# Patient Record
Sex: MALE | Race: WHITE | NOT HISPANIC OR LATINO | Employment: OTHER | URBAN - METROPOLITAN AREA
[De-identification: names, ages, dates, MRNs, and addresses within clinical notes are randomized per-mention and may not be internally consistent; named-entity substitution may affect disease eponyms.]

---

## 2017-07-17 ENCOUNTER — APPOINTMENT (OUTPATIENT)
Dept: CT IMAGING | Facility: CLINIC | Age: 82
End: 2017-07-17
Attending: EMERGENCY MEDICINE
Payer: MEDICARE

## 2017-07-17 ENCOUNTER — HOSPITAL ENCOUNTER (EMERGENCY)
Facility: CLINIC | Age: 82
Discharge: HOME | End: 2017-07-17
Attending: EMERGENCY MEDICINE | Admitting: EMERGENCY MEDICINE
Payer: MEDICARE

## 2017-07-17 VITALS
HEIGHT: 66 IN | DIASTOLIC BLOOD PRESSURE: 70 MMHG | BODY MASS INDEX: 22.53 KG/M2 | OXYGEN SATURATION: 100 % | WEIGHT: 140.2 LBS | SYSTOLIC BLOOD PRESSURE: 176 MMHG | RESPIRATION RATE: 16 BRPM | TEMPERATURE: 98.5 F

## 2017-07-17 DIAGNOSIS — S00.03XA SCALP HEMATOMA, INITIAL ENCOUNTER: ICD-10-CM

## 2017-07-17 DIAGNOSIS — S09.90XA CLOSED HEAD INJURY, INITIAL ENCOUNTER: ICD-10-CM
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
## 2017-07-17 PROCEDURE — 99284 EMERGENCY DEPT VISIT MOD MDM: CPT | Mod: 25

## 2017-07-17 PROCEDURE — 70450 CT HEAD/BRAIN W/O DYE: CPT

## 2017-07-17 NOTE — DISCHARGE INSTRUCTIONS
*No sports or activities that put you at risk for a repeat head injury until you have been without symptoms for 1 week.   *Tylenol or motrin as directed as needed for pain.  *Follow-up with your doctor for a recheck in 2-3 days.  *Return if you develop worsening headache, recurrent vomiting, seizures, neurologic changes or become worse in any way.    Discharge Instructions  Head Injury    You have been seen today for a head injury. You were checked for serious problems, like bleeding on the brain, but these problems cannot always be found right away.  Due to this risk, you should not be alone for 24 hours after your injury.  Follow up with your regular physician in 2-3 days. If you are taking a blood thinner, such as aspirin, Pradaxa  (dabigatran), Coumadin  (warfarin), or Plavix  (clopidogrel), you are at especially high risk for immediate or delayed bleeding, and need to re-check with a physician in 24 hours, or sooner if any of the symptoms below happen.     Return to the Emergency Department if:    You are confused, have amnesia, or you are not acting right.    Your headache gets worse or you start to have a really bad headache even with your recommended treatment plan.    You vomit more than once.    You have a convulsion or seizure.    You have trouble walking.    You have weakness or paralysis in an arm or a leg.    You have blood or fluid coming from your ears or nose.    You have new symptoms or anything that worries you.    Sleeping:  It is okay for you to sleep, but someone should wake you up as instructed by your doctor, and someone should check on you at your usual time to wake up.     Activity:    Do not drive for at least 24 hours.    Do not drive if you have dizzy spells or trouble concentrating, or remembering things.    Do not return to any contact sports until cleared by your regular doctor.     Follow-up:  It is very important that you make an appointment with your clinic and go to the  appointment.  If you do not follow-up with your regular doctor, it may result in missing an important development which could result in permanent injury or disability and/or lasting pain.  If there is any problem keeping your appointment, call your doctor or return to the Emergency Department.    MORE INFORMATION:    Concussion:  A concussion is a minor head injury that may cause temporary problems with the way your brain works.  Some symptoms include:  confusion, amnesia, nausea and vomiting, dizziness, fatigue, memory or concentration problems, irritability and sleep problems.    CT Scans: Your evaluation today may have included a CT scan (CAT scan) to look for things like bleeding or a skull fracture (break).  CT scans involve radiation and too many CT scans can cause serious health problems like cancer, especially in children.  Because of this, your doctor may not have ordered a CT scan today if they think you are at low risk for a serious or life threatening problem.    If you were given a prescription for medicine here today, be sure to read all of the information (including the package insert) that comes with your prescription.  This will include important information about the medicine, its side effects, and any warnings that you need to know about.  The pharmacist who fills the prescription can provide more information and answer questions you may have about the medicine.  If you have questions or concerns that the pharmacist cannot address, please call or return to the Emergency Department.     Opioid Medication Information    Pain medications are among the most commonly prescribed medicines, so we are including this information for all our patients. If you did not receive pain medication or get a prescription for pain medicine, you can ignore it.     You may have been given a prescription for an opioid (narcotic) pain medicine and/or have received a pain medicine while here in the Emergency Department.  These medicines can make you drowsy or impaired. You must not drive, operate dangerous equipment, or engage in any other dangerous activities while taking these medications. If you drive while taking these medications, you could be arrested for DUI, or driving under the influence. Do not drink any alcohol while you are taking these medications.     Opioid pain medications can cause addiction. If you have a history of chemical dependency of any type, you are at a higher risk of becoming addicted to pain medications.  Only take these prescribed medications to treat your pain when all other options have been tried. Take it for as short a time and as few doses as possible. Store your pain pills in a secure place, as they are frequently stolen and provide a dangerous opportunity for children or visitors in your house to start abusing these powerful medications. We will not replace any lost or stolen medicine.  As soon as your pain is better, you should flush all your remaining medication.     Many prescription pain medications contain Tylenol  (acetaminophen), including Vicodin , Tylenol #3 , Norco , Lortab , and Percocet .  You should not take any extra pills of Tylenol  if you are using these prescription medications or you can get very sick.  Do not ever take more than 3000 mg of acetaminophen in any 24 hour period.    All opioids tend to cause constipation. Drink plenty of water and eat foods that have a lot of fiber, such as fruits, vegetables, prune juice, apple juice and high fiber cereal.  Take a laxative if you don t move your bowels at least every other day. Miralax , Milk of Magnesia, Colace , or Senna  can be used to keep you regular.      Remember that you can always come back to the Emergency Department if you are not able to see your regular doctor in the amount of time listed above, if you get any new symptoms, or if there is anything that worries you.

## 2017-07-17 NOTE — ED NOTES
Pt did not turn lights on and fell down about 6 steps, landed hitting head on the left temple area, bruising left shoulder.

## 2017-07-17 NOTE — ED PROVIDER NOTES
History     Chief Complaint:  Fall, head injury    HPI   Jean Pierre Roblero is a 83 year old male who presents with complaints of a fall and a left-sided forehead hematoma. She was reaching for the lites at the top of the stairs and accidentally overstepped and tumbled down approximately 6 stairs which were carpeted and then onto a tile floor. He had no loss of consciousness. He did hit the left side of his head and sustained a goose egg there. He has not had any nausea, vomiting, numbness, weakness, or other complaints. He did sustain an abrasion over his right foot on the top, but has been ambulating and denies any pain there at this time. His wife says he's been acting normally. He is not on any blood thinning medications. He was seen by the paramedics at his home, after this occurred, because his wife was concerned when she found him on the floor. They recommended he come to the ED, but he refused transport at that time. He denies any complaints at this time.  He does have a Band-Aid over his nose, but this was from a skin lesion biopsy or excision done earlier in the week, and not from a current injury.    Allergies:    Allergies   Allergen Reactions     Chocolate Anaphylaxis     headache     [place] GI Disturbance     Feathers      Novocaine [Procaine]      Passes out almost immediately     [place] [facility] Disturbance     Most berries        Medications:      cholestyramine (QUESTRAN) 4 G packet   Simethicone (PHAZYME) 180 MG CAPS   pilocarpine (PILOCAR) 0.5 % ophthalmic solution   Multiple Vitamin (MULTI-VITAMIN) per tablet   Ascorbic Acid (VITAMIN C PO)   Cyanocobalamin (VITAMIN B 12 PO)   Cholecalciferol (VITAMIN D3 PO)   FINASTERIDE PO   HYDROcodone-acetaminophen 5-325 MG per tablet   ondansetron (ZOFRAN-ODT) 8 MG disintegrating tablet     Past Medical History:      Past Medical History:   Diagnosis Date     Allergic state      Fainting episodes      Gastroesophageal reflux disease      Headaches      Heartburn   "    Nocturia      Photosensitivity      Swallowing difficulty        Past Surgical History:      Past Surgical History:   Procedure Laterality Date     BACK SURGERY       CHOLECYSTECTOMY       ENDOSCOPIC SINUS SURGERY  8/20/2012    Procedure: ENDOSCOPIC SINUS SURGERY;  ENDOSCOPIC REMOVAL OF LEFT NASAL MASS WITH LANDMARKS (Fusion Tracking [place]), BILATERAL INFERIOR TURBINOPLASTY;  Surgeon: Dima Younger MD;  Location: [facility]     EYE SURGERY      cataracts     HERNIA REPAIR       ORTHOPEDIC SURGERY      ingrown toe nail removal     TURBINOPLASTY  8/20/2012    Procedure: TURBINOPLASTY;;  Surgeon: Dima Younger MD;  Location: [facility]       Family History:    Noncontributory    Social History:  Here today with his wife. Lives at home.  Marital Status:   [2]  Social History   Substance Use Topics     Smoking status: Never Smoker     Smokeless tobacco: Never Used     Alcohol use No        Review of Systems  As noted per HPI.  Remainder of a 10 point review of systems was negative.    Physical Exam   First Vitals:  BP: 176/70  Heart Rate: 78  Temp: 98.5  F (36.9  C)  Resp: 16  Height: 167.6 cm (5' 6\")  Weight: 63.6 kg (140 lb 3.2 oz)  SpO2: 100 %    Physical Exam  General: Well-nourished, no acute distress  Eyes: PERRL, conjunctivae pink no scleral icterus or conjunctival injection  ENT:  Moist mucus membranes, posterior oropharynx clear without erythema or exudates, hearing aids in place  Respiratory:  Lungs clear to auscultation bilaterally, no crackles/rubs/wheezes.  Good air movement.  No seatbelt sign or ecchymoses  CV: Normal rate and rhythm, no murmurs/rubs/gallops  GI:  Abdomen soft and non-distended.  Normoactive BS.  No tenderness, guarding or rebound  Skin: Warm, dry.  No rashes or petechiae.  No lacerations.  Superficial abrasion over right foot.  Musculoskeletal: Hematoma over left forehead.  No peripheral edema or calf tenderness.  No midline tenderness of the cervical/thoracic/lumbar spine.  " No tenderness/crepitus/bony stepoffs over the clavicles, chest wall, pelvis, arms or legs.  Neuro: Alert and oriented to person/place/time  Psychiatric: Normal affect    Emergency Department Course     Imaging:  Radiographic findings were communicated with the patient and family who voiced understanding of the findings.  Head CT w/o contrast   Final Result   IMPRESSION:   1. Mild soft tissue swelling overlying the left frontal bone   anteriorly.    2. Mild diffuse chronic changes of the brain. No acute intracranial   abnormality.          ERLIN ROCHE MD        Emergency Department Course:  After reviewing nursing notes and the patient's past medical history, I examined the patient here in the emergency department. I discussed with him the plan of care including CT workup, and he was in agreement with this plan.     Recheck: After evaluation here in the emergency department, I reassured the patient regarding his symptoms and discussed outpatient management and supportive care at home. I also reviewed with him signs and symptoms to watch and return for. The patient agreed with discharge and will follow up with his primary physician. With reasonable clinical confidence, I believe that the patient is stable to receive further medical care as an outpatient. Patient was given copies of labs and radiographic findings.      Impression & Plan      Medical Decision Making:  Jean Pierre Roblero is a 83 year old male presents with a history and clinical exam consistent with closed head injury.  This patient denies seizure, and has no focal neurological findings.  The patient did not have prolonged LOC, sleepiness, repeated emesis, poor orientation, or significant irritability.  Given his age, however, head CT is indicated. I have discussed the risk/benefit analysis with the patient and family regarding CT imaging.  Fortunately this was negative.  The remainder of his head to toe trauma exam is essentially normal. He is at his  "baseline mental status and well appearing. The patient/family understand that they must return if any \"red flags\" appear/develop in the coming hours/days, as this may represent an indication to perform a CT scan.  I have noted that \"red flags\" include: headaches that get worse, increased drowsiness, strange behavior, repetitive speech, seizures, repeated vomiting, growing confusion, increased irritability, slurred speech, weakness or numbness, and loss of responsiveness.  This information will also be provided in writing at discharge.    Diagnosis:    ICD-10-CM    1. Closed head injury, initial encounter S09.90XA    2. Scalp hematoma, initial encounter S00.03XA    3. Fall, initial encounter W19.XXXA        Disposition:  discharged to home    Discharge Medications:  Discharge Medication List as of 7/17/2017  5:17 AM            Madelin Walter  7/17/2017    EMERGENCY DEPARTMENT       Madelin Walter MD  07/17/17 0640    "

## 2017-07-17 NOTE — ED AVS SNAPSHOT
Emergency Department    [address] [facility] [address]    Phone:  [phone]    Fax:  [phone]                                       Jean Pierre Roblero   MRN: [id number]    Department:   Emergency Department   Date of Visit:  7/17/2017           Patient Information     Date Of Birth          6/21/1934        Your diagnoses for this visit were:     Closed head injury, initial encounter     Scalp hematoma, initial encounter     Fall, initial encounter        You were seen by Rogelio Pitt MD and Madelin Walter MD.      Follow-up Information     Follow up with Kolby Bentley MD. Schedule an appointment as soon as possible for a visit in 3 days.    Specialty:  Internal Medicine    Contact information:    [address] [place] [address]  [phone]          Discharge Instructions       *No sports or activities that put you at risk for a repeat head injury until you have been without symptoms for 1 week.   *Tylenol or motrin as directed as needed for pain.  *Follow-up with your doctor for a recheck in 2-3 days.  *Return if you develop worsening headache, recurrent vomiting, seizures, neurologic changes or become worse in any way.    Discharge Instructions  Head Injury    You have been seen today for a head injury. You were checked for serious problems, like bleeding on the brain, but these problems cannot always be found right away.  Due to this risk, you should not be alone for 24 hours after your injury.  Follow up with your regular physician in 2-3 days. If you are taking a blood thinner, such as aspirin, Pradaxa  (dabigatran), Coumadin  (warfarin), or Plavix  (clopidogrel), you are at especially high risk for immediate or delayed bleeding, and need to re-check with a physician in 24 hours, or sooner if any of the symptoms below happen.     Return to the Emergency Department if:    You are confused, have amnesia, or you are not acting right.    Your  headache gets worse or you start to have a really bad headache even with your recommended treatment plan.    You vomit more than once.    You have a convulsion or seizure.    You have trouble walking.    You have weakness or paralysis in an arm or a leg.    You have blood or fluid coming from your ears or nose.    You have new symptoms or anything that worries you.    Sleeping:  It is okay for you to sleep, but someone should wake you up as instructed by your doctor, and someone should check on you at your usual time to wake up.     Activity:    Do not drive for at least 24 hours.    Do not drive if you have dizzy spells or trouble concentrating, or remembering things.    Do not return to any contact sports until cleared by your regular doctor.     Follow-up:  It is very important that you make an appointment with your clinic and go to the appointment.  If you do not follow-up with your regular doctor, it may result in missing an important development which could result in permanent injury or disability and/or lasting pain.  If there is any problem keeping your appointment, call your doctor or return to the Emergency Department.    MORE INFORMATION:    Concussion:  A concussion is a minor head injury that may cause temporary problems with the way your brain works.  Some symptoms include:  confusion, amnesia, nausea and vomiting, dizziness, fatigue, memory or concentration problems, irritability and sleep problems.    CT Scans: Your evaluation today may have included a CT scan (CAT scan) to look for things like bleeding or a skull fracture (break).  CT scans involve radiation and too many CT scans can cause serious health problems like cancer, especially in children.  Because of this, your doctor may not have ordered a CT scan today if they think you are at low risk for a serious or life threatening problem.    If you were given a prescription for medicine here today, be sure to read all of the information (including  the package insert) that comes with your prescription.  This will include important information about the medicine, its side effects, and any warnings that you need to know about.  The pharmacist who fills the prescription can provide more information and answer questions you may have about the medicine.  If you have questions or concerns that the pharmacist cannot address, please call or return to the Emergency Department.     Opioid Medication Information    Pain medications are among the most commonly prescribed medicines, so we are including this information for all our patients. If you did not receive pain medication or get a prescription for pain medicine, you can ignore it.     You may have been given a prescription for an opioid (narcotic) pain medicine and/or have received a pain medicine while here in the Emergency Department. These medicines can make you drowsy or impaired. You must not drive, operate dangerous equipment, or engage in any other dangerous activities while taking these medications. If you drive while taking these medications, you could be arrested for DUI, or driving under the influence. Do not drink any alcohol while you are taking these medications.     Opioid pain medications can cause addiction. If you have a history of chemical dependency of any type, you are at a higher risk of becoming addicted to pain medications.  Only take these prescribed medications to treat your pain when all other options have been tried. Take it for as short a time and as few doses as possible. Store your pain pills in a secure place, as they are frequently stolen and provide a dangerous opportunity for children or visitors in your house to start abusing these powerful medications. We will not replace any lost or stolen medicine.  As soon as your pain is better, you should flush all your remaining medication.     Many prescription pain medications contain Tylenol  (acetaminophen), including Vicodin , Tylenol  #3 , Norco , Lortab , and Percocet .  You should not take any extra pills of Tylenol  if you are using these prescription medications or you can get very sick.  Do not ever take more than 3000 mg of acetaminophen in any 24 hour period.    All opioids tend to cause constipation. Drink plenty of water and eat foods that have a lot of fiber, such as fruits, vegetables, prune juice, apple juice and high fiber cereal.  Take a laxative if you don t move your bowels at least every other day. Miralax , Milk of Magnesia, Colace , or Senna  can be used to keep you regular.      Remember that you can always come back to the Emergency Department if you are not able to see your regular doctor in the amount of time listed above, if you get any new symptoms, or if there is anything that worries you.            24 Hour Appointment Hotline       To make an appointment at any [facility] call [phone] ([phone]). If you don't have a family doctor or clinic, we will help you find one. [facility] are conveniently located to serve the needs of you and your family.             Review of your medicines      Our records show that you are taking the medicines listed below. If these are incorrect, please call your family doctor or clinic.        Dose / Directions Last dose taken    cholestyramine 4 G Packet   Commonly known as:  QUESTRAN   Dose:  1 packet        Take 1 packet by mouth 2 times daily (with meals).   Refills:  0        FINASTERIDE PO   Dose:  5 mg        Take 5 mg by mouth daily.   Refills:  0        HYDROcodone-acetaminophen 5-325 MG per tablet   Commonly known as:  NORCO   Dose:  1-2 tablet   Quantity:  30 tablet        Take 1-2 tablets by mouth every 4 hours as needed for other (Moderate to Severe Pain).   Refills:  0        Multi-vitamin Tabs tablet   Dose:  1 tablet   Generic drug:  multivitamin, therapeutic with minerals        Take 1 tablet by mouth daily.   Refills:  0        ondansetron 8 MG ODT  tab   Commonly known as:  ZOFRAN-ODT   Dose:  8 mg   Quantity:  10 tablet        Take 1 tablet by mouth every 8 hours as needed for nausea.   Refills:  0        PHAZYME 180 MG Caps   Dose:  180 mg   Generic drug:  Simethicone        Take 180 mg by mouth 3 times daily.   Refills:  0        pilocarpine 0.5 % ophthalmic solution   Commonly known as:  PILOCAR   Dose:  1-2 drop        1-2 drops At Bedtime.   Refills:  0        VITAMIN B 12 PO   Dose:  1000 mg        Take 1,000 mg by mouth daily.   Refills:  0        VITAMIN C PO   Dose:  500 mg        Take 500 mg by mouth daily.   Refills:  0        VITAMIN D3 PO   Dose:  1000 Units        Take 1,000 Units by mouth daily.   Refills:  0                Procedures and tests performed during your visit     Head CT w/o contrast      Orders Needing Specimen Collection     None      Pending Results     No orders found from 7/15/2017 to 7/18/2017.            Pending Culture Results     No orders found from 7/15/2017 to 7/18/2017.            Pending Results Instructions     If you had any lab results that were not finalized at the time of your Discharge, you can call the ED Lab Result RN at [phone]. You will be contacted by this team for any positive Lab results or changes in treatment. The nurses are available 7 days a week from 10A to 6:30P.  You can leave a message 24 hours per day and they will return your call.        Test Results From Your Hospital Stay        7/17/2017  5:03 AM      Narrative     CT HEAD W/O CONTRAST   7/17/2017 4:54 AM     HISTORY: Fall. Head injury.     TECHNIQUE: Volumetric helical acquisition through the head without IV  contrast, displayed as 0.5 cm axial reconstructions. Radiation dose  for this scan was reduced using automated exposure control, adjustment  of the mA and/or kV according to patient size, or iterative  reconstruction technique.    COMPARISON: 7/1/2011.     FINDINGS: Mild soft tissue swelling overlying the left frontal bone.   Mild  generalized cerebral and cerebellar atrophy. Mild low attenuation  in the periventricular white matter consistent with small vessel  ischemic changes of aging. The visualized sinuses and mastoid air  cells are normal. Ventricular size and configuration are within normal  limits, given the degree of generalized atrophy. No evidence for  intracranial hemorrhage or fracture.        Impression     IMPRESSION:  1. Mild soft tissue swelling overlying the left frontal bone  anteriorly.   2. Mild diffuse chronic changes of the brain. No acute intracranial  abnormality.       ERLIN ROCHE MD                Clinical Quality Measure: Blood Pressure Screening     Your blood pressure was checked while you were in the emergency department today. The last reading we obtained was  BP: 176/70 . Please read the guidelines below about what these numbers mean and what you should do about them.  If your systolic blood pressure (the top number) is less than 120 and your diastolic blood pressure (the bottom number) is less than 80, then your blood pressure is normal. There is nothing more that you need to do about it.  If your systolic blood pressure (the top number) is 120-139 or your diastolic blood pressure (the bottom number) is 80-89, your blood pressure may be higher than it should be. You should have your blood pressure rechecked within a year by a primary care provider.  If your systolic blood pressure (the top number) is 140 or greater or your diastolic blood pressure (the bottom number) is 90 or greater, you may have high blood pressure. High blood pressure is treatable, but if left untreated over time it can put you at risk for heart attack, stroke, or kidney failure. You should have your blood pressure rechecked by a primary care provider within the next 4 weeks.  If your provider in the emergency department today gave you specific instructions to follow-up with your doctor or provider even sooner than that, you should  "follow that instruction and not wait for up to 4 weeks for your follow-up visit.        Thank you for choosing [facility]       Thank you for choosing [facility] for your care. Our goal is always to provide you with excellent care. Hearing back from our patients is one way we can continue to improve our services. Please take a few minutes to complete the written survey that you may receive in the mail after you visit with us. Thank you!        [employer] Information     [employer] lets you send messages to your doctor, view your test results, renew your prescriptions, schedule appointments and more. To sign up, go to [email or web address] . Click on \"Log in\" on the left side of the screen, which will take you to the Welcome page. Then click on \"Sign up Now\" on the right side of the page.     You will be asked to enter the access code listed below, as well as some personal information. Please follow the directions to create your username and password.     Your access code is: [email or web address]  Expires: 10/15/2017  5:12 AM     Your access code will  in 90 days. If you need help or a new code, please call your [facility] clinic or [phone].        Care EveryWhere ID     This is your Care EveryWhere ID. This could be used by other organizations to access your [facility] medical records  [id number]        Equal Access to Services     KADI JOYCE : Hadave Jolley, waaxda luqadaha, qaybta kaalmada adenoemi, sarkis vargas . So [facility] [phone].    ATENCIÓN: Si habla español, tiene a aden disposición servicios gratuitos de asistencia lingüística. Llame al [phone].    We comply with applicable federal civil rights laws and [place] laws. We do not discriminate on the basis of race, color, national origin, age, disability sex, sexual orientation or gender identity.            After Visit Summary       This is your record. Keep this with you and show to your community pharmacist(s) " and doctor(s) at your next visit.

## 2017-07-17 NOTE — ED AVS SNAPSHOT
Emergency Department    [address] [place] [address]    Phone:  [phone]    Fax:  [phone]                                       Jean Pierre Roblero   MRN: [id number]    Department:   Emergency Department   Date of Visit:  7/17/2017           After Visit Summary Signature Page     I have received my discharge instructions, and my questions have been answered. I have discussed any challenges I see with this plan with the nurse or doctor.    ..........................................................................................................................................  Patient/Patient Representative Signature      ..........................................................................................................................................  Patient Representative Print Name and Relationship to Patient    ..................................................               ................................................  Date                                            Time    ..........................................................................................................................................  Reviewed by Signature/Title    ...................................................              ..............................................  Date                                                            Time

## 2017-10-31 ENCOUNTER — THERAPY VISIT (OUTPATIENT)
Dept: PHYSICAL THERAPY | Facility: CLINIC | Age: 82
End: 2017-10-31
Payer: MEDICARE

## 2017-10-31 DIAGNOSIS — M54.42 BILATERAL LOW BACK PAIN WITH LEFT-SIDED SCIATICA: Primary | ICD-10-CM

## 2017-10-31 PROCEDURE — G8979 MOBILITY GOAL STATUS: HCPCS | Mod: GP | Performed by: PHYSICAL THERAPIST

## 2017-10-31 PROCEDURE — 97110 THERAPEUTIC EXERCISES: CPT | Mod: GP | Performed by: PHYSICAL THERAPIST

## 2017-10-31 PROCEDURE — G8978 MOBILITY CURRENT STATUS: HCPCS | Mod: GP | Performed by: PHYSICAL THERAPIST

## 2017-10-31 PROCEDURE — 97161 PT EVAL LOW COMPLEX 20 MIN: CPT | Mod: GP | Performed by: PHYSICAL THERAPIST

## 2017-10-31 NOTE — MR AVS SNAPSHOT
"              After Visit Summary   10/31/2017    Jean Pierre Roblero    MRN: [id number]           Patient Information     Date Of Birth          6/21/1934        Visit Information        Provider Department      10/31/2017 1:20 PM Leonila Samuels PT [facility] Physical [facility]        Today's Diagnoses     Bilateral low back pain with left-sided sciatica    -  1       Follow-ups after your visit        Your next 10 appointments already scheduled     Nov 07, 2017  9:30 AM CST   MARTHA Spine with Leonila Samuels PT   [facility]    [address] [id number] [phone]              Who to contact     If you have questions or need follow up information about today's clinic visit or your schedule please contact [facility] THERAPY directly at [phone].  Normal or non-critical lab and imaging results will be communicated to you by [employer], letter or phone within 4 business days after the clinic has received the results. If you do not hear from us within 7 days, please contact the clinic through [employer] or phone. If you have a critical or abnormal lab result, we will notify you by phone as soon as possible.  Submit refill requests through [employer] or call your pharmacy and they will forward the refill request to us. Please allow 3 business days for your refill to be completed.          Additional Information About Your Visit        [employer] Information     [employer] lets you send messages to your doctor, view your test results, renew your prescriptions, schedule appointments and more. To sign up, go to [email or web address] . Click on \"Log in\" on the left side of the screen, which will take you to the Welcome page. Then click on \"Sign up Now\" on the right side of the page.     You will be asked to enter the access code listed below, as well as some " personal information. Please follow the directions to create your username and password.     Your access code is: [email or web address]  Expires: 2018  6:34 AM     Your access code will  in 90 days. If you need help or a new code, please call your [facility] clinic or [phone].        Care EveryWhere ID     This is your Care EveryWhere ID. This could be used by other organizations to access your [facility] medical records  [id number]         Blood Pressure from Last 3 Encounters:   17 176/70   12 160/101    Weight from Last 3 Encounters:   17 63.6 kg (140 lb 3.2 oz)   12 66.1 kg (145 lb 12.8 oz)              We Performed the Following     MARTHA CERT REPORT     MARTHA Inital Eval Report     PT Eval, Low Complexity ([address])     Therapeutic Exercises        Primary Care Provider Office Phone # Fax #    Kolby Zaki Bentley [phone]       [facility] [address] [place] [address]        Equal Access to Services     [facility]: Hadii aad ku hadasho Soomaali, waaxda luqadaha, qaybta kaalmada adenoemi, sarkis vargas . So [facility] [phone].    ATENCIÓN: Si habla español, tiene a aden disposición servicios gratuitos de asistencia lingüística. Jaleel al [phone].    We comply with applicable federal civil rights laws and [place] laws. We do not discriminate on the basis of race, color, national origin, age, disability, sex, sexual orientation, or gender identity.            Thank you!     Thank you for choosing [facility]  for your care. Our goal is always to provide you with excellent care. Hearing back from our patients is one way we can continue to improve our services. Please take a few minutes to complete the written survey that you may receive in the mail after your visit with us. Thank you!             Your Updated Medication List - Protect others around you: Learn how to  safely use, store and throw away your medicines at www.disposemymeds.org.          This list is accurate as of: 10/31/17 11:59 PM.  Always use your most recent med list.                   Brand Name Dispense Instructions for use Diagnosis    cholestyramine 4 G Packet    QUESTRAN     Take 1 packet by mouth 2 times daily (with meals).        FINASTERIDE PO      Take 5 mg by mouth daily.        HYDROcodone-acetaminophen 5-325 MG per tablet    NORCO    30 tablet    Take 1-2 tablets by mouth every 4 hours as needed for other (Moderate to Severe Pain).    Nasal cavity mass       Multi-vitamin Tabs tablet   Generic drug:  multivitamin, therapeutic with minerals      Take 1 tablet by mouth daily.        ondansetron 8 MG ODT tab    ZOFRAN-ODT    10 tablet    Take 1 tablet by mouth every 8 hours as needed for nausea.    Nasal cavity mass       PHAZYME 180 MG Caps   Generic drug:  Simethicone      Take 180 mg by mouth 3 times daily.        pilocarpine 0.5 % ophthalmic solution    PILOCAR     1-2 drops At Bedtime.        VITAMIN B 12 PO      Take 1,000 mg by mouth daily.        VITAMIN C PO      Take 500 mg by mouth daily.        VITAMIN D3 PO      Take 1,000 Units by mouth daily.

## 2017-10-31 NOTE — PROGRESS NOTES
"Subjective:    Patient is a 83 year old male presenting with rehab back hpi.   Jean Pierre Roblero is a 83 year old male with a lumbar condition.  Condition occurred with:  Insidious onset.  Condition occurred: for unknown reasons.  This is a new condition  Pt presents with complaints of numbness of L LE. Pt reported onset of sx's 2 weeks prior without precipitating event. Pt reported concern due to presentation of similar sx's 2 years ago (prior to low back surgery). Pt reported MD office visit; MRI ordered. Return to MD office on 10-30-17 to review MRI results. PT orders generated. Pt reported sx's in L LE are intermittent; generally occurring with sitting on hard/firm chairs and also during walking. Pt reported walking 5 days a week, up to 30 minutes/session. Pt aware of numbness in left lower leg during walking as well as \"fatigue\" of bilateral lower legs while walking.    Patient reports pain:  Lumbar spine right and lumbar spine left.  Radiates to:  Foot left, lower leg left and thigh left.  Quality: numbness, fatigue.    Associated symptoms:  Numbness and tingling. Pain is worse during the day.  Symptoms are exacerbated by sitting and walking and relieved by activity/movement.  Since onset symptoms are gradually improving.  Special tests:  MRI.  Previous treatment includes surgery.  There was moderate improvement following previous treatment.  General health as reported by patient is good.                  Barriers include:  None as reported by the patient.    Red flags:  None as reported by the patient.                        Objective:    Standing Alignment:    Cervical/Thoracic:  Convex thoracic scoliosis R    Lumbar:  Lordosis decr  Pelvic:  Iliac crest high L                         Lumbar/SI Evaluation  ROM:    AROM Lumbar:   Flexion:          WFL's, no provocation of sx's  Ext:                    Significant loss, no provocation of sx's   Side Bend:        Left:  Significant loss, no provocation of sx's    " Right:  WFL's, no provocation of sx's  Rotation:           Left:     Right:   Side Glide:        Left:     Right:                         Lumbar Provocation:      Left negative with:  PROM hip    Right negative with:  PROM hip                                      Hip Evaluation  Hip PROM:    Flexion: Left: WFL   Right: WFL  Extension: Left: 0   Right: 0      Internal Rotation: Left: 20+    Right: 20+  External Rotation: Left: WFL    Right: WFL                               General     ROS    Assessment/Plan:      Patient is a 83 year old male with lumbar complaints.    Patient has the following significant findings with corresponding treatment plan.                Diagnosis 1:  Low back pain with left LE sx's  Pain -  manual therapy, self management, education and home program  Decreased ROM/flexibility - manual therapy and therapeutic exercise  Decreased joint mobility - manual therapy and therapeutic exercise  Decreased function - therapeutic activities    Therapy Evaluation Codes:   1) History comprised of:   Personal factors that impact the plan of care:      Age.    Comorbidity factors that impact the plan of care are:      Numbness/tingling and Osteoarthritis.     Medications impacting care: None.  2) Examination of Body Systems comprised of:   Body structures and functions that impact the plan of care:      Lumbar spine.   Activity limitations that impact the plan of care are:      Walking.  3) Clinical presentation characteristics are:   Stable/Uncomplicated.  4) Decision-Making    Low complexity using standardized patient assessment instrument and/or measureable assessment of functional outcome.  Cumulative Therapy Evaluation is: Low complexity.    Previous and current functional limitations:  (See Goal Flow Sheet for this information)    Short term and Long term goals: (See Goal Flow Sheet for this information)     Communication ability:  Patient appears to be able to clearly communicate and understand  verbal and written communication and follow directions correctly.  Treatment Explanation - The following has been discussed with the patient:   RX ordered/plan of care  Anticipated outcomes  Possible risks and side effects  This patient would benefit from PT intervention to resume normal activities.   Rehab potential is good.    Frequency:  1 X week, once daily  Duration:  for 6 weeks  Discharge Plan:  Achieve all LTG.  Independent in home treatment program.  Reach maximal therapeutic benefit.    Please refer to the daily flowsheet for treatment today, total treatment time and time spent performing 1:1 timed codes.

## 2017-10-31 NOTE — LETTER
"DEPARTMENT OF HEALTH AND HUMAN SERVICES  CENTERS FOR MEDICARE & MEDICAID SERVICES    PLAN/UPDATED PLAN OF PROGRESS FOR OUTPATIENT REHABILITATION    PATIENTS NAME:  Jean Pierre Roblero   : 1934  PR[facility] NUMBER:    [id number]  [id number]  PR[facility]: [facility]  MEDICAL RECORD NUMBER: [id number]   START OF CARE DATE:  SOC Date: 10/31/17   TYPE:  PT  PRIMARY/TREATMENT DIAGNOSIS: (Pertinent Medical Diagnosis)  Bilateral low back pain with left-sided sciatica    VISITS FROM START OF CARE:  Rxs Used: 1     Subjective:  Jean Pierre Roblero is a 83 year old male with a lumbar condition.  Condition occurred with:  Insidious onset.  Condition occurred: for unknown reasons.  This is a new condition  Pt presents with complaints of numbness of L LE. Pt reported onset of sx's 2 weeks prior without precipitating event. Pt reported concern due to presentation of similar sx's 2 years ago (prior to low back surgery). Pt reported MD office visit; MRI ordered. Return to MD office on 10-30-17 to review MRI results. PT orders generated. Pt reported sx's in L LE are intermittent; generally occurring with sitting on hard/firm chairs and also during walking. Pt reported walking 5 days a week, up to 30 minutes/session. Pt aware of numbness in left lower leg during walking as well as \"fatigue\" of bilateral lower legs while walking.  Patient reports pain:  Lumbar spine right and lumbar spine left.  Radiates to:  Foot left, lower leg left and thigh left.  Quality: numbness, fatigue.    Associated symptoms:  Numbness and tingling. Pain is worse during the day.  Symptoms are exacerbated by sitting and walking and relieved by activity/movement.  Since onset symptoms are gradually improving.  Special tests:  MRI.  Previous treatment includes surgery.  There was moderate improvement following previous treatment.  General health as reported by patient is good.  Barriers include:  None as " reported by the patient.  Red flags:  None as reported by the patient.  Pertinent medical history includes:  Osteoarthritis.  Other surgeries include:  Other (Gall bladder, back, hernia).    Current occupation is Retired.  Oswestry Score: 20 %       Objective:  Standing Alignment:    Cervical/Thoracic:  Convex thoracic scoliosis R  Lumbar:  Lordosis decr  Pelvic:  Iliac crest high L  Lumbar/SI Evaluation  ROM:    AROM Lumbar:   Flexion:          WFL's, no provocation of sx's  Ext:                    Significant loss, no provocation of sx's   Side Bend:        Left:  Significant loss, no provocation of sx's    Right:  WFL's, no provocation of sx's  Rotation:           Left:     Right:   Side Glide:        Left:     Right:         Lumbar Provocation:    Left negative with:  PROM hip  Right negative with:  PROM hip  Hip Evaluation  Hip PROM:    Flexion: Left: WFL   Right: WFL  Extension: Left: 0   Right: 0  Internal Rotation: Left: 20+    Right: 20+  External Rotation: Left: WFL    Right: WFL    ROS  Assessment/Plan:    Patient is a 83 year old male with lumbar complaints.    Patient has the following significant findings with corresponding treatment plan.                Diagnosis 1:  Low back pain with left LE sx's  Pain -  manual therapy, self management, education and home program  Decreased ROM/flexibility - manual therapy and therapeutic exercise  Decreased joint mobility - manual therapy and therapeutic exercise  Decreased function - therapeutic activities    Therapy Evaluation Codes:   1) History comprised of:   Personal factors that impact the plan of care:      Age.    Comorbidity factors that impact the plan of care are:      Numbness/tingling and Osteoarthritis.     Medications impacting care: None.  2) Examination of Body Systems comprised of:   Body structures and functions that impact the plan of care:      Lumbar spine.   Activity limitations that impact the plan of care are:      Walking.  3) Clinical  "presentation characteristics are:   Stable/Uncomplicated.  4) Decision-Making    Low complexity using standardized patient assessment instrument and/or   measureable assessment of functional outcome.  Cumulative Therapy Evaluation is: Low complexity.    Previous and current functional limitations:  (See Goal Flow Sheet for this information)    Short term and Long term goals: (See Goal Flow Sheet for this information)     Communication ability:  Patient appears to be able to clearly communicate and understand verbal and written communication and follow directions correctly.  Treatment Explanation - The following has been discussed with the patient:   RX ordered/plan of care  Anticipated outcomes  Possible risks and side effects  This patient would benefit from PT intervention to resume normal activities.   Rehab potential is good.        Frequency:  1 X week, once daily  Duration:  for 6 weeks  Discharge Plan:  Achieve all LTG.  Independent in home treatment program.  Reach maximal therapeutic benefit.    Caregiver Signature/Credentials _____________________________ Date ________       Treating Provider: Leonila Samuels, PT   I have reviewed and certified the need for these services and plan of treatment while under my care.        PHYSICIAN'S SIGNATURE:   ___________________________________  Date___________                                 Osvaldo Vines MD    Certification period:  Beginning of Cert date period: 10/31/17 to  End of Cert period date: 01/03/18     Functional Level Progress Report: Please see attached \"Goal Flow sheet for Functional level.\"    ____X____ Continue Services or       ________ DC Services                Service dates: From  SOC Date: 10/31/17 date to present                         "

## 2017-11-02 PROBLEM — M54.42 BILATERAL LOW BACK PAIN WITH LEFT-SIDED SCIATICA: Status: ACTIVE | Noted: 2017-11-02

## 2017-11-02 NOTE — PROGRESS NOTES
Subjective:    Patient is a 83 year old male presenting with rehab left ankle/foot hpi.                                      Pertinent medical history includes:  Osteoarthritis.    Other surgeries include:  Other (Gall bladder, back, hernia).    Current occupation is Retired.                  Oswestry Score: 20 %                 Objective:    System    Physical Exam    General     ROS    Assessment/Plan:

## 2017-11-07 ENCOUNTER — THERAPY VISIT (OUTPATIENT)
Dept: PHYSICAL THERAPY | Facility: CLINIC | Age: 82
End: 2017-11-07
Payer: MEDICARE

## 2017-11-07 DIAGNOSIS — M54.42 ACUTE BILATERAL LOW BACK PAIN WITH LEFT-SIDED SCIATICA: ICD-10-CM

## 2017-11-07 PROCEDURE — 97112 NEUROMUSCULAR REEDUCATION: CPT | Mod: GP | Performed by: PHYSICAL THERAPIST

## 2017-11-07 PROCEDURE — 97110 THERAPEUTIC EXERCISES: CPT | Mod: GP | Performed by: PHYSICAL THERAPIST

## 2017-11-14 ENCOUNTER — THERAPY VISIT (OUTPATIENT)
Dept: PHYSICAL THERAPY | Facility: CLINIC | Age: 82
End: 2017-11-14
Payer: MEDICARE

## 2017-11-14 DIAGNOSIS — M54.42 ACUTE BILATERAL LOW BACK PAIN WITH LEFT-SIDED SCIATICA: ICD-10-CM

## 2017-11-14 PROCEDURE — G8980 MOBILITY D/C STATUS: HCPCS | Mod: GP | Performed by: PHYSICAL THERAPIST

## 2017-11-14 PROCEDURE — 97110 THERAPEUTIC EXERCISES: CPT | Mod: GP | Performed by: PHYSICAL THERAPIST

## 2017-11-14 PROCEDURE — G8979 MOBILITY GOAL STATUS: HCPCS | Mod: GP | Performed by: PHYSICAL THERAPIST

## 2017-11-14 NOTE — MR AVS SNAPSHOT
"              After Visit Summary   2017    Jean Pierre Roblero    MRN: [id number]           Patient Information     Date Of Birth          1934        Visit Information        Provider Department      2017 9:30 AM Leonila Samuels PT [facility] Physical Therapy        Today's Diagnoses     Acute bilateral low back pain with left-sided sciatica           Follow-ups after your visit        Who to contact     If you have questions or need follow up information about today's clinic visit or your schedule please contact [facility] PHYSICAL [facility] directly at [phone].  Normal or non-critical lab and imaging results will be communicated to you by [employer], letter or phone within 4 business days after the clinic has received the results. If you do not hear from us within 7 days, please contact the clinic through [employer] or phone. If you have a critical or abnormal lab result, we will notify you by phone as soon as possible.  Submit refill requests through [employer] or call your pharmacy and they will forward the refill request to us. Please allow 3 business days for your refill to be completed.          Additional Information About Your Visit        MyChart Information     [employer] lets you send messages to your doctor, view your test results, renew your prescriptions, schedule appointments and more. To sign up, go to [email or web address] . Click on \"Log in\" on the left side of the screen, which will take you to the Welcome page. Then click on \"Sign up Now\" on the right side of the page.     You will be asked to enter the access code listed below, as well as some personal information. Please follow the directions to create your username and password.     Your access code is: [email or web address]  Expires: 2018  5:34 AM     Your access code will  in 90 days. If you need help or a new code, please call your [facility] clinic or " [phone].        Care EveryWhere ID     This is your Care EveryWhere ID. This could be used by other organizations to access your [place] medical records  [id number]         Blood Pressure from Last 3 Encounters:   07/17/17 176/70   08/20/12 160/101    Weight from Last 3 Encounters:   07/17/17 63.6 kg (140 lb 3.2 oz)   08/20/12 66.1 kg (145 lb 12.8 oz)              We Performed the Following     MARTHA Progress Notes Report     Therapeutic Exercises        Primary Care Provider Office Phone # Fax #    Kolby Zaki Bentley [phone]       [facility] [address] [place] [address]        Equal Access to Services     INDIGO [facility] : Hadii aad ku hadasho Soomaali, waaxda luqadaha, qaybta kaalmada adeegyada, waxay nitinin hayfortunaton lei vargas . So [facility] [phone].    ATENCIÓN: Si habla español, tiene a aden disposición servicios gratuitos de asistencia lingüística. [facility] [phone].    We comply with applicable federal civil rights laws and [place] laws. We do not discriminate on the basis of race, color, national origin, age, disability, sex, sexual orientation, or gender identity.            Thank you!     Thank you for choosing [facility] PHYSICAL THERAPY  for your care. Our goal is always to provide you with excellent care. Hearing back from our patients is one way we can continue to improve our services. Please take a few minutes to complete the written survey that you may receive in the mail after your visit with us. Thank you!             Your Updated Medication List - Protect others around you: Learn how to safely use, store and throw away your medicines at www.disposemymeds.org.          This list is accurate as of: 11/14/17 10:52 AM.  Always use your most recent med list.                   Brand Name Dispense Instructions for use Diagnosis    cholestyramine 4 G Packet    QUESTRAN     Take 1 packet by mouth 2 times daily  (with meals).        FINASTERIDE PO      Take 5 mg by mouth daily.        HYDROcodone-acetaminophen 5-325 MG per tablet    NORCO    30 tablet    Take 1-2 tablets by mouth every 4 hours as needed for other (Moderate to Severe Pain).    Nasal cavity mass       Multi-vitamin Tabs tablet   Generic drug:  multivitamin, therapeutic with minerals      Take 1 tablet by mouth daily.        ondansetron 8 MG ODT tab    ZOFRAN-ODT    10 tablet    Take 1 tablet by mouth every 8 hours as needed for nausea.    Nasal cavity mass       PHAZYME 180 MG Caps   Generic drug:  Simethicone      Take 180 mg by mouth 3 times daily.        pilocarpine 0.5 % ophthalmic solution    PILOCAR     1-2 drops At Bedtime.        VITAMIN B 12 PO      Take 1,000 mg by mouth daily.        VITAMIN C PO      Take 500 mg by mouth daily.        VITAMIN D3 PO      Take 1,000 Units by mouth daily.

## 2017-11-14 NOTE — PROGRESS NOTES
Subjective:    HPI  Oswestry Score: 10 %                 Objective:    System    Physical Exam    General     ROS    Assessment/Plan:      DISCHARGE REPORT    Progress reporting period is from 10-31-17 to 11-14-17.       SUBJECTIVE  Subjective changes noted by patient:   Pt reported general absence of of L LE sx's (i.e. numbness). Able to resolve numbness with active movement of the LE.  Walking without restrictions.   Current pain level is  0/10.     Previous pain level was  0/10.  Changes in function:  Yes (See Goal flowsheet attached for changes in current functional level)  Adverse reaction to treatment or activity: None    OBJECTIVE  Objective: ALROM: WFL's; moderate to significant loss of lumbar extension without pain. Negative neuro exam.  ASSESSMENT/PLAN  Updated problem list and treatment plan: Diagnosis 1:  Left lumbar radiculopathy   STG/LTGs have been met or progress has been made towards goals:  Yes (See Goal flow sheet completed today.)  Assessment of Progress: The patient has met all of their long term goals.  Self Management Plans:  Patient has been instructed in a home treatment program.  Patient is independent in a home treatment program.  I have re-evaluated this patient and find that the nature, scope, duration and intensity of the therapy is appropriate for the medical condition of the patient.  Jean Pierre continues to require the following intervention to meet STG and LTG's:  PT intervention is no longer required to meet STG/LTG.    Recommendations:  This patient is ready to be discharged from therapy and continue their home treatment program.    Please refer to the daily flowsheet for treatment today, total treatment time and time spent performing 1:1 timed codes.

## 2017-11-14 NOTE — LETTER
[facility] PHYSICAL THERAPY  [address] [place] [address]  [facility] [address]  [phone]    November 15, 2017    Re: Jean Pierre Roblero   :   1934  MRN:  [id number]   REFERRING PHYSICIAN:   Osvaldo Vines    [facility] PHYSICAL [facility]    Date of Initial Evaluation:  10/31/2017  Visits:  Rxs Used: 3  Reason for Referral:  Acute bilateral low back pain with left-sided sciatica    DISCHARGE REPORT  Progress reporting period is from 10-31-17 to 17.       SUBJECTIVE  Subjective changes noted by patient:   Pt reported general absence of of L LE sx's (i.e. numbness). Able to resolve numbness with active movement of the LE.  Walking without restrictions. Current pain level is  0/10.   Previous pain level was  0/10.  Changes in function:  Yes (See Goal flowsheet attached for changes in current functional level) Adverse reaction to treatment or activity: None    OBJECTIVE  Objective: ALROM: WFL's; moderate to significant loss of lumbar extension without pain. Negative neuro exam.    ASSESSMENT/PLAN  Updated problem list and treatment plan: Diagnosis 1:  Left lumbar radiculopathy   STG/LTGs have been met or progress has been made towards goals:  Yes (See Goal flow sheet completed today.)  Assessment of Progress: The patient has met all of their long term goals.  Self Management Plans:  Patient has been instructed in a home treatment program.  Patient is independent in a home treatment program.  I have re-evaluated this patient and find that the nature, scope, duration and intensity of the therapy is appropriate for the medical condition of the patient.  Jean Pierre continues to require the following intervention to meet STG and LTG's:  PT intervention is no longer required to meet STG/LTG.    Recommendations:  This patient is ready to be discharged from therapy and continue their home treatment program.    Thank you for your  referral.    INQUIRIES  Therapist: Leonila Samuels, PT  [facility]  [address] [place] [id number]  Phone: [phone]  Fax: [phone]

## 2018-07-31 ENCOUNTER — THERAPY VISIT (OUTPATIENT)
Dept: PHYSICAL THERAPY | Facility: CLINIC | Age: 83
End: 2018-07-31
Payer: MEDICARE

## 2018-07-31 DIAGNOSIS — M79.651 BILATERAL THIGH PAIN: Primary | ICD-10-CM

## 2018-07-31 DIAGNOSIS — M79.652 BILATERAL THIGH PAIN: Primary | ICD-10-CM

## 2018-07-31 PROCEDURE — G8981 BODY POS CURRENT STATUS: HCPCS | Mod: GP | Performed by: PHYSICAL THERAPIST

## 2018-07-31 PROCEDURE — 97161 PT EVAL LOW COMPLEX 20 MIN: CPT | Mod: GP | Performed by: PHYSICAL THERAPIST

## 2018-07-31 PROCEDURE — G8982 BODY POS GOAL STATUS: HCPCS | Mod: GP | Performed by: PHYSICAL THERAPIST

## 2018-07-31 PROCEDURE — 97110 THERAPEUTIC EXERCISES: CPT | Mod: GP | Performed by: PHYSICAL THERAPIST

## 2018-07-31 NOTE — LETTER
DEPARTMENT OF HEALTH AND HUMAN SERVICES  CENTERS FOR MEDICARE & MEDICAID SERVICES    PLAN/UPDATED PLAN OF PROGRESS FOR OUTPATIENT REHABILITATION    PATIENTS NAME:  Jean Pierre Roblero   : 1934  PROVIDER NUMBER:    [id number]  HICN:  [id number]  PROVIDER NAME: [place] [facility] PHYSICAL [facility]  MEDICAL RECORD NUMBER: [id number]   START OF CARE DATE:  SOC Date: 18   TYPE:  PT  PRIMARY/TREATMENT DIAGNOSIS: (Pertinent Medical Diagnosis)  Bilateral thigh pain    VISITS FROM START OF CARE:  Rxs Used: 1     [place] [facility] Initial Evaluation  Subjective:  Jean Pierre Roblero is a 84 year old male with a lumbar condition.  Condition occurred with:  Insidious onset.  Condition occurred: for unknown reasons.  This is a new condition  Pt presents with complaints of intermittent bilateral proximal posterior thigh sx's. Pt reported onset of sx's approximately 3 months ago. Pt noted onset of sx's specifically while sitting, primarily after one hour. Pt reported he recently attended a car show where he walked around for about 1.5 hours; pt was unable to walk back to his car. Pt's past medical history significant for lumbar surgery. MD orders for PT generated 18.    Site of Pain: denies low back pain.  Radiates to:  Thigh left and thigh right.  Pain is described as burning and is intermittent    Pain is worse during the day.  Symptoms are exacerbated by sitting and standing   Since onset symptoms are gradually worsening.     General health as reported by patient is good.  Barriers include:  None as reported by the patient.  Red flags:  None as reported by the patient.  Pertinent medical history includes:  Migraines/headaches, numbness/tingling and other (changes in bowel/bladder, severe headaches).    Other surgeries include:  Other (Gall bladder, hernia).  Current medications:  High blood pressure medication.  Current occupation is Retired.  Primary job tasks include:   Prolonged sitting (computer work).  Oswestry Score: 31.11 %            Objective:  Standing Alignment:    Lumbar:  Convex scoliosis R  Pelvic:  PSIS high L and iliac crest high L  Flexibility/Screens:   Lower Extremity:  Decreased left lower extremity flexibility:Hip Flexors and Quadriceps  Decreased right lower extremity flexibility:  Hip Flexors and Quadriceps  Lumbar/SI Evaluation  ROM:    AROM Lumbar:   Flexion:        Fingertips to lower leg; no provocation of sx's  Ext:                    Significant loss in standing; no provocation of sx's   Side Bend:        Left:     Right:   Rotation:           Left:     Right:   Side Glide:        Left:     Right:   Lumbar Myotomes:  Lumbar myotomes: Difficulty with ankle DF bilaterally due to balance issues.  S1 (Toe Raise):  Left: 5    Right: 5  Neural Tension/Mobility:    Left side:SLR  negative.   Right side:   SLR  negative.   Hip Evaluation  Hip PROM:  Hip PROM:  Left Hip:    Normal (with exception of hip extension-significant loss)  Right Hip:  Normal (with exception of hip extension; significant loss)    ROS  Assessment/Plan:    Patient is a 84 year old male with lumbar complaints.    Patient has the following significant findings with corresponding treatment plan.                Diagnosis 1:  Bilateral thigh sx's  Pain -  manual therapy, self management, education and home program  Decreased ROM/flexibility - manual therapy and therapeutic exercise  Decreased joint mobility - manual therapy and therapeutic exercise  Decreased strength - therapeutic exercise and therapeutic activities  Decreased function - therapeutic activities    Therapy Evaluation Codes:   1) History comprised of:   Personal factors that impact the plan of care:      None.    Comorbidity factors that impact the plan of care are:      None.     Medications impacting care: None.  2) Examination of Body Systems comprised of:   Body structures and functions that impact the plan of care:      Lumbar  "spine.   Activity limitations that impact the plan of care are:      Sitting and Walking.  3) Clinical presentation characteristics are:   Stable/Uncomplicated.  4) Decision-Making    Low complexity using standardized patient assessment instrument and/or   measureable assessment of functional outcome.  Cumulative Therapy Evaluation is: Low complexity.    Previous and current functional limitations:  (See Goal Flow Sheet for this information)    Short term and Long term goals: (See Goal Flow Sheet for this information)     Communication ability:  Patient appears to be able to clearly communicate and understand verbal and written communication and follow directions correctly.  Treatment Explanation - The following has been discussed with the patient:   RX ordered/plan of care  Anticipated outcomes  Possible risks and side effects  This patient would benefit from PT intervention to resume normal activities.   Rehab potential is good.    Frequency:  1 X week, once daily  Duration:  for 6 weeks  Discharge Plan:  Achieve all LTG.  Independent in home treatment program.  Reach maximal therapeutic benefit.    Caregiver Signature/Credentials _____________________________ Date ________       Treating Provider: Leonila Samuels, PT  I have reviewed and certified the need for these services and plan of treatment while under my care.        PHYSICIAN'S SIGNATURE:   __________________________________ Date___________                  Kolby Bentley MD    Certification period:  Beginning of Cert date period: 07/31/18 to  End of Cert period date: 10/28/18     Functional Level Progress Report: Please see attached \"Goal Flow sheet for Functional level.\"    ____X____ Continue Services or       ________ DC Services                Service dates: From  SOC Date: 07/31/18 date to present                         "

## 2018-07-31 NOTE — MR AVS SNAPSHOT
"              After Visit Summary   7/31/2018    Jean Pierre Roblero    MRN: [id number]           Patient Information     Date Of Birth          6/21/1934        Visit Information        Provider Department      7/31/2018 2:00 PM Leonila Samuels PT [facility] Physical Therapy        Today's Diagnoses     Bilateral thigh pain    -  1       Follow-ups after your visit        Your next 10 appointments already scheduled     Aug 14, 2018  8:50 AM CDT   MARTHA Extremity with Leonila Samuels PT   [facility]    [address] [id number] [phone]              Who to contact     If you have questions or need follow up information about today's clinic visit or your schedule please contact [facility]ICAL [facility] directly at [phone].  Normal or non-critical lab and imaging results will be communicated to you by MyChart, letter or phone within 4 business days after the clinic has received the results. If you do not hear from us within 7 days, please contact the clinic through [employer] or phone. If you have a critical or abnormal lab result, we will notify you by phone as soon as possible.  Submit refill requests through [employer] or call your pharmacy and they will forward the refill request to us. Please allow 3 business days for your refill to be completed.          Additional Information About Your Visit        MyChart Information     [employer] lets you send messages to your doctor, view your test results, renew your prescriptions, schedule appointments and more. To sign up, go to [email or web address] . Click on \"Log in\" on the left side of the screen, which will take you to the Welcome page. Then click on \"Sign up Now\" on the right side of the page.     You will be asked to enter the access code listed below, as well as some personal information. " Please follow the directions to create your username and password.     Your access code is: [email or web address]  Expires: 10/31/2018  1:20 PM     Your access code will  in 90 days. If you need help or a new code, please call your [facility] clinic or [phone].        Care EveryWhere ID     This is your Care EveryWhere ID. This could be used by other organizations to access your [facility] medical records  [id number]         Blood Pressure from Last 3 Encounters:   17 176/70   12 160/101    Weight from Last 3 Encounters:   17 63.6 kg (140 lb 3.2 oz)   12 66.1 kg (145 lb 12.8 oz)              We Performed the Following     MARTHA CERT REPORT     MARTHA Inital Eval Report     PT Eval, Low Complexity (52062)     Therapeutic Exercises        Primary Care Provider Office Phone # Fax #    Kolby Zaki Bentley [phone]       [facility] [address] [place] [address] DR ZOIE SIMONS [id number]        Equal Access to Services     [facility]: Hadii aad ku hadasho Soomaali, waaxda luqadaha, qaybta kaalmada adeegyada, sarkis vargas . So [facility] [phone].    ATENCIÓN: Si habla español, tiene a aden disposición servicios gratuitos de asistencia lingüística. Llame al [phone].    We comply with applicable federal civil rights laws and [place] laws. We do not discriminate on the basis of race, color, national origin, age, disability, sex, sexual orientation, or gender identity.            Thank you!     Thank you for choosing [facility] PH[facility] THER[facility]  for your care. Our goal is always to provide you with excellent care. Hearing back from our patients is one way we can continue to improve our services. Please take a few minutes to complete the written survey that you may receive in the mail after your visit with us. Thank you!             Your Updated Medication List - Protect others around you: Learn how to safely use, store and  throw away your medicines at www.disposemymeds.org.          This list is accurate as of 7/31/18 11:59 PM.  Always use your most recent med list.                   Brand Name Dispense Instructions for use Diagnosis    cholestyramine 4 g Packet    QUESTRAN     Take 1 packet by mouth 2 times daily (with meals).        FINASTERIDE PO      Take 5 mg by mouth daily.        HYDROcodone-acetaminophen 5-325 MG per tablet    NORCO    30 tablet    Take 1-2 tablets by mouth every 4 hours as needed for other (Moderate to Severe Pain).    Nasal cavity mass       Multi-vitamin Tabs tablet   Generic drug:  multivitamin, therapeutic with minerals      Take 1 tablet by mouth daily.        ondansetron 8 MG ODT tab    ZOFRAN-ODT    10 tablet    Take 1 tablet by mouth every 8 hours as needed for nausea.    Nasal cavity mass       PHAZYME 180 MG Caps   Generic drug:  Simethicone      Take 180 mg by mouth 3 times daily.        pilocarpine 0.5 % ophthalmic solution    PILOCAR     1-2 drops At Bedtime.        VITAMIN B 12 PO      Take 1,000 mg by mouth daily.        VITAMIN C PO      Take 500 mg by mouth daily.        VITAMIN D3 PO      Take 1,000 Units by mouth daily.

## 2018-07-31 NOTE — PROGRESS NOTES
[place] [facility] Initial Evaluation  Subjective:  Patient is a 84 year old male presenting with rehab back hpi.   Jean Pierre Roblero is a 84 year old male with a lumbar condition.  Condition occurred with:  Insidious onset.  Condition occurred: for unknown reasons.  This is a new condition  Pt presents with complaints of intermittent bilateral proximal posterior thigh sx's. Pt reported onset of sx's approximately 3 months ago. Pt noted onset of sx's specifically while sitting, primarily after one hour. Pt reported he recently attended a car show where he walked around for about 1.5 hours; pt was unable to walk back to his car. Pt's past medical history significant for lumbar surgery. MD orders for PT generated 7-30-18.    Site of Pain: denies low back pain.  Radiates to:  Thigh left and thigh right.  Pain is described as burning and is intermittent    Pain is worse during the day.  Symptoms are exacerbated by sitting and standing   Since onset symptoms are gradually worsening.        General health as reported by patient is good.                  Barriers include:  None as reported by the patient.    Red flags:  None as reported by the patient.                        Objective:  Standing Alignment:        Lumbar:  Convex scoliosis R  Pelvic:  PSIS high L and iliac crest high L              Flexibility/Screens:       Lower Extremity:  Decreased left lower extremity flexibility:Hip Flexors and Quadriceps    Decreased right lower extremity flexibility:  Hip Flexors and Quadriceps               Lumbar/SI Evaluation  ROM:    AROM Lumbar:   Flexion:        Fingertips to lower leg; no provocation of sx's  Ext:                    Significant loss in standing; no provocation of sx's   Side Bend:        Left:     Right:   Rotation:           Left:     Right:   Side Glide:        Left:     Right:           Lumbar Myotomes:  Lumbar myotomes: Difficulty with ankle DF bilaterally due to balance issues.          S1 (Toe  Raise):  Left: 5    Right: 5        Neural Tension/Mobility:      Left side:SLR  negative.     Right side:   SLR  negative.                                             Hip Evaluation  Hip PROM:  Hip PROM:  Left Hip:    Normal (with exception of hip extension-significant loss)  Right Hip:  Normal (with exception of hip extension; significant loss)                                           General     ROS    Assessment/Plan:    Patient is a 84 year old male with lumbar complaints.    Patient has the following significant findings with corresponding treatment plan.                Diagnosis 1:  Bilateral thigh sx's  Pain -  manual therapy, self management, education and home program  Decreased ROM/flexibility - manual therapy and therapeutic exercise  Decreased joint mobility - manual therapy and therapeutic exercise  Decreased strength - therapeutic exercise and therapeutic activities  Decreased function - therapeutic activities    Therapy Evaluation Codes:   1) History comprised of:   Personal factors that impact the plan of care:      None.    Comorbidity factors that impact the plan of care are:      None.     Medications impacting care: None.  2) Examination of Body Systems comprised of:   Body structures and functions that impact the plan of care:      Lumbar spine.   Activity limitations that impact the plan of care are:      Sitting and Walking.  3) Clinical presentation characteristics are:   Stable/Uncomplicated.  4) Decision-Making    Low complexity using standardized patient assessment instrument and/or measureable assessment of functional outcome.  Cumulative Therapy Evaluation is: Low complexity.    Previous and current functional limitations:  (See Goal Flow Sheet for this information)    Short term and Long term goals: (See Goal Flow Sheet for this information)     Communication ability:  Patient appears to be able to clearly communicate and understand verbal and written communication and follow  directions correctly.  Treatment Explanation - The following has been discussed with the patient:   RX ordered/plan of care  Anticipated outcomes  Possible risks and side effects  This patient would benefit from PT intervention to resume normal activities.   Rehab potential is good.    Frequency:  1 X week, once daily  Duration:  for 6 weeks  Discharge Plan:  Achieve all LTG.  Independent in home treatment program.  Reach maximal therapeutic benefit.    Please refer to the daily flowsheet for treatment today, total treatment time and time spent performing 1:1 timed codes.

## 2018-07-31 NOTE — LETTER
DEPARTMENT OF HEALTH AND HUMAN SERVICES  CENTERS FOR MEDICARE & MEDICAID SERVICES    PLAN/UPDATED PLAN OF PROGRESS FOR OUTPATIENT REHABILITATION    PATIENTS NAME:  Jean Pierre Roblero   : 1934  PROVIDER NUMBER:    [id number]  HICN:  [id number]  PROVIDER NAME: [place] [facility] PHYSICAL [facility]  MEDICAL RECORD NUMBER: [id number]   START OF CARE DATE:  SOC Date: 18   TYPE:  PT  PRIMARY/TREATMENT DIAGNOSIS: (Pertinent Medical Diagnosis)  Bilateral thigh pain    VISITS FROM START OF CARE:  Rxs Used: 1     [place] [facility] Initial Evaluation  Subjective:  Jean Pierre Roblero is a 84 year old male with a lumbar condition.  Condition occurred with:  Insidious onset.  Condition occurred: for unknown reasons.  This is a new condition  Pt presents with complaints of intermittent bilateral proximal posterior thigh sx's. Pt reported onset of sx's approximately 3 months ago. Pt noted onset of sx's specifically while sitting, primarily after one hour. Pt reported he recently attended a car show where he walked around for about 1.5 hours; pt was unable to walk back to his car. Pt's past medical history significant for lumbar surgery-.  Site of Pain: denies low back pain.  Radiates to:  Thigh left and thigh right.  Pain is described as burning and is intermittent    Pain is worse during the day.  Symptoms are exacerbated by sitting and standing   Since onset symptoms are gradually worsening.  General health as reported by patient is good.  Barriers include:  None as reported by the patient.  Red flags:  None as reported by the patient.  Pertinent medical history includes:  Migraines/headaches, numbness/tingling and other (changes in bowel/bladder, severe headaches).    Other surgeries include:  Other (Gall bladder, hernia).  Current medications:  High blood pressure medication.  Current occupation is Retired.    Primary job tasks include:  Prolonged sitting (computer work).  Oswestry  Score: 31.11 %                 Objective:  Standing Alignment:    Lumbar:  Convex scoliosis R  Pelvic:  PSIS high L and iliac crest high L  Flexibility/Screens:   Lower Extremity:  Decreased left lower extremity flexibility:Hip Flexors and Quadriceps  Decreased right lower extremity flexibility:  Hip Flexors and Quadriceps  Lumbar/SI Evaluation  ROM:    AROM Lumbar:   Flexion:        Fingertips to lower leg; no provocation of sx's  Ext:                    Significant loss in standing; no provocation of sx's   Side Bend:        Left:     Right:   Rotation:           Left:     Right:   Side Glide:        Left:     Right:         Lumbar Myotomes:  Lumbar myotomes: Difficulty with ankle DF bilaterally due to balance issues.  S1 (Toe Raise):  Left: 5    Right: 5  Neural Tension/Mobility:    Left side:SLR  negative.   Right side:   SLR  negative.   Hip Evaluation  Hip PROM:  Hip PROM:  Left Hip:    Normal (with exception of hip extension-significant loss)  Right Hip:  Normal (with exception of hip extension; significant loss)    ROS  Assessment/Plan:    Patient is a 84 year old male with lumbar complaints.    Patient has the following significant findings with corresponding treatment plan.                Diagnosis 1:  Bilateral thigh sx's  Pain -  manual therapy, self management, education and home program  Decreased ROM/flexibility - manual therapy and therapeutic exercise  Decreased joint mobility - manual therapy and therapeutic exercise  Decreased strength - therapeutic exercise and therapeutic activities  Decreased function - therapeutic activities    Therapy Evaluation Codes:   1) History comprised of:   Personal factors that impact the plan of care:      None.    Comorbidity factors that impact the plan of care are:      None.     Medications impacting care: None.  2) Examination of Body Systems comprised of:   Body structures and functions that impact the plan of care:      Lumbar spine.   Activity limitations  "that impact the plan of care are:      Sitting and Walking.  3) Clinical presentation characteristics are:   Stable/Uncomplicated.  4) Decision-Making    Low complexity using standardized patient assessment instrument and/or   measureable assessment of functional outcome.  Cumulative Therapy Evaluation is: Low complexity.    Previous and current functional limitations:  (See Goal Flow Sheet for this information)    Short term and Long term goals: (See Goal Flow Sheet for this information)     Communication ability:  Patient appears to be able to clearly communicate and understand verbal and written communication and follow directions correctly.  Treatment Explanation - The following has been discussed with the patient:   RX ordered/plan of care  Anticipated outcomes  Possible risks and side effects  This patient would benefit from PT intervention to resume normal activities.   Rehab potential is good.    Frequency:  1 X week, once daily  Duration:  for 6 weeks  Discharge Plan:  Achieve all LTG.  Independent in home treatment program.  Reach maximal therapeutic benefit.                             Caregiver Signature/Credentials _____________________________ Date ________       Treating Provider: Leonila Samuels   I have reviewed and certified the need for these services and plan of treatment while under my care.        PHYSICIAN'S SIGNATURE:   ____________________________________ Date___________                   Kolby Bentley MD    Certification period:  Beginning of Cert date period: 07/31/18 to  End of Cert period date: 10/28/18     Functional Level Progress Report: Please see attached \"Goal Flow sheet for Functional level.\"    ____X____ Continue Services or       ________ DC Services                Service dates: From  SOC Date: 07/31/18 date to present                         "

## 2018-08-02 PROBLEM — M79.652 BILATERAL THIGH PAIN: Status: ACTIVE | Noted: 2018-08-02

## 2018-08-02 PROBLEM — M79.651 BILATERAL THIGH PAIN: Status: ACTIVE | Noted: 2018-08-02

## 2018-08-08 NOTE — PROGRESS NOTES
[place] [facility] Initial Evaluation  Subjective:  Patient is a 84 year old male presenting with rehab left ankle/foot hpi.                                      Pertinent medical history includes:  Migraines/headaches, numbness/tingling and other (changes in bowel/bladder, severe headaches).    Other surgeries include:  Other (Gall bladder, hernia).  Current medications:  High blood pressure medication.  Current occupation is Retired.    Primary job tasks include:  Prolonged sitting (computer work).              Oswestry Score: 31.11 %                 Objective:  System    Physical Exam    General     ROS    Assessment/Plan:

## 2018-08-14 ENCOUNTER — THERAPY VISIT (OUTPATIENT)
Dept: PHYSICAL THERAPY | Facility: CLINIC | Age: 83
End: 2018-08-14
Payer: MEDICARE

## 2018-08-14 DIAGNOSIS — M79.651 BILATERAL THIGH PAIN: ICD-10-CM

## 2018-08-14 DIAGNOSIS — M79.652 BILATERAL THIGH PAIN: ICD-10-CM

## 2018-08-14 PROCEDURE — 97110 THERAPEUTIC EXERCISES: CPT | Mod: GP | Performed by: PHYSICAL THERAPIST

## 2018-08-14 PROCEDURE — 97112 NEUROMUSCULAR REEDUCATION: CPT | Mod: GP | Performed by: PHYSICAL THERAPIST

## 2018-08-21 ENCOUNTER — THERAPY VISIT (OUTPATIENT)
Dept: PHYSICAL THERAPY | Facility: CLINIC | Age: 83
End: 2018-08-21
Payer: MEDICARE

## 2018-08-21 DIAGNOSIS — M79.652 BILATERAL THIGH PAIN: ICD-10-CM

## 2018-08-21 DIAGNOSIS — M79.651 BILATERAL THIGH PAIN: ICD-10-CM

## 2018-08-21 PROCEDURE — 97110 THERAPEUTIC EXERCISES: CPT | Mod: GP | Performed by: PHYSICAL THERAPIST

## 2018-08-21 PROCEDURE — 97112 NEUROMUSCULAR REEDUCATION: CPT | Mod: GP | Performed by: PHYSICAL THERAPIST

## 2018-09-04 ENCOUNTER — THERAPY VISIT (OUTPATIENT)
Dept: PHYSICAL THERAPY | Facility: CLINIC | Age: 83
End: 2018-09-04
Payer: MEDICARE

## 2018-09-04 DIAGNOSIS — M79.652 BILATERAL THIGH PAIN: ICD-10-CM

## 2018-09-04 DIAGNOSIS — M79.651 BILATERAL THIGH PAIN: ICD-10-CM

## 2018-09-04 PROCEDURE — 97112 NEUROMUSCULAR REEDUCATION: CPT | Mod: GP | Performed by: PHYSICAL THERAPIST

## 2018-09-04 PROCEDURE — 97110 THERAPEUTIC EXERCISES: CPT | Mod: GP | Performed by: PHYSICAL THERAPIST

## 2018-09-27 PROBLEM — M54.42 BILATERAL LOW BACK PAIN WITH LEFT-SIDED SCIATICA: Status: RESOLVED | Noted: 2017-11-02 | Resolved: 2018-09-27

## 2018-10-22 ENCOUNTER — THERAPY VISIT (OUTPATIENT)
Dept: PHYSICAL THERAPY | Facility: CLINIC | Age: 83
End: 2018-10-22
Payer: MEDICARE

## 2018-10-22 DIAGNOSIS — G44.209 TENSION HEADACHE: Primary | ICD-10-CM

## 2018-10-22 PROCEDURE — 97140 MANUAL THERAPY 1/> REGIONS: CPT | Mod: GP | Performed by: PHYSICAL THERAPIST

## 2018-10-22 PROCEDURE — G8981 BODY POS CURRENT STATUS: HCPCS | Mod: GP | Performed by: PHYSICAL THERAPIST

## 2018-10-22 PROCEDURE — 97161 PT EVAL LOW COMPLEX 20 MIN: CPT | Mod: GP | Performed by: PHYSICAL THERAPIST

## 2018-10-22 PROCEDURE — G8982 BODY POS GOAL STATUS: HCPCS | Mod: GP | Performed by: PHYSICAL THERAPIST

## 2018-10-22 NOTE — MR AVS SNAPSHOT
After Visit Summary   10/22/2018    Jean Pierre Roblero    MRN: [id number]           Patient Information     Date Of Birth          6/21/1934        Visit Information        Provider Department      10/22/2018 9:30 AM Leonila Samuels, DENAE [facility] Physical [facility]        Today's Diagnoses     Tension headache    -  1       Follow-ups after your visit        Your next 10 appointments already scheduled     Oct 25, 2018  8:50 AM CDT   MARTHA Spine with Leonila Samuels PT   [facility] Physical Therapy ([facility]  )    [address]  [facility] [address]   [phone]            Oct 30, 2018 10:10 AM CDT   MARTHA Spine with Leonila Samuels PT   [facility] Physical Therapy ([facility]  )    [address]  [facility] [address]   [phone]            Nov 02, 2018  9:30 AM CDT   MARTHA Spine with Leonila Samuels PT   [facility] ([facility]  )    [address]  [facility] [address]   [phone]              Who to contact     If you have questions or need follow up information about today's clinic visit or your schedule please contact [facility] PHYSICAL THERAPY directly at [phone].  Normal or non-critical lab and imaging results will be communicated to you by MyChart, letter or phone within 4 business days after the clinic has received the results. If you do not hear from us within 7 days, please contact the clinic through MyChart or phone. If you have a critical or abnormal lab result, we will notify you by phone as soon as possible.  Submit refill requests through Lucibel or call your pharmacy and they will forward the refill request to us. Please allow 3 business days for your refill to be completed.          Additional Information About Your Visit         Care EveryWhere ID     This is your Care EveryWhere ID. This could be used by other organizations to access your [place] medical records  [id number]         Blood Pressure from Last 3 Encounters:   07/17/17 176/70   08/20/12 160/101    Weight from Last 3 Encounters:   07/17/17 63.6 kg (140 lb 3.2 oz)   08/20/12 66.1 kg (145 lb 12.8 oz)              We Performed the Following     MARTHA CERT REPORT     MARTHA Inital Eval Report     Manual Ther Tech, 1+Regions, EA 15 min     PT Eval, Low Complexity ([id number])        Primary Care Provider Office Phone # Fax #    Kolby Zaki Bentley [phone]       [facility] [address] [place] [address]        Equal Access to Services     KADI JOYCE : Hadii aad ku hadasho Soomaali, waaxda luqadaha, qaybta kaalmada adeegyada, waxay nitinin hayjunito vargas . So [facility] [phone].    ATENCIÓN: Si habla español, tiene a aden disposición servicios gratuitos de asistencia lingüística. Joshuaame al [phone].    We comply with applicable federal civil rights laws and [place] laws. We do not discriminate on the basis of race, color, national origin, age, disability, sex, sexual orientation, or gender identity.            Thank you!     Thank you for choosing [facility] PHYSICAL THERAPY  for your care. Our goal is always to provide you with excellent care. Hearing back from our patients is one way we can continue to improve our services. Please take a few minutes to complete the written survey that you may receive in the mail after your visit with us. Thank you!             Your Updated Medication List - Protect others around you: Learn how to safely use, store and throw away your medicines at www.disposemymeds.org.          This list is accurate as of 10/22/18  8:03 PM.  Always use your most recent med list.                   Brand Name Dispense Instructions for use Diagnosis    cholestyramine 4 g Packet     QUESTRAN     Take 1 packet by mouth 2 times daily (with meals).        FINASTERIDE PO      Take 5 mg by mouth daily.        HYDROcodone-acetaminophen 5-325 MG per tablet    NORCO    30 tablet    Take 1-2 tablets by mouth every 4 hours as needed for other (Moderate to Severe Pain).    Nasal cavity mass       Multi-vitamin Tabs tablet   Generic drug:  multivitamin, therapeutic with minerals      Take 1 tablet by mouth daily.        ondansetron 8 MG ODT tab    ZOFRAN-ODT    10 tablet    Take 1 tablet by mouth every 8 hours as needed for nausea.    Nasal cavity mass       PHAZYME 180 MG Caps   Generic drug:  Simethicone      Take 180 mg by mouth 3 times daily.        pilocarpine 0.5 % ophthalmic solution    PILOCAR     1-2 drops At Bedtime.        VITAMIN B 12 PO      Take 1,000 mg by mouth daily.        VITAMIN C PO      Take 500 mg by mouth daily.        VITAMIN D3 PO      Take 1,000 Units by mouth daily.

## 2018-10-22 NOTE — PROGRESS NOTES
[place] [facility] Initial Evaluation  Subjective:  Patient is a 84 year old male presenting with rehab cervical spine hpi.   Jean Pierre Roblero is a 84 year old male with a cervical spine condition.  Condition occurred with:  Insidious onset.  Condition occurred: for unknown reasons.  This is a recurrent condition  Pt presents to PT with complaints of HA's. Pt reports history of migraine HA's for which he is currently undergoes Botox injections every 2-3 months.  Approximately 2-3 months ago, pt noted onset of HA's occurring during the night. Pt reported waking usually between 12AM to 3AM due to a HA. Pt reported he will  typically get up and use a cold cloth to the forehead/head; after about one hour pt reports he is able to return to bed. Pt reported HA's typically occur upwards of 3-4 nights/week. Pt reports not waking with a HA in the AM. Pt reported discussing onset of night-time HA's at recent MD visit on 10-11-18. MD ordered PT at that time. Pt denies neck pain at this time; previous PT for left neck related sx's in 2016.    Site of Pain: denies neck pain.  Radiates to:  Face (head).  Pain is described as aching and is intermittent Pain Scale: 0-6/10.  Associated symptoms:  Headache.    and relieved by ice.  Since onset symptoms are unchanged.    Previous treatment includes physical therapy.    General health as reported by patient is good.                  Barriers include:  None as reported by the patient.    Red flags:  None as reported by the patient.                        Objective:  Standing Alignment:    Cervical/Thoracic:  Cervical spine lateral flex L                                    Cervical/Thoracic Evaluation    AROM:  AROM Cervical:    Flexion:            WNL  Extension:       WNL  Rotation:         Left: minimal to moderate loss     Right: minimal to moderate loss  Side Bend:      Left: moderate loss     Right:  Moderate loss      Headaches: cervical and migraine  Cervical Myotomes:   not assessed                  DTR's:  not assessed            Cervical Palpation:  normal (Hypertonicity noted bilaterally of lateral and posterior neck musculature without tenderness)          Spinal Segmental Conclusions:  Bilateral hypomobility of upper/mid cervical spine  Level:  Hypo at C2, C3 and C4                                                General     ROS    Assessment/Plan:    Patient is a 84 year old male with cervical complaints.    Patient has the following significant findings with corresponding treatment plan.                Diagnosis 1:  HA's  Pain -  manual therapy, self management, education and home program  Decreased ROM/flexibility - manual therapy and therapeutic exercise  Decreased joint mobility - manual therapy and therapeutic exercise  Decreased function - therapeutic activities    Therapy Evaluation Codes:   1) History comprised of:   Personal factors that impact the plan of care:      Age and Past/current experiences.    Comorbidity factors that impact the plan of care are:      Osteoarthritis.     Medications impacting care: None.  2) Examination of Body Systems comprised of:   Body structures and functions that impact the plan of care:      Cervical spine.   Activity limitations that impact the plan of care are:      Sleeping.  3) Clinical presentation characteristics are:   Stable/Uncomplicated.  4) Decision-Making    Low complexity using standardized patient assessment instrument and/or measureable assessment of functional outcome.  Cumulative Therapy Evaluation is: Low complexity.    Previous and current functional limitations:  (See Goal Flow Sheet for this information)    Short term and Long term goals: (See Goal Flow Sheet for this information)     Communication ability:  Patient appears to be able to clearly communicate and understand verbal and written communication and follow directions correctly.  Treatment Explanation - The following has been discussed with the patient:    RX ordered/plan of care  Anticipated outcomes  Possible risks and side effects  This patient would benefit from PT intervention to resume normal activities.   Rehab potential is good.    Frequency:  2 X week, once daily  Duration:  for 4 weeks  Discharge Plan:  Independent in home treatment program.  Reach maximal therapeutic benefit.    Please refer to the daily flowsheet for treatment today, total treatment time and time spent performing 1:1 timed codes.

## 2018-10-22 NOTE — LETTER
DEPARTMENT OF HEALTH AND HUMAN SERVICES  CENTERS FOR MEDICARE & MEDICAID SERVICES    PLAN/UPDATED PLAN OF PROGRESS FOR OUTPATIENT REHABILITATION    PATIENTS NAME:  Jean Pierre Roblero   : 1934  PROVIDER NUMBER:    [id number]  HICN:  [id number]   PROVIDER NAME: [place] [facility] PH[facility]ER[facility]  MEDICAL RECORD NUMBER: [id number]   START OF CARE DATE:  SOC Date: 10/22/18   TYPE:  PT  PRIMARY/TREATMENT DIAGNOSIS: (Pertinent Medical Diagnosis)  Tension headache    VISITS FROM START OF CARE:  Rxs Used: 1     [place] [facility] Initial Evaluation  Subjective:  Jean Pierre Roblero is a 84 year old male with a cervical spine condition.  Condition occurred with:  Insidious onset.  Condition occurred: for unknown reasons.  This is a recurrent condition  Pt presents to PT with complaints of HA's. Pt reports history of migraine HA's for which he is currently undergoes Botox injections every 2-3 months.  Approximately 2-3 months ago, pt noted onset of HA's occurring during the night. Pt reported waking usually between 12AM to 3AM due to a HA. Pt reported he will  typically get up and use a cold cloth to the forehead/head; after about one hour pt reports he is able to return to bed. Pt reported HA's typically occur upwards of 3-4 nights/week. Pt reports not waking with a HA in the AM. Pt reported discussing onset of night-time HA's at recent MD visit on 10-11-18. MD ordered PT at that time. Pt denies neck pain at this time; previous PT for left neck related sx's in 2016.  Site of Pain: denies neck pain.  Radiates to:  Face (head).  Pain is described as aching and is intermittent Pain Scale: 0-6/10.  Associated symptoms:  Headache and relieved by ice.  Since onset symptoms are unchanged.  Previous treatment includes physical therapy.  General health as reported by patient is good.  Barriers include:  None as reported by the patient.  Red flags:  None as reported by the  patient.    Objective:  Standing Alignment:    Cervical/Thoracic:  Cervical spine lateral flex L  Cervical/Thoracic Evaluation  AROM:  AROM Cervical:  Flexion:            WNL  Extension:       WNL  Rotation:         Left: minimal to moderate loss     Right: minimal to moderate loss  Side Bend:      Left: moderate loss     Right:  Moderate loss  Headaches: cervical and migraine  Cervical Myotomes:  not assessed  DTR's:  not assessed  Cervical Palpation:  normal (Hypertonicity noted bilaterally of lateral and posterior neck musculature without tenderness)  Spinal Segmental Conclusions:  Bilateral hypomobility of upper/mid cervical spine  Level:  Hypo at C2, C3 and C4  ROS  Assessment/Plan:    Patient is a 84 year old male with cervical complaints.    Patient has the following significant findings with corresponding treatment plan.                Diagnosis 1:  HA's  Pain -  manual therapy, self management, education and home program  Decreased ROM/flexibility - manual therapy and therapeutic exercise  Decreased joint mobility - manual therapy and therapeutic exercise  Decreased function - therapeutic activities    Therapy Evaluation Codes:   1) History comprised of:   Personal factors that impact the plan of care:      Age and Past/current experiences.    Comorbidity factors that impact the plan of care are:      Osteoarthritis.     Medications impacting care: None.  2) Examination of Body Systems comprised of:   Body structures and functions that impact the plan of care:      Cervical spine.   Activity limitations that impact the plan of care are:      Sleeping.  3) Clinical presentation characteristics are:   Stable/Uncomplicated.  4) Decision-Making    Low complexity using standardized patient assessment instrument and/or   measureable assessment of functional outcome.  Cumulative Therapy Evaluation is: Low complexity.    Previous and current functional limitations:  (See Goal Flow Sheet for this information)   "  Short term and Long term goals: (See Goal Flow Sheet for this information)     Communication ability:  Patient appears to be able to clearly communicate and understand verbal and written communication and follow directions correctly.  Treatment Explanation - The following has been discussed with the patient:   RX ordered/plan of care  Anticipated outcomes  Possible risks and side effects  This patient would benefit from PT intervention to resume normal activities.   Rehab potential is good.    Frequency:  2 X week, once daily  Duration:  for 4 weeks  Discharge Plan:  Independent in home treatment program.  Reach maximal therapeutic benefit.                  Caregiver Signature/Credentials _____________________________ Date ________       Treating Provider: Leonila Samuels, PT   I have reviewed and certified the need for these services and plan of treatment while under my care.        PHYSICIAN'S SIGNATURE:   _______________________________  Date___________                       Osvaldo Vines MD    Certification period:  Beginning of Cert date period: 10/22/18 to  End of Cert period date: 12/20/18     Functional Level Progress Report: Please see attached \"Goal Flow sheet for Functional level.\"    ____X____ Continue Services or       ________ DC Services                Service dates: From  SOC Date: 10/22/18 date to present                         "

## 2018-10-25 ENCOUNTER — THERAPY VISIT (OUTPATIENT)
Dept: PHYSICAL THERAPY | Facility: CLINIC | Age: 83
End: 2018-10-25
Payer: MEDICARE

## 2018-10-25 DIAGNOSIS — G44.209 TENSION HEADACHE: ICD-10-CM

## 2018-10-25 PROCEDURE — 97112 NEUROMUSCULAR REEDUCATION: CPT | Mod: GP | Performed by: PHYSICAL THERAPIST

## 2018-10-25 PROCEDURE — 97140 MANUAL THERAPY 1/> REGIONS: CPT | Mod: GP | Performed by: PHYSICAL THERAPIST

## 2018-10-30 ENCOUNTER — THERAPY VISIT (OUTPATIENT)
Dept: PHYSICAL THERAPY | Facility: CLINIC | Age: 83
End: 2018-10-30
Payer: MEDICARE

## 2018-10-30 DIAGNOSIS — G44.209 TENSION HEADACHE: ICD-10-CM

## 2018-10-30 PROCEDURE — 97140 MANUAL THERAPY 1/> REGIONS: CPT | Mod: GP | Performed by: PHYSICAL THERAPIST

## 2018-10-30 PROCEDURE — 97112 NEUROMUSCULAR REEDUCATION: CPT | Mod: GP | Performed by: PHYSICAL THERAPIST

## 2018-11-02 ENCOUNTER — THERAPY VISIT (OUTPATIENT)
Dept: PHYSICAL THERAPY | Facility: CLINIC | Age: 83
End: 2018-11-02
Payer: MEDICARE

## 2018-11-02 DIAGNOSIS — G44.209 TENSION HEADACHE: ICD-10-CM

## 2018-11-02 PROCEDURE — 97140 MANUAL THERAPY 1/> REGIONS: CPT | Mod: GP | Performed by: PHYSICAL THERAPIST

## 2018-11-02 PROCEDURE — 97110 THERAPEUTIC EXERCISES: CPT | Mod: GP | Performed by: PHYSICAL THERAPIST

## 2018-11-06 ENCOUNTER — THERAPY VISIT (OUTPATIENT)
Dept: PHYSICAL THERAPY | Facility: CLINIC | Age: 83
End: 2018-11-06
Payer: MEDICARE

## 2018-11-06 DIAGNOSIS — G44.209 TENSION HEADACHE: ICD-10-CM

## 2018-11-06 PROCEDURE — 97112 NEUROMUSCULAR REEDUCATION: CPT | Mod: GP | Performed by: PHYSICAL THERAPIST

## 2018-11-06 PROCEDURE — 97140 MANUAL THERAPY 1/> REGIONS: CPT | Mod: GP | Performed by: PHYSICAL THERAPIST

## 2018-11-13 ENCOUNTER — THERAPY VISIT (OUTPATIENT)
Dept: PHYSICAL THERAPY | Facility: CLINIC | Age: 83
End: 2018-11-13
Payer: MEDICARE

## 2018-11-13 DIAGNOSIS — G44.209 TENSION HEADACHE: ICD-10-CM

## 2018-11-13 PROCEDURE — 97530 THERAPEUTIC ACTIVITIES: CPT | Mod: GP | Performed by: PHYSICAL THERAPIST

## 2018-11-13 PROCEDURE — 97140 MANUAL THERAPY 1/> REGIONS: CPT | Mod: GP | Performed by: PHYSICAL THERAPIST

## 2018-11-13 NOTE — MR AVS SNAPSHOT
After Visit Summary   11/13/2018    Jean Pierre Roblero    MRN: [id number]           Patient Information     Date Of Birth          6/21/1934        Visit Information        Provider Department      11/13/2018 9:30 AM Leonila Samuels PT [facility] Physical Therapy        Today's Diagnoses     Tension headache           Follow-ups after your visit        Who to contact     If you have questions or need follow up information about today's clinic visit or your schedule please contact [facility] PHYSICAL THERAPY directly at [phone].  Normal or non-critical lab and imaging results will be communicated to you by MyChart, letter or phone within 4 business days after the clinic has received the results. If you do not hear from us within 7 days, please contact the clinic through MyChart or phone. If you have a critical or abnormal lab result, we will notify you by phone as soon as possible.  Submit refill requests through Talenz or call your pharmacy and they will forward the refill request to us. Please allow 3 business days for your refill to be completed.          Additional Information About Your Visit        Care EveryWhere ID     This is your Care EveryWhere ID. This could be used by other organizations to access your [place] medical records  [id number]         Blood Pressure from Last 3 Encounters:   07/17/17 176/70   08/20/12 160/101    Weight from Last 3 Encounters:   07/17/17 63.6 kg (140 lb 3.2 oz)   08/20/12 66.1 kg (145 lb 12.8 oz)              We Performed the Following     MARTHA Progress Notes Report     Manual Ther Tech, 1+Regions, EA 15 min     Therapeutic Activities        Primary Care Provider Office Phone # Fax #    Kolby Bentley [phone]       [facility] [address] [place] [address]        Equal Access to Services     KADI JOYCE : bj Chavez  natanaelfely whitt waxay idiin hayaan adeeg kharash la'aan ah. So [facility] [place] [facility] [phone].    ATENCIÓN: Si maddy prasad, tiene a aden disposición servicios gratuitos de asistencia lingüística. Jaleel al [phone].    We comply with applicable federal civil rights laws and [place] laws. We do not discriminate on the basis of race, color, national origin, age, disability, sex, sexual orientation, or gender identity.            Thank you!     Thank you for choosing [place] [facility] PHYSICAL THERAPY  for your care. Our goal is always to provide you with excellent care. Hearing back from our patients is one way we can continue to improve our services. Please take a few minutes to complete the written survey that you may receive in the mail after your visit with us. Thank you!             Your Updated Medication List - Protect others around you: Learn how to safely use, store and throw away your medicines at www.disposemymeds.org.          This list is accurate as of 11/13/18  1:30 PM.  Always use your most recent med list.                   Brand Name Dispense Instructions for use Diagnosis    cholestyramine 4 g Packet    QUESTRAN     Take 1 packet by mouth 2 times daily (with meals).        FINASTERIDE PO      Take 5 mg by mouth daily.        HYDROcodone-acetaminophen 5-325 MG per tablet    NORCO    30 tablet    Take 1-2 tablets by mouth every 4 hours as needed for other (Moderate to Severe Pain).    Nasal cavity mass       Multi-vitamin Tabs tablet   Generic drug:  multivitamin, therapeutic with minerals      Take 1 tablet by mouth daily.        ondansetron 8 MG ODT tab    ZOFRAN-ODT    10 tablet    Take 1 tablet by mouth every 8 hours as needed for nausea.    Nasal cavity mass       PHAZYME 180 MG Caps   Generic drug:  Simethicone      Take 180 mg by mouth 3 times daily.        pilocarpine 0.5 % ophthalmic solution    PILOCAR     1-2 drops At Bedtime.        VITAMIN B 12 PO       Take 1,000 mg by mouth daily.        VITAMIN C PO      Take 500 mg by mouth daily.        VITAMIN D3 PO      Take 1,000 Units by mouth daily.

## 2018-11-13 NOTE — PROGRESS NOTES
Subjective:  HPI                    Objective:  System    Physical Exam    General     ROS    Assessment/Plan:    PROGRESS  REPORT    Progress reporting period is from 10-22-18 to 11-13-18.       SUBJECTIVE  Subjective changes noted by patient:  Pt had one night without a headache since his last visit and one night with a HA of lesser intensity. Trial of no allergy meds for a week but noticed no change in HA reduction. Experimented with other pillows without improvement;  may buy a thinner pillow. Next botox visit in January (over a month away).   Current pain level is 0-6/10.  Previous pain level was 0-6/10.  Changes in function:  None  Adverse reaction to treatment or activity: None    OBJECTIVE  Objective: PROM: min-mod loss of R sided rotation, min loss on L; no pain with all motions. Mild abnormal density of cervical muscles in posterior neck, R > than L.      ASSESSMENT/PLAN  Updated problem list and treatment plan: Diagnosis 1:  Headaches  Decreased ROM/flexibility - manual therapy and therapeutic exercise  Decreased function - therapeutic activities  STG/LTGs have been met or progress has been made towards goals:  None  Assessment of Progress: The patient's condition is unchanged.  Self Management Plans:  Patient has been instructed in a home treatment program.  I have re-evaluated this patient and find that the nature, scope, duration and intensity of the therapy is appropriate for the medical condition of the patient.  Jean Pierre continues to require the following intervention to meet STG and LTG's:  No further PT planned. Due to lack of improvement, pt was advised to contact 's office.    Recommendations:  No further PT therapy planned at this time.    Please refer to the daily flowsheet for treatment today, total treatment time and time spent performing 1:1 timed codes.

## 2018-11-13 NOTE — LETTER
[facility] [place] PHYSICAL THERAPY  [address] [place] [address]  [facility] [address]  [phone]    2018    Re: Jean Pierre Roblero   :   1934  MRN:  [id number]   REFERRING PHYSICIAN:   Osvaldo Vines    [facility] [place] PHYSICAL THERAPY    Date of Initial Evaluation:  10/22/2018  Visits:  Rxs Used: 6  Reason for Referral:  Tension headache    PROGRESS  REPORT  Progress reporting period is from 10-22-18 to 18.       SUBJECTIVE  Subjective changes noted by patient:  Pt had one night without a headache since his last visit and one night with a HA of lesser intensity. Trial of no allergy meds for a week but noticed no change in HA reduction. Experimented with other pillows without improvement;  may buy a thinner pillow. Next botox visit in January (over a month away).   Current pain level is 0-6/10.  Previous pain level was 0-6/10.  Changes in function:  None  Adverse reaction to treatment or activity: None    OBJECTIVE  Objective: PROM: min-mod loss of R sided rotation, min loss on L; no pain with all motions. Mild abnormal density of cervical muscles in posterior neck, R > than L.      ASSESSMENT/PLAN  Updated problem list and treatment plan: Diagnosis 1:  Headaches  Decreased ROM/flexibility - manual therapy and therapeutic exercise  Decreased function - therapeutic activities  STG/LTGs have been met or progress has been made towards goals:  None  Assessment of Progress: The patient's condition is unchanged.  Self Management Plans:  Patient has been instructed in a home treatment program.  I have re-evaluated this patient and find that the nature, scope, duration and intensity of the therapy is appropriate for the medical condition of the patient.  Jean Pierre continues to require the following intervention to meet STG and LTG's:  No further PT planned. Due to lack of improvement, pt was advised to contact [facility]  office.    Recommendations:  No further PT therapy planned at this time.          Thank you for your referral.    INQUIRIES  Therapist: Leonila Samuels, PT  [facility] [place] [facility]  [address] [place] [id number]  Phone: [phone]  Fax: [phone]

## 2019-02-19 ENCOUNTER — THERAPY VISIT (OUTPATIENT)
Dept: PHYSICAL THERAPY | Facility: CLINIC | Age: 84
End: 2019-02-19
Payer: MEDICARE

## 2019-02-19 DIAGNOSIS — M54.2 NECK PAIN: ICD-10-CM

## 2019-02-19 DIAGNOSIS — R26.9 GAIT DISTURBANCE: ICD-10-CM

## 2019-02-19 PROCEDURE — 97110 THERAPEUTIC EXERCISES: CPT | Mod: GP | Performed by: PHYSICAL THERAPIST

## 2019-02-19 PROCEDURE — 97161 PT EVAL LOW COMPLEX 20 MIN: CPT | Mod: GP | Performed by: PHYSICAL THERAPIST

## 2019-02-19 NOTE — LETTER
DEPARTMENT OF HEALTH AND HUMAN SERVICES  CENTERS FOR MEDICARE & MEDICAID SERVICES    PLAN/UPDATED PLAN OF PROGRESS FOR OUTPATIENT REHABILITATION    PATIENTS NAME:  Jean Pierre Roblero   : 1934  PROVIDER NUMBER:    [id number]  HICN:  [id number]   PROVIDER NAME: [place] [facility] PHYSICAL THERAPY  MEDICAL RECORD NUMBER: [id number]   START OF CARE DATE:  SOC Date: 19   TYPE:  PT  PRIMARY/TREATMENT DIAGNOSIS: (Pertinent Medical Diagnosis)     Gait disturbance  Neck pain    VISITS FROM START OF CARE:   Rxs Used:  1    [place] [facility] [place] Initial Evaluation  General   Condition requiring PT:  Poor balance  Chronicity:  Recurrent  Onset date of current episode/exacerbation comment:  Pt returns to PT with complaints of neck pain (ongoing) and gait disturbances. Pt reported a fall last year in which the EMS was called to his home; pt missed the last step and fell forwards-was unable to get up by himself. Pt has more recently noted his balance to be off when he gets up from bed during the night to use the bathroom or in the AM upon getting up for the day. Pt feels like he lists to the R as he walks. Pt lives in a split level home with 2 sets of stairs. Pt feels he is able to ascend/descend stairs without issue. MD appointment 19; PT orders generated at that time.  Pain location:  Cervical spine (absence of pain in LE's)  Frequency:  Intermittent  Associated symptoms:  Loss of balance  Pain is:  Worse during the day  Symptoms exacerbated by:  Walking  Progression since onset:  Unchanged  Previous treatment:  Physical therapy (neck)  Improvement with previous treatment:  Mild  General health as reported by patient:  Fair  Red flags:  None as reported by the patient  Pertinent medical history includes:  High blood pressure, migraines/headaches and other (changes in bowel/bladder, chest pain, severe headaches).  Medical allergies: yes (Novocain).  Other surgeries include:   Other (Back).  Current medications:  Anti-inflammatory and pain medication.  Current occupation is Retired.    Primary job tasks include:  Lifting (carrying, computer work).    Objective:  Gait:  Pt ambulated to clinic without assistive device. Pt able to ascend/descend stairs with a reciprocal gait; use of railing for descent (for balance), non-use of railing during ascent  Gait Type:  Normal   Assistive Devices:  None    Flexibility/Screens:   Lower Extremity:  Decreased left lower extremity flexibility:Quadriceps and Hamstrings  Decreased right lower extremity flexibility:  Quadriceps and Hamstrings  Hip Evaluation  Hip PROM:  : WFL with exception of hip extension.  Hip Strength:    Flexion:   Left: 5-/5   Pain:  Right: 5-/5   Pain:  Extension:  Left: 4/5  Pain:Right: 4/5    Pain:    Abduction:  Left: 5/5     Pain:Right: 4+/5    Pain:  External Rotation:  Left: 5/5   Pain:  Right: 5/5   Pain:  Knee Flexion:  Left: 5/5   Pain:Right: 5/5   Pain:  Knee Extension:  Left: 5/5   Pain:Right: 5/5    Pain:  Functional Testing:    Proprioception:  Stork balance test:   Left:    <5 seconds  Right:  <5 seconds  % of Uninvolved:     ROS  Assessment/Plan:    Patient is a 84 year old male with cervical and balance complaints.    Patient has the following significant findings with corresponding treatment plan.                Diagnosis 1:  Neck pain  Pain -  self management, education and home program  Decreased ROM/flexibility - therapeutic exercise  Decreased joint mobility - therapeutic exercise  Decreased function - therapeutic activities  Diagnosis 2:  Gait disturbance   Decreased ROM/flexibility - therapeutic exercise  Decreased strength - therapeutic exercise and therapeutic activities  Impaired balance - neuro re-education and therapeutic activities  Impaired gait - gait training  Decreased function - therapeutic activities    Therapy Evaluation Codes:   1) History comprised of:   Personal factors that impact the plan of  "care:      Age and Time since onset of symptoms.    Comorbidity factors that impact the plan of care are:      None.     Medications impacting care: None.  2) Examination of Body Systems comprised of:   Body structures and functions that impact the plan of care:      Cervical spine.   Activity limitations that impact the plan of care are:      Reading/Computer work and Walking.  3) Clinical presentation characteristics are:   Stable/Uncomplicated.  4) Decision-Making    Low complexity using standardized patient assessment instrument and/or   measureable assessment of functional outcome.        Cumulative Therapy Evaluation is: Low complexity.  Previous and current functional limitations:  (See Goal Flow Sheet for this information)    Short term and Long term goals: (See Goal Flow Sheet for this information)     Communication ability:  Patient appears to be able to clearly communicate and understand verbal and written communication and follow directions correctly.  Treatment Explanation - The following has been discussed with the patient:   RX ordered/plan of care  Anticipated outcomes  Possible risks and side effects  This patient would benefit from PT intervention to resume normal activities.   Rehab potential is good.    Frequency:  1 X week, once daily  Duration:  for 6 weeks  Discharge Plan:  Independent in home treatment program.  Reach maximal therapeutic benefit.    Caregiver Signature/Credentials _____________________________ Date ________       Treating Provider: Leonila Samuels, PT   I have reviewed and certified the need for these services and plan of treatment while under my care.        PHYSICIAN'S SIGNATURE:   _____________________________  Date___________                                   Osvaldo Vines MD    Certification period:  Beginning of Cert date period: 02/19/19 to  End of Cert period date: 04/21/19     Functional Level Progress Report: Please see attached \"Goal Flow sheet for Functional " "level.\"    ____X____ Continue Services or       ________ DC Services                Service dates: From  SOC Date: 02/19/19 date to present                         "

## 2019-02-19 NOTE — LETTER
DEPARTMENT OF HEALTH AND HUMAN SERVICES  CENTERS FOR MEDICARE & MEDICAID SERVICES    PLAN/UPDATED PLAN OF PROGRESS FOR OUTPATIENT REHABILITATION    PATIENTS NAME:  Jean Pierre Roblero   : 1934  PROVIDER NUMBER:    [id number]  HICN:  [id number]    PROVIDER NAME: [place] [facility] PHYSICAL THERAPY  MEDICAL RECORD NUMBER: [id number]   START OF CARE DATE:  SOC Date: 19   TYPE:  PT  PRIMARY/TREATMENT DIAGNOSIS: (Pertinent Medical Diagnosis)     Gait disturbance  Neck pain    VISITS FROM START OF CARE:  Rxs Used: 1     [place] [facility] Initial Evaluation  Condition requiring PT:  Poor balance  Chronicity:  Recurrent  Onset date of current episode/exacerbation comment:  Pt returns to PT with complaints of neck pain (ongoing) and gait disturbances. Pt reported a fall last year in which the EMS was called to his home; pt missed the last step and fell forwards-was unable to get up by himself. Pt has more recently noted his balance to be off when he gets up from bed during the night to use the bathroom or in the AM upon getting up for the day. Pt feels like he lists to the R as he walks. Pt lives in a split level home with 2 sets of stairs. Pt feels he is able to ascend/descend stairs without issue  Pain location:  Cervical spine (absence of pain in LE's)  Frequency:  Intermittent  Associated symptoms:  Loss of balance  Pain is:  Worse during the day  Symptoms exacerbated by:  Walking  Progression since onset:  Unchanged  Previous treatment:  Physical therapy (neck)  Improvement with previous treatment:  Mild  General health as reported by patient:  Fair  Red flags:  None as reported by the patient  Pertinent medical history includes:  High blood pressure, migraines/headaches and other (changes in bowel/bladder, chest pain, severe headaches).  Medical allergies: yes (Novocain).  Other surgeries include:  Other (Back).  Current medications:  Anti-inflammatory and pain  medication.  Current occupation is Retired.    Primary job tasks include:  Lifting (carrying, computer work).    Objective:  Gait:  Pt ambulated to clinic without assistive device. Pt able to ascend/descend stairs with a reciprocal gait; use of railing for descent (for balance), non-use of railing during ascent  Gait Type:  Normal   Assistive Devices:  None      Flexibility/Screens:   Lower Extremity:  Decreased left lower extremity flexibility:Quadriceps and Hamstrings  Decreased right lower extremity flexibility:  Quadriceps and Hamstrings  Hip Evaluation  Hip PROM:  : WFL with exception of hip extension.  Hip Strength:    Flexion:   Left: 5-/5   Pain:  Right: 5-/5   Pain:  Extension:  Left: 4/5  Pain:Right: 4/5    Pain:    Abduction:  Left: 5/5     Pain:Right: 4+/5    Pain:  External Rotation:  Left: 5/5   Pain:  Right: 5/5   Pain:  Knee Flexion:  Left: 5/5   Pain:Right: 5/5   Pain:  Knee Extension:  Left: 5/5   Pain:Right: 5/5    Pain:  Functional Testing:    Proprioception:  Stork balance test:   Left:    <5 seconds  Right:  <5 seconds  % of Uninvolved:     ROS  Assessment/Plan:    Patient is a 84 year old male with cervical and balance complaints.    Patient has the following significant findings with corresponding treatment plan.                Diagnosis 1:  Neck pain  Pain -  self management, education and home program  Decreased ROM/flexibility - therapeutic exercise  Decreased joint mobility - therapeutic exercise  Decreased function - therapeutic activities  Diagnosis 2:  Gait disturbance   Decreased ROM/flexibility - therapeutic exercise  Decreased strength - therapeutic exercise and therapeutic activities  Impaired balance - neuro re-education and therapeutic activities  Impaired gait - gait training  Decreased function - therapeutic activities    Therapy Evaluation Codes:   1) History comprised of:   Personal factors that impact the plan of care:      Age and Time since onset of symptoms.  "   Comorbidity factors that impact the plan of care are:      None.     Medications impacting care: None.  2) Examination of Body Systems comprised of:   Body structures and functions that impact the plan of care:      Cervical spine.   Activity limitations that impact the plan of care are:      Reading/Computer work and Walking.  3) Clinical presentation characteristics are:   Stable/Uncomplicated.  4) Decision-Making    Low complexity using standardized patient assessment instrument and/or   measureable assessment of functional outcome.      Cumulative Therapy Evaluation is: Low complexity.  Previous and current functional limitations:  (See Goal Flow Sheet for this information)    Short term and Long term goals: (See Goal Flow Sheet for this information)   Communication ability:  Patient appears to be able to clearly communicate and understand verbal and written communication and follow directions correctly.  Treatment Explanation - The following has been discussed with the patient:   RX ordered/plan of care  Anticipated outcomes  Possible risks and side effects  This patient would benefit from PT intervention to resume normal activities.   Rehab potential is good.    Frequency:  1 X week, once daily  Duration:  for 6 weeks  Discharge Plan:  Independent in home treatment program.  Reach maximal therapeutic benefit.    Caregiver Signature/Credentials _____________________________ Date ________       Treating Provider: Leonila Samuels, PT   I have reviewed and certified the need for these services and plan of treatment while under my care.        PHYSICIAN'S SIGNATURE:   ____________________________  Date___________            Osvaldo Vines MD    Certification period:  Beginning of Cert date period: 02/19/19 to  End of Cert period date: 04/21/19     Functional Level Progress Report: Please see attached \"Goal Flow sheet for Functional level.\"    ____X____ Continue Services or       ________ DC Services            "     Service dates: From  SOC Date: 02/19/19 date to present

## 2019-02-19 NOTE — PROGRESS NOTES
[place] [facility] Initial Evaluation  Subjective:    General   Condition requiring PT:  Poor balance  Chronicity:  Recurrent  Onset date of current episode/exacerbation comment:  Pt returns to PT with complaints of neck pain (ongoing) and gait disturbances. Pt reported a fall last year in which the EMS was called to his home; pt missed the last step and fell forwards-was unable to get up by himself. Pt has more recently noted his balance to be off when he gets up from bed during the night to use the bathroom or in the AM upon getting up for the day. Pt feels like he lists to the R as he walks. Pt lives in a split level home with 2 sets of stairs. Pt feels he is able to ascend/descend stairs without issue. MD appointment 2-11-19; PT orders generated at that time.  Pain location:  Cervical spine (absence of pain in LE's)  Frequency:  Intermittent  Associated symptoms:  Loss of balance  Pain is:  Worse during the day  Symptoms exacerbated by:  Walking  Progression since onset:  Unchanged  Previous treatment:  Physical therapy (neck)  Improvement with previous treatment:  Mild  General health as reported by patient:  Fair  Red flags:  None as reported by the patient                      Objective:    Gait:  Pt ambulated to clinic without assistive device. Pt able to ascend/descend stairs with a reciprocal gait; use of railing for descent (for balance), non-use of railing during ascent  Gait Type:  Normal   Assistive Devices:  None      Flexibility/Screens:       Lower Extremity:  Decreased left lower extremity flexibility:Quadriceps and Hamstrings    Decreased right lower extremity flexibility:  Quadriceps and Hamstrings                                                 Hip Evaluation  Hip PROM:  : WFL with exception of hip extension.                          Hip Strength:    Flexion:   Left: 5-/5   Pain:  Right: 5-/5   Pain:                    Extension:  Left: 4/5  Pain:Right: 4/5    Pain:    Abduction:   Left: 5/5     Pain:Right: 4+/5    Pain:      External Rotation:  Left: 5/5   Pain:  Right: 5/5   Pain:  Knee Flexion:  Left: 5/5   Pain:Right: 5/5   Pain:  Knee Extension:  Left: 5/5   Pain:Right: 5/5    Pain:            Functional Testing:            Proprioception:    Stork balance test:   Left:    <5 seconds  Right:  <5 seconds  % of Uninvolved:                General     ROS    Assessment/Plan:    Patient is a 84 year old male with cervical and balance complaints.    Patient has the following significant findings with corresponding treatment plan.                Diagnosis 1:  Neck pain  Pain -  self management, education and home program  Decreased ROM/flexibility - therapeutic exercise  Decreased joint mobility - therapeutic exercise  Decreased function - therapeutic activities  Diagnosis 2:  Gait disturbance   Decreased ROM/flexibility - therapeutic exercise  Decreased strength - therapeutic exercise and therapeutic activities  Impaired balance - neuro re-education and therapeutic activities  Impaired gait - gait training  Decreased function - therapeutic activities    Therapy Evaluation Codes:   1) History comprised of:   Personal factors that impact the plan of care:      Age and Time since onset of symptoms.    Comorbidity factors that impact the plan of care are:      None.     Medications impacting care: None.  2) Examination of Body Systems comprised of:   Body structures and functions that impact the plan of care:      Cervical spine.   Activity limitations that impact the plan of care are:      Reading/Computer work and Walking.  3) Clinical presentation characteristics are:   Stable/Uncomplicated.  4) Decision-Making    Low complexity using standardized patient assessment instrument and/or measureable assessment of functional outcome.  Cumulative Therapy Evaluation is: Low complexity.    Previous and current functional limitations:  (See Goal Flow Sheet for this information)    Short term and Long  term goals: (See Goal Flow Sheet for this information)     Communication ability:  Patient appears to be able to clearly communicate and understand verbal and written communication and follow directions correctly.  Treatment Explanation - The following has been discussed with the patient:   RX ordered/plan of care  Anticipated outcomes  Possible risks and side effects  This patient would benefit from PT intervention to resume normal activities.   Rehab potential is good.    Frequency:  1 X week, once daily  Duration:  for 6 weeks  Discharge Plan:  Independent in home treatment program.  Reach maximal therapeutic benefit.    Please refer to the daily flowsheet for treatment today, total treatment time and time spent performing 1:1 timed codes.

## 2019-02-21 PROBLEM — M54.2 NECK PAIN: Status: ACTIVE | Noted: 2019-02-21

## 2019-02-21 PROBLEM — R26.9 GAIT DISTURBANCE: Status: ACTIVE | Noted: 2019-02-21

## 2019-02-21 NOTE — PROGRESS NOTES
[place] [facility] Initial Evaluation  Subjective:                                       Pertinent medical history includes:  High blood pressure, migraines/headaches and other (changes in bowel/bladder, chest pain, severe headaches).  Medical allergies: yes (Novocain).  Other surgeries include:  Other (Back).  Current medications:  Anti-inflammatory and pain medication.  Current occupation is Retired.    Primary job tasks include:  Lifting (carrying, computer work).                                Objective:  System    Physical Exam    General     ROS    Assessment/Plan:

## 2019-03-04 ENCOUNTER — THERAPY VISIT (OUTPATIENT)
Dept: PHYSICAL THERAPY | Facility: CLINIC | Age: 84
End: 2019-03-04
Payer: MEDICARE

## 2019-03-04 DIAGNOSIS — M54.2 NECK PAIN: ICD-10-CM

## 2019-03-04 DIAGNOSIS — R26.9 GAIT DISTURBANCE: Primary | ICD-10-CM

## 2019-03-04 PROCEDURE — 97112 NEUROMUSCULAR REEDUCATION: CPT | Mod: GP | Performed by: PHYSICAL THERAPIST

## 2019-03-04 PROCEDURE — 97110 THERAPEUTIC EXERCISES: CPT | Mod: GP | Performed by: PHYSICAL THERAPIST

## 2019-03-12 ENCOUNTER — THERAPY VISIT (OUTPATIENT)
Dept: PHYSICAL THERAPY | Facility: CLINIC | Age: 84
End: 2019-03-12
Payer: MEDICARE

## 2019-03-12 DIAGNOSIS — R26.9 GAIT DISTURBANCE: ICD-10-CM

## 2019-03-12 DIAGNOSIS — M54.2 NECK PAIN: ICD-10-CM

## 2019-03-12 PROCEDURE — 97530 THERAPEUTIC ACTIVITIES: CPT | Mod: GP | Performed by: PHYSICAL THERAPIST

## 2019-03-12 PROCEDURE — 97110 THERAPEUTIC EXERCISES: CPT | Mod: GP | Performed by: PHYSICAL THERAPIST

## 2019-03-18 PROBLEM — G44.209 TENSION HEADACHE: Status: RESOLVED | Noted: 2018-10-22 | Resolved: 2019-03-18

## 2019-06-24 ENCOUNTER — OFFICE VISIT (OUTPATIENT)
Dept: PODIATRY | Facility: CLINIC | Age: 84
End: 2019-06-24
Payer: MEDICARE

## 2019-06-24 VITALS
SYSTOLIC BLOOD PRESSURE: 152 MMHG | DIASTOLIC BLOOD PRESSURE: 82 MMHG | WEIGHT: 146 LBS | HEIGHT: 67 IN | BODY MASS INDEX: 22.91 KG/M2

## 2019-06-24 DIAGNOSIS — L60.0 INGROWN TOENAIL OF LEFT FOOT: ICD-10-CM

## 2019-06-24 DIAGNOSIS — I73.9 PAD (PERIPHERAL ARTERY DISEASE) (H): ICD-10-CM

## 2019-06-24 DIAGNOSIS — R09.89 DECREASED PEDAL PULSES: ICD-10-CM

## 2019-06-24 DIAGNOSIS — I73.9 CLAUDICATION (H): ICD-10-CM

## 2019-06-24 DIAGNOSIS — M79.675 PAIN OF TOE OF LEFT FOOT: Primary | ICD-10-CM

## 2019-06-24 DIAGNOSIS — L60.8 NAIL DEFORMITY: ICD-10-CM

## 2019-06-24 PROCEDURE — 99203 OFFICE O/P NEW LOW 30 MIN: CPT | Performed by: PODIATRIST

## 2019-06-24 ASSESSMENT — MIFFLIN-ST. JEOR: SCORE: 1305.88

## 2019-06-24 NOTE — PROGRESS NOTES
ASSESSMENT/PLAN:    Encounter Diagnoses   Name Primary?     Pain of toe of left foot Yes     Ingrown toenail of left foot      Nail deformity      Decreased pedal pulses      Claudication (H)      PAD (peripheral artery disease) (H)      His description of pain is consistent with an ingrown nail or nail-related soft tissue irritation. There are no clinical signs of bacterial infection.      We discussed partial nail avulsion, but without infection, I prefer to know his arterial status for healing after such a procedure.     FRANCISCO/US ordered    Using a small nail nippers, I was able to trim a large part of the distal media nail plate back at a slant, taking pressure off of the skin. I did not charge for this service today. He was informed that should he want it done again in the future, an out of pocket expense is likely and an ABN waiver for will need to be signed.     Body mass index is 22.87 kg/m .          Eduardo Grant DPM, FACLYNN, MS    [place] [facility] of Podiatry/Foot & Ankle Surgery      ____________________________________________________________________    HPI:         Chief Complaint: ingrown toenal  Onset of problem: months  Pain/ discomfort is described as:  Sore big toe  Ratin/10   Frequency:  intermittent    Previous treatment: warm water soaking; he has packed some cotton under the nail edge.  *  Patient Active Problem List   Diagnosis     Bilateral thigh pain     Gait disturbance     Neck pain   *  *  Past Surgical History:   Procedure Laterality Date     BACK SURGERY       CHOLECYSTECTOMY       ENDOSCOPIC SINUS SURGERY  2012    Procedure: ENDOSCOPIC SINUS SURGERY;  ENDOSCOPIC REMOVAL OF LEFT NASAL MASS WITH LANDMARKS (Fusion Tracking Isabella), BILATERAL INFERIOR TURBINOPLASTY;  Surgeon: Dima Younger MD;  Location: [facility]     EYE SURGERY      cataracts     HERNIA REPAIR       ORTHOPEDIC SURGERY      ingrown toe nail removal     TURBINOPLASTY  2012    Procedure: TURBINOPLASTY;;   Surgeon: Dima Younger MD;  Location: [facility]   *  *  Current Outpatient Medications   Medication Sig Dispense Refill     Ascorbic Acid (VITAMIN C PO) Take 500 mg by mouth daily.         Cholecalciferol (VITAMIN D3 PO) Take 1,000 Units by mouth daily.         cholestyramine (QUESTRAN) 4 G packet Take 1 packet by mouth 2 times daily (with meals).         Cyanocobalamin (VITAMIN B 12 PO) Take 1,000 mg by mouth daily.         FINASTERIDE PO Take 5 mg by mouth daily.         HYDROcodone-acetaminophen 5-325 MG per tablet Take 1-2 tablets by mouth every 4 hours as needed for other (Moderate to Severe Pain). 30 tablet 0     Multiple Vitamin (MULTI-VITAMIN) per tablet Take 1 tablet by mouth daily.         ondansetron (ZOFRAN-ODT) 8 MG disintegrating tablet Take 1 tablet by mouth every 8 hours as needed for nausea. 10 tablet 0     pilocarpine (PILOCAR) 0.5 % ophthalmic solution 1-2 drops At Bedtime.         Simethicone (PHAZYME) 180 MG CAPS Take 180 mg by mouth 3 times daily.           ROS:     A 10-point review of systems was performed and is positive for that noted above in the HPI and as seen below.  All other areas are negative.     Numbness in feet?  no   Calf pain with walking? no  Recent foot/ankle injury? Fractured knee in a fall, 3/18/19  Weight change over past 12 months? no  Self perception as overweight? no  Recent flu-like symptoms? no  Joint pain other than feet ? no    Social History: Employment:  no;  Exercise/Physical activity:  Daily walking, biking;  Tobacco use:  no  Social History     Socioeconomic History     Marital status:      Spouse name: Not on file     Number of children: Not on file     Years of education: Not on file     Highest education level: Not on file   Occupational History     Not on file   Social Needs     Financial resource strain: Not on file     Food insecurity:     Worry: Not on file     Inability: Not on file     Transportation needs:     Medical: Not on file      Non-medical: Not on file   Tobacco Use     Smoking status: Never Smoker     Smokeless tobacco: Never Used   Substance and Sexual Activity     Alcohol use: No     Drug use: No     Sexual activity: Yes     Partners: Female   Lifestyle     Physical activity:     Days per week: Not on file     Minutes per session: Not on file     Stress: Not on file   Relationships     Social connections:     Talks on phone: Not on file     Gets together: Not on file     Attends Yazidi service: Not on file     Active member of club or organization: Not on file     Attends meetings of clubs or organizations: Not on file     Relationship status: Not on file     Intimate partner violence:     Fear of current or ex partner: Not on file     Emotionally abused: Not on file     Physically abused: Not on file     Forced sexual activity: Not on file   Other Topics Concern     Parent/sibling w/ CABG, MI or angioplasty before 65F 55M? Not Asked   Social History Narrative     Not on file       Family history:  No family history on file.    Rheumatoid arthritis:  parent  Foot Problems: no  Diabetes: no      EXAM:    Vitals: There were no vitals taken for this visit.  BMI: There is no height or weight on file to calculate BMI.  Height: Data Unavailable    Constitutional/ general:  Pt is in no apparent distress, appears well-nourished.  Cooperative with history and physical exam.     Vascular:  Pedal pulses are not readily palpable op the left for the DP and PT arteries.     Neuro:  Alert and oriented x 3. Coordinated gait.  Light touch sensation is intact to the L4, L5, S1 distributions. No obvious deficits.  No evidence of neurological-based weakness, spasticity, or contracture in the lower extremities.     Derm: Normal texture and turgor.  No erythema, ecchymosis, or cyanosis.  No open lesions.   The left hallux nail is thicker, elongated, growing slightly laterally.  No pain today, but he specifies the distal aspect, medial skin fold of the  left hallux.  He has packed some cotton under the nail corner.     Musculoskeletal:    Lower extremity muscle strength is normal.  Patient is ambulatory without an assistive device or brace .  No gross deformities.   Eduardo Grant DPM, FACLYNN, MS    [place] Department of Podiatry/Foot & Ankle Surgery

## 2019-06-24 NOTE — PATIENT INSTRUCTIONS
Thank you for choosing [facility]!    DR. JONES'S CLINIC LOCATIONS     MONDAY - [place] WEDNESDAY (AM ONLY) - [place]   [address] [place] [address] [address] Epstein [address]   [phone] / [phone] / [phone]       THURSDAY - [place] SCHEDULE SURGERY: [phone]   [address] APPOINTMENTS: [phone]   [place] [address] BILLING QUESTIONS: [phone] / [phone]       [facility] SCHEDULING  They should be calling you within 24 hours to schedule your scan.  If not, please call the location discussed at your appointment.    1) [facility]:       [phone]      [address] [place] [address]    2) [facility]:      [phone]      [address] [facility] [place] [address]    3) [facility]:       [phone]      [address] [place] [address]    * We will call you with the results. Please allow 24-48 hours for the results to be read and final.                  FYI: BODY WEIGHT AND YOUR FEET  The following information is included in the after visit summary for all patients. Body weight can be a sensitive issue to discuss in clinic, but we think the following information is very important. Although we focus on the feet and ankles, we do support the overall health of our patients.     Many things can cause foot and ankle problems. Foot structure, activity level, foot mechanics and injuries are common causes of pain. One very important issue that often goes unmentioned, is body weight. Extra weight can cause increased stress on muscles, ligaments, bones and tendons. Sometimes just a few extra pounds is all it takes to put one over her/his threshold. Without reducing that stress, it can be difficult to alleviate pain. As Foot & Ankle specialists, our job is addressing the lower extremity problem and possible  causes. Regarding extra body weight, we encourage patients to discuss diet and weight management plans with their primary care doctors. It is this team approach that gives you the best opportunity for pain relief and getting you back on your feet.      [facility] has a Comprehensive Weight Management Program. This program includes counseling, education, non-surgical and surgical approaches to weight loss. If you are interested in learning more either talk to you primary care provider or call [phone].    [facility] has a Comprehensive Weight Management Program. This program includes counseling, education, non-surgical and surgical approaches to weight loss. If you are interested in learning more either talk to you primary care provider or call [phone].    Jean Pierre to follow up with Primary Care provider regarding elevated blood pressure.

## 2019-06-24 NOTE — LETTER
2019         RE: Jean Pierre Roblero  [address]  [facility] [address]        Dear Colleague,    Thank you for referring your patient, Jean Pierre Roblero, to the [facility]. Please see a copy of my visit note below.      ASSESSMENT/PLAN:    Encounter Diagnoses   Name Primary?     Pain of toe of left foot Yes     Ingrown toenail of left foot      Nail deformity      Decreased pedal pulses      Claudication (H)      PAD (peripheral artery disease) (H)      His description of pain is consistent with an ingrown nail or nail-related soft tissue irritation. There are no clinical signs of bacterial infection.      We discussed partial nail avulsion, but without infection, I prefer to know his arterial status for healing after such a procedure.     FRANCISCO/US ordered    Using a small nail nippers, I was able to trim a large part of the distal media nail plate back at a slant, taking pressure off of the skin. I did not charge for this service today. He was informed that should he want it done again in the future, an out of pocket expense is likely and an ABN waiver for will need to be signed.     Body mass index is 22.87 kg/m .          Eduardo Grant DPM, FACFAS, MS    [facility]      ____________________________________________________________________    HPI:         Chief Complaint: ingrown toenal  Onset of problem: months  Pain/ discomfort is described as:  Sore big toe  Ratin/10   Frequency:  intermittent    Previous treatment: warm water soaking; he has packed some cotton under the nail edge.  *  Patient Active Problem List   Diagnosis     Bilateral thigh pain     Gait disturbance     Neck pain   *  *  Past Surgical History:   Procedure Laterality Date     BACK SURGERY       CHOLECYSTECTOMY       ENDOSCOPIC SINUS SURGERY  2012    Procedure: ENDOSCOPIC SINUS SURGERY;  ENDOSCOPIC REMOVAL OF LEFT NASAL MASS WITH LANDMARKS (Fusion Tracking  [place]), BILATERAL INFERIOR TURBINOPLASTY;  Surgeon: Dima Younger MD;  Location: [facility]     EYE SURGERY      cataracts     HERNIA REPAIR       ORTHOPEDIC SURGERY      ingrown toe nail removal     TURBINOPLASTY  8/20/2012    Procedure: TURBINOPLASTY;;  Surgeon: Dima Younger MD;  Location: [facility]   *  *  Current Outpatient Medications   Medication Sig Dispense Refill     Ascorbic Acid (VITAMIN C PO) Take 500 mg by mouth daily.         Cholecalciferol (VITAMIN D3 PO) Take 1,000 Units by mouth daily.         cholestyramine (QUESTRAN) 4 G packet Take 1 packet by mouth 2 times daily (with meals).         Cyanocobalamin (VITAMIN B 12 PO) Take 1,000 mg by mouth daily.         FINASTERIDE PO Take 5 mg by mouth daily.         HYDROcodone-acetaminophen 5-325 MG per tablet Take 1-2 tablets by mouth every 4 hours as needed for other (Moderate to Severe Pain). 30 tablet 0     Multiple Vitamin (MULTI-VITAMIN) per tablet Take 1 tablet by mouth daily.         ondansetron (ZOFRAN-ODT) 8 MG disintegrating tablet Take 1 tablet by mouth every 8 hours as needed for nausea. 10 tablet 0     pilocarpine (PILOCAR) 0.5 % ophthalmic solution 1-2 drops At Bedtime.         Simethicone (PHAZYME) 180 MG CAPS Take 180 mg by mouth 3 times daily.           ROS:     A 10-point review of systems was performed and is positive for that noted above in the HPI and as seen below.  All other areas are negative.     Numbness in feet?  no   Calf pain with walking? no  Recent foot/ankle injury? Fractured knee in a fall, 3/18/19  Weight change over past 12 months? no  Self perception as overweight? no  Recent flu-like symptoms? no  Joint pain other than feet ? no    Social History: Employment:  no;  Exercise/Physical activity:  Daily walking, biking;  Tobacco use:  no  Social History     Socioeconomic History     Marital status:      Spouse name: Not on file     Number of children: Not on file     Years of education: Not on file     Highest  education level: Not on file   Occupational History     Not on file   Social Needs     Financial resource strain: Not on file     Food insecurity:     Worry: Not on file     Inability: Not on file     Transportation needs:     Medical: Not on file     Non-medical: Not on file   Tobacco Use     Smoking status: Never Smoker     Smokeless tobacco: Never Used   Substance and Sexual Activity     Alcohol use: No     Drug use: No     Sexual activity: Yes     Partners: Female   Lifestyle     Physical activity:     Days per week: Not on file     Minutes per session: Not on file     Stress: Not on file   Relationships     Social connections:     Talks on phone: Not on file     Gets together: Not on file     Attends Oriental orthodox service: Not on file     Active member of club or organization: Not on file     Attends meetings of clubs or organizations: Not on file     Relationship status: Not on file     Intimate partner violence:     Fear of current or ex partner: Not on file     Emotionally abused: Not on file     Physically abused: Not on file     Forced sexual activity: Not on file   Other Topics Concern     Parent/sibling w/ CABG, MI or angioplasty before 65F 55M? Not Asked   Social History Narrative     Not on file       Family history:  No family history on file.    Rheumatoid arthritis:  parent  Foot Problems: no  Diabetes: no      EXAM:    Vitals: There were no vitals taken for this visit.  BMI: There is no height or weight on file to calculate BMI.  Height: Data Unavailable    Constitutional/ general:  Pt is in no apparent distress, appears well-nourished.  Cooperative with history and physical exam.     Vascular:  Pedal pulses are not readily palpable op the left for the DP and PT arteries.     Neuro:  Alert and oriented x 3. Coordinated gait.  Light touch sensation is intact to the L4, L5, S1 distributions. No obvious deficits.  No evidence of neurological-based weakness, spasticity, or contracture in the lower  extremities.     Derm: Normal texture and turgor.  No erythema, ecchymosis, or cyanosis.  No open lesions.   The left hallux nail is thicker, elongated, growing slightly laterally.  No pain today, but he specifies the distal aspect, medial skin fold of the left hallux.  He has packed some cotton under the nail corner.     Musculoskeletal:    Lower extremity muscle strength is normal.  Patient is ambulatory without an assistive device or brace .  No gross deformities.   Eduardo Grant DPM, FACFAS, MS    [facility]le [facility]                Again, thank you for allowing me to participate in the care of your patient.        Sincerely,        Eduardo Grant DPM

## 2019-07-08 ENCOUNTER — HOSPITAL ENCOUNTER (OUTPATIENT)
Dept: ULTRASOUND IMAGING | Facility: CLINIC | Age: 84
Discharge: HOME | End: 2019-07-08
Attending: PODIATRIST | Admitting: PODIATRIST
Payer: MEDICARE

## 2019-07-08 DIAGNOSIS — I73.9 CLAUDICATION (H): ICD-10-CM

## 2019-07-08 DIAGNOSIS — R09.89 DECREASED PEDAL PULSES: ICD-10-CM

## 2019-07-08 DIAGNOSIS — I73.9 PAD (PERIPHERAL ARTERY DISEASE) (H): ICD-10-CM

## 2019-07-08 PROCEDURE — 93922 UPR/L XTREMITY ART 2 LEVELS: CPT

## 2019-07-16 PROBLEM — M54.2 NECK PAIN: Status: RESOLVED | Noted: 2019-02-21 | Resolved: 2019-07-16

## 2019-07-16 PROBLEM — R26.9 GAIT DISTURBANCE: Status: RESOLVED | Noted: 2019-02-21 | Resolved: 2019-07-16

## 2019-07-16 NOTE — PROGRESS NOTES
"DISCHARGE REPORT    Progress reporting period is from 2-19-19 to 3-12-19.       SUBJECTIVE  Subjective changes noted by patient:   Pt reported significant improvement in regards to his lower back pain. Found his lumbar roll and started using while sitting in his computer work chair. Notices an overall improvement at the end of the day. Decreased his \"speed\" while walking at the mall. Indicated he works out 3x/week at a local fitness center using upper/lower body machines. Every other week works with a ; spent the last session focusing on balance related ex's. Indicated his grandchildren have remarked on his walking like a \"penguin.\"  Reports balance remains an issue overall.    Current pain level is 0/10  .     Previous pain level was  0/10  .   Changes in function:  Yes (See Goal flowsheet attached for changes in current functional level)  Adverse reaction to treatment or activity: None    OBJECTIVE  Changes noted in objective findings:  Balance issues ongoing; SLS-remains limited to <5 seconds/leg. Able to perform tandem stance: L foot behind-15 seconds, R foot behind-30 seconds.        ASSESSMENT/PLAN  Updated problem list and treatment plan: Diagnosis 1:  Gait disturbance.   STG/LTGs have been met or progress has been made towards goals:  Yes (See Goal flow sheet completed today.)  Assessment of Progress: The patient's condition is improving.  Self Management Plans:  Patient is independent in a home treatment program.  Jean Pierre continues to require the following intervention to meet STG and LTG's:  PT intervention is no longer required to meet STG/LTG.    Recommendations:  This patient is ready to be discharged from therapy and continue their home treatment program.    Please refer to the daily flowsheet for treatment today, total treatment time and time spent performing 1:1 timed codes.        "

## 2019-07-18 ENCOUNTER — TELEPHONE (OUTPATIENT)
Dept: PODIATRY | Facility: CLINIC | Age: 84
End: 2019-07-18

## 2019-07-18 NOTE — TELEPHONE ENCOUNTER
Reason for Call:  Request for results:    Name of test or procedure: ultrasound    Date of test of procedure: 7/8/19    Location of the test or procedure: [facility]    OK to leave the result message on voice mail or with a family member? YES    Phone number Patient can be reached at:  Home number on file [phone] (home)    Additional comments: pt had ultrasound done on 7/8/19 at [facility] and wants to know the results. Please call pt back with results. Thanks.    Call taken on 7/18/2019 at 4:28 PM by SHIRLEY GUPTA

## 2019-07-19 NOTE — TELEPHONE ENCOUNTER
Called and informed pt results were normal. Advised to come back for a 30 minute nail procedure as needed.

## 2019-11-04 ENCOUNTER — OFFICE VISIT (OUTPATIENT)
Dept: PODIATRY | Facility: CLINIC | Age: 84
End: 2019-11-04
Payer: MEDICARE

## 2019-11-04 VITALS
SYSTOLIC BLOOD PRESSURE: 154 MMHG | DIASTOLIC BLOOD PRESSURE: 80 MMHG | WEIGHT: 147 LBS | HEIGHT: 67 IN | BODY MASS INDEX: 23.07 KG/M2

## 2019-11-04 DIAGNOSIS — M79.675 PAIN OF LEFT GREAT TOE: ICD-10-CM

## 2019-11-04 DIAGNOSIS — L60.8 NAIL DEFORMITY: Primary | ICD-10-CM

## 2019-11-04 PROCEDURE — 99213 OFFICE O/P EST LOW 20 MIN: CPT | Performed by: PODIATRIST

## 2019-11-04 ASSESSMENT — MIFFLIN-ST. JEOR: SCORE: 1310.42

## 2019-11-04 NOTE — PATIENT INSTRUCTIONS
Thank you for choosing [facility]!    DR. JONES'S CLINIC LOCATIONS     MONDAY - [place] WEDNESDAY (AM ONLY) - [place]   [address] [place] [address] [address] [place] [address] [place], [place] [id number] [phone] / [phone] / [phone]       THURSDAY - [place] SCHEDULE SURGERY: [phone]   [address] APPOINTMENTS: [phone]   [place] [address] BILLING QUESTIONS: [phone] / [phone]         ROUTINE FOOT CARE    [employer]   $55 nails - $10 calluses  [phone]  (Travels to your home) [employer] - $40  [phone]  [email or web address] for locations or they can come to your home   [employer]  [phone]  [facility] [employer]  [phone]  (Travels to your home)   Mariely Mann, DPM  [address] [place] [address] [phone] Gerard Barrientos, DPM  [address] [place] [address] [phone]   [facility]  [address] [place], [place] [id number] [phone] [facility]   [phone]  [facility]   [facility]  [phone]  [place] & [place] Locations  (does not take BCBS) [facility]   [phone]           FYI: BODY WEIGHT AND YOUR FEET  The following information is included in the after visit summary for all patients. Body weight can be a sensitive issue to discuss in clinic, but we think the following information is very important. Although we focus on the feet and ankles, we do support the overall health of our patients.     Many things can cause foot and ankle problems. Foot structure, activity level, foot mechanics and injuries are common causes of pain. One very important issue that often goes unmentioned, is body weight. Extra weight can cause increased stress on muscles, ligaments, bones and tendons. Sometimes just a few extra pounds is all it takes to put one over her/his threshold.  Without reducing that stress, it can be difficult to alleviate pain. As Foot & Ankle specialists, our job is addressing the lower extremity problem and possible causes. Regarding extra body weight, we encourage patients to discuss diet and weight management plans with their primary care doctors. It is this team approach that gives you the best opportunity for pain relief and getting you back on your feet.      [facility] has a Comprehensive Weight Management Program. This program includes counseling, education, non-surgical and surgical approaches to weight loss. If you are interested in learning more either talk to you primary care provider or call [phone].    Jean Pierre to follow up with Primary Care provider regarding elevated blood pressure.

## 2019-11-04 NOTE — LETTER
11/4/2019         RE: Jean Pierre Roblero  [address]  [facility] [address]        Dear Colleague,    Thank you for referring your patient, Jean Pierre Roblero, to the [facility]. Please see a copy of my visit note below.    ASSESSMENT/PLAN:    Encounter Diagnoses   Name Primary?     Nail deformity Yes     Pain of left great toe      No clinical signs of infection. No toe pain today. Left hallux pain is intermittent.  The FRANCISCO/US studies suggest that there is likely adequate blood flow to his feet for healing, should he need a partial nail avulsion at some point. I asked him if the pain/ discomfort he has is something he can live with. He said it is, rather than a procedure at this time.     I explained that toenail changes are often from injury to a nail unit, rather than from fungus.  Injury might be a one-time event or from repetitive irritation in foot wear and with certain types of activities.  Injured nails are likely more susceptible to fungal infection.  Change seen in multiple nails is more likely from fungus.  Treatment options are limited.     It was explained that many people opt to simply keep the involved nails trimmed and filed. [facility] does not offer this service.  Another option is a trial of a topical and/or oral antifungal.  Many times these are not successful nor provide a cure.  These medications do not correct a nail deformity.      I suggest that he live with the nails and keep them trimmed and filed.  A list of alternatives for nail care was provided.    I asked him to follow up if increasing frequency of pain, redness and swelling.    Body mass index is 23.02 kg/m .      Eduardo Grant DPM, FACFAS, MS    [facility]      ____________________________________________________________________    HPI:       Follow up for left great toe pain  At his last visit on 6/24/19, I trimmed the nail.  We considered it a form  "of ingrown toenail. He has some intermittent pain. No pain today.   He asks about if the right hallux nail has fungus and if there is a treatment option.  Non invasive vascular studies were completed, in case he would need a more involved nail removal procedure.     Patient Active Problem List   Diagnosis     Bilateral thigh pain     Past Surgical History:   Procedure Laterality Date     BACK SURGERY       CHOLECYSTECTOMY       ENDOSCOPIC SINUS SURGERY  8/20/2012    Procedure: ENDOSCOPIC SINUS SURGERY;  ENDOSCOPIC REMOVAL OF LEFT NASAL MASS WITH LANDMARKS (Fusion Tracking [place]), BILATERAL INFERIOR TURBINOPLASTY;  Surgeon: Dima Younger MD;  Location: [facility]     EYE SURGERY      cataracts     HERNIA REPAIR       ORTHOPEDIC SURGERY      ingrown toe nail removal     TURBINOPLASTY  8/20/2012    Procedure: TURBINOPLASTY;;  Surgeon: Dima Younger MD;  Location: [facility]     Current Outpatient Medications   Medication Sig Dispense Refill     Ascorbic Acid (VITAMIN C PO) Take 500 mg by mouth daily.         Cholecalciferol (VITAMIN D3 PO) Take 1,000 Units by mouth daily.         cholestyramine (QUESTRAN) 4 G packet Take 1 packet by mouth 2 times daily (with meals).         Cyanocobalamin (VITAMIN B 12 PO) Take 1,000 mg by mouth daily.         FINASTERIDE PO Take 5 mg by mouth daily.         HYDROcodone-acetaminophen 5-325 MG per tablet Take 1-2 tablets by mouth every 4 hours as needed for other (Moderate to Severe Pain). 30 tablet 0     Multiple Vitamin (MULTI-VITAMIN) per tablet Take 1 tablet by mouth daily.         pilocarpine (PILOCAR) 0.5 % ophthalmic solution 1-2 drops nightly as needed        Simethicone (PHAZYME) 180 MG CAPS Take 180 mg by mouth 3 times daily as needed        ondansetron (ZOFRAN-ODT) 8 MG disintegrating tablet Take 1 tablet by mouth every 8 hours as needed for nausea. (Patient not taking: Reported on 6/24/2019) 10 tablet 0       EXAM:    Vitals: BP (!) 154/80   Ht 1.702 m (5' 7\")   Wt 66.7 " "kg (147 lb)   BMI 23.02 kg/m     BMI: Body mass index is 23.02 kg/m .  Height: 5' 7\"    Constitutional/ general:  Pt is in no apparent distress, appears well-nourished.  Cooperative with history and physical exam.      Vascular:  Pedal pulses are not readily palpable op the left for the DP and PT arteries.      Neuro:  Alert and oriented x 3. Coordinated gait.  Light touch sensation is intact to the L4, L5, S1 distributions. No obvious deficits.  No evidence of neurological-based weakness, spasticity, or contracture in the lower extremities.      Derm: Normal texture and turgor.  No erythema, ecchymosis, or cyanosis.  No open lesions. The left hallux nail is thicker, growing slightly laterally.  No pain today, but he specifies the distal aspect, medial skin fold of the left hallux.   I trimmed this edge at his last visit.     The right hallux nail is also thicker and mildly discolored.  It is elongated.   Other nails appear normal.     Musculoskeletal:    Lower extremity muscle strength is normal.  Patient is ambulatory without an assistive device or brace .  No gross deformities.     PROCEDURE:   FRANCISCO with Doppler Waveforms of the bilateral lower extremities     DATE OF PROCEDURE:   7/8/2019 11:42 AM     CLINICAL HISTORY/INDICATION:  Decreased pedal pulses; Claudication (H); PAD (peripheral artery  disease) (H)     COMPARISON:  None relevant     TECHNIQUE:  Resting ankle branchial indices and waveform analysis of the bilateral  lower extremities     FINDINGS:     Right:  Resting FRANCISCO 1.21 with triphasic waveforms in the femoral, popliteal  and tibial vessels     Left:  Resting FRANCISCO 1.05 triphasic femoral and popliteal waveforms. Dampened  biphasic study wave forms in the tibial vessels.                                                                      Impression:  Normal resting ABIs bilaterally.     FRANCISCO Diagnostic Criteria       >/= 1.3          Non compressible   0.95 - 1.29     Normal   0.90 - 0.94     Mild " PAD   0.50 - 0.89     Moderate PAD   0.20 - 0.49     Severe PAD   < 0.20             Critical PAD     CELIO MACIEL MD        Again, thank you for allowing me to participate in the care of your patient.        Sincerely,        Eduardo Grant DPM

## 2019-11-04 NOTE — PROGRESS NOTES
ASSESSMENT/PLAN:    Encounter Diagnoses   Name Primary?     Nail deformity Yes     Pain of left great toe      No clinical signs of infection. No toe pain today. Left hallux pain is intermittent.  The FRANCISCO/US studies suggest that there is likely adequate blood flow to his feet for healing, should he need a partial nail avulsion at some point. I asked him if the pain/ discomfort he has is something he can live with. He said it is, rather than a procedure at this time.     I explained that toenail changes are often from injury to a nail unit, rather than from fungus.  Injury might be a one-time event or from repetitive irritation in foot wear and with certain types of activities.  Injured nails are likely more susceptible to fungal infection.  Change seen in multiple nails is more likely from fungus.  Treatment options are limited.     It was explained that many people opt to simply keep the involved nails trimmed and filed. [facility] does not offer this service.  Another option is a trial of a topical and/or oral antifungal.  Many times these are not successful nor provide a cure.  These medications do not correct a nail deformity.      I suggest that he live with the nails and keep them trimmed and filed.  A list of alternatives for nail care was provided.    I asked him to follow up if increasing frequency of pain, redness and swelling.    Body mass index is 23.02 kg/m .      Eduardo Grant DPM, FACFAS, MS    [facility]      ____________________________________________________________________    HPI:       Follow up for left great toe pain  At his last visit on 6/24/19, I trimmed the nail.  We considered it a form of ingrown toenail. He has some intermittent pain. No pain today.   He asks about if the right hallux nail has fungus and if there is a treatment option.  Non invasive vascular studies were completed, in case he would need a more involved nail removal  "procedure.     Patient Active Problem List   Diagnosis     Bilateral thigh pain     Past Surgical History:   Procedure Laterality Date     BACK SURGERY       CHOLECYSTECTOMY       ENDOSCOPIC SINUS SURGERY  8/20/2012    Procedure: ENDOSCOPIC SINUS SURGERY;  ENDOSCOPIC REMOVAL OF LEFT NASAL MASS WITH LANDMARKS ([employer] [place]), BILATERAL INFERIOR TURBINOPLASTY;  Surgeon: Dima Younger MD;  Location: [facility]     EYE SURGERY      cataracts     HERNIA REPAIR       ORTHOPEDIC SURGERY      ingrown toe nail removal     TURBINOPLASTY  8/20/2012    Procedure: TURBINOPLASTY;;  Surgeon: Dima Younger MD;  Location: [facility]     Current Outpatient Medications   Medication Sig Dispense Refill     Ascorbic Acid (VITAMIN C PO) Take 500 mg by mouth daily.         Cholecalciferol (VITAMIN D3 PO) Take 1,000 Units by mouth daily.         cholestyramine (QUESTRAN) 4 G packet Take 1 packet by mouth 2 times daily (with meals).         Cyanocobalamin (VITAMIN B 12 PO) Take 1,000 mg by mouth daily.         FINASTERIDE PO Take 5 mg by mouth daily.         HYDROcodone-acetaminophen 5-325 MG per tablet Take 1-2 tablets by mouth every 4 hours as needed for other (Moderate to Severe Pain). 30 tablet 0     Multiple Vitamin (MULTI-VITAMIN) per tablet Take 1 tablet by mouth daily.         pilocarpine (PILOCAR) 0.5 % ophthalmic solution 1-2 drops nightly as needed        Simethicone (PHAZYME) 180 MG CAPS Take 180 mg by mouth 3 times daily as needed        ondansetron (ZOFRAN-ODT) 8 MG disintegrating tablet Take 1 tablet by mouth every 8 hours as needed for nausea. (Patient not taking: Reported on 6/24/2019) 10 tablet 0       EXAM:    Vitals: BP (!) 154/80   Ht 1.702 m (5' 7\")   Wt 66.7 kg (147 lb)   BMI 23.02 kg/m    BMI: Body mass index is 23.02 kg/m .  Height: 5' 7\"    Constitutional/ general:  Pt is in no apparent distress, appears well-nourished.  Cooperative with history and physical exam.      Vascular:  Pedal pulses are not " readily palpable op the left for the DP and PT arteries.      Neuro:  Alert and oriented x 3. Coordinated gait.  Light touch sensation is intact to the L4, L5, S1 distributions. No obvious deficits.  No evidence of neurological-based weakness, spasticity, or contracture in the lower extremities.      Derm: Normal texture and turgor.  No erythema, ecchymosis, or cyanosis.  No open lesions. The left hallux nail is thicker, growing slightly laterally.  No pain today, but he specifies the distal aspect, medial skin fold of the left hallux.  I trimmed this edge at his last visit.     The right hallux nail is also thicker and mildly discolored.  It is elongated.   Other nails appear normal.     Musculoskeletal:    Lower extremity muscle strength is normal.  Patient is ambulatory without an assistive device or brace .  No gross deformities.     PROCEDURE:   FRANCISCO with Doppler Waveforms of the bilateral lower extremities     DATE OF PROCEDURE:   7/8/2019 11:42 AM     CLINICAL HISTORY/INDICATION:  Decreased pedal pulses; Claudication (H); PAD (peripheral artery  disease) (H)     COMPARISON:  None relevant     TECHNIQUE:  Resting ankle branchial indices and waveform analysis of the bilateral  lower extremities     FINDINGS:     Right:  Resting FRANCISCO 1.21 with triphasic waveforms in the femoral, popliteal  and tibial vessels     Left:  Resting FRANCISCO 1.05 triphasic femoral and popliteal waveforms. Dampened  biphasic study wave forms in the tibial vessels.                                                                      Impression:  Normal resting ABIs bilaterally.     FRANCISCO Diagnostic Criteria       >/= 1.3          Non compressible   0.95 - 1.29     Normal   0.90 - 0.94     Mild PAD   0.50 - 0.89     Moderate PAD   0.20 - 0.49     Severe PAD   < 0.20             Critical PAD     CELIO MACIEL MD

## 2020-03-02 ENCOUNTER — APPOINTMENT (OUTPATIENT)
Dept: CT IMAGING | Facility: CLINIC | Age: 85
DRG: 871 | End: 2020-03-02
Attending: EMERGENCY MEDICINE
Payer: MEDICARE

## 2020-03-02 ENCOUNTER — HOSPITAL ENCOUNTER (INPATIENT)
Facility: CLINIC | Age: 85
LOS: 2 days | Discharge: HOME HEALTH | DRG: 871 | End: 2020-03-04
Attending: EMERGENCY MEDICINE | Admitting: INTERNAL MEDICINE
Payer: MEDICARE

## 2020-03-02 DIAGNOSIS — J18.9 COMMUNITY ACQUIRED PNEUMONIA, UNSPECIFIED LATERALITY: ICD-10-CM

## 2020-03-02 LAB
ALBUMIN SERPL-MCNC: 3.8 G/DL (ref 3.4–5)
ALBUMIN UR-MCNC: NEGATIVE MG/DL
ALP SERPL-CCNC: 98 U/L (ref 40–150)
ALT SERPL W P-5'-P-CCNC: 22 U/L (ref 0–70)
ANION GAP SERPL CALCULATED.3IONS-SCNC: 2 MMOL/L (ref 3–14)
APPEARANCE UR: CLEAR
AST SERPL W P-5'-P-CCNC: 22 U/L (ref 0–45)
BASOPHILS # BLD AUTO: 0 10E9/L (ref 0–0.2)
BASOPHILS NFR BLD AUTO: 0 %
BILIRUB SERPL-MCNC: 0.8 MG/DL (ref 0.2–1.3)
BILIRUB UR QL STRIP: NEGATIVE
BUN SERPL-MCNC: 15 MG/DL (ref 7–30)
CALCIUM SERPL-MCNC: 8.5 MG/DL (ref 8.5–10.1)
CHLORIDE SERPL-SCNC: 106 MMOL/L (ref 94–109)
CO2 SERPL-SCNC: 28 MMOL/L (ref 20–32)
COLOR UR AUTO: ABNORMAL
CREAT SERPL-MCNC: 0.92 MG/DL (ref 0.66–1.25)
D DIMER PPP FEU-MCNC: 0.8 UG/ML FEU (ref 0–0.5)
DIFFERENTIAL METHOD BLD: ABNORMAL
EOSINOPHIL # BLD AUTO: 0 10E9/L (ref 0–0.7)
EOSINOPHIL NFR BLD AUTO: 0.2 %
ERYTHROCYTE [DISTWIDTH] IN BLOOD BY AUTOMATED COUNT: 12.8 % (ref 10–15)
FLUAV+FLUBV AG SPEC QL: NEGATIVE
FLUAV+FLUBV AG SPEC QL: NEGATIVE
GFR SERPL CREATININE-BSD FRML MDRD: 76 ML/MIN/{1.73_M2}
GLUCOSE SERPL-MCNC: 128 MG/DL (ref 70–99)
GLUCOSE UR STRIP-MCNC: NEGATIVE MG/DL
HCT VFR BLD AUTO: 44.3 % (ref 40–53)
HGB BLD-MCNC: 14.6 G/DL (ref 13.3–17.7)
HGB UR QL STRIP: NEGATIVE
IMM GRANULOCYTES # BLD: 0 10E9/L (ref 0–0.4)
IMM GRANULOCYTES NFR BLD: 0.2 %
INTERPRETATION ECG - MUSE: NORMAL
KETONES UR STRIP-MCNC: 10 MG/DL
LACTATE BLD-SCNC: 1.2 MMOL/L (ref 0.7–2)
LEUKOCYTE ESTERASE UR QL STRIP: NEGATIVE
LYMPHOCYTES # BLD AUTO: 0.6 10E9/L (ref 0.8–5.3)
LYMPHOCYTES NFR BLD AUTO: 9.5 %
MCH RBC QN AUTO: 32.9 PG (ref 26.5–33)
MCHC RBC AUTO-ENTMCNC: 33 G/DL (ref 31.5–36.5)
MCV RBC AUTO: 100 FL (ref 78–100)
MONOCYTES # BLD AUTO: 0.1 10E9/L (ref 0–1.3)
MONOCYTES NFR BLD AUTO: 2 %
NEUTROPHILS # BLD AUTO: 5.8 10E9/L (ref 1.6–8.3)
NEUTROPHILS NFR BLD AUTO: 88.1 %
NITRATE UR QL: NEGATIVE
NRBC # BLD AUTO: 0 10*3/UL
NRBC BLD AUTO-RTO: 0 /100
PH UR STRIP: 7 PH (ref 5–7)
PLATELET # BLD AUTO: 127 10E9/L (ref 150–450)
POTASSIUM SERPL-SCNC: 4.2 MMOL/L (ref 3.4–5.3)
PROCALCITONIN SERPL-MCNC: <0.05 NG/ML
PROT SERPL-MCNC: 6.8 G/DL (ref 6.8–8.8)
RBC # BLD AUTO: 4.44 10E12/L (ref 4.4–5.9)
RBC #/AREA URNS AUTO: 1 /HPF (ref 0–2)
SODIUM SERPL-SCNC: 137 MMOL/L (ref 133–144)
SOURCE: ABNORMAL
SP GR UR STRIP: 1.01 (ref 1–1.03)
SPECIMEN SOURCE: NORMAL
SQUAMOUS #/AREA URNS AUTO: <1 /HPF (ref 0–1)
TROPONIN I SERPL-MCNC: <0.015 UG/L (ref 0–0.04)
TROPONIN I SERPL-MCNC: <0.015 UG/L (ref 0–0.04)
UROBILINOGEN UR STRIP-MCNC: NORMAL MG/DL (ref 0–2)
WBC # BLD AUTO: 6.5 10E9/L (ref 4–11)
WBC #/AREA URNS AUTO: 0 /HPF (ref 0–5)

## 2020-03-02 PROCEDURE — 12000000 ZZH R&B MED SURG/OB

## 2020-03-02 PROCEDURE — 99223 1ST HOSP IP/OBS HIGH 75: CPT | Mod: AI | Performed by: INTERNAL MEDICINE

## 2020-03-02 PROCEDURE — 84484 ASSAY OF TROPONIN QUANT: CPT | Performed by: EMERGENCY MEDICINE

## 2020-03-02 PROCEDURE — 96375 TX/PRO/DX INJ NEW DRUG ADDON: CPT

## 2020-03-02 PROCEDURE — 93005 ELECTROCARDIOGRAM TRACING: CPT

## 2020-03-02 PROCEDURE — 25800030 ZZH RX IP 258 OP 636: Performed by: EMERGENCY MEDICINE

## 2020-03-02 PROCEDURE — 71275 CT ANGIOGRAPHY CHEST: CPT

## 2020-03-02 PROCEDURE — 99285 EMERGENCY DEPT VISIT HI MDM: CPT | Mod: 25

## 2020-03-02 PROCEDURE — 85379 FIBRIN DEGRADATION QUANT: CPT | Performed by: EMERGENCY MEDICINE

## 2020-03-02 PROCEDURE — 81001 URINALYSIS AUTO W/SCOPE: CPT | Performed by: EMERGENCY MEDICINE

## 2020-03-02 PROCEDURE — 85025 COMPLETE CBC W/AUTO DIFF WBC: CPT | Performed by: EMERGENCY MEDICINE

## 2020-03-02 PROCEDURE — 25800030 ZZH RX IP 258 OP 636: Performed by: INTERNAL MEDICINE

## 2020-03-02 PROCEDURE — 87040 BLOOD CULTURE FOR BACTERIA: CPT | Performed by: EMERGENCY MEDICINE

## 2020-03-02 PROCEDURE — 25000125 ZZHC RX 250: Performed by: EMERGENCY MEDICINE

## 2020-03-02 PROCEDURE — 25000128 H RX IP 250 OP 636: Performed by: EMERGENCY MEDICINE

## 2020-03-02 PROCEDURE — 80053 COMPREHEN METABOLIC PANEL: CPT | Performed by: EMERGENCY MEDICINE

## 2020-03-02 PROCEDURE — 96376 TX/PRO/DX INJ SAME DRUG ADON: CPT

## 2020-03-02 PROCEDURE — 87804 INFLUENZA ASSAY W/OPTIC: CPT | Performed by: EMERGENCY MEDICINE

## 2020-03-02 PROCEDURE — 25000132 ZZH RX MED GY IP 250 OP 250 PS 637: Mod: GY | Performed by: EMERGENCY MEDICINE

## 2020-03-02 PROCEDURE — 70450 CT HEAD/BRAIN W/O DYE: CPT

## 2020-03-02 PROCEDURE — 25000132 ZZH RX MED GY IP 250 OP 250 PS 637: Mod: GY | Performed by: INTERNAL MEDICINE

## 2020-03-02 PROCEDURE — 83605 ASSAY OF LACTIC ACID: CPT | Performed by: EMERGENCY MEDICINE

## 2020-03-02 PROCEDURE — 87086 URINE CULTURE/COLONY COUNT: CPT | Performed by: EMERGENCY MEDICINE

## 2020-03-02 PROCEDURE — 84145 PROCALCITONIN (PCT): CPT | Performed by: EMERGENCY MEDICINE

## 2020-03-02 PROCEDURE — 25000128 H RX IP 250 OP 636: Performed by: INTERNAL MEDICINE

## 2020-03-02 PROCEDURE — 96365 THER/PROPH/DIAG IV INF INIT: CPT | Mod: 59

## 2020-03-02 RX ORDER — AZITHROMYCIN 500 MG/1
500 INJECTION, POWDER, LYOPHILIZED, FOR SOLUTION INTRAVENOUS ONCE
Status: COMPLETED | OUTPATIENT
Start: 2020-03-02 | End: 2020-03-02

## 2020-03-02 RX ORDER — CHOLECALCIFEROL (VITAMIN D3) 125 MCG
3000 CAPSULE ORAL PRN
COMMUNITY
End: 2022-05-26

## 2020-03-02 RX ORDER — ONDANSETRON 2 MG/ML
4 INJECTION INTRAMUSCULAR; INTRAVENOUS EVERY 6 HOURS PRN
Status: DISCONTINUED | OUTPATIENT
Start: 2020-03-02 | End: 2020-03-04 | Stop reason: HOSPADM

## 2020-03-02 RX ORDER — LIDOCAINE 40 MG/G
CREAM TOPICAL
Status: DISCONTINUED | OUTPATIENT
Start: 2020-03-02 | End: 2020-03-04 | Stop reason: HOSPADM

## 2020-03-02 RX ORDER — VIT C/E/ZN/COPPR/LUTEIN/ZEAXAN 60 MG-6 MG
1 CAPSULE ORAL DAILY
COMMUNITY

## 2020-03-02 RX ORDER — ONDANSETRON 2 MG/ML
4 INJECTION INTRAMUSCULAR; INTRAVENOUS ONCE
Status: COMPLETED | OUTPATIENT
Start: 2020-03-02 | End: 2020-03-02

## 2020-03-02 RX ORDER — ACETAMINOPHEN 325 MG/1
650 TABLET ORAL EVERY 4 HOURS PRN
Status: DISCONTINUED | OUTPATIENT
Start: 2020-03-02 | End: 2020-03-04 | Stop reason: HOSPADM

## 2020-03-02 RX ORDER — PROCHLORPERAZINE 25 MG
12.5 SUPPOSITORY, RECTAL RECTAL EVERY 12 HOURS PRN
Status: DISCONTINUED | OUTPATIENT
Start: 2020-03-02 | End: 2020-03-04 | Stop reason: HOSPADM

## 2020-03-02 RX ORDER — AMOXICILLIN 250 MG
2 CAPSULE ORAL 2 TIMES DAILY PRN
Status: DISCONTINUED | OUTPATIENT
Start: 2020-03-02 | End: 2020-03-04 | Stop reason: HOSPADM

## 2020-03-02 RX ORDER — BISACODYL 10 MG
10 SUPPOSITORY, RECTAL RECTAL DAILY PRN
Status: DISCONTINUED | OUTPATIENT
Start: 2020-03-02 | End: 2020-03-04 | Stop reason: HOSPADM

## 2020-03-02 RX ORDER — SODIUM CHLORIDE 9 MG/ML
INJECTION, SOLUTION INTRAVENOUS CONTINUOUS
Status: DISCONTINUED | OUTPATIENT
Start: 2020-03-02 | End: 2020-03-03

## 2020-03-02 RX ORDER — IOPAMIDOL 755 MG/ML
58 INJECTION, SOLUTION INTRAVASCULAR ONCE
Status: COMPLETED | OUTPATIENT
Start: 2020-03-02 | End: 2020-03-02

## 2020-03-02 RX ORDER — CEFTRIAXONE 2 G/1
2 INJECTION, POWDER, FOR SOLUTION INTRAMUSCULAR; INTRAVENOUS EVERY 24 HOURS
Status: DISCONTINUED | OUTPATIENT
Start: 2020-03-03 | End: 2020-03-04 | Stop reason: HOSPADM

## 2020-03-02 RX ORDER — BUTALBITAL, ACETAMINOPHEN AND CAFFEINE 50; 325; 40 MG/1; MG/1; MG/1
1 TABLET ORAL EVERY 4 HOURS PRN
COMMUNITY
End: 2024-02-08

## 2020-03-02 RX ORDER — CEFTRIAXONE 1 G/1
1 INJECTION, POWDER, FOR SOLUTION INTRAMUSCULAR; INTRAVENOUS ONCE
Status: COMPLETED | OUTPATIENT
Start: 2020-03-02 | End: 2020-03-02

## 2020-03-02 RX ORDER — MAGNESIUM AMINO ACID CHELATE 27 MG
250 TABLET ORAL EVERY EVENING
Status: DISCONTINUED | OUTPATIENT
Start: 2020-03-02 | End: 2020-03-04 | Stop reason: HOSPADM

## 2020-03-02 RX ORDER — ACETAMINOPHEN 650 MG/1
650 SUPPOSITORY RECTAL ONCE
Status: COMPLETED | OUTPATIENT
Start: 2020-03-02 | End: 2020-03-02

## 2020-03-02 RX ORDER — POTASSIUM CHLORIDE 7.45 MG/ML
10 INJECTION INTRAVENOUS
Status: DISCONTINUED | OUTPATIENT
Start: 2020-03-02 | End: 2020-03-04 | Stop reason: HOSPADM

## 2020-03-02 RX ORDER — FINASTERIDE 5 MG/1
5 TABLET, FILM COATED ORAL DAILY
COMMUNITY

## 2020-03-02 RX ORDER — NALOXONE HYDROCHLORIDE 0.4 MG/ML
.1-.4 INJECTION, SOLUTION INTRAMUSCULAR; INTRAVENOUS; SUBCUTANEOUS
Status: DISCONTINUED | OUTPATIENT
Start: 2020-03-02 | End: 2020-03-04 | Stop reason: HOSPADM

## 2020-03-02 RX ORDER — ACETAMINOPHEN 650 MG/1
650 SUPPOSITORY RECTAL EVERY 4 HOURS PRN
Status: DISCONTINUED | OUTPATIENT
Start: 2020-03-02 | End: 2020-03-04 | Stop reason: HOSPADM

## 2020-03-02 RX ORDER — ONDANSETRON 4 MG/1
4 TABLET, ORALLY DISINTEGRATING ORAL EVERY 6 HOURS PRN
Status: DISCONTINUED | OUTPATIENT
Start: 2020-03-02 | End: 2020-03-04 | Stop reason: HOSPADM

## 2020-03-02 RX ORDER — FINASTERIDE 5 MG/1
2.5 TABLET, FILM COATED ORAL EVERY EVENING
Status: DISCONTINUED | OUTPATIENT
Start: 2020-03-02 | End: 2020-03-04 | Stop reason: HOSPADM

## 2020-03-02 RX ORDER — PROCHLORPERAZINE MALEATE 5 MG
5 TABLET ORAL EVERY 6 HOURS PRN
Status: DISCONTINUED | OUTPATIENT
Start: 2020-03-02 | End: 2020-03-04 | Stop reason: HOSPADM

## 2020-03-02 RX ORDER — POTASSIUM CL/LIDO/0.9 % NACL 10MEQ/0.1L
10 INTRAVENOUS SOLUTION, PIGGYBACK (ML) INTRAVENOUS
Status: DISCONTINUED | OUTPATIENT
Start: 2020-03-02 | End: 2020-03-04 | Stop reason: HOSPADM

## 2020-03-02 RX ORDER — MULTIVITAMIN WITH IRON
1 TABLET ORAL EVERY EVENING
Status: ON HOLD | COMMUNITY
End: 2022-12-20

## 2020-03-02 RX ORDER — POTASSIUM CHLORIDE 1500 MG/1
20-40 TABLET, EXTENDED RELEASE ORAL
Status: DISCONTINUED | OUTPATIENT
Start: 2020-03-02 | End: 2020-03-04 | Stop reason: HOSPADM

## 2020-03-02 RX ORDER — POTASSIUM CHLORIDE 1.5 G/1.58G
20-40 POWDER, FOR SOLUTION ORAL
Status: DISCONTINUED | OUTPATIENT
Start: 2020-03-02 | End: 2020-03-04 | Stop reason: HOSPADM

## 2020-03-02 RX ORDER — ASCORBIC ACID 500 MG
500 TABLET ORAL DAILY
COMMUNITY

## 2020-03-02 RX ORDER — MEMANTINE HYDROCHLORIDE 10 MG/1
10 TABLET ORAL 2 TIMES DAILY
Status: DISCONTINUED | OUTPATIENT
Start: 2020-03-02 | End: 2020-03-04 | Stop reason: HOSPADM

## 2020-03-02 RX ORDER — CHOLECALCIFEROL (VITAMIN D3) 125 MCG
3000 CAPSULE ORAL 3 TIMES DAILY PRN
Status: DISCONTINUED | OUTPATIENT
Start: 2020-03-02 | End: 2020-03-04 | Stop reason: HOSPADM

## 2020-03-02 RX ORDER — POLYETHYLENE GLYCOL 3350 17 G/17G
17 POWDER, FOR SOLUTION ORAL DAILY PRN
Status: DISCONTINUED | OUTPATIENT
Start: 2020-03-02 | End: 2020-03-04 | Stop reason: HOSPADM

## 2020-03-02 RX ORDER — SIMETHICONE 80 MG
160 TABLET,CHEWABLE ORAL 3 TIMES DAILY PRN
Status: DISCONTINUED | OUTPATIENT
Start: 2020-03-02 | End: 2020-03-04 | Stop reason: HOSPADM

## 2020-03-02 RX ORDER — POTASSIUM CHLORIDE 29.8 MG/ML
20 INJECTION INTRAVENOUS
Status: DISCONTINUED | OUTPATIENT
Start: 2020-03-02 | End: 2020-03-02 | Stop reason: CLARIF

## 2020-03-02 RX ORDER — ZINC SULFATE 50(220)MG
220 CAPSULE ORAL DAILY
Status: ON HOLD | COMMUNITY
End: 2022-12-20

## 2020-03-02 RX ORDER — FAMOTIDINE 40 MG/1
40 TABLET, FILM COATED ORAL AT BEDTIME
Status: ON HOLD | COMMUNITY
End: 2024-02-08

## 2020-03-02 RX ORDER — AMOXICILLIN 250 MG
1 CAPSULE ORAL 2 TIMES DAILY PRN
Status: DISCONTINUED | OUTPATIENT
Start: 2020-03-02 | End: 2020-03-04 | Stop reason: HOSPADM

## 2020-03-02 RX ORDER — FAMOTIDINE 20 MG/1
40 TABLET, FILM COATED ORAL AT BEDTIME
Status: DISCONTINUED | OUTPATIENT
Start: 2020-03-02 | End: 2020-03-04 | Stop reason: HOSPADM

## 2020-03-02 RX ORDER — CHOLECALCIFEROL (VITAMIN D3) 50 MCG
2000 TABLET ORAL DAILY
COMMUNITY

## 2020-03-02 RX ORDER — MEMANTINE HYDROCHLORIDE 10 MG/1
10 TABLET ORAL 2 TIMES DAILY
COMMUNITY

## 2020-03-02 RX ADMIN — ONDANSETRON 4 MG: 2 INJECTION INTRAMUSCULAR; INTRAVENOUS at 06:31

## 2020-03-02 RX ADMIN — ENOXAPARIN SODIUM 40 MG: 40 INJECTION SUBCUTANEOUS at 21:02

## 2020-03-02 RX ADMIN — ACETAMINOPHEN 650 MG: 325 TABLET, FILM COATED ORAL at 21:02

## 2020-03-02 RX ADMIN — MEMANTINE 10 MG: 10 TABLET ORAL at 21:02

## 2020-03-02 RX ADMIN — SODIUM CHLORIDE 1000 ML: 9 INJECTION, SOLUTION INTRAVENOUS at 17:47

## 2020-03-02 RX ADMIN — Medication 250 MG: at 21:02

## 2020-03-02 RX ADMIN — SODIUM CHLORIDE 84 ML: 9 INJECTION, SOLUTION INTRAVENOUS at 08:22

## 2020-03-02 RX ADMIN — FINASTERIDE 2.5 MG: 5 TABLET, FILM COATED ORAL at 21:02

## 2020-03-02 RX ADMIN — SODIUM CHLORIDE 500 ML: 9 INJECTION, SOLUTION INTRAVENOUS at 06:31

## 2020-03-02 RX ADMIN — FAMOTIDINE 40 MG: 20 TABLET, FILM COATED ORAL at 21:07

## 2020-03-02 RX ADMIN — ACETAMINOPHEN 650 MG: 650 SUPPOSITORY RECTAL at 12:02

## 2020-03-02 RX ADMIN — AZITHROMYCIN MONOHYDRATE 500 MG: 500 INJECTION, POWDER, LYOPHILIZED, FOR SOLUTION INTRAVENOUS at 11:16

## 2020-03-02 RX ADMIN — SODIUM CHLORIDE 1000 ML: 9 INJECTION, SOLUTION INTRAVENOUS at 07:16

## 2020-03-02 RX ADMIN — SODIUM CHLORIDE: 9 INJECTION, SOLUTION INTRAVENOUS at 21:08

## 2020-03-02 RX ADMIN — IOPAMIDOL 58 ML: 755 INJECTION, SOLUTION INTRAVENOUS at 08:22

## 2020-03-02 RX ADMIN — CEFTRIAXONE SODIUM 1 G: 1 INJECTION, POWDER, FOR SOLUTION INTRAMUSCULAR; INTRAVENOUS at 10:51

## 2020-03-02 ASSESSMENT — ENCOUNTER SYMPTOMS
LIGHT-HEADEDNESS: 1
SHORTNESS OF BREATH: 0
VOMITING: 1
CONFUSION: 1
DIZZINESS: 0
NAUSEA: 1

## 2020-03-02 ASSESSMENT — ACTIVITIES OF DAILY LIVING (ADL): ADLS_ACUITY_SCORE: 21

## 2020-03-02 NOTE — ED NOTES
Wife of pt came to pod area stating she needed assistance with her . Reoriented to call light. Upon entry to room pt was noted to be wet from urine, half out of bed and confused. ERT and RN attempted to change pt and reorient but pt appeared confused and was having difficulty following directions. Pt was noted to be tremulous and pulse was in the 140s. BP is 203/113. Rechecked and bp is correct.

## 2020-03-02 NOTE — PROGRESS NOTES
Admission    Patient arrives to room 613 via cart from ED  Care plan note:Pt alert to self, [place], calm/coop, T-99.5, other VSS on RA, denies pain, up w/2/GB/walker, NPO w IVF, PT/OT/Speech eval in am.    Inpatient nursing criteria listed below were met:    PCD's Documented: yes  Skin issues/needs documented :na  Isolation education started/completed: yes  Patient allergies verified with patient:yes  Verified completion of [place] Risk Assessment Tool:  yes  Verified completion of Guardianship screening tool: yes  Fall Prevention: Care plan updated, Education given and documented yes  Care Plan initiated: yes  Home medications documented in belongings flowsheet: na  Patient belongings documented in belongings flowsheet: yes  Reminder note (belongings/ medications) placed in discharge instructions:na  Admission profile/ required documentation complete: yes  Bedside Report Letter given and explained to patient yes

## 2020-03-02 NOTE — PROGRESS NOTES
RECEIVING UNIT ED HANDOFF REVIEW    ED Nurse Handoff Report was reviewed by: Yamilex Jackson RN on March 2, 2020 at 4:22 PM

## 2020-03-02 NOTE — PHARMACY-ADMISSION MEDICATION HISTORY
Pharmacy Medication History  Admission medication history interview status for the 3/2/2020  admission is complete. See EPIC admission navigator for prior to admission medications     Medication history sources: Patient, Surescripts and Care Everywhere  Medication history source reliability: Good  Adherence assessment: Good    Medication reconciliation completed by provider prior to medication history? No    Time spent in this activity: 20 minutes      Prior to Admission medications    Medication Sig Last Dose Taking? Auth Provider   butalbital-acetaminophen-caffeine (ESGIC) -40 MG tablet Take 1 tablet by mouth every 4 hours as needed for headaches as needed Yes Unknown, Entered By History   chlorpheniramine-pseudoePHEDrine (PSEUDO-GEST PLUS) 4-60 MG TABS per tablet Take 1 tablet by mouth every evening 3/1/2020 at Unknown time Yes Unknown, Entered By History   cholestyramine (QUESTRAN) 4 G packet Take 1 packet by mouth 2 times daily (with meals).   3/1/2020 at Unknown time Yes Reported, Patient   Diphenhydramine-Pseudoephed (ALLERGY DECONGESTANT PO) Take 1 tablet by mouth every evening Chlorpheniramine  Yes Unknown, Entered By History   famotidine (PEPCID) 40 MG tablet Take 40 mg by mouth At Bedtime 3/1/2020 at Unknown time Yes Unknown, Entered By History   finasteride (PROSCAR) 5 MG tablet Take 2.5 mg by mouth every evening 3/1/2020 at Unknown time Yes Unknown, Entered By History   lactase (LACTAID) 3000 UNIT tablet Take 3,000 Units by mouth as needed (before eating dairy foods) as needed Yes Unknown, Entered By History   magnesium 250 MG tablet Take 1 tablet by mouth every evening 3/1/2020 at Unknown time Yes Unknown, Entered By History   memantine (NAMENDA) 10 MG tablet Take 10 mg by mouth 2 times daily 3/1/2020 at Unknown time Yes Unknown, Entered By History   Multiple Vitamin (MULTI-VITAMIN) per tablet Take 1 tablet by mouth daily.   3/1/2020 at Unknown time Yes Reported, Patient   multivitamin  with  lutein (OCUVITE WITH LTEIN) CAPS per capsule Take 1 capsule by mouth daily 3/1/2020 at Unknown time Yes Unknown, Entered By History   Simethicone (PHAZYME) 180 MG CAPS Take 180 mg by mouth 3 times daily as needed  as needed Yes Reported, Patient   vitamin B-12 (CYANOCOBALAMIN) 1000 MCG tablet Take 1,000 mcg by mouth daily Noon 3/1/2020 at Unknown time Yes Unknown, Entered By History   vitamin C (ASCORBIC ACID) 500 MG tablet Take 500 mg by mouth daily 3/1/2020 at Unknown time Yes Unknown, Entered By History   vitamin D3 (CHOLECALCIFEROL) 2000 units (50 mcg) tablet Take 4,000 Units by mouth daily Noon 3/1/2020 at Unknown time Yes Unknown, Entered By History   zinc sulfate (ZINCATE) 220 (50 Zn) MG capsule Take 220 mg by mouth daily noon 3/1/2020 at Unknown time Yes Unknown, Entered By History

## 2020-03-02 NOTE — H&P
[facility]    History and Physical - Hospitalist Service       Date of Admission:  3/2/2020    Assessment & Plan   Jean Pierre Roblero is a 85 year old male, with a history of recurrent syncope, who presents to the ED for evaluation of syncope with fever observed in the ED of 103.5 and CT scan showing bilateral pneumonia.    Community acquired pneumonia versus influenza with sepsis. CT showed bilateral lower lobe and right middle lobe pneumonia. WBC and procalcitonin were negative.  Febrile to 103.5 and tachycardic in ED. Lactic acid negative.  -continue ceftriaxone and azithromycin  -follow up blood cultures  -PT/OT/SW consults    Acute metabolic encephalopathy with baseline dementia. Baseline mental status is knowing year and what is going on around him. Was not at baseline per family at admission.  -Continue home Namenda  -Continue supportive cares and treat underlying pneumonia and dehydration  -speech consult for diet recommendations    Syncope. Head CT negative.  Had recurrent syncope in the past and is sensitive.  Likely precipitated by pneumonia and dehydration.  -additional 1 liter fluid bolus and then IVF 0.9% NS at 125 ml/h    BPH.   -continue home finasteride    Pulmonary Nodule. CT chest showed incidental findings of 1 cm right upper lobe pulmonary nodule with biopsy or 3 month follow up recommended  -follow up with PCP    Ascending aortic aneurysm. Incidental finding of 4.2 cm ascending aortic aneurysm (increased in size as compared to 7/1/2011 exam where it measured 3.8 cm)   -follow up with PCP    Incidental finding in right internal jugular vein. Radiodensity within the caudal aspect of the right internal jugular vein could represent contrast mixing versus chronic calcified thrombus that could be further evaluated with ultrasound if clinically warranted.  -no symptoms so suspect this is contrast mixing  -will defer to PCP if any additional follow up is needed     Diet: NPO except meds,  consulted speech for diet recommendations  DVT Prophylaxis: Enoxaparin (Lovenox) SQ and Pneumatic Compression Devices  Puckett Catheter: not present  Code Status: Full code, family not available to discuss that would make this decision, will need to discuss later when they are available    Disposition Plan   Expected discharge: 2 - 3 days, recommended to prior living arrangement once antibiotic plan established, mental status at baseline and SIRS/Sepsis treated.  Entered: William Ames MD 03/02/2020, 12:50 PM     The patient's care was discussed with the Bedside Nurse, Patient and Patient's Family.    William Ames MD  [facility]    ______________________________________________________________________    Chief Complaint   Passing out    History is obtained from medical records and family with limited interaction from the patient because of his confusion    History of Present Illness   Jean Pierre Roblero is a 85 year old male, with a history of recurrent syncope, who presents to the ED for evaluation of syncope with fever observed in the ED of 103.5. The spouse reports the patient woke up this morning around 0300 and went to the bathroom. Just after going to the bathroom he was returning to bed when he became lightheaded and she was able to lower him into a chair. 911 was called and, as his vitals were normal and he was feeling better, they left.  However, about two hours later at 0500, the patient began to feel nauseous. He made it to the bathroom where he vomitied but again became lightheaded. The wife called 911 and he was transported to the [facility] ED.  He never hit his head. Patient denies CP, SOB, cough, abd pain, or diarrhea but is not able to communicate reliably currently secondary to confusion. CT chest PE protocol showed bibasilar and right middle lobe pneumonia with incidental findings of 1 cm right upper lobe pulmonary nodule with biopsy or 3 month follow up recommended, 4.2 cm  ascending aortic aneurysm (increased in size as compared to 7/1/2011 exam where it measured 3.8 cm) and radiodensity within the caudal aspect of the right internal jugular vein could represent contrast mixing versus chronic calcified thrombus that could be further evaluated with ultrasound if clinically warranted. CT head was negative.  Only sick contact is his wife that came home from surgery at [facility] with cough and fever that could be pneumonia. Him and his wife reported that he has had multiple syncopal episodes in the past with normal work-ups. Denies nausea now and says feeling better. His WBC is normal and he has been swabbed for influenza which was negative and blood cultures were taken. Procalcitonin, lactic acid and troponin were all negative. UA was negative. EKG showed sinus tachycardia with no signs of ischemia.    Review of Systems    The 10 point Review of Systems is not possible secondary to confusion.    Past Medical History    I have reviewed this patient's medical history and updated it with pertinent information if needed.   Past Medical History:   Diagnosis Date     Allergic state      Fainting episodes     FREQUENT/ EASILY     Gastroesophageal reflux disease      Headaches      Heartburn      Nocturia      Photosensitivity      Swallowing difficulty      Past Surgical History   I have reviewed this patient's surgical history and updated it with pertinent information if needed.  Past Surgical History:   Procedure Laterality Date     BACK SURGERY       CHOLECYSTECTOMY       ENDOSCOPIC SINUS SURGERY  8/20/2012    Procedure: ENDOSCOPIC SINUS SURGERY;  ENDOSCOPIC REMOVAL OF LEFT NASAL MASS WITH LANDMARKS (Fusion Tracking [place]), BILATERAL INFERIOR TURBINOPLASTY;  Surgeon: Dima Younger MD;  Location: [facility]     EYE SURGERY      cataracts     HERNIA REPAIR       ORTHOPEDIC SURGERY      ingrown toe nail removal     TURBINOPLASTY  8/20/2012    Procedure: TURBINOPLASTY;;  Surgeon:  Dima Younger MD;  Location: [facility]       Social History   I have reviewed this patient's social history and updated it with pertinent information if needed.  Social History     Tobacco Use     Smoking status: Never Smoker     Smokeless tobacco: Never Used   Substance Use Topics     Alcohol use: No     Drug use: No       Family History   I have reviewed this patient's family history but patient not able to give history because of confusion.  No family history on file.    Prior to Admission Medications   Prior to Admission Medications   Prescriptions Last Dose Informant Patient Reported? Taking?   Diphenhydramine-Pseudoephed (ALLERGY DECONGESTANT PO)  Spouse/Significant Other Yes Yes   Sig: Take 1 tablet by mouth every evening Chlorpheniramine   Multiple Vitamin (MULTI-VITAMIN) per tablet 3/1/2020 at Unknown time Spouse/Significant Other Yes Yes   Sig: Take 1 tablet by mouth daily.     Simethicone (PHAZYME) 180 MG CAPS as needed Spouse/Significant Other Yes Yes   Sig: Take 180 mg by mouth 3 times daily as needed    butalbital-acetaminophen-caffeine (ESGIC) -40 MG tablet as needed Spouse/Significant Other Yes Yes   Sig: Take 1 tablet by mouth every 4 hours as needed for headaches   chlorpheniramine-pseudoePHEDrine (PSEUDO-GEST PLUS) 4-60 MG TABS per tablet 3/1/2020 at Unknown time Spouse/Significant Other Yes Yes   Sig: Take 1 tablet by mouth every evening   cholestyramine (QUESTRAN) 4 G packet 3/1/2020 at Unknown time Spouse/Significant Other Yes Yes   Sig: Take 1 packet by mouth 2 times daily (with meals).     famotidine (PEPCID) 40 MG tablet 3/1/2020 at Unknown time Spouse/Significant Other Yes Yes   Sig: Take 40 mg by mouth At Bedtime   finasteride (PROSCAR) 5 MG tablet 3/1/2020 at Unknown time Spouse/Significant Other Yes Yes   Sig: Take 2.5 mg by mouth every evening   lactase (LACTAID) 3000 UNIT tablet as needed Spouse/Significant Other Yes Yes   Sig: Take 3,000 Units by mouth as needed (before eating  dairy foods)   magnesium 250 MG tablet 3/1/2020 at Unknown time Spouse/Significant Other Yes Yes   Sig: Take 1 tablet by mouth every evening   memantine (NAMENDA) 10 MG tablet 3/1/2020 at Unknown time Spouse/Significant Other Yes Yes   Sig: Take 10 mg by mouth 2 times daily   multivitamin  with lutein (OCUVITE WITH LTEIN) CAPS per capsule 3/1/2020 at Unknown time Spouse/Significant Other Yes Yes   Sig: Take 1 capsule by mouth daily   vitamin B-12 (CYANOCOBALAMIN) 1000 MCG tablet 3/1/2020 at Unknown time Spouse/Significant Other Yes Yes   Sig: Take 1,000 mcg by mouth daily Noon   vitamin C (ASCORBIC ACID) 500 MG tablet 3/1/2020 at Unknown time Spouse/Significant Other Yes Yes   Sig: Take 500 mg by mouth daily   vitamin D3 (CHOLECALCIFEROL) 2000 units (50 mcg) tablet 3/1/2020 at Unknown time Spouse/Significant Other Yes Yes   Sig: Take 4,000 Units by mouth daily Noon   zinc sulfate (ZINCATE) 220 (50 Zn) MG capsule 3/1/2020 at Unknown time Spouse/Significant Other Yes Yes   Sig: Take 220 mg by mouth daily noon      Facility-Administered Medications: None     Allergies   Allergies   Allergen Reactions     Chocolate Anaphylaxis     headache     [place]gg[place] GI Disturbance     Feathers      Novocaine [Procaine]      Passes out almost immediately     [place] GI Disturbance     Most berries       Physical Exam   Vital Signs: Temp: 103.5  F (39.7  C) Temp src: Rectal BP: 120/73 Pulse: 115 Heart Rate: 114 Resp: 28 SpO2: 92 % O2 Device: None (Room air)    Weight: 138 lbs 0 oz  Constitutional: Awake, confused but cooperative, ill appearing  Eyes: Conjunctiva and pupils examined and normal.  HEENT: Moist mucous membranes, normal dentition.  Respiratory: Crackles bilaterally at bases, no wheezing  Cardiovascular: Regular rate and rhythm, normal S1 and S2, and no murmur noted  GI: Normal bowel sounds, soft, non-distended, non-tender  Lymph/Hematologic: No anterior cervical or supraclavicular adenopathy.  Skin: No rashes, no cyanosis,  no edema.  Musculoskeletal: No joint swelling, erythema or tenderness.  Neurologic: Cranial nerves 2-12 intact, normal strength and sensation.  Psychiatric: Alert, oriented to person only, no obvious anxiety or depression.      Data   Data reviewed today: I reviewed all medications, new labs and imaging results over the last 24 hours. I personally reviewed the EKG tracing showing sinus tachycardia with no ischemic changes.    Recent Labs   Lab 03/02/20  0850 03/02/20  0547   WBC  --  6.5   HGB  --  14.6   MCV  --  100   PLT  --  127*   NA  --  137   POTASSIUM  --  4.2   CHLORIDE  --  106   CO2  --  28   BUN  --  15   CR  --  0.92   ANIONGAP  --  2*   KENYA  --  8.5   GLC  --  128*   ALBUMIN  --  3.8   PROTTOTAL  --  6.8   BILITOTAL  --  0.8   ALKPHOS  --  98   ALT  --  22   AST  --  22   TROPI <0.015 <0.015     Recent Results (from the past 24 hour(s))   CT Chest Pulmonary Embolism w Contrast    Narrative    CT CHEST PULMONARY EMBOLISM WITH CONTRAST  3/2/2020 8:27 AM    HISTORY:  Chest pain, evaluate PE, tachycardia, syncope.    TECHNIQUE: Scans obtained from the apices through the diaphragm with  IV contrast. 58 mL Isovue-370 IV injected. Radiation dose for this  scan was reduced using automated exposure control, adjustment of the  mA and/or kV according to patient size, or iterative reconstruction  technique.    COMPARISON:  None available.    FINDINGS:  Chest/mediastinum: No evidence of pulmonary emboli. No cardiomegaly or  pericardial effusion. Moderate atherosclerotic vascular calcification  of the coronary arteries and thoracic aorta. The ascending aorta is  enlarged measuring 4.2 cm, increased in size as compared to 7/1/2011  exam where it measured 3.8 cm. No significant mediastinal, hilar or  axillary lymphadenopathy. Calcification versus contrast mixing in the  caudal aspect of the right internal jugular vein.    Lungs and pleura: No pleural effusion or pneumothorax. Bibasilar and  right middle lobe patchy  and nodular pulmonary opacities, worrisome  for infection. There is 1 cm right upper lobe solid nodule (series 8  image 148), new as compared to 7/1/2011.    Upper abdomen: Evaluation of the upper abdomen due to lack of coverage  and timing of contrast. Moderate-sized hiatal hernia.    Bones and soft tissue: No suspicious osseous lesion.      Impression    IMPRESSION:   1. No evidence of pulmonary embolism.  2. Bibasilar and right middle lobe predominant patchy and nodular  pulmonary opacities, worrisome for infection.  3. 1 cm right upper lobe solid pulmonary nodule, new as compared to  7/1/2011 exam, indeterminate, as per Fleischner society criteria this  can be further evaluated with PET/CT (probably after symptoms  resolution to increase the specificity of the test), tissue sampling  or 3 month follow-up.  4. 4.2 cm ascending aortic aneurysm, increased in size as compared to  7/1/2011 exam where it measured 3.8 cm.  5. Radiodensity within the caudal aspect of the right internal jugular  vein could represent contrast mixing versus chronic calcified  thrombus, can be further evaluated with ultrasound if clinically  warranted.    SALINAS ORTEGA MD   Head CT w/o contrast    Narrative    CT SCAN OF THE HEAD WITHOUT CONTRAST   3/2/2020 8:27 AM     HISTORY: Fall, syncope.    TECHNIQUE:  Axial images of the head and coronal reformations without  IV contrast material. Radiation dose for this scan was reduced using  automated exposure control, adjustment of the mA and/or kV according  to patient size, or iterative reconstruction technique.    COMPARISON: CT head 7/17/2017.    FINDINGS: There is no evidence of intracranial hemorrhage, mass, acute  infarct or anomaly. Mild-to-moderate global brain parenchymal volume  loss. Moderate scattered patchy and confluent areas of hypoattenuation  in the periventricular and deep cerebral white matter predominantly,  nonspecific, but most likely related to chronic small vessel  ischemic  disease. The ventricles are within normal limits in size and  configuration.    Bilateral lens replacements. Visualized aspects of both orbits  otherwise within normal limits. The visualized paranasal sinuses and  mastoids are clear. The bony calvarium and bones of the skull base  appear intact.       Impression    IMPRESSION:  1. No CT findings of acute intracranial abnormality.  2. Generalized brain parenchymal volume loss and moderate presumed  chronic small vessel ischemic disease.

## 2020-03-02 NOTE — ED PROVIDER NOTES
History   Chief Complaint:  Syncope    HPI   Jean Pierre Roblero is a 85 year old male, with a history of syncope, who presents to the ED for evaluation of syncope. The spouse reports the patient woke up this morning around 0300 and went to the bathroom. Just after going to the bathroom he was returning to bed when he became lightheaded and she was able to lower him into a chair. 911 was called and, as his vitals were normal and he was feeling better, they left.  However, about two hours later at 0500, the patient began to feel nauseous. He made it to the bathroom where he vomited but again became lightheaded. The wife called 911 and he was transported.  He never hit his head.  The pt denies CP, SOB, cough, fever, abd pain, diarrhea.  He is unaware of sick contacts. Him and his wife report that he has had multiple syncope episodes in the past with nl work-ups. Currently, the patient states he feels better and his nausea has resolved.    Allergies:  Novocain  Amoxicillin  Nabumetone  Procaine    Medications:    Finasteride  Ditropan  Proscar  Pepcid  Questran    Past Medical History:    GERD  Migraine  Somatization disorder  Osteopenia  ASHD  Osteoarthritis  Mild dementia  Insomnia    Past Surgical History:    Back surgery  Cholecystectomy  Bilateral cataract removal  Hernia repair  Ingrown to nail removal  Turbinoplasty    Family History:    Dementia  Leukemia    Social History:  Smoking status: Never  Alcohol use: No  Drug use: No  PCP: Kolby Bentley  Presents to the ED with spouse  Marital Status:   [2]     Review of Systems   Respiratory: Negative for shortness of breath.    Cardiovascular: Negative for chest pain.   Gastrointestinal: Positive for nausea and vomiting.   Neurological: Positive for syncope and light-headedness. Negative for dizziness.   Psychiatric/Behavioral: Positive for confusion.   All other systems reviewed and are negative.    Physical Exam     Patient Vitals for the past 24  hrs:   BP Temp Temp src Pulse Heart Rate Resp SpO2 Weight   03/02/20 1017 -- 103.5  F (39.7  C) Rectal -- 128 22 94 % --   03/02/20 0900 (!) 150/88 -- -- 121 121 24 93 % --   03/02/20 0830 (!) 166/101 -- -- 126 126 25 94 % --   03/02/20 0803 (!) 203/116 -- -- 133 135 21 97 % --   03/02/20 0700 -- -- -- -- 112 11 96 % --   03/02/20 0645 -- -- -- -- 107 -- -- --   03/02/20 0630 -- -- -- -- 103 -- -- --   03/02/20 0600 (!) 149/84 -- -- -- -- -- 98 % --   03/02/20 0542 (!) 160/96 98.4  F (36.9  C) Oral 106 -- 16 98 % 62.6 kg (138 lb)     Physical Exam  General/Appearance: appears stated age, well-groomed, appears comfortable  Eyes: EOMI, no scleral injection, no icterus  ENT: MMM  Neck: supple, nl ROM, no stiffness  Cardiovascular: RRR, nl S1S2, no m/r/g, 2+ pulses in all 4 extremities, cap refill <2sec  Respiratory: tachy but regular, good air movement throughout, no wheezes/rhonchi/rales, no increased WOB, no retractions  Back: no lesions  GI: abd soft, non-distended, nttp,  no HSM, no rebound, no guarding, nl BS  MSK: ROBERTS, good tone, no bony abnormality  Skin: warm and well-perfused, no rash, no edema, no ecchymosis, nl turgor  Neuro: GCS 15, alert and oriented, no gross focal neuro deficits  Psych: interacts appropriately  Heme: no petechia, no purpura, no active bleeding    Emergency Department Course   ECG (5:42:01):  Rate 107 bpm. DE interval 166. QRS duration 74. QT/QTc 328/437. P-R-T axes 77 34 61. Sinus tachycardia. Nonspecific ST abnormality. Abnormal ECG. Interpreted at 0610 by Haley Fernandez MD.    Imaging:  Radiology findings were communicated with the patient who voiced understanding of the findings.    CT Chest PE with contrast:  1. No evidence of pulmonary embolism.  2. Bibasilar and right middle lobe predominant patchy and nodular  pulmonary opacities, worrisome for infection.  3. 1 cm right upper lobe solid pulmonary nodule, new as compared to  7/1/2011 exam, indeterminate, as per  Fleischner society criteria this  can be further evaluated with PET/CT (probably after symptoms  resolution to increase the specificity of the test), tissue sampling  or 3 month follow-up.  4. 4.2 cm ascending aortic aneurysm, increased in size as compared to  7/1/2011 exam where it measured 3.8 cm.  5. Radiodensity within the caudal aspect of the right internal jugular  vein could represent contrast mixing versus chronic calcified  thrombus, can be further evaluated with ultrasound if clinically  warranted.    Imaging independently reviewed and agree with radiologist interpretation.    CT Head without contrast:  1. No CT findings of acute intracranial abnormality.  2. Generalized brain parenchymal volume loss and moderate presumed  chronic small vessel ischemic disease.    Imaging independently reviewed and agree with radiologist interpretation.     Laboratory:  Laboratory findings were communicated with the patient who voiced understanding of the findings.    CBC:  (L), o/w WNL (WBC 6.5, HGB 14.6)    CMP: Anion Gap 2 (L),  (H), o/w WNL (Creatinine 0.92)    Troponin (0624): <0.015  Troponin (0933): <0.015    D-dimer: 0.8 (H)    Procalcitonin: <0.05    Lactic Acid (0908): 1.2    UA: Ketone 10, o/w Negative    Influenza A/B Antigen: Negative    Urine Culture Aerobic Bacterial: Pending    Blood Culture x2: Pending    Interventions:  0631: Zofran 4 mg IV  0631: NS 500mL IV Bolus   0716: NS 1L IV Bolus   1051: Rocephin 1 g in 100 IV infusion  1116: Zithromax 500 mg in 250 mL IV infusion    Emergency Department Course:  Past medical records, nursing notes, and vitals reviewed.    0557 I performed an exam of the patient as documented above.     EKG obtained in the ED, see results above.   IV was inserted and blood was drawn for laboratory testing, results above.  The patient was sent for a chest and head CT while in the emergency department, results above.     0715 I rechecked the patient and discussed the  results of his workup thus far.     1015    I spoke to Bj Lay RN for the patient, who informed me that the patient measured a 103.5 fever and had developed shaking chills.     1033    I rechecked the patient. Explained findings to patient.    Findings and plan explained to the Patient who consents to admission. Discussed the patient with Dr. Ames, who will admit the patient to a med/tele bed for further monitoring, evaluation, and treatment.    I personally reviewed the laboratory and imaging results with the Patient and answered all related questions prior to admission.     Impression & Plan   Medical Decision Making:  This patient is an 85-year-old male with history of syncope who presents initially with the same.  There were 2 episodes this morning that sound like syncope versus near syncope.  Both of them were associated with having stood up fairly recently, 1 after which she went to the bathroom and 1 after which he vomited.  There was never chest pain, shortness of breath, palpitations.  He did not hit his head.  That being said when he arrived here he was fairly tachycardic.  Initially I assume this was possibly secondary to dehydration however it had not corrected after 2 L of fluid.  At that point in time I started to consider other etiologies such as PE, ACS, indolent infections.  Delta troponin were negative.  D-dimer was normal for his age however as he remained tachycardic a CT PE was changed.  This suggests infection.  Shortly after this he ultimately spiked a fever.,  Consistent with infection.  Influenza was negative.  We will go ahead and treat him for community-acquired pneumonia.  Lactate is normal, though notably this is after IV fluids.  White count is note to be normal.  He does have sepsis secondary to the tachycardia and fever.  He will come into the hospitalist service under med telemetry.     Diagnosis:    ICD-10-CM    1. Community acquired pneumonia, unspecified laterality  J18.9      Disposition:  Admitted to a med/tele bed.    Scribe Disclosure:  I, Yoan Vazquez, am serving as a scribe at 5:57 AM on 3/2/2020 to document services personally performed by Haley Fernandez MD based on my observations and the provider's statements to me.        Haley Fernandez MD  03/02/20 1136

## 2020-03-02 NOTE — ED NOTES
[facility]  ED Nurse Handoff Report    ED Chief complaint: Loss of Consciousness (brought in by ems - pt woke up nauseous and was walking to bathroom to vomit when he had two syncopal episodes - was lowered to floor by wife. denies falls. fingerstick 128. pt alert, oriented. )      ED Diagnosis:   Final diagnoses:   None       Code Status:     Allergies:   Allergies   Allergen Reactions     Chocolate Anaphylaxis     headache     [place]gg[place] GI Disturbance     Feathers      Novocaine [Procaine]      Passes out almost immediately     [place] GI Disturbance     Most berries       Patient Story: Pt came to ED for evaluation of syncopal episodes x2. Witnessed at home by wife, pt did not hit his head per report.  Focused Assessment:  Per night shift report pt was seen and evaluated by MD. Labs ordered and pt kept comfortable in bed. Upon wife coming out to day RN at 0750 pt was found half out of a soiled bed. Pt was anxious, restless and shivering. Pulse has been in the 120s-140s. RR in the 20s. BP was initially 140s but has since changed and holding steady in the 200s. Pt sent for CT and awaiting results. 18g IV in L AC.     Treatments and/or interventions provided: 2L NS given, CT ordered, labs  Patient's response to treatments and/or interventions: Bp and pulse continued to rise.MD aware. Pt more alert after rectal tylenol.    To be done/followed up on inpatient unit:  follow MD orders    Does this patient have any cognitive concerns?: Forgetful    Activity level - Baseline/Home:  Stand w/ assist  Activity Level - Current:   Stand with assist x2    Patient's Preferred language: English   Needed?: No    Isolation: None  Infection: Not Applicable  Bariatric?: No    Vital Signs:   Vitals:    03/02/20 1400 03/02/20 1428 03/02/20 1430 03/02/20 1530   BP: 112/64  111/64 123/74   Pulse: 98  100 112   Resp: 21  23 20   Temp:  98  F (36.7  C)     TempSrc:  Oral     SpO2: 90%  93% 94%   Weight:            Cardiac Rhythm:     Was the PSS-3 completed:   Yes  What interventions are required if any?               Family Comments: Wife at bedside  OBS brochure/video discussed/provided to patient/family: N/A              Name of person given brochure if not patient: N/A              Relationship to patient: N/A    For the majority of the shift this patient's behavior was Yellow.   Behavioral interventions performed were Redirection, side rails up, fall risk precautions.    ED NURSE PHONE NUMBER: [phone]

## 2020-03-02 NOTE — H&P
[facility]    History and Physical - Hospitalist Service       Date of Admission:  3/2/2020    Assessment & Plan   Jean Pierre Roblero is a 85 year old male, with a history of recurrent syncope, who presents to the ED for evaluation of syncope with fever observed in the ED of 103.5 and CT scan showing bilateral pneumonia.    Community acquired pneumonia versus influenza with sepsis. CT showed bilateral lower lobe and right middle lobe pneumonia. WBC and procalcitonin were negative.  Febrile to 103.5 and tachycardic in ED. Lactic acid negative.  -continue ceftriaxone and azithromycin  -follow up blood cultures  -PT/OT/SW consults    Acute metabolic encephalopathy with baseline dementia. Baseline mental status is knowing year and what is going on around him. Was not at baseline per family at admission.  -Continue home Namenda  -Continue supportive cares and treat underlying pneumonia and dehydration  -speech consult for diet recommendations    Syncope. Head CT negative.  Had recurrent syncope in the past and is sensitive.  Likely precipitated by pneumonia and dehydration.  -additional 1 liter fluid bolus and then IVF 0.9% NS at 125 ml/h    BPH.   -continue home finasteride    Pulmonary Nodule. CT chest showed incidental findings of 1 cm right upper lobe pulmonary nodule with biopsy or 3 month follow up recommended  -follow up with PCP    Ascending aortic aneurysm. Incidental finding of 4.2 cm ascending aortic aneurysm (increased in size as compared to 7/1/2011 exam where it measured 3.8 cm)   -follow up with PCP    Incidental finding in right internal jugular vein. Radiodensity within the caudal aspect of the right internal jugular vein could represent contrast mixing versus chronic calcified thrombus that could be further evaluated with ultrasound if clinically warranted.  -no symptoms so suspect this is contrast mixing  -will defer to PCP if any additional follow up is needed     Diet: NPO except meds,  consulted speech for diet recommendations  DVT Prophylaxis: Enoxaparin (Lovenox) SQ and Pneumatic Compression Devices  Puckett Catheter: not present  Code Status: Full code, family not available to discuss that would make this decision, will need to discuss later when they are available    Disposition Plan   Expected discharge: 2 - 3 days, recommended to prior living arrangement once antibiotic plan established, mental status at baseline and SIRS/Sepsis treated.  Entered: William Ames MD 03/02/2020, 10:57 AM     The patient's care was discussed with the Bedside Nurse, Patient and Patient's Family.    William Ames MD  [facility]    ______________________________________________________________________    Chief Complaint   Passing out    History is obtained from medical records and family with limited interaction from the patient because of his confusion    History of Present Illness   Jean Pierre Roblero is a 85 year old male, with a history of recurrent syncope, who presents to the ED for evaluation of syncope with fever observed in the ED of 103.5. The spouse reports the patient woke up this morning around 0300 and went to the bathroom. Just after going to the bathroom he was returning to bed when he became lightheaded and she was able to lower him into a chair. 911 was called and, as his vitals were normal and he was feeling better, they left.  However, about two hours later at 0500, the patient began to feel nauseous. He made it to the bathroom where he vomitied but again became lightheaded. The wife called 911 and he was transported to the [facility] ED.  He never hit his head. Patient denies CP, SOB, cough, abd pain, or diarrhea but is not able to communicate reliably currently secondary to confusion. CT chest PE protocol showed bibasilar and right middle lobe pneumonia with incidental findings of 1 cm right upper lobe pulmonary nodule with biopsy or 3 month follow up recommended, 4.2 cm  ascending aortic aneurysm (increased in size as compared to 7/1/2011 exam where it measured 3.8 cm) and radiodensity within the caudal aspect of the right internal jugular vein could represent contrast mixing versus chronic calcified thrombus that could be further evaluated with ultrasound if clinically warranted. CT head was negative.  Only sick contact is his wife that came home from surgery at [facility] with cough and fever that could be pneumonia. Him and his wife reported that he has had multiple syncopal episodes in the past with normal work-ups. Denies nausea now and says feeling better. His WBC is normal and he has been swabbed for influenza which was negative and blood cultures were taken. Procalcitonin, lactic acid and troponin were all negative. UA was negative. EKG showed sinus tachycardia with no signs of ischemia.    Review of Systems    The 10 point Review of Systems is not possible secondary to confusion.    Past Medical History    I have reviewed this patient's medical history and updated it with pertinent information if needed.   Past Medical History:   Diagnosis Date     Allergic state      Fainting episodes     FREQUENT/ EASILY     Gastroesophageal reflux disease      Headaches      Heartburn      Nocturia      Photosensitivity      Swallowing difficulty      Past Surgical History   I have reviewed this patient's surgical history and updated it with pertinent information if needed.  Past Surgical History:   Procedure Laterality Date     BACK SURGERY       CHOLECYSTECTOMY       ENDOSCOPIC SINUS SURGERY  8/20/2012    Procedure: ENDOSCOPIC SINUS SURGERY;  ENDOSCOPIC REMOVAL OF LEFT NASAL MASS WITH LANDMARKS ([place]), BILATERAL INFERIOR TURBINOPLASTY;  Surgeon: Dima Younger MD;  Location: [facility]     EYE SURGERY      cataracts     HERNIA REPAIR       ORTHOPEDIC SURGERY      ingrown toe nail removal     TURBINOPLASTY  8/20/2012    Procedure: TURBINOPLASTY;;  Surgeon:  Dima Younger MD;  Location: [facility]       Social History   I have reviewed this patient's social history and updated it with pertinent information if needed.  Social History     Tobacco Use     Smoking status: Never Smoker     Smokeless tobacco: Never Used   Substance Use Topics     Alcohol use: No     Drug use: No       Family History   I have reviewed this patient's family history but patient not able to give history because of confusion.  No family history on file.    Prior to Admission Medications   Prior to Admission Medications   Prescriptions Last Dose Informant Patient Reported? Taking?   Multiple Vitamin (MULTI-VITAMIN) per tablet 3/1/2020 at Unknown time  Yes Yes   Sig: Take 1 tablet by mouth daily.     Simethicone (PHAZYME) 180 MG CAPS as needed  Yes Yes   Sig: Take 180 mg by mouth 3 times daily as needed    butalbital-acetaminophen-caffeine (ESGIC) -40 MG tablet as needed  Yes Yes   Sig: Take 1 tablet by mouth every 4 hours as needed for headaches   cholestyramine (QUESTRAN) 4 G packet 3/1/2020 at Unknown time  Yes Yes   Sig: Take 1 packet by mouth 2 times daily (with meals).     famotidine (PEPCID) 40 MG tablet 3/1/2020 at Unknown time  Yes Yes   Sig: Take 40 mg by mouth At Bedtime   finasteride (PROSCAR) 5 MG tablet 3/1/2020 at Unknown time  Yes Yes   Sig: Take 2.5 mg by mouth every evening   lactase (LACTAID) 3000 UNIT tablet as needed  Yes Yes   Sig: Take 3,000 Units by mouth as needed (before eating dairy foods)   magnesium 250 MG tablet 3/1/2020 at Unknown time  Yes Yes   Sig: Take 1 tablet by mouth every evening   memantine (NAMENDA) 10 MG tablet 3/1/2020 at Unknown time  Yes Yes   Sig: Take 10 mg by mouth 2 times daily   multivitamin  with lutein (OCUVITE WITH LTEIN) CAPS per capsule 3/1/2020 at Unknown time  Yes Yes   Sig: Take 1 capsule by mouth daily   vitamin B-12 (CYANOCOBALAMIN) 1000 MCG tablet 3/1/2020 at Unknown time  Yes Yes   Sig: Take 1,000 mcg by mouth daily Noon   vitamin  C (ASCORBIC ACID) 500 MG tablet 3/1/2020 at Unknown time  Yes Yes   Sig: Take 500 mg by mouth daily   vitamin D3 (CHOLECALCIFEROL) 2000 units (50 mcg) tablet 3/1/2020 at Unknown time  Yes Yes   Sig: Take 4,000 Units by mouth daily Noon   zinc sulfate (ZINCATE) 220 (50 Zn) MG capsule 3/1/2020 at Unknown time  Yes Yes   Sig: Take 220 mg by mouth daily noon      Facility-Administered Medications: None     Allergies   Allergies   Allergen Reactions     Chocolate Anaphylaxis     headache     Eggs GI Disturbance     Feathers      Novocaine [Procaine]      Passes out almost immediately     [place] GI Disturbance     Most berries       Physical Exam   Vital Signs: Temp: 103.5  F (39.7  C) Temp src: Rectal BP: (!) 150/88 Pulse: 121 Heart Rate: 128 Resp: 22 SpO2: 94 % O2 Device: None (Room air)    Weight: 138 lbs 0 oz  Constitutional: Awake, confused but cooperative, ill appearing  Respiratory: Crackles bilaterally at bases, no wheezing  Cardiovascular: Regular rate and rhythm, normal S1 and S2, and no murmur noted  GI: Normal bowel sounds, soft, non-distended, non-tender  Skin/Integumen: No rashes, no cyanosis, no edema  Other:    Data   Data reviewed today: I reviewed all medications, new labs and imaging results over the last 24 hours. I personally reviewed the EKG tracing showing sinus tachycardia with no ischemic changes.    Recent Labs   Lab 03/02/20  0850 03/02/20  0547   WBC  --  6.5   HGB  --  14.6   MCV  --  100   PLT  --  127*   NA  --  137   POTASSIUM  --  4.2   CHLORIDE  --  106   CO2  --  28   BUN  --  15   CR  --  0.92   ANIONGAP  --  2*   KENYA  --  8.5   GLC  --  128*   ALBUMIN  --  3.8   PROTTOTAL  --  6.8   BILITOTAL  --  0.8   ALKPHOS  --  98   ALT  --  22   AST  --  22   TROPI <0.015 <0.015     Recent Results (from the past 24 hour(s))   CT Chest Pulmonary Embolism w Contrast    Narrative    CT CHEST PULMONARY EMBOLISM WITH CONTRAST  3/2/2020 8:27 AM    HISTORY:  Chest pain, evaluate PE, tachycardia,  syncope.    TECHNIQUE: Scans obtained from the apices through the diaphragm with  IV contrast. 58 mL Isovue-370 IV injected. Radiation dose for this  scan was reduced using automated exposure control, adjustment of the  mA and/or kV according to patient size, or iterative reconstruction  technique.    COMPARISON:  None available.    FINDINGS:  Chest/mediastinum: No evidence of pulmonary emboli. No cardiomegaly or  pericardial effusion. Moderate atherosclerotic vascular calcification  of the coronary arteries and thoracic aorta. The ascending aorta is  enlarged measuring 4.2 cm, increased in size as compared to 7/1/2011  exam where it measured 3.8 cm. No significant mediastinal, hilar or  axillary lymphadenopathy. Calcification versus contrast mixing in the  caudal aspect of the right internal jugular vein.    Lungs and pleura: No pleural effusion or pneumothorax. Bibasilar and  right middle lobe patchy and nodular pulmonary opacities, worrisome  for infection. There is 1 cm right upper lobe solid nodule (series 8  image 148), new as compared to 7/1/2011.    Upper abdomen: Evaluation of the upper abdomen due to lack of coverage  and timing of contrast. Moderate-sized hiatal hernia.    Bones and soft tissue: No suspicious osseous lesion.      Impression    IMPRESSION:   1. No evidence of pulmonary embolism.  2. Bibasilar and right middle lobe predominant patchy and nodular  pulmonary opacities, worrisome for infection.  3. 1 cm right upper lobe solid pulmonary nodule, new as compared to  7/1/2011 exam, indeterminate, as per Fleischner society criteria this  can be further evaluated with PET/CT (probably after symptoms  resolution to increase the specificity of the test), tissue sampling  or 3 month follow-up.  4. 4.2 cm ascending aortic aneurysm, increased in size as compared to  7/1/2011 exam where it measured 3.8 cm.  5. Radiodensity within the caudal aspect of the right internal jugular  vein could represent  contrast mixing versus chronic calcified  thrombus, can be further evaluated with ultrasound if clinically  warranted.    SALINAS ORTEGA MD   Head CT w/o contrast    Narrative    CT SCAN OF THE HEAD WITHOUT CONTRAST   3/2/2020 8:27 AM     HISTORY: Fall, syncope.    TECHNIQUE:  Axial images of the head and coronal reformations without  IV contrast material. Radiation dose for this scan was reduced using  automated exposure control, adjustment of the mA and/or kV according  to patient size, or iterative reconstruction technique.    COMPARISON: CT head 7/17/2017.    FINDINGS: There is no evidence of intracranial hemorrhage, mass, acute  infarct or anomaly. Mild-to-moderate global brain parenchymal volume  loss. Moderate scattered patchy and confluent areas of hypoattenuation  in the periventricular and deep cerebral white matter predominantly,  nonspecific, but most likely related to chronic small vessel ischemic  disease. The ventricles are within normal limits in size and  configuration.    Bilateral lens replacements. Visualized aspects of both orbits  otherwise within normal limits. The visualized paranasal sinuses and  mastoids are clear. The bony calvarium and bones of the skull base  appear intact.       Impression    IMPRESSION:  1. No CT findings of acute intracranial abnormality.  2. Generalized brain parenchymal volume loss and moderate presumed  chronic small vessel ischemic disease.

## 2020-03-02 NOTE — ED TRIAGE NOTES
brought in by ems - pt woke up nauseous and was walking to bathroom to vomit when he had two syncopal episodes - was lowered to floor by wife. denies falls. fingerstick 128. pt alert, oriented.

## 2020-03-02 NOTE — ED NOTES
Bed: ED20  Expected date:   Expected time:   Means of arrival:   Comments:  516  85M Syncopal  7809

## 2020-03-02 NOTE — H&P
[facility]    History and Physical - Hospitalist Service       Date of Admission:  3/2/2020    Assessment & Plan   Jean Pierre Roblero is a 85 year old male, with a history of recurrent syncope, who presents to the ED for evaluation of syncope with fever observed in the ED of 103.5 and CT scan showing bilateral pneumonia.    Community acquired pneumonia with sepsis. CT showed bilateral lower lobe and right middle lobe pneumonia. WBC and procalcitonin were negative.  Febrile to 103.5 and tachycardic in ED. Lactic acid negative. Influenza swab was negative.  -continue ceftriaxone and azithromycin  -follow up blood cultures  -PT/OT/SW consults    Acute metabolic encephalopathy with baseline dementia. Worse than baseline per family with baseline knowing year and what is happening and able to communicate.  -continue Namenda  -continue supportive cares  -speech eval for possible dysphagia    Syncope. Head CT negative.  Had recurrent syncope in the past and is sensitive.  Likely precipitated by pneumonia and dehydration.  -additional 1 liter fluid bolus and then IVF 0.9% NS at 125 ml/h  -monitor vitals    BHP  -continue home finasteride    Pulmonary Nodule. CT chest showed incidental findings of 1 cm right upper lobe pulmonary nodule with biopsy or 3 month follow up recommended  -follow up with PCP    Ascending aortic aneurysm. Incidental finding of 4.2 cm ascending aortic aneurysm (increased in size as compared to 7/1/2011 exam where it measured 3.8 cm)   -follow up with PCP    Incidental finding in right internal jugular vein. Radiodensity within the caudal aspect of the right internal jugular vein could represent contrast mixing versus chronic calcified thrombus that could be further evaluated with ultrasound if clinically warranted.  -no symptoms so suspect this is contrast mixing  -will defer to PCP if any additional follow up is needed     Diet: NPO except meds pending speech eval  DVT Prophylaxis:  Enoxaparin (Lovenox) SQ and Pneumatic Compression Devices  Puckett Catheter: not present  Code Status: Full code, family not available    Disposition Plan   Expected discharge: 2 - 3 days, recommended to prior living arrangement once antibiotic plan established, mental status at baseline and SIRS/Sepsis treated.  Entered: William Ames MD 03/02/2020, 10:57 AM     The patient's care was discussed with the Bedside Nurse, Patient and Patient's Family.    William Ames MD  [facility]    ______________________________________________________________________    Chief Complaint   Passing out    History is obtained from medical history and family with limited history from the patient because of confusion.    History of Present Illness   Jean Pierre Roblero is a 85 year old male, with a history of recurrent syncope, who presents to the ED for evaluation of syncope with fever observed in the ED of 103.5. The spouse reports the patient woke up this morning around 0300 and went to the bathroom. Just after going to the bathroom he was returning to bed when he became lightheaded and she was able to lower him into a chair. 911 was called and, as his vitals were normal and he was feeling better, they left.  However, about two hours later at 0500, the patient began to feel nauseous. He made it to the bathroom where he vomitied but again became lightheaded. The wife called 911 and he was transported to the [facility] ED.  He never hit his head. Patient denied CP, SOB, cough, abd pain, or diarrhea. CT chest PE protocol showed bibasilar and right middle lobe pneumonia with incidental findings of 1 cm right upper lobe pulmonary nodule with biopsy or 3 month follow up recommended, 4.2 cm ascending aortic aneurysm (increased in size as compared to 7/1/2011 exam where it measured 3.8 cm) and radiodensity within the caudal aspect of the right internal jugular vein could represent contrast mixing versus chronic calcified  thrombus that could be further evaluated with ultrasound if clinically warranted. CT head was negative.  He is unaware of sick contacts. Him and his wife report that he has had multiple syncopal episodes in the past with normal work-ups. Currently, the patient states he feels better and his nausea has resolved. His WBC is normal and he has been swabbed for influenza which was negative and had blood cultures taken. Procalcitonin, lactic acid and troponin were all negative. UA was negative. EKG shows sinus tachycardia with no signs of ischemia.    Review of Systems    The 10 point Review of Systems is not possible because of confusion.    Past Medical History    I have reviewed this patient's medical history and updated it with pertinent information if needed.   Past Medical History:   Diagnosis Date     Allergic state      Fainting episodes     FREQUENT/ EASILY     Gastroesophageal reflux disease      Headaches      Heartburn      Nocturia      Photosensitivity      Swallowing difficulty      Past Surgical History   I have reviewed this patient's surgical history and updated it with pertinent information if needed.  Past Surgical History:   Procedure Laterality Date     BACK SURGERY       CHOLECYSTECTOMY       ENDOSCOPIC SINUS SURGERY  8/20/2012    Procedure: ENDOSCOPIC SINUS SURGERY;  ENDOSCOPIC REMOVAL OF LEFT NASAL MASS WITH LANDMARKS (Fusion Tracking [place]), BILATERAL INFERIOR TURBINOPLASTY;  Surgeon: Dima Younger MD;  Location: [facility]     EYE SURGERY      cataracts     HERNIA REPAIR       ORTHOPEDIC SURGERY      ingrown toe nail removal     TURBINOPLASTY  8/20/2012    Procedure: TURBINOPLASTY;;  Surgeon: Dima Younger MD;  Location: [facility]       Social History   I have reviewed this patient's social history and updated it with pertinent information if needed.  Social History     Tobacco Use     Smoking status: Never Smoker     Smokeless tobacco: Never Used   Substance Use Topics     Alcohol use: No      Drug use: No       Family History   I have reviewed this patient's family history but patient and family were not able to update at this time.  No family history on file.    Prior to Admission Medications   Prior to Admission Medications   Prescriptions Last Dose Informant Patient Reported? Taking?   Multiple Vitamin (MULTI-VITAMIN) per tablet 3/1/2020 at Unknown time  Yes Yes   Sig: Take 1 tablet by mouth daily.     Simethicone (PHAZYME) 180 MG CAPS as needed  Yes Yes   Sig: Take 180 mg by mouth 3 times daily as needed    butalbital-acetaminophen-caffeine (ESGIC) -40 MG tablet as needed  Yes Yes   Sig: Take 1 tablet by mouth every 4 hours as needed for headaches   cholestyramine (QUESTRAN) 4 G packet 3/1/2020 at Unknown time  Yes Yes   Sig: Take 1 packet by mouth 2 times daily (with meals).     famotidine (PEPCID) 40 MG tablet 3/1/2020 at Unknown time  Yes Yes   Sig: Take 40 mg by mouth At Bedtime   finasteride (PROSCAR) 5 MG tablet 3/1/2020 at Unknown time  Yes Yes   Sig: Take 2.5 mg by mouth every evening   lactase (LACTAID) 3000 UNIT tablet as needed  Yes Yes   Sig: Take 3,000 Units by mouth as needed (before eating dairy foods)   magnesium 250 MG tablet 3/1/2020 at Unknown time  Yes Yes   Sig: Take 1 tablet by mouth every evening   memantine (NAMENDA) 10 MG tablet 3/1/2020 at Unknown time  Yes Yes   Sig: Take 10 mg by mouth 2 times daily   multivitamin  with lutein (OCUVITE WITH LTEIN) CAPS per capsule 3/1/2020 at Unknown time  Yes Yes   Sig: Take 1 capsule by mouth daily   vitamin B-12 (CYANOCOBALAMIN) 1000 MCG tablet 3/1/2020 at Unknown time  Yes Yes   Sig: Take 1,000 mcg by mouth daily Noon   vitamin C (ASCORBIC ACID) 500 MG tablet 3/1/2020 at Unknown time  Yes Yes   Sig: Take 500 mg by mouth daily   vitamin D3 (CHOLECALCIFEROL) 2000 units (50 mcg) tablet 3/1/2020 at Unknown time  Yes Yes   Sig: Take 4,000 Units by mouth daily Noon   zinc sulfate (ZINCATE) 220 (50 Zn) MG capsule 3/1/2020 at Unknown  time  Yes Yes   Sig: Take 220 mg by mouth daily noon      Facility-Administered Medications: None     Allergies   Allergies   Allergen Reactions     Chocolate Anaphylaxis     headache     [place]gg[place] GI Disturbance     Feathers      Novocaine [Procaine]      Passes out almost immediately     [place] GI Disturbance     Most berries       Physical Exam   Vital Signs: Temp: 103.5  F (39.7  C) Temp src: Rectal BP: (!) 150/88 Pulse: 121 Heart Rate: 128 Resp: 22 SpO2: 94 % O2 Device: None (Room air)    Weight: 138 lbs 0 oz  Constitutional: Awake, confused, cooperative, warm and ill appearing  Respiratory: Crackles bilaterally at bases, no wheezing  Cardiovascular: Regular rate and rhythm, normal S1 and S2, and no murmur noted  GI: Normal bowel sounds, soft, non-distended, non-tender  Skin/Integumen: No rashes, no cyanosis, no edema  Other:    Data   Data reviewed today: I reviewed all medications, new labs and imaging results over the last 24 hours. I personally reviewed the EKG tracing showing sinus tachycardia with no ischemic changes.    Recent Labs   Lab 03/02/20  0850 03/02/20  0547   WBC  --  6.5   HGB  --  14.6   MCV  --  100   PLT  --  127*   NA  --  137   POTASSIUM  --  4.2   CHLORIDE  --  106   CO2  --  28   BUN  --  15   CR  --  0.92   ANIONGAP  --  2*   KENYA  --  8.5   GLC  --  128*   ALBUMIN  --  3.8   PROTTOTAL  --  6.8   BILITOTAL  --  0.8   ALKPHOS  --  98   ALT  --  22   AST  --  22   TROPI <0.015 <0.015     Recent Results (from the past 24 hour(s))   CT Chest Pulmonary Embolism w Contrast    Narrative    CT CHEST PULMONARY EMBOLISM WITH CONTRAST  3/2/2020 8:27 AM    HISTORY:  Chest pain, evaluate PE, tachycardia, syncope.    TECHNIQUE: Scans obtained from the apices through the diaphragm with  IV contrast. 58 mL Isovue-370 IV injected. Radiation dose for this  scan was reduced using automated exposure control, adjustment of the  mA and/or kV according to patient size, or iterative  reconstruction  technique.    COMPARISON:  None available.    FINDINGS:  Chest/mediastinum: No evidence of pulmonary emboli. No cardiomegaly or  pericardial effusion. Moderate atherosclerotic vascular calcification  of the coronary arteries and thoracic aorta. The ascending aorta is  enlarged measuring 4.2 cm, increased in size as compared to 7/1/2011  exam where it measured 3.8 cm. No significant mediastinal, hilar or  axillary lymphadenopathy. Calcification versus contrast mixing in the  caudal aspect of the right internal jugular vein.    Lungs and pleura: No pleural effusion or pneumothorax. Bibasilar and  right middle lobe patchy and nodular pulmonary opacities, worrisome  for infection. There is 1 cm right upper lobe solid nodule (series 8  image 148), new as compared to 7/1/2011.    Upper abdomen: Evaluation of the upper abdomen due to lack of coverage  and timing of contrast. Moderate-sized hiatal hernia.    Bones and soft tissue: No suspicious osseous lesion.      Impression    IMPRESSION:   1. No evidence of pulmonary embolism.  2. Bibasilar and right middle lobe predominant patchy and nodular  pulmonary opacities, worrisome for infection.  3. 1 cm right upper lobe solid pulmonary nodule, new as compared to  7/1/2011 exam, indeterminate, as per Fleischner society criteria this  can be further evaluated with PET/CT (probably after symptoms  resolution to increase the specificity of the test), tissue sampling  or 3 month follow-up.  4. 4.2 cm ascending aortic aneurysm, increased in size as compared to  7/1/2011 exam where it measured 3.8 cm.  5. Radiodensity within the caudal aspect of the right internal jugular  vein could represent contrast mixing versus chronic calcified  thrombus, can be further evaluated with ultrasound if clinically  warranted.    SALINAS ORTEGA MD   Head CT w/o contrast    Narrative    CT SCAN OF THE HEAD WITHOUT CONTRAST   3/2/2020 8:27 AM     HISTORY: Fall,  syncope.    TECHNIQUE:  Axial images of the head and coronal reformations without  IV contrast material. Radiation dose for this scan was reduced using  automated exposure control, adjustment of the mA and/or kV according  to patient size, or iterative reconstruction technique.    COMPARISON: CT head 7/17/2017.    FINDINGS: There is no evidence of intracranial hemorrhage, mass, acute  infarct or anomaly. Mild-to-moderate global brain parenchymal volume  loss. Moderate scattered patchy and confluent areas of hypoattenuation  in the periventricular and deep cerebral white matter predominantly,  nonspecific, but most likely related to chronic small vessel ischemic  disease. The ventricles are within normal limits in size and  configuration.    Bilateral lens replacements. Visualized aspects of both orbits  otherwise within normal limits. The visualized paranasal sinuses and  mastoids are clear. The bony calvarium and bones of the skull base  appear intact.       Impression    IMPRESSION:  1. No CT findings of acute intracranial abnormality.  2. Generalized brain parenchymal volume loss and moderate presumed  chronic small vessel ischemic disease.

## 2020-03-03 ENCOUNTER — APPOINTMENT (OUTPATIENT)
Dept: PHYSICAL THERAPY | Facility: CLINIC | Age: 85
DRG: 871 | End: 2020-03-03
Attending: INTERNAL MEDICINE
Payer: MEDICARE

## 2020-03-03 ENCOUNTER — APPOINTMENT (OUTPATIENT)
Dept: SPEECH THERAPY | Facility: CLINIC | Age: 85
DRG: 871 | End: 2020-03-03
Attending: INTERNAL MEDICINE
Payer: MEDICARE

## 2020-03-03 ENCOUNTER — APPOINTMENT (OUTPATIENT)
Dept: OCCUPATIONAL THERAPY | Facility: CLINIC | Age: 85
DRG: 871 | End: 2020-03-03
Attending: INTERNAL MEDICINE
Payer: MEDICARE

## 2020-03-03 LAB
ANION GAP SERPL CALCULATED.3IONS-SCNC: 3 MMOL/L (ref 3–14)
BACTERIA SPEC CULT: NO GROWTH
BUN SERPL-MCNC: 17 MG/DL (ref 7–30)
CALCIUM SERPL-MCNC: 8.1 MG/DL (ref 8.5–10.1)
CHLORIDE SERPL-SCNC: 108 MMOL/L (ref 94–109)
CO2 SERPL-SCNC: 28 MMOL/L (ref 20–32)
CREAT SERPL-MCNC: 0.89 MG/DL (ref 0.66–1.25)
ERYTHROCYTE [DISTWIDTH] IN BLOOD BY AUTOMATED COUNT: 13.4 % (ref 10–15)
GFR SERPL CREATININE-BSD FRML MDRD: 78 ML/MIN/{1.73_M2}
GLUCOSE SERPL-MCNC: 95 MG/DL (ref 70–99)
HCT VFR BLD AUTO: 40 % (ref 40–53)
HGB BLD-MCNC: 13.2 G/DL (ref 13.3–17.7)
Lab: NORMAL
MCH RBC QN AUTO: 33.1 PG (ref 26.5–33)
MCHC RBC AUTO-ENTMCNC: 33 G/DL (ref 31.5–36.5)
MCV RBC AUTO: 100 FL (ref 78–100)
PLATELET # BLD AUTO: 111 10E9/L (ref 150–450)
POTASSIUM SERPL-SCNC: 3.6 MMOL/L (ref 3.4–5.3)
RBC # BLD AUTO: 3.99 10E12/L (ref 4.4–5.9)
SODIUM SERPL-SCNC: 139 MMOL/L (ref 133–144)
SPECIMEN SOURCE: NORMAL
WBC # BLD AUTO: 6.6 10E9/L (ref 4–11)

## 2020-03-03 PROCEDURE — 92610 EVALUATE SWALLOWING FUNCTION: CPT | Mod: GN | Performed by: SPEECH-LANGUAGE PATHOLOGIST

## 2020-03-03 PROCEDURE — 12000000 ZZH R&B MED SURG/OB

## 2020-03-03 PROCEDURE — 25000132 ZZH RX MED GY IP 250 OP 250 PS 637: Mod: GY | Performed by: INTERNAL MEDICINE

## 2020-03-03 PROCEDURE — 99232 SBSQ HOSP IP/OBS MODERATE 35: CPT | Performed by: INTERNAL MEDICINE

## 2020-03-03 PROCEDURE — 97161 PT EVAL LOW COMPLEX 20 MIN: CPT | Mod: GP

## 2020-03-03 PROCEDURE — 97116 GAIT TRAINING THERAPY: CPT | Mod: GP

## 2020-03-03 PROCEDURE — 85027 COMPLETE CBC AUTOMATED: CPT | Performed by: INTERNAL MEDICINE

## 2020-03-03 PROCEDURE — 80048 BASIC METABOLIC PNL TOTAL CA: CPT | Performed by: INTERNAL MEDICINE

## 2020-03-03 PROCEDURE — 36415 COLL VENOUS BLD VENIPUNCTURE: CPT | Performed by: INTERNAL MEDICINE

## 2020-03-03 PROCEDURE — 25800030 ZZH RX IP 258 OP 636: Performed by: INTERNAL MEDICINE

## 2020-03-03 PROCEDURE — 97535 SELF CARE MNGMENT TRAINING: CPT | Mod: GO

## 2020-03-03 PROCEDURE — 97166 OT EVAL MOD COMPLEX 45 MIN: CPT | Mod: GO

## 2020-03-03 PROCEDURE — 25000128 H RX IP 250 OP 636: Performed by: INTERNAL MEDICINE

## 2020-03-03 RX ORDER — CETIRIZINE HYDROCHLORIDE 10 MG/1
10 TABLET ORAL DAILY PRN
Status: DISCONTINUED | OUTPATIENT
Start: 2020-03-03 | End: 2020-03-04 | Stop reason: HOSPADM

## 2020-03-03 RX ORDER — FLUTICASONE PROPIONATE 50 MCG
1 SPRAY, SUSPENSION (ML) NASAL
Status: ON HOLD | COMMUNITY
End: 2024-02-05

## 2020-03-03 RX ORDER — FLUTICASONE PROPIONATE 50 MCG
1 SPRAY, SUSPENSION (ML) NASAL DAILY
Status: DISCONTINUED | OUTPATIENT
Start: 2020-03-03 | End: 2020-03-04 | Stop reason: HOSPADM

## 2020-03-03 RX ORDER — CETIRIZINE HYDROCHLORIDE 10 MG/1
10 TABLET ORAL DAILY PRN
COMMUNITY
End: 2022-05-26

## 2020-03-03 RX ADMIN — CEFTRIAXONE 2 G: 2 INJECTION, POWDER, FOR SOLUTION INTRAMUSCULAR; INTRAVENOUS at 11:03

## 2020-03-03 RX ADMIN — MEMANTINE 10 MG: 10 TABLET ORAL at 20:54

## 2020-03-03 RX ADMIN — ENOXAPARIN SODIUM 40 MG: 40 INJECTION SUBCUTANEOUS at 20:54

## 2020-03-03 RX ADMIN — Medication 250 MG: at 20:54

## 2020-03-03 RX ADMIN — CETIRIZINE HYDROCHLORIDE 10 MG: 10 TABLET, FILM COATED ORAL at 09:00

## 2020-03-03 RX ADMIN — FINASTERIDE 2.5 MG: 5 TABLET, FILM COATED ORAL at 20:54

## 2020-03-03 RX ADMIN — MEMANTINE 10 MG: 10 TABLET ORAL at 09:00

## 2020-03-03 RX ADMIN — AZITHROMYCIN MONOHYDRATE 250 MG: 500 INJECTION, POWDER, LYOPHILIZED, FOR SOLUTION INTRAVENOUS at 11:57

## 2020-03-03 RX ADMIN — SODIUM CHLORIDE: 9 INJECTION, SOLUTION INTRAVENOUS at 07:46

## 2020-03-03 RX ADMIN — FAMOTIDINE 40 MG: 20 TABLET, FILM COATED ORAL at 20:55

## 2020-03-03 ASSESSMENT — ACTIVITIES OF DAILY LIVING (ADL)
ADLS_ACUITY_SCORE: 23
ADLS_ACUITY_SCORE: 21
ADLS_ACUITY_SCORE: 23
ADLS_ACUITY_SCORE: 21
PREVIOUS_RESPONSIBILITIES: MEDICATION MANAGEMENT;DRIVING
ADLS_ACUITY_SCORE: 21
ADLS_ACUITY_SCORE: 21

## 2020-03-03 NOTE — PLAN OF CARE
DATE & TIME: 3/3/2020 AM shift  Cognitive Concerns/ Orientation : A/O x3, disoriented to situation or forgetful. Keeps asking about discharge.   BEHAVIOR & AGGRESSION TOOL COLOR: green, calm/cooperative.   ABNL VS/O2: VSS on RA  MOBILITY: SBA. Ambulated in the [place]bin with PT and nursing.  PAIN MANAGMENT: denies  DIET:Regular diet after Swallow evaluation.   BOWEL/BLADDER: was incontinent yesterday. Pt reports going to the bathroom this morning and urinating, Bladder scanned for 160 ml this afternoon. MD notified.   ABNL LAB/BG: CT chest-baldo infiltrates. D dimer 0.8 on admission. Hg 13.2. Platelets 111  DRAIN/DEVICES: PIV IVF infusing with 0.9 NS at 125 ml/hr.   TELEMETRY RHYTHM: na  SKIN:pale/intact. R LE edema more than L LE edema, per pt that's  his baseline.   TESTS/PROCEDURES: PT/OT/Speech are following.   D/C DAY/GOALS/PLACE: TBD  OTHER IMPORTANT INFO: droplet isolation discontinued, influenza negative. Continues on IV abx. Denies dizziness or lightheadedness.

## 2020-03-03 NOTE — PLAN OF CARE
OT: Eval complete and Tx initiated. Pt admitted after syncopal episode with pneumonia. Prior to admit, pt lives in house with wife and reports I with ADLs, med mgmt, and driving.     Discharge Planner OT   Patient plan for discharge: Home tomorrow    Current status: Pt required min A for LE dressing task seated at EOB. Pt completed functional transfers and retrieval of ADL items with min A.     Barriers to return to prior living situation: Stairs to enter and within home; Fall risk    Recommendations for discharge: Home with A for transportation, home RN for med mgmt, and home OT    Rationale for recommendations: Pt limited by impaired cognition, safety, and balance. OT will continue with daily intervention to ensure safety with ADL/IADLs. Pt's wife supportive and available to provide recommended A. Pt would benefit from home care at discharge.        Entered by: Rohit Carroll 03/03/2020 3:09 PM

## 2020-03-03 NOTE — PLAN OF CARE
DATE & TIME: 3/2/20 winsome  Cognitive Concerns/ Orientation : A/O # 1-2 self/place?  BEHAVIOR & AGGRESSION TOOL COLOR: green, calm/coop  ABNL VS/O2: T-99.8, other VSS on RA  MOBILITY: 1-2/GB/walker to BR, pt assist with repositining  PAIN MANAGMENT: denies  DIET: NPO/ice chips/meds  BOWEL/BLADDER: incontinent of urine, no BM  ABNL LAB/BG: CT chest-baldo infiltrates  DRAIN/DEVICES: PIV IVF infusing  TELEMETRY RHYTHM: na  SKIN:pale/intact  TESTS/PROCEDURES: PT/OT/Speech eval tomorrow  D/C DAY/GOALS/PLACE: TBD  OTHER IMPORTANT INFO: Pt confused, calm/cooperative, LS-diminished, infrquent dry cough, denies pain/syncope, IVF infusing, continues on Abxs, Tylenol given at hs for gen discomfort, therapies will eval pt in am, droplet ISO initiated.

## 2020-03-03 NOTE — PROGRESS NOTES
03/03/20 0815   Quick Adds   Type of Visit Initial PT Evaluation   Living Environment   Lives With spouse   Living Arrangements house   Home Accessibility stairs to enter home;stairs within home   Number of Stairs, Main Entrance 1   Stair Railings, Main Entrance none   Number of Stairs, Within Home, Primary 7   Stair Railings, Within Home, Primary railing on right side (ascending)   Self-Care   Usual Activity Tolerance moderate   Current Activity Tolerance fair   Regular Exercise Yes   Activity/Exercise Type walking;strength training  (owns treadmill and elliptical, goes to the gym 3x/wk)   Equipment Currently Used at Home none   Functional Level Prior   Ambulation 0-->independent   Transferring 0-->independent   Fall history within last six months no   Which of the above functional risks had a recent onset or change? none   General Information   Onset of Illness/Injury or Date of Surgery - Date 03/02/20   Referring Physician William Ames MD   Patient/Family Goals Statement Return home   Pertinent History of Current Problem (include personal factors and/or comorbidities that impact the POC) Pt admitted with syncope, found to have B pneumonia and acute metabolic encephalopathy, now back to baseline mental status. PMH: dementia, recurrent syncope, AAA.   Precautions/Limitations fall precautions   Weight-Bearing Status - LLE full weight-bearing   Weight-Bearing Status - RLE full weight-bearing   Cognitive Status Examination   Orientation orientation to person, place and time   Level of Consciousness alert   Follows Commands and Answers Questions 100% of the time;able to follow single-step instructions   Personal Safety and Judgment intact   Pain Assessment   Patient Currently in Pain No   Posture    Posture Forward head position;Protracted shoulders   Range of Motion (ROM)   ROM Quick Adds No deficits were identified   Strength   Strength Comments B hip flex 4/5, knee ext 5/5, DF 5/5   Bed Mobility   Bed Mobility  "Comments Supine to sit independent   Transfer Skills   Transfer Comments Sit to stand SBA   Gait   Gait Comments Pt ambulated 10 ft with IV pole and CGA, mild dec in balance noted   Balance   Balance Comments Mild dec in balance noted with gait   Sensory Examination   Sensory Perception Comments Denies numbness or tingling   General Therapy Interventions   Planned Therapy Interventions gait training;neuromuscular re-education;strengthening;transfer training   Clinical Impression   Criteria for Skilled Therapeutic Intervention yes, treatment indicated   PT Diagnosis Difficulty ambulating   Influenced by the following impairments Dec strength, balance, activity tolerance   Functional limitations due to impairments Difficulty ambulating   Clinical Presentation Stable/Uncomplicated   Clinical Presentation Rationale medically improved   Clinical Decision Making (Complexity) Low complexity   Therapy Frequency 3x/week   Predicted Duration of Therapy Intervention (days/wks) 1 week   Anticipated Discharge Disposition Home   Risk & Benefits of therapy have been explained Yes   Patient, Family & other staff in agreement with plan of care Yes   [facility] TM \"6 Clicks\"   2016, Trustees of [facility], under license to [employer].  All rights reserved.   6 Clicks Short Forms Basic Mobility Inpatient Short Form   [facility]  \"6 Clicks\" V.2 Basic Mobility Inpatient Short Form   1. Turning from your back to your side while in a flat bed without using bedrails? 4 - None   2. Moving from lying on your back to sitting on the side of a flat bed without using bedrails? 4 - None   3. Moving to and from a bed to a chair (including a wheelchair)? 4 - None   4. Standing up from a chair using your arms (e.g., wheelchair, or bedside chair)? 4 - None   5. To walk in hospital room? 3 - A Little   6. Climbing 3-5 steps with a railing? 3 - A Little   Basic Mobility Raw Score (Score out of 24.Lower scores equate " to lower levels of function) 22   Total Evaluation Time   Total Evaluation Time (Minutes) 15

## 2020-03-03 NOTE — PROGRESS NOTES
03/03/20 1446   Quick Adds   Type of Visit Initial Occupational Therapy Evaluation   Living Environment   Lives With spouse   Living Arrangements house   Home Accessibility stairs to enter home;stairs within home   Number of Stairs, Main Entrance 1   Number of Stairs, Within Home, Primary 7   Transportation Anticipated car, drives self;family or friend will provide  (Pt still drives. Wife can provide for now.)   Self-Care   Usual Activity Tolerance moderate   Current Activity Tolerance fair   Equipment Currently Used at Home grab bar, tub/shower   Functional Level   Ambulation 0-->independent   Transferring 0-->independent   Toileting 0-->independent   Bathing 1-->assistive equipment   Dressing 0-->independent   Eating 0-->independent   Communication 0-->understands/communicates without difficulty   Swallowing 0-->swallows foods/liquids without difficulty   Cognition 1 - attention or memory deficits   Fall history within last six months no   General Information   Onset of Illness/Injury or Date of Surgery - Date 03/02/20   Referring Physician Dr. William Ames   Patient/Family Goals Statement Pt plans to discharge home tomorrow   Additional Occupational Profile Info/Pertinent History of Current Problem Admitted after a syncopal episode and diagnosed with pneumonia   Precautions/Limitations fall precautions   General Observations Pt's wife present for OT eval.    Cognitive Status Examination   Orientation orientation to person, place and time   Level of Consciousness alert;confused   Follows Commands (Cognition) WNL   Memory impaired   Cognitive Comment Per chart, baseline dementia.    Visual Perception   Visual Perception Wears glasses   Pain Assessment   Patient Currently in Pain No   Mobility   Bed Mobility Bed mobility skill: Rolling/Turning;Bed mobility skill: Scooting/Bridging;Bed mobility skill: Sit to supine;Bed mobility skill: Supine to sit;Bed mobility analysis   Bed Mobility Skill: Rolling/Turning    Level of [place] - Bed Mobility Skill Rolling Turning minimum assist (75% patients effort)   Bed Mobility Skill: Scooting/Bridging   Level of [place]: Scooting/Bridging minimum assist (75% patients effort)   Bed Mobility Skill: Sit to Supine   Level of [place]: Sit/Supine minimum assist (75% patients effort)   Bed Mobility Skill: Supine to Sit   Level of [place]: Supine/Sit minimum assist (75% patients effort)   Bed Mobility Analysis   Bed Mobility Limitations cognitive deficits   Impairments Contributing to Impaired Bed Mobility impaired balance   Transfer Skills   Transfer Transfer Safety Analysis Bed/Chair;Transfer Skill: Stand to Sit;Transfer Safety Analysis Sit/Stand   Transfer Skill: Bed to Chair/Chair to Bed   Level of [place]: Bed to Chair minimum assist (75% patients effort)   Transfer Safety Analysis Bed/Chair   Transfer Safety Concerns Noted decreased balance during turns;losing balance backward   Impairments Contributing to Impaired Transfers impaired balance   Transfer Skill: Sit to Stand   Level of [place]: Sit/Stand minimum assist (75% patients effort)   Transfer Safety Analysis Sit/Stand   Transfer Safety Concerns Noted: Sit/Stand decreased balance during turns;losing balance backward   Impaired Transfers: Sit/Stand impaired balance   Toilet Transfer   Toilet Transfer Toilet Transfer Skill;Toilet Transfer Safety Analysis   Transfer Skill: Toilet Transfer   Level of [place]: Toilet minimum assist (75% patients effort)   Assistive Device seat riser   Transfer Safety Analysis Toilet   Transfer Safety Concerns Noted: Toilet losing balance backward;decreased balance during turns   Transfer Safety Analysis Toilet impaired balance   Lower Body Dressing   Level of [place]: Dress Lower Body minimum assist (75% patients effort)   Toileting   Level of [place]: Toilet minimum assist (75% patients effort)   Grooming   Level of [place]: Grooming minimum assist  "(75% patients effort)   Instrumental Activities of Daily Living (IADL)   Previous Responsibilities medication management;driving   Activities of Daily Living Analysis   Impairments Contributing to Impaired Activities of Daily Living balance impaired;cognition impaired   General Therapy Interventions   Planned Therapy Interventions ADL retraining;cognition;transfer training   Clinical Impression   Criteria for Skilled Therapeutic Interventions Met yes, treatment indicated   OT Diagnosis Decreased I and safety with ADLs   Influenced by the following impairments Impaired cognition and safety; Decreased balance and activity tolerance   Assessment of Occupational Performance 3-5 Performance Deficits   Identified Performance Deficits Dressing; Toileting; Bathing; IADLs   Clinical Decision Making (Complexity) Moderate complexity   Therapy Frequency Daily   Predicted Duration of Therapy Intervention (days/wks) 4 days   Anticipated Discharge Disposition Home with Assist;Home with Home Therapy   Risks and Benefits of Treatment have been explained. Yes   Patient, Family & other staff in agreement with plan of care Yes   [facility] TM \"6 Clicks\"   2016, Trustees of [facility], under license to [employer].  All rights reserved.   6 Clicks Short Forms Daily Activity Inpatient Short Form   [facility]  \"6 Clicks\" Daily Activity Inpatient Short Form   1. Putting on and taking off regular lower body clothing? 3 - A Little   2. Bathing (including washing, rinsing, drying)? 3 - A Little   3. Toileting, which includes using toilet, bedpan or urinal? 3 - A Little   4. Putting on and taking off regular upper body clothing? 4 - None   5. Taking care of personal grooming such as brushing teeth? 3 - A Little   6. Eating meals? 4 - None   Daily Activity Raw Score (Score out of 24.Lower scores equate to lower levels of function) 20   Total Evaluation Time   Total Evaluation Time (Minutes) 10     "

## 2020-03-03 NOTE — PROGRESS NOTES
"   03/03/20 1100   General Information   Onset Date 03/02/20   Start of Care Date 03/03/20   Referring Physician William Ames MD   Patient Profile Review/OT: Additional Occupational Profile Info See Profile for full history and prior level of function   Patient/Family Goals Statement to eat something    Swallowing Evaluation Bedside swallow evaluation   Behaviorial Observations WFL (within functional limits)   Mode of current nutrition NPO   Respiratory Status Room air   Comments Per MD note: \"Jean Pierre Roblero is a 85 year old male, with a history of recurrent syncope, who presents to the ED for evaluation of syncope with fever observed in the ED of 103.5. The spouse reports the patient woke up this morning around 0300 and went to the bathroom. Just after going to the bathroom he was returning to bed when he became lightheaded and she was able to lower him into a chair. 911 was called and, as his vitals were normal and he was feeling better, they left.  However, about two hours later at 0500, the patient began to feel nauseous. He made it to the bathroom where he vomitied but again became lightheaded. The wife called 911 and he was transported to the [facility] ED.  He never hit his head. Patient denies CP, SOB, cough, abd pain, or diarrhea but is not able to communicate reliably currently secondary to confusion. CT chest PE protocol showed bibasilar and right middle lobe pneumonia with incidental findings of 1 cm right upper lobe pulmonary nodule with biopsy or 3 month follow up recommended, 4.2 cm ascending aortic aneurysm (increased in size as compared to 7/1/2011 exam where it measured 3.8 cm) and radiodensity within the caudal aspect of the right internal jugular vein could represent contrast mixing versus chronic calcified thrombus that could be further evaluated with ultrasound if clinically warranted. CT head was negative.\"   Clinical Swallow Evaluation   Oral Musculature generally intact  (slight " flat/lower upper lip on left)   Structural Abnormalities none present   Dentition present and adequate  (some missing molars )   Oral Labial Strength and Mobility WFL   Lingual Strength and Mobility WFL   Velar Elevation intact   Buccal Strength and Mobility intact   Laryngeal Function Cough;Throat clear;Swallow;Voicing initiated;Dry swallow palpated   Clinical Swallow Eval: Thin Liquid Texture Trial   Mode of Presentation, Thin Liquids cup;spoon;self-fed;fed by clinician   Volume of Liquid or Food Presented 3 oz    Oral Phase of Swallow WFL   Pharyngeal Phase of Swallow repeated swallows   Diagnostic Statement semi delayed swallow, no s/sx of aspiration   Clinical Swallow Eval: Puree Solid Texture Trial   Mode of Presentation, Puree spoon;self-fed   Volume of Puree Presented whole pudding   Oral Phase, Puree WFL   Pharyngeal Phase, Puree repeated swallows   Diagnostic Statement semi delayed swallow    Clinical Swallow Eval: Solid Food Texture Trial   Mode of Presentation, Solid self-fed   Volume of Solid Food Presented 1 cracker   Oral Phase, Solid WFL   Pharyngeal Phase, Solid repeated swallows   Diagnostic Statement prolonged mastication, semi delayed swallow   Swallow Compensations   Swallow Compensations Reduce amounts;Alternate viscosity of consistencies;Multiple swallow   Esophageal Phase of Swallow   Patient reports or presents with symptoms of esophageal dysphagia Yes   Esophageal comments Hx of reflux and tight UES   Swallow Eval: Clinical Impressions   Skilled Criteria for Therapy Intervention Skilled criteria met.  Treatment indicated.   Functional Assessment Scale (FAS) 5   Dysphagia Outcome Severity Scale (MROA) Level 5 - MORA   Treatment Diagnosis mild oral-pharyngeal dysphagia   Diet texture recommendations Regular diet;Thin liquids   Recommended Feeding/Eating Techniques maintain upright posture during/after eating for 30 mins;alternate between small bites and sips of food/liquid;small sips/bites    Therapy Frequency 2x/3 days   Predicted Duration of Therapy Intervention (days/wks) 1 week   Anticipated Discharge Disposition home   Risks and Benefits of Treatment have been explained. Yes   Patient, family and/or staff in agreement with Plan of Care Yes   Clinical Impression Comments Clinical swallow evaluation completed this AM. Pt presents with mild oral-pharyngeal dysphagia at bedside. Pt/spouse also report occasional choking episodes at home.  Has hx of video swallow studies completed 10/13 and 12/16 which both showed mild deficits. Tight UES with slight reflux per VFSS completed 12/16. ST recommended DD3 with thin liquids which pt currently has regular diet at home. Pt trialed thin liquids by tsp and cup with a semi delayed swallow but no overt s/sx of aspiration. Pudding and solid trials were observed with minimal difficulty and proper mastication. Pt/wife educated on past video swallow studies which they did not recall. Strategies were provided to help minimize swallow difficulties. Pt already following some strategies on his own at home (alternating liquids/solids, avoiding dry/hard foods). Recommend a regular diet with thin liquids with safe swallow strategies: sit upright, double swallow to clear residue, alternate liquids and solids, slow rate, and small bites/sips. Will follow up 1-2x for a meal follow up and to teach strategies further.  If increased choking episodes and respiratory difficulty are observed in the future, a repeat video swallow study and/or GI consult may be indicated for further assessment of swallow function.   Total Evaluation Time   Total Evaluation Time (Minutes) 25

## 2020-03-03 NOTE — PLAN OF CARE
DATE & TIME: 03/02/2020 night    Cognitive Concerns/ Orientation : Alert/oriented x3. Disoriented to situation. Some word finding difficulty   BEHAVIOR & AGGRESSION TOOL COLOR: green  CIWA SCORE: n/a   ABNL VS/O2: VSS with soft BP 95/56, room air  MOBILITY: Assist-1/2, gait belt/walker.   PAIN MANAGMENT: denies this shift  DIET: NPO until SLP can evaluate  BOWEL/BLADDER: Ambulated with 2-assist to bathroom  ABNL LAB/BG:   DRAIN/DEVICES: L PIV running NS at 125 mL/hour  TELEMETRY RHYTHM: n/a  SKIN: intact, pale  TESTS/PROCEDURES: SLP to evaluate swallowing. CT indicates bilateral chest infiltrates  D/C DAY/GOALS/PLACE: pending  OTHER IMPORTANT INFO: Pt requesting allergy medications (Zyrtec and Flonase used at home but does not know dose). Droplet isolation maintained

## 2020-03-03 NOTE — PLAN OF CARE
Discharge Planner SLP   Patient plan for discharge: home   Current status:   Clinical swallow evaluation completed this AM. Pt presents with mild oral-pharyngeal dysphagia at bedside. Pt/spouse also report occasional choking episodes at home.  Has hx of video swallow studies completed 10/13 and 12/16 which both showed mild deficits. Tight UES with slight reflux per VFSS completed 12/16. ST recommended DD3 with thin liquids which pt currently has regular diet at home. Pt trialed thin liquids by tsp and cup with a semi delayed swallow but no overt s/sx of aspiration. Pudding and solid trials were observed with minimal difficulty and proper mastication. Pt/wife educated on past video swallow studies which they did not recall. Strategies were provided to help minimize swallow difficulties. Pt already following some strategies on his own at home (alternating liquids/solids, avoiding dry/hard foods).     Recommend a regular diet with thin liquids with safe swallow strategies: sit upright, double swallow to clear residue, alternate liquids and solids, slow rate, and small bites/sips. Will follow up 1-2x for a meal follow up and to teach strategies further.  If increased choking episodes and respiratory difficulty are observed in the future, a repeat video swallow study and/or GI consult may be indicated for further assessment of swallow function.  Barriers to return to prior living situation: None from ST  Recommendations for discharge: Home with possible OP/HH to follow up on diet recommendations/strategies for ongoing dysphagia difficultly   Rationale for recommendations: Pt likely to meet goals by discharge        Entered by: Charlotte Bernardo 03/03/2020 11:42 AM

## 2020-03-04 ENCOUNTER — APPOINTMENT (OUTPATIENT)
Dept: OCCUPATIONAL THERAPY | Facility: CLINIC | Age: 85
DRG: 871 | End: 2020-03-04
Payer: MEDICARE

## 2020-03-04 VITALS
HEART RATE: 81 BPM | SYSTOLIC BLOOD PRESSURE: 117 MMHG | DIASTOLIC BLOOD PRESSURE: 70 MMHG | TEMPERATURE: 97.6 F | RESPIRATION RATE: 16 BRPM | OXYGEN SATURATION: 97 % | WEIGHT: 142.2 LBS | BODY MASS INDEX: 22.27 KG/M2

## 2020-03-04 PROCEDURE — 25000132 ZZH RX MED GY IP 250 OP 250 PS 637: Mod: GY | Performed by: INTERNAL MEDICINE

## 2020-03-04 PROCEDURE — 97535 SELF CARE MNGMENT TRAINING: CPT | Mod: GO | Performed by: OCCUPATIONAL THERAPY ASSISTANT

## 2020-03-04 PROCEDURE — 99239 HOSP IP/OBS DSCHRG MGMT >30: CPT | Performed by: INTERNAL MEDICINE

## 2020-03-04 RX ORDER — ACETAMINOPHEN 325 MG/1
650 TABLET ORAL EVERY 4 HOURS PRN
Qty: 100 TABLET | Refills: 0 | COMMUNITY
Start: 2020-03-04 | End: 2022-05-26

## 2020-03-04 RX ORDER — AZITHROMYCIN 250 MG/1
250 TABLET, FILM COATED ORAL DAILY
Qty: 3 TABLET | Refills: 0 | Status: SHIPPED | OUTPATIENT
Start: 2020-03-04 | End: 2020-03-07

## 2020-03-04 RX ORDER — CEFUROXIME AXETIL 500 MG/1
500 TABLET ORAL 2 TIMES DAILY
Qty: 14 TABLET | Refills: 0 | Status: SHIPPED | OUTPATIENT
Start: 2020-03-04 | End: 2020-03-11

## 2020-03-04 RX ADMIN — MEMANTINE 10 MG: 10 TABLET ORAL at 09:06

## 2020-03-04 RX ADMIN — CETIRIZINE HYDROCHLORIDE 10 MG: 10 TABLET, FILM COATED ORAL at 09:06

## 2020-03-04 ASSESSMENT — ACTIVITIES OF DAILY LIVING (ADL)
ADLS_ACUITY_SCORE: 23

## 2020-03-04 NOTE — PLAN OF CARE
Discharge Planner OT   Patient plan for discharge: Home   Current status: Pt A&O x4, completed SLUMS, scoring 22/30 which falls in the mild neurocognitive range with deficits in executive functioning and short term memory. Pt completed LE dressing, pt able to doff/don socks with SBA while seated, don pants and underwear with SBA while seated/standing, by bringing legs up over knees, requiring no use of AE. Pt and wife had many questions on home OT, answered all questions I could. Pt completed chair on/off transfer with CGA.   Barriers to return to prior living situation: Stairs to enter and within home; Fall risk  Recommendations for discharge: Home with A for transportation, home RN for med mgmt, and home OT  Rationale for recommendations: Pt limited by impaired cognition, safety, and balance. OT will continue with daily intervention to ensure safety with ADL/IADLs. Pt's wife supportive and available to provide recommended A. Pt would benefit from home care at discharge.        Entered by: Arlen Lugo 03/04/2020 11:34 AM      Pt discharging home with home OT, see discharge summary, GOALS NOT MET

## 2020-03-04 NOTE — PLAN OF CARE
DATE & TIME: 3/3/20, 0040 - 2377   Cognitive Concerns/ Orientation : A&O x3. Disoriented to time   BEHAVIOR & AGGRESSION TOOL COLOR: Green  CIWA SCORE: N/a   ABNL VS/O2: VSS on room air  MOBILITY: SBA, GB and walker  PAIN MANAGMENT: Denied  DIET: Regular  BOWEL/BLADDER: Voids in bathroom with some incontinence at times  ABNL LAB/BG: N/a  DRAIN/DEVICES: PIV SL  TELEMETRY RHYTHM: N/a  SKIN: Pale/intact+1-+2 edema to BLE  TESTS/PROCEDURES: N/a  D/C DAY/GOALS/PLACE: For discharge today

## 2020-03-04 NOTE — DISCHARGE SUMMARY
[facility]  Hospitalist Discharge Summary       Date of Admission:  3/2/2020  Date of Discharge:  3/4/2020  Discharging Provider: William Ames MD      Discharge Diagnoses   Community acquired pneumonia, bilateral with resulting sepsis    Follow-ups Needed After Discharge   Follow-up Appointments     Follow-up and recommended labs and tests       Follow up with primary care provider, Kolby Bentley, within 7   days for hospital follow- up.  No follow up labs or test are needed. Will   need to find up incidental findings of pulmonary nodule, dilated ascending   aorta and a filling defect but suspect these can all just be observed in   setting of dementia but will defer to PCP.           Unresulted Labs Ordered in the Past 30 Days of this Admission     Date and Time Order Name Status Description    3/2/2020 1016 Blood culture Preliminary     3/2/2020 1016 Blood culture Preliminary       These results will be called out if they become positive. Patient discharged on ceftin and azithromycin.    Discharge Disposition   Discharged to home with home care  Condition at discharge: Stable    Hospital Course   Jean Pierre Roblero is a 85 year old male, with a history of recurrent syncope, who presents to the ED for evaluation of syncope with fever observed in the ED of 103.5 and CT scan showing bilateral pneumonia.    Community acquired pneumonia with sepsis. CT showed bilateral lower lobe and right middle lobe pneumonia. WBC and procalcitonin were negative.  Febrile to 103.5 and tachycardic in ED. Lactic acid negative.  -discharge on ceftin and azithromycin  -PT/OT/SW consults recommending home with home PT/OT which has been ordered    Acute metabolic encephalopathy with baseline dementia. Baseline mental status is knowing year and what is going on around him. Was not at baseline per family at admission.  -Continue home Namenda  -Continue supportive cares and treat underlying pneumonia and  dehydration  -speech consult appreciated for diet recommendations to do regular diet and continue to teach safe swallow strategies, home OT to reinforce these strategies    Syncope. Head CT negative.  Had recurrent syncope in the past and is sensitive.  Likely precipitated by pneumonia and dehydration.  -responded to IV fluids and treatment of pnuemonia    BPH.   -continue home finasteride    Pulmonary Nodule. CT chest showed incidental findings of 1 cm right upper lobe pulmonary nodule with biopsy or 3 month follow up recommended  -follow up with PCP to consider imaging again in 3 months or longer with dementia and comorbidities    Ascending aortic aneurysm. Incidental finding of 4.2 cm ascending aortic aneurysm (increased in size as compared to 7/1/2011 exam where it measured 3.8 cm)   -follow up with PCP    Incidental finding in right internal jugular vein. Radiodensity within the caudal aspect of the right internal jugular vein could represent contrast mixing versus chronic calcified thrombus that could be further evaluated with ultrasound if clinically warranted.  -no symptoms so suspect this is contrast mixing  -will defer to PCP if any additional follow up is needed    Consultations This Hospital Stay   SWALLOW EVAL SPEECH PATH AT BEDSIDE IP CONSULT  SOCIAL WORK IP CONSULT  PHYSICAL THERAPY ADULT IP CONSULT  OCCUPATIONAL THERAPY ADULT IP CONSULT    Code Status   Full Code    Time Spent on this Encounter   I, William Ames MD, personally saw the patient today and spent greater than 30 minutes discharging this patient.       William mAes MD  [facility]  ______________________________________________________________________    Physical Exam   Vital Signs: Temp: 97.6  F (36.4  C) Temp src: Oral BP: 117/70 Pulse: 81 Heart Rate: 87 Resp: 16 SpO2: 97 % O2 Device: None (Room air)    Weight: 142 lbs 3.15 oz  Constitutional: Awake, alert, cooperative, no apparent distress  Respiratory:  Crackles right lung base improving, no wheezing  Cardiovascular: Regular rate and rhythm, normal S1 and S2, and no murmur noted  GI: Normal bowel sounds, soft, non-distended, non-tender  Skin/Integumen: No rashes, no cyanosis, 1+ pitting edema in ankles bilaterally improving  Other:         Primary Care Physician   Kolby Bentley    Discharge Orders      Home care nursing referral      Home Care PT Referral for Hospital Discharge      Home Care OT Referral for Hospital Discharge      Activity    Your activity upon discharge: activity as tolerated     MD face to face encounter    Documentation of Face to Face and Certification for Home Health Services    I certify that patient: Jean Pierre Roblero is under my care and that I, or a nurse practitioner or physician's assistant working with me, had a face-to-face encounter that meets the physician face-to-face encounter requirements with this patient on: 3/4/2020.    This encounter with the patient was in whole, or in part, for the following medical condition, which is the primary reason for home health care: community acquired pneumonia, physical deconditioning, dementai.    I certify that, based on my findings, the following services are medically necessary home health services: Nursing, Occupational Therapy and Physical Therapy.    My clinical findings support the need for the above services because: Nurse is needed: To assess compliance after changes in medications or other medical regimen, Occupational Therapy Services are needed to assess and treat cognitive ability and address ADL safety due to impairment in cognition and physical conditioning from dementia and pneumonia and to continue to reinforce safe eating strategies to avoid aspiration. Physical Therapy Services are needed to assess and treat the following functional impairments: physical conditioning from dementia and pneumonia.    Further, I certify that my clinical findings support that this patient is  homebound (i.e. absences from home require considerable and taxing effort and are for medical reasons or [employer] services or infrequently or of short duration when for other reasons) because: Requires assistance of another person or specialized equipment to access medical services because patient: Is prone to wander/get lost without assistance...    Based on the above findings. I certify that this patient is confined to the home and needs intermittent skilled nursing care, physical therapy and/or speech therapy.  The patient is under my care, and I have initiated the establishment of the plan of care.  This patient will be followed by a physician who will periodically review the plan of care.  Physician/Provider to provide follow up care: Kolby Bentley    Attending hospital physician (the Medicare certified [facility] provider): William Ames MD  Physician Signature: See electronic signature associated with these discharge orders.  Date: 3/4/2020     Reason for your hospital stay    You were admitted with pneumonia and are improving. You will be discharged on two antibiotics, azithromycin and ceftin, and should complete the full course prescribed. With your dementia, trouble swallowing and weakness in the setting of this pneumonia therapies are recommending home therapy and RN services which have been ordered. You had some incidental findings on your CT Scan with a lung nodule and dilation of your aorta that will need to be followed by your PCP. It was a pleasure for our Hospitalist group and me personally to care for you at [facility].  We wish you a speedy recovery and good health!     Follow-up and recommended labs and tests     Follow up with primary care provider, Kolby Bentley, within 7 days for hospital follow- up.  No follow up labs or test are needed. Will need to find up incidental findings of pulmonary nodule, dilated ascending aorta and a filling defect but suspect  these can all just be observed in setting of dementia but will defer to PCP.     Full Code     Diet    Follow this diet upon discharge: Regular Diet Adult, recommended safe swallowing strategies with small bites and tucked chin, home OT to assist with continued reminders       Significant Results and Procedures   Most Recent 3 CBC's:  Recent Labs   Lab Test 03/03/20  0851 03/02/20  0547 05/30/13  1024   WBC 6.6 6.5 5.6   HGB 13.2* 14.6 14.8    100 98   * 127* 152     Most Recent 3 BMP's:  Recent Labs   Lab Test 03/03/20  0851 03/02/20  0547 05/30/13  1024    137 139   POTASSIUM 3.6 4.2 4.3   CHLORIDE 108 106 103   CO2 28 28 24   BUN 17 15  --    CR 0.89 0.92 0.82   ANIONGAP 3 2* 11   KENYA 8.1* 8.5  --    GLC 95 128*  --      Most Recent 2 LFT's:  Recent Labs   Lab Test 03/02/20 0547 05/30/13  1024   AST 22 30   ALT 22 29   ALKPHOS 98 86   BILITOTAL 0.8 0.5   ,   Results for orders placed or performed during the hospital encounter of 03/02/20   CT Chest Pulmonary Embolism w Contrast    Narrative    CT CHEST PULMONARY EMBOLISM WITH CONTRAST  3/2/2020 8:27 AM    HISTORY:  Chest pain, evaluate PE, tachycardia, syncope.    TECHNIQUE: Scans obtained from the apices through the diaphragm with  IV contrast. 58 mL Isovue-370 IV injected. Radiation dose for this  scan was reduced using automated exposure control, adjustment of the  mA and/or kV according to patient size, or iterative reconstruction  technique.    COMPARISON:  None available.    FINDINGS:  Chest/mediastinum: No evidence of pulmonary emboli. No cardiomegaly or  pericardial effusion. Moderate atherosclerotic vascular calcification  of the coronary arteries and thoracic aorta. The ascending aorta is  enlarged measuring 4.2 cm, increased in size as compared to 7/1/2011  exam where it measured 3.8 cm. No significant mediastinal, hilar or  axillary lymphadenopathy. Calcification versus contrast mixing in the  caudal aspect of the right internal  jugular vein.    Lungs and pleura: No pleural effusion or pneumothorax. Bibasilar and  right middle lobe patchy and nodular pulmonary opacities, worrisome  for infection. There is 1 cm right upper lobe solid nodule (series 8  image 148), new as compared to 7/1/2011.    Upper abdomen: Evaluation of the upper abdomen due to lack of coverage  and timing of contrast. Moderate-sized hiatal hernia.    Bones and soft tissue: No suspicious osseous lesion.      Impression    IMPRESSION:   1. No evidence of pulmonary embolism.  2. Bibasilar and right middle lobe predominant patchy and nodular  pulmonary opacities, worrisome for infection.  3. 1 cm right upper lobe solid pulmonary nodule, new as compared to  7/1/2011 exam, indeterminate, as per Fleischner society criteria this  can be further evaluated with PET/CT (probably after symptoms  resolution to increase the specificity of the test), tissue sampling  or 3 month follow-up.  4. 4.2 cm ascending aortic aneurysm, increased in size as compared to  7/1/2011 exam where it measured 3.8 cm.  5. Radiodensity within the caudal aspect of the right internal jugular  vein could represent contrast mixing versus chronic calcified  thrombus, can be further evaluated with ultrasound if clinically  warranted.    SALINAS ORTEGA MD   Head CT w/o contrast    Narrative    CT SCAN OF THE HEAD WITHOUT CONTRAST   3/2/2020 8:27 AM     HISTORY: Fall, syncope.    TECHNIQUE:  Axial images of the head and coronal reformations without  IV contrast material. Radiation dose for this scan was reduced using  automated exposure control, adjustment of the mA and/or kV according  to patient size, or iterative reconstruction technique.    COMPARISON: CT head 7/17/2017.    FINDINGS: There is no evidence of intracranial hemorrhage, mass, acute  infarct or anomaly. Mild-to-moderate global brain parenchymal volume  loss. Moderate scattered patchy and confluent areas of hypoattenuation  in the periventricular  and deep cerebral white matter predominantly,  nonspecific, but most likely related to chronic small vessel ischemic  disease. The ventricles are within normal limits in size and  configuration.    Bilateral lens replacements. Visualized aspects of both orbits  otherwise within normal limits. The visualized paranasal sinuses and  mastoids are clear. The bony calvarium and bones of the skull base  appear intact.       Impression    IMPRESSION:  1. No CT findings of acute intracranial abnormality.  2. Generalized brain parenchymal volume loss and moderate presumed  chronic small vessel ischemic disease.    BETTY REDD MD       Discharge Medications   Current Discharge Medication List      START taking these medications    Details   acetaminophen (TYLENOL) 325 MG tablet Take 2 tablets (650 mg) by mouth every 4 hours as needed for mild pain  Qty: 100 tablet, Refills: 0    Associated Diagnoses: Community acquired pneumonia, unspecified laterality      azithromycin (ZITHROMAX) 250 MG tablet Take 1 tablet (250 mg) by mouth daily for 3 days  Qty: 3 tablet, Refills: 0    Associated Diagnoses: Community acquired pneumonia, unspecified laterality      cefuroxime (CEFTIN) 500 MG tablet Take 1 tablet (500 mg) by mouth 2 times daily for 7 days  Qty: 14 tablet, Refills: 0    Associated Diagnoses: Community acquired pneumonia, unspecified laterality         CONTINUE these medications which have NOT CHANGED    Details   butalbital-acetaminophen-caffeine (ESGIC) -40 MG tablet Take 1 tablet by mouth every 4 hours as needed for headaches      cetirizine (ZYRTEC) 10 MG tablet Take 10 mg by mouth daily as needed for allergies      chlorpheniramine-pseudoePHEDrine (PSEUDO-GEST PLUS) 4-60 MG TABS per tablet Take 1 tablet by mouth every evening      cholestyramine (QUESTRAN) 4 G packet Take 1 packet by mouth 2 times daily (with meals).        Diphenhydramine-Pseudoephed (ALLERGY DECONGESTANT PO) Take 1 tablet by mouth every evening  Chlorpheniramine      famotidine (PEPCID) 40 MG tablet Take 40 mg by mouth At Bedtime      finasteride (PROSCAR) 5 MG tablet Take 2.5 mg by mouth every evening      fluticasone (FLONASE) 50 MCG/ACT nasal spray Spray 1 spray into both nostrils daily      lactase (LACTAID) 3000 UNIT tablet Take 3,000 Units by mouth as needed (before eating dairy foods)      magnesium 250 MG tablet Take 1 tablet by mouth every evening      memantine (NAMENDA) 10 MG tablet Take 10 mg by mouth 2 times daily      Multiple Vitamin (MULTI-VITAMIN) per tablet Take 1 tablet by mouth daily.        multivitamin  with lutein (OCUVITE WITH LTEIN) CAPS per capsule Take 1 capsule by mouth daily      Simethicone (PHAZYME) 180 MG CAPS Take 180 mg by mouth 3 times daily as needed       vitamin B-12 (CYANOCOBALAMIN) 1000 MCG tablet Take 1,000 mcg by mouth daily Noon      vitamin C (ASCORBIC ACID) 500 MG tablet Take 500 mg by mouth daily      vitamin D3 (CHOLECALCIFEROL) 2000 units (50 mcg) tablet Take 4,000 Units by mouth daily Noon      zinc sulfate (ZINCATE) 220 (50 Zn) MG capsule Take 220 mg by mouth daily noon           Allergies   Allergies   Allergen Reactions     Chocolate Anaphylaxis     headache     Feathers      Novocaine [Procaine]      Passes out almost immediately     [facility] Disturbance     Most berries

## 2020-03-04 NOTE — DISCHARGE INSTRUCTIONS
A referral has been sent to [facility] for home RN, Occupational and Physical Therapy. Their phone number is [phone].  They will call you to schedule.

## 2020-03-04 NOTE — PLAN OF CARE
Speech Language Therapy Discharge Summary    Reason for therapy discharge:    Discharged to home with home therapy.    Progress towards therapy goal(s). See goals on Care Plan in [facility] electronic health record for goal details.  Goals not met.  Barriers to achieving goals:   discharge from facility.    Therapy recommendation(s):    Continued therapy is recommended.  Rationale/Recommendations:  Continue ST needs for short term dysphagia tx for diet tolerance and swallow strategies.  Recommend a regular diet with thin liquids with safe swallow strategies: sit upright, double swallow to clear residue, alternate liquids and solids, slow rate, and small bites/sips. Will follow up 1-2x for a meal follow up and to teach strategies further.  If increased choking episodes and respiratory difficulty are observed in the future, a repeat video swallow study and/or GI consult may be indicated for further assessment of swallow function.

## 2020-03-04 NOTE — PROGRESS NOTES
[facility]    Medicine Progress Note - Hospitalist Service       Date of Admission:  3/2/2020  Assessment & Plan   Jean Pierre Roblero is a 85 year old male, with a history of recurrent syncope, who presents to the ED for evaluation of syncope with fever observed in the ED of 103.5 and CT scan showing bilateral pneumonia.    Community acquired pneumonia with sepsis. CT showed bilateral lower lobe and right middle lobe pneumonia. WBC and procalcitonin were negative.  Febrile to 103.5 and tachycardic in ED. Lactic acid negative.  -continue ceftriaxone and azithromycin, likely discharge tomorrow on ceftin and azithromycin if remains afebrile  -follow up blood cultures  -PT/OT/SW consults and recommending home with home PT/OT/speech    Acute metabolic encephalopathy with baseline dementia. Baseline mental status is knowing year and what is going on around him. Was not at baseline per family at admission.  -Continue home Namenda  -Continue supportive cares and treat underlying pneumonia and dehydration  -speech consult appreciated for diet recommendations    Syncope. Head CT negative.  Had recurrent syncope in the past and is sensitive.  Likely precipitated by pneumonia and dehydration.  -additional 1 liter fluid bolus and then IVF 0.9% NS at 125 ml/h    BPH.   -continue home finasteride    Pulmonary Nodule. CT chest showed incidental findings of 1 cm right upper lobe pulmonary nodule with biopsy or 3 month follow up recommended  -follow up with PCP    Ascending aortic aneurysm. Incidental finding of 4.2 cm ascending aortic aneurysm (increased in size as compared to 7/1/2011 exam where it measured 3.8 cm)   -follow up with PCP    Incidental finding in right internal jugular vein. Radiodensity within the caudal aspect of the right internal jugular vein could represent contrast mixing versus chronic calcified thrombus that could be further evaluated with ultrasound if clinically warranted.  -no symptoms so  suspect this is contrast mixing  -will defer to PCP if any additional follow up is needed     Diet: Regular Diet Adult    DVT Prophylaxis: Enoxaparin (Lovenox) SQ and Pneumatic Compression Devices  Puckett Catheter: not present  Code Status: Full Code      Disposition Plan   Expected discharge: Tomorrow, recommended to prior living arrangement with home therapies once antibiotic plan established, SIRS/Sepsis treated and afebrile.  Entered: William Ames MD 03/03/2020, 6:38 PM     The patient's care was discussed with the Bedside Nurse and Patient.    William Ames MD  Hospitalist Service  [facility]    ______________________________________________________________________    Interval History   No complaints. No fevers. Much more alert. Denies shortness of breath. Says he has trouble chewing and wants to see dentist. Hoping to go home tomorrow.    Data reviewed today: I reviewed all medications, new labs and imaging results over the last 24 hours. I personally reviewed no images or EKG's today.    Physical Exam   Vital Signs: Temp: 97  F (36.1  C) Temp src: Oral BP: (!) 142/81 Pulse: 88 Heart Rate: 88 Resp: 16 SpO2: 96 % O2 Device: None (Room air)    Weight: 142 lbs 3.15 oz  Constitutional: Awake, alert, cooperative, no apparent distress  Respiratory: Crackles right lung base, no wheezing  Cardiovascular: Regular rate and rhythm, normal S1 and S2, and no murmur noted  GI: Normal bowel sounds, soft, non-distended, non-tender  Skin/Integumen: No rashes, no cyanosis, 1+ pitting edema in ankles bilaterally  Other:    Data   Recent Labs   Lab 03/03/20  0851 03/02/20  0850 03/02/20  0547   WBC 6.6  --  6.5   HGB 13.2*  --  14.6     --  100   *  --  127*     --  137   POTASSIUM 3.6  --  4.2   CHLORIDE 108  --  106   CO2 28  --  28   BUN 17  --  15   CR 0.89  --  0.92   ANIONGAP 3  --  2*   KENYA 8.1*  --  8.5   GLC 95  --  128*   ALBUMIN  --   --  3.8   PROTTOTAL  --   --  6.8    BILITOTAL  --   --  0.8   ALKPHOS  --   --  98   ALT  --   --  22   AST  --   --  22   TROPI  --  <0.015 <0.015     No results found for this or any previous visit (from the past 24 hour(s)).  Medications       azithromycin  250 mg Intravenous Q24H     cefTRIAXone  2 g Intravenous Q24H     enoxaparin ANTICOAGULANT  40 mg Subcutaneous Q24H     famotidine  40 mg Oral At Bedtime     finasteride  2.5 mg Oral QPM     fluticasone  1 spray Both Nostrils Daily     magnesium gluconate  250 mg Oral QPM     memantine  10 mg Oral BID     sodium chloride (PF)  3 mL Intracatheter Q8H

## 2020-03-04 NOTE — CONSULTS
Care Transition Initial Assessment - RN        Met with: Patient and his Spouse Lianet MISHRA   Active Problems:    Community acquired pneumonia       Cognitive Status: awake, alert and oriented.        Contact information and PCP information verified: Yes  Lives With: spouse   Living Arrangements: house     Insurance concerns: No Insurance issues identified  ASSESSMENT  Patient currently receives the following services: NA         Identified issues/concerns regarding health management: Pt is discharging home today.  Went over the Pneumonia Action Plan with pt and his spouse.  The two of them like to keep fit and they do walk at their fitness club frequently.  Home care was ordered.  Offered to go through the Medicare.gov home care ratings, they declined and wanted to use [facility].  Referral was sent.  Follow up appointment was scheduled for pt on 3/10/20.  His chart was corrected with his current PCP, Dr Alaniz at [facility].  No other concerns or issues have been identified in discussion with the two of them.

## 2020-03-04 NOTE — PLAN OF CARE
Discharge    Patient discharged to home via car with his wife  Care plan note    DATE & TIME: 3/4/2020 AM shift  Cognitive Concerns/ Orientation : A/O x3, disoriented to situation or forgetful. Keeps asking about discharge.   BEHAVIOR & AGGRESSION TOOL COLOR: green, calm/cooperative.   ABNL VS/O2: VSS on RA  MOBILITY: SBA. Ambulated in the [place] with nursing.  PAIN MANAGMENT: denies  DIET:Regular diet   BOWEL/BLADDER: voiding per bathroom; reports one BM this morning.   ABNL LAB/BG: n/a  DRAIN/DEVICES: PIV SL  TELEMETRY RHYTHM: na  SKIN:pale/intact. R LE edema more than L LE edema, per pt that's  his baseline.   TESTS/PROCEDURES: PT/OT are following  D/C DAY/GOALS/PLACE: Today home with Home PT/OT/RN and continue PO abx  OTHER IMPORTANT INFO:          Listed belongings gathered and returned to patient. Yes  Care Plan and Patient education resolved: Yes  Prescriptions if needed, hard copies sent with patient  NA  Home and hospital acquired medications returned to patient: NA  Medication Bin checked and emptied on discharge Yes  Follow up appointment made for patient: Yes

## 2020-03-04 NOTE — PLAN OF CARE
"DATE & TIME: 3/3/2020 winsome  Cognitive Concerns/ Orientation : A/O x3, disoriented to situation/forgetful  BEHAVIOR & AGGRESSION TOOL COLOR: green, calm/cooperative.   ABNL VS/O2: VSS on RA, afibrile  MOBILITY: SBA/walker, ambulated in hallway #2 tonight  PAIN MANAGMENT: denies  DIET:Regular diet, good appetite  BOWEL/BLADDER: occ incontinent of urine, sm BM   ABNL LAB/BG: none today  DRAIN/DEVICES: PIV SL  TELEMETRY RHYTHM: na  SKIN:pale/intact, baldo LEs 1-2+ edema   TESTS/PROCEDURES: PT/OT  D/C DAY/GOALS/PLACE: tomorrow  OTHER IMPORTANT INFO: Pt feels \"a lot better\" today, IVF to SL, VSS on RA, afibrile, denies pain/dizziness, LS-diminished, infrequent dry cough, continues on Abxs, voiding w/o difficulties, discharge to home tomorrow.  "

## 2020-03-05 NOTE — PLAN OF CARE
PT:  Physical Therapy Discharge Summary    Reason for therapy discharge:    Discharged to home with home therapy.    Progress towards therapy goal(s). See goals on Care Plan in [facility] electronic health record for goal details.  Goals not met.  Barriers to achieving goals:   discharge from facility.    Therapy recommendation(s):    Continued therapy is recommended.  Rationale/Recommendations:  eval and treat with home PT.

## 2020-03-08 LAB
BACTERIA SPEC CULT: NO GROWTH
BACTERIA SPEC CULT: NO GROWTH
SPECIMEN SOURCE: NORMAL
SPECIMEN SOURCE: NORMAL

## 2021-04-06 ENCOUNTER — HOSPITAL ENCOUNTER (EMERGENCY)
Facility: CLINIC | Age: 86
Discharge: HOME | End: 2021-04-07
Attending: EMERGENCY MEDICINE | Admitting: EMERGENCY MEDICINE
Payer: MEDICARE

## 2021-04-06 ENCOUNTER — APPOINTMENT (OUTPATIENT)
Dept: CT IMAGING | Facility: CLINIC | Age: 86
End: 2021-04-06
Attending: EMERGENCY MEDICINE
Payer: MEDICARE

## 2021-04-06 VITALS
TEMPERATURE: 97.1 F | WEIGHT: 135 LBS | HEART RATE: 106 BPM | SYSTOLIC BLOOD PRESSURE: 167 MMHG | BODY MASS INDEX: 20.46 KG/M2 | RESPIRATION RATE: 18 BRPM | HEIGHT: 68 IN | OXYGEN SATURATION: 97 % | DIASTOLIC BLOOD PRESSURE: 105 MMHG

## 2021-04-06 DIAGNOSIS — S01.81XA FACIAL LACERATION, INITIAL ENCOUNTER: ICD-10-CM

## 2021-04-06 DIAGNOSIS — S01.81XA LACERATION OF FOREHEAD, INITIAL ENCOUNTER: ICD-10-CM

## 2021-04-06 PROCEDURE — 70450 CT HEAD/BRAIN W/O DYE: CPT | Mod: MG

## 2021-04-06 PROCEDURE — 99284 EMERGENCY DEPT VISIT MOD MDM: CPT | Mod: 25

## 2021-04-06 ASSESSMENT — MIFFLIN-ST. JEOR: SCORE: 1258.92

## 2021-04-06 NOTE — ED TRIAGE NOTES
"Patient was moving his garbage can and it \"took off on me.\" Patient fell and hit his face on the curb. NO LOC, denies blood thinners. Lacerations to forehead and nose.   "

## 2021-04-07 NOTE — DISCHARGE INSTRUCTIONS
Please return to the the ED if you notice increasing redness, warmth, swelling, drainage or fevers, this is concerning for infection.  Keep wound clean and dry, wash after the first 24 hours with warm soapy water or after the dressing has dissolved.  After dressing has dissolved, apply bacitracin to wounds.    Discharge Instructions  Laceration (Cut)    You were seen today for a laceration (cut).  Your provider examined your laceration for any problems such a buried foreign body (like glass, a splinter, or gravel), or injury to blood vessels, tendons, and nerves.  Your provider may have also rinsed and/or scrubbed your laceration to help prevent an infection. It may not be possible to find all problems with your laceration on the first visit; occasionally foreign bodies or a tendon injury can go undetected.    Your laceration may have been closed in one of several ways:  No closure: many wounds will heal just fine without closure.  Stitches: regular stitches that require removal.  Staples: skin staples are often used in the scalp/head.  Wound adhesive (glue): skin glue can be used for certain lacerations and doesn t require removal.  Wound strips (aka Butterfly bandages or steri-strips): these are bandages that help to close a wound.  Absorbable stitches:  dissolving  stitches that go away on their own and usually don t require removal.    A small percentage of wounds will develop an infection regardless of how well the wound is cared for. Antibiotics are generally not indicated to prevent an infection so are only given for a small number of high-risk wounds. Some lacerations are too high risk to close, and are left open to heal because closure can increase the likelihood that an infection will develop.    Remember that all lacerations, no matter how expertly repaired, will cause scarring. We consider many factors, techniques, and materials, in our efforts to provide the best possible cosmetic outcome.    Generally,  every Emergency Department visit should have a follow-up clinic visit with either a primary or a specialty clinic/provider. Please follow-up as instructed by your emergency provider today.     Return to the Emergency Department right away if:  You have more redness, swelling, pain, drainage (pus), a bad smell, or red streaking from your laceration as these symptoms could indicate an infection.  You have a fever of 100.4 F or more.  You have bleeding that you cannot stop at home. If your cut starts to bleed, hold pressure on the bleeding area with a clean cloth or put pressure over the bandage.  If the bleeding does not stop after using constant pressure for 30 minutes, you should return to the Emergency Department for further treatment.  An area past the laceration is cool, pale, or blue compared with the other side, or has a slower return of color when squeezed.  Your dressing seems too tight or starts to get uncomfortable or painful. For children, signs of a problem might be irritability or restlessness.  You have loss of normal function or use of an area, such as being unable to straighten or bend a finger normally.  You have a numb area past the laceration.    Return to the Emergency Department or see your regular provider if:  The laceration starts to come open.   You have something coming out of the cut or a feeling that there is something in the laceration.  Your wound will not heal, or keeps breaking open. There can always be glass, wood, dirt or other things in any wound.  They will not always show up, even on x-rays.  If a wound does not heal, this may be why, and it is important to follow-up with your regular provider.    Home Care:  Take your dressing off in 12-24 hours, or as instructed by your provider, to check your laceration. Remove the dressing sooner if it seems too tight or painful, or if it is getting numb, tingly, or pale past the dressing.  Gently wash your laceration 1-2 times daily with  clean water and mild soap. It is okay to shower or run clean water over the laceration, but do not let the laceration soak in water (no swimming).  If your laceration was closed with wound adhesive or strips: pat it dry and leave it open to the air. For all other repairs: after you wash your laceration, or at least 2 times a day, apply antibiotic ointment (such as Neosporin  or Bacitracin ) to the laceration, then cover it with a Band-Aid  or gauze.  Keep the laceration clean. Wear gloves or other protective clothing if you are around dirt.    Follow-up for removal:  If your wound was closed with staples or regular stitches, they need to be removed according to the instructions and timeline specified by your provider today.  If your wound was closed with absorbable ( dissolving ) sutures, they should fall out, dissolve, or not be visible in about one week. If they are still visible, then they should be removed according to the instructions and timeline specified by your provider today.    Scars:  To help minimize scarring:  Wear sunscreen over the healed laceration when out in the sun.  Massage the area regularly once healed.  You may apply Vitamin E to the healed wound.  Wait. Scars improve in appearance over months and years.    If you were given a prescription for medicine here today, be sure to read all of the information (including the package insert) that comes with your prescription.  This will include important information about the medicine, its side effects, and any warnings that you need to know about.  The pharmacist who fills the prescription can provide more information and answer questions you may have about the medicine.  If you have questions or concerns that the pharmacist cannot address, please call or return to the Emergency Department.       Remember that you can always come back to the Emergency Department if you are not able to see your regular provider in the amount of time listed above, if  you get any new symptoms, or if there is anything that worries you.    Discharge Instructions  Head Injury    You have been seen today for a head injury. Your evaluation included a history and physical examination. You may have had a CT (CAT) scan performed, though most head injuries do not require a scan. Based on this evaluation, your provider today does not feel that your head injury is serious.    Generally, every Emergency Department visit should have a follow-up clinic visit with either a primary or a specialty clinic/provider. Please follow-up as instructed by your emergency provider today.  Return to the Emergency Department if:  You are confused or you are not acting right.  Your headache gets worse or you start to have a really bad headache even with your recommended treatment plan.  You vomit (throw up) more than once.  You have a seizure.  You have trouble walking.  You have weakness or paralysis (cannot move) in an arm or a leg.  You have blood or fluid coming from your ears or nose.  You have new symptoms or anything that worries you.    Sleeping:  It is okay for you to sleep, but someone should wake you up if instructed by your provider, and someone should check on you at your usual time to wake up.     Activity:  Do not drive for at least 24 hours.  Do not drive if you have dizzy spells or trouble concentrating, or remembering things.  Do not return to any contact sports until cleared by your regular provider.     MORE INFORMATION:    Concussion:  A concussion is a minor head injury that may cause temporary problems with the way the brain works. Although concussions are important, they are generally not an emergency or a reason that a person needs to be hospitalized. Some concussion symptoms include confusion, amnesia (forgetful), nausea (sick to your stomach) and vomiting (throwing up), dizziness, fatigue, memory or concentration problems, irritability and sleep problems. For most people,  concussions are mild and temporary but some will have more severe and persistent symptoms that require on-going care and treatment.  CT Scans: Your evaluation today may have included a CT scan (CAT scan) to look for things like bleeding or a skull fracture (broken bone).  CT scans involve radiation and too many CT scans can cause serious health problems like cancer, especially in children.  Because of this, your provider may not have ordered a CT scan today if they think you are at low risk for a serious or life threatening problem.    If you were given a prescription for medicine here today, be sure to read all of the information (including the package insert) that comes with your prescription.  This will include important information about the medicine, its side effects, and any warnings that you need to know about.  The pharmacist who fills the prescription can provide more information and answer questions you may have about the medicine.  If you have questions or concerns that the pharmacist cannot address, please call or return to the Emergency Department.     Remember that you can always come back to the Emergency Department if you are not able to see your regular provider in the amount of time listed above, if you get any new symptoms, or if there is anything that worries you.

## 2021-04-07 NOTE — ED PROVIDER NOTES
History     Chief Complaint:    Facial Laceration      HPI   Jean Pierre Roblero is a 86 year old male who presents with fall.  Patient reports that he was wheeling the trashcan down to the curb when he lost his balance and fell into the curb.  He does not recall a loss of consciousness.  He does have ongoing balance issues and had difficulty getting up, he required assistance of the neighbors.  Wife reports that he was somewhat fuzzy for 10 minutes following the fall but now is at baseline.  He has not had any vomiting, nausea, headaches, neck pain or other concerns.  He is not on blood thinners.  He did not have any other injuries from the fall.  He denies any chest pain, shortness of breath, weakness, numbness or tingling.  Tetanus 2017.      Review of Systems   All other systems reviewed and are negative.        Allergies:    Chocolate  Feathers  Novocaine [Procaine]  Strawberry      Medications:      acetaminophen (TYLENOL) 325 MG tablet  butalbital-acetaminophen-caffeine (ESGIC) -40 MG tablet  cetirizine (ZYRTEC) 10 MG tablet  chlorpheniramine-pseudoePHEDrine (PSEUDO-GEST PLUS) 4-60 MG TABS per tablet  cholestyramine (QUESTRAN) 4 G packet  Diphenhydramine-Pseudoephed (ALLERGY DECONGESTANT PO)  famotidine (PEPCID) 40 MG tablet  finasteride (PROSCAR) 5 MG tablet  fluticasone (FLONASE) 50 MCG/ACT nasal spray  lactase (LACTAID) 3000 UNIT tablet  magnesium 250 MG tablet  memantine (NAMENDA) 10 MG tablet  Multiple Vitamin (MULTI-VITAMIN) per tablet  multivitamin  with lutein (OCUVITE WITH LTEIN) CAPS per capsule  Simethicone (PHAZYME) 180 MG CAPS  vitamin B-12 (CYANOCOBALAMIN) 1000 MCG tablet  vitamin C (ASCORBIC ACID) 500 MG tablet  vitamin D3 (CHOLECALCIFEROL) 2000 units (50 mcg) tablet  zinc sulfate (ZINCATE) 220 (50 Zn) MG capsule        Past Medical History:      Past Medical History:   Diagnosis Date     Allergic state      Fainting episodes      Gastroesophageal reflux disease      Headaches      Heartburn  "     Nocturia      Photosensitivity      Swallowing difficulty      Patient Active Problem List    Diagnosis Date Noted     Community acquired pneumonia 03/02/2020     Priority: Medium     Bilateral thigh pain 08/02/2018     Priority: Medium        Past Surgical History:      Past Surgical History:   Procedure Laterality Date     BACK SURGERY       CHOLECYSTECTOMY       ENDOSCOPIC SINUS SURGERY  8/20/2012    Procedure: ENDOSCOPIC SINUS SURGERY;  ENDOSCOPIC REMOVAL OF LEFT NASAL MASS WITH LANDMARKS (Fusion Tracking [place]), BILATERAL INFERIOR TURBINOPLASTY;  Surgeon: Dima Younger MD;  Location: [facility]     EYE SURGERY      cataracts     HERNIA REPAIR       ORTHOPEDIC SURGERY      ingrown toe nail removal     TURBINOPLASTY  8/20/2012    Procedure: TURBINOPLASTY;;  Surgeon: Dima Younger MD;  Location: [facility]       Family History:    No family history on file.    Social History:  Presents with wife    Physical Exam     Patient Vitals for the past 24 hrs:   BP Temp Pulse Resp SpO2 Height Weight   04/06/21 2305 -- -- -- -- 97 % -- --   04/06/21 2300 -- -- -- -- 98 % -- --   04/06/21 2215 -- -- -- -- 97 % -- --   04/06/21 2200 (!) 167/105 -- -- -- 97 % -- --   04/06/21 1857 (!) 214/112 97.1  F (36.2  C) 106 18 97 % 1.715 m (5' 7.5\") 61.2 kg (135 lb)       Physical Exam  General: Resting on the bed.  Head: No obvious trauma to head.  Ears, Nose, Throat:  External ears normal.  Nose with abrasion on the bridge, no septal hematoma, no obvious deformity.  Clear TMs.  Forehead hematoma and abrasion.    Eyes:  Conjunctivae clear.  Pupils are equal, round, and reactive.   Neck: Normal range of motion.  Neck supple. Nontender C-spine to palpation.  CV: Regular rate and rhythm.  No murmurs.      Respiratory: Effort normal and breath sounds normal.  No wheezing or crackles.   Gastrointestinal: Soft.  No distension. There is no tenderness.  There is no rigidity, no rebound and no guarding.   Musculoskeletal: Normal range of " motion.  Non tender extremities to palpations.  Non tender t/l spine.    Neuro: Alert. Moving all extremities appropriately.  Normal speech.  GCS 15.  CN II-XII grossly intact.  Gross muscle strength intact of the proximal and distal bilateral upper and lower extremities.  Sensation intact to light touch in all 4 extremities.    Skin: Skin is warm and dry.  No rash noted.       Emergency Department Course     Imaging:  CT Head w/o Contrast   Final Result   IMPRESSION:   1.  No acute intracranial process.        Emergency Department Course:    Reviewed:    I reviewed nursing notes, vitals and past history    Assessments:   I obtained history and examined the patient as noted above.    I rechecked the patient and explained findings to patient and family member .     Disposition:  The patient was discharged to home.    Impression & Plan      Medical Decision Makin-year-old male presents after a mechanical fall.  Vital signs mildly hypertensive but improved over the course of stay.  Broad differential was pursued including not limited to skull fracture, hematoma, intracranial hemorrhage, subdural, subarachnoid, laceration, etc.  Overall patient's well-appearing nontoxic.  He is alert and oriented.  This appears to be a mechanical fall.  No syncopal symptoms.  Remainder of head to toe exam is otherwise unremarkable.  Able to clinically clear the C-spine.  Appears to be no associated facial fractures.  Abrasion to the nose and to the forehead.  CT fortunately shows no evidence of acute intracranial pathology.  Abrasions were cleaned and dressing was applied.  On the bridge of his nose there was a avulsion, I placed Surgicel and was able to get good hemostasis.  Recommend home cares for this including bacitracin and covering with a dressing as needed.  We discussed possible closure with sutures versus glue but given this is more of avulsion it is more amenable to good wound cares as opposed to suture or glue.   Patient voiced understand this plan.  Patient and wife feel comfortable with plan to discharge home.  Return precautions were provided.  Close follow-up was advised.  Patient was discharged home.      Diagnosis:    ICD-10-CM    1. Facial laceration, initial encounter  S01.81XA    2. Laceration of forehead, initial encounter  S01.81XA        Discharge Medications:  Discharge Medication List as of 4/7/2021 12:03 AM             Marietta Aguilera MD  04/07/21 0551       Marietta Aguilera MD  04/07/21 0707

## 2022-05-26 ENCOUNTER — APPOINTMENT (OUTPATIENT)
Dept: SPEECH THERAPY | Facility: CLINIC | Age: 87
DRG: 069 | End: 2022-05-26
Attending: INTERNAL MEDICINE
Payer: MEDICARE

## 2022-05-26 ENCOUNTER — APPOINTMENT (OUTPATIENT)
Dept: MRI IMAGING | Facility: CLINIC | Age: 87
DRG: 069 | End: 2022-05-26
Payer: MEDICARE

## 2022-05-26 ENCOUNTER — APPOINTMENT (OUTPATIENT)
Dept: CT IMAGING | Facility: CLINIC | Age: 87
DRG: 069 | End: 2022-05-26
Attending: EMERGENCY MEDICINE
Payer: MEDICARE

## 2022-05-26 ENCOUNTER — HOSPITAL ENCOUNTER (INPATIENT)
Facility: CLINIC | Age: 87
LOS: 1 days | Discharge: HOME HEALTH | DRG: 069 | End: 2022-05-27
Attending: EMERGENCY MEDICINE | Admitting: INTERNAL MEDICINE
Payer: MEDICARE

## 2022-05-26 DIAGNOSIS — G45.9 TIA (TRANSIENT ISCHEMIC ATTACK): ICD-10-CM

## 2022-05-26 LAB
ANION GAP SERPL CALCULATED.3IONS-SCNC: 5 MMOL/L (ref 3–14)
APTT PPP: 28 SECONDS (ref 22–38)
ATRIAL RATE - MUSE: 87 BPM
BASOPHILS # BLD AUTO: 0 10E3/UL (ref 0–0.2)
BASOPHILS NFR BLD AUTO: 0 %
BUN SERPL-MCNC: 15 MG/DL (ref 7–30)
CALCIUM SERPL-MCNC: 8.6 MG/DL (ref 8.5–10.1)
CHLORIDE BLD-SCNC: 108 MMOL/L (ref 94–109)
CHOLEST SERPL-MCNC: 128 MG/DL
CO2 SERPL-SCNC: 26 MMOL/L (ref 20–32)
CREAT SERPL-MCNC: 1.05 MG/DL (ref 0.66–1.25)
DIASTOLIC BLOOD PRESSURE - MUSE: NORMAL MMHG
EOSINOPHIL # BLD AUTO: 0.1 10E3/UL (ref 0–0.7)
EOSINOPHIL NFR BLD AUTO: 1 %
ERYTHROCYTE [DISTWIDTH] IN BLOOD BY AUTOMATED COUNT: 12.8 % (ref 10–15)
GFR SERPL CREATININE-BSD FRML MDRD: 69 ML/MIN/1.73M2
GLUCOSE BLD-MCNC: 133 MG/DL (ref 70–99)
GLUCOSE BLDC GLUCOMTR-MCNC: 107 MG/DL (ref 70–99)
HBA1C MFR BLD: 5.4 % (ref 0–5.6)
HCT VFR BLD AUTO: 39.9 % (ref 40–53)
HDLC SERPL-MCNC: 45 MG/DL
HGB BLD-MCNC: 13.2 G/DL (ref 13.3–17.7)
HOLD SPECIMEN: NORMAL
HOLD SPECIMEN: NORMAL
IMM GRANULOCYTES # BLD: 0.1 10E3/UL
IMM GRANULOCYTES NFR BLD: 1 %
INR PPP: 1.05 (ref 0.85–1.15)
INTERPRETATION ECG - MUSE: NORMAL
LDLC SERPL CALC-MCNC: 64 MG/DL
LYMPHOCYTES # BLD AUTO: 3.2 10E3/UL (ref 0.8–5.3)
LYMPHOCYTES NFR BLD AUTO: 41 %
MAGNESIUM SERPL-MCNC: 2.6 MG/DL (ref 1.6–2.3)
MCH RBC QN AUTO: 32.8 PG (ref 26.5–33)
MCHC RBC AUTO-ENTMCNC: 33.1 G/DL (ref 31.5–36.5)
MCV RBC AUTO: 99 FL (ref 78–100)
MONOCYTES # BLD AUTO: 0.5 10E3/UL (ref 0–1.3)
MONOCYTES NFR BLD AUTO: 7 %
NEUTROPHILS # BLD AUTO: 3.9 10E3/UL (ref 1.6–8.3)
NEUTROPHILS NFR BLD AUTO: 50 %
NONHDLC SERPL-MCNC: 83 MG/DL
NRBC # BLD AUTO: 0 10E3/UL
NRBC BLD AUTO-RTO: 0 /100
P AXIS - MUSE: 81 DEGREES
PLATELET # BLD AUTO: 142 10E3/UL (ref 150–450)
POTASSIUM BLD-SCNC: 4.1 MMOL/L (ref 3.4–5.3)
PR INTERVAL - MUSE: 170 MS
QRS DURATION - MUSE: 82 MS
QT - MUSE: 380 MS
QTC - MUSE: 457 MS
R AXIS - MUSE: 51 DEGREES
RBC # BLD AUTO: 4.03 10E6/UL (ref 4.4–5.9)
SARS-COV-2 RNA RESP QL NAA+PROBE: NEGATIVE
SODIUM SERPL-SCNC: 139 MMOL/L (ref 133–144)
SYSTOLIC BLOOD PRESSURE - MUSE: NORMAL MMHG
T AXIS - MUSE: 79 DEGREES
TRIGL SERPL-MCNC: 96 MG/DL
TROPONIN I SERPL HS-MCNC: 12 NG/L
TROPONIN I SERPL HS-MCNC: 12 NG/L
VENTRICULAR RATE- MUSE: 87 BPM
WBC # BLD AUTO: 7.7 10E3/UL (ref 4–11)

## 2022-05-26 PROCEDURE — 120N000001 HC R&B MED SURG/OB

## 2022-05-26 PROCEDURE — G0378 HOSPITAL OBSERVATION PER HR: HCPCS

## 2022-05-26 PROCEDURE — 70496 CT ANGIOGRAPHY HEAD: CPT | Mod: ME

## 2022-05-26 PROCEDURE — 99285 EMERGENCY DEPT VISIT HI MDM: CPT | Mod: 25

## 2022-05-26 PROCEDURE — 85610 PROTHROMBIN TIME: CPT | Performed by: EMERGENCY MEDICINE

## 2022-05-26 PROCEDURE — 250N000013 HC RX MED GY IP 250 OP 250 PS 637: Performed by: INTERNAL MEDICINE

## 2022-05-26 PROCEDURE — 92610 EVALUATE SWALLOWING FUNCTION: CPT | Mod: GN

## 2022-05-26 PROCEDURE — 83735 ASSAY OF MAGNESIUM: CPT | Performed by: INTERNAL MEDICINE

## 2022-05-26 PROCEDURE — U0005 INFEC AGEN DETEC AMPLI PROBE: HCPCS | Performed by: EMERGENCY MEDICINE

## 2022-05-26 PROCEDURE — 82962 GLUCOSE BLOOD TEST: CPT

## 2022-05-26 PROCEDURE — 82310 ASSAY OF CALCIUM: CPT | Performed by: EMERGENCY MEDICINE

## 2022-05-26 PROCEDURE — 80061 LIPID PANEL: CPT | Performed by: INTERNAL MEDICINE

## 2022-05-26 PROCEDURE — 84484 ASSAY OF TROPONIN QUANT: CPT | Performed by: EMERGENCY MEDICINE

## 2022-05-26 PROCEDURE — 250N000011 HC RX IP 250 OP 636: Performed by: EMERGENCY MEDICINE

## 2022-05-26 PROCEDURE — 85730 THROMBOPLASTIN TIME PARTIAL: CPT | Performed by: EMERGENCY MEDICINE

## 2022-05-26 PROCEDURE — 99207 PR CDG-CODE CATEGORY CHANGED: CPT | Performed by: INTERNAL MEDICINE

## 2022-05-26 PROCEDURE — A9585 GADOBUTROL INJECTION: HCPCS | Performed by: EMERGENCY MEDICINE

## 2022-05-26 PROCEDURE — C9803 HOPD COVID-19 SPEC COLLECT: HCPCS

## 2022-05-26 PROCEDURE — 93005 ELECTROCARDIOGRAM TRACING: CPT

## 2022-05-26 PROCEDURE — 250N000009 HC RX 250: Performed by: EMERGENCY MEDICINE

## 2022-05-26 PROCEDURE — 85025 COMPLETE CBC W/AUTO DIFF WBC: CPT | Performed by: EMERGENCY MEDICINE

## 2022-05-26 PROCEDURE — 84484 ASSAY OF TROPONIN QUANT: CPT | Performed by: INTERNAL MEDICINE

## 2022-05-26 PROCEDURE — 36415 COLL VENOUS BLD VENIPUNCTURE: CPT | Performed by: INTERNAL MEDICINE

## 2022-05-26 PROCEDURE — 70553 MRI BRAIN STEM W/O & W/DYE: CPT | Mod: ME

## 2022-05-26 PROCEDURE — 255N000002 HC RX 255 OP 636: Performed by: EMERGENCY MEDICINE

## 2022-05-26 PROCEDURE — 83036 HEMOGLOBIN GLYCOSYLATED A1C: CPT | Performed by: INTERNAL MEDICINE

## 2022-05-26 PROCEDURE — G1004 CDSM NDSC: HCPCS

## 2022-05-26 PROCEDURE — 99220 PR INITIAL OBSERVATION CARE,LEVEL III: CPT | Performed by: INTERNAL MEDICINE

## 2022-05-26 PROCEDURE — 36415 COLL VENOUS BLD VENIPUNCTURE: CPT | Performed by: EMERGENCY MEDICINE

## 2022-05-26 PROCEDURE — 250N000013 HC RX MED GY IP 250 OP 250 PS 637: Performed by: NURSE PRACTITIONER

## 2022-05-26 RX ORDER — ASPIRIN 81 MG/1
81 TABLET ORAL DAILY
Status: DISCONTINUED | OUTPATIENT
Start: 2022-05-27 | End: 2022-05-26

## 2022-05-26 RX ORDER — FLUTICASONE PROPIONATE 50 MCG
1 SPRAY, SUSPENSION (ML) NASAL DAILY PRN
Status: DISCONTINUED | OUTPATIENT
Start: 2022-05-26 | End: 2022-05-27 | Stop reason: HOSPADM

## 2022-05-26 RX ORDER — ONDANSETRON 2 MG/ML
4 INJECTION INTRAMUSCULAR; INTRAVENOUS EVERY 6 HOURS PRN
Status: DISCONTINUED | OUTPATIENT
Start: 2022-05-26 | End: 2022-05-26

## 2022-05-26 RX ORDER — POLYETHYLENE GLYCOL 3350 17 G/17G
17 POWDER, FOR SOLUTION ORAL DAILY PRN
Status: DISCONTINUED | OUTPATIENT
Start: 2022-05-26 | End: 2022-05-27 | Stop reason: HOSPADM

## 2022-05-26 RX ORDER — ASPIRIN 300 MG/1
300 SUPPOSITORY RECTAL DAILY
Status: CANCELLED | OUTPATIENT
Start: 2022-05-26

## 2022-05-26 RX ORDER — IOPAMIDOL 755 MG/ML
75 INJECTION, SOLUTION INTRAVASCULAR ONCE
Status: COMPLETED | OUTPATIENT
Start: 2022-05-26 | End: 2022-05-26

## 2022-05-26 RX ORDER — LIDOCAINE 40 MG/G
CREAM TOPICAL
Status: DISCONTINUED | OUTPATIENT
Start: 2022-05-26 | End: 2022-05-27 | Stop reason: HOSPADM

## 2022-05-26 RX ORDER — FINASTERIDE 5 MG/1
5 TABLET, FILM COATED ORAL DAILY
Status: DISCONTINUED | OUTPATIENT
Start: 2022-05-27 | End: 2022-05-27 | Stop reason: HOSPADM

## 2022-05-26 RX ORDER — ONDANSETRON 2 MG/ML
4 INJECTION INTRAMUSCULAR; INTRAVENOUS EVERY 6 HOURS PRN
Status: DISCONTINUED | OUTPATIENT
Start: 2022-05-26 | End: 2022-05-27 | Stop reason: HOSPADM

## 2022-05-26 RX ORDER — ASPIRIN 81 MG/1
81 TABLET ORAL DAILY
Status: DISCONTINUED | OUTPATIENT
Start: 2022-05-27 | End: 2022-05-27 | Stop reason: HOSPADM

## 2022-05-26 RX ORDER — ATORVASTATIN CALCIUM 40 MG/1
40 TABLET, FILM COATED ORAL EVERY EVENING
Status: DISCONTINUED | OUTPATIENT
Start: 2022-05-26 | End: 2022-05-27

## 2022-05-26 RX ORDER — ACETAMINOPHEN 325 MG/1
650 TABLET ORAL EVERY 4 HOURS PRN
Status: DISCONTINUED | OUTPATIENT
Start: 2022-05-26 | End: 2022-05-27 | Stop reason: HOSPADM

## 2022-05-26 RX ORDER — ONDANSETRON 4 MG/1
4 TABLET, ORALLY DISINTEGRATING ORAL EVERY 6 HOURS PRN
Status: DISCONTINUED | OUTPATIENT
Start: 2022-05-26 | End: 2022-05-27 | Stop reason: HOSPADM

## 2022-05-26 RX ORDER — ASPIRIN 81 MG/1
81 TABLET, CHEWABLE ORAL DAILY
Status: DISCONTINUED | OUTPATIENT
Start: 2022-05-27 | End: 2022-05-27 | Stop reason: HOSPADM

## 2022-05-26 RX ORDER — FAMOTIDINE 20 MG/1
40 TABLET, FILM COATED ORAL DAILY
Status: DISCONTINUED | OUTPATIENT
Start: 2022-05-27 | End: 2022-05-27 | Stop reason: HOSPADM

## 2022-05-26 RX ORDER — GADOBUTROL 604.72 MG/ML
6 INJECTION INTRAVENOUS ONCE
Status: COMPLETED | OUTPATIENT
Start: 2022-05-26 | End: 2022-05-26

## 2022-05-26 RX ORDER — ASPIRIN 81 MG/1
324 TABLET, CHEWABLE ORAL DAILY
Status: CANCELLED | OUTPATIENT
Start: 2022-05-26

## 2022-05-26 RX ORDER — ONDANSETRON 4 MG/1
4 TABLET, ORALLY DISINTEGRATING ORAL EVERY 6 HOURS PRN
Status: DISCONTINUED | OUTPATIENT
Start: 2022-05-26 | End: 2022-05-26

## 2022-05-26 RX ORDER — MEMANTINE HYDROCHLORIDE 10 MG/1
10 TABLET ORAL 2 TIMES DAILY
Status: DISCONTINUED | OUTPATIENT
Start: 2022-05-26 | End: 2022-05-27 | Stop reason: HOSPADM

## 2022-05-26 RX ADMIN — GADOBUTROL 6 ML: 604.72 INJECTION INTRAVENOUS at 10:17

## 2022-05-26 RX ADMIN — MEMANTINE 10 MG: 10 TABLET ORAL at 21:39

## 2022-05-26 RX ADMIN — SODIUM CHLORIDE 90 ML: 900 INJECTION INTRAVENOUS at 06:44

## 2022-05-26 RX ADMIN — IOPAMIDOL 75 ML: 755 INJECTION, SOLUTION INTRAVENOUS at 06:43

## 2022-05-26 RX ADMIN — ATORVASTATIN CALCIUM 40 MG: 40 TABLET, FILM COATED ORAL at 19:50

## 2022-05-26 ASSESSMENT — ENCOUNTER SYMPTOMS
FACIAL ASYMMETRY: 1
CONFUSION: 1
HEADACHES: 1
WEAKNESS: 1

## 2022-05-26 ASSESSMENT — ACTIVITIES OF DAILY LIVING (ADL)
ADLS_ACUITY_SCORE: 35
ADLS_ACUITY_SCORE: 35

## 2022-05-26 NOTE — CONSULTS
"          Stroke Consult Note    Reason for Consult: Stroke Code     Chief Complaint: Altered Mental Status      HPI  Jean Pierre Roblero is a 87 year old male w hx dementia headaches, here w L weakness.    0330 went to bathroom, wife heard a fall, found on knees trying to stand, seemed ok when he got up, LKN 0400 was reading newspaper in chair, fell asleep which is not unusual. 0600 was found in chair unresponsive. Paramedics called. BP 60/40 with pulse 40. Incontinent of urine. Got 0.5ml fluid bolus.     In ED responsive but new L neglect, L facial droop, LLE weakness.     Notably patient had MRI brain yesterday for headaches, we do not have records.  Imaging Findings  CTH no bleed or large infarct    HEAD CTA:   No intracranial arterial large vessel occlusion.  Right precavernous ICA mild stenosis. Right distal carotid siphon moderate stenosis.  Left distal carotid siphon severe stenosis.  Left proximal V4 segment mild stenosis.     NECK CTA:  1.  No significant stenosis, occlusion, dissection    Intravenous Thrombolysis  Not given due to:   - minor/isolated/quickly resolving symptoms    Endovascular Treatment  Not initiated due to absence of proximal vessel occlusion    Impression     86yo man here with L sided weakness and neglect, concern for RMCA infarct.    Differential includes new thrombotic event cardioembolic vs large vessel vs hypoperfusion from hypotension combined with intracranial athero vs a seizure given being found with incontinence and recent headaches (need to ro brain tumor).    Patient exam rapidly improved in ED with significant improvement of L neglects, resolution of LLE drift, patient able to walk, mild L facial droop. Discussed TNK with wife and daughter Fabienne and agreed to hold off given rapid improvement. No target for IR. Needs admission for stabilization and workup.    Recommendations  - Use orderset: \"Ischemic Stroke Routine Admission\" or \"Ischemic " "Stroke No Thrombolytics/No Thrombectomy ICU Admission\"  - Neurochecks and Vital Signs every q4h   - Permissive HTN; goal SBP < 220 mmHg  - Daily aspirin 81 mg for secondary stroke prevention  - Statin: Atorvastatin 40mg qhs  - MRI Brain with and without contrast  - TTE (with Bubble Study if age 60 yrs or less)  - Telemetry, EKG  - Bedside Glucose Monitoring  - A1c, Lipid Panel, Troponin x 3  - PT/OT/SLP  - Stroke Education  - Euthermia, Euglycemia    Patient Follow-up    - final recommendation pending work-up    Thank you for this consult. We will continue to follow.     The Stroke Staff is Dr. Eisenberg.    Gee Ramirez MD  Vascular Neurology Fellow  To page me or covering stroke neurology team member, click here: [facility]   Choose \"On Call\" tab at top, then search dropdown box for \"Neurology Adult\", select location, press Enter, then look for stroke/neuro ICU/telestroke.    ______________________________________________________    Clinically Significant Risk Factors Present on Admission                        Past Medical History   Past Medical History:   Diagnosis Date     Allergic state      Fainting episodes     FREQUENT/ EASILY     Gastroesophageal reflux disease      Headaches      Heartburn      Nocturia      Photosensitivity      Swallowing difficulty      Past Surgical History   Past Surgical History:   Procedure Laterality Date     BACK SURGERY       CHOLECYSTECTOMY       ENDOSCOPIC SINUS SURGERY  8/20/2012    Procedure: ENDOSCOPIC SINUS SURGERY;  ENDOSCOPIC REMOVAL OF LEFT NASAL MASS WITH LANDMARKS (Fusion Tracking Sawyer), BILATERAL INFERIOR TURBINOPLASTY;  Surgeon: Dima Younger MD;  Location: [facility]     EYE SURGERY      cataracts     HERNIA REPAIR       ORTHOPEDIC SURGERY      ingrown toe nail removal     TURBINOPLASTY  8/20/2012    Procedure: TURBINOPLASTY;;  Surgeon: Dima Younger MD;  Location: [facility]     Medications   Home Meds  Prior to Admission medications    Medication Sig Start Date End Date " Taking? Authorizing Provider   acetaminophen (TYLENOL) 325 MG tablet Take 2 tablets (650 mg) by mouth every 4 hours as needed for mild pain 3/4/20   William Ames MD   butalbital-acetaminophen-caffeine (ESGIC) -40 MG tablet Take 1 tablet by mouth every 4 hours as needed for headaches    Unknown, Entered By History   cetirizine (ZYRTEC) 10 MG tablet Take 10 mg by mouth daily as needed for allergies    Reported, Patient   chlorpheniramine-pseudoePHEDrine (PSEUDO-GEST PLUS) 4-60 MG TABS per tablet Take 1 tablet by mouth every evening    Unknown, Entered By History   cholestyramine (QUESTRAN) 4 G packet Take 1 packet by mouth 2 times daily (with meals).      Reported, Patient   Diphenhydramine-Pseudoephed (ALLERGY DECONGESTANT PO) Take 1 tablet by mouth every evening Chlorpheniramine    Unknown, Entered By History   famotidine (PEPCID) 40 MG tablet Take 40 mg by mouth At Bedtime    Unknown, Entered By History   finasteride (PROSCAR) 5 MG tablet Take 2.5 mg by mouth every evening    Unknown, Entered By History   fluticasone (FLONASE) 50 MCG/ACT nasal spray Spray 1 spray into both nostrils daily    Reported, Patient   lactase (LACTAID) 3000 UNIT tablet Take 3,000 Units by mouth as needed (before eating dairy foods)    Unknown, Entered By History   magnesium 250 MG tablet Take 1 tablet by mouth every evening    Unknown, Entered By History   memantine (NAMENDA) 10 MG tablet Take 10 mg by mouth 2 times daily    Unknown, Entered By History   Multiple Vitamin (MULTI-VITAMIN) per tablet Take 1 tablet by mouth daily.      Reported, Patient   multivitamin  with lutein (OCUVITE WITH LTEIN) CAPS per capsule Take 1 capsule by mouth daily    Unknown, Entered By History   Simethicone (PHAZYME) 180 MG CAPS Take 180 mg by mouth 3 times daily as needed     Reported, Patient   vitamin B-12 (CYANOCOBALAMIN) 1000 MCG tablet Take 1,000 mcg by mouth daily Noon    Unknown, Entered By History   vitamin C (ASCORBIC ACID) 500 MG tablet  Take 500 mg by mouth daily    Unknown, Entered By History   vitamin D3 (CHOLECALCIFEROL) 2000 units (50 mcg) tablet Take 4,000 Units by mouth daily Noon    Unknown, Entered By History   zinc sulfate (ZINCATE) 220 (50 Zn) MG capsule Take 220 mg by mouth daily noon    Unknown, Entered By History       Scheduled Meds      Infusion Meds      PRN Meds      Allergies   Allergies   Allergen Reactions     Chocolate Anaphylaxis     headache     Feathers      Novocaine [Procaine]      Passes out almost immediately     [facility] Disturbance     Most berries     Family History   No family history on file.  Social History   Social History     Tobacco Use     Smoking status: Never Smoker     Smokeless tobacco: Never Used   Substance Use Topics     Alcohol use: No     Drug use: No       Review of Systems   Unable due to dementia       PHYSICAL EXAMINATION  Temp:  [98.1  F (36.7  C)] 98.1  F (36.7  C)  Pulse:  [68-75] 75  Resp:  [16] 16  BP: (125-140)/(71-91) 140/91  SpO2:  [96 %] 96 %     Neuro Exam  Mental Status:  alert, oriented x 1, follows commands, mild slurring speech, naming and repetition normal  Cranial Nerves: L field cut, R gaze able to overcome, facial sensation intact and symmetric (tested by nurse), mild L facial droop, hearing not formally tested but intact to conversation  Motor:  LLE drift resolved on follow-up exam, otherwise itnact  Reflexes:  unable to test (telestroke)  Sensory:  light touch sensation intact and symmetric throughout upper and lower extremities (assessed by nurse), no extinction on double simultaneous stimulation (assessed by nurse)  Coordination:  normal finger-to-nose and heel-to-shin bilaterally without dysmetria, rapid alternating movements symmetric  Station/Gait:  shuffling gait, baseline per wife    Dysphagia Screen  Per Nursing    Stroke Scales    NIHSS  1a. Level of Consciousness 0-->Alert, keenly responsive   1b. LOC Questions 1-->Answers one question correctly   1c. LOC Commands  0-->Performs both tasks correctly   2.   Best Gaze 1-->Partial gaze palsy, gaze is abnormal in one or both eyes, but forced deviation or total gaze paresis is not present   3.   Visual 1-->Partial hemianopia   4.   Facial Palsy 1-->Minor paralysis (flattened nasolabial fold, asymmetry on smiling)   5a. Motor Arm, Left 0-->No drift, limb holds 90 (or 45) degrees for full 10 secs   5b. Motor Arm, Right 0-->No drift, limb holds 90 (or 45) degrees for full 10 secs   6a. Motor Leg, Left 1-->Drift, leg falls by the end of the 5-sec period but does not hit bed   6b. Motor Leg, right 0-->No drift, leg holds 30 degree position for full 5 secs   7.   Limb Ataxia 0-->Absent   8.   Sensory 0-->Normal, no sensory loss   9.   Best Language 0-->No aphasia, normal   10. Dysarthria 1-->Mild-to-moderate dysarthria, patient slurs at least some words and, at worst, can be understood with some difficulty   11. Extinction and Inattention  1-->Visual, tactile, auditory, spatial, or personal inattention or extinction to bilateral simultaneous stimulation in one of the sensory modalities   Total 7 (05/26/22 0739)       Imaging  I personally reviewed all imaging; relevant findings per HPI.     Lab Results Data   CBC  Recent Labs   Lab 05/26/22  0640   WBC 7.7   RBC 4.03*   HGB 13.2*   HCT 39.9*   *     Basic Metabolic Panel    Recent Labs   Lab 05/26/22  0640      POTASSIUM 4.1   CHLORIDE 108   CO2 26   BUN 15   CR 1.05   *   KENYA 8.6     Liver Panel  No results for input(s): PROTTOTAL, ALBUMIN, BILITOTAL, ALKPHOS, AST, ALT, BILIDIRECT in the last 168 hours.  INR    Recent Labs   Lab Test 05/26/22  0640   INR 1.05      Lipid Profile  No lab results found.  A1C  No lab results found.  Troponin I    Recent Labs   Lab 05/26/22  0640   TROPONINIS 12          Stroke Code Data Data   Stroke Code Data  (for stroke code with tele)  Stroke code activated 05/26/22   0601   First stroke provider response 05/26/22   0698   Video start  time 05/26/22   0700   Video end time 05/26/22   0735   Last known normal 05/26/22   0400   Time of discovery  (or onset of symptoms)  05/26/22   0600   Head CT read by Stroke Neuro Dr/Provider 05/26/22   0658   Was stroke code de-escalated? Yes 05/26/22 0736           Telestroke Service Details  Type of service telemedicine diagnostic assessment of acute neurological changes   Reason telemedicine is appropriate patient requires assessment with a specialist for diagnosis and treatment of neurological symptoms   Mode of transmission secure interactive audio and video communication per [place]   Originating site (patient location) [facility]    Distant site (provider location) Provider remote site

## 2022-05-26 NOTE — ED NOTES
Tele neuro at this time. Pt. Up to side of bed, walked 10 feet, slow with 2 assist. Pt. Able to ask for urinal. Pt. Looks to left at writer and states his name and denies pain. Pt. Able to lift left leg for 5 seconds. Wife in room.

## 2022-05-26 NOTE — PROGRESS NOTES
"   05/26/22 1312   General Information   Onset of Illness/Injury or Date of Surgery 05/25/22   Referring Physician Zeny Brown MD   Patient/Family Therapy Goal Statement (SLP) go home today if possible   Pertinent History of Current Problem Per H&P, \"Jean Pierre Roblero is a 87 year old male with hx of mild dementia and recurrent syncope who was admitted on 5/26/2022 for possible TIA/stroke...Patient's wife believes he has returned to his baseline mental status. He offers no complaints - he denies focal weakness, numbness/tingling; cp/sob, dizziness/lightheadedness; fevers/chills or recent illnesses; he believes he has been eating and drinking well.\" Per MRI 5/26/22: \"Faint increased diffusion signal within the right frontal white matter without ADC correlate, likely representing recent white matter ischemic change. No other evidence for recent infarction.\"   General Observations Pt awake/alert, eating lunch. Wife present.   Past History of Dysphagia Pt seen for past VFSS in 5/2012 10/2013, and 12/2016 secondary to c/o coughing episodes with PO. Most recent recommendations were for DD3 diet (consistent with soft and bite sized diet -- 6) with thin liquid. Pt seen for clinical swallow evaluation in 3/2020 with recommendation for regular diet with thin liquid and swallow precautions.   Pain Assessment   Patient Currently in Pain No   Type of Evaluation   Type of Evaluation Swallow Evaluation   Oral Motor   Oral Musculature generally intact   Dentition (Oral Motor)   Dentition (Oral Motor) natural dentition;some missing teeth  (partial not present)   Cough/Swallow/Gag Reflex (Oral Motor)   Volitional Throat Clear/Cough (Oral Motor) reduced strength   Vocal Quality/Secretion Management (Oral Motor)   Vocal Quality (Oral Motor) hoarse  (mild)   Secretion Management (Oral Motor) WNL   General Swallowing Observations   Current Diet/Method of Nutritional Intake (General Swallowing Observations, NIS) regular diet;thin " liquids (level 0)   Swallowing Evaluation Clinical swallow evaluation   Clinical Swallow Evaluation   Clinical Swallow Evaluation Textures Trialed thin liquids;pureed;solid foods   Clinical Swallow Eval: Thin Liquid Texture Trial   Mode of Presentation, Thin Liquids cup;self-fed   Oral Phase of Swallow WFL   Pharyngeal Phase of Swallow intact   Clinical Swallow Evaluation: Puree Solid Texture Trial   Mode of Presentation, Puree spoon;self-fed   Oral Phase, Puree WFL   Pharyngeal Phase, Puree intact   Clinical Swallow Evaluation: Solid Food Texture Trial   Mode of Presentation self-fed   Oral Phase WFL   Pharyngeal Phase intact   Swallowing Recommendations   Diet Consistency Recommendations regular diet;thin liquids (level 0)   Supervision Level for Intake distant supervision needed   Mode of Delivery Recommendations bolus size, small;slow rate of intake   Swallowing Maneuver Recommendations alternate food and liquid intake   Recommended Feeding/Eating Techniques (Swallow Eval) maintain upright posture during/after eating for 30 minutes   Medication Administration Recommendations, Swallowing (SLP) whole with liquid or in puree   Instrumental Assessment Recommendations instrumental evaluation not recommended at this time   Clinical Impression   Criteria for Skilled Therapeutic Interventions Met (SLP Eval) Evaluation only   SLP Diagnosis clinically functional swallow   Clinical Impression Comments Pt currently presents with clinically functional swallow. + adequate acceptance/containment. Bolus formation/propulsion and lingual coordination appeared grossly adequate. Noted slightly reduced/prolonged mastication with increased oral transit time and complete clearance. Suspect grossly timely swallow with fair/adequate hyolaryngeal elevation. No cough, throat clear, wet vocal quality, eye watering, or increased WOB.   SLP Discharge Planning   SLP Discharge Recommendation home;home with assist   SLP Rationale for DC Rec Pt  appears to be at baseline re: swallow function; no further services warranted. Pt/wife deny other deficits, may pursue speech-language/cognitive evaluation at next level of care as indicated.   SLP Brief overview of current status  sit upright, double swallow to clear residue, alternate liquids and solids, slow rate, and small bites/sips    Total Evaluation Time   Total Evaluation Time (Minutes) 9

## 2022-05-26 NOTE — ED TRIAGE NOTES
Pt biba from home - wife heard pt fall in bathroom around 0330 - pt then proceeded to walk into his office and sit on chair while wife went back to sleep. Wife found pt slumped over in chair around 0600 unresponsive. Upon ems arrival, pt had pulse in the 40s and hypotensive 60/40 - improved after being laid down and fluid administration. Upon arrival in ed, pt alert, but confused, not following directions. Left sided neglect and left facial droop noted

## 2022-05-26 NOTE — ED NOTES
Bed: ST03  Expected date:   Expected time:   Means of arrival:   Comments:  [id number] 87m unresponsive.

## 2022-05-26 NOTE — ED NOTES
[facility]  ED Nurse Handoff Report    ED Chief complaint: Altered Mental Status      ED Diagnosis:   Final diagnoses:   TIA (transient ischemic attack)       Code Status: Full Code    Allergies:   Allergies   Allergen Reactions     Chocolate Anaphylaxis     headache     Feathers      Novocaine [Procaine]      Passes out almost immediately     [place] [facility] Disturbance     Most berries       Patient Story: Pt. Lives at home with wife, fell in bathroom at 330, got up and walked to office, sat in office chair and wife found pt sitting in chair not responding. EMS brings pt in with left sided deficit and neglect. Pt. Worked up as stroke in ED.  Focused Assessment:  A/Ox4, can make needs known. Pt. Able to walk with slow gait assist of 2. Left sided weakness has improved along with facial droop. Stroke code de escalated. Wife in room.    Treatments and/or interventions provided: IV, labs, CT, MRI pending  Patient's response to treatments and/or interventions: Responded well    To be done/followed up on inpatient unit:  Monitor    Does this patient have any cognitive concerns?: None    Activity level - Baseline/Home:  Cane  Activity Level - Current:   Stand with assist x2    Patient's Preferred language: English   Needed?: No    Isolation: None  Infection: Not Applicable  Patient tested for COVID 19 prior to admission: YES  Bariatric?: No    Vital Signs:   Vitals:    05/26/22 0700 05/26/22 0715 05/26/22 0730 05/26/22 0745   BP: (!) 145/84 (!) 160/97 (!) 143/82 (!) 164/91   Pulse: 85 102 97 99   Resp: 13 10 17 20   Temp:       TempSrc:       SpO2: 96%      Weight:           Cardiac Rhythm:     Was the PSS-3 completed:   Yes  What interventions are required if any?  None             Family Comments: Wife in room  OBS brochure/video discussed/provided to patient/family: N/A              Name of person given brochure if not patient: NA              Relationship to patient: NA    For the majority  of the shift this patient's behavior was Green.   Behavioral interventions performed were None.    ED NURSE PHONE NUMBER: [phone]

## 2022-05-26 NOTE — H&P
[facility]    History and Physical - Hospitalist Service       Date of Admission:  5/26/2022    Assessment & Plan   Jean Pierre Roblero is a 87 year old male with hx of mild dementia and recurrent syncope who was admitted on 5/26/2022 for possible TIA/stroke    Possible TIA/stroke  * Presented to the ED after patient was found unresponsive by his wife. In the field, he was noted to be bradycardic to 40s and hypotensive to 60s systolic; left facial droop and left hemianopsia noted on arrival to the ED. Code Stroke activated in the ED  * Head CT was negative for stroke; CTA head/neck showed right precavernous ICA mild stenosis and right distal carotid siphon moderate stenosis; left distal carotid siphon severe stenosis and left proximal V4 segment mild stenosis  * Symptoms resolved in the ED; TNKase deferred in consultation with Stroke Neuro  - Brain MRI, TTE ordered  - Lipid panel, A1c, troponin ordered  - He has been empirically initiated on ASA 81 mg daily and atorvastatin 40 mg qhs   - PT/OT/SLP   - Stroke Neuro following    GERD  - Resume home famotidine once med rec complete    BPH  - Resume home finasteride once med rec complete    Mild dementia without behavioral disturbance  - Currently oriented x4  - Resume home memantine once med rec complete  - Delirium precautions in place    Diet: Regular diet once passes swallow eval  DVT Prophylaxis: Pneumatic Compression Devices  Puckett Catheter: Not present  Code Status:  FULL CODE       Disposition: Expected discharge once stroke work-up complete and he has been cleared by Stroke Neuro, PT/OT/SLP, possibly tomorrow    Zeny Brown MD  [facility]  Contact information available via [facility] Paging/Directory      ______________________________________________________________________    Chief Complaint   Altered mental status    History is obtained from the patient, his wife, discussion with ED staff, and review of  medical records    History of Present Illness   Jean Pierre Roblero is a 87 year old male with hx of mild dementia and recurrent syncope who presents following an episode of unresponsiveness. The patient seemed to be at his usual state of health until earlier this morning - he went to the bathroom, and fell while trying to back up from the toilet. He denies symptoms preceding his fall and denies hitting his head. His wife found him on the floor in the bathroom, helped him up, and he went to sit down in their home office where he fell asleep in a chair, which is typical. 2 hours later (around 0600), his wife went to check on him, and was unable to rouse him which prompted her to activate EMS    In the field, he was noted to be bradycardic with HR 40s and hypotensive to 60s systolic. En route to the ED, he was given a bolus of IV fluid with improvement in blood pressure and mental status, but on arrival to the ED, he was noted to have a left facial droop and left gaze neglect. Code stroke was activated - head CT was negative for stroke; CTA head/neck showed right precavernous ICA mild stenosis and right distal carotid siphon moderate stenosis; left distal carotid siphon severe stenosis and left proximal V4 segment mild stenosis. While in the ED, his symptoms resolved, and in consultation with Stroke Neuro, TNKase was deferred.    Patient's wife believes he has returned to his baseline mental status. He offers no complaints - he denies focal weakness, numbness/tingling; cp/sob, dizziness/lightheadedness; fevers/chills or recent illnesses; he believes he has been eating and drinking well.    Review of Systems    10 point Review of Systems was reviewed and pertinent positives and negatives are noted in the HPI    Past Medical History    I have reviewed this patient's medical history and updated it with pertinent information if needed.   Past Medical History:   Diagnosis Date     Allergic state      Fainting episodes      FREQUENT/ EASILY     Gastroesophageal reflux disease      Headaches      Heartburn      Nocturia      Photosensitivity      Swallowing difficulty        Past Surgical History   I have reviewed this patient's surgical history and updated it with pertinent information if needed.  Past Surgical History:   Procedure Laterality Date     BACK SURGERY       CHOLECYSTECTOMY       ENDOSCOPIC SINUS SURGERY  8/20/2012    Procedure: ENDOSCOPIC SINUS SURGERY;  ENDOSCOPIC REMOVAL OF LEFT NASAL MASS WITH LANDMARKS (Fusion Tracking [place]), BILATERAL INFERIOR TURBINOPLASTY;  Surgeon: Dima Younger MD;  Location: [facility]     EYE SURGERY      cataracts     HERNIA REPAIR       ORTHOPEDIC SURGERY      ingrown toe nail removal     TURBINOPLASTY  8/20/2012    Procedure: TURBINOPLASTY;;  Surgeon: Dima Younger MD;  Location: [facility]       Social History   He denies history of smoking, alcohol, or illicit drug use. He lives at home with his wife who assists with cares. He ambulates with a cane and walker    Family History    Family history was reviewed and non-contributory    Prior to Admission Medications   Prior to Admission Medications   Prescriptions Last Dose Informant Patient Reported? Taking?   Diphenhydramine-Pseudoephed (ALLERGY DECONGESTANT PO)  Spouse/Significant Other Yes No   Sig: Take 1 tablet by mouth every evening Chlorpheniramine   Multiple Vitamin (MULTI-VITAMIN) per tablet  Spouse/Significant Other Yes No   Sig: Take 1 tablet by mouth daily.     Simethicone (PHAZYME) 180 MG CAPS  Spouse/Significant Other Yes No   Sig: Take 180 mg by mouth 3 times daily as needed    acetaminophen (TYLENOL) 325 MG tablet   No No   Sig: Take 2 tablets (650 mg) by mouth every 4 hours as needed for mild pain   butalbital-acetaminophen-caffeine (ESGIC) -40 MG tablet  Spouse/Significant Other Yes No   Sig: Take 1 tablet by mouth every 4 hours as needed for headaches   cetirizine (ZYRTEC) 10 MG tablet   Yes No   Sig: Take 10 mg by  mouth daily as needed for allergies   chlorpheniramine-pseudoePHEDrine (PSEUDO-GEST PLUS) 4-60 MG TABS per tablet  Spouse/Significant Other Yes No   Sig: Take 1 tablet by mouth every evening   cholestyramine (QUESTRAN) 4 G packet  Spouse/Significant Other Yes No   Sig: Take 1 packet by mouth 2 times daily (with meals).     famotidine (PEPCID) 40 MG tablet  Spouse/Significant Other Yes No   Sig: Take 40 mg by mouth At Bedtime   finasteride (PROSCAR) 5 MG tablet  Spouse/Significant Other Yes No   Sig: Take 2.5 mg by mouth every evening   fluticasone (FLONASE) 50 MCG/ACT nasal spray   Yes No   Sig: Spray 1 spray into both nostrils daily   lactase (LACTAID) 3000 UNIT tablet  Spouse/Significant Other Yes No   Sig: Take 3,000 Units by mouth as needed (before eating dairy foods)   magnesium 250 MG tablet  Spouse/Significant Other Yes No   Sig: Take 1 tablet by mouth every evening   memantine (NAMENDA) 10 MG tablet  Spouse/Significant Other Yes No   Sig: Take 10 mg by mouth 2 times daily   multivitamin  with lutein (OCUVITE WITH LTEIN) CAPS per capsule  Spouse/Significant Other Yes No   Sig: Take 1 capsule by mouth daily   vitamin B-12 (CYANOCOBALAMIN) 1000 MCG tablet  Spouse/Significant Other Yes No   Sig: Take 1,000 mcg by mouth daily Noon   vitamin C (ASCORBIC ACID) 500 MG tablet  Spouse/Significant Other Yes No   Sig: Take 500 mg by mouth daily   vitamin D3 (CHOLECALCIFEROL) 2000 units (50 mcg) tablet  Spouse/Significant Other Yes No   Sig: Take 4,000 Units by mouth daily Noon   zinc sulfate (ZINCATE) 220 (50 Zn) MG capsule  Spouse/Significant Other Yes No   Sig: Take 220 mg by mouth daily noon      Facility-Administered Medications: None     Allergies   Allergies   Allergen Reactions     Chocolate Anaphylaxis     headache     Feathers      Novocaine [Procaine]      Passes out almost immediately     [place] GI Disturbance     Most berries       Physical Exam   Vital Signs: Temp: 98.1  F (36.7  C) Temp src: Temporal  BP: (!) 140/91 Pulse: 75   Resp: 16 SpO2: 96 %      Weight: 143 lbs 8.31 oz    Constitutional: Appears comfortable  HEENT: NC/AT, sclera white, conjunctiva clear, EOMI, MMM  Respiratory: Breathing non-labored. Lungs CTAB - no wheezes/crackles/rhonchi  Cardiovascular: Heart RRR, no m/r/g. 1-2+ BLE edema   GI: +BS. Abd soft/NT  Skin: Warm and dry. No rash.  Musculoskeletal: Normal muscle bulk and tone  Neurologic: Alert and appropriate. Oriented to self, place, year, and situation. ROBERTS. 5/5 strength throughout.  Psychiatric: Calm and cooperative     Data   Data reviewed today: I reviewed all medications, new labs and imaging results over the last 24 hours. I personally reviewed the head CT which was negative for stroke; CTA head/neck showed right precavernous ICA mild stenosis and right distal carotid siphon moderate stenosis; left distal carotid siphon severe stenosis and left proximal V4 segment mild stenosis.    Recent Labs   Lab 05/26/22  0640   WBC 7.7   HGB 13.2*   MCV 99   *   INR 1.05      POTASSIUM 4.1   CHLORIDE 108   CO2 26   BUN 15   CR 1.05   ANIONGAP 5   KENYA 8.6   *         Recent Results (from the past 24 hour(s))   CT Head w/o Contrast    Narrative    EXAM: CT HEAD W/O CONTRAST  LOCATION: [facility]  DATE/TIME: 5/26/2022 6:42 AM    INDICATION: Neuro deficit, acute, stroke suspected, left-sided weakness  COMPARISON: None.  TECHNIQUE: Routine CT Head without IV contrast. Multiplanar reformats. Dose reduction techniques were used.    FINDINGS:  INTRACRANIAL CONTENTS: No intracranial hemorrhage, extraaxial collection, or mass effect.  No CT evidence of acute infarct. Moderate to severe presumed chronic small vessel ischemic changes. Moderate generalized volume loss. No hydrocephalus.     VISUALIZED ORBITS/SINUSES/MASTOIDS: No intraorbital abnormality. No paranasal sinus mucosal disease. No middle ear or mastoid effusion.    BONES/SOFT TISSUES: No acute  abnormality. Vascular calcifications. Air within bilateral cavernous sinus likely echogenic.      Impression    IMPRESSION:  1.  No acute intracranial hemorrhage.    2.  No CT evidence acute infarct. Aspect score 10.    3.  Age related changes described above.      Dr Gretchen Baez  was notified by Dr Edward Andrew at 7:00 AM 05/26/2022.   CTA Head Neck with Contrast    Narrative    EXAM: CTA  HEAD NECK WITH CONTRAST  LOCATION: [facility]  DATE/TIME: 5/26/2022 6:43 AM    INDICATION: Neuro deficit, acute, stroke suspected, left sided weakness  COMPARISON: None.  CONTRAST:  75 ml Isovue 370  TECHNIQUE: Head and neck CT angiogram with IV contrast. Axial helical CT images of the head and neck vessels obtained during the arterial phase of intravenous contrast administration. Axial 2D reconstructed images and multiplanar 3D MIP reconstructed   images of the head and neck vessels were performed by the technologist. Dose reduction techniques were used. All stenosis measurements made according to NASCET criteria unless otherwise specified.    FINDINGS:   HEAD CTA:  Right precavernous ICA mild stenosis. Right distal carotid siphon moderate stenosis.    Left distal carotid siphon severe stenosis.    Left proximal V4 segment mild stenosis.    No additional significant stenosis/occlusion.      No brain aneurysm. No AVM/AVF.    Standard [place] anatomy.     Dominant left and smaller right vertebral artery contribute to a normal basilar artery.     DURAL VENOUS SINUSES: Not well evaluated on a technical basis.      NECK CTA:  RIGHT CAROTID: No significant stenosis/occlusion. No dissection.    LEFT CAROTID: No significant stenosis/occlusion. No dissection.    VERTEBRAL ARTERIES:  No significant stenosis/occlusion. No dissection.      Dominant left and smaller right vertebral arteries.    AORTIC ARCH: Classic aortic arch anatomy with no significant stenosis at the origin of the great  vessels.    ARTERIAL PLAQUE: Calcified and to a lesser extent noncalcified plaque within the aorta, left subclavian artery, right brachiocephalic artery, right subclavian artery, bilateral chronic calcifications/bulbs, bilateral carotid siphons, left V4 segment.    NONVASCULAR STRUCTURES:   Multifocal foci of air within the venous system nonspecific likely iatrogenic. Degenerative changes noted within the spine. Multilevel severe central canal stenosis. Right anterior nasal polyp.          Impression     IMPRESSION:   HEAD CTA:   No intracranial arterial large vessel occlusion.    Right precavernous ICA mild stenosis. Right distal carotid siphon moderate stenosis.    Left distal carotid siphon severe stenosis.    Left proximal V4 segment mild stenosis.    No additional significant stenosis/occlusion.        NECK CTA:  1.  No significant stenosis, occlusion, dissection.      Dr Gretchen Baez  was notified by Dr Edward Andrew at  7:20 AM 05/26/2020.

## 2022-05-26 NOTE — ED NOTES
Pt c/o Rt sided CP when arriving back from MRI. Dr. Baez updated. EKG done. Pain quickly resolved on it own within 5 minutes.

## 2022-05-26 NOTE — PROGRESS NOTES
RECEIVING UNIT ED HANDOFF REVIEW    ED Nurse Handoff Report was reviewed by: Ana Campos RN on May 26, 2022 at 10:36 AM

## 2022-05-26 NOTE — ED PROVIDER NOTES
"  History   Chief Complaint:  Altered Mental Status     The history is provided by the patient, the EMS personnel, medical records and the spouse. The history is limited by the condition of the patient.      Jean Pierre Roblero is a 87 year old male with history of mild dementia, ASHD, and migraine who presents via ambulance with altered mental status. The patient's wife reports that they went to bed at 2130 last night. He got up to use the bathroom at 0330. She heard a crash in the bathroom and when she went to check on him he was on his knees trying to stand back up. She did not see him fall. When she helped him up he seemed ok. He went to his office and read a magazine in his office. This seemed normal to her. He fell asleep in his chair, which is also normal, and she went to bed around 0400. When she got up at 0600, she was not able to rouse him, so she called 911.  When EMS arrived, medics were initially not able to rouse him. His initial blood pressure was 60/40, pulse 40. He had been incontinent of urine. When they laid him flat his blood pressure improved and he roused, but was not able to follow commands. Blood sugar 146 on finger poke. Medics gave 500mL BOLUS en route. He reported to medics that he \"feels crummy.\" On my examination he is oriented to self and he knows his wifes name. He endorses mild headache. Wife denies history of stroke. No blood thinners.   Incidentally the patient apparently had an MRI of his brain yesterday at an outside radiology facility.  The wife cannot give any more information.    Review of Systems   Unable to perform ROS: Mental status change   Neurological: Positive for facial asymmetry, weakness and headaches.   Psychiatric/Behavioral: Positive for confusion.     Allergies:  Chocolate  Feathers  Novocaine [Procaine]  Strawberry    Medications:  Flomax  Namenda  Pepcid  Fioricet  Questran  Proscar    Past Medical History:     Syncope  GERD  Headache  Photosensitivity  Swallowing " difficulty  Mild dementia  Somatization disorder  Presbyesophagus  Osteoarthritis  Anal sphincter incontinence  Diverticulosis of colon  Migraine  Inguinal hernia  Xerostomia  Insomnia  Tortuous colon  Sciatica  Heberden's node  Laly's node  ASHD  Spinal stenosis  Skin cancer    Past Surgical History:    Back surgery  Cholecystectomy  Endoscopic sinus surgery  Cataract extraction  Hernia repair  Ingrown toenail removal  Turbinoplasty  Flexible sigmoidoscopy  Hemilaminotomy lumbar spine     Family History:    Brother: dementia  Father: leukemia  Mother: arthritis, dementia  Sister: back problems, injury  Son: prostate cancer     Social History:  The patient presents to the ED via ambulance.   The patient lives at home with his wife.     Physical Exam     Patient Vitals for the past 24 hrs:   BP Temp Temp src Pulse Resp SpO2 Weight   05/26/22 0745 (!) 164/91 -- -- 99 20 -- --   05/26/22 0730 (!) 143/82 -- -- 97 17 -- --   05/26/22 0715 (!) 160/97 -- -- 102 10 -- --   05/26/22 0700 (!) 145/84 -- -- 85 13 96 % --   05/26/22 0659 -- 98.1  F (36.7  C) Temporal -- -- -- --   05/26/22 0650 (!) 140/91 -- -- -- -- -- --   05/26/22 0645 135/77 -- -- 75 -- -- --   05/26/22 0636 125/71 -- -- 68 16 96 % 65.1 kg (143 lb 8.3 oz)       Physical Exam  Nursing note and vitals reviewed.    Constitutional:  Appears comfortable.    HENT:    Nose normal.  No discharge. No evidence of scalp trauma. Old lesion on right ear.      Oral mucosa is moist.  Eyes:    Conjunctivae are normal without injection.  Pupils are equal.  Cardiovascular:  Normal rate, regular rhythm with normal S1 and S2.      Normal heart sounds and peripheral pulses 2+ and equal.       No murmur or boom.  Pulmonary:  Effort normal and breath sounds clear to auscultation bilaterally.     No respiratory distress.  No stridor.     No wheezes. No rales.     GI:    Soft. No distension and no mass. No tenderness.      No rebound and no guarding. No flank pain.  No  HSM.  Musculoskeletal:  Normal range of motion. No extremity deformity.     No edema and no tenderness.    Neurological:   GCS 15. NIH stroke scale score 5. O X 3.  No sensory deficit. Left-sided neglect.   Finger to nose unable to assess, patient unable to follow command. No hand drift. No leg drift.   Visual fields full. EOMs intact. Right gaze preference. No diplopia. Left facial droop. No slurred speech.   Comprehension and expression of speech is normal. Follows commands, but requires coaching.   Skin:    Skin is warm and dry. No rash noted. No diaphoresis.      No erythema. No pallor.  No lesions.  Psychiatric:   Behavior is normal. Appropriate mood and affect.  Seems a little slow.    Emergency Department Course   ECG  ECG taken at 0658, ECG read at 0700  Normal sinus rhythm. Possible left atrial enlargement. Borderline ECG.    Rate 87 bpm. [place] interval 170 ms. QRS duration 82 ms. QT/QTc 380/457 ms. P-R-T axes 81 51 79.     Imaging:  CTA Head Neck with Contrast   Final Result    IMPRESSION:    HEAD CTA:    No intracranial arterial large vessel occlusion.      Right precavernous ICA mild stenosis. Right distal carotid siphon moderate stenosis.      Left distal carotid siphon severe stenosis.      Left proximal V4 segment mild stenosis.      No additional significant stenosis/occlusion.           NECK CTA:   1.  No significant stenosis, occlusion, dissection.         Dr Gretchen Baez  was notified by Dr Edward Andrew at  7:20 AM 05/26/2020.      CT Head w/o Contrast   Final Result   IMPRESSION:   1.  No acute intracranial hemorrhage.      2.  No CT evidence acute infarct. Aspect score 10.      3.  Age related changes described above.         Dr Gretchen Baez  was notified by Dr Edward Andrew at 7:00 AM 05/26/2022.      MR Brain w/o & w Contrast    (Results Pending)     Report per radiology    Laboratory:  Labs Ordered and Resulted from Time of ED Arrival to Time of ED Departure   BASIC METABOLIC PANEL - Abnormal        Result Value    Sodium 139      Potassium 4.1      Chloride 108      Carbon Dioxide (CO2) 26      Anion Gap 5      Urea Nitrogen 15      Creatinine 1.05      Calcium 8.6      Glucose 133 (*)     GFR Estimate 69     CBC WITH PLATELETS AND DIFFERENTIAL - Abnormal    WBC Count 7.7      RBC Count 4.03 (*)     Hemoglobin 13.2 (*)     Hematocrit 39.9 (*)     MCV 99      MCH 32.8      MCHC 33.1      RDW 12.8      Platelet Count 142 (*)     % Neutrophils 50      % Lymphocytes 41      % Monocytes 7      % Eosinophils 1      % Basophils 0      % Immature Granulocytes 1      NRBCs per 100 WBC 0      Absolute Neutrophils 3.9      Absolute Lymphocytes 3.2      Absolute Monocytes 0.5      Absolute Eosinophils 0.1      Absolute Basophils 0.0      Absolute Immature Granulocytes 0.1      Absolute NRBCs 0.0     INR - Normal    INR 1.05     PARTIAL THROMBOPLASTIN TIME - Normal    aPTT 28     TROPONIN I - Normal    Troponin I High Sensitivity 12     COVID-19 VIRUS (CORONAVIRUS) BY PCR - Normal    SARS CoV2 PCR Negative     GLUCOSE MONITOR NURSING POCT        Emergency Department Course:         Reviewed:  I reviewed nursing notes, vitals, past medical history and Care Everywhere    Assessments:  0632 I obtained history and examined the patient as noted above.   0649 I spoke with the patient's wife while the patient was in CT.   0723 I rechecked the patient and explained findings to patient and wife. Neurology at bedside on TeleHealth.    0742 I rechecked the patient and explained plan for admission.     Consults:  0643 I spoke with Dr. Ramirez, stroke neurology, about the patient's presentation and plan.   0703 I spoke with [facility] about the patient's CT imaging.   0719 I spoke with [facility] again.   0745 I spoke with Dr. Brown, the hospitalist, who accepted the patient.     Disposition:  The patient was admitted to the hospital under the care of Dr. Brown.     Impression & Plan     CMS Diagnoses: The patient  has stroke symptoms:         ED Stroke specific documentation           NIHSS PDF     Patient last known well time: 0400  ED Provider first to bedside at: 0632  CT Results received at: 0703    Thrombolytics:   Not given due to:   - minor/isolated/quickly resolving symptoms      Endovascular Retrieval:  Not initiated due to absence of proximal vessel occlusion    National Institutes of Health Stroke Scale (Baseline)  Time Performed: 0635     Score    Level of consciousness: (0)   Alert, keenly responsive    LOC questions: (0)   Answers both questions correctly    LOC commands: (0)   Performs both tasks correctly    Best gaze: (1)   Partial gaze palsy    Visual: (2)   Complete hemianopia    Facial palsy: (1)   Minor paralysis (flat nasolabial fold, smile asymmetry)    Motor arm (left): (0)   No drift    Motor arm (right): (0)   No drift    Motor leg (left): (0)   No drift    Motor leg (right): (0)   No drift    Limb ataxia: (0)   Absent    Sensory: (0)   Normal- no sensory loss    Best language: (0)   Normal- no aphasia    Dysarthria: (0)   Normal    Extinction and inattention: (1)   Visual, tacile, auditory, spatial, person inattention        Total Score:  5        Stroke Mimics were considered (including migraine headache, seizure disorder, hypoglycemia (or hyperglycemia), head or spinal trauma, CNS infection, Toxin ingestion and shock state (e.g. sepsis) .        National Institutes of Health Stroke Scale  Time Performed: 0730      Score    Level of consciousness: (0)   Alert, keenly responsive    LOC questions: (0)   Answers both questions correctly    LOC commands: (0)   Performs both tasks correctly    Best gaze: (0)   Normal    Visual: (0)   No visual loss    Facial palsy: (0)   Normal symmetrical movements    Motor arm (left): (0)   No drift    Motor arm (right): (0)   No drift    Motor leg (left): (0)   No drift    Motor leg (right): (0)   No drift    Limb ataxia: (0)   Absent    Sensory: (0)   Normal- no  sensory loss    Best language: (0)   Normal- no aphasia    Dysarthria: (0)   Normal    Extinction and inattention: (0)   No abnormality        Total Score:  0            Medical Decision Making:  Patient came in with possible stroke with left facial droop and left hemianopsia.  Last known well was a little over 2 hours prior to presentation so a tier 1 code stroke was called.  He went down to CT and the CT and CTA revealed just some narrowing of his internal carotid arteries but no acute bleed or other abnormality.  Over the next hour his symptoms essentially resolved.  Stroke neurology saw the patient and after discussion with the family and with his significant improvement, they decided against thrombolytics.  The patient remained stable in the ER.  His labs came back showing no acute abnormality other than a mild thrombocytopenia.  This likely was a TIA.  There was no evidence that he had a seizure.  He will be admitted to Dr. Brown on a neuro floor and get an MRI today.  They are looking for the outside MRI report which we do not have available in the chart from yesterday.  He will have frequent neurochecks.  His NIH score went from a 5 to a 0.    Critical Care Time: was 45 minutes for this patient excluding procedures    Diagnosis:    ICD-10-CM    1. TIA (transient ischemic attack)  G45.9        Discharge Medications:  New Prescriptions    No medications on file       Scribe Disclosure:  I, Bridgett Camacho, am serving as a scribe at 6:36 AM on 5/26/2022 to document services personally performed by Gretchen Baez MD based on my observations and the provider's statements to me.      Gretchen Baez MD  05/26/22 8452

## 2022-05-26 NOTE — PROGRESS NOTES
OBS GOALS:   - diagnostic tests and consults completed and resulted. NOT MET. ECHO ordered  -vital signs normal or at patient baseline. MET

## 2022-05-26 NOTE — PHARMACY-ADMISSION MEDICATION HISTORY
Pharmacy Medication History  Admission medication history interview status for the 5/26/2022  admission is complete. See EPIC admission navigator for prior to admission medications     Location of Interview: Patient room  Medication history sources: Patient's family/friend (spouse) and Surescripts    Significant changes made to the medication list:  None    In the past week, patient estimated taking medication this percent of the time: greater than 90%    Additional medication history information:   None    Medication reconciliation completed by provider prior to medication history? No    Time spent in this activity: 15 min    Prior to Admission medications    Medication Sig Last Dose Taking? Auth Provider   butalbital-acetaminophen-caffeine (ESGIC) -40 MG tablet Take 1 tablet by mouth every 4 hours as needed for headaches  at prn Yes Unknown, Entered By History   cholestyramine (QUESTRAN) 4 G packet Take 1 packet by mouth 2 times daily (with meals) 5/25/2022 at Unknown time Yes Reported, Patient   famotidine (PEPCID) 40 MG tablet Take 40 mg by mouth At Bedtime 5/25/2022 at Unknown time Yes Unknown, Entered By History   finasteride (PROSCAR) 5 MG tablet Take 5 mg by mouth daily 5/25/2022 at Unknown time Yes Unknown, Entered By History   fluticasone (FLONASE) 50 MCG/ACT nasal spray Spray 1 spray into both nostrils daily as needed for rhinitis or allergies  at prn Yes Reported, Patient   magnesium 250 MG tablet Take 1 tablet by mouth every evening 5/25/2022 at Unknown time Yes Unknown, Entered By History   memantine (NAMENDA) 10 MG tablet Take 10 mg by mouth 2 times daily 5/25/2022 at Unknown time Yes Unknown, Entered By History   Multiple Vitamin (MULTI-VITAMIN) per tablet Take 1 tablet by mouth daily 5/25/2022 at Unknown time Yes Reported, Patient   multivitamin  with lutein (OCUVITE WITH LTEIN) CAPS per capsule Take 1 capsule by mouth daily 5/25/2022 at Unknown time Yes Unknown, Entered By History   sodium  chloride (OCEAN) 0.65 % nasal spray Spray 1 spray into both nostrils daily as needed for congestion  at prn Yes Unknown, Entered By History   vitamin B-12 (CYANOCOBALAMIN) 1000 MCG tablet Take 1,000 mcg by mouth daily Noon 5/25/2022 at Unknown time Yes Unknown, Entered By History   vitamin C (ASCORBIC ACID) 500 MG tablet Take 500 mg by mouth daily 5/25/2022 at Unknown time Yes Unknown, Entered By History   vitamin D3 (CHOLECALCIFEROL) 2000 units (50 mcg) tablet Take 4,000 Units by mouth daily Noon 5/25/2022 at Unknown time Yes Unknown, Entered By History   zinc sulfate (ZINCATE) 220 (50 Zn) MG capsule Take 220 mg by mouth daily noon 5/25/2022 at Unknown time Yes Unknown, Entered By History       The information provided in this note is only as accurate as the sources available at the time of update(s)

## 2022-05-27 ENCOUNTER — APPOINTMENT (OUTPATIENT)
Dept: CARDIOLOGY | Facility: CLINIC | Age: 87
DRG: 069 | End: 2022-05-27
Attending: INTERNAL MEDICINE
Payer: MEDICARE

## 2022-05-27 ENCOUNTER — APPOINTMENT (OUTPATIENT)
Dept: OCCUPATIONAL THERAPY | Facility: CLINIC | Age: 87
DRG: 069 | End: 2022-05-27
Attending: INTERNAL MEDICINE
Payer: MEDICARE

## 2022-05-27 ENCOUNTER — APPOINTMENT (OUTPATIENT)
Dept: PHYSICAL THERAPY | Facility: CLINIC | Age: 87
DRG: 069 | End: 2022-05-27
Attending: INTERNAL MEDICINE
Payer: MEDICARE

## 2022-05-27 VITALS
RESPIRATION RATE: 16 BRPM | SYSTOLIC BLOOD PRESSURE: 143 MMHG | OXYGEN SATURATION: 99 % | DIASTOLIC BLOOD PRESSURE: 80 MMHG | TEMPERATURE: 97.8 F | HEART RATE: 89 BPM | HEIGHT: 68 IN | BODY MASS INDEX: 21.75 KG/M2 | WEIGHT: 143.52 LBS

## 2022-05-27 LAB
ANION GAP SERPL CALCULATED.3IONS-SCNC: 6 MMOL/L (ref 3–14)
BUN SERPL-MCNC: 14 MG/DL (ref 7–30)
CALCIUM SERPL-MCNC: 8.6 MG/DL (ref 8.5–10.1)
CHLORIDE BLD-SCNC: 105 MMOL/L (ref 94–109)
CO2 SERPL-SCNC: 27 MMOL/L (ref 20–32)
CREAT SERPL-MCNC: 0.83 MG/DL (ref 0.66–1.25)
ERYTHROCYTE [DISTWIDTH] IN BLOOD BY AUTOMATED COUNT: 12.8 % (ref 10–15)
GFR SERPL CREATININE-BSD FRML MDRD: 85 ML/MIN/1.73M2
GLUCOSE BLD-MCNC: 146 MG/DL (ref 70–99)
GLUCOSE BLDC GLUCOMTR-MCNC: 124 MG/DL (ref 70–99)
HCT VFR BLD AUTO: 43.2 % (ref 40–53)
HGB BLD-MCNC: 14.1 G/DL (ref 13.3–17.7)
LVEF ECHO: NORMAL
MAGNESIUM SERPL-MCNC: 2.4 MG/DL (ref 1.6–2.3)
MCH RBC QN AUTO: 33.2 PG (ref 26.5–33)
MCHC RBC AUTO-ENTMCNC: 32.6 G/DL (ref 31.5–36.5)
MCV RBC AUTO: 102 FL (ref 78–100)
PLATELET # BLD AUTO: 143 10E3/UL (ref 150–450)
POTASSIUM BLD-SCNC: 3.7 MMOL/L (ref 3.4–5.3)
RBC # BLD AUTO: 4.25 10E6/UL (ref 4.4–5.9)
SODIUM SERPL-SCNC: 138 MMOL/L (ref 133–144)
WBC # BLD AUTO: 5.3 10E3/UL (ref 4–11)

## 2022-05-27 PROCEDURE — 250N000013 HC RX MED GY IP 250 OP 250 PS 637: Performed by: NURSE PRACTITIONER

## 2022-05-27 PROCEDURE — 250N000013 HC RX MED GY IP 250 OP 250 PS 637: Performed by: INTERNAL MEDICINE

## 2022-05-27 PROCEDURE — G0378 HOSPITAL OBSERVATION PER HR: HCPCS

## 2022-05-27 PROCEDURE — 97165 OT EVAL LOW COMPLEX 30 MIN: CPT | Mod: GO

## 2022-05-27 PROCEDURE — 97116 GAIT TRAINING THERAPY: CPT | Mod: GP | Performed by: PHYSICAL THERAPIST

## 2022-05-27 PROCEDURE — 36415 COLL VENOUS BLD VENIPUNCTURE: CPT | Performed by: INTERNAL MEDICINE

## 2022-05-27 PROCEDURE — 99221 1ST HOSP IP/OBS SF/LOW 40: CPT | Performed by: NURSE PRACTITIONER

## 2022-05-27 PROCEDURE — 97535 SELF CARE MNGMENT TRAINING: CPT | Mod: GO

## 2022-05-27 PROCEDURE — 85027 COMPLETE CBC AUTOMATED: CPT | Performed by: INTERNAL MEDICINE

## 2022-05-27 PROCEDURE — 99217 PR OBSERVATION CARE DISCHARGE: CPT | Performed by: INTERNAL MEDICINE

## 2022-05-27 PROCEDURE — 80048 BASIC METABOLIC PNL TOTAL CA: CPT | Performed by: INTERNAL MEDICINE

## 2022-05-27 PROCEDURE — 82962 GLUCOSE BLOOD TEST: CPT

## 2022-05-27 PROCEDURE — 83735 ASSAY OF MAGNESIUM: CPT | Performed by: INTERNAL MEDICINE

## 2022-05-27 PROCEDURE — 97161 PT EVAL LOW COMPLEX 20 MIN: CPT | Mod: GP | Performed by: PHYSICAL THERAPIST

## 2022-05-27 RX ORDER — CLOPIDOGREL BISULFATE 75 MG/1
300 TABLET ORAL ONCE
Status: DISCONTINUED | OUTPATIENT
Start: 2022-05-27 | End: 2022-05-27 | Stop reason: HOSPADM

## 2022-05-27 RX ORDER — ATORVASTATIN CALCIUM 10 MG/1
10 TABLET, FILM COATED ORAL EVERY EVENING
Qty: 30 TABLET | Refills: 0 | Status: SHIPPED | OUTPATIENT
Start: 2022-05-27

## 2022-05-27 RX ORDER — CLOPIDOGREL BISULFATE 75 MG/1
75 TABLET ORAL DAILY
Qty: 90 TABLET | Refills: 0 | Status: SHIPPED | OUTPATIENT
Start: 2022-05-28 | End: 2022-08-19

## 2022-05-27 RX ORDER — ATORVASTATIN CALCIUM 10 MG/1
10 TABLET, FILM COATED ORAL EVERY EVENING
Status: DISCONTINUED | OUTPATIENT
Start: 2022-05-27 | End: 2022-05-27 | Stop reason: HOSPADM

## 2022-05-27 RX ORDER — CLOPIDOGREL BISULFATE 75 MG/1
75 TABLET ORAL DAILY
Status: DISCONTINUED | OUTPATIENT
Start: 2022-05-28 | End: 2022-05-27 | Stop reason: HOSPADM

## 2022-05-27 RX ORDER — HYDROCHLOROTHIAZIDE 12.5 MG/1
12.5 TABLET ORAL DAILY
Qty: 30 TABLET | Refills: 0 | Status: SHIPPED | OUTPATIENT
Start: 2022-05-27 | End: 2022-05-27

## 2022-05-27 RX ORDER — HYDROCHLOROTHIAZIDE 12.5 MG/1
12.5 TABLET ORAL DAILY
Qty: 30 TABLET | Refills: 0 | Status: CANCELLED | OUTPATIENT
Start: 2022-05-27

## 2022-05-27 RX ADMIN — FINASTERIDE 5 MG: 5 TABLET, FILM COATED ORAL at 08:00

## 2022-05-27 RX ADMIN — FAMOTIDINE 40 MG: 20 TABLET ORAL at 08:00

## 2022-05-27 RX ADMIN — ASPIRIN 81 MG CHEWABLE TABLET 81 MG: 81 TABLET CHEWABLE at 08:00

## 2022-05-27 RX ADMIN — MEMANTINE 10 MG: 10 TABLET ORAL at 08:00

## 2022-05-27 NOTE — PROGRESS NOTES
"   05/27/22 0800   Quick Adds   Type of Visit Initial Occupational Therapy Evaluation   Living Environment   Living Environment Comments pt lives in a house with spouse. walk in shower with bars. stairs. standard height toilet   Self-Care   Usual Activity Tolerance good   Current Activity Tolerance good   Equipment Currently Used at Home walker, rolling  (hurrycane)   Fall history within last six months yes   Number of times patient has fallen within last six months 1   Activity/Exercise/Self-Care Comment pt reports indp with self care and ADL. has been using hurrycane and walker in house and in community   Instrumental Activities of Daily Living (IADL)   IADL Comments pt and wife complete IADL in the home together. wife drives more frequently. manages medication and finances. strong family support that lives in the area for check in   General Information   Onset of Illness/Injury or Date of Surgery 05/26/22   Referring Physician Zeny Brown MD   Patient/Family Therapy Goal Statement (OT) to go home today   Additional Occupational Profile Info/Pertinent History of Current Problem Jean Pierre Roblero is a 87 year old male with hx of mild dementia and recurrent syncope who was admitted on 5/26/2022 for possible TIA/stroke   Existing Precautions/Restrictions fall   General Observations and Info agreeable to OT. wife present in room for session   Cognitive Status Examination   Orientation Status orientation to person, place and time   Affect/Mental Status (Cognitive) WNL   Follows Commands WNL   Cognitive Status Comments good safety awareness and insight into limitations. per chart has baseline \"mild dementia\". pt wife reports he is back to baseline.   Visual Perception   Visual Impairment/Limitations corrective lenses full-time   Impact of Vision Impairment on Function (Vision) pt reports he was getting botox injections. after stpping injections he has developed fluctuating blurry vision. reports some days are " better than others. reports he does not drive when vision is worse   Sensory   Sensory Quick Adds No deficits were identified   Pain Assessment   Patient Currently in Pain No   Integumentary/Edema   Integumentary/Edema no deficits were identifed   Range of Motion Comprehensive   Comment, General Range of Motion WFL for ADL. bilaterally equal   Strength Comprehensive (MMT)   Comment, General Manual Muscle Testing (MMT) Assessment bilaterally equal 4/5   Coordination   Upper Extremity Coordination No deficits were identified   Coordination Comments opposition and finger to nose in tact   Transfers   Transfers sit-stand transfer   Sit-Stand Transfer   Sit-Stand [place] (Transfers) minimum assist (75% patient effort)   Sit/Stand Transfer Comments no device   Activities of Daily Living   BADL Assessment/Intervention lower body dressing;upper body dressing;toileting   Upper Body Dressing Assessment/Training   Comment, (Upper Body Dressing) seated   [place] Level (Upper Body Dressing) supervision   Lower Body Dressing Assessment/Training   Comment, (Lower Body Dressing) seated   [place] Level (Lower Body Dressing) supervision   Clinical Impression   Criteria for Skilled Therapeutic Interventions Met (OT) Yes, treatment indicated   OT Diagnosis decreased function in ADL   OT Problem List-Impairments impacting ADL problems related to;activity tolerance impaired;mobility;vision   Assessment of Occupational Performance 1-3 Performance Deficits   Identified Performance Deficits bathing, dressing, mobility   Planned Therapy Interventions (OT) ADL retraining;IADL retraining;home program guidelines;progressive activity/exercise   Clinical Decision Making Complexity (OT) low complexity   Anticipated Equipment Needs Upon Discharge (OT) shower chair   Risk & Benefits of therapy have been explained evaluation/treatment results reviewed;care plan/treatment goals reviewed;risks/benefits reviewed;current/potential barriers  reviewed;participants voiced agreement with care plan;participants included;patient;spouse/significant other   Clinical Impression Comments decreased function in ADL warrants skilled IP OT tx   OT Discharge Planning   OT Discharge Recommendation (DC Rec) home with assist   OT Rationale for DC Rec pt below baseline at this time with ADL. mild activity intolerance and mobility deficits impacting ADL and IADL safety. anticipate with skilled IP OT tx will be able to discharge to home with supervision from wife for medication mgmt and IADL. rec shower chair   Total Evaluation Time (Minutes)   Total Evaluation Time (Minutes) 10   OT Goals   Therapy Frequency (OT) One time eval and treatment   OT Predicted Duration/Target Date for Goal Attainment 05/27/22   OT Goals OT Goal 1;OT Goal 2;Toilet Transfer/Toileting   OT: Toilet Transfer/Toileting Supervision/stand-by assist;toilet transfer;cleaning and garment management;using adaptive equipment   OT: Goal 1 Pt will verbalize 3 fall reduction strategies for home safety   OT: Goal 2 Pt will verbalize understanding of shower chair rec for home   Psychosocial Support   Trust Relationship/Rapport care explained;choices provided;emotional support provided;empathic listening provided;questions answered;questions encouraged;reassurance provided;thoughts/feelings acknowledged

## 2022-05-27 NOTE — PLAN OF CARE
Physical Therapy Discharge Summary    Reason for therapy discharge:    Discharged to home with home therapy.    Progress towards therapy goal(s). See goals on Care Plan in [facility] electronic health record for goal details.  Goals partially met.  Barriers to achieving goals:   discharge from facility.    Therapy recommendation(s):    Continued therapy is recommended.  Rationale/Recommendations:  Patient would benefit from continued skilled pt to improve balance and increase independence on stairs. .    Goal Outcome Evaluation:

## 2022-05-27 NOTE — PHARMACY-ADMISSION MEDICATION HISTORY
Pharmacy Consult to evaluate for medication related stroke core measures    Jean Pierre Roblero, 87 year old male admitted for possible TIA/stroke on 5/26/2022.    Thrombolytic was not given because of  rapid improvement of symptoms    VTE Prophylaxis SCDs /PCDs placed on 5/26/22, as appropriate prior to end of hospital day 2.    Antithrombotic: aspirin started on 5/27/22, as appropriate by end of hospital day 2. Continue antithrombotic therapy on discharge to meet quality measures, unless contraindicated.    Anticoagulation if history of A-fib/flutter: Patient does not have history of A-fib/flutter - anticoagulation not required for medication related stroke core measures.     LDL Cholesterol Calculated   Date Value Ref Range Status   05/26/2022 64 <=100 mg/dL Final   07/02/2011 67 0 - 129 mg/dL Final     Comment:     LDL Cholesterol is the primary guide to therapy: LDL-cholesterol goal in high   risk patients is <100 mg/dL and in very high risk patients is <70 mg/dL.       Patient currently receiving Lipitor (atorvastatin) continue statin on discharge to meet quality measures, unless contraindicated.    Recommendations: None at this time    Thank you for the consult.    Elyssa Green, PharmD 5/27/2022 11:53 AM

## 2022-05-27 NOTE — DISCHARGE INSTRUCTIONS
Your risk factors for stroke or TIA (transient ischemic attack):    Your Risk Factors Your Results Normal Ranges   High blood pressure BP Readings from Last 1 Encounters:   05/27/22 (!) 167/93    Less than 120/80   Cholesterol              Total Lab Results   Component Value Date    CHOL 128 05/26/2022    CHOL 125 07/02/2011      Less than 150    Triglycerides   Lab Results   Component Value Date    TRIG 96 05/26/2022    TRIG 90 07/02/2011    Less than 150   LDL Lab Results   Component Value Date    LDL 64 05/26/2022    LDL 67 07/02/2011       Less than 70   HDL Lab Results   Component Value Date    HDL 45 05/26/2022    HDL 40 07/02/2011            Greater than 40 (men)  Greater than 50 (women)   Diabetes Recent Labs   Lab 05/27/22  0823   *    Fasting blood glucose    Smoking/tobacco use  Quit smoking and tobacco   Overweight  Lose 1-2 pounds a week   Lack of exercise  30 minutes moderate activity each day   Other risk factors include carotid (neck) artery disease, atrial fibrillation and stress. You may be on new medicine to treat high blood pressure, cholesterol, diabetes or atrial fibrillation.    Understanding Stroke Booklet given to patient. Please refer to booklet for further information.    Stroke warning signs and symptoms - CALL 911 right away for:  - Sudden numbness or weakness in the face, arm or leg (often on one side of the body).  - Sudden confusion or trouble understanding what is going on.  - Sudden blurred or decreased vision in one or both eyes.  - Sudden trouble speaking, loss of balance, dizziness or problems with coordination.  - Sudden, severe headache for no reason.  - Fainting or seizures.  - Symptoms may go away then come back suddenly.              in the next 1-2 week(s) with PCP  - in 6-8 weeks with [facility] or other local neurologist of patient's choice  [facility]

## 2022-05-27 NOTE — DISCHARGE SUMMARY
[facility]  Hospitalist Discharge Summary      Date of Admission:  5/26/2022  Date of Discharge:  5/27/2022  Discharging Provider: Zeny Brown MD    Discharge Diagnoses   1. TIA  2. GERD  3. BPH  4. Mild dementia without behavioral disturbance    Follow-ups Needed After Discharge   Follow-up Appointments     Follow-up and recommended labs and tests       Follow up with primary care provider, Zaki Alaniz, Monday June 6th   1pm for hospital follow-up and blood pressure check.           Discharge Disposition   Discharged to home  Condition at discharge: Good    Hospital Course   Jean Pierre Roblero is a 87 year old male with hx of mild dementia and recurrent syncope who was admitted on 5/26/2022 for suspected TIA     TIA  * Presented to the ED after patient was found unresponsive by his wife. In the field, he was noted to be bradycardic to 40s and hypotensive to 60s systolic; left facial droop and left hemianopsia noted on arrival to the ED. Code Stroke activated in the ED  * Head CT was negative for stroke; CTA head/neck showed right precavernous ICA mild stenosis and right distal carotid siphon moderate stenosis; left distal carotid siphon severe stenosis and left proximal V4 segment mild stenosis  * Symptoms resolved in the ED; TNKase deferred in consultation with Stroke Neuro  * Brain MRI (5/26) showed faint increased diffusion signal in the right frontal matter suggestive of recent ischemia  * TTE (5/27) showed EF 60-65%, grade I diastolic dysfunction, moderate aortic root dilatation, mildly dilated ascending aorta, mild aortic regurgitation  * Additional stroke work-up: A1c 5.4, lipid panel: LDL 64, serial troponin negative  - He will be discharged on DAPT with ASA 81 mg and Plavix for 90 days after which he will decrease to ASA 81 mg daily alone  - He will also be discharged with atorvastatin 10 mg daily  - Home PT ordered at discharge. Home RN was offered and  declined    Elevated blood pressure  - BP 140s-160s since admission, but 109 systolic at the time of discharge  - Defer antihypertensives at this time, and follow up with PCP for BP check    GERD  - Continues on PTA famotidine      BPH  - Continues on PTA finasteride      Mild dementia without behavioral disturbance  - Continues on PTA memantine    Consultations This Hospital Stay   1. NEURO STROKE VIDEO CONNECTION  2. SPEECH LANGUAGE PATH ADULT IP CONSULT  3. PATIENT LEARNING CENTER IP CONSULT  4. PHYSICAL THERAPY ADULT IP CONSULT  5. OCCUPATIONAL THERAPY ADULT IP CONSULT  6. CARE MANAGEMENT / SOCIAL WORK IP CONSULT    Code Status   Full Code    Time Spent on this Encounter   I, Zeny Brown MD, personally saw the patient today and spent 30 minutes discharging this patient.       Zeny Brown MD  [facility] NEUROSCIENCE UNIT  [address] [place] [address]  Phone: [phone]  ______________________________________________________________________    Physical Exam   Vital Signs: Temp: 97.8  F (36.6  C) Temp src: Oral BP: (!) 143/80 Pulse: 89   Resp: 16 SpO2: 99 % O2 Device: None (Room air)    Weight: 143 lbs 8.31 oz    Constitutional: Appears comfortable  HEENT: Sclera white, conjunctiva clear, EOMI, MMM  Respiratory: Breathing non-labored. Lungs CTAB - no wheezes/crackles/rhonchi  Cardiovascular: Heart RRR, no m/r/g. 1+ BLE edema   GI: +BS. Abd soft/NT  Skin: Warm and dry. No rash.  Musculoskeletal: Normal muscle bulk and tone  Neurologic: Alert and appropriate. ROBERTS  Psychiatric: Calm and cooperative    Primary Care Physician   Zaki Alaniz    Discharge Orders      Home Care Referral      Reason for your hospital stay    TIA/stroke. You have been initiated on new medications for stroke prevention     Follow-up and recommended labs and tests     Follow up with primary care provider, Zaki Alaniz, Monday June 6th 1pm for hospital follow-up and blood pressure  check.     Activity    Your activity upon discharge: activity as tolerated     Diet    Follow this diet upon discharge: Regular diet       Significant Results and Procedures   Most Recent 3 CBC's:Recent Labs   Lab Test 05/27/22  0823 05/26/22  0640 03/03/20  0851   WBC 5.3 7.7 6.6   HGB 14.1 13.2* 13.2*   * 99 100   * 142* 111*     Most Recent 3 BMP's:Recent Labs   Lab Test 05/27/22  1222 05/27/22  0823 05/26/22  1247 05/26/22  0640 03/03/20  0851   NA  --  138  --  139 139   POTASSIUM  --  3.7  --  4.1 3.6   CHLORIDE  --  105  --  108 108   CO2  --  27  --  26 28   BUN  --  14  --  15 17   CR  --  0.83  --  1.05 0.89   ANIONGAP  --  6  --  5 3   KENYA  --  8.6  --  8.6 8.1*   * 146* 107* 133* 95   ,   Results for orders placed or performed during the hospital encounter of 05/26/22   CT Head w/o Contrast    Narrative    EXAM: CT HEAD W/O CONTRAST  LOCATION: [facility]  DATE/TIME: 5/26/2022 6:42 AM    INDICATION: Neuro deficit, acute, stroke suspected, left-sided weakness  COMPARISON: None.  TECHNIQUE: Routine CT Head without IV contrast. Multiplanar reformats. Dose reduction techniques were used.    FINDINGS:  INTRACRANIAL CONTENTS: No intracranial hemorrhage, extraaxial collection, or mass effect.  No CT evidence of acute infarct. Moderate to severe presumed chronic small vessel ischemic changes. Moderate generalized volume loss. No hydrocephalus.     VISUALIZED ORBITS/SINUSES/MASTOIDS: No intraorbital abnormality. No paranasal sinus mucosal disease. No middle ear or mastoid effusion.    BONES/SOFT TISSUES: No acute abnormality. Vascular calcifications. Air within bilateral cavernous sinus likely echogenic.      Impression    IMPRESSION:  1.  No acute intracranial hemorrhage.    2.  No CT evidence acute infarct. Aspect score 10.    3.  Age related changes described above.      Dr Gretchen Baez  was notified by Dr Edward Andrew at 7:00 AM 05/26/2022.   CTA Head Neck with  Contrast    Narrative    EXAM: CTA  HEAD NECK WITH CONTRAST  LOCATION: [facility]  DATE/TIME: 5/26/2022 6:43 AM    INDICATION: Neuro deficit, acute, stroke suspected, left sided weakness  COMPARISON: None.  CONTRAST:  75 ml Isovue 370  TECHNIQUE: Head and neck CT angiogram with IV contrast. Axial helical CT images of the head and neck vessels obtained during the arterial phase of intravenous contrast administration. Axial 2D reconstructed images and multiplanar 3D MIP reconstructed   images of the head and neck vessels were performed by the technologist. Dose reduction techniques were used. All stenosis measurements made according to NASCET criteria unless otherwise specified.    FINDINGS:   HEAD CTA:  Right precavernous ICA mild stenosis. Right distal carotid siphon moderate stenosis.    Left distal carotid siphon severe stenosis.    Left proximal V4 segment mild stenosis.    No additional significant stenosis/occlusion.      No brain aneurysm. No AVM/AVF.    Standard [place] of [place] anatomy.     Dominant left and smaller right vertebral artery contribute to a normal basilar artery.     DURAL VENOUS SINUSES: Not well evaluated on a technical basis.      NECK CTA:  RIGHT CAROTID: No significant stenosis/occlusion. No dissection.    LEFT CAROTID: No significant stenosis/occlusion. No dissection.    VERTEBRAL ARTERIES:  No significant stenosis/occlusion. No dissection.      Dominant left and smaller right vertebral arteries.    AORTIC ARCH: Classic aortic arch anatomy with no significant stenosis at the origin of the great vessels.    ARTERIAL PLAQUE: Calcified and to a lesser extent noncalcified plaque within the aorta, left subclavian artery, right brachiocephalic artery, right subclavian artery, bilateral chronic calcifications/bulbs, bilateral carotid siphons, left V4 segment.    NONVASCULAR STRUCTURES:   Multifocal foci of air within the venous system nonspecific likely iatrogenic.  Degenerative changes noted within the spine. Multilevel severe central canal stenosis. Right anterior nasal polyp.          Impression     IMPRESSION:   HEAD CTA:   No intracranial arterial large vessel occlusion.    Right precavernous ICA mild stenosis. Right distal carotid siphon moderate stenosis.    Left distal carotid siphon severe stenosis.    Left proximal V4 segment mild stenosis.    No additional significant stenosis/occlusion.        NECK CTA:  1.  No significant stenosis, occlusion, dissection.      Dr Gretchen Baez  was notified by Dr Edward Andrew at  7:20 AM 05/26/2020.   MR Brain w/o & w Contrast    Narrative    MRI OF THE BRAIN WITHOUT AND WITH CONTRAST  5/26/2022 10:16 AM     HISTORY:  Neuro deficit, acute, stroke suspected.     COMPARISON: Head CT and CTA 5/26/2022.    TECHNIQUE: Axial diffusion-weighted with ADC map, T2-weighted with fat  saturation, T1-weighted and turboFLAIR and coronal T1-weighted images  of the brain were obtained without intravenous contrast.  Following 6  mL Gadavist IV, axial turboFLAIR and coronal T1-weighted images of the  brain were obtained.     FINDINGS:   INTRACRANIAL CONTENTS: Faint increased diffusion signal within the  right frontal centrum semiovale (axial image 31) without ADC  correlate, otherwise no diffusion abnormality. No mass, acute  hemorrhage, or extra-axial fluid collections. Patchy nonspecific T2  hyperintensities throughout the cerebral white matter most consistent  with moderate chronic microvascular ischemic change. Moderate  generalized parenchymal volume loss. No hydrocephalus. Normal position  of the cerebellar tonsils. No pathologic enhancement.    SELLA: No significant abnormality accounting for technique.    OSSEOUS STRUCTURES/SOFT TISSUES: No aggressive osseous lesion  involving the calvarium, skull base, or visualized upper cervical  spine. The major intracranial vascular flow voids are maintained.    ORBITS: No significant abnormality  accounting for technique.    SINUSES/MASTOIDS: No significant paranasal sinus mucosal disease. No  significant middle ear or mastoid effusion.       Impression    IMPRESSION:  1.  Faint increased diffusion signal within the right frontal white  matter without ADC correlate, likely representing recent white matter  ischemic change. No other evidence for recent infarction.  2.  Moderate generalized volume loss and chronic microvascular  ischemic change.    ANDREI BARON MD         SYSTEM ID:  [id number]   Echocardiogram Complete - For age > 60 yrs     Value    LVEF  60-65%    Narrative    [id number]  [id number]  [id number]  [id number]^EVA^[place]     [facility]  Echocardiography Laboratory  [address] [place] [id number]     Name: MITZY DICKERSON  MRN: [id number]  : 1934  Study Date: 2022 11:49 AM  Age: 87 yrs  Gender: Male  Patient Location: [facility]  Reason For Study: Cerebrovascular Incident  Ordering Physician: EVA GUADARRAMA  Referring Physician: Zaki Alaniz,  Performed By: Mariely Pelaez     BSA: 1.8 m2  Height: 67 in  Weight: 143 lb  HR: 87  BP: 154/91 mmHg  ______________________________________________________________________________  Procedure  Complete Portable Echo Adult.  ______________________________________________________________________________  Interpretation Summary     The visual ejection fraction is 60-65%.  Left ventricular systolic function is normal.  Moderate aortic root dilatation.  The ascending aorta is Mildly dilated.  There is mild (1+) aortic regurgitation.  The study was technically difficult.  ______________________________________________________________________________  Left Ventricle  The left ventricle is normal in size. There is normal left ventricular wall  thickness. The visual ejection fraction is 60-65%. Left ventricular systolic  function is normal. Grade I or early diastolic dysfunction. Diastolic Doppler  findings (E/E'  ratio and/or other parameters) suggest left ventricular filling  pressures are indeterminate. Septal motion is consistent with conduction  abnormality.     Right Ventricle  The right ventricle is normal in size and function.     Atria  Normal left atrial size. Right atrial size is normal. Lipomatous hypertrophy  of the interatrial septum is noted. There is no color Doppler evidence of an  atrial shunt.     Mitral Valve  The mitral valve leaflets are moderately thickened. There is mild mitral  annular calcification.     Tricuspid Valve  There is mild (1+) tricuspid regurgitation.     Aortic Valve  The aortic valve is trileaflet. There is mild (1+) aortic regurgitation. No  hemodynamically significant valvular aortic stenosis.     Pulmonic Valve  There is trace pulmonic valvular regurgitation. Normal pulmonic valve  velocity.     Vessels  Moderate aortic root dilatation. The ascending aorta is Mildly dilated. IVC  diameter <2.1 cm collapsing >50% with sniff suggests a normal RA pressure of 3  mmHg.     Pericardium  There is no pericardial effusion.     Rhythm  Sinus rhythm was noted.  ______________________________________________________________________________  MMode/2D Measurements & Calculations  IVSd: 1.0 cm     LVIDd: 4.3 cm  LVIDs: 3.0 cm  LVPWd: 1.1 cm  FS: 29.7 %  LV mass(C)d: 159.1 grams  LV mass(C)dI: 90.7 grams/m2  Ao root diam: 4.8 cm  LA dimension: 1.9 cm  asc Aorta Diam: 4.3 cm  LA/Ao: 0.39  LVOT diam: 2.0 cm  LVOT area: 3.2 cm2  LA Volume (BP): 24.7 ml  LA Volume Index (BP): 14.1 ml/m2  RWT: 0.52     Doppler Measurements & Calculations  MV E max yuri: 50.4 cm/sec  MV A max yuri: 96.0 cm/sec  MV E/A: 0.52  MV max P.3 mmHg  MV mean P.1 mmHg  MV V2 VTI: 18.8 cm  MVA(VTI): 2.4 cm2  MV dec slope: 141.9 cm/sec2  AI P1/2t: 273.2 msec  LV V1 max P.9 mmHg  LV V1 max: 84.8 cm/sec  LV V1 VTI: 13.7 cm  SV(LVOT): 44.6 ml  SI(LVOT): 25.4 ml/m2  PA acc time: 0.10 sec  E/E' av.8  Lateral E/e':  8.3  [facility] E/e': 11.3     ______________________________________________________________________________  Report approved by: Jodie Rush 05/27/2022 01:24 PM               Discharge Medications   Current Discharge Medication List      START taking these medications    Details   aspirin (ASA) 81 MG EC tablet Take 1 tablet (81 mg) by mouth daily  Qty: 30 tablet, Refills: 3    Associated Diagnoses: TIA (transient ischemic attack)      atorvastatin (LIPITOR) 10 MG tablet 1 tablet (10 mg) by Oral or Feeding Tube route every evening  Qty: 30 tablet, Refills: 0    Associated Diagnoses: TIA (transient ischemic attack)      clopidogrel (PLAVIX) 75 MG tablet Take 1 tablet (75 mg) by mouth daily  Qty: 90 tablet, Refills: 0    Associated Diagnoses: TIA (transient ischemic attack)         CONTINUE these medications which have NOT CHANGED    Details   butalbital-acetaminophen-caffeine (ESGIC) -40 MG tablet Take 1 tablet by mouth every 4 hours as needed for headaches      cholestyramine (QUESTRAN) 4 G packet Take 1 packet by mouth 2 times daily (with meals)      famotidine (PEPCID) 40 MG tablet Take 40 mg by mouth At Bedtime      finasteride (PROSCAR) 5 MG tablet Take 5 mg by mouth daily      fluticasone (FLONASE) 50 MCG/ACT nasal spray Spray 1 spray into both nostrils daily as needed for rhinitis or allergies      magnesium 250 MG tablet Take 1 tablet by mouth every evening      memantine (NAMENDA) 10 MG tablet Take 10 mg by mouth 2 times daily      Multiple Vitamin (MULTI-VITAMIN) per tablet Take 1 tablet by mouth daily      multivitamin  with lutein (OCUVITE WITH LTEIN) CAPS per capsule Take 1 capsule by mouth daily      sodium chloride (OCEAN) 0.65 % nasal spray Spray 1 spray into both nostrils daily as needed for congestion      vitamin B-12 (CYANOCOBALAMIN) 1000 MCG tablet Take 1,000 mcg by mouth daily Noon      vitamin C (ASCORBIC ACID) 500 MG tablet Take 500 mg by mouth daily      vitamin D3  (CHOLECALCIFEROL) 2000 units (50 mcg) tablet Take 4,000 Units by mouth daily Noon      zinc sulfate (ZINCATE) 220 (50 Zn) MG capsule Take 220 mg by mouth daily noon           Allergies   Allergies   Allergen Reactions     Chocolate Anaphylaxis     headache     Feathers      Novocaine [Procaine]      Passes out almost immediately     [place] [facility] Disturbance     Most berries

## 2022-05-27 NOTE — CONSULTS
05/27/22 0948 Lorna Nj RN     Stroke Education Note     The following information has been reviewed with the patient and family:     1. Warning signs of stroke     2. Calling 911 if having warning signs of stroke     3. All modifiable risk factors: hypertension, CAD, atrial fib, diabetes, hypercholesterolemia, smoking, substance abuse, diet, physical inactivity, obesity, sleep apnea.     4. Patient's risk factors for stroke which include: physical inactivity,      5. Follow-up plan for after discharge     6. Discharge medications which include: ASA, LIPITOR     In addition, the above information was given to the patient and family in writing as a part of the [facility] Stroke Class Handout.     Learner's response to risk factors / lifestyle modification education: Activation Taking steps      Lorna Nj RN,

## 2022-05-27 NOTE — CONSULTS
[facility]    Stroke Consult Note    Reason for Consult:  stroke    Chief Complaint: Altered Mental Status       HPI  Jean Pierre Roblero is a 87 year old male with past medical history significant for dementia and headaches who was brought to the [facility] ED 5/26 for evaluation after a fall.  On the morning of arrival, he walked to the bathroom around 330 and his wife heard him fall.  He seemed okay when he got up and he returned to his chair and fell asleep.  Around 0600, he was found unresponsive.  Initial BP was 60/40, and he was incontinent of urine.  In the ED he was noted to be responsive but with left-sided neglect, left facial droop, and left lower extremity weakness that improved over the course of his neurologic exam.    Today he reports feeling entirely back to baseline. He reports that there are parts of this event that he does not remember, but this can sometimes be normal as he does have memory problems.    Stroke Evaluation Summarized    MRI/Head CT  MRI brain with faint area of restricted diffusion in the right frontal lobe, likely representing recent white matter ischemic change   Intracranial Vasculature  CTA head without significant intracranial stenosis, left distal carotid siphon severe stenosis   Cervical Vasculature  CTA neck unremarkable     Echocardiogram EF 60-65%, normal LA size, lipomatous hypertrophy of the interatrial septum   EKG/Telemetry  sinus   Other Testing Not Applicable     LDL  5/26/2022: 64 mg/dL   A1C  5/26/2022: 5.4 %   Troponin No lab value available in past 48 hrs       Impression  Acute ischemic stroke of right frontal lobe in the setting of hypotension and right ICA and MCA stenoses.     Recommendations  - Load with Plavix 300 mg today, then DAPT with ASA 81 mg + Plavix 75 mg for 90 days, then ASA 81 mg alone indefinitely   - Lipitor 10 mg daily, goal LDL 40-70. LDL <40 associated with increased risk of ICH  - A1c within goal <7.0  - Goal BP  "<140/90 with tighter control associated with decreased overall CV risk, if tolerated. Avoid hypotension.   - Therapies, as needed  - PLC stroke education  - Consider outpatient EEG given incomplete account of morning of presentation. Suspect this is secondary to hypotension and neuro deficits and less likely seizure.     Patient Follow-up    - in the next 1-2 week(s) with PCP  - in 6-8 weeks with [facility] or other local neurologist of patient's choice    Thank you for this consult. No further stroke evaluation is recommended, so we will sign off. Please contact us with any additional questions.    JAY Barlow, CNP  Neurology  To page me or covering stroke neurology team member, click here: [facility]COM   Choose \"On Call\" tab at top, then search dropdown box for \"Neurology Adult\", select location, press Enter, then look for stroke/neuro ICU/telestroke.    _____________________________________________________    Clinically Significant Risk Factors Present on Admission                     Past Medical History   Past Medical History:   Diagnosis Date     Allergic state      Fainting episodes     FREQUENT/ EASILY     Gastroesophageal reflux disease      Headaches      Heartburn      Nocturia      Photosensitivity      Swallowing difficulty      Past Surgical History   Past Surgical History:   Procedure Laterality Date     BACK SURGERY       CHOLECYSTECTOMY       ENDOSCOPIC SINUS SURGERY  8/20/2012    Procedure: ENDOSCOPIC SINUS SURGERY;  ENDOSCOPIC REMOVAL OF LEFT NASAL MASS WITH LANDMARKS (Fusion Tracking [place]), BILATERAL INFERIOR TURBINOPLASTY;  Surgeon: Dima Younger MD;  Location: [facility]     EYE SURGERY      cataracts     HERNIA REPAIR       ORTHOPEDIC SURGERY      ingrown toe nail removal     TURBINOPLASTY  8/20/2012    Procedure: TURBINOPLASTY;;  Surgeon: Dima Younger MD;  Location: [facility]     Medications   Home Meds  Prior to Admission medications    Medication Sig Start Date End " Date Taking? Authorizing Provider   butalbital-acetaminophen-caffeine (ESGIC) -40 MG tablet Take 1 tablet by mouth every 4 hours as needed for headaches   Yes Unknown, Entered By History   cholestyramine (QUESTRAN) 4 G packet Take 1 packet by mouth 2 times daily (with meals)   Yes Reported, Patient   famotidine (PEPCID) 40 MG tablet Take 40 mg by mouth At Bedtime   Yes Unknown, Entered By History   finasteride (PROSCAR) 5 MG tablet Take 5 mg by mouth daily   Yes Unknown, Entered By History   fluticasone (FLONASE) 50 MCG/ACT nasal spray Spray 1 spray into both nostrils daily as needed for rhinitis or allergies   Yes Reported, Patient   magnesium 250 MG tablet Take 1 tablet by mouth every evening   Yes Unknown, Entered By History   memantine (NAMENDA) 10 MG tablet Take 10 mg by mouth 2 times daily   Yes Unknown, Entered By History   Multiple Vitamin (MULTI-VITAMIN) per tablet Take 1 tablet by mouth daily   Yes Reported, Patient   multivitamin  with lutein (OCUVITE WITH LTEIN) CAPS per capsule Take 1 capsule by mouth daily   Yes Unknown, Entered By History   sodium chloride (OCEAN) 0.65 % nasal spray Spray 1 spray into both nostrils daily as needed for congestion   Yes Unknown, Entered By History   vitamin B-12 (CYANOCOBALAMIN) 1000 MCG tablet Take 1,000 mcg by mouth daily Noon   Yes Unknown, Entered By History   vitamin C (ASCORBIC ACID) 500 MG tablet Take 500 mg by mouth daily   Yes Unknown, Entered By History   vitamin D3 (CHOLECALCIFEROL) 2000 units (50 mcg) tablet Take 4,000 Units by mouth daily Noon   Yes Unknown, Entered By History   zinc sulfate (ZINCATE) 220 (50 Zn) MG capsule Take 220 mg by mouth daily noon   Yes Unknown, Entered By History       Scheduled Meds    aspirin  81 mg Oral Daily    Or     aspirin  81 mg Per Feeding Tube Daily     atorvastatin  10 mg Oral or Feeding Tube QPM     famotidine  40 mg Oral Daily     finasteride  5 mg Oral Daily     memantine  10 mg Oral BID     sodium chloride (PF)   3 mL Intracatheter Q8H       Infusion Meds    - MEDICATION INSTRUCTIONS -       - MEDICATION INSTRUCTIONS -         PRN Meds  acetaminophen, fluticasone, lidocaine 4%, lidocaine (buffered or not buffered), - MEDICATION INSTRUCTIONS -, - MEDICATION INSTRUCTIONS -, melatonin, ondansetron **OR** ondansetron, polyethylene glycol, sodium chloride (PF)    Allergies   Allergies   Allergen Reactions     Chocolate Anaphylaxis     headache     Feathers      Novocaine [Procaine]      Passes out almost immediately     [place] [facility] Disturbance     Most berries     Family History   No family history on file.  Social History   Social History     Tobacco Use     Smoking status: Never Smoker     Smokeless tobacco: Never Used   Substance Use Topics     Alcohol use: No     Drug use: No       Review of Systems   The 10 point Review of Systems is negative other than noted in the HPI or here.        PHYSICAL EXAMINATION   Temp:  [97.4  F (36.3  C)-99.2  F (37.3  C)] 98.3  F (36.8  C)  Pulse:  [] 82  Resp:  [9-24] 18  BP: (130-164)/(72-97) 147/84  SpO2:  [96 %-100 %] 96 %    Neurologic  Mental Status:  alert, oriented x 3, follows commands, speech clear and fluent, naming and repetition normal  Cranial Nerves:  visual fields intact, PERRL, EOMI with normal smooth pursuit, facial sensation intact and symmetric, facial movements symmetric, hearing not formally tested but intact to conversation, no dysarthria, tongue protrusion midline  Motor:  normal muscle tone and bulk, no abnormal movements, able to move all limbs spontaneously, no pronator drift  Reflexes:  deferred   Sensory:  light touch sensation intact and symmetric throughout upper and lower extremities, no extinction on double simultaneous stimulation   Coordination:  FTN without dysmetria  Station/Gait:  deferred    Dysphagia Screen  Per Nursing    Stroke Scales    NIHSS  1a. Level of Consciousness 0-->Alert, keenly responsive   1b. LOC Questions 0-->Answers both  questions correctly   1c. LOC Commands 0-->Performs both tasks correctly   2.   Best Gaze 0-->Normal   3.   Visual 0-->No visual loss   4.   Facial Palsy 0-->Normal symmetrical movements   5a. Motor Arm, Left 0-->No drift, limb holds 90 (or 45) degrees for full 10 secs   5b. Motor Arm, Right 0-->No drift, limb holds 90 (or 45) degrees for full 10 secs   6a. Motor Leg, Left 0-->No drift, leg holds 30 degree position for full 5 secs   6b. Motor Leg, right 0-->No drift, leg holds 30 degree position for full 5 secs   7.   Limb Ataxia 0-->Absent   8.   Sensory 0-->Normal, no sensory loss   9.   Best Language 0-->No aphasia, normal   10. Dysarthria 0-->Normal   11. Extinction and Inattention  0-->No abnormality   Total 0 (05/27/22 1418)       Modified [place] Score (Pre-morbid)  1 - No significant disability.  Able to carry out all usual activities, despite some symptoms.    Imaging  I personally reviewed all imaging; relevant findings per HPI.    Labs Data   CBC  Recent Labs   Lab 05/26/22  0640   WBC 7.7   RBC 4.03*   HGB 13.2*   HCT 39.9*   *     Basic Metabolic Panel   Recent Labs   Lab 05/26/22  1247 05/26/22  0640   NA  --  139   POTASSIUM  --  4.1   CHLORIDE  --  108   CO2  --  26   BUN  --  15   CR  --  1.05   * 133*   KENYA  --  8.6     Liver Panel  No results for input(s): PROTTOTAL, ALBUMIN, BILITOTAL, ALKPHOS, AST, ALT, BILIDIRECT in the last 168 hours.  INR    Recent Labs   Lab Test 05/26/22  0640   INR 1.05      Lipid Profile    Recent Labs   Lab Test 05/26/22  0640   CHOL 128   HDL 45   LDL 64   TRIG 96     A1C    Recent Labs   Lab Test 05/26/22  0640   A1C 5.4     Troponin I    Recent Labs   Lab 05/26/22  1149 05/26/22  0640   TROPONINIS 12 12          Stroke Consult Data Data   This was a non-emergent, non-telestroke consult.  I have personally spent a total of 40 minutes providing care and consulting with this patient's medical providers today, with more than 50% of this time spent in  consultation, coordination of care, and discussion with the patient and/or family regarding diagnostic results, prognosis, symptom management, risks and benefits of management options, and development of plan of care.

## 2022-05-27 NOTE — PROGRESS NOTES
piv removed.  Discharge instructions including f/u care, new meds reviewed with pt and pt spouse.  Teach back used.  Pt to discharge once meds arrived.  BP soft this afternoon.  104/67.  Dr stephens paged re order for new hydrochlorothiazide given previous syncopal episdoe.  Update:  Hydrochlorothiazide discontinued prior to discharge

## 2022-05-27 NOTE — CONSULTS
Care Management Initial Consult    General Information  Assessment completed with: Patient, Spouse or significant other,    Type of CM/SW Visit: Offer D/C Planning    Primary Care Provider verified and updated as needed:     Readmission within the last 30 days:        Reason for Consult: discharge planning  Advance Care Planning:            Communication Assessment  Patient's communication style: spoken language (English or Bilingual)             Cognitive  Cognitive/Neuro/Behavioral: WDL  Level of Consciousness: alert  Arousal Level: opens eyes spontaneously  Orientation: oriented x 4  Mood/Behavior: calm  Best Language: 0 - No aphasia  Speech: clear    Living Environment:   People in home: spouse     Current living Arrangements:        Able to return to prior arrangements: yes       Family/Social Support:  Care provided by: self  Provides care for:    Marital Status:              Description of Support System: Supportive, Involved         Current Resources:   Patient receiving home care services: No     Community Resources: None  Equipment currently used at home: cane, straight, walker, rolling  Supplies currently used at home:      Employment/Financial:  Employment Status: retired        Financial Concerns:             Lifestyle & Psychosocial Needs:  Social Determinants of Health     Tobacco Use: Not on file   Alcohol Use: Not on file   Financial Resource Strain: Not on file   Food Insecurity: Not on file   Transportation Needs: Not on file   Physical Activity: Not on file   Stress: Not on file   Social Connections: Not on file   Intimate Partner Violence: Not on file   Depression: Not on file   Housing Stability: Not on file       Functional Status:  Prior to admission patient needed assistance:              Mental Health Status:          Chemical Dependency Status:                Values/Beliefs:  Spiritual, Cultural Beliefs, Episcopal Practices, Values that affect care:                 Additional  Information:  Met with patient and family to discuss role in discharge planning. Pt lives indep with wife in houes, drives and is indep at baseline. No services in home. Reviewed recommendation for HHPT.  Discussed option of HHC vs OP PT.  Pt feels he will be homebound for awhile and would like HHC due to status of driving to OP PT.  Pt does not feel he needs a Home RN.    Pt/family was provided with the Medicare Compare list for Home Care.  Discussed associated Medicare star ratings to assist with choice for referrals/discharge planning Yes    Education was given to pt/family that star ratings are updated/maintained by Medicare and can be reviewed by visiting www.medicare.gov Yes    Pt/wife would like referral sent to [facility].  Referral given to Salma, Liaison who will see if they can accept.  Will await acceptance prior to adding to AVS.     Pt notes he has Follow-up with Local neurologist Dr. Vines in the next week.  Encouraged to keep that appointment    PCP follow up made and added to AVS. Follow up with primary care provider, Zaki Alaniz, Monday June 6th 1pm for hospital follow-up and blood pressure check.         Terri Mcpherson RN

## 2022-05-27 NOTE — PROGRESS NOTES
Occupational Therapy Discharge Summary    Reason for therapy discharge:    All goals and outcomes met, no further needs identified. rec home with assist. No further OT needs     Progress towards therapy goal(s). See goals on Care Plan in [facility] electronic health record for goal details.  Goals met    Therapy recommendation(s):    rec home with assist. no further OT needs. rec shower chair for home. pt and wife with good understanding of needs for discharge.

## 2022-05-27 NOTE — PROGRESS NOTES
05/27/22 1032   Quick Adds   Type of Visit Initial PT Evaluation   Living Environment   People in Home spouse   Current Living Arrangements house   Home Accessibility stairs to enter home;stairs within home   Number of Stairs, Main Entrance 2   Stair Railings, Main Entrance none   Number of Stairs, Within Home, Primary   (6+6 Rail on one side)   Transportation Anticipated car, drives self  (Rex does most the driving but wife can drive.)   Living Environment Comments Pateint is typically independent with all ADL's and IADL's. Amb with cane inside most of the time but does go without AD at times. Has a 2ww that he uses at night to go to the bathroom and sometimes when he firts gets up in the morning.   Self-Care   Usual Activity Tolerance good   Regular Exercise Yes   Activity/Exercise Type   (Stationary bike.)   Equipment Currently Used at Home cane, straight;walker, rolling   Fall history within last six months yes   Number of times patient has fallen within last six months   (1-2 wasn't sure)   Activity/Exercise/Self-Care Comment Granddaughter does the yard work   General Information   Onset of Illness/Injury or Date of Surgery 05/26/22   Referring Physician Zeny Brown   Patient/Family Therapy Goals Statement (PT) To go home.   Pertinent History of Current Problem (include personal factors and/or comorbidities that impact the POC) Jean Pierre Roblero is a 87 year old male with hx of mild dementia and recurrent syncope who was admitted on 5/26/2022 for possible TIA/stroke   Existing Precautions/Restrictions no known precautions/restrictions   General Observations Patients wife present and very supportive. Pateint slow to answer and very [place]. Hearing aids in.   Cognition   Affect/Mental Status (Cognition) WNL   Orientation Status (Cognition) oriented to;person;place;situation   Follows Commands (Cognition)   (Needs commands repeated frequently.)   Cognitive Status Comments Per chart patient had mild dementia.    Pain Assessment   Patient Currently in Pain No   Posture    Posture Forward head position   Range of Motion (ROM)   Range of Motion ROM is WFL   Strength (Manual Muscle Testing)   Strength Comments Bilateral LE strength grossly 4/5 but patient reports right LE stronger and functionally noted this when doing stairs.   Bed Mobility   Comment, (Bed Mobility) Did not assess, up in chair.   Transfers   Comment, (Transfers) Sit to stand with cues to push from the sitting surface. Guarded with initial standing.   Gait/Stairs (Locomotion)   Comment, (Gait/Stairs) Gait without AD 10 feet in room with no romero LOB but unsteady and very slow. CGA provided.   Balance   Balance Comments Can maintain static standing balance once balance gained. Impaired dynamic balance.   Sensory Examination   Sensory Perception WNL   Clinical Impression   Criteria for Skilled Therapeutic Intervention Yes, treatment indicated   PT Diagnosis (PT) Impaired balance and functional strength especially noted on stairs.   Influenced by the following impairments Decreased balance, Functional strength LE's decreased   Functional limitations due to impairments Needs CGA with cane for amb and min A on stairs when no rail is present.   Clinical Presentation (PT Evaluation Complexity) Stable/Uncomplicated   Clinical Presentation Rationale <3 factors affecting functional mobility   Clinical Decision Making (Complexity) low complexity   Planned Therapy Interventions (PT) balance training;gait training;stair training   Anticipated Equipment Needs at Discharge (PT)   (Has a cane and walker already.)   Risk & Benefits of therapy have been explained evaluation/treatment results reviewed;care plan/treatment goals reviewed;risks/benefits reviewed;current/potential barriers reviewed;participants voiced agreement with care plan;participants included;patient;spouse/significant other   PT Discharge Planning   PT Discharge Recommendation (DC Rec) home with assist;home  with home care physical therapy   PT Rationale for DC Rec Wife observes session and reports he is very close to baseline. Therapist initially provided CGA but progressed to SBA with amb using cane. He has 2 steps at home without a rail and patient currently needs min A when not using a rail. Therapist had wife perform this and she was able to demonstrate this adequately. He would benefit from HHPT to specifically work on independence on 2 steps without a rail or have therapist provide information on getting a rail/some support on these steps. He would also benefit from improving balance with the cane and increasing correct use of the cane. If patient doesn't qualify for HHPT as he does go out of the home for more than MD appts, then recommend OPPT. Discussed with pateint and wife and they are agreeable.   PT Brief overview of current status Up with SBA with his own cane that is in the room.   Total Evaluation Time   Total Evaluation Time (Minutes) 14   Physical Therapy Goals   PT Frequency Daily   PT Predicted Duration/Target Date for Goal Attainment 05/29/22   PT Goals Transfers;Gait;Stairs   PT: Transfers Modified independent;Sit to/from stand;Bed to/from chair;Assistive device   PT: Gait Modified independent;Straight cane;Greater than 200 feet   PT: Stairs 2 stairs;Modified independent

## 2022-06-01 NOTE — UTILIZATION REVIEW
Admission Status; Secondary Review Determination       As part of the [facility] Utilization review plan, a self-audit is done on Medicare inpatient admission with less than 2 midnights stay. The 2014 IPPS Final Rule allows outpatient billing in the event that a hospital determines that an inpatient admission was not medically necessary under utilization review process.          (x) Outpatient status would be Appropriate- Short Stay- Post discharge review.     RATIONALE FOR DETERMINATION   87 year old male with hx of mild dementia and recurrent syncope who was admitted on 5/26/2022 for suspected TIA   * Symptoms resolved in the ED; TNKase deferred in consultation with Stroke Neuro  * Brain MRI (5/26) showed faint increased diffusion signal in the right frontal matter suggestive of recent ischemia  This account and medical record number for a Medicare beneficiary was an inpatient short stay (<2 midnights) and didn't meet medical necessity for inpatient admission. We recommend billing outpatient services based on the severity of illness, intensity of service provided and the inpatient length of stay.  Please contact me within one week of receiving this letter only if you disagree with this determination. If you concur, no further action is needed.          The information on this document is developed by the utilization review team in order for the business office to ensure compliance.  This only denotes the appropriateness of proper admission status and does not reflect the quality of care rendered.         The definitions of Inpatient Status and Observation Status used in making the determination above are those provided in the CMS Coverage Manual, Chapter 1 and Chapter 6, section 70.4.      Sincerely,       CARTER GASPAR MD    System Medical Director  Utilization  Management  [facility].

## 2022-07-08 NOTE — PLAN OF CARE
[facility]      OUTPATIENT SPEECH LANGUAGE PATHOLOGY EVALUATION  PLAN OF TREATMENT FOR OUTPATIENT REHABILITATION  (COMPLETE FOR INITIAL CLAIMS ONLY)  Patient's Last Name, First Name, M.I.  YOB: 1934  Jean Pierre Roblero                        Provider's Name  [facility] Medical Record No.  [id number]                               Onset Date:  05/25/22  Start of Care Date:      Type:     ___PT   ___OT   _X_SLP Medical Diagnosis:            SLP Diagnosis:  clinically functional swallow  Visits from SOC:  1   ________________________________________________________________  Plan of Treatment/Functional Goals    Planned Interventions:                    Goals: See Speech Language Pathology Goals on Care Plan in [facility] electronic health record.    Therapy Frequency: evaluation only  Predicted Duration of Therapy Intervention:    ________________________________________________________________________________    I CERTIFY THE NEED FOR THESE SERVICES FURNISHED UNDER        THIS PLAN OF TREATMENT AND WHILE UNDER MY CARE     (Physician co-signature of this document indicates review and certification of the therapy plan).                       Referring Physician: Zeny Brown MD           Initial Assessment        See Speech Language Pathology documentation in Epic electronic health record, evaluation dated

## 2022-07-08 NOTE — PROGRESS NOTES
[facility]      OUTPATIENT OCCUPATIONAL THERAPY  EVALUATION  PLAN OF TREATMENT FOR OUTPATIENT REHABILITATION  (COMPLETE FOR INITIAL CLAIMS ONLY)  Patient's Last Name, First Name, M.I.  YOB: 1934  Jean Pierre Roblero                          Provider's Name  [facility] Medical Record No.  [id number]                               Onset Date:  05/26/22   Start of Care Date:  07/08/22     Type:     ___PT   _X_OT   ___SLP Medical Diagnosis:  TIA                        OT Diagnosis:  decreased function in ADL   Visits from SOC:  1   _________________________________________________________________________________  Plan of Treatment/Functional Goals    Planned Interventions: ADL retraining, IADL retraining, home program guidelines, progressive activity/exercise   Goals: See Occupational Therapy Goals on Care Plan in [employer] electronic health record.    Therapy Frequency: One time eval and treatment  Predicted Duration of Therapy Intervention: 05/27/22  _________________________________________________________________________________    I CERTIFY THE NEED FOR THESE SERVICES FURNISHED UNDER        THIS PLAN OF TREATMENT AND WHILE UNDER MY CARE     (Physician co-signature of this document indicates review and certification of the therapy plan).              Certification date from: 07/08/22, Certification date to: 07/08/22    Referring Physician: Zeny Brown MD            Initial Assessment        See Occupational Therapy evaluation dated 07/08/22 in Epic electronic health record.

## 2022-07-14 NOTE — PLAN OF CARE
[facility]      OUTPATIENT PHYSICAL THERAPY EVALUATION  PLAN OF TREATMENT FOR OUTPATIENT REHABILITATION  (COMPLETE FOR INITIAL CLAIMS ONLY)  Patient's Last Name, First Name, M.I.  YOB: 1934  Jean Pierre Roblero                        Provider's Name  [facility] Medical Record No.  [id number]                               Onset Date:  05/26/22   Start of Care Date:  05/27/22      Type:     _X_PT   ___OT   ___SLP Medical Diagnosis:  TIA                        PT Diagnosis:  Impaired balance and functional strength especially noted on stairs.   Visits from SOC:  1   _________________________________________________________________________________  Plan of Treatment/Functional Goals    Planned Interventions: balance training, gait training, stair training     Goals: See Physical Therapy Goals on Care Plan in [employer] electronic health record.    Therapy Frequency: Daily  Predicted Duration of Therapy Intervention: 05/29/22  _________________________________________________________________________________    I CERTIFY THE NEED FOR THESE SERVICES FURNISHED UNDER        THIS PLAN OF TREATMENT AND WHILE UNDER MY CARE     (Physician co-signature of this document indicates review and certification of the therapy plan).              Certification date from: 05/27/22, Certification date to: 05/28/22    Referring Physician: Zeny Brown            Initial Assessment        See Physical Therapy evaluation dated 05/27/22 in Epic electronic health record.Goal Outcome Evaluation:

## 2022-08-13 ENCOUNTER — APPOINTMENT (OUTPATIENT)
Dept: CT IMAGING | Facility: CLINIC | Age: 87
DRG: 177 | End: 2022-08-13
Attending: EMERGENCY MEDICINE
Payer: MEDICARE

## 2022-08-13 ENCOUNTER — APPOINTMENT (OUTPATIENT)
Dept: ULTRASOUND IMAGING | Facility: CLINIC | Age: 87
DRG: 177 | End: 2022-08-13
Attending: EMERGENCY MEDICINE
Payer: MEDICARE

## 2022-08-13 ENCOUNTER — HOSPITAL ENCOUNTER (INPATIENT)
Facility: CLINIC | Age: 87
LOS: 6 days | Discharge: HOME | DRG: 177 | End: 2022-08-19
Attending: EMERGENCY MEDICINE | Admitting: INTERNAL MEDICINE
Payer: MEDICARE

## 2022-08-13 ENCOUNTER — HOSPITAL ENCOUNTER (EMERGENCY)
Facility: CLINIC | Age: 87
Discharge: HOME | DRG: 177 | End: 2022-08-13
Attending: EMERGENCY MEDICINE | Admitting: EMERGENCY MEDICINE
Payer: MEDICARE

## 2022-08-13 ENCOUNTER — APPOINTMENT (OUTPATIENT)
Dept: GENERAL RADIOLOGY | Facility: CLINIC | Age: 87
DRG: 177 | End: 2022-08-13
Attending: EMERGENCY MEDICINE
Payer: MEDICARE

## 2022-08-13 VITALS — OXYGEN SATURATION: 98 % | RESPIRATION RATE: 19 BRPM | TEMPERATURE: 99.5 F | HEART RATE: 103 BPM

## 2022-08-13 DIAGNOSIS — U07.1 INFECTION DUE TO 2019 NOVEL CORONAVIRUS: ICD-10-CM

## 2022-08-13 DIAGNOSIS — J18.9 PNEUMONIA DUE TO INFECTIOUS ORGANISM, UNSPECIFIED LATERALITY, UNSPECIFIED PART OF LUNG: ICD-10-CM

## 2022-08-13 DIAGNOSIS — R53.1 WEAKNESS: ICD-10-CM

## 2022-08-13 PROBLEM — N40.0 BPH (BENIGN PROSTATIC HYPERPLASIA): Status: ACTIVE | Noted: 2022-08-13

## 2022-08-13 PROBLEM — E87.1 HYPONATREMIA: Status: ACTIVE | Noted: 2022-08-13

## 2022-08-13 PROBLEM — G93.49 INFECTIOUS ENCEPHALOPATHY: Status: ACTIVE | Noted: 2022-08-13

## 2022-08-13 PROBLEM — E78.2 MIXED HYPERLIPIDEMIA: Status: ACTIVE | Noted: 2022-08-13

## 2022-08-13 PROBLEM — B99.9 INFECTIOUS ENCEPHALOPATHY: Status: ACTIVE | Noted: 2022-08-13

## 2022-08-13 PROBLEM — D69.6 THROMBOCYTOPENIA (H): Status: ACTIVE | Noted: 2022-08-13

## 2022-08-13 LAB
ALBUMIN SERPL-MCNC: 4 G/DL (ref 3.4–5)
ALBUMIN UR-MCNC: NEGATIVE MG/DL
ALBUMIN UR-MCNC: NEGATIVE MG/DL
ALP SERPL-CCNC: 76 U/L (ref 40–150)
ALT SERPL W P-5'-P-CCNC: 22 U/L (ref 0–70)
ANION GAP SERPL CALCULATED.3IONS-SCNC: 3 MMOL/L (ref 3–14)
ANION GAP SERPL CALCULATED.3IONS-SCNC: 6 MMOL/L (ref 3–14)
APPEARANCE UR: CLEAR
APPEARANCE UR: CLEAR
AST SERPL W P-5'-P-CCNC: 21 U/L (ref 0–45)
ATRIAL RATE - MUSE: 109 BPM
BASOPHILS # BLD AUTO: 0 10E3/UL (ref 0–0.2)
BASOPHILS # BLD AUTO: 0 10E3/UL (ref 0–0.2)
BASOPHILS NFR BLD AUTO: 0 %
BASOPHILS NFR BLD AUTO: 0 %
BILIRUB SERPL-MCNC: 0.6 MG/DL (ref 0.2–1.3)
BILIRUB UR QL STRIP: NEGATIVE
BILIRUB UR QL STRIP: NEGATIVE
BUN SERPL-MCNC: 13 MG/DL (ref 7–30)
BUN SERPL-MCNC: 13 MG/DL (ref 7–30)
CALCIUM SERPL-MCNC: 8.5 MG/DL (ref 8.5–10.1)
CALCIUM SERPL-MCNC: 8.8 MG/DL (ref 8.5–10.1)
CHLORIDE BLD-SCNC: 102 MMOL/L (ref 94–109)
CHLORIDE BLD-SCNC: 104 MMOL/L (ref 94–109)
CO2 SERPL-SCNC: 27 MMOL/L (ref 20–32)
CO2 SERPL-SCNC: 28 MMOL/L (ref 20–32)
COLOR UR AUTO: ABNORMAL
COLOR UR AUTO: ABNORMAL
CREAT SERPL-MCNC: 0.87 MG/DL (ref 0.66–1.25)
CREAT SERPL-MCNC: 0.96 MG/DL (ref 0.66–1.25)
CRP SERPL-MCNC: 19.6 MG/L (ref 0–8)
D DIMER PPP FEU-MCNC: 0.69 UG/ML FEU (ref 0–0.5)
DIASTOLIC BLOOD PRESSURE - MUSE: NORMAL MMHG
EOSINOPHIL # BLD AUTO: 0 10E3/UL (ref 0–0.7)
EOSINOPHIL # BLD AUTO: 0 10E3/UL (ref 0–0.7)
EOSINOPHIL NFR BLD AUTO: 0 %
EOSINOPHIL NFR BLD AUTO: 1 %
ERYTHROCYTE [DISTWIDTH] IN BLOOD BY AUTOMATED COUNT: 12.7 % (ref 10–15)
ERYTHROCYTE [DISTWIDTH] IN BLOOD BY AUTOMATED COUNT: 12.8 % (ref 10–15)
GFR SERPL CREATININE-BSD FRML MDRD: 76 ML/MIN/1.73M2
GFR SERPL CREATININE-BSD FRML MDRD: 83 ML/MIN/1.73M2
GLUCOSE BLD-MCNC: 101 MG/DL (ref 70–99)
GLUCOSE BLD-MCNC: 129 MG/DL (ref 70–99)
GLUCOSE UR STRIP-MCNC: NEGATIVE MG/DL
GLUCOSE UR STRIP-MCNC: NEGATIVE MG/DL
HCT VFR BLD AUTO: 42.2 % (ref 40–53)
HCT VFR BLD AUTO: 42.4 % (ref 40–53)
HGB BLD-MCNC: 14 G/DL (ref 13.3–17.7)
HGB BLD-MCNC: 14 G/DL (ref 13.3–17.7)
HGB UR QL STRIP: ABNORMAL
HGB UR QL STRIP: ABNORMAL
HOLD SPECIMEN: NORMAL
IMM GRANULOCYTES # BLD: 0 10E3/UL
IMM GRANULOCYTES # BLD: 0 10E3/UL
IMM GRANULOCYTES NFR BLD: 0 %
IMM GRANULOCYTES NFR BLD: 0 %
INTERPRETATION ECG - MUSE: NORMAL
KETONES UR STRIP-MCNC: 10 MG/DL
KETONES UR STRIP-MCNC: ABNORMAL MG/DL
LACTATE SERPL-SCNC: 1 MMOL/L (ref 0.7–2)
LEUKOCYTE ESTERASE UR QL STRIP: NEGATIVE
LEUKOCYTE ESTERASE UR QL STRIP: NEGATIVE
LYMPHOCYTES # BLD AUTO: 0.5 10E3/UL (ref 0.8–5.3)
LYMPHOCYTES # BLD AUTO: 0.7 10E3/UL (ref 0.8–5.3)
LYMPHOCYTES NFR BLD AUTO: 10 %
LYMPHOCYTES NFR BLD AUTO: 16 %
MCH RBC QN AUTO: 33.1 PG (ref 26.5–33)
MCH RBC QN AUTO: 33.3 PG (ref 26.5–33)
MCHC RBC AUTO-ENTMCNC: 33 G/DL (ref 31.5–36.5)
MCHC RBC AUTO-ENTMCNC: 33.2 G/DL (ref 31.5–36.5)
MCV RBC AUTO: 100 FL (ref 78–100)
MCV RBC AUTO: 100 FL (ref 78–100)
MONOCYTES # BLD AUTO: 0.3 10E3/UL (ref 0–1.3)
MONOCYTES # BLD AUTO: 0.5 10E3/UL (ref 0–1.3)
MONOCYTES NFR BLD AUTO: 10 %
MONOCYTES NFR BLD AUTO: 6 %
NEUTROPHILS # BLD AUTO: 3.4 10E3/UL (ref 1.6–8.3)
NEUTROPHILS # BLD AUTO: 4.1 10E3/UL (ref 1.6–8.3)
NEUTROPHILS NFR BLD AUTO: 74 %
NEUTROPHILS NFR BLD AUTO: 83 %
NITRATE UR QL: NEGATIVE
NITRATE UR QL: NEGATIVE
NRBC # BLD AUTO: 0 10E3/UL
NRBC # BLD AUTO: 0 10E3/UL
NRBC BLD AUTO-RTO: 0 /100
NRBC BLD AUTO-RTO: 0 /100
P AXIS - MUSE: 77 DEGREES
PH UR STRIP: 7 [PH] (ref 5–7)
PH UR STRIP: 7.5 [PH] (ref 5–7)
PLATELET # BLD AUTO: 111 10E3/UL (ref 150–450)
PLATELET # BLD AUTO: 122 10E3/UL (ref 150–450)
POTASSIUM BLD-SCNC: 3.9 MMOL/L (ref 3.4–5.3)
POTASSIUM BLD-SCNC: 4.3 MMOL/L (ref 3.4–5.3)
PR INTERVAL - MUSE: 172 MS
PROCALCITONIN SERPL-MCNC: <0.05 NG/ML
PROT SERPL-MCNC: 6.7 G/DL (ref 6.8–8.8)
QRS DURATION - MUSE: 76 MS
QT - MUSE: 328 MS
QTC - MUSE: 441 MS
R AXIS - MUSE: -12 DEGREES
RBC # BLD AUTO: 4.21 10E6/UL (ref 4.4–5.9)
RBC # BLD AUTO: 4.23 10E6/UL (ref 4.4–5.9)
RBC URINE: 10 /HPF
RBC URINE: 5 /HPF
SARS-COV-2 RNA RESP QL NAA+PROBE: POSITIVE
SODIUM SERPL-SCNC: 132 MMOL/L (ref 133–144)
SODIUM SERPL-SCNC: 138 MMOL/L (ref 133–144)
SP GR UR STRIP: 1.01 (ref 1–1.03)
SP GR UR STRIP: 1.02 (ref 1–1.03)
SYSTOLIC BLOOD PRESSURE - MUSE: NORMAL MMHG
T AXIS - MUSE: 72 DEGREES
UROBILINOGEN UR STRIP-MCNC: NORMAL MG/DL
UROBILINOGEN UR STRIP-MCNC: NORMAL MG/DL
VENTRICULAR RATE- MUSE: 109 BPM
WBC # BLD AUTO: 4.6 10E3/UL (ref 4–11)
WBC # BLD AUTO: 5 10E3/UL (ref 4–11)
WBC URINE: 0 /HPF
WBC URINE: 1 /HPF

## 2022-08-13 PROCEDURE — 81001 URINALYSIS AUTO W/SCOPE: CPT | Performed by: EMERGENCY MEDICINE

## 2022-08-13 PROCEDURE — 99223 1ST HOSP IP/OBS HIGH 75: CPT | Mod: AI | Performed by: INTERNAL MEDICINE

## 2022-08-13 PROCEDURE — 258N000003 HC RX IP 258 OP 636: Performed by: INTERNAL MEDICINE

## 2022-08-13 PROCEDURE — C9803 HOPD COVID-19 SPEC COLLECT: HCPCS

## 2022-08-13 PROCEDURE — 84145 PROCALCITONIN (PCT): CPT | Performed by: INTERNAL MEDICINE

## 2022-08-13 PROCEDURE — 93971 EXTREMITY STUDY: CPT | Mod: RT

## 2022-08-13 PROCEDURE — 80053 COMPREHEN METABOLIC PANEL: CPT | Performed by: EMERGENCY MEDICINE

## 2022-08-13 PROCEDURE — 85379 FIBRIN DEGRADATION QUANT: CPT | Performed by: INTERNAL MEDICINE

## 2022-08-13 PROCEDURE — 250N000013 HC RX MED GY IP 250 OP 250 PS 637: Performed by: INTERNAL MEDICINE

## 2022-08-13 PROCEDURE — 250N000011 HC RX IP 250 OP 636: Performed by: EMERGENCY MEDICINE

## 2022-08-13 PROCEDURE — 36415 COLL VENOUS BLD VENIPUNCTURE: CPT | Performed by: INTERNAL MEDICINE

## 2022-08-13 PROCEDURE — 36415 COLL VENOUS BLD VENIPUNCTURE: CPT | Performed by: EMERGENCY MEDICINE

## 2022-08-13 PROCEDURE — U0005 INFEC AGEN DETEC AMPLI PROBE: HCPCS | Performed by: EMERGENCY MEDICINE

## 2022-08-13 PROCEDURE — XW043E5 INTRODUCTION OF REMDESIVIR ANTI-INFECTIVE INTO CENTRAL VEIN, PERCUTANEOUS APPROACH, NEW TECHNOLOGY GROUP 5: ICD-10-PCS | Performed by: INTERNAL MEDICINE

## 2022-08-13 PROCEDURE — 87086 URINE CULTURE/COLONY COUNT: CPT | Performed by: EMERGENCY MEDICINE

## 2022-08-13 PROCEDURE — 85025 COMPLETE CBC W/AUTO DIFF WBC: CPT | Performed by: EMERGENCY MEDICINE

## 2022-08-13 PROCEDURE — 99285 EMERGENCY DEPT VISIT HI MDM: CPT | Mod: 25

## 2022-08-13 PROCEDURE — 120N000001 HC R&B MED SURG/OB

## 2022-08-13 PROCEDURE — G1010 CDSM STANSON: HCPCS

## 2022-08-13 PROCEDURE — 82803 BLOOD GASES ANY COMBINATION: CPT

## 2022-08-13 PROCEDURE — 83605 ASSAY OF LACTIC ACID: CPT

## 2022-08-13 PROCEDURE — 82310 ASSAY OF CALCIUM: CPT | Performed by: EMERGENCY MEDICINE

## 2022-08-13 PROCEDURE — 96368 THER/DIAG CONCURRENT INF: CPT

## 2022-08-13 PROCEDURE — 83605 ASSAY OF LACTIC ACID: CPT | Performed by: EMERGENCY MEDICINE

## 2022-08-13 PROCEDURE — 250N000011 HC RX IP 250 OP 636: Performed by: INTERNAL MEDICINE

## 2022-08-13 PROCEDURE — 71046 X-RAY EXAM CHEST 2 VIEWS: CPT

## 2022-08-13 PROCEDURE — 86140 C-REACTIVE PROTEIN: CPT | Performed by: INTERNAL MEDICINE

## 2022-08-13 PROCEDURE — 96365 THER/PROPH/DIAG IV INF INIT: CPT

## 2022-08-13 PROCEDURE — 250N000013 HC RX MED GY IP 250 OP 250 PS 637: Performed by: EMERGENCY MEDICINE

## 2022-08-13 PROCEDURE — 96361 HYDRATE IV INFUSION ADD-ON: CPT

## 2022-08-13 PROCEDURE — 258N000003 HC RX IP 258 OP 636: Performed by: EMERGENCY MEDICINE

## 2022-08-13 PROCEDURE — 93005 ELECTROCARDIOGRAM TRACING: CPT

## 2022-08-13 PROCEDURE — 96367 TX/PROPH/DG ADDL SEQ IV INF: CPT

## 2022-08-13 PROCEDURE — 87040 BLOOD CULTURE FOR BACTERIA: CPT | Performed by: EMERGENCY MEDICINE

## 2022-08-13 RX ORDER — ONDANSETRON 4 MG/1
4 TABLET, ORALLY DISINTEGRATING ORAL EVERY 6 HOURS PRN
Status: DISCONTINUED | OUTPATIENT
Start: 2022-08-13 | End: 2022-08-19 | Stop reason: HOSPADM

## 2022-08-13 RX ORDER — ASPIRIN 81 MG/1
81 TABLET ORAL DAILY
Status: DISCONTINUED | OUTPATIENT
Start: 2022-08-13 | End: 2022-08-19 | Stop reason: HOSPADM

## 2022-08-13 RX ORDER — CLOPIDOGREL BISULFATE 75 MG/1
75 TABLET ORAL DAILY
Status: DISCONTINUED | OUTPATIENT
Start: 2022-08-13 | End: 2022-08-19 | Stop reason: HOSPADM

## 2022-08-13 RX ORDER — VIT C/E/ZN/COPPR/LUTEIN/ZEAXAN 60 MG-6 MG
1 CAPSULE ORAL DAILY
Status: DISCONTINUED | OUTPATIENT
Start: 2022-08-13 | End: 2022-08-19 | Stop reason: HOSPADM

## 2022-08-13 RX ORDER — MEMANTINE HYDROCHLORIDE 10 MG/1
10 TABLET ORAL 2 TIMES DAILY
Status: DISCONTINUED | OUTPATIENT
Start: 2022-08-13 | End: 2022-08-19 | Stop reason: HOSPADM

## 2022-08-13 RX ORDER — BISACODYL 5 MG
5 TABLET, DELAYED RELEASE (ENTERIC COATED) ORAL DAILY PRN
Status: DISCONTINUED | OUTPATIENT
Start: 2022-08-13 | End: 2022-08-19 | Stop reason: HOSPADM

## 2022-08-13 RX ORDER — FAMOTIDINE 20 MG/1
20 TABLET, FILM COATED ORAL AT BEDTIME
Status: DISCONTINUED | OUTPATIENT
Start: 2022-08-13 | End: 2022-08-15

## 2022-08-13 RX ORDER — ATORVASTATIN CALCIUM 10 MG/1
10 TABLET, FILM COATED ORAL EVERY EVENING
Status: DISCONTINUED | OUTPATIENT
Start: 2022-08-13 | End: 2022-08-19 | Stop reason: HOSPADM

## 2022-08-13 RX ORDER — ONDANSETRON 2 MG/ML
4 INJECTION INTRAMUSCULAR; INTRAVENOUS EVERY 6 HOURS PRN
Status: DISCONTINUED | OUTPATIENT
Start: 2022-08-13 | End: 2022-08-19 | Stop reason: HOSPADM

## 2022-08-13 RX ORDER — CHOLESTYRAMINE 4 G/9G
1 POWDER, FOR SUSPENSION ORAL 2 TIMES DAILY WITH MEALS
Status: DISCONTINUED | OUTPATIENT
Start: 2022-08-13 | End: 2022-08-19 | Stop reason: HOSPADM

## 2022-08-13 RX ORDER — CEFTRIAXONE 2 G/1
2 INJECTION, POWDER, FOR SOLUTION INTRAMUSCULAR; INTRAVENOUS ONCE
Status: COMPLETED | OUTPATIENT
Start: 2022-08-13 | End: 2022-08-13

## 2022-08-13 RX ORDER — ENOXAPARIN SODIUM 100 MG/ML
40 INJECTION SUBCUTANEOUS EVERY 24 HOURS
Status: DISCONTINUED | OUTPATIENT
Start: 2022-08-13 | End: 2022-08-19 | Stop reason: HOSPADM

## 2022-08-13 RX ORDER — LIDOCAINE 40 MG/G
CREAM TOPICAL
Status: DISCONTINUED | OUTPATIENT
Start: 2022-08-13 | End: 2022-08-19 | Stop reason: HOSPADM

## 2022-08-13 RX ORDER — AZITHROMYCIN 500 MG/1
500 INJECTION, POWDER, LYOPHILIZED, FOR SOLUTION INTRAVENOUS ONCE
Status: COMPLETED | OUTPATIENT
Start: 2022-08-13 | End: 2022-08-13

## 2022-08-13 RX ORDER — FINASTERIDE 5 MG/1
5 TABLET, FILM COATED ORAL DAILY
Status: DISCONTINUED | OUTPATIENT
Start: 2022-08-13 | End: 2022-08-19 | Stop reason: HOSPADM

## 2022-08-13 RX ORDER — BISACODYL 5 MG
10 TABLET, DELAYED RELEASE (ENTERIC COATED) ORAL DAILY PRN
Status: DISCONTINUED | OUTPATIENT
Start: 2022-08-13 | End: 2022-08-19 | Stop reason: HOSPADM

## 2022-08-13 RX ORDER — PIPERACILLIN SODIUM, TAZOBACTAM SODIUM 4; .5 G/20ML; G/20ML
4.5 INJECTION, POWDER, LYOPHILIZED, FOR SOLUTION INTRAVENOUS ONCE
Status: COMPLETED | OUTPATIENT
Start: 2022-08-13 | End: 2022-08-13

## 2022-08-13 RX ORDER — ACETAMINOPHEN 650 MG/1
650 SUPPOSITORY RECTAL ONCE
Status: COMPLETED | OUTPATIENT
Start: 2022-08-13 | End: 2022-08-13

## 2022-08-13 RX ORDER — FLUTICASONE PROPIONATE 50 MCG
1 SPRAY, SUSPENSION (ML) NASAL DAILY PRN
Status: DISCONTINUED | OUTPATIENT
Start: 2022-08-13 | End: 2022-08-19 | Stop reason: HOSPADM

## 2022-08-13 RX ORDER — SODIUM CHLORIDE 9 MG/ML
INJECTION, SOLUTION INTRAVENOUS CONTINUOUS
Status: ACTIVE | OUTPATIENT
Start: 2022-08-13 | End: 2022-08-14

## 2022-08-13 RX ADMIN — AZITHROMYCIN MONOHYDRATE 500 MG: 500 INJECTION, POWDER, LYOPHILIZED, FOR SOLUTION INTRAVENOUS at 17:50

## 2022-08-13 RX ADMIN — PIPERACILLIN AND TAZOBACTAM 4.5 G: 4; .5 INJECTION, POWDER, FOR SOLUTION INTRAVENOUS at 17:30

## 2022-08-13 RX ADMIN — ACETAMINOPHEN 650 MG: 650 SUPPOSITORY RECTAL at 17:06

## 2022-08-13 RX ADMIN — FAMOTIDINE 20 MG: 20 TABLET ORAL at 21:24

## 2022-08-13 RX ADMIN — SODIUM CHLORIDE 50 ML: 9 INJECTION, SOLUTION INTRAVENOUS at 21:23

## 2022-08-13 RX ADMIN — SODIUM CHLORIDE 1000 ML: 9 INJECTION, SOLUTION INTRAVENOUS at 15:04

## 2022-08-13 RX ADMIN — ENOXAPARIN SODIUM 40 MG: 40 INJECTION SUBCUTANEOUS at 19:48

## 2022-08-13 RX ADMIN — SODIUM CHLORIDE 500 ML: 9 INJECTION, SOLUTION INTRAVENOUS at 19:47

## 2022-08-13 RX ADMIN — FINASTERIDE 5 MG: 5 TABLET, FILM COATED ORAL at 19:53

## 2022-08-13 RX ADMIN — REMDESIVIR 200 MG: 100 INJECTION, POWDER, LYOPHILIZED, FOR SOLUTION INTRAVENOUS at 21:21

## 2022-08-13 RX ADMIN — ATORVASTATIN CALCIUM 10 MG: 10 TABLET, FILM COATED ORAL at 19:52

## 2022-08-13 RX ADMIN — Medication 1 CAPSULE: at 19:53

## 2022-08-13 RX ADMIN — CHOLESTYRAMINE 4 G: 4 POWDER, FOR SUSPENSION ORAL at 21:24

## 2022-08-13 RX ADMIN — MEMANTINE 10 MG: 10 TABLET ORAL at 21:24

## 2022-08-13 RX ADMIN — ASPIRIN 81 MG: 81 TABLET, COATED ORAL at 19:53

## 2022-08-13 RX ADMIN — CEFTRIAXONE SODIUM 2 G: 2 INJECTION, POWDER, FOR SOLUTION INTRAMUSCULAR; INTRAVENOUS at 17:10

## 2022-08-13 ASSESSMENT — ACTIVITIES OF DAILY LIVING (ADL)
ADLS_ACUITY_SCORE: 45
WALKING_OR_CLIMBING_STAIRS_DIFFICULTY: YES
ADLS_ACUITY_SCORE: 35
TOILETING_ISSUES: YES
CHANGE_IN_FUNCTIONAL_STATUS_SINCE_ONSET_OF_CURRENT_ILLNESS/INJURY: YES
CONCENTRATING,_REMEMBERING_OR_MAKING_DECISIONS_DIFFICULTY: YES
DRESSING/BATHING: BATHING DIFFICULTY, ASSISTANCE 1 PERSON
DOING_ERRANDS_INDEPENDENTLY_DIFFICULTY: OTHER (SEE COMMENTS)
WEAR_GLASSES_OR_BLIND: YES
EQUIPMENT_CURRENTLY_USED_AT_HOME: CANE, STRAIGHT;WALKER, ROLLING
TOILETING_ASSISTANCE: TOILETING DIFFICULTY, ASSISTANCE 1 PERSON
ADLS_ACUITY_SCORE: 45
ADLS_ACUITY_SCORE: 35
FALL_HISTORY_WITHIN_LAST_SIX_MONTHS: YES
VISION_MANAGEMENT: GLASSES ON
DIFFICULTY_EATING/SWALLOWING: NO
ADLS_ACUITY_SCORE: 35
ADLS_ACUITY_SCORE: 35
DRESSING/BATHING_DIFFICULTY: YES
TRANSFERRING: 1-->ASSISTANCE (EQUIPMENT/PERSON) NEEDED (NOT DEVELOPMENTALLY APPROPRIATE)
ADLS_ACUITY_SCORE: 35
TRANSFERRING: 1-->ASSISTANCE (EQUIPMENT/PERSON) NEEDED
WALKING_OR_CLIMBING_STAIRS: AMBULATION DIFFICULTY, REQUIRES EQUIPMENT

## 2022-08-13 ASSESSMENT — ENCOUNTER SYMPTOMS
WEAKNESS: 1
WEAKNESS: 1
NAUSEA: 0
CONFUSION: 1
COUGH: 1
APPETITE CHANGE: 0
ABDOMINAL PAIN: 0
DIARRHEA: 0
FEVER: 1
NAUSEA: 0

## 2022-08-13 NOTE — ED NOTES
[facility]  ED Nurse Handoff Report    ED Chief complaint: Fever and Generalized Weakness      ED Diagnosis:   Final diagnoses:   Pneumonia due to infectious organism, unspecified laterality, unspecified part of lung   Infection due to 2019 novel coronavirus       Code Status: Full Code    Allergies:   Allergies   Allergen Reactions     Chocolate Anaphylaxis     headache     Feathers      Novocaine [Procaine]      Passes out almost immediately     [place] [facility] Disturbance     Most berries       Patient Story: weakness / cough  Focused Assessment:  88 year old male with history of dementia, TIA who presents with fever, cough and generalized weakness. He was more weak yesterday and was seen in the ED. Patient was discharged early this morning from ED after being evaluated for generalized weakness. History dementia and TIA. Wife reports him to be more confused and generally weak as he is not getting out of his chair and shuffles around when he does. CT blood work and UA were obtained, see results below. He has a new cough and fever since discharge. Per his wife he fell asleep around 11 and was coughing some. When he woke up he had a fever of 102 and seemed weak per family. He ate lunch around 1200 and was coughing some during this. Wife doesn't report any aspiration. He does not have aspiration issues or use thickened food. Family suspects he is dehydrated as he has not urinated since discharge and did not drink much at lunch. Wife states that his right leg swelling has been present for years but seems to be increased. He does typically elevated his feet. Family reports that his dementia is mild at baseline. He is COVID vaccinated.  Pt sleeping and very lethargic during ED stay.    Treatments and/or interventions provided: zythro, rocephin, tylenol  Patient's response to treatments and/or interventions:       To be done/followed up on inpatient unit:        Does this patient have any cognitive  "concerns?: Baseline dementia    Activity level - Baseline/Home:  Stand with Assist and Stand with assist x2  Activity Level - Current:   Total Care    Patient's Preferred language: English   Needed?: No    Isolation: None and COVID r/o and special precautions  Infection: Not Applicable  COVID r/o and special precautions  Patient tested for COVID 19 prior to admission: YES  Bariatric?: No    Vital Signs:   Vitals:    08/13/22 1421   BP: (!) 154/92   Pulse: 102   Resp: 18   Temp: 100.3  F (37.9  C)   TempSrc: Oral   SpO2: 98%   Height: 1.702 m (5' 7\")       Cardiac Rhythm:     Was the PSS-3 completed:   Yes  What interventions are required if any?               Family Comments:     OBS brochure/video discussed/provided to patient/family: N/A              Name of person given brochure if not patient:                 Relationship to patient:       For the majority of the shift this patient's behavior was Green.   Behavioral interventions performed were   .    ED NURSE PHONE NUMBER: [id number]         "

## 2022-08-13 NOTE — PROGRESS NOTES
.RECEIVING UNIT ED HANDOFF REVIEW    ED Nurse Handoff Report was reviewed by: Michael Dejesus RN on August 13, 2022 at 5:34 PM       Can Pt go to US for LE before coming to floor? Order needs to be finalized.     Notes read.   Orders reviewed.   Room 618 getting set up for Special Precautions.    Please message floor when pt is ready for transfer.Thanks

## 2022-08-13 NOTE — ED PROVIDER NOTES
History   Chief Complaint:  Fever and Generalized Weakness       HPI   Jean Pierre Roblero is a 88 year old male with history of dementia, TIA who presents with fever, cough and generalized weakness. His weakness started yesterday and was seen in the ED. Wife reports him to be more confused and generally weak as he is not getting out of his chair and shuffles around when he does. Patient was discharged early this morning from ED after being evaluated for generalized weakness after very thorough work-up was performed including head CT urine and blood work with everything looking fairly normal.  They are discharged around 630 this morning. He has a new cough and fever since discharge. Per his wife he fell asleep around 11 and was coughing some. When he woke up he had a fever of 102 and seemed weak per family. He ate lunch around 1200 and was coughing some during this. Wife doesn't report any aspiration. He does not have aspiration issues or use thickened food. Family suspects he is dehydrated as he has not urinated since discharge and did not drink much at lunch. Wife states that his right leg swelling has been present for years but seems to be increased. He does typically elevated his feet. Family reports that his dementia is mild at baseline. No recent falls. No recent medication changes. Denies chest pain, abdominal pain, nausea or diarrhea. He is COVID vaccinated.     Blood Work Results from 08/13/22:  CBC: WBC 5.0, HGB 14.0, , RBC 4.23, MCH 33.1, Absolute lymphocytes 0.5  CMP: Glucose 101, Protein total 6.7, Creatine 0.87  UA: Ketones 10, Blood 10, RBC 10    CT Results from 08/13/22:  IMPRESSION:  1.  No definite CT evidence for acute intracranial process.  2.  Subtle serpiginous hyperdensity involving the right temporal lobe is likely related to streak artifact rather than hemorrhage.  3.  Brain atrophy and presumed chronic microvascular ischemic changes as above.    Review of Systems   Constitutional:  "Positive for fever.   Respiratory: Positive for cough.    Cardiovascular: Negative for chest pain.   Gastrointestinal: Negative for abdominal pain, diarrhea and nausea.   Neurological: Positive for weakness.   All other systems reviewed and are negative.      Allergies:  Chocolate  Feathers  Novocaine [Procaine]  Strawberry    Medications:  Aspirin 81  Lipitor  Esgic  Questran  Plavix  Pepcid  Proscar  Flonase  Magnesium  Namenda  Cyanocobalamin  Ascorbic acid  Cholecalciferol  Zincate    Past Medical History:     Dementia  Lumbar and sacral osteoarthritis  Somatization disorder  TIA  CAP  Spinal stenosis of lumbar region  Presbyesophagus  ASHD  Skin cancer    Past Surgical History:    Back surgery  Cholecystectomy  Endoscopic sinus surgery  Eye surgery  Hernia repair  Orthopedic surgery  Turbinoplasty    Family History:    Brother - dementia  Father - leukemia  Mother - arthritis, dementia  Son - prostate cancer    Social History:  The patient presents to the ED via EMS. Family at bedside.   PCP: Zaki Alaniz     Physical Exam     Patient Vitals for the past 24 hrs:   BP Temp Temp src Pulse Resp SpO2 Height   08/13/22 1815 -- -- -- -- -- 97 % --   08/13/22 1800 124/71 -- -- 88 -- 97 % --   08/13/22 1745 -- -- -- -- -- 98 % --   08/13/22 1730 131/78 -- -- 88 -- 97 % --   08/13/22 1715 -- -- -- -- -- 96 % --   08/13/22 1700 138/83 -- -- 92 -- 96 % --   08/13/22 1421 (!) 154/92 100.3  F (37.9  C) Oral 102 18 98 % 1.702 m (5' 7\")       Physical Exam    Physical Exam   Constitutional:  Lethargic. Arouses to physical and verbal stimuli.   HENT:   Mouth/Throat:   Oropharynx is clear. Mucus membranes are dry.   Eyes:    Conjunctivae normal and EOM are normal. Pupils are equal, round, and reactive to light.   Neck:    Normal range of motion.   Cardiovascular: Normal rate, regular rhythm and normal heart sounds.  Exam reveals no gallop and no friction rub.  No murmur heard.   Pulmonary/Chest:  Effort normal. Patient " has no wheezes. Patient has no rales. Rhonchorous breath sounds. No respiratory distress.   Abdominal:   Soft. Bowel sounds are normal. Patient exhibits no mass. There is no tenderness. There is no rebound and no guarding.   Musculoskeletal:  Right leg pedal edema chronic.   Neurological:   Lethargic. Generally weak. Not oriented   Skin:   Skin is warm and dry. No rash noted. No erythema.   Psychiatric:   Unable to assess.        Emergency Department Course   ECG  ECG results from 08/13/22   EKG 12-lead, tracing only     Value    Systolic Blood Pressure     Diastolic Blood Pressure     Ventricular Rate 109    Atrial Rate 109    WY Interval 172    QRS Duration 76        QTc 441    P Axis 77    R AXIS -12    T Axis 72    Interpretation ECG      Sinus tachycardia  Biatrial enlargement  Anterior infarct , age undetermined  Abnormal ECG  When compared with ECG of 26-MAY-2022 06:58,  Anterior infarct is now Present  Confirmed by GENERATED REPORT, COMPUTER ([id number]),  Aasen, Bradley ([id number]) on 8/13/2022 2:50:38 AM         Imaging:  Chest XR,  PA & LAT   Final Result   IMPRESSION: Stable cardiac silhouette. Subtle bibasilar opacities, left greater than right could represent atelectasis or pneumonia. No pleural effusion or pneumothorax. No acute bony abnormality. Prior cholecystectomy.      US Lower Extremity Venous Duplex Right    (Results Pending)     Report per radiology    Laboratory:  Labs Ordered and Resulted from Time of ED Arrival to Time of ED Departure   BASIC METABOLIC PANEL - Abnormal       Result Value    Sodium 132 (*)     Potassium 4.3      Chloride 102      Carbon Dioxide (CO2) 27      Anion Gap 3      Urea Nitrogen 13      Creatinine 0.96      Calcium 8.8      Glucose 129 (*)     GFR Estimate 76     CBC WITH PLATELETS AND DIFFERENTIAL - Abnormal    WBC Count 4.6      RBC Count 4.21 (*)     Hemoglobin 14.0      Hematocrit 42.2            MCH 33.3 (*)     MCHC 33.2      RDW 12.8      Platelet  Count 111 (*)     % Neutrophils 74      % Lymphocytes 16      % Monocytes 10      % Eosinophils 0      % Basophils 0      % Immature Granulocytes 0      NRBCs per 100 WBC 0      Absolute Neutrophils 3.4      Absolute Lymphocytes 0.7 (*)     Absolute Monocytes 0.5      Absolute Eosinophils 0.0      Absolute Basophils 0.0      Absolute Immature Granulocytes 0.0      Absolute NRBCs 0.0     COVID-19 VIRUS (CORONAVIRUS) BY PCR - Abnormal    SARS CoV2 PCR Positive (*)    PROCALCITONIN - Normal    Procalcitonin <0.05     LACTIC ACID WHOLE BLOOD - Normal    Lactic Acid 1.0     ROUTINE UA WITH MICROSCOPIC REFLEX TO CULTURE   BLOOD CULTURE   URINE CULTURE   BLOOD CULTURE        Emergency Department Course:       Reviewed:  I reviewed nursing notes, vitals, past medical history and Care Everywhere    Assessments:  1441 I obtained history and examined the patient as noted above.   1608 I rechecked the patient and explained findings.     Consults:  1616 I spoke to Dr. Conroy, hospitalist, who accepts the patient.    Interventions:  Medications   azithromycin (ZITHROMAX) 500 mg vial to attach to  mL bag (500 mg Intravenous Not Given 8/13/22 1822)   0.9% sodium chloride BOLUS (0 mLs Intravenous Stopped 8/13/22 1705)   cefTRIAXone (ROCEPHIN) 2 g vial to attach to  ml bag for ADULTS or NS 50 ml bag for PEDS (0 g Intravenous Stopped 8/13/22 1748)   acetaminophen (TYLENOL) Suppository 650 mg (650 mg Rectal Given 8/13/22 1706)   piperacillin-tazobactam (ZOSYN) 4.5 g vial to attach to  mL bag (0 g Intravenous Stopped 8/13/22 1822)       Disposition:  The patient was admitted to the hospital under the care of Dr. Conroy.     Impression & Plan       Medical Decision Making:  Jean Pierre Mullen is an 88-year-old gentleman presenting to the emergency department with generalized weakness fever and cough.  His work-up last night was fairly unremarkable.  An i-STAT lactic acid was performed when he came back to the main ED and  it was 0.9.  I repeated the blood work which continues to show a normal white blood cell count.  His sodium is 132 his glucose is normal.  A chest x-ray is performed that was suspicious for a left lower lobe pneumonia.  He is also COVID-positive.  He is vaccinated.  He appears to be very encephalopathic from his infection.  He is receiving IV fluids and community-acquired pneumonia treatment.  I will admit him to hospitalist.  He is a candidate for remdesivir.  He is not hypoxic.  He was given rectal Tylenol due to his altered mentation I felt that oral fluids and medication at this time is not appropriate.    Diagnosis:    ICD-10-CM    1. Pneumonia due to infectious organism, unspecified laterality, unspecified part of lung  J18.9    2. Infection due to 2019 novel coronavirus  U07.1          Scribe Disclosure:  I, Yahaira Perla, am serving as a scribe at 2:41 PM on 8/13/2022 to document services personally performed by Parris Sellers MD based on my observations and the provider's statements to me.              Parris Sellers MD  08/13/22 [id number]

## 2022-08-13 NOTE — ED TRIAGE NOTES
pt BIBA from home.    Wife called increased weakness for the past 4 hrs. Woke up to walk to the bathroom but was unable to make it. Baseline able to walk slowly. No recent falls. Hx of TIA in may,. On plavix. No neuro deficits.     Denies any pain.

## 2022-08-13 NOTE — H&P
"    History and Physical - Hospitalist Service       Date of Admission:  8/13/2022    Jean Pierre Roblero is a 88 year old male with multiple medical problems including dementia, history of TIA, hyperlipidemia, BPH who presents the emergency department with weakness.  COVID PCR is positive.    Assessment & Plan    Principal Problem:    Infection due to 2019 novel coronavirus    Admit as inpatient    Special precautions    Treat with remdesivir x3 days in patient at high risk for progression    Routine COVID labs ordered    Begin Lovenox for DVT prophylaxis, following platelet count closely  Active Problems:    Infectious encephalopathy    Patient has a history of dementia, though wife stresses he is only on Namenda \"to prevent progression\" of disease    During first ED visit on 8/13/2022, patient's wife, Lianet, said patient did not remember he needed to remove his dentures, was reluctant to leave his chair, he is now drowsy    Labs are essentially unremarkable with the exception of COVID PCR positive, mild hyponatremia, new from earlier today    Presume encephalopathy is related to infection    Avoid medications which can worsen symptoms, including sedatives, anticholinergics    Follow clinically    Hyponatremia    Sodium was 138 mEq/L earlier today, now 132    Wife says he has not had much to eat or drink today because he is not feeling well, presume he has hypovolemic hyponatremia    Give gentle IV fluids, i.e. 500 cc, then saline lock    Follow sodium    Thrombocytopenia (H)    This is mild, was also noted during hospitalization 5/26/2022    Follow platelet count closely since he will be on Lovenox for DVT Prophylaxis    Mixed hyperlipidemia    Continue statin, Questran    BPH (benign prostatic hyperplasia)    Continue Proscar    Dementia (H)    Continue Namenda  Right lower extremity edema    This is chronic, it was noted in the record of an office visit dated 7/29/2022 and wife " confirms that it is chronic    Begin Lovenox for DVT prophylaxis  History of TIA (transient ischemic attack)    Noted    Patient was admitted to [facility] on 5/26 for TIA.  He was instructed to take baby aspirin and Plavix for 90 days.  Note from patient's office visit with his primary physician on 7/29 indicates he instructed the patient to discontinue Plavix, patient has continued taking it.    Will hold Plavix for now, given mild thrombocytopenia and need for Lovenox    Could request stroke neurology follow-up during his hospital stay to clarify duration of treatment with Plavix, given discrepancy between their instructions and instructions from his primary provider     Diet:   Regular  DVT Prophylaxis: Enoxaparin (Lovenox) SQ  Puckett Catheter: Not present  Central Lines: None  Cardiac Monitoring: None  Code Status:   Full    Clinically Significant Risk Factors Present on Admission         # Hyponatremia: Na = 132 mmol/L (Ref range: 133 - 144 mmol/L) on admission, will monitor as appropriate        # Platelet Defect: home medication list includes an antiplatelet medication           Disposition Plan      Expected Discharge Date: 08/17/2022                The patient's care was discussed with the Patient's Family and ED MD, Dr. Taylor.    Pily Conroy MD  Hospitalist Service  [facility]  Securely message with the Vocera Web Console (learn more here)  Text page via [employer] [place] Paging/Directory         ______________________________________________________________________    Chief Complaint   Weakness.  (Patient is drowsy, unable to provide chief complaint.)    History of Present Illness   Jean Pierre Roblero is a 88 year old male with multiple medical problems including dementia, history of TIA, hyperlipidemia, BPH who presents the emergency department with weakness.  COVID PCR is positive.    Jean Pierre was originally brought to the emergency department by EMS during the early morning hours of 8/13/2022  "with his wife, Lianet, reporting that he was confused, walking slowly, did not acknowledge his need to remove his dentures, had been reluctant to leave his chair.  Work-up included routine blood counts, electrolytes and head CT, all of which were described as \"reassuring,\" showing no acute process.  His symptoms apparently resolved completely, he was able to stand and walk and his wife felt that he was at his baseline.  He was dismissed home.    He woke up, but his weakness had increased to the point where he was unable to walk to the bathroom.  Wife again called EMS.  Their notes indicate patient was talking, had no facial droop, was moving both arms and both legs, he was simply weaker.  He came to the emergency department for evaluation where chest x-ray suggests subtle bibasilar opacities, left greater than right.  Labs show very mild hyponatremia.  COVID PCR is positive.  He is admitted for further evaluation and treatment of COVID infection with superimposed encephalopathy.      Review of Systems    The 10 point Review of Systems is negative other than noted in the HPI or here.     Past Medical History    I have reviewed this patient's medical history and updated it with pertinent information if needed.   Bilateral thigh pain   Community acquired pneumonia   TIA  Mild dementia   OA  Somatization disorder   Spinal stenosis of lumbar region   Presbyesophagus   Hearing loss   Anal sphincter incompetence   Diverticulosis of colon   Localized hyperplasia of prostate   GERD  Migraine   Xerostomia   Inguinal hernia   Tortuous colon   Sciatica   Heberden's node  Laly's node   ASHD  Skin cancer     Past Surgical History   I have reviewed this patient's surgical history and updated it with pertinent information if needed.  Cholecystectomy   Endoscopic sinus surgery   Cataract surgery   Bilateral hernia repair   Ingrown toe nail removal   turbinoplasty   Flexible sigmoidoscopy   Hemilaminotomy lumbar spine, L5-S1  "     Social History   I have reviewed this patient's social history and updated it with pertinent information if needed.  Social History     Tobacco Use     Smoking status: Never Smoker     Smokeless tobacco: Never Used   Substance Use Topics     Alcohol use: No     Drug use: No       Family History     Dementia Brother        Other Father    leukemia   Arthritis Mother        Other Mother    dementia   Other Sister    back problems (Work injury)   Cancer-prostate Son          Family History  Relation Name Status Comments   Brother          Father     (Age 46) leukemia   Mother     (Age 90's)           Prior to Admission Medications   Prior to Admission Medications   Prescriptions Last Dose Informant Patient Reported? Taking?   Multiple Vitamin (MULTI-VITAMIN) per tablet  Spouse/Significant Other Yes No   Sig: Take 1 tablet by mouth daily   aspirin (ASA) 81 MG EC tablet   No No   Sig: Take 1 tablet (81 mg) by mouth daily   atorvastatin (LIPITOR) 10 MG tablet   No No   Si tablet (10 mg) by Oral or Feeding Tube route every evening   butalbital-acetaminophen-caffeine (ESGIC) -40 MG tablet  Spouse/Significant Other Yes No   Sig: Take 1 tablet by mouth every 4 hours as needed for headaches   cholestyramine (QUESTRAN) 4 G packet  Spouse/Significant Other Yes No   Sig: Take 1 packet by mouth 2 times daily (with meals)   clopidogrel (PLAVIX) 75 MG tablet   No No   Sig: Take 1 tablet (75 mg) by mouth daily   famotidine (PEPCID) 40 MG tablet  Spouse/Significant Other Yes No   Sig: Take 40 mg by mouth At Bedtime   finasteride (PROSCAR) 5 MG tablet  Spouse/Significant Other Yes No   Sig: Take 5 mg by mouth daily   fluticasone (FLONASE) 50 MCG/ACT nasal spray   Yes No   Sig: Spray 1 spray into both nostrils daily as needed for rhinitis or allergies   magnesium 250 MG tablet  Spouse/Significant Other Yes No   Sig: Take 1 tablet by mouth every evening   memantine (NAMENDA) 10 MG tablet  Spouse/Significant  Other Yes No   Sig: Take 10 mg by mouth 2 times daily   multivitamin  with lutein (OCUVITE WITH LTEIN) CAPS per capsule  Spouse/Significant Other Yes No   Sig: Take 1 capsule by mouth daily   sodium chloride (OCEAN) 0.65 % nasal spray   Yes No   Sig: Spray 1 spray into both nostrils daily as needed for congestion   vitamin B-12 (CYANOCOBALAMIN) 1000 MCG tablet  Spouse/Significant Other Yes No   Sig: Take 1,000 mcg by mouth daily Noon   vitamin C (ASCORBIC ACID) 500 MG tablet  Spouse/Significant Other Yes No   Sig: Take 500 mg by mouth daily   vitamin D3 (CHOLECALCIFEROL) 2000 units (50 mcg) tablet  Spouse/Significant Other Yes No   Sig: Take 4,000 Units by mouth daily Noon   zinc sulfate (ZINCATE) 220 (50 Zn) MG capsule  Spouse/Significant Other Yes No   Sig: Take 220 mg by mouth daily noon      Facility-Administered Medications: None     Allergies   Allergies   Allergen Reactions     Chocolate Anaphylaxis     headache     Feathers      Novocaine [Procaine]      Passes out almost immediately     [place] GI Disturbance     Most berries       Physical Exam   Vital Signs: Temp: 100.3  F (37.9  C) Temp src: Oral BP: (!) 154/92 Pulse: 102   Resp: 18 SpO2: 98 %      Weight: 0 lbs 0 oz    Constitutional: Dozing, flickered eyelids to loud voice, then back to sleep again.  (Wife says his hearing aids have run out of battery life and he cannot hear examine.)  Eyes: Conjunctiva and pupils examined and normal.  HEENT: Has a partial denture  Respiratory: Clear to auscultation bilaterally, no crackles or wheezing.  Cardiovascular: Tachycardic, regular rate and rhythm  GI: Soft, non-distended, non-tender, normal bowel sounds.  Lymph/Hematologic: No anterior cervical or supraclavicular adenopathy.  Skin: No rash on exposed skin  Musculoskeletal: Right leg has edema from about the mid thigh to the foot.  Wife says this is not new.  Neurologic: moves arms and legs, exam is nonfocal  Psychiatric: Cannot assess mood due to  drowsiness      Data   Data reviewed today: I reviewed all medications, new labs and imaging results over the last 24 hours. I personally reviewed the chest x-ray image(s) showing Stable cardiac silhouette. Subtle bibasilar opacities, left greater than right could represent atelectasis or pneumonia. and the head CT image(s) showing No definite CT evidence for acute intracranial process.  Subtle serpiginous hyperdensity involving the right temporal lobe likely related to streak artifact rather than hemorrhage, brain atrophy and presumed chronic microvascular ischemic changes    Recent Labs   Lab 08/13/22  1425 08/13/22  0148   WBC 4.6 5.0   HGB 14.0 14.0    100   * 122*   * 138   POTASSIUM 4.3 3.9   CHLORIDE 102 104   CO2 27 28   BUN 13 13   CR 0.96 0.87   ANIONGAP 3 6   KENYA 8.8 8.5   * 101*   ALBUMIN  --  4.0   PROTTOTAL  --  6.7*   BILITOTAL  --  0.6   ALKPHOS  --  76   ALT  --  22   AST  --  21     Recent Results (from the past 24 hour(s))   Head CT w/o contrast    Narrative    EXAM: CT HEAD W/O CONTRAST  LOCATION: [facility]  DATE/TIME: 8/13/2022 3:10 AM    INDICATION: Confusion.  COMPARISON: CT head dated 5/26/2022.  TECHNIQUE: Routine CT Head without IV contrast. Multiplanar reformats. Dose reduction techniques were used.    FINDINGS:  INTRACRANIAL CONTENTS: No intracranial hemorrhage, extraaxial collection, or mass effect.  Subtle serpiginous hyperdensity involving the right temporal lobe (image 9, series 2 and image 8, series 2) is likely streak artifact related. No CT evidence of   acute infarct. Moderate to severe presumed chronic small vessel ischemic changes. Mild generalized volume loss. No hydrocephalus.     VISUALIZED ORBITS/SINUSES/MASTOIDS: Prior bilateral cataract surgery. Visualized portions of the orbits are otherwise unremarkable. Mucosal thickening primarily involving the ethmoid air cells.No middle ear or mastoid effusion.    BONES/SOFT  TISSUES: No acute abnormality.      Impression    IMPRESSION:  1.  No definite CT evidence for acute intracranial process.  2.  Subtle serpiginous hyperdensity involving the right temporal lobe is likely related to streak artifact rather than hemorrhage.  3.  Brain atrophy and presumed chronic microvascular ischemic changes as above.   Chest XR,  PA & LAT    Narrative    EXAM: XR CHEST 2 VIEWS  LOCATION: [facility]  DATE/TIME: 8/13/2022 3:14 PM    INDICATION: fever  COMPARISON: Chest CT 6/3/2020      Impression    IMPRESSION: Stable cardiac silhouette. Subtle bibasilar opacities, left greater than right could represent atelectasis or pneumonia. No pleural effusion or pneumothorax. No acute bony abnormality. Prior cholecystectomy.

## 2022-08-13 NOTE — ED PROVIDER NOTES
History   Chief Complaint:  Generalized Weakness    The history is provided by the patient and the spouse.      Jean Pierre Roblero is a 88 year old male with history of TIA and mild dementia who presents with generalized weakness. Jean Pierre presents at baseline but reports that he wasn't feel well earlier tonight. His wife, Lianet, reports confusion, urinary incontinence, weakness, and gait instability tonight. She elaborates that Jean Pierre has been walking slowly today, didn't acknowledge his need to remove his dentures today, and hesitancy to leave his chair; he was still sitting in his chair upon EMS arrival. Denies nausea and changes in appetite. Lianet notes that Jean Pierre typically utilizes a cane but he opted to use his walker today. Lianet reports that his only noticeable symptoms for his TIA three months ago was inability to arouse the patient from sleep. Lianet and Jean Pierre usually go to bed around 2300.     Review of Systems   Constitutional: Negative for appetite change.   Gastrointestinal: Negative for nausea.   Musculoskeletal: Positive for gait problem.   Neurological: Positive for weakness.   Psychiatric/Behavioral: Positive for confusion.   All other systems reviewed and are negative.    Allergies:  Chocolate  Feathers  Novocaine [Procaine]  Strawberry  Berry flavor   Amitriptyline   Nabumetone   Poultry   Theobroma oil   Topiramate   Zonisamide     Medications:  Loratadine   Tamsulosin   Famotidine   Fioricet   Aimovig   Questran   Finasteride   Aspirin  Hydrochlorothiazide   Memantine   Atorvastatin     Past Medical History:     Bilateral thigh pain   Community acquired pneumonia   TIA  Mild dementia   OA  Somatization disorder   Spinal stenosis of lumbar region   Presbyesophagus   Hearing loss   Anal sphincter incompetence   Diverticulosis of colon   Localized hyperplasia of prostate   GERD  Migraine   Xerostomia   Inguinal hernia   Tortuous colon   Sciatica   Heberden's node  Laly's node   ASHD  Skin cancer      Past Surgical History:    Cholecystectomy   Endoscopic sinus surgery   Cataract surgery   Bilateral hernia repair   Ingrown toe nail removal   turbinoplasty   Flexible sigmoidoscopy   Hemilaminotomy lumbar spine, L5-S1      Social History:  The patient presents to the ED with his wife, Lianet   PCP: Zaki Alaniz MD     Physical Exam     Patient Vitals for the past 24 hrs:   Temp Temp src Pulse Resp SpO2   08/13/22 0345 -- -- 106 17 97 %   08/13/22 0200 -- -- 110 17 98 %   08/13/22 0135 99.5  F (37.5  C) Oral -- -- --     Physical Exam  General: Appears well-developed and well-nourished.   Head: No signs of trauma.   CV: Normal rate and regular rhythm.    Resp: Effort normal and breath sounds normal. No respiratory distress.   GI: Soft. There is no tenderness.  No rebound or guarding.  Normal bowel sounds.    MSK: Normal range of motion. no edema. No Calf tenderness.  Neuro: The patient is alert and oriented.  PERRLA, EOMI, strength in upper/lower extremities normal and symmetrical. Sensation normal. Speech normal.  Ambulatory  Skin: Skin is warm and dry. No rash noted.   Psych: normal mood and affect. behavior is normal.       Emergency Department Course   ECG:  ECG taken at 0205, ECG read at 0214  Sinus tachycardia   Biatrial enlargement   Anterior infarct, age undetermined   Abnormal ECG  No change compared to EKG dated 05/26/2022   Rate 109 bpm. MA interval 172 ms. QRS duration 76 ms. QT/QTc 328/441 ms. P-R-T axes 77 -12 72.    Imaging:  Head CT w/o contrast   Final Result   IMPRESSION:   1.  No definite CT evidence for acute intracranial process.   2.  Subtle serpiginous hyperdensity involving the right temporal lobe is likely related to streak artifact rather than hemorrhage.   3.  Brain atrophy and presumed chronic microvascular ischemic changes as above.        Report per radiology    Laboratory:  Labs Ordered and Resulted from Time of ED Arrival to Time of ED Departure   COMPREHENSIVE METABOLIC  PANEL - Abnormal       Result Value    Sodium 138      Potassium 3.9      Chloride 104      Carbon Dioxide (CO2) 28      Anion Gap 6      Urea Nitrogen 13      Creatinine 0.87      Calcium 8.5      Glucose 101 (*)     Alkaline Phosphatase 76      AST 21      ALT 22      Protein Total 6.7 (*)     Albumin 4.0      Bilirubin Total 0.6      GFR Estimate 83     ROUTINE UA WITH MICROSCOPIC REFLEX TO CULTURE - Abnormal    Color Urine Light Yellow      Appearance Urine Clear      Glucose Urine Negative      Bilirubin Urine Negative      Ketones Urine 10  (*)     Specific Gravity Urine 1.016      Blood Urine Small (*)     pH Urine 7.0      Protein Albumin Urine Negative      Urobilinogen Urine Normal      Nitrite Urine Negative      Leukocyte Esterase Urine Negative      RBC Urine 10 (*)     WBC Urine 0     CBC WITH PLATELETS AND DIFFERENTIAL - Abnormal    WBC Count 5.0      RBC Count 4.23 (*)     Hemoglobin 14.0      Hematocrit 42.4            MCH 33.1 (*)     MCHC 33.0      RDW 12.7      Platelet Count 122 (*)     % Neutrophils 83      % Lymphocytes 10      % Monocytes 6      % Eosinophils 1      % Basophils 0      % Immature Granulocytes 0      NRBCs per 100 WBC 0      Absolute Neutrophils 4.1      Absolute Lymphocytes 0.5 (*)     Absolute Monocytes 0.3      Absolute Eosinophils 0.0      Absolute Basophils 0.0      Absolute Immature Granulocytes 0.0      Absolute NRBCs 0.0       Emergency Department Course:       Reviewed:  I reviewed nursing notes, vitals, past medical history and Care Everywhere    Assessments:  0155 I obtained history and examined the patient as noted above.   0447 I rechecked the patient and explained findings. I discussed discharge with the patient and his wife. All questions answered.     Disposition:  The patient was discharged to home.     Impression & Plan     Medical Decision Making:  Jean Pierre Roblero is an 88-year-old gentleman who presents due to weakness.  Apparently he was not getting up  out of his chair or doing some of the typical things he does in the evening, prompting his wife to call EMS.  She reports that he seemed like he was fine while he was sitting in his chair.  On my evaluation the patient was alert and had no complaints.  He was neurologically intact.  Wife does report he had a TIA recently and on chart review of the notes, there are reports neurologic findings at that time.  Blood work and urine were obtained that were reassuring.  I did find an CT of the head which did not show signs of acute process.  Patient was able to stand and ambulate and was acting at his baseline per his wife.  Exact cause of his overall presentation is not clear but given the reassuring work-up I felt is appropriate for discharge home.  I have a low suspicion for TIA as he never did have any neurologic deficits.  Patient and wife are instructed return for any further concerns and to follow-up with the doctor in clinic.    Diagnosis:    ICD-10-CM    1. Weakness  R53.1      Scribe Disclosure:  Isabel GARCIA, am serving as a scribe at 1:55 AM on 8/13/2022 to document services personally performed by Gerard Michelle MD based on my observations and the provider's statements to me.      Gerard Michelle MD  08/14/22 0353

## 2022-08-13 NOTE — ED TRIAGE NOTES
Pt BIBA after getting more and more weak and had fever of 102 at home. Patient was seen overnight for stroke symptoms and sent home. Patient has wet, productive cough. Hx of aspiration pneumonia.     Triage Assessment     Row Name 08/13/22 1422       Respiratory WDL    Respiratory WDL X;cough    Cough Frequency frequent    Cough Type congested       Cardiac WDL    Cardiac WDL WDL       Cognitive/Neuro/Behavioral WDL    Cognitive/Neuro/Behavioral WDL WDL

## 2022-08-14 LAB
ANION GAP SERPL CALCULATED.3IONS-SCNC: 5 MMOL/L (ref 3–14)
BUN SERPL-MCNC: 14 MG/DL (ref 7–30)
CALCIUM SERPL-MCNC: 8.1 MG/DL (ref 8.5–10.1)
CHLORIDE BLD-SCNC: 107 MMOL/L (ref 94–109)
CO2 SERPL-SCNC: 27 MMOL/L (ref 20–32)
CREAT SERPL-MCNC: 0.88 MG/DL (ref 0.66–1.25)
CRP SERPL-MCNC: 37.4 MG/L (ref 0–8)
D DIMER PPP FEU-MCNC: 0.52 UG/ML FEU (ref 0–0.5)
ERYTHROCYTE [DISTWIDTH] IN BLOOD BY AUTOMATED COUNT: 12.9 % (ref 10–15)
GFR SERPL CREATININE-BSD FRML MDRD: 83 ML/MIN/1.73M2
GLUCOSE BLD-MCNC: 90 MG/DL (ref 70–99)
HCT VFR BLD AUTO: 37.2 % (ref 40–53)
HGB BLD-MCNC: 12.3 G/DL (ref 13.3–17.7)
MCH RBC QN AUTO: 33.2 PG (ref 26.5–33)
MCHC RBC AUTO-ENTMCNC: 33.1 G/DL (ref 31.5–36.5)
MCV RBC AUTO: 100 FL (ref 78–100)
PLATELET # BLD AUTO: 101 10E3/UL (ref 150–450)
POTASSIUM BLD-SCNC: 4 MMOL/L (ref 3.4–5.3)
RBC # BLD AUTO: 3.71 10E6/UL (ref 4.4–5.9)
SODIUM SERPL-SCNC: 139 MMOL/L (ref 133–144)
WBC # BLD AUTO: 4.3 10E3/UL (ref 4–11)

## 2022-08-14 PROCEDURE — 250N000011 HC RX IP 250 OP 636: Performed by: INTERNAL MEDICINE

## 2022-08-14 PROCEDURE — 82310 ASSAY OF CALCIUM: CPT | Performed by: INTERNAL MEDICINE

## 2022-08-14 PROCEDURE — 85379 FIBRIN DEGRADATION QUANT: CPT | Performed by: INTERNAL MEDICINE

## 2022-08-14 PROCEDURE — 120N000001 HC R&B MED SURG/OB

## 2022-08-14 PROCEDURE — 36415 COLL VENOUS BLD VENIPUNCTURE: CPT | Performed by: INTERNAL MEDICINE

## 2022-08-14 PROCEDURE — 258N000003 HC RX IP 258 OP 636: Performed by: INTERNAL MEDICINE

## 2022-08-14 PROCEDURE — 85027 COMPLETE CBC AUTOMATED: CPT | Performed by: INTERNAL MEDICINE

## 2022-08-14 PROCEDURE — 250N000013 HC RX MED GY IP 250 OP 250 PS 637: Performed by: INTERNAL MEDICINE

## 2022-08-14 PROCEDURE — 86140 C-REACTIVE PROTEIN: CPT | Performed by: INTERNAL MEDICINE

## 2022-08-14 PROCEDURE — 99233 SBSQ HOSP IP/OBS HIGH 50: CPT | Performed by: INTERNAL MEDICINE

## 2022-08-14 RX ORDER — MORPHINE SULFATE 2 MG/ML
INJECTION, SOLUTION INTRAMUSCULAR; INTRAVENOUS
Status: DISPENSED
Start: 2022-08-14 | End: 2022-08-15

## 2022-08-14 RX ADMIN — ASPIRIN 81 MG: 81 TABLET, COATED ORAL at 09:29

## 2022-08-14 RX ADMIN — REMDESIVIR 100 MG: 100 INJECTION, POWDER, LYOPHILIZED, FOR SOLUTION INTRAVENOUS at 17:04

## 2022-08-14 RX ADMIN — CHOLESTYRAMINE 4 G: 4 POWDER, FOR SUSPENSION ORAL at 17:04

## 2022-08-14 RX ADMIN — MEMANTINE 10 MG: 10 TABLET ORAL at 09:29

## 2022-08-14 RX ADMIN — SODIUM CHLORIDE 50 ML: 9 INJECTION, SOLUTION INTRAVENOUS at 17:05

## 2022-08-14 RX ADMIN — CHOLESTYRAMINE 4 G: 4 POWDER, FOR SUSPENSION ORAL at 09:29

## 2022-08-14 RX ADMIN — FLUTICASONE PROPIONATE 1 SPRAY: 50 SPRAY, METERED NASAL at 23:12

## 2022-08-14 RX ADMIN — FAMOTIDINE 20 MG: 20 TABLET ORAL at 21:20

## 2022-08-14 RX ADMIN — FINASTERIDE 5 MG: 5 TABLET, FILM COATED ORAL at 09:29

## 2022-08-14 RX ADMIN — ENOXAPARIN SODIUM 40 MG: 40 INJECTION SUBCUTANEOUS at 19:06

## 2022-08-14 RX ADMIN — ATORVASTATIN CALCIUM 10 MG: 10 TABLET, FILM COATED ORAL at 19:06

## 2022-08-14 RX ADMIN — MEMANTINE 10 MG: 10 TABLET ORAL at 21:20

## 2022-08-14 RX ADMIN — Medication 1 CAPSULE: at 09:29

## 2022-08-14 ASSESSMENT — ACTIVITIES OF DAILY LIVING (ADL)
ADLS_ACUITY_SCORE: 47
ADLS_ACUITY_SCORE: 51
ADLS_ACUITY_SCORE: 47
ADLS_ACUITY_SCORE: 47
ADLS_ACUITY_SCORE: 45
ADLS_ACUITY_SCORE: 51
ADLS_ACUITY_SCORE: 45

## 2022-08-14 NOTE — PHARMACY-ADMISSION MEDICATION HISTORY
Pharmacy Medication History  Admission medication history interview status for the 8/13/2022  admission is complete. See EPIC admission navigator for prior to admission medications     Location of Interview: Phone  Medication history sources: Patient's family/friend (spouse) and Surescripts    Significant changes made to the medication list:  1. No changes made to PTA list    In the past week, patient estimated taking medication this percent of the time: greater than 90%    Additional medication history information:   1. none    Medication reconciliation completed by provider prior to medication history? No    Time spent in this activity: 10 min    Prior to Admission medications    Medication Sig Last Dose Taking? Auth Provider Long Term End Date   aspirin (ASA) 81 MG EC tablet Take 1 tablet (81 mg) by mouth daily 8/12/2022 at Unknown time Yes Zeny Brown MD     atorvastatin (LIPITOR) 10 MG tablet 1 tablet (10 mg) by Oral or Feeding Tube route every evening 8/12/2022 at Unknown time Yes Zeny Brown MD Yes    butalbital-acetaminophen-caffeine (ESGIC) -40 MG tablet Take 1 tablet by mouth every 4 hours as needed for headaches  Yes Unknown, Entered By History     cholestyramine (QUESTRAN) 4 G packet Take 1 packet by mouth 2 times daily (with meals) 8/12/2022 at Unknown time Yes Reported, Patient     clopidogrel (PLAVIX) 75 MG tablet Take 1 tablet (75 mg) by mouth daily 8/12/2022 at Unknown time Yes Zeny Brown MD Yes    famotidine (PEPCID) 40 MG tablet Take 40 mg by mouth At Bedtime 8/12/2022 at Unknown time Yes Unknown, Entered By History     finasteride (PROSCAR) 5 MG tablet Take 5 mg by mouth daily 8/12/2022 at Unknown time Yes Unknown, Entered By History     fluticasone (FLONASE) 50 MCG/ACT nasal spray Spray 1 spray into both nostrils daily as needed for rhinitis or allergies  Yes Reported, Patient     magnesium 250 MG tablet Take 1 tablet by mouth every evening 8/12/2022 at  Unknown time Yes Unknown, Entered By History     memantine (NAMENDA) 10 MG tablet Take 10 mg by mouth 2 times daily 8/12/2022 at Unknown time Yes Unknown, Entered By History     Multiple Vitamin (MULTI-VITAMIN) per tablet Take 1 tablet by mouth daily 8/12/2022 at Unknown time Yes Reported, Patient     multivitamin  with lutein (OCUVITE WITH LTEIN) CAPS per capsule Take 1 capsule by mouth daily 8/12/2022 at Unknown time Yes Unknown, Entered By History     sodium chloride (OCEAN) 0.65 % nasal spray Spray 1 spray into both nostrils daily as needed for congestion 8/12/2022 at Unknown time Yes Unknown, Entered By History     vitamin B-12 (CYANOCOBALAMIN) 1000 MCG tablet Take 1,000 mcg by mouth daily Noon 8/12/2022 at Unknown time Yes Unknown, Entered By History     vitamin C (ASCORBIC ACID) 500 MG tablet Take 500 mg by mouth daily 8/12/2022 at Unknown time Yes Unknown, Entered By History     vitamin D3 (CHOLECALCIFEROL) 2000 units (50 mcg) tablet Take 4,000 Units by mouth daily Noon 8/12/2022 at Unknown time Yes Unknown, Entered By History     zinc sulfate (ZINCATE) 220 (50 Zn) MG capsule Take 220 mg by mouth daily noon 8/12/2022 at Unknown time Yes Unknown, Entered By History       The information provided in this note is only as accurate as the sources available at the time of update(s)   Tomy OakleyD

## 2022-08-14 NOTE — PLAN OF CARE
DATE & TIME: 08/14/2022 0114-3185  Cognitive Concerns/ Orientation : A&Ox3, somewhat d.o. to time, forgetful, [place], aids bilaterally  BEHAVIOR & AGGRESSION TOOL COLOR: Green, calm/cooperative   CIWA SCORE: NA    ABNL VS/O2: VSS on RA. Frequent congested NPC   MOBILITY: Ax1 w/walker/cane, ambulate in room, T/R q2h  PAIN MANAGMENT: denies   DIET: Regular, needs some assistance ordering food   BOWEL/BLADDER: incontinent b/b, no BM  ABNL LAB/BG: Ca 8.1, CRP 37.4, D-dim 0.52, Covid 19 (+), BC pending.   DRAIN/DEVICES: R PIV x 2 SL  TELEMETRY RHYTHM: NA   SKIN: Pale/bruised   TESTS/PROCEDURES: none  D/C DAY/GOALS/PLACE: 3-4 days pending improvement   OTHER IMPORTANT INFO: LS diminished/clear, no SOB noted, Special precautions, continues on Remdesivir/Lovenox.

## 2022-08-14 NOTE — PLAN OF CARE
Goal Outcome Evaluation:      DATE & TIME: 08/13/2022 6326-6603   Cognitive Concerns/ Orientation : A&Ox4, sleepy, [place], aids bilaterally  BEHAVIOR & AGGRESSION TOOL COLOR: Green, calm/cooperative   CIWA SCORE: NA    ABNL VS/O2: VSS on RA. Infrequent congested NPC   MOBILITY: not OOB this shift, up w/walker/cane at home, turn/repo q2h,   PAIN MANAGMENT: denies   DIET: Regular, needs some assistance ordering food   BOWEL/BLADDER: External catheter in place, no BM  ABNL LAB/BG: Na 132, D-dim 0.69, Covid 19 (+), BC pending.   DRAIN/DEVICES: PIV x 2   TELEMETRY RHYTHM: NA   SKIN: Pale/bruised   TESTS/PROCEDURES: none  D/C DAY/GOALS/PLACE: 3-4 days pending improvement   OTHER IMPORTANT INFO: Pt sleepy/arouses to voice, denies pain, IVF to SL, LS-diminished, no SOB noted, Special precautions, continues on Remdesivir/Lovenox.

## 2022-08-14 NOTE — PLAN OF CARE
Summary: Covid (+), weakness and fever   DATE & TIME: 08/13/2022 2145-7940   Cognitive Concerns/ Orientation : A&Ox4. Tired looking. Awakes to voice. Hearing aids bilaterally. Partial dentures in lower. Wears glasses.   BEHAVIOR & AGGRESSION TOOL COLOR: Green   CIWA SCORE: NA    ABNL VS/O2: VSS, on RA. Occasional congested cough and clear.   MOBILITY: Have not ambulated on floor yet. Assisted with turns. Fair strength with all extremeties. Pt reports using walker and cane in home at baseline.   PAIN MANAGMENT: Some minor back discomfort.   DIET: Regular. Pt likes orange juice with medication. Had some crackers on floor.   BOWEL/BLADDER: External catheter set up for comfort.   ABNL LAB/BG: Na 132, D-dim 0.69, Covid 19 (+), BC pending.   DRAIN/DEVICES: 2 PIV in RFA/hand. NS bolus of 500mL over 5 hours infusing. Remdesivir.   TELEMETRY RHYTHM: NA   SKIN: Some bruising on UE. Pale in color.   TESTS/PROCEDURES: AM Labs, US of LE completed and negative for DVT, CT of head negative in ED   D/C DAY/GOALS/PLACE: 4 days pending improvement   OTHER IMPORTANT INFO: Special precautions initiated on admission. Bed alarm on for safety. Cont POX. Q4 VS. Spouse updated by phone this evening.

## 2022-08-14 NOTE — PROGRESS NOTES
"[facility]    Medicine Progress Note - Hospitalist Service    Date of Admission:  8/13/2022    Assessment & Plan          Jean Pierre Roblero is a 88 year old male with multiple medical problems including dementia, history of TIA, hyperlipidemia, BPH who presents the emergency department with weakness.  COVID PCR is positive.     Principal Problem:    Infection due to 2019 novel coronavirus    Admit as inpatient    Special precautions    Treat with remdesivir x3 days in patient at high risk for progression    Routine COVID labs       Lovenox for DVT prophylaxis, following platelet count closely    Afebrile, normal WBC count and procalcitonin less than 0.05 so discontinued antibiotics.    Active Problems:    Infectious encephalopathy    Patient has a history of dementia, though wife stresses he is only on Namenda \"to prevent progression\" of disease    During first ED visit on 8/13/2022, patient's wife, Lianet, said patient did not remember he needed to remove his dentures, was reluctant to leave his chair, he is now drowsy    Labs are essentially unremarkable with the exception of COVID PCR positive, mild hyponatremia, new from earlier today    Presume encephalopathy is related to infection    Avoid medications which can worsen symptoms, including sedatives, anticholinergics    Follow clinically      Hyponatremia    Sodium was 132 on 8/13: resolved    Wife says he has not had much to eat or drink today because he is not feeling well, presume he has hypovolemic hyponatremia    Follow sodium    Thrombocytopenia (H)    This is mild, was also noted during hospitalization 5/26/2022    Follow platelet count closely since he will be on Lovenox for DVT Prophylaxis    Mixed hyperlipidemia    Continue statin, Questran    BPH (benign prostatic hyperplasia)    Continue Proscar    Dementia (H)    Continue Namenda  Right lower extremity edema    This is chronic, it was noted in the record of an office visit " "dated 7/29/2022 and wife confirms that it is chronic    Begin Lovenox for DVT prophylaxis  History of TIA (transient ischemic attack)    Noted    Patient was admitted to [facility] on 5/26 for TIA.  He was instructed to take baby aspirin and Plavix for 90 days.  Note from patient's office visit with his primary physician on 7/29 indicates he instructed the patient to discontinue Plavix, patient has continued taking it.    Will hold Plavix for now, given mild thrombocytopenia and need for Lovenox    Could request stroke neurology follow-up during his hospital stay to clarify duration of treatment with Plavix, given discrepancy between their instructions and instructions from his primary provider      Diet: Combination Diet Regular Diet Adult    DVT Prophylaxis: Enoxaparin (Lovenox) SQ  Puckett Catheter: Not present  Central Lines: None  Cardiac Monitoring: None  Code Status: Full Code      Disposition Plan      Expected Discharge Date: 08/17/2022                The patient's care was discussed with the Bedside Nurse and Patient.    Ciaran Segal MD, MD  Hospitalist Service  [facility]  Securely message with the Vocera Web Console (learn more here)  Text page via [employer] Paging/Directory     \"This dictation was performed with voice recognition software and may contain errors,  omissions and inadvertent word substitution.\"         Clinically Significant Risk Factors Present on Admission          # Hypocalcemia: Ca = 8.1 mg/dL (Ref range: 8.5 - 10.1 mg/dL) and/or iCa = N/A on admission, will replace as needed       # Platelet Defect: home medication list includes an antiplatelet medication           ______________________________________________________________________    Interval History   Feels much better today compared to yesterday.  Breathing improved  Denies any pain   4 point ROS done and negative unless mentioned     Data reviewed today: I reviewed all medications, new labs and imaging results " "over the last 24 hours. I personally reviewed the chest x-ray image(s) showing as below and the USS venous RLE image(s) showing no DVT.    Physical Exam   /74 (BP Location: Right arm)   Pulse 91   Temp 98.5  F (36.9  C) (Oral)   Resp 18   Ht 1.702 m (5' 7\")   Wt 64.1 kg (141 lb 6.4 oz)   SpO2 97%   BMI 22.15 kg/m    Gen- pleasant lying in bed  HEENT- NAD, EDIS  Neck- supple, no JVD elevation, no thyromegaly  CVS- I+II+ no m/r/g  RS- CTAB, decreased at base   Abdo- soft, no tenderness . No g/r/r   Ext- no edema     Data    BMPRecent Labs   Lab 08/14/22  0729 08/13/22  1425 08/13/22  0148    132* 138   POTASSIUM 4.0 4.3 3.9   CHLORIDE 107 102 104   KENYA 8.1* 8.8 8.5   CO2 27 27 28   BUN 14 13 13   CR 0.88 0.96 0.87   GLC 90 129* 101*     CBC  Recent Labs   Lab 08/14/22  0729 08/13/22  1425 08/13/22  0148   WBC 4.3 4.6 5.0   RBC 3.71* 4.21* 4.23*   HGB 12.3* 14.0 14.0   HCT 37.2* 42.2 42.4    100 100   MCH 33.2* 33.3* 33.1*   MCHC 33.1 33.2 33.0   RDW 12.9 12.8 12.7   * 111* 122*     INRNo lab results found in last 7 days.  LFTs  Recent Labs   Lab 08/13/22  0148   ALKPHOS 76   AST 21   ALT 22   BILITOTAL 0.6   PROTTOTAL 6.7*   ALBUMIN 4.0      PANCNo lab results found in last 7 days.    Recent Results (from the past 24 hour(s))   Chest XR,  PA & LAT    Narrative    EXAM: XR CHEST 2 VIEWS  LOCATION: [facility]  DATE/TIME: 8/13/2022 3:14 PM    INDICATION: fever  COMPARISON: Chest CT 6/3/2020      Impression    IMPRESSION: Stable cardiac silhouette. Subtle bibasilar opacities, left greater than right could represent atelectasis or pneumonia. No pleural effusion or pneumothorax. No acute bony abnormality. Prior cholecystectomy.   US Lower Extremity Venous Duplex Right    Narrative    EXAM: US LOWER EXTREMITY VENOUS DUPLEX RIGHT  LOCATION: [facility]  DATE/TIME: 8/13/2022 6:55 PM    INDICATION: worsening leg edema  COMPARISON: " None.  TECHNIQUE: Venous Duplex ultrasound of the right lower extremity with and without compression, augmentation and duplex. Color flow and spectral Doppler with waveform analysis performed.    FINDINGS: Exam includes the common femoral, femoral, popliteal, and contralateral common femoral veins as well as segmentally visualized deep calf veins and greater saphenous vein.     RIGHT: No deep vein thrombosis. No superficial thrombophlebitis. No popliteal cyst.      Impression    IMPRESSION:  1.  No deep venous thrombosis in the right lower extremity.

## 2022-08-15 ENCOUNTER — APPOINTMENT (OUTPATIENT)
Dept: PHYSICAL THERAPY | Facility: CLINIC | Age: 87
DRG: 177 | End: 2022-08-15
Attending: INTERNAL MEDICINE
Payer: MEDICARE

## 2022-08-15 LAB
ANION GAP SERPL CALCULATED.3IONS-SCNC: 6 MMOL/L (ref 3–14)
BACTERIA UR CULT: NORMAL
BUN SERPL-MCNC: 14 MG/DL (ref 7–30)
CALCIUM SERPL-MCNC: 8.2 MG/DL (ref 8.5–10.1)
CHLORIDE BLD-SCNC: 105 MMOL/L (ref 94–109)
CO2 SERPL-SCNC: 23 MMOL/L (ref 20–32)
CREAT SERPL-MCNC: 0.66 MG/DL (ref 0.66–1.25)
CRP SERPL-MCNC: 47.1 MG/L (ref 0–8)
D DIMER PPP FEU-MCNC: 0.52 UG/ML FEU (ref 0–0.5)
ERYTHROCYTE [DISTWIDTH] IN BLOOD BY AUTOMATED COUNT: 12.8 % (ref 10–15)
GFR SERPL CREATININE-BSD FRML MDRD: 90 ML/MIN/1.73M2
GLUCOSE BLD-MCNC: 94 MG/DL (ref 70–99)
HCT VFR BLD AUTO: 38.7 % (ref 40–53)
HGB BLD-MCNC: 13.1 G/DL (ref 13.3–17.7)
MCH RBC QN AUTO: 33.6 PG (ref 26.5–33)
MCHC RBC AUTO-ENTMCNC: 33.9 G/DL (ref 31.5–36.5)
MCV RBC AUTO: 99 FL (ref 78–100)
PLATELET # BLD AUTO: 107 10E3/UL (ref 150–450)
POTASSIUM BLD-SCNC: 3.6 MMOL/L (ref 3.4–5.3)
RBC # BLD AUTO: 3.9 10E6/UL (ref 4.4–5.9)
SODIUM SERPL-SCNC: 134 MMOL/L (ref 133–144)
WBC # BLD AUTO: 4.6 10E3/UL (ref 4–11)

## 2022-08-15 PROCEDURE — 80048 BASIC METABOLIC PNL TOTAL CA: CPT | Performed by: INTERNAL MEDICINE

## 2022-08-15 PROCEDURE — 86140 C-REACTIVE PROTEIN: CPT | Performed by: INTERNAL MEDICINE

## 2022-08-15 PROCEDURE — 97161 PT EVAL LOW COMPLEX 20 MIN: CPT | Mod: GP

## 2022-08-15 PROCEDURE — 120N000001 HC R&B MED SURG/OB

## 2022-08-15 PROCEDURE — 99233 SBSQ HOSP IP/OBS HIGH 50: CPT | Performed by: INTERNAL MEDICINE

## 2022-08-15 PROCEDURE — 85379 FIBRIN DEGRADATION QUANT: CPT | Performed by: INTERNAL MEDICINE

## 2022-08-15 PROCEDURE — 97530 THERAPEUTIC ACTIVITIES: CPT | Mod: GP

## 2022-08-15 PROCEDURE — 85027 COMPLETE CBC AUTOMATED: CPT | Performed by: INTERNAL MEDICINE

## 2022-08-15 PROCEDURE — 36415 COLL VENOUS BLD VENIPUNCTURE: CPT | Performed by: INTERNAL MEDICINE

## 2022-08-15 PROCEDURE — 250N000013 HC RX MED GY IP 250 OP 250 PS 637: Performed by: INTERNAL MEDICINE

## 2022-08-15 PROCEDURE — 85041 AUTOMATED RBC COUNT: CPT | Performed by: INTERNAL MEDICINE

## 2022-08-15 PROCEDURE — 258N000003 HC RX IP 258 OP 636: Performed by: INTERNAL MEDICINE

## 2022-08-15 PROCEDURE — 97116 GAIT TRAINING THERAPY: CPT | Mod: GP

## 2022-08-15 PROCEDURE — 250N000011 HC RX IP 250 OP 636: Performed by: INTERNAL MEDICINE

## 2022-08-15 RX ORDER — FAMOTIDINE 20 MG/1
40 TABLET, FILM COATED ORAL AT BEDTIME
Status: DISCONTINUED | OUTPATIENT
Start: 2022-08-15 | End: 2022-08-19 | Stop reason: HOSPADM

## 2022-08-15 RX ADMIN — ATORVASTATIN CALCIUM 10 MG: 10 TABLET, FILM COATED ORAL at 21:59

## 2022-08-15 RX ADMIN — SODIUM CHLORIDE 50 ML: 9 INJECTION, SOLUTION INTRAVENOUS at 17:20

## 2022-08-15 RX ADMIN — FAMOTIDINE 40 MG: 20 TABLET ORAL at 21:59

## 2022-08-15 RX ADMIN — REMDESIVIR 100 MG: 100 INJECTION, POWDER, LYOPHILIZED, FOR SOLUTION INTRAVENOUS at 17:12

## 2022-08-15 RX ADMIN — Medication 1 CAPSULE: at 08:38

## 2022-08-15 RX ADMIN — FINASTERIDE 5 MG: 5 TABLET, FILM COATED ORAL at 08:38

## 2022-08-15 RX ADMIN — CHOLESTYRAMINE 4 G: 4 POWDER, FOR SUSPENSION ORAL at 17:09

## 2022-08-15 RX ADMIN — CHOLESTYRAMINE 4 G: 4 POWDER, FOR SUSPENSION ORAL at 08:38

## 2022-08-15 RX ADMIN — ASPIRIN 81 MG: 81 TABLET, COATED ORAL at 08:38

## 2022-08-15 RX ADMIN — MEMANTINE 10 MG: 10 TABLET ORAL at 21:59

## 2022-08-15 RX ADMIN — ENOXAPARIN SODIUM 40 MG: 40 INJECTION SUBCUTANEOUS at 21:59

## 2022-08-15 RX ADMIN — MEMANTINE 10 MG: 10 TABLET ORAL at 08:38

## 2022-08-15 ASSESSMENT — ACTIVITIES OF DAILY LIVING (ADL)
ADLS_ACUITY_SCORE: 45

## 2022-08-15 NOTE — PROGRESS NOTES
"[facility]    Medicine Progress Note - Hospitalist Service    Date of Admission:  8/13/2022    Assessment & Plan          Jean Pierre Roblero is a 88 year old male with multiple medical problems including dementia, history of TIA, hyperlipidemia, BPH who presents the emergency department with weakness.  COVID PCR is positive.     Principal Problem:    Infection due to 2019 novel coronavirus    Admit as inpatient    Special precautions    Treat with remdesivir x3 days in patient at high risk for progression    Routine COVID labs       Lovenox for DVT prophylaxis, following platelet count closely    Afebrile, normal WBC count and procalcitonin less than 0.05 so discontinued antibiotics.    08/15: Clinically improving.  If remains stable may likely be discharged tomorrow depending on  rehab recommendations    Active Problems:    Infectious encephalopathy    Patient has a history of dementia, though wife stresses he is only on Namenda \"to prevent progression\" of disease    During first ED visit on 8/13/2022, patient's wife, Lianet, said patient did not remember he needed to remove his dentures, was reluctant to leave his chair, he is now drowsy    Labs are essentially unremarkable with the exception of COVID PCR positive, mild hyponatremia, new from earlier today    Presume encephalopathy is related to infection    Avoid medications which can worsen symptoms, including sedatives, anticholinergics    Follow clinically      Hyponatremia    Sodium was 132 on 8/13: resolved    Wife says he has not had much to eat or drink today because he is not feeling well, presume he has hypovolemic hyponatremia    Follow sodium    Thrombocytopenia (H)    This is mild, was also noted during hospitalization 5/26/2022    Follow platelet count closely since he will be on Lovenox for DVT Prophylaxis    Mixed hyperlipidemia    Continue statin, Questran    BPH (benign prostatic hyperplasia)    Continue Proscar    Dementia " "(H)    Continue Namenda  Right lower extremity edema    This is chronic, it was noted in the record of an office visit dated 7/29/2022 and wife confirms that it is chronic    Begin Lovenox for DVT prophylaxis  History of TIA (transient ischemic attack)    Noted    Patient was admitted to [facility] on 5/26 for TIA.  He was instructed to take baby aspirin and Plavix for 90 days.  Note from patient's office visit with his primary physician on 7/29 indicates he instructed the patient to discontinue Plavix, patient has continued taking it.    Will hold Plavix for now, given mild thrombocytopenia and need for Lovenox    Could request stroke neurology follow-up during his hospital stay to clarify duration of treatment with Plavix, given discrepancy between their instructions and instructions from his primary provider      Diet: Combination Diet Regular Diet Adult    DVT Prophylaxis: Enoxaparin (Lovenox) SQ  Puckett Catheter: Not present  Central Lines: None  Cardiac Monitoring: None  Code Status: Full Code      Disposition Plan      Expected Discharge Date: 08/24/2022                The patient's care was discussed with the Bedside Nurse and Patient.    Ciaran Segal MD, MD  Hospitalist Service  [facility]  Securely message with the Vocera Web Console (learn more here)  Text page via [employer] Paging/Directory     \"This dictation was performed with voice recognition software and may contain errors,  omissions and inadvertent word substitution.\"         Clinically Significant Risk Factors Present on Admission                      ______________________________________________________________________    Interval History   Feels  better  Breathing improved  Denies any pain   4 point ROS done and negative unless mentioned     Data reviewed today: I reviewed all medications, new labs and imaging results over the last 24 hours. I personally reviewed no images or EKG's today.    Physical Exam   /81 (BP " "Location: Right arm)   Pulse 83   Temp 99  F (37.2  C) (Oral)   Resp 18   Ht 1.702 m (5' 7\")   Wt 63.8 kg (140 lb 10.5 oz)   SpO2 95%   BMI 22.03 kg/m    Gen- pleasant lying in bed  HEENT- NAD, EDIS  Neck- supple, no JVD elevation, no thyromegaly  CVS- I+II+ no m/r/g  RS- CTAB, decreased at base   Abdo- soft, no tenderness . No g/r/r   Ext- no edema     Data    BMP  Recent Labs   Lab 08/15/22  0759 08/14/22  0729 08/13/22  1425 08/13/22  0148    139 132* 138   POTASSIUM 3.6 4.0 4.3 3.9   CHLORIDE 105 107 102 104   KENYA 8.2* 8.1* 8.8 8.5   CO2 23 27 27 28   BUN 14 14 13 13   CR 0.66 0.88 0.96 0.87   GLC 94 90 129* 101*     CBC  Recent Labs   Lab 08/15/22  0759 08/14/22  0729 08/13/22  1425 08/13/22  0148   WBC 4.6 4.3 4.6 5.0   RBC 3.90* 3.71* 4.21* 4.23*   HGB 13.1* 12.3* 14.0 14.0   HCT 38.7* 37.2* 42.2 42.4   MCV 99 100 100 100   MCH 33.6* 33.2* 33.3* 33.1*   MCHC 33.9 33.1 33.2 33.0   RDW 12.8 12.9 12.8 12.7   * 101* 111* 122*     INRNo lab results found in last 7 days.  LFTs  Recent Labs   Lab 08/13/22  0148   ALKPHOS 76   AST 21   ALT 22   BILITOTAL 0.6   PROTTOTAL 6.7*   ALBUMIN 4.0      PANCNo lab results found in last 7 days.    No results found for this or any previous visit (from the past 24 hour(s)).  "

## 2022-08-15 NOTE — PLAN OF CARE
Summary: Covid (+), weakness and fever   DATE & TIME: 08/14/2022 Evenings   Cognitive Concerns/ Orientation : A&Ox3-4. Has some difficulties with information. Calm and cooperative. Hearing aids bilateral. Glasses on.   BEHAVIOR & AGGRESSION TOOL COLOR: Green  CIWA SCORE: NA    ABNL VS/O2: VSS on RA, SaO2>95%. Infrequent congested cough. Some nasal congestion. Nasal spray ordered.   MOBILITY: 1PA, GB, and walker. Assisted pt with positioning.   PAIN MANAGMENT: Denies   DIET: Regular. Assisted with ordering. Pt feeds self with set up. Good appetite and fluid intake. Swallows pills whole 1 or 2 at a time.   BOWEL/BLADDER: External catheter in place this evening. No BM this shift.   ABNL LAB/BG: D-dim 0.52, Covid 19 (+) 8/13, BC pending.   DRAIN/DEVICES: PIV x 2 in RUE SL with daily Remdesivir   TELEMETRY RHYTHM: NA   SKIN: Pale/bruised   TESTS/PROCEDURES: NA   D/C DAY/GOALS/PLACE: 3-4 days pending improvement and completion of IV anti-viral infusions   OTHER IMPORTANT INFO:  Special precautions, continues on Remdesivir/Lovenox. Cont POX. Rounded on freq. Son updated on this shift.

## 2022-08-15 NOTE — PLAN OF CARE
Goal Outcome Evaluation:      Cognitive Concerns/ Orientation : A&O x3, intermittent confusion to situation, calm and cooperative. [place] , baldo hearing aids   BEHAVIOR & AGGRESSION TOOL COLOR: Green  CIWA SCORE: NA    ABNL VS/O2: VSS on RA.   MOBILITY: assist of of 1/GB/walker  PAIN MANAGMENT: Denies   DIET: Regular. Assisted with ordering. Pt feeds self with set up. Good appetite and fluid intake. Swallows pills whole 1 or 2 at a time.   BOWEL/BLADDER: External catheter in place this shift, No BM    ABNL LAB/BG: D-dim 0.52, Covid 19 (+) 8/13, BC pending.   DRAIN/DEVICES: PIV x 2 in RUE SL with daily Remdesivir   TELEMETRY RHYTHM: NA   SKIN: Pale/bruised  TESTS/PROCEDURES: NA   D/C DAY/GOALS/PLACE: 3-4 days pending clinical improvement  OTHER IMPORTANT INFO:  Slept well, denies pain, no BM, LS -diminished, infrequent NPC, Special precautions, continues on Remdesivir/Lovenox.

## 2022-08-15 NOTE — PROGRESS NOTES
08/15/22 1100   Quick Adds   Type of Visit Initial PT Evaluation   Living Environment   People in Home spouse   Current Living Arrangements house   Home Accessibility stairs within home;stairs to enter home   Number of Stairs, Main Entrance 3   Number of Stairs, Within Home, Primary seven   Transportation Anticipated car, drives self;family or friend will provide   Living Environment Comments Lives with spouse who is able to assist with needs, also has 3 sons.   Self-Care   Usual Activity Tolerance good   Current Activity Tolerance good   Regular Exercise Yes   Equipment Currently Used at Home cane, straight;walker, rolling   Fall history within last six months yes   Number of times patient has fallen within last six months 2   Activity/Exercise/Self-Care Comment PLOF IND, walk regularly   General Information   Onset of Illness/Injury or Date of Surgery 08/13/22   Referring Physician Ciaran Segal MD   Patient/Family Therapy Goals Statement (PT) Return to home   Pertinent History of Current Problem (include personal factors and/or comorbidities that impact the POC) 88 year old male with multiple medical problems including dementia, history of TIA, hyperlipidemia, BPH who presents the emergency department with weakness.  COVID PCR is positive.   Cognition   Affect/Mental Status (Cognition) WNL   Orientation Status (Cognition) oriented x 3   Follows Commands (Cognition) WNL   Pain Assessment   Patient Currently in Pain No   Integumentary/Edema   Integumentary/Edema Comments RLE swelling > LLE. LLE redness   Posture    Posture Comments Forward head and shoulders with sitting   Range of Motion (ROM)   ROM Comment BLE ROM WFL, RLE ankle < LLE   Strength (Manual Muscle Testing)   Strength Comments BLE strength against gravity, 3+/5   Bed Mobility   Comment, (Bed Mobility) SBA supine-sit with use of bedrails   Transfers   Comment, (Transfers) SBA sit-stand with FWW   Gait/Stairs (Locomotion)   Distance in Feet  (Required for LE Total Joints) 10, 15, 15   Comment, (Gait/Stairs) CGA gait with FWW   Balance   Balance Comments Stable sitting and static standing balane   Sensory Examination   Sensory Perception Comments No changes from baseline   Clinical Impression   Criteria for Skilled Therapeutic Intervention Yes, treatment indicated   PT Diagnosis (PT) Decreased functional mobility   Influenced by the following impairments Pain, decreased strength and ROM, decreaased activity tolerance   Functional limitations due to impairments Decreased functional mobility   Clinical Presentation (PT Evaluation Complexity) Stable/Uncomplicated   Clinical Presentation Rationale Clinical judgement   Clinical Decision Making (Complexity) low complexity   Planned Therapy Interventions (PT) balance training;bed mobility training;gait training;stair training;strengthening;transfer training;progressive activity/exercise;risk factor education;patient/family education;home program guidelines   Risk & Benefits of therapy have been explained evaluation/treatment results reviewed;risks/benefits reviewed;care plan/treatment goals reviewed;participants voiced agreement with care plan;current/potential barriers reviewed;participants included;patient   Clinical Impression Comments [place], uses hearing aids   PT Discharge Planning   PT Discharge Recommendation (DC Rec) home;home with assist;Transitional Care Facility   PT Rationale for DC Rec Pt below baseline with all functional mobility, previously amb without an AD, currently requires FWW and assist of 1 for safe mobility. Pt has not performed stairs to this date, will require assist of 1 and safe stair navigation in order to safely return home. Anticipate SBA all functional mobility at discharge. If pt does not progress activity tolerance during IP stay, may benefit from short TCU stay to increase strength, balance and safety prior to returning home with spouse.   PT Brief overview of current status SBA  bed mobility, CGA-Naomi transfers and gait with FWW   Total Evaluation Time   Total Evaluation Time (Minutes) 5   Physical Therapy Goals   PT Frequency Daily   PT Predicted Duration/Target Date for Goal Attainment 08/22/22   PT Goals Bed Mobility;Transfers;Gait;Stairs   PT: Bed Mobility Supervision/stand-by assist;Supine to/from sit   PT: Transfers Supervision/stand-by assist;Assistive device;Bed to/from chair;Sit to/from stand   PT: Gait Supervision/stand-by assist;Greater than 200 feet;Standard walker   PT: Stairs 3 stairs;Supervision/stand-by assist

## 2022-08-15 NOTE — PLAN OF CARE
Goal Outcome Evaluation:    Plan of Care Reviewed With: patient, spouse     Summary: Covid (+), weakness and fever   DATE & TIME: 08/15/2022 0431-5496  Cognitive Concerns/ Orientation : A&O x3, intermittent confusion to situation, calm and cooperative. [place] , baldo hearing aids   BEHAVIOR & AGGRESSION TOOL COLOR: Green  CIWA SCORE: NA    ABNL VS/O2: VSS on RA.   MOBILITY: assist of of 1/GB/walker  PAIN MANAGMENT: Denies   DIET: Regular. Assisted with ordering. Pt feeds self with set up. Good appetite and fluid intake. Swallows pills whole 1 or 2 at a time.   BOWEL/BLADDER: External catheter removed this shift, cont BM this shift x1  ABNL LAB/BG: D-dim 0.52, Covid 19 (+) 8/13,   DRAIN/DEVICES: PIV x 2 in RUE SL with daily Remdesivir   TELEMETRY RHYTHM: NA   SKIN: Pale/bruised   TESTS/PROCEDURES: NA   D/C DAY/GOALS/PLACE: 3-4 days pending clinical improvement-most likely 8/24 to home w/ wife if pt cont. To improve, PT suggesting possible TCU if pt cant do stairs safely  OTHER IMPORTANT INFO: denies pain, LS -diminished, infrequent NPC, Special precautions, continues on Remdesivir/Lovenox.

## 2022-08-15 NOTE — PROGRESS NOTES
08/15/22 1100   Quick Adds   Type of Visit Initial PT Evaluation   Living Environment   People in Home spouse   Current Living Arrangements house   Home Accessibility stairs within home;stairs to enter home   Number of Stairs, Main Entrance 3   Number of Stairs, Within Home, Primary seven   Transportation Anticipated car, drives self;family or friend will provide   Living Environment Comments Lives with spouse who is able to assist with needs, also has 3 sons.   Self-Care   Usual Activity Tolerance good   Current Activity Tolerance good   Regular Exercise Yes   Equipment Currently Used at Home cane, straight;walker, rolling   Fall history within last six months yes   Number of times patient has fallen within last six months 2   Activity/Exercise/Self-Care Comment PLOF IND, walk regularly   General Information   Onset of Illness/Injury or Date of Surgery 08/13/22   Referring Physician Ciaran Segal MD   Patient/Family Therapy Goals Statement (PT) Return to home   Pertinent History of Current Problem (include personal factors and/or comorbidities that impact the POC) 88 year old male with multiple medical problems including dementia, history of TIA, hyperlipidemia, BPH who presents the emergency department with weakness.  COVID PCR is positive.   Cognition   Affect/Mental Status (Cognition) WNL   Orientation Status (Cognition) oriented x 3   Follows Commands (Cognition) WNL   Pain Assessment   Patient Currently in Pain No   Integumentary/Edema   Integumentary/Edema Comments RLE swelling > LLE. LLE redness   Posture    Posture Comments Forward head and shoulders with sitting   Range of Motion (ROM)   ROM Comment BLE ROM WFL, RLE ankle < LLE   Strength (Manual Muscle Testing)   Strength Comments BLE strength against gravity, 3+/5   Bed Mobility   Comment, (Bed Mobility) SBA supine-sit with use of bedrails   Transfers   Comment, (Transfers) SBA sit-stand with FWW   Gait/Stairs (Locomotion)   Distance in Feet  (Required for LE Total Joints) 10, 15, 15   Comment, (Gait/Stairs) CGA gait with FWW   Balance   Balance Comments Stable sitting and static standing balane   Sensory Examination   Sensory Perception Comments No changes from baseline   Clinical Impression   Criteria for Skilled Therapeutic Intervention Yes, treatment indicated   PT Diagnosis (PT) Decreased functional mobility   Influenced by the following impairments Pain, decreased strength and ROM, decreaased activity tolerance   Functional limitations due to impairments Decreased functional mobility   Clinical Presentation (PT Evaluation Complexity) Stable/Uncomplicated   Clinical Presentation Rationale Clinical judgement   Clinical Decision Making (Complexity) low complexity   Planned Therapy Interventions (PT) balance training;bed mobility training;gait training;stair training;strengthening;transfer training;progressive activity/exercise;risk factor education;patient/family education;home program guidelines   Risk & Benefits of therapy have been explained evaluation/treatment results reviewed;risks/benefits reviewed;care plan/treatment goals reviewed;participants voiced agreement with care plan;current/potential barriers reviewed;participants included;patient   Clinical Impression Comments [place], uses hearing aids   PT Discharge Planning   PT Discharge Recommendation (DC Rec) home;home with assist;Transitional Care Facility   PT Rationale for DC Rec Pt below baseline with all functional mobility, previously amb without an AD, currently requires FWW and assist of 1 for safe mobility. Pt has not performed stairs to this date, will require assist of 1 and safe stair navigation in order to safely return home. Anticipate SBA all functional mobility at discharge. If pt does not progress activity tolerance during IP stay, may benefit from short TCU stay to increase strength, balance and safety prior to returning home with spouse.   PT Brief overview of current status SBA  bed mobility, CGA-Naomi transfers and gait with FWW   Total Evaluation Time   Total Evaluation Time (Minutes) 5

## 2022-08-16 ENCOUNTER — APPOINTMENT (OUTPATIENT)
Dept: PHYSICAL THERAPY | Facility: CLINIC | Age: 87
DRG: 177 | End: 2022-08-16
Payer: MEDICARE

## 2022-08-16 LAB
ANION GAP SERPL CALCULATED.3IONS-SCNC: 5 MMOL/L (ref 3–14)
BUN SERPL-MCNC: 18 MG/DL (ref 7–30)
CALCIUM SERPL-MCNC: 8.3 MG/DL (ref 8.5–10.1)
CHLORIDE BLD-SCNC: 104 MMOL/L (ref 94–109)
CO2 SERPL-SCNC: 25 MMOL/L (ref 20–32)
CREAT SERPL-MCNC: 0.77 MG/DL (ref 0.66–1.25)
CRP SERPL-MCNC: 38.6 MG/L (ref 0–8)
D DIMER PPP FEU-MCNC: 0.54 UG/ML FEU (ref 0–0.5)
ERYTHROCYTE [DISTWIDTH] IN BLOOD BY AUTOMATED COUNT: 12.7 % (ref 10–15)
GFR SERPL CREATININE-BSD FRML MDRD: 86 ML/MIN/1.73M2
GLUCOSE BLD-MCNC: 128 MG/DL (ref 70–99)
HCT VFR BLD AUTO: 42.9 % (ref 40–53)
HGB BLD-MCNC: 14 G/DL (ref 13.3–17.7)
MCH RBC QN AUTO: 32.7 PG (ref 26.5–33)
MCHC RBC AUTO-ENTMCNC: 32.6 G/DL (ref 31.5–36.5)
MCV RBC AUTO: 100 FL (ref 78–100)
PLATELET # BLD AUTO: 123 10E3/UL (ref 150–450)
POTASSIUM BLD-SCNC: 3.3 MMOL/L (ref 3.4–5.3)
POTASSIUM BLD-SCNC: 4.2 MMOL/L (ref 3.4–5.3)
RBC # BLD AUTO: 4.28 10E6/UL (ref 4.4–5.9)
SODIUM SERPL-SCNC: 134 MMOL/L (ref 133–144)
WBC # BLD AUTO: 4.4 10E3/UL (ref 4–11)

## 2022-08-16 PROCEDURE — 120N000001 HC R&B MED SURG/OB

## 2022-08-16 PROCEDURE — 84132 ASSAY OF SERUM POTASSIUM: CPT | Performed by: INTERNAL MEDICINE

## 2022-08-16 PROCEDURE — 97116 GAIT TRAINING THERAPY: CPT | Mod: GP

## 2022-08-16 PROCEDURE — 250N000013 HC RX MED GY IP 250 OP 250 PS 637: Performed by: INTERNAL MEDICINE

## 2022-08-16 PROCEDURE — 85379 FIBRIN DEGRADATION QUANT: CPT | Performed by: INTERNAL MEDICINE

## 2022-08-16 PROCEDURE — 250N000011 HC RX IP 250 OP 636: Performed by: INTERNAL MEDICINE

## 2022-08-16 PROCEDURE — 86140 C-REACTIVE PROTEIN: CPT | Performed by: INTERNAL MEDICINE

## 2022-08-16 PROCEDURE — 80048 BASIC METABOLIC PNL TOTAL CA: CPT | Performed by: INTERNAL MEDICINE

## 2022-08-16 PROCEDURE — 97112 NEUROMUSCULAR REEDUCATION: CPT | Mod: GP

## 2022-08-16 PROCEDURE — 36415 COLL VENOUS BLD VENIPUNCTURE: CPT | Performed by: INTERNAL MEDICINE

## 2022-08-16 PROCEDURE — 85027 COMPLETE CBC AUTOMATED: CPT | Performed by: INTERNAL MEDICINE

## 2022-08-16 PROCEDURE — 99232 SBSQ HOSP IP/OBS MODERATE 35: CPT | Performed by: INTERNAL MEDICINE

## 2022-08-16 RX ORDER — POTASSIUM CHLORIDE 1.5 G/1.58G
40 POWDER, FOR SOLUTION ORAL ONCE
Status: COMPLETED | OUTPATIENT
Start: 2022-08-16 | End: 2022-08-16

## 2022-08-16 RX ADMIN — Medication 1 CAPSULE: at 08:35

## 2022-08-16 RX ADMIN — MEMANTINE 10 MG: 10 TABLET ORAL at 08:35

## 2022-08-16 RX ADMIN — CHOLESTYRAMINE 4 G: 4 POWDER, FOR SUSPENSION ORAL at 10:31

## 2022-08-16 RX ADMIN — FAMOTIDINE 40 MG: 20 TABLET ORAL at 22:04

## 2022-08-16 RX ADMIN — ASPIRIN 81 MG: 81 TABLET, COATED ORAL at 08:34

## 2022-08-16 RX ADMIN — MEMANTINE 10 MG: 10 TABLET ORAL at 22:04

## 2022-08-16 RX ADMIN — ENOXAPARIN SODIUM 40 MG: 40 INJECTION SUBCUTANEOUS at 19:59

## 2022-08-16 RX ADMIN — ATORVASTATIN CALCIUM 10 MG: 10 TABLET, FILM COATED ORAL at 19:59

## 2022-08-16 RX ADMIN — POTASSIUM CHLORIDE 40 MEQ: 1.5 POWDER, FOR SOLUTION ORAL at 10:31

## 2022-08-16 RX ADMIN — FINASTERIDE 5 MG: 5 TABLET, FILM COATED ORAL at 08:34

## 2022-08-16 ASSESSMENT — ACTIVITIES OF DAILY LIVING (ADL)
ADLS_ACUITY_SCORE: 47
ADLS_ACUITY_SCORE: 45
ADLS_ACUITY_SCORE: 45
ADLS_ACUITY_SCORE: 47
ADLS_ACUITY_SCORE: 45

## 2022-08-16 NOTE — PLAN OF CARE
Goal Outcome Evaluation:    Cognitive Concerns/ Orientation : A&O x4. Intermittent confusion. [place], baldo hearing aids.   BEHAVIOR & AGGRESSION TOOL COLOR: Green  CIWA SCORE: NA    ABNL VS/O2: VSS on RA.   MOBILITY: Ax1 GB/walker, up in chair this morning.  PAIN MANAGMENT: Denies   DIET: Regular. Needs assistance with ordering. Pt feeds self with set up.   BOWEL/BLADDER: Up to bathroom to void  and incontinent at times. No bm this shift.  ABNL LAB/BG: None new  DRAIN/DEVICES: PIV x 2 in RUE SL.  TELEMETRY RHYTHM: NA   SKIN: Pale/bruised   TESTS/PROCEDURES: NA   D/C DAY/GOALS/PLACE:  Possible discharge  home with assist vs TCU per PT.  OTHER IMPORTANT INFO:  Special precautions maintained. LS diminished. Pt has dentures.

## 2022-08-16 NOTE — PLAN OF CARE
Goal Outcome Evaluation:    Plan of Care Reviewed With: patient     Overall Patient Progress: improving         Summary: Covid (+), weakness and fever   DATE & TIME: 08/15/2022 pm shift  Cognitive Concerns/ Orientation : A&O x4. Intermittent confusion. [place] , baldo hearing aids   BEHAVIOR & AGGRESSION TOOL COLOR: Green  CIWA SCORE: NA    ABNL VS/O2: VSS on RA.   MOBILITY: assist of of 1/GB/walker  PAIN MANAGMENT: Denies   DIET: Regular. Assisted with ordering. Pt feeds self with set up. Good appetite and fluid intake. Swallows pills whole 1 or 2 at a time.   BOWEL/BLADDER: incontinent of urine. No bm  ABNL LAB/BG: D-dim 0.52, Covid 19 (+) 8/13, CRP 47.1  DRAIN/DEVICES: PIV x 2 in RUE SL.  TELEMETRY RHYTHM: NA   SKIN: Pale/bruised   TESTS/PROCEDURES: NA   D/C DAY/GOALS/PLACE:  pending clinical improvement to home w/ wife if pt cont. To improve, PT suggesting possible TCU if pt cant do stairs safely  OTHER IMPORTANT INFO:  LS -diminished, infrequent NPC, Special precautions. Last dose of Remdesivir given this shift. On Lovenox.

## 2022-08-16 NOTE — PROGRESS NOTES
"[facility]    Medicine Progress Note - Hospitalist Service    Date of Admission:  8/13/2022    Assessment & Plan          Jean Pierre Roblero is a 88 year old male with multiple medical problems including dementia, history of TIA, hyperlipidemia, BPH who presents the emergency department with weakness.  COVID PCR is positive.     Principal Problem:    Infection due to 2019 novel coronavirus    Admit as inpatient    Special precautions    Treat with remdesivir x3 days in patient at high risk for progression    Routine COVID labs       Lovenox for DVT prophylaxis, following platelet count closely    Afebrile, normal WBC count and procalcitonin less than 0.05 so discontinued antibiotics.  08/16: Clinically improving. A/w TCU     Active Problems:    Infectious encephalopathy    Patient has a history of dementia, though wife stresses he is only on Namenda \"to prevent progression\" of disease    During first ED visit on 8/13/2022, patient's wife, Lianet, said patient did not remember he needed to remove his dentures, was reluctant to leave his chair, he is now drowsy    Labs are essentially unremarkable with the exception of COVID PCR positive, mild hyponatremia, new from earlier today    Presume encephalopathy is related to infection    Avoid medications which can worsen symptoms, including sedatives, anticholinergics    Follow clinically      Hyponatremia    Sodium was 132 on 8/13: resolved    Wife says he has not had much to eat or drink today because he is not feeling well, presume he has hypovolemic hyponatremia    Follow sodium    Hypokalemia: Replace as per protocol      Thrombocytopenia (H)    This is mild, was also noted during hospitalization 5/26/2022    Follow platelet count closely since he will be on Lovenox for DVT Prophylaxis    Mixed hyperlipidemia    Continue statin, Questran    BPH (benign prostatic hyperplasia)    Continue Proscar    Dementia (H)    Continue Namenda  Right lower " "extremity edema    This is chronic, it was noted in the record of an office visit dated 7/29/2022 and wife confirms that it is chronic    Begin Lovenox for DVT prophylaxis  History of TIA (transient ischemic attack)    Noted    Patient was admitted to [facility] on 5/26 for TIA.  He was instructed to take baby aspirin and Plavix for 90 days.  Note from patient's office visit with his primary physician on 7/29 indicates he instructed the patient to discontinue Plavix, patient has continued taking it.    Will hold Plavix for now, given mild thrombocytopenia and need for Lovenox    Could request stroke neurology follow-up during his hospital stay to clarify duration of treatment with Plavix, given discrepancy between their instructions and instructions from his primary provider      Diet: Combination Diet Regular Diet Adult    DVT Prophylaxis: Enoxaparin (Lovenox) SQ  Puckett Catheter: Not present  Central Lines: None  Cardiac Monitoring: None  Code Status: Full Code      Disposition Plan      Expected Discharge Date: 08/24/2022                The patient's care was discussed with the Bedside Nurse and Patient. Called wife and updated 8/16    Ciaran Segal MD, MD  Hospitalist Service  [facility]  Securely message with the Vocera Web Console (learn more here)  Text page via [email or web address] Paging/Directory     \"This dictation was performed with voice recognition software and may contain errors,  omissions and inadvertent word substitution.\"     Clinically Significant Risk Factors Present on Admission                    ______________________________________________________________________    Interval History   Feels  better  Breathing improved  No new complaints.     4 point ROS done and negative unless mentioned     Data reviewed today: I reviewed all medications, new labs and imaging results over the last 24 hours. I personally reviewed no images or EKG's today.    Physical Exam   /76 (BP Location: Right " "arm)   Pulse 67   Temp 97.5  F (36.4  C) (Oral)   Resp 16   Ht 1.702 m (5' 7\")   Wt 63.8 kg (140 lb 10.5 oz)   SpO2 100%   BMI 22.03 kg/m    Gen- pleasant   Neck- supple  CVS- I+II+ no m/r/g  RS- CTAB, decreased at base   Abdo- soft, no tenderness . No g/r/r   Ext- no edema     Data    BMP  Recent Labs   Lab 08/16/22  0822 08/15/22  0759 08/14/22  0729 08/13/22  1425    134 139 132*   POTASSIUM 3.3* 3.6 4.0 4.3   CHLORIDE 104 105 107 102   KENYA 8.3* 8.2* 8.1* 8.8   CO2 25 23 27 27   BUN 18 14 14 13   CR 0.77 0.66 0.88 0.96   * 94 90 129*     CBC  Recent Labs   Lab 08/16/22  0822 08/15/22  0759 08/14/22  0729 08/13/22  1425   WBC 4.4 4.6 4.3 4.6   RBC 4.28* 3.90* 3.71* 4.21*   HGB 14.0 13.1* 12.3* 14.0   HCT 42.9 38.7* 37.2* 42.2    99 100 100   MCH 32.7 33.6* 33.2* 33.3*   MCHC 32.6 33.9 33.1 33.2   RDW 12.7 12.8 12.9 12.8   * 107* 101* 111*     INRNo lab results found in last 7 days.  LFTs  Recent Labs   Lab 08/13/22  0148   ALKPHOS 76   AST 21   ALT 22   BILITOTAL 0.6   PROTTOTAL 6.7*   ALBUMIN 4.0      PANCNo lab results found in last 7 days.    No results found for this or any previous visit (from the past 24 hour(s)).  "

## 2022-08-16 NOTE — PLAN OF CARE
Goal Outcome Evaluation:    Plan of Care Reviewed With: patient     Summary: Covid (+), weakness and fever   DATE & TIME: 08/16/22 5211-1220  Cognitive Concerns/ Orientation : A&O x4. Intermittent confusion. [place], baldo hearing aids.   BEHAVIOR & AGGRESSION TOOL COLOR: Green  CIWA SCORE: NA    ABNL VS/O2: VSS on RA.   MOBILITY: Ax1 GB/walker, up in chair this morning.  PAIN MANAGMENT: Denies   DIET: Regular. Needs assistance with ordering. Pt feeds self with set up.   BOWEL/BLADDER: Up to bathroom to void x1 this shift and incontinent x1. No bm this shift.  ABNL LAB/BG: None new  DRAIN/DEVICES: PIV x 2 in RUE SL.  TELEMETRY RHYTHM: NA   SKIN: Pale/bruised   TESTS/PROCEDURES: NA   D/C DAY/GOALS/PLACE:  Possible discharge today home with assist vs TCU per PT.  OTHER IMPORTANT INFO:  Special precautions maintained. LS diminished.

## 2022-08-16 NOTE — CONSULTS
Care Management Initial Consult    General Information  Assessment completed with: Spouse or significant otherLianet  Type of CM/SW Visit: Initial Assessment    Primary Care Provider verified and updated as needed: Yes   Readmission within the last 30 days: current reason for admission unrelated to previous admission      Reason for Consult: discharge planning  Advance Care Planning: Advance Care Planning Reviewed: no concerns identified        Communication Assessment  Patient's communication style: spoken language (English or Bilingual)    Hearing Difficulty or Deaf: yes   Wear Glasses or Blind: yes    Cognitive  Cognitive/Neuro/Behavioral: WDL  Level of Consciousness: alert  Arousal Level: opens eyes spontaneously  Orientation: oriented x 4  Mood/Behavior: calm, cooperative  Best Language: 0 - No aphasia  Speech: clear    Living Environment:   People in home: spouse  Lianet  Current living Arrangements: house      Able to return to prior arrangements: yes     Family/Social Support:  Care provided by: spouse/significant other, self  Provides care for: no one  Marital Status:   Wife, Children  Lianet       Description of Support System: Involved    Support Assessment: Adequate family and caregiver support, Adequate social supports    Current Resources:   Patient receiving home care services: No     Community Resources: None  Equipment currently used at home: cane, straight, walker, rolling  Supplies currently used at home:      Employment/Financial:  Employment Status: retired      Financial Concerns:       Lifestyle & Psychosocial Needs:  Social Determinants of Health     Tobacco Use: Not on file   Alcohol Use: Not on file   Financial Resource Strain: Not on file   Food Insecurity: Not on file   Transportation Needs: Not on file   Physical Activity: Not on file   Stress: Not on file   Social Connections: Not on file   Intimate Partner Violence: Not on file   Depression: Not on file   Housing Stability: Not  on file     Functional Status:  Prior to admission patient needed assistance:     Mental Health Status:  Chemical Dependency Status:  Values/Beliefs:  Spiritual, Cultural Beliefs, Quaker Practices, Values that affect care:               Additional Information:  Per care management/ social work consult for discharge planning SW spoke with patients spouse via phone. Patient was admitted 8-13-22 for weakness and has an expected discharge date to be determined. Patients spouse stated they live in a house together and he was completely independent (medication management, bathing, driving,etc.) prior to this hospital stay. Lianet now believes she has Covid and is feeling weak herself, she wants to be sure patient is independent before he comes home. SW informed spouse about TCU and how [facility] is the only TCU accepting covid + patients. Spouse is not interested in TCU and plans to pick patient up once he is medically ready.     [place], MSW, LGSW

## 2022-08-17 ENCOUNTER — APPOINTMENT (OUTPATIENT)
Dept: PHYSICAL THERAPY | Facility: CLINIC | Age: 87
DRG: 177 | End: 2022-08-17
Payer: MEDICARE

## 2022-08-17 LAB
ANION GAP SERPL CALCULATED.3IONS-SCNC: 6 MMOL/L (ref 3–14)
BUN SERPL-MCNC: 16 MG/DL (ref 7–30)
CALCIUM SERPL-MCNC: 8.1 MG/DL (ref 8.5–10.1)
CHLORIDE BLD-SCNC: 106 MMOL/L (ref 94–109)
CO2 SERPL-SCNC: 24 MMOL/L (ref 20–32)
CREAT SERPL-MCNC: 0.64 MG/DL (ref 0.66–1.25)
CRP SERPL-MCNC: 23.9 MG/L (ref 0–8)
D DIMER PPP FEU-MCNC: 0.44 UG/ML FEU (ref 0–0.5)
ERYTHROCYTE [DISTWIDTH] IN BLOOD BY AUTOMATED COUNT: 12.4 % (ref 10–15)
GFR SERPL CREATININE-BSD FRML MDRD: >90 ML/MIN/1.73M2
GLUCOSE BLD-MCNC: 150 MG/DL (ref 70–99)
HCT VFR BLD AUTO: 41.9 % (ref 40–53)
HGB BLD-MCNC: 13.8 G/DL (ref 13.3–17.7)
MCH RBC QN AUTO: 32.6 PG (ref 26.5–33)
MCHC RBC AUTO-ENTMCNC: 32.9 G/DL (ref 31.5–36.5)
MCV RBC AUTO: 99 FL (ref 78–100)
PLATELET # BLD AUTO: 109 10E3/UL (ref 150–450)
POTASSIUM BLD-SCNC: 3.6 MMOL/L (ref 3.4–5.3)
RBC # BLD AUTO: 4.23 10E6/UL (ref 4.4–5.9)
SODIUM SERPL-SCNC: 136 MMOL/L (ref 133–144)
WBC # BLD AUTO: 4.2 10E3/UL (ref 4–11)

## 2022-08-17 PROCEDURE — 250N000013 HC RX MED GY IP 250 OP 250 PS 637: Performed by: INTERNAL MEDICINE

## 2022-08-17 PROCEDURE — 97116 GAIT TRAINING THERAPY: CPT | Mod: GP

## 2022-08-17 PROCEDURE — 36415 COLL VENOUS BLD VENIPUNCTURE: CPT | Performed by: INTERNAL MEDICINE

## 2022-08-17 PROCEDURE — 82310 ASSAY OF CALCIUM: CPT | Performed by: INTERNAL MEDICINE

## 2022-08-17 PROCEDURE — 99231 SBSQ HOSP IP/OBS SF/LOW 25: CPT | Performed by: INTERNAL MEDICINE

## 2022-08-17 PROCEDURE — 85379 FIBRIN DEGRADATION QUANT: CPT | Performed by: INTERNAL MEDICINE

## 2022-08-17 PROCEDURE — 85027 COMPLETE CBC AUTOMATED: CPT | Performed by: INTERNAL MEDICINE

## 2022-08-17 PROCEDURE — 120N000001 HC R&B MED SURG/OB

## 2022-08-17 PROCEDURE — 250N000011 HC RX IP 250 OP 636: Performed by: INTERNAL MEDICINE

## 2022-08-17 PROCEDURE — 86140 C-REACTIVE PROTEIN: CPT | Performed by: INTERNAL MEDICINE

## 2022-08-17 RX ADMIN — CHOLESTYRAMINE 4 G: 4 POWDER, FOR SUSPENSION ORAL at 10:35

## 2022-08-17 RX ADMIN — Medication 1 CAPSULE: at 08:14

## 2022-08-17 RX ADMIN — ASPIRIN 81 MG: 81 TABLET, COATED ORAL at 08:14

## 2022-08-17 RX ADMIN — MEMANTINE 10 MG: 10 TABLET ORAL at 20:19

## 2022-08-17 RX ADMIN — ATORVASTATIN CALCIUM 10 MG: 10 TABLET, FILM COATED ORAL at 20:19

## 2022-08-17 RX ADMIN — FAMOTIDINE 40 MG: 20 TABLET ORAL at 22:40

## 2022-08-17 RX ADMIN — FLUTICASONE PROPIONATE 1 SPRAY: 50 SPRAY, METERED NASAL at 08:14

## 2022-08-17 RX ADMIN — MEMANTINE 10 MG: 10 TABLET ORAL at 08:14

## 2022-08-17 RX ADMIN — FINASTERIDE 5 MG: 5 TABLET, FILM COATED ORAL at 08:14

## 2022-08-17 RX ADMIN — CHOLESTYRAMINE 4 G: 4 POWDER, FOR SUSPENSION ORAL at 17:55

## 2022-08-17 RX ADMIN — ENOXAPARIN SODIUM 40 MG: 40 INJECTION SUBCUTANEOUS at 20:19

## 2022-08-17 ASSESSMENT — ACTIVITIES OF DAILY LIVING (ADL)
ADLS_ACUITY_SCORE: 41
ADLS_ACUITY_SCORE: 47
ADLS_ACUITY_SCORE: 47
ADLS_ACUITY_SCORE: 41
ADLS_ACUITY_SCORE: 47
ADLS_ACUITY_SCORE: 48
ADLS_ACUITY_SCORE: 47
ADLS_ACUITY_SCORE: 48
ADLS_ACUITY_SCORE: 41
ADLS_ACUITY_SCORE: 41

## 2022-08-17 NOTE — PLAN OF CARE
Summary: Weakness found to be COVID +.  DATE & TIME: 08/17/22 7-3 pm   Cognitive Concerns/ Orientation : A & O x4. Forgetful. [place], baldo hearing aids in place.   BEHAVIOR & AGGRESSION TOOL COLOR: Green  CIWA SCORE: NA    ABNL VS/O2: VSS on RA.   MOBILITY: A1 GB/walker, up in recliner. Ambulated in room x1 and into BR to urinate x3  PAIN MANAGMENT: Denies   DIET: Regular. Needs assistance with ordering. Pt feeds self with set up.   BOWEL/BLADDER: Incontinent at times of urine. No BM today, one charted yesterday.   ABNL LAB/BG: Cr 0.64, CRP 23.9, and Platelet 109.   DRAIN/DEVICES: PIV x 2 in RUE SL.  TELEMETRY RHYTHM: NA   SKIN: Pale/bruised. Blanchable redness to coccyx, mepilex in place.  TESTS/PROCEDURES: None  D/C DAY/GOALS/PLACE: Unsure of discharge plan have not spoke with MD.   OTHER IMPORTANT INFO:  Special precautions maintained. Stable on RA. LS clear. C/O of feeling congested this am, PRN fluticasone (FLONASE) administered.

## 2022-08-17 NOTE — PROGRESS NOTES
"[facility]    Medicine Progress Note - Hospitalist Service    Date of Admission:  8/13/2022    Assessment & Plan          Jean Pierre Roblero is a 88 year old male with multiple medical problems including dementia, history of TIA, hyperlipidemia, BPH who presents the emergency department with weakness.  COVID PCR is positive.     Principal Problem:    Infection due to 2019 novel coronavirus    Admit as inpatient    Special precautions    Treat with remdesivir x3 days in patient at high risk for progression    Routine COVID labs       Lovenox for DVT prophylaxis, following platelet count closely    Afebrile, normal WBC count and procalcitonin less than 0.05 so discontinued antibiotics.  08/17: Clinically improving. A/w TCU     Active Problems:    Infectious encephalopathy    Patient has a history of dementia, though wife stresses he is only on Namenda \"to prevent progression\" of disease    During first ED visit on 8/13/2022, patient's wife, Lianet, said patient did not remember he needed to remove his dentures, was reluctant to leave his chair, he is now drowsy    Labs are essentially unremarkable with the exception of COVID PCR positive, mild hyponatremia, new from earlier today    Presume encephalopathy is related to infection    Avoid medications which can worsen symptoms, including sedatives, anticholinergics    Follow clinically      Hyponatremia    Sodium was 132 on 8/13: resolved    Wife says he has not had much to eat or drink today because he is not feeling well, presume he has hypovolemic hyponatremia    Follow sodium    Hypokalemia: Replace as per protocol      Thrombocytopenia (H)    This is mild, was also noted during hospitalization 5/26/2022    Follow platelet count closely since he will be on Lovenox for DVT Prophylaxis    Mixed hyperlipidemia    Continue statin, Questran    BPH (benign prostatic hyperplasia)    Continue Proscar    Dementia (H)    Continue Namenda  Right lower " "extremity edema    This is chronic, it was noted in the record of an office visit dated 7/29/2022 and wife confirms that it is chronic    Begin Lovenox for DVT prophylaxis  History of TIA (transient ischemic attack)    Noted    Patient was admitted to [facility] on 5/26 for TIA.  He was instructed to take baby aspirin and Plavix for 90 days.  Note from patient's office visit with his primary physician on 7/29 indicates he instructed the patient to discontinue Plavix, patient has continued taking it.    Will hold Plavix for now, given mild thrombocytopenia and need for Lovenox    Could request stroke neurology follow-up during his hospital stay to clarify duration of treatment with Plavix, given discrepancy between their instructions and instructions from his primary provider      Diet: Combination Diet Regular Diet Adult    DVT Prophylaxis: Enoxaparin (Lovenox) SQ  Puckett Catheter: Not present  Central Lines: None  Cardiac Monitoring: None  Code Status: Full Code      Disposition Plan      Expected Discharge Date: 08/24/2022                The patient's care was discussed with the Bedside Nurse and Patient. Called wife and updated 8/16    Ciaran Segal MD, MD  Hospitalist Service  [facility]  Securely message with the Vocera Web Console (learn more here)  Text page via [employer] Paging/Directory     \"This dictation was performed with voice recognition software and may contain errors,  omissions and inadvertent word substitution.\"     Clinically Significant Risk Factors Present on Admission                    ______________________________________________________________________    Interval History   Feels  better  Breathing improved  No new complaints.     4 point ROS done and negative unless mentioned     Data reviewed today: I reviewed all medications, new labs and imaging results over the last 24 hours. I personally reviewed no images or EKG's today.    Physical Exam   /65 (BP Location: Right " "arm)   Pulse 84   Temp 97.6  F (36.4  C) (Oral)   Resp 18   Ht 1.702 m (5' 7\")   Wt 63.8 kg (140 lb 10.5 oz)   SpO2 98%   BMI 22.03 kg/m    Gen- pleasant   Neck- supple  CVS- I+II+ no m/r/g  RS- CTAB, decreased at base   Abdo- soft, no tenderness . No g/r/r   Ext- no edema     Data    BMP  Recent Labs   Lab 08/17/22  0914 08/16/22  1514 08/16/22  0822 08/15/22  0759 08/14/22  0729     --  134 134 139   POTASSIUM 3.6 4.2 3.3* 3.6 4.0   CHLORIDE 106  --  104 105 107   KENYA 8.1*  --  8.3* 8.2* 8.1*   CO2 24  --  25 23 27   BUN 16  --  18 14 14   CR 0.64*  --  0.77 0.66 0.88   *  --  128* 94 90     CBC  Recent Labs   Lab 08/17/22  0914 08/16/22  0822 08/15/22  0759 08/14/22  0729   WBC 4.2 4.4 4.6 4.3   RBC 4.23* 4.28* 3.90* 3.71*   HGB 13.8 14.0 13.1* 12.3*   HCT 41.9 42.9 38.7* 37.2*   MCV 99 100 99 100   MCH 32.6 32.7 33.6* 33.2*   MCHC 32.9 32.6 33.9 33.1   RDW 12.4 12.7 12.8 12.9   * 123* 107* 101*     INRNo lab results found in last 7 days.  LFTs  Recent Labs   Lab 08/13/22  0148   ALKPHOS 76   AST 21   ALT 22   BILITOTAL 0.6   PROTTOTAL 6.7*   ALBUMIN 4.0      PANCNo lab results found in last 7 days.    No results found for this or any previous visit (from the past 24 hour(s)).  "

## 2022-08-17 NOTE — PLAN OF CARE
Goal Outcome Evaluation:        Cognitive Concerns/ Orientation : A & O x4. Forgetful. [place], baldo hearing aids in place.   BEHAVIOR & AGGRESSION TOOL COLOR: Green  CIWA SCORE: NA    ABNL VS/O2: VSS on RA.   MOBILITY: A1 GB/walker, up in recliner. Ambulated in room x1 and into BR to urinate x1  PAIN MANAGMENT: Denies   DIET: Regular. Needs assistance with ordering. Pt feeds self with set up.   BOWEL/BLADDER: Incontinent at times of urine. No BM today, one charted yesterday.   ABNL LAB/BG: Cr 0.64, CRP 23.9, and Platelet 109.   DRAIN/DEVICES: PIV x 2 in RUE SL.  TELEMETRY RHYTHM: NA   SKIN: Pale/bruised. Blanchable redness to coccyx, mepilex in place.  TESTS/PROCEDURES: None  D/C DAY/GOALS/PLACE: Unsure of discharge plan have not spoke with MD.   OTHER IMPORTANT INFO:  Special precautions maintained. Stable on RA. LS clear.

## 2022-08-17 NOTE — PLAN OF CARE
Goal Outcome Evaluation:                    Summary: Covid (+), weakness and fever   DATE & TIME: 8/16/22-8/17/22 2900-7101  Cognitive Concerns/ Orientation : A&Ox4. Intermittent confusion. [place], baldo hearing aids.   BEHAVIOR & AGGRESSION TOOL COLOR: Green  CIWA SCORE: NA    ABNL VS/O2: VSS on RA.   MOBILITY: Ax1 GB/walker, up in chair this morning.  PAIN MANAGMENT: Denies   DIET: Regular. Needs assistance with ordering. Pt feeds self with set up.   BOWEL/BLADDER: Incontinent at time, external male cath in place. No bm this shift.  ABNL LAB/BG: None  DRAIN/DEVICES: PIV x 2 in RUE SL.  TELEMETRY RHYTHM: NA   SKIN: Pale/bruised. Blanchable redness to coccyx, mepilex in place. Blanchable redness to coccyx, mepilex in place.  TESTS/PROCEDURES: NA   D/C DAY/GOALS/PLACE:  Possible discharge today home with assist vs TCU per PT.  OTHER IMPORTANT INFO:  Special precautions maintained. LS diminished.

## 2022-08-18 ENCOUNTER — APPOINTMENT (OUTPATIENT)
Dept: PHYSICAL THERAPY | Facility: CLINIC | Age: 87
DRG: 177 | End: 2022-08-18
Payer: MEDICARE

## 2022-08-18 LAB
BACTERIA BLD CULT: NO GROWTH
BACTERIA BLD CULT: NO GROWTH
ERYTHROCYTE [DISTWIDTH] IN BLOOD BY AUTOMATED COUNT: 12.4 % (ref 10–15)
HCT VFR BLD AUTO: 42.3 % (ref 40–53)
HGB BLD-MCNC: 14.3 G/DL (ref 13.3–17.7)
MCH RBC QN AUTO: 33.5 PG (ref 26.5–33)
MCHC RBC AUTO-ENTMCNC: 33.8 G/DL (ref 31.5–36.5)
MCV RBC AUTO: 99 FL (ref 78–100)
PLATELET # BLD AUTO: 132 10E3/UL (ref 150–450)
POTASSIUM BLD-SCNC: 3.6 MMOL/L (ref 3.4–5.3)
RBC # BLD AUTO: 4.27 10E6/UL (ref 4.4–5.9)
WBC # BLD AUTO: 4.7 10E3/UL (ref 4–11)

## 2022-08-18 PROCEDURE — 97116 GAIT TRAINING THERAPY: CPT | Mod: GP

## 2022-08-18 PROCEDURE — 250N000013 HC RX MED GY IP 250 OP 250 PS 637: Performed by: INTERNAL MEDICINE

## 2022-08-18 PROCEDURE — 85027 COMPLETE CBC AUTOMATED: CPT | Performed by: INTERNAL MEDICINE

## 2022-08-18 PROCEDURE — 36415 COLL VENOUS BLD VENIPUNCTURE: CPT | Performed by: INTERNAL MEDICINE

## 2022-08-18 PROCEDURE — 99232 SBSQ HOSP IP/OBS MODERATE 35: CPT | Performed by: INTERNAL MEDICINE

## 2022-08-18 PROCEDURE — 250N000011 HC RX IP 250 OP 636: Performed by: INTERNAL MEDICINE

## 2022-08-18 PROCEDURE — 84132 ASSAY OF SERUM POTASSIUM: CPT | Performed by: INTERNAL MEDICINE

## 2022-08-18 PROCEDURE — 97530 THERAPEUTIC ACTIVITIES: CPT | Mod: GP

## 2022-08-18 PROCEDURE — 120N000001 HC R&B MED SURG/OB

## 2022-08-18 RX ADMIN — CHOLESTYRAMINE 4 G: 4 POWDER, FOR SUSPENSION ORAL at 16:11

## 2022-08-18 RX ADMIN — FINASTERIDE 5 MG: 5 TABLET, FILM COATED ORAL at 08:18

## 2022-08-18 RX ADMIN — FAMOTIDINE 40 MG: 20 TABLET ORAL at 21:09

## 2022-08-18 RX ADMIN — ENOXAPARIN SODIUM 40 MG: 40 INJECTION SUBCUTANEOUS at 21:10

## 2022-08-18 RX ADMIN — Medication 1 CAPSULE: at 08:18

## 2022-08-18 RX ADMIN — MEMANTINE 10 MG: 10 TABLET ORAL at 08:18

## 2022-08-18 RX ADMIN — ATORVASTATIN CALCIUM 10 MG: 10 TABLET, FILM COATED ORAL at 21:10

## 2022-08-18 RX ADMIN — CHOLESTYRAMINE 4 G: 4 POWDER, FOR SUSPENSION ORAL at 10:23

## 2022-08-18 RX ADMIN — ASPIRIN 81 MG: 81 TABLET, COATED ORAL at 08:18

## 2022-08-18 RX ADMIN — MEMANTINE 10 MG: 10 TABLET ORAL at 21:10

## 2022-08-18 ASSESSMENT — ACTIVITIES OF DAILY LIVING (ADL)
ADLS_ACUITY_SCORE: 41

## 2022-08-18 NOTE — PROGRESS NOTES
"[facility]    Medicine Progress Note - Hospitalist Service    Date of Admission:  8/13/2022    Assessment & Plan          Jean Pierre Roblero is a 88 year old male with multiple medical problems including dementia, history of TIA, hyperlipidemia, BPH who presents the emergency department with weakness.  COVID PCR is positive.     Principal Problem:    Infection due to 2019 novel coronavirus    Admit as inpatient    Special precautions    Treat with remdesivir x3 days in patient at high risk for progression    Routine COVID labs       Lovenox for DVT prophylaxis, following platelet count closely    Afebrile, normal WBC count and procalcitonin less than 0.05 so discontinued antibiotics.  08/18: Clinically improving. A/w TCU vs home once he does not need assistance (See CC note)    Active Problems:    Infectious encephalopathy    Patient has a history of dementia, though wife stresses he is only on Namenda \"to prevent progression\" of disease    During first ED visit on 8/13/2022, patient's wife, Lianet, said patient did not remember he needed to remove his dentures, was reluctant to leave his chair, he is now drowsy    Labs are essentially unremarkable with the exception of COVID PCR positive, mild hyponatremia, new from earlier today    Presume encephalopathy is related to infection    Avoid medications which can worsen symptoms, including sedatives, anticholinergics    Follow clinically      Hyponatremia    Sodium was 132 on 8/13: resolved    Wife says he has not had much to eat or drink today because he is not feeling well, presume he has hypovolemic hyponatremia    Follow sodium    Hypokalemia: Replace as per protocol      Thrombocytopenia (H)    This is mild, was also noted during hospitalization 5/26/2022    Follow platelet count closely since he will be on Lovenox for DVT Prophylaxis    Mixed hyperlipidemia    Continue statin, Questran    BPH (benign prostatic hyperplasia)    Continue " "Proscar    Dementia (H)    Continue Namenda  Right lower extremity edema    This is chronic, it was noted in the record of an office visit dated 7/29/2022 and wife confirms that it is chronic    Begin Lovenox for DVT prophylaxis  History of TIA (transient ischemic attack)    Noted    Patient was admitted to [facility] on 5/26 for TIA.  He was instructed to take baby aspirin and Plavix for 90 days.  Note from patient's office visit with his primary physician on 7/29 indicates he instructed the patient to discontinue Plavix, patient has continued taking it.    Will hold Plavix for now, given mild thrombocytopenia and need for Lovenox    Could request stroke neurology follow-up during his hospital stay to clarify duration of treatment with Plavix, given discrepancy between their instructions and instructions from his primary provider      Diet: Combination Diet Regular Diet Adult    DVT Prophylaxis: Enoxaparin (Lovenox) SQ  Puckett Catheter: Not present  Central Lines: None  Cardiac Monitoring: None  Code Status: Full Code      Disposition Plan      Expected Discharge Date: 08/24/2022                The patient's care was discussed with the Bedside Nurse and Patient. Called wife and updated 8/16    Ciaran Segal MD, MD  Hospitalist Service  [facility]  Securely message with the Vocera Web Console (learn more here)  Text page via [employer] Paging/Directory     \"This dictation was performed with voice recognition software and may contain errors,  omissions and inadvertent word substitution.\"     Clinically Significant Risk Factors Present on Admission                     ______________________________________________________________________    Interval History   Feels  better  Breathing improved  No new complaints.     4 point ROS done and negative unless mentioned     Data reviewed today: I reviewed all medications, new labs and imaging results over the last 24 hours. I personally reviewed no images or " "EKG's today.    Physical Exam   /72 (BP Location: Right arm)   Pulse 73   Temp 97.6  F (36.4  C) (Oral)   Resp 16   Ht 1.702 m (5' 7\")   Wt 63.8 kg (140 lb 10.5 oz)   SpO2 98%   BMI 22.03 kg/m    Gen- pleasant   Neck- supple  CVS- I+II+ no m/r/g  RS- CTAB, decreased at base   Abdo- soft, no tenderness . No g/r/r   Ext- no edema     Data    BMP  Recent Labs   Lab 08/18/22  0830 08/17/22  0914 08/16/22  1514 08/16/22  0822 08/15/22  0759 08/14/22  0729   NA  --  136  --  134 134 139   POTASSIUM 3.6 3.6 4.2 3.3* 3.6 4.0   CHLORIDE  --  106  --  104 105 107   KENYA  --  8.1*  --  8.3* 8.2* 8.1*   CO2  --  24  --  25 23 27   BUN  --  16  --  18 14 14   CR  --  0.64*  --  0.77 0.66 0.88   GLC  --  150*  --  128* 94 90     CBC  Recent Labs   Lab 08/18/22  0830 08/17/22  0914 08/16/22  0822 08/15/22  0759   WBC 4.7 4.2 4.4 4.6   RBC 4.27* 4.23* 4.28* 3.90*   HGB 14.3 13.8 14.0 13.1*   HCT 42.3 41.9 42.9 38.7*   MCV 99 99 100 99   MCH 33.5* 32.6 32.7 33.6*   MCHC 33.8 32.9 32.6 33.9   RDW 12.4 12.4 12.7 12.8   * 109* 123* 107*     INRNo lab results found in last 7 days.  LFTs  Recent Labs   Lab 08/13/22  0148   ALKPHOS 76   AST 21   ALT 22   BILITOTAL 0.6   PROTTOTAL 6.7*   ALBUMIN 4.0      PANCNo lab results found in last 7 days.    No results found for this or any previous visit (from the past 24 hour(s)).  "

## 2022-08-18 NOTE — PLAN OF CARE
Summary: Weakness found to be COVID +.  DATE & TIME: 08/18/22 7-3 pm  Cognitive Concerns/ Orientation : A & O x4. Forgetful. [employer], baldo hearing aids in place.   BEHAVIOR & AGGRESSION TOOL COLOR: Green  ABNL VS/O2: VSS on RA.   MOBILITY: SBA GB/walker, up in recliner. Ambulated in room x2.  PAIN MANAGMENT: Denies   DIET: Regular.  BOWEL/BLADDER: Incontinent at times of urine. No BM today.   ABNL LAB/BG: Platelet 132  DRAIN/DEVICES: PIV x 2 in RUE SL.  SKIN: Pale/bruised. Blanchable redness to coccyx, mepilex replaced. +1-2 edema to RLE (chronic).   TESTS/PROCEDURES: None  D/C DAY/GOALS/PLACE: Pt needs assistance to go home and now wife has COVID. Unsure of plan.   OTHER IMPORTANT INFO:  Special precautions maintained. Stable. No changes.

## 2022-08-18 NOTE — PLAN OF CARE
Goal Outcome Evaluation:                    Summary: Weakness found to be COVID +.  DATE & TIME: 8/17/22-8/18/22 2731-6277  Cognitive Concerns/ Orientation : A & O x4. Forgetful. [place], baldo hearing aids in place.   BEHAVIOR & AGGRESSION TOOL COLOR: Green  CIWA SCORE: NA    ABNL VS/O2: VSS on RA.   MOBILITY: A1 GB/walker, up in recliner. Ambulated to BR x2.  PAIN MANAGMENT: Denies   DIET: Regular. Needs assistance with ordering. Pt feeds self with set up.   BOWEL/BLADDER: Incontinent at times of urine. External cath in place. No BM today, one charted yesterday.   ABNL LAB/BG: Cr 0.64, CRP 23.9, and Platelet 109.   DRAIN/DEVICES: PIV x 2 in RUE SL.  TELEMETRY RHYTHM: NA   SKIN: Pale/bruised. Blanchable redness to coccyx, mepilex in place.  TESTS/PROCEDURES: None  D/C DAY/GOALS/PLACE: Unsure of discharge plan have not spoke with MD.   OTHER IMPORTANT INFO:  Special precautions maintained. LS clear.

## 2022-08-18 NOTE — PROGRESS NOTES
Care Management Follow Up  Spoke with patients spouse Lianet. Lianet presently is nt feeling well,states she has the same sx like her spouse with fevers and sore throat.  Discussed therapy recommendation to home with assist.  caleb betancur has been independent prior to his illness and did not require any help,she will not be able to assist patient with stairs or mobility as she is not feeling well.  Spouse hopes patient will be independent prior to discharge    Length of Stay (days): 5    Expected Discharge Date: 08/24/2022     Concerns to be Addressed:       Patient plan of care discussed at interdisciplinary rounds: Yes    Anticipated Discharge Disposition: home      Anticipated Discharge Services:    Anticipated Discharge DME:  none    Patient/family educated on Medicare website which has current facility and service quality ratings:    Education Provided on the Discharge Plan:  yes  Patient/Family in Agreement with the Plan: yes    Referrals Placed by CM/SW:Home care services  Private pay costs discussed: transportation costs        Krista Andrew RN CC

## 2022-08-19 ENCOUNTER — APPOINTMENT (OUTPATIENT)
Dept: PHYSICAL THERAPY | Facility: CLINIC | Age: 87
DRG: 177 | End: 2022-08-19
Payer: MEDICARE

## 2022-08-19 VITALS
RESPIRATION RATE: 16 BRPM | HEART RATE: 76 BPM | HEIGHT: 67 IN | WEIGHT: 140.65 LBS | DIASTOLIC BLOOD PRESSURE: 73 MMHG | BODY MASS INDEX: 22.08 KG/M2 | OXYGEN SATURATION: 97 % | TEMPERATURE: 98 F | SYSTOLIC BLOOD PRESSURE: 134 MMHG

## 2022-08-19 LAB
ERYTHROCYTE [DISTWIDTH] IN BLOOD BY AUTOMATED COUNT: 12.5 % (ref 10–15)
HCT VFR BLD AUTO: 42.8 % (ref 40–53)
HGB BLD-MCNC: 13.8 G/DL (ref 13.3–17.7)
MCH RBC QN AUTO: 32.2 PG (ref 26.5–33)
MCHC RBC AUTO-ENTMCNC: 32.2 G/DL (ref 31.5–36.5)
MCV RBC AUTO: 100 FL (ref 78–100)
PLATELET # BLD AUTO: 141 10E3/UL (ref 150–450)
RBC # BLD AUTO: 4.29 10E6/UL (ref 4.4–5.9)
WBC # BLD AUTO: 5 10E3/UL (ref 4–11)

## 2022-08-19 PROCEDURE — 85014 HEMATOCRIT: CPT | Performed by: INTERNAL MEDICINE

## 2022-08-19 PROCEDURE — 250N000013 HC RX MED GY IP 250 OP 250 PS 637: Performed by: INTERNAL MEDICINE

## 2022-08-19 PROCEDURE — 99239 HOSP IP/OBS DSCHRG MGMT >30: CPT | Performed by: INTERNAL MEDICINE

## 2022-08-19 PROCEDURE — 97530 THERAPEUTIC ACTIVITIES: CPT | Mod: GP

## 2022-08-19 PROCEDURE — 97116 GAIT TRAINING THERAPY: CPT | Mod: GP

## 2022-08-19 PROCEDURE — 36415 COLL VENOUS BLD VENIPUNCTURE: CPT | Performed by: INTERNAL MEDICINE

## 2022-08-19 RX ORDER — GUAIFENESIN/DEXTROMETHORPHAN 100-10MG/5
10 SYRUP ORAL EVERY 4 HOURS PRN
Qty: 236 ML | Refills: 1 | Status: SHIPPED | OUTPATIENT
Start: 2022-08-19 | End: 2022-11-06

## 2022-08-19 RX ADMIN — FINASTERIDE 5 MG: 5 TABLET, FILM COATED ORAL at 09:11

## 2022-08-19 RX ADMIN — CHOLESTYRAMINE 4 G: 4 POWDER, FOR SUSPENSION ORAL at 09:11

## 2022-08-19 RX ADMIN — Medication 1 CAPSULE: at 09:11

## 2022-08-19 RX ADMIN — MEMANTINE 10 MG: 10 TABLET ORAL at 09:11

## 2022-08-19 RX ADMIN — ASPIRIN 81 MG: 81 TABLET, COATED ORAL at 09:11

## 2022-08-19 ASSESSMENT — ACTIVITIES OF DAILY LIVING (ADL)
ADLS_ACUITY_SCORE: 47
ADLS_ACUITY_SCORE: 41
ADLS_ACUITY_SCORE: 47
ADLS_ACUITY_SCORE: 47

## 2022-08-19 NOTE — PLAN OF CARE
Goal Outcome Evaluation:    Summary: Weakness found to be COVID +.  DATE & TIME: 08/18/22 2170-0722  Cognitive Concerns/ Orientation : A & O x4. Forgetful. [place],   BEHAVIOR & AGGRESSION TOOL COLOR: Green  ABNL VS/O2: VSS on RA.   MOBILITY: SBA GB/walker, up in recliner. Ambulated in room   PAIN MANAGMENT: Denies   DIET: Regular.  BOWEL/BLADDER: Incontinent at times of urine. No BM this shift  ABNL LAB/BG: Platelet 132  DRAIN/DEVICES: PIV x 2 in RUE SL.  SKIN: Pale/bruised. Blanchable redness to coccyx, mepilex replaced. +1-2 edema to RLE (chronic).   TESTS/PROCEDURES: None  D/C DAY/GOALS/PLACE: Pt needs assistance to go home and now wife has COVID. Unsure of plan.   OTHER IMPORTANT INFO:  Special precautions maintained.

## 2022-08-19 NOTE — DISCHARGE SUMMARY
"[facility]  Hospitalist Discharge Summary      Date of Admission:  8/13/2022  Date of Discharge:  8/19/2022  Discharging Provider: Ciaran Segal MD, MD  Discharge Service: Hospitalist Service    Discharge Diagnoses       Infection due to 2019 novel coronavirus  Possible mild toxic metabolic encephalopathy on admission      Hyponatremia  Hypokalemia       Thrombocytopenia     Mixed hyperlipidemia    BPH (benign prostatic hyperplasia)    Dementia (H)  Right lower extremity edema  History of TIA (transient ischemic attack)    Follow-ups Needed After Discharge   Follow-up Appointments     Follow-up and recommended labs and tests       Follow up with primary care provider, Zaki Alaniz, within 7 days   for hospital follow- up.  The following labs/tests are recommended: CBC-   to evaluate platelet count. B) please review the need for Plavix         {  Unresulted Labs Ordered in the Past 30 Days of this Admission     No orders found from 7/14/2022 to 8/14/2022.        Discharge Disposition   Discharged to home  Condition at discharge: Stable    Hospital Course   Jean Pierre Roblero is a 88 year old male with multiple medical problems including dementia, history of TIA, hyperlipidemia, BPH who presents the emergency department with weakness.  COVID PCR is positive.     Principal Problem:    Infection due to 2019 novel coronavirus  ? Admit as inpatient  ? Special precautions  ? Treated with remdesivir x3 days in patient at high risk for progression  ?  Lovenox for DVT prophylaxis, while inpatient  ? Afebrile, normal WBC count and procalcitonin less than 0.05 so discontinued antibiotics.  He clinically improved with conservative management and was discharged home    Active Problems:    Infectious encephalopathy  ? Patient has a history of dementia, though wife stresses he is only on Namenda \"to prevent progression\" of disease  ? During first ED visit on 8/13/2022, patient's wife, Lianet, said " patient did not remember he needed to remove his dentures, was reluctant to leave his chair, he is now drowsy  ? Labs are essentially unremarkable with the exception of COVID PCR positive, mild hyponatremia, new from earlier today  ? Avoid medications which can worsen symptoms, including sedatives, anticholinergics       Hyponatremia  ? Sodium was 132 on 8/13: resolved  ? Wife says he has not had much to eat or drink today because he is not feeling well, presume he has hypovolemic hyponatremia     Hypokalemia: Replaced as per protocol       Thrombocytopenia (H)  ? This is mild, was also noted during hospitalization 5/26/2022  ? Monitored      Mixed hyperlipidemia  ? Continued statin, Questran    BPH (benign prostatic hyperplasia)  ? Continued Proscar    Dementia (H)  ? Continued Namenda  Right lower extremity edema  ? This is chronic, it was noted in the record of an office visit dated 7/29/2022 and wife confirms that it is chronic    History of TIA (transient ischemic attack)  ? Noted  ? Patient was admitted to [facility] on 5/26 for TIA.  He was instructed to take baby aspirin and Plavix for 90 days.  Note from patient's office visit with his primary physician on 7/29 indicates he instructed the patient to discontinue Plavix, patient has continued taking it.  ? Will hold Plavix for now, given mild thrombocytopenia and need for Lovenox  ? Advised to f/u with primary provider       Consultations This Hospital Stay   PHYSICAL THERAPY ADULT IP CONSULT  SOCIAL WORK IP CONSULT  CARE MANAGEMENT / SOCIAL WORK IP CONSULT    Code Status   Full Code    Time Spent on this Encounter   ICiaran MD, MD, personally saw the patient today and spent greater than 30 minutes discharging this patient.     Ciaran Segal MD, MD  [place] [address] [facility] [address]  [address] [place] [address]  Phone: [phone]  ______________________________________________________________________    Physical Exam    Vital Signs: Temp: 98  F (36.7  C) Temp src: Oral BP: 134/73 Pulse: 76   Resp: 16 SpO2: 97 % O2 Device: None (Room air)    Weight: 140 lbs 10.46 oz  Gen- pleasant   Neck- supple  CVS- I+II+ no m/r/g  RS- CTAB, decreased at base   Abdo- soft, no tenderness . No g/r/r   Ext- no edema     Primary Care Physician   Zaki Alaniz    Discharge Orders      COVID-19 GetWell Loop Referral      Home Care Referral      Reason for your hospital stay    Jean Pierre Roblero is a 88 year old male with multiple medical problems including dementia, history of TIA, hyperlipidemia, BPH who presents the emergency department with weakness.  He was found to have COVID and was managed conservatively     Contact provider    Contact your primary care provider if If increased trouble breathing, new arm/leg swelling, dizziness/passing out, falls, bleeding that doesn't stop, or uncontrolled pain.     Follow-up and recommended labs and tests     Follow up with primary care provider, Zaki Alaniz, within 7 days for hospital follow- up.  The following labs/tests are recommended: CBC- to evaluate platelet count. B) please review the need for Plavix     Discharge - Quarantine/Isolation Instruction    Date of symptom onset:     Date of first positive test:    If you tested positive COVID-19 and show symptoms (fever, cough, body aches or trouble breathing):        Stay home and away from others (self-isolate) until:        At least 10 days have passed since your symptoms started. AND...        You've had no fever-and no medicine that reduces fever for 1 full day (24 hours). AND...         Your other symptoms have resolved (gotten better).  If you tested positive for COVID-19 but don't show symptoms:       Stay home and away from others (self-isolate) until at least 10 days have passed since the date of your first positive COVID-19 test.     Activity    Your activity upon discharge: activity as tolerated     Diet    Follow this diet upon  discharge: Orders Placed This Encounter      Combination Diet Regular Diet Adult       Significant Results and Procedures   Results for orders placed or performed during the hospital encounter of 08/13/22   Chest XR,  PA & LAT    Narrative    EXAM: XR CHEST 2 VIEWS  LOCATION: [facility]  DATE/TIME: 8/13/2022 3:14 PM    INDICATION: fever  COMPARISON: Chest CT 6/3/2020      Impression    IMPRESSION: Stable cardiac silhouette. Subtle bibasilar opacities, left greater than right could represent atelectasis or pneumonia. No pleural effusion or pneumothorax. No acute bony abnormality. Prior cholecystectomy.   US Lower Extremity Venous Duplex Right    Narrative    EXAM: US LOWER EXTREMITY VENOUS DUPLEX RIGHT  LOCATION: [facility]  DATE/TIME: 8/13/2022 6:55 PM    INDICATION: worsening leg edema  COMPARISON: None.  TECHNIQUE: Venous Duplex ultrasound of the right lower extremity with and without compression, augmentation and duplex. Color flow and spectral Doppler with waveform analysis performed.    FINDINGS: Exam includes the common femoral, femoral, popliteal, and contralateral common femoral veins as well as segmentally visualized deep calf veins and greater saphenous vein.     RIGHT: No deep vein thrombosis. No superficial thrombophlebitis. No popliteal cyst.      Impression    IMPRESSION:  1.  No deep venous thrombosis in the right lower extremity.       Discharge Medications   Current Discharge Medication List      CONTINUE these medications which have NOT CHANGED    Details   aspirin (ASA) 81 MG EC tablet Take 1 tablet (81 mg) by mouth daily  Qty: 30 tablet, Refills: 3    Associated Diagnoses: TIA (transient ischemic attack)      atorvastatin (LIPITOR) 10 MG tablet 1 tablet (10 mg) by Oral or Feeding Tube route every evening  Qty: 30 tablet, Refills: 0    Associated Diagnoses: TIA (transient ischemic attack)      butalbital-acetaminophen-caffeine (ESGIC) -40  MG tablet Take 1 tablet by mouth every 4 hours as needed for headaches      cholestyramine (QUESTRAN) 4 G packet Take 1 packet by mouth 2 times daily (with meals)      famotidine (PEPCID) 40 MG tablet Take 40 mg by mouth At Bedtime      finasteride (PROSCAR) 5 MG tablet Take 5 mg by mouth daily      fluticasone (FLONASE) 50 MCG/ACT nasal spray Spray 1 spray into both nostrils daily as needed for rhinitis or allergies      magnesium 250 MG tablet Take 1 tablet by mouth every evening      memantine (NAMENDA) 10 MG tablet Take 10 mg by mouth 2 times daily      Multiple Vitamin (MULTI-VITAMIN) per tablet Take 1 tablet by mouth daily      multivitamin  with lutein (OCUVITE WITH LTEIN) CAPS per capsule Take 1 capsule by mouth daily      sodium chloride (OCEAN) 0.65 % nasal spray Spray 1 spray into both nostrils daily as needed for congestion      vitamin B-12 (CYANOCOBALAMIN) 1000 MCG tablet Take 1,000 mcg by mouth daily Noon      vitamin C (ASCORBIC ACID) 500 MG tablet Take 500 mg by mouth daily      vitamin D3 (CHOLECALCIFEROL) 2000 units (50 mcg) tablet Take 4,000 Units by mouth daily Noon      zinc sulfate (ZINCATE) 220 (50 Zn) MG capsule Take 220 mg by mouth daily noon         STOP taking these medications       clopidogrel (PLAVIX) 75 MG tablet Comments:   Reason for Stopping:             Allergies   Allergies   Allergen Reactions     Chocolate Anaphylaxis     headache     Feathers      Novocaine [Procaine]      Passes out almost immediately     [place] GI Disturbance     Most berries

## 2022-08-19 NOTE — CARE PLAN
Physical Therapy Discharge Summary    Reason for therapy discharge:    Discharged to home.    Progress towards therapy goal(s). See goals on Care Plan in [facility] electronic health record for goal details.  Goals met    Therapy recommendation(s):    No further therapy is recommended.

## 2022-08-19 NOTE — PROGRESS NOTES
Discharge    Patient discharged to home via car with spouse  Care plan note - IV removed, VSS on RA - still COVID positive     Listed belongings gathered and given to patient (including from security/pharmacy). Yes  Care Plan and Patient education resolved: Yes  Prescriptions if needed, hard copies sent with patient  NA  Medication Bin checked and emptied on discharge Yes  SW/care coordinator/charge RN aware of discharge: Yes

## 2022-08-19 NOTE — PLAN OF CARE
Summary: Weakness found to be COVID +.  DATE & TIME: 08/18/22-8/19/22 4530-5183  Cognitive Concerns/ Orientation : A&Ox4. Forgetful. [place]  BEHAVIOR & AGGRESSION TOOL COLOR: Green  ABNL VS/O2: VSS on RA.   MOBILITY: SBA GB/walker, repositioning self in bed.   PAIN MANAGMENT: Denies   DIET: Regular.  BOWEL/BLADDER: Incontinent at times of urine. No BM this shift  ABNL LAB/BG: N/A  DRAIN/DEVICES: PIV x 2 in RUE SL  SKIN: Pale/bruised. Blanchable redness to coccyx, mepilex replaced. +1-2 edema to RLE (chronic).   TESTS/PROCEDURES: None  D/C DAY/GOALS/PLACE: Pts wife now has Covid and is unable to care for pt until feeling better.   OTHER IMPORTANT INFO:  Special precautions maintained.

## 2022-08-22 LAB
HCO3 BLDV-SCNC: 30 MMOL/L (ref 21–28)
LACTATE BLD-SCNC: 1 MMOL/L
PCO2 BLDV: 51 MM HG (ref 40–50)
PH BLDV: 7.38 [PH] (ref 7.32–7.43)
PO2 BLDV: <15 MM HG (ref 25–47)
SAO2 % BLDV: ABNORMAL %

## 2022-11-06 ENCOUNTER — HOSPITAL ENCOUNTER (OUTPATIENT)
Facility: CLINIC | Age: 87
Setting detail: OBSERVATION
Discharge: HOME | End: 2022-11-09
Attending: EMERGENCY MEDICINE | Admitting: STUDENT IN AN ORGANIZED HEALTH CARE EDUCATION/TRAINING PROGRAM
Payer: MEDICARE

## 2022-11-06 ENCOUNTER — APPOINTMENT (OUTPATIENT)
Dept: GENERAL RADIOLOGY | Facility: CLINIC | Age: 87
End: 2022-11-06
Attending: EMERGENCY MEDICINE
Payer: MEDICARE

## 2022-11-06 ENCOUNTER — APPOINTMENT (OUTPATIENT)
Dept: GENERAL RADIOLOGY | Facility: CLINIC | Age: 87
End: 2022-11-06
Attending: STUDENT IN AN ORGANIZED HEALTH CARE EDUCATION/TRAINING PROGRAM
Payer: MEDICARE

## 2022-11-06 DIAGNOSIS — M25.552 HIP PAIN, LEFT: ICD-10-CM

## 2022-11-06 LAB
ALBUMIN SERPL-MCNC: 3.8 G/DL (ref 3.4–5)
ALP SERPL-CCNC: 76 U/L (ref 40–150)
ALT SERPL W P-5'-P-CCNC: 21 U/L (ref 0–70)
ANION GAP SERPL CALCULATED.3IONS-SCNC: 5 MMOL/L (ref 3–14)
AST SERPL W P-5'-P-CCNC: 22 U/L (ref 0–45)
BASOPHILS # BLD AUTO: 0 10E3/UL (ref 0–0.2)
BASOPHILS NFR BLD AUTO: 0 %
BILIRUB SERPL-MCNC: 1 MG/DL (ref 0.2–1.3)
BUN SERPL-MCNC: 12 MG/DL (ref 7–30)
CALCIUM SERPL-MCNC: 8.8 MG/DL (ref 8.5–10.1)
CHLORIDE BLD-SCNC: 107 MMOL/L (ref 94–109)
CO2 SERPL-SCNC: 27 MMOL/L (ref 20–32)
CREAT SERPL-MCNC: 0.83 MG/DL (ref 0.66–1.25)
CRP SERPL-MCNC: <2.9 MG/L (ref 0–8)
EOSINOPHIL # BLD AUTO: 0 10E3/UL (ref 0–0.7)
EOSINOPHIL NFR BLD AUTO: 0 %
ERYTHROCYTE [DISTWIDTH] IN BLOOD BY AUTOMATED COUNT: 12.7 % (ref 10–15)
GFR SERPL CREATININE-BSD FRML MDRD: 84 ML/MIN/1.73M2
GLUCOSE BLD-MCNC: 145 MG/DL (ref 70–99)
HCT VFR BLD AUTO: 42.4 % (ref 40–53)
HGB BLD-MCNC: 14.1 G/DL (ref 13.3–17.7)
IMM GRANULOCYTES # BLD: 0 10E3/UL
IMM GRANULOCYTES NFR BLD: 1 %
LYMPHOCYTES # BLD AUTO: 1 10E3/UL (ref 0.8–5.3)
LYMPHOCYTES NFR BLD AUTO: 16 %
MCH RBC QN AUTO: 33.3 PG (ref 26.5–33)
MCHC RBC AUTO-ENTMCNC: 33.3 G/DL (ref 31.5–36.5)
MCV RBC AUTO: 100 FL (ref 78–100)
MONOCYTES # BLD AUTO: 0.3 10E3/UL (ref 0–1.3)
MONOCYTES NFR BLD AUTO: 4 %
NEUTROPHILS # BLD AUTO: 4.7 10E3/UL (ref 1.6–8.3)
NEUTROPHILS NFR BLD AUTO: 79 %
NRBC # BLD AUTO: 0 10E3/UL
NRBC BLD AUTO-RTO: 0 /100
PLATELET # BLD AUTO: 153 10E3/UL (ref 150–450)
POTASSIUM BLD-SCNC: 3.9 MMOL/L (ref 3.4–5.3)
PROT SERPL-MCNC: 6.9 G/DL (ref 6.8–8.8)
RBC # BLD AUTO: 4.24 10E6/UL (ref 4.4–5.9)
SARS-COV-2 RNA RESP QL NAA+PROBE: NEGATIVE
SODIUM SERPL-SCNC: 139 MMOL/L (ref 133–144)
WBC # BLD AUTO: 6 10E3/UL (ref 4–11)

## 2022-11-06 PROCEDURE — 99285 EMERGENCY DEPT VISIT HI MDM: CPT | Mod: 25

## 2022-11-06 PROCEDURE — 85025 COMPLETE CBC W/AUTO DIFF WBC: CPT | Performed by: EMERGENCY MEDICINE

## 2022-11-06 PROCEDURE — 99219 PR INITIAL OBSERVATION CARE,LEVEL II: CPT | Performed by: STUDENT IN AN ORGANIZED HEALTH CARE EDUCATION/TRAINING PROGRAM

## 2022-11-06 PROCEDURE — 250N000011 HC RX IP 250 OP 636: Performed by: EMERGENCY MEDICINE

## 2022-11-06 PROCEDURE — G0378 HOSPITAL OBSERVATION PER HR: HCPCS

## 2022-11-06 PROCEDURE — U0003 INFECTIOUS AGENT DETECTION BY NUCLEIC ACID (DNA OR RNA); SEVERE ACUTE RESPIRATORY SYNDROME CORONAVIRUS 2 (SARS-COV-2) (CORONAVIRUS DISEASE [COVID-19]), AMPLIFIED PROBE TECHNIQUE, MAKING USE OF HIGH THROUGHPUT TECHNOLOGIES AS DESCRIBED BY CMS-2020-01-R: HCPCS | Performed by: EMERGENCY MEDICINE

## 2022-11-06 PROCEDURE — 72100 X-RAY EXAM L-S SPINE 2/3 VWS: CPT

## 2022-11-06 PROCEDURE — C9803 HOPD COVID-19 SPEC COLLECT: HCPCS

## 2022-11-06 PROCEDURE — 250N000013 HC RX MED GY IP 250 OP 250 PS 637: Performed by: EMERGENCY MEDICINE

## 2022-11-06 PROCEDURE — 73502 X-RAY EXAM HIP UNI 2-3 VIEWS: CPT

## 2022-11-06 PROCEDURE — 86140 C-REACTIVE PROTEIN: CPT | Performed by: EMERGENCY MEDICINE

## 2022-11-06 PROCEDURE — 80053 COMPREHEN METABOLIC PANEL: CPT | Performed by: EMERGENCY MEDICINE

## 2022-11-06 PROCEDURE — 36415 COLL VENOUS BLD VENIPUNCTURE: CPT | Performed by: EMERGENCY MEDICINE

## 2022-11-06 PROCEDURE — 96374 THER/PROPH/DIAG INJ IV PUSH: CPT

## 2022-11-06 PROCEDURE — 250N000013 HC RX MED GY IP 250 OP 250 PS 637: Performed by: STUDENT IN AN ORGANIZED HEALTH CARE EDUCATION/TRAINING PROGRAM

## 2022-11-06 RX ORDER — ONDANSETRON 2 MG/ML
4 INJECTION INTRAMUSCULAR; INTRAVENOUS EVERY 6 HOURS PRN
Status: DISCONTINUED | OUTPATIENT
Start: 2022-11-06 | End: 2022-11-09 | Stop reason: HOSPADM

## 2022-11-06 RX ORDER — OXYCODONE HYDROCHLORIDE 5 MG/1
5 TABLET ORAL EVERY 4 HOURS PRN
Status: DISCONTINUED | OUTPATIENT
Start: 2022-11-06 | End: 2022-11-09 | Stop reason: HOSPADM

## 2022-11-06 RX ORDER — NALOXONE HYDROCHLORIDE 0.4 MG/ML
0.4 INJECTION, SOLUTION INTRAMUSCULAR; INTRAVENOUS; SUBCUTANEOUS
Status: DISCONTINUED | OUTPATIENT
Start: 2022-11-06 | End: 2022-11-09 | Stop reason: HOSPADM

## 2022-11-06 RX ORDER — ACETAMINOPHEN 500 MG
1000 TABLET ORAL EVERY 6 HOURS
Status: DISCONTINUED | OUTPATIENT
Start: 2022-11-06 | End: 2022-11-09 | Stop reason: HOSPADM

## 2022-11-06 RX ORDER — GUAIFENESIN 600 MG/1
600 TABLET, EXTENDED RELEASE ORAL DAILY PRN
Status: ON HOLD | COMMUNITY
End: 2024-02-05

## 2022-11-06 RX ORDER — MEMANTINE HYDROCHLORIDE 10 MG/1
10 TABLET ORAL 2 TIMES DAILY
Status: DISCONTINUED | OUTPATIENT
Start: 2022-11-06 | End: 2022-11-09 | Stop reason: HOSPADM

## 2022-11-06 RX ORDER — KETOROLAC TROMETHAMINE 15 MG/ML
15 INJECTION, SOLUTION INTRAMUSCULAR; INTRAVENOUS ONCE
Status: COMPLETED | OUTPATIENT
Start: 2022-11-06 | End: 2022-11-06

## 2022-11-06 RX ORDER — FINASTERIDE 5 MG/1
5 TABLET, FILM COATED ORAL DAILY
Status: DISCONTINUED | OUTPATIENT
Start: 2022-11-07 | End: 2022-11-09 | Stop reason: HOSPADM

## 2022-11-06 RX ORDER — NALOXONE HYDROCHLORIDE 0.4 MG/ML
0.2 INJECTION, SOLUTION INTRAMUSCULAR; INTRAVENOUS; SUBCUTANEOUS
Status: DISCONTINUED | OUTPATIENT
Start: 2022-11-06 | End: 2022-11-09 | Stop reason: HOSPADM

## 2022-11-06 RX ORDER — ASPIRIN 81 MG/1
81 TABLET ORAL DAILY
Status: DISCONTINUED | OUTPATIENT
Start: 2022-11-07 | End: 2022-11-09 | Stop reason: HOSPADM

## 2022-11-06 RX ORDER — FAMOTIDINE 20 MG/1
40 TABLET, FILM COATED ORAL AT BEDTIME
Status: DISCONTINUED | OUTPATIENT
Start: 2022-11-06 | End: 2022-11-09 | Stop reason: HOSPADM

## 2022-11-06 RX ORDER — CHOLESTYRAMINE 4 G/9G
1 POWDER, FOR SUSPENSION ORAL 2 TIMES DAILY WITH MEALS
Status: DISCONTINUED | OUTPATIENT
Start: 2022-11-06 | End: 2022-11-09 | Stop reason: HOSPADM

## 2022-11-06 RX ORDER — IBUPROFEN 200 MG
400 TABLET ORAL 2 TIMES DAILY PRN
Status: ON HOLD | COMMUNITY
End: 2024-02-05

## 2022-11-06 RX ORDER — ONDANSETRON 4 MG/1
4 TABLET, ORALLY DISINTEGRATING ORAL EVERY 6 HOURS PRN
Status: DISCONTINUED | OUTPATIENT
Start: 2022-11-06 | End: 2022-11-09 | Stop reason: HOSPADM

## 2022-11-06 RX ORDER — TRAMADOL HYDROCHLORIDE 50 MG/1
50 TABLET ORAL ONCE
Status: COMPLETED | OUTPATIENT
Start: 2022-11-06 | End: 2022-11-06

## 2022-11-06 RX ADMIN — ACETAMINOPHEN 1000 MG: 500 TABLET ORAL at 22:37

## 2022-11-06 RX ADMIN — TRAMADOL HYDROCHLORIDE 50 MG: 50 TABLET, COATED ORAL at 17:06

## 2022-11-06 RX ADMIN — KETOROLAC TROMETHAMINE 15 MG: 15 INJECTION, SOLUTION INTRAMUSCULAR; INTRAVENOUS at 15:29

## 2022-11-06 RX ADMIN — MEMANTINE 10 MG: 10 TABLET ORAL at 22:37

## 2022-11-06 RX ADMIN — FAMOTIDINE 40 MG: 20 TABLET ORAL at 22:37

## 2022-11-06 ASSESSMENT — ENCOUNTER SYMPTOMS
WEAKNESS: 0
COLOR CHANGE: 0
FEVER: 0
NUMBNESS: 0

## 2022-11-06 ASSESSMENT — ACTIVITIES OF DAILY LIVING (ADL)
ADLS_ACUITY_SCORE: 35
ADLS_ACUITY_SCORE: 35
ADLS_ACUITY_SCORE: 46
ADLS_ACUITY_SCORE: 35
ADLS_ACUITY_SCORE: 35

## 2022-11-06 NOTE — ED PROVIDER NOTES
History     Chief Complaint:  Leg Pain       HPI   Jean Pierre Roblero is a 88 year old male who presents with left hip and thigh pain for the last 3-4 days, got worse today and was having a hard time standing so he called EMS and brought him in.  No recent falls, no fevers or chills.  He has not had a rash or redness in his joints.  Most of the pain is in his hip now, it started down above his knee in his thigh area and has radiated up to his hip now.  No numbness or tingling in his leg.  He did not take anything for this.  No other associated signs or symptoms.    ROS:  Review of Systems   Constitutional: Negative for fever.   Musculoskeletal:        Left thigh and hip pain   Skin: Negative for color change and rash.   Neurological: Negative for weakness and numbness.   All other systems reviewed and are negative.        Allergies:  Chocolate  Feathers  Novocaine [Procaine]  Strawberry     Medications:    aspirin (ASA) 81 MG EC tablet  atorvastatin (LIPITOR) 10 MG tablet  butalbital-acetaminophen-caffeine (ESGIC) -40 MG tablet  cholestyramine (QUESTRAN) 4 G packet  famotidine (PEPCID) 40 MG tablet  finasteride (PROSCAR) 5 MG tablet  fluticasone (FLONASE) 50 MCG/ACT nasal spray  guaiFENesin-dextromethorphan (ROBITUSSIN DM) 100-10 MG/5ML syrup  magnesium 250 MG tablet  memantine (NAMENDA) 10 MG tablet  Multiple Vitamin (MULTI-VITAMIN) per tablet  multivitamin  with lutein (OCUVITE WITH LTEIN) CAPS per capsule  sodium chloride (OCEAN) 0.65 % nasal spray  vitamin B-12 (CYANOCOBALAMIN) 1000 MCG tablet  vitamin C (ASCORBIC ACID) 500 MG tablet  vitamin D3 (CHOLECALCIFEROL) 2000 units (50 mcg) tablet  zinc sulfate (ZINCATE) 220 (50 Zn) MG capsule        Past Medical History:    Past Medical History:   Diagnosis Date     Allergic state      Fainting episodes      Gastroesophageal reflux disease      Headaches      Heartburn      Nocturia      Photosensitivity      Swallowing difficulty        Past Surgical History:   "  Past Surgical History:   Procedure Laterality Date     BACK SURGERY       CHOLECYSTECTOMY       ENDOSCOPIC SINUS SURGERY  8/20/2012    Procedure: ENDOSCOPIC SINUS SURGERY;  ENDOSCOPIC REMOVAL OF LEFT NASAL MASS WITH LANDMARKS (Fusion Tracking [place]), BILATERAL INFERIOR TURBINOPLASTY;  Surgeon: Dima Younger MD;  Location: [facility]     EYE SURGERY      cataracts     HERNIA REPAIR       ORTHOPEDIC SURGERY      ingrown toe nail removal     TURBINOPLASTY  8/20/2012    Procedure: TURBINOPLASTY;;  Surgeon: Dima Younger MD;  Location: [facility]        Family History:    family history is not on file.    Social History:   reports that he has never smoked. He has never used smokeless tobacco. He reports that he does not drink alcohol and does not use drugs.  PCP: Zaki Alaniz     Physical Exam     Patient Vitals for the past 24 hrs:   BP Temp Temp src Pulse Resp SpO2 Height Weight   11/06/22 1711 -- -- -- -- -- 100 % -- --   11/06/22 1701 (!) 166/101 -- -- -- -- -- -- --   11/06/22 1410 (!) 153/90 -- -- 95 -- 98 % -- --   11/06/22 1318 (!) 171/97 -- -- -- -- -- -- --   11/06/22 1314 (!) 171/100 98.1  F (36.7  C) Oral 104 16 100 % 1.715 m (5' 7.5\") 64 kg (141 lb)        Physical Exam  Nursing note and vitals reviewed.    Constitutional:  Appears comfortable.    Cardiovascular:  Normal rate, regular rhythm.     DP pulse 2+.  Cap refill less than 2 seconds.  Musculoskeletal:  Range of motion full of the left knee and hip with some pain in the hip. No extremity deformity.     There is tenderness palpable over the lateral hip and deep in the buttock on the left.  There is no redness to the knee or hip area, no swelling of the knee and no pain in the knee with palpation or with movement.  He does have trace ankle edema bilaterally.  Legs are equal length bilaterally.  Neurological:   Alert and oriented. Exhibits good muscle tone.      Sensation in the foot is normal..   Skin:    Skin is warm and dry. No " bruising.     No erythema. No pallor.    Psychiatric:   Behavior is normal. Appropriate mood and affect.     Judgment and thought content normal.       Emergency Department Course   ECG:  ECG results from 08/13/22   EKG 12-lead, tracing only     Value    Systolic Blood Pressure     Diastolic Blood Pressure     Ventricular Rate 109    Atrial Rate 109    KS Interval 172    QRS Duration 76        QTc 441    P Axis 77    R AXIS -12    T Axis 72    Interpretation ECG      Sinus tachycardia  Biatrial enlargement  Anterior infarct , age undetermined  Abnormal ECG  When compared with ECG of 26-MAY-2022 06:58,  Anterior infarct is now Present  Confirmed by GENERATED REPORT, COMPUTER ([id number]),  Aasen, Bradley ([id number]) on 8/13/2022 2:50:38 AM         Imaging:  XR Pelvis w Hip Left 1 View   Final Result   IMPRESSION: Left hip and pelvis negative for fracture. Normal hip joint alignment. The pelvic ring appears intact. Generalized demineralization; no focal bone lesion identified. Mild degenerative arthritic changes in both hips, with partial joint space    narrowing and osteophytes. Moderate degenerative changes in the bilateral SI joints. Advanced degenerative disc and facet changes in the lower lumbar spine. Heavy iliofemoral atherosclerotic calcification. Mesh tacks in the pelvis, presumably related to    prior hernia repair.         Report per radiology    Laboratory:  Labs Ordered and Resulted from Time of ED Arrival to Time of ED Departure   COMPREHENSIVE METABOLIC PANEL - Abnormal       Result Value    Sodium 139      Potassium 3.9      Chloride 107      Carbon Dioxide (CO2) 27      Anion Gap 5      Urea Nitrogen 12      Creatinine 0.83      Calcium 8.8      Glucose 145 (*)     Alkaline Phosphatase 76      AST 22      ALT 21      Protein Total 6.9      Albumin 3.8      Bilirubin Total 1.0      GFR Estimate 84     CBC WITH PLATELETS AND DIFFERENTIAL - Abnormal    WBC Count 6.0      RBC Count 4.24 (*)      Hemoglobin 14.1      Hematocrit 42.4            MCH 33.3 (*)     MCHC 33.3      RDW 12.7      Platelet Count 153      % Neutrophils 79      % Lymphocytes 16      % Monocytes 4      % Eosinophils 0      % Basophils 0      % Immature Granulocytes 1      NRBCs per 100 WBC 0      Absolute Neutrophils 4.7      Absolute Lymphocytes 1.0      Absolute Monocytes 0.3      Absolute Eosinophils 0.0      Absolute Basophils 0.0      Absolute Immature Granulocytes 0.0      Absolute NRBCs 0.0     CRP INFLAMMATION - Normal    CRP Inflammation <2.9     COVID-19 VIRUS (CORONAVIRUS) BY PCR          Emergency Department Course:      Reviewed:  I reviewed nursing notes, vitals and past medical history    Assessments:  1320 I obtained history and examined the patient as noted above.     Consults:   1810    Dr Shaffer    Interventions:  Medications   sodium chloride (PF) 0.9% PF flush 3 mL (has no administration in time range)   ketorolac (TORADOL) injection 15 mg (15 mg Intravenous Given 11/6/22 1529)   traMADol (ULTRAM) tablet 50 mg (50 mg Oral Given 11/6/22 1706)        Disposition:  The patient was admitted to the hospital under the care of Dr. Shaffer.     Impression & Plan      Medical Decision Making:  Patient comes in with worsening left hip pain that has had for a few days.  He did not fall.  No fevers or chills.  On exam, I can move his hip and pretty good range of motion although he has some pain.  But he is more tender laterally and in the deep gluteal area.  He does not have radiculopathy or signs of a neurologic deficit.  There is no redness, his white count and CRP are normal.  X-ray reveals arthritis but otherwise unremarkable.  There is calcification in his vessels.  Initially I tried a dose of Toradol IV and he still could not stand up and bear weight on it.  He was then given 1 tramadol tablet and again unable to bear weight.  At this point he needs to come in because he cannot get around at home with his  elderly wife and needs orthopedic consultation.  They may decide to get an MRI or CT of his hip.  His lab work otherwise was unremarkable I will admit him to the hospitalist with Ortho consultation in the morning.          Diagnosis:    ICD-10-CM    1. Hip pain, left  M25.552            Discharge Medications:  New Prescriptions    No medications on file        11/6/2022   Gretchen Baez MD Powell, Tracy Alan, MD  11/06/22 1813

## 2022-11-06 NOTE — ED TRIAGE NOTES
Pt brought in by EMS from home. Pt was unable to stand or get out of bed with left hip and knee pain.      Triage Assessment     Row Name 11/06/22 1315       Triage Assessment (Adult)    Airway WDL WDL       Respiratory WDL    Respiratory WDL WDL       Skin Circulation/Temperature WDL    Skin Circulation/Temperature WDL WDL       Cardiac WDL    Cardiac WDL WDL       Peripheral/Neurovascular WDL    Peripheral Neurovascular WDL WDL       Cognitive/Neuro/Behavioral WDL    Cognitive/Neuro/Behavioral WDL WDL

## 2022-11-07 ENCOUNTER — APPOINTMENT (OUTPATIENT)
Dept: PHYSICAL THERAPY | Facility: CLINIC | Age: 87
End: 2022-11-07
Attending: STUDENT IN AN ORGANIZED HEALTH CARE EDUCATION/TRAINING PROGRAM
Payer: MEDICARE

## 2022-11-07 PROCEDURE — 97530 THERAPEUTIC ACTIVITIES: CPT | Mod: GP | Performed by: PHYSICAL THERAPIST

## 2022-11-07 PROCEDURE — 250N000013 HC RX MED GY IP 250 OP 250 PS 637: Performed by: STUDENT IN AN ORGANIZED HEALTH CARE EDUCATION/TRAINING PROGRAM

## 2022-11-07 PROCEDURE — G0378 HOSPITAL OBSERVATION PER HR: HCPCS

## 2022-11-07 PROCEDURE — 99225 PR SUBSEQUENT OBSERVATION CARE,LEVEL II: CPT | Performed by: HOSPITALIST

## 2022-11-07 PROCEDURE — 250N000012 HC RX MED GY IP 250 OP 636 PS 637: Performed by: PHYSICIAN ASSISTANT

## 2022-11-07 PROCEDURE — 97162 PT EVAL MOD COMPLEX 30 MIN: CPT | Mod: GP | Performed by: PHYSICAL THERAPIST

## 2022-11-07 PROCEDURE — 99207 PR APP CREDIT; MD BILLING SHARED VISIT: CPT | Mod: FS | Performed by: PHYSICIAN ASSISTANT

## 2022-11-07 RX ORDER — METHYLPREDNISOLONE 4 MG/1
4 TABLET ORAL ONCE
Status: COMPLETED | OUTPATIENT
Start: 2022-11-07 | End: 2022-11-07

## 2022-11-07 RX ORDER — METHYLPREDNISOLONE 4 MG/1
4 TABLET ORAL
Status: DISCONTINUED | OUTPATIENT
Start: 2022-11-08 | End: 2022-11-09 | Stop reason: HOSPADM

## 2022-11-07 RX ORDER — METHYLPREDNISOLONE 4 MG/1
8 TABLET ORAL ONCE
Status: COMPLETED | OUTPATIENT
Start: 2022-11-07 | End: 2022-11-07

## 2022-11-07 RX ORDER — METHYLPREDNISOLONE 4 MG/1
8 TABLET ORAL AT BEDTIME
Status: COMPLETED | OUTPATIENT
Start: 2022-11-07 | End: 2022-11-08

## 2022-11-07 RX ORDER — METHYLPREDNISOLONE 4 MG/1
4 TABLET ORAL AT BEDTIME
Status: DISCONTINUED | OUTPATIENT
Start: 2022-11-09 | End: 2022-11-09 | Stop reason: HOSPADM

## 2022-11-07 RX ADMIN — METHYLPREDNISOLONE 4 MG: 4 TABLET ORAL at 21:18

## 2022-11-07 RX ADMIN — OXYCODONE HYDROCHLORIDE 5 MG: 5 TABLET ORAL at 08:35

## 2022-11-07 RX ADMIN — CHOLESTYRAMINE 4 G: 4 POWDER, FOR SUSPENSION ORAL at 18:33

## 2022-11-07 RX ADMIN — ACETAMINOPHEN 1000 MG: 500 TABLET ORAL at 14:41

## 2022-11-07 RX ADMIN — MEMANTINE 10 MG: 10 TABLET ORAL at 21:18

## 2022-11-07 RX ADMIN — ACETAMINOPHEN 1000 MG: 500 TABLET ORAL at 03:48

## 2022-11-07 RX ADMIN — METHYLPREDNISOLONE 8 MG: 4 TABLET ORAL at 23:41

## 2022-11-07 RX ADMIN — CHOLESTYRAMINE 4 G: 4 POWDER, FOR SUSPENSION ORAL at 08:15

## 2022-11-07 RX ADMIN — ASPIRIN 81 MG: 81 TABLET, COATED ORAL at 08:14

## 2022-11-07 RX ADMIN — FINASTERIDE 5 MG: 5 TABLET, FILM COATED ORAL at 08:15

## 2022-11-07 RX ADMIN — FAMOTIDINE 40 MG: 20 TABLET ORAL at 21:19

## 2022-11-07 RX ADMIN — METHYLPREDNISOLONE 8 MG: 4 TABLET ORAL at 15:04

## 2022-11-07 RX ADMIN — OXYCODONE HYDROCHLORIDE 5 MG: 5 TABLET ORAL at 21:31

## 2022-11-07 RX ADMIN — MEMANTINE 10 MG: 10 TABLET ORAL at 08:15

## 2022-11-07 RX ADMIN — METHYLPREDNISOLONE 4 MG: 4 TABLET ORAL at 18:33

## 2022-11-07 RX ADMIN — ACETAMINOPHEN 1000 MG: 500 TABLET ORAL at 08:14

## 2022-11-07 ASSESSMENT — ACTIVITIES OF DAILY LIVING (ADL)
ADLS_ACUITY_SCORE: 46
ADLS_ACUITY_SCORE: 40
ADLS_ACUITY_SCORE: 46
ADLS_ACUITY_SCORE: 46
ADLS_ACUITY_SCORE: 40
ADLS_ACUITY_SCORE: 46
ADLS_ACUITY_SCORE: 40
ADLS_ACUITY_SCORE: 42
ADLS_ACUITY_SCORE: 40

## 2022-11-07 NOTE — H&P
[facility]    History and Physical - Hospitalist Service       Date of Admission:  11/6/2022    Assessment & Plan      Jean Pierre Roblero is a 88 year old male admitted on 11/6/202 for hip pain.    L hip pain  OA   DDD  Patient developed L thigh pain a few weeks ago that would shoot up into his hip. He began taking advil which did help relieve the pain however progressive worsening to presentation today. He cannot bear weight on his L leg without significant pain in his hip. He does not have pain at rest or supine. No numbness or tingling. Strength normal. No leg swelling. No signs of infection or inflammation with normal CRP and WBC. XR imaging with mild OA, degenerative changes to his SI joints and lumbar spine.    - observation admission  - will obtain lumbar imaging to further evaluate his lumbar spine and DDD, pain may be referred  - PRN oxycodone and scheduled tylenol for pain control. Avoid NSAIDS as patient reports taking consistently for several weeks  - ortho consult for management assistance  - PT and OT consults    Elevated BP  BP 170s/100s in the ED. Patient denies hx of elevated BP and both he and his wife surprised to hear the results. Tell me his BP is normally in the 120s  - treat pain and avoid NSAIDs  - monitor for now, slowly trended down to 150s on my assesment    Dementia  - restart PTA namenda    BPH  -restart finasteride    Hold other vitamins and nonessential medications due to obs status    Covid negative         Diet:  regular  DVT Prophylaxis: Pneumatic Compression Devices  Puckett Catheter: Not present  Central Lines: None  Cardiac Monitoring: None  Code Status:   FULL    Clinically Significant Risk Factors Present on Admission                    # Dementia: noted on problem list           Disposition Plan      Expected Discharge Date: 11/07/2022                  Shante Shaffer DO  Hospitalist Service  [facility]  Securely message with the  [employer] Console (learn more here)  Text page via [facility] Paging/Directory         ______________________________________________________________________    Chief Complaint   Hip pain    History is obtained from the patient and family    History of Present Illness   Jean Pierre Roblero is a 88 year old male admitted on 11/6/202 for hip pain.    Patient developed L thigh pain a few weeks ago that would shoot up into his hip. He began taking advil which did help relieve the pain however progressive worsening to presentation today. He cannot bear weight on his L leg without significant pain in his hip. He does not have pain at rest or supine. No numbness or tingling. Strength normal. No leg swelling. No signs of infection or inflammation with normal CRP and WBC. Patient denies     Patient comfortable in exam room. He denies pain at the moment. He denies recent illness or fever. No leg swelling. No trauma. No falls. No GI complaints. No dysuria. NO rashes No headaches    Review of Systems    The 10 point Review of Systems is negative other than noted in the HPI or here.     Past Medical History    I have reviewed this patient's medical history and updated it with pertinent information if needed.   Past Medical History:   Diagnosis Date     Allergic state      Fainting episodes     FREQUENT/ EASILY     Gastroesophageal reflux disease      Headaches      Heartburn      Nocturia      Photosensitivity      Swallowing difficulty        Past Surgical History   I have reviewed this patient's surgical history and updated it with pertinent information if needed.  Past Surgical History:   Procedure Laterality Date     BACK SURGERY       CHOLECYSTECTOMY       ENDOSCOPIC SINUS SURGERY  8/20/2012    Procedure: ENDOSCOPIC SINUS SURGERY;  ENDOSCOPIC REMOVAL OF LEFT NASAL MASS WITH LANDMARKS (Fusion Tracking [place]), BILATERAL INFERIOR TURBINOPLASTY;  Surgeon: Dima Younger MD;  Location: [facility]     EYE SURGERY      cataracts     HERNIA  REPAIR       ORTHOPEDIC SURGERY      ingrown toe nail removal     TURBINOPLASTY  2012    Procedure: TURBINOPLASTY;;  Surgeon: Dima Younger MD;  Location: [facility]       Social History   I have reviewed this patient's social history and updated it with pertinent information if needed.  Social History     Tobacco Use     Smoking status: Never     Smokeless tobacco: Never   Substance Use Topics     Alcohol use: No     Drug use: No       Family History     Non contributory    Prior to Admission Medications   Prior to Admission Medications   Prescriptions Last Dose Informant Patient Reported? Taking?   Multiple Vitamin (MULTI-VITAMIN) per tablet 2022 Spouse/Significant Other Yes Yes   Sig: Take 1 tablet by mouth daily   aspirin (ASA) 81 MG EC tablet 2022 Spouse/Significant Other No Yes   Sig: Take 1 tablet (81 mg) by mouth daily   atorvastatin (LIPITOR) 10 MG tablet 2022 at pm Spouse/Significant Other No Yes   Si tablet (10 mg) by Oral or Feeding Tube route every evening   butalbital-acetaminophen-caffeine (ESGIC) -40 MG tablet  Spouse/Significant Other Yes Yes   Sig: Take 1 tablet by mouth every 4 hours as needed for headaches   cholestyramine (QUESTRAN) 4 G packet 2022 Spouse/Significant Other Yes Yes   Sig: Take 1 packet by mouth 2 times daily (with meals)   famotidine (PEPCID) 40 MG tablet 2022 Spouse/Significant Other Yes Yes   Sig: Take 40 mg by mouth At Bedtime   finasteride (PROSCAR) 5 MG tablet 2022 Spouse/Significant Other Yes Yes   Sig: Take 5 mg by mouth daily   fluticasone (FLONASE) 50 MCG/ACT nasal spray  Spouse/Significant Other Yes No   Sig: Spray 1 spray into both nostrils every 48 hours as needed for rhinitis or allergies   magnesium 250 MG tablet 2022 at pm Spouse/Significant Other Yes Yes   Sig: Take 1 tablet by mouth every evening   memantine (NAMENDA) 10 MG tablet 2022 at am Spouse/Significant Other Yes Yes   Sig: Take 10 mg by mouth 2 times  daily   multivitamin  with lutein (OCUVITE WITH LTEIN) CAPS per capsule  Spouse/Significant Other Yes Yes   Sig: Take 1 capsule by mouth daily   sodium chloride (OCEAN) 0.65 % nasal spray  Spouse/Significant Other Yes Yes   Sig: Spray 1 spray into both nostrils every 48 hours as needed for congestion   vitamin B-12 (CYANOCOBALAMIN) 1000 MCG tablet 11/5/2022 Spouse/Significant Other Yes Yes   Sig: Take 1,000 mcg by mouth daily Noon   vitamin C (ASCORBIC ACID) 500 MG tablet 11/5/2022 Spouse/Significant Other Yes Yes   Sig: Take 500 mg by mouth daily   vitamin D3 (CHOLECALCIFEROL) 2000 units (50 mcg) tablet 11/5/2022 Spouse/Significant Other Yes Yes   Sig: Take 2,000 Units by mouth daily Noon   zinc sulfate (ZINCATE) 220 (50 Zn) MG capsule 11/5/2022 Spouse/Significant Other Yes Yes   Sig: Take 220 mg by mouth daily noon      Facility-Administered Medications: None     Allergies   Allergies   Allergen Reactions     Chocolate Anaphylaxis     headache     Feathers      Novocaine [Procaine]      Passes out almost immediately     [place] GI Disturbance     Most berries       Physical Exam   Vital Signs: Temp: 98.1  F (36.7  C) Temp src: Oral BP: (!) 166/101 Pulse: 95   Resp: 16 SpO2: 100 %      Weight: 141 lbs 0 oz    Constitutional: Awake, alert, cooperative, no apparent distress.  Eyes: Conjunctiva and pupils examined and normal.  HEENT: Moist mucous membranes, normal dentition.  Respiratory: Clear to auscultation bilaterally, no crackles or wheezing.  Cardiovascular: Regular rate and rhythm, normal S1 and S2, and no murmur noted.  GI: Soft, non-distended, non-tender, normal bowel sounds.  Lymph/Hematologic: No anterior cervical or supraclavicular adenopathy.  Skin: No rashes, no cyanosis, no edema.  Musculoskeletal no pain on active ROM of L hip, straight leg negative for pain, OA arthritic changes to hands noted  Neurologic: Cranial nerves 2-12 intact, normal strength and sensation.  Psychiatric: Alert, oriented to  person, place and time, no obvious anxiety or depression.    Data   Data reviewed today: I reviewed all medications, new labs and imaging results over the last 24 hours. I personally reviewed XR of the hips without obvious abnormality.    Recent Labs   Lab 11/06/22  1324   WBC 6.0   HGB 14.1            POTASSIUM 3.9   CHLORIDE 107   CO2 27   BUN 12   CR 0.83   ANIONGAP 5   KENYA 8.8   *   ALBUMIN 3.8   PROTTOTAL 6.9   BILITOTAL 1.0   ALKPHOS 76   ALT 21   AST 22     Most Recent 3 CBC's:Recent Labs   Lab Test 11/06/22  1324 08/19/22  1033 08/18/22  0830   WBC 6.0 5.0 4.7   HGB 14.1 13.8 14.3    100 99    141* 132*     Most Recent 3 BMP's:Recent Labs   Lab Test 11/06/22  1324 08/18/22  0830 08/17/22  0914 08/16/22  1514 08/16/22  0822     --  136  --  134   POTASSIUM 3.9 3.6 3.6   < > 3.3*   CHLORIDE 107  --  106  --  104   CO2 27  --  24  --  25   BUN 12  --  16  --  18   CR 0.83  --  0.64*  --  0.77   ANIONGAP 5  --  6  --  5   KENYA 8.8  --  8.1*  --  8.3*   *  --  150*  --  128*    < > = values in this interval not displayed.     Most Recent 2 LFT's:Recent Labs   Lab Test 11/06/22  1324 08/13/22  0148   AST 22 21   ALT 21 22   ALKPHOS 76 76   BILITOTAL 1.0 0.6

## 2022-11-07 NOTE — PROGRESS NOTES
Orientation/Cognitive: Occasionally d/o to time.  Observation Goals (Met/ Not Met): Not met  Mobility Level/Assist Equipment: Not OOB this shift.   Fall Risk (Y/N): Y  Behavior Concerns: None  Pain Management: Tylenol w/ relief.   Tele/VS/O2: Elevated BP, other VSS on RA.  ABNL Lab/BG: WNL  Diet: Re  Bowel/Bladder: Dribbling at times.  Skin Concerns: Scattered bruises.   Drains/Devices: PIV SL  Tests/Procedures for next shift: None  Anticipated DC date & active delays: TBD.  Patient Stated Goal for Today: Rest

## 2022-11-07 NOTE — PLAN OF CARE
Goal Outcome Evaluation:  -diagnostic tests and consults completed and resulted No  -vital signs normal or at patient baseline  Yes  -safe disposition plan has been identified  No

## 2022-11-07 NOTE — PLAN OF CARE
Goal Outcome Evaluation:  -diagnostic tests and consults completed and resulted Yes  -vital signs normal or at patient baseline  Yes  -safe disposition plan has been identified  NO  Orientation/Cognitive: A&OX3-4, forgetful  Observation Goals (Met/ Not Met): Not met  Mobility Level/Assist Equipment: AX1 with GB and walker  Fall Risk (Y/N): Yes  Behavior Concerns: None, pleasant  Pain Management: Pain in the L hip managed with scheduled tylenol, PRN Oxy and now started on medrol dose pack per Ortho  Tele/VS/O2: VSS on RA  ABNL Lab/BG: None ordered  Diet: Regular diet  Bowel/Bladder: Continent  Skin Concerns: scattered bruising  Drains/Devices: PIV Sled  Tests/Procedures for next shift: none  Anticipated DC date & active delays: possibly tomorrow, PT recommending home with Assist Vs TCU  Patient Stated Goal for Today: Pain management and walk.

## 2022-11-07 NOTE — PROGRESS NOTES
Observation goals  PRIOR TO DISCHARGE        Comments:   -diagnostic tests and consults completed and resulted: not met      -vital signs normal or at patient baseline: met      -safe disposition plan has been identified: not met      Nurse to notify provider when observation goals have been met and patient is ready for discharge.

## 2022-11-07 NOTE — PROGRESS NOTES
Orientation/Cognitive: Disoriented to time   Observation Goals (Met/ Not Met): Not met   Mobility Level/Assist Equipment: Not OOB, refuses to get OOB due to hip pain    Fall Risk (Y/N): Y  Behavior Concerns: Green   Pain Management:  Scheduled Tylenol w/ relief.   Tele/VS/O2: VSS on RA.  ABNL Lab/BG: Abnormal XR lumber spine   Diet: Regular   Bowel/Bladder: Continent, using bedside urinal   Skin Concerns: Scattered bruises.   Drains/Devices: PIV SL  Tests/Procedures for next shift: None  Anticipated DC date & active delays: TBD.  Patient Stated Goal for Today: Rest    Observation goals  PRIOR TO DISCHARGE        Comments:   -diagnostic tests and consults completed and resulted: not met       -vital signs normal or at patient baseline: met       -safe disposition plan has been identified: not met       Nurse to notify provider when observation goals have been met and patient is ready for discharge.

## 2022-11-07 NOTE — PROGRESS NOTES
[facility]    Medicine Progress Note - Hospitalist Service    Date of Admission:  11/6/2022    Assessment & Plan   Jean Pierre Roblero is a 88 year old male admitted on 11/6/202 for hip pain.     Left low back degenerative changes with left leg radiculopathy  OA   DDD  Patient developed L thigh pain a few weeks ago that would shoot up into his hip. He began taking advil which did help relieve the pain however progressive worsening to presentation today. He cannot bear weight on his L leg without significant pain in his hip. He does not have pain at rest or supine. No numbness or tingling. Strength normal. No leg swelling. No signs of infection or inflammation with normal CRP and WBC. XR imaging with mild OA, degenerative changes to his SI joints and lumbar spine.  --observation admission  --X-ray imaging left hip shows mild OA, degenerative changes and scoliosis to his SI joints and lumbar spine.  - PRN oxycodone and scheduled tylenol for pain control. Avoid NSAIDS as patient reports taking consistently for several weeks  --Ortho consulted, input appreciated.  --Patient's pain is improving today, able to ambulate with PT in the room with use of a walker.  He reports that his pain is improving, as he is now able to bear weight when he was not able to before.   --Will trial Medrol Dosepak  --PT consulted and currently recommending home with 24/7 assist with OP PT versus TCU.    Elevated BP  BP 170s/100s in the ED. Patient denies hx of elevated BP and both he and his wife were surprised to hear the results.  Patient reports his BP is normally in the 120s  --treat pain and avoid NSAIDs  --monitor for now, BP has slowly trended down, ranging 108/62 - 150/93 so far today.  Ng     Dementia  --Continue PTA namenda     BPH  --Continue finasteride     Hold other vitamins and nonessential medications due to obs status     Covid negative       Diet: Regular Diet Adult    DVT Prophylaxis: Pneumatic  Compression Devices  Puckett Catheter: Not present  Central Lines: None  Cardiac Monitoring: None  Code Status: Full Code      Disposition Plan      Expected Discharge Date: 11/07/2022                The patient's care was discussed with the patient, bedside RN, and /CC.    Patient was discussed with Dr. Anderson of the hospitalist service.  He is in agreement with my assessment plan of care.    JENIFFER Cain  Hospitalist Service  [facility]  Securely message with the Vocera Web Console (learn more here)  Text page via Concert Window Paging/Directory         Clinically Significant Risk Factors Present on Admission                    # Dementia: noted on problem list           ______________________________________________________________________    Interval History   Patient ambulating from the bathroom to the bed on my arrival.  Using a walker, assist of 1.  He reports that his pain is improving, as he is now able to bear weight when he was not able to before.  He still has pain in the left leg, but states that it is lower down now to the knee.  Denies any focal weakness.  No chest pain, shortness of breath, fever, abdominal pain or nausea.  We will trial Medrol Dosepak and continue PT today, monitor for improvement.    Data reviewed today: I reviewed all medications, new labs and imaging results over the last 24 hours.     Physical Exam   Vital Signs: Temp: 97.6  F (36.4  C) Temp src: Axillary BP: (!) 150/93 Pulse: 95   Resp: 18 SpO2: 98 % O2 Device: None (Room air)    Weight: 138 lbs 0 oz     Constitutional: Alert, oriented to person, place, situation.  Cooperative, lying in bed in NAD.  Respiratory:  Lungs CTAB.  No crackles, wheezes, or rhonchi, no labored breathing.  Cardiovascular:  Heart RRR, no MRG, no edema.  GI:  Abdomen soft, NT/ND and with normoactive BS  Skin/Integumen:  Warm, dry, non-diaphoretic.  MSK: CMS x4 intact.      Data   Recent Labs   Lab 11/06/22  1324    WBC 6.0   HGB 14.1            POTASSIUM 3.9   CHLORIDE 107   CO2 27   BUN 12   CR 0.83   ANIONGAP 5   KENYA 8.8   *   ALBUMIN 3.8   PROTTOTAL 6.9   BILITOTAL 1.0   ALKPHOS 76   ALT 21   AST 22     Recent Results (from the past 24 hour(s))   XR Lumbar Spine 2/3 Views    Narrative    EXAM: XR LUMBAR SPINE 2/3 VIEWS  LOCATION: [facility]  DATE/TIME: 11/6/2022 9:36 PM    INDICATION: Low back pain  COMPARISON: None.  TECHNIQUE: CR Lumbar Spine.      Impression    IMPRESSION: 5 lumbar type vertebral bodies. Normal vertebral body heights. Straightened lordosis. 25 degree levoscoliosis apex L4-L5. Severe L2-L3 through L5-S1 interbody degeneration. Advanced L2-L3 through L5-S1 facet arthropathy. Mild degenerative   change of the sacroiliac joints.     Medications       acetaminophen  1,000 mg Oral Q6H     aspirin  81 mg Oral Daily     cholestyramine  1 packet Oral BID w/meals     famotidine  40 mg Oral At Bedtime     finasteride  5 mg Oral Daily     memantine  10 mg Oral BID     methylPREDNISolone  4 mg Oral Once    And     methylPREDNISolone  4 mg Oral Once    And     methylPREDNISolone  8 mg Oral At Bedtime    And     [START ON 11/8/2022] methylPREDNISolone  4 mg Oral QAM AC    And     [START ON 11/8/2022] methylPREDNISolone  4 mg Oral Daily with lunch    And     [START ON 11/8/2022] methylPREDNISolone  4 mg Oral Daily with supper    And     [START ON 11/9/2022] methylPREDNISolone  4 mg Oral At Bedtime

## 2022-11-07 NOTE — PHARMACY-ADMISSION MEDICATION HISTORY
Pharmacy Medication History  Admission medication history interview status for the 11/6/2022  admission is complete. See EPIC admission navigator for prior to admission medications     Location of Interview: Patient room  Medication history sources: Patient/Lianet patient's spouse    Significant changes made to the medication list:  Removed Robitussin.    Added mucinex, cordelia-synephrine and advil.    In the past week, patient estimated taking medication this percent of the time: 50-90% due to other    Additional medication history information:   none    Medication reconciliation completed by provider prior to medication history? No    Time spent in this activity: 25 minutes    Prior to Admission medications    Medication Sig Last Dose Taking? Auth Provider Long Term End Date   aspirin (ASA) 81 MG EC tablet Take 1 tablet (81 mg) by mouth daily 11/5/2022 Yes Zeny Brown MD     atorvastatin (LIPITOR) 10 MG tablet 1 tablet (10 mg) by Oral or Feeding Tube route every evening 11/5/2022 at pm Yes Zeny Brown MD Yes    butalbital-acetaminophen-caffeine (ESGIC) -40 MG tablet Take 1 tablet by mouth every 4 hours as needed for headaches  Yes Unknown, Entered By History     cholestyramine (QUESTRAN) 4 G packet Take 1 packet by mouth 2 times daily (with meals) 11/5/2022 Yes Reported, Patient     famotidine (PEPCID) 40 MG tablet Take 40 mg by mouth At Bedtime 11/5/2022 Yes Unknown, Entered By History     finasteride (PROSCAR) 5 MG tablet Take 5 mg by mouth daily 11/5/2022 Yes Unknown, Entered By History     guaiFENesin (MUCINEX) 600 MG 12 hr tablet Take 600 mg by mouth daily  Yes Unknown, Entered By History     ibuprofen (ADVIL/MOTRIN) 200 MG tablet Take 400 mg by mouth 2 times daily as needed for pain  Yes Unknown, Entered By History     magnesium 250 MG tablet Take 1 tablet by mouth every evening 11/5/2022 at pm Yes Unknown, Entered By History     memantine (NAMENDA) 10 MG tablet Take 10 mg by mouth 2  times daily 11/6/2022 at am Yes Unknown, Entered By History     Multiple Vitamin (MULTI-VITAMIN) per tablet Take 1 tablet by mouth daily 11/6/2022 Yes Reported, Patient     multivitamin  with lutein (OCUVITE WITH LTEIN) CAPS per capsule Take 1 capsule by mouth daily  Yes Unknown, Entered By History     Phenylephrine HCl (ROX-SYNEPHRINE NA) Spray 1 spray into both nostrils daily as needed  Yes Unknown, Entered By History     sodium chloride (OCEAN) 0.65 % nasal spray Spray 1 spray into both nostrils every 48 hours as needed for congestion  Yes Unknown, Entered By History     vitamin B-12 (CYANOCOBALAMIN) 1000 MCG tablet Take 1,000 mcg by mouth daily Noon 11/5/2022 Yes Unknown, Entered By History     vitamin C (ASCORBIC ACID) 500 MG tablet Take 500 mg by mouth daily 11/5/2022 Yes Unknown, Entered By History     vitamin D3 (CHOLECALCIFEROL) 2000 units (50 mcg) tablet Take 2,000 Units by mouth daily Noon 11/5/2022 Yes Unknown, Entered By History     zinc sulfate (ZINCATE) 220 (50 Zn) MG capsule Take 220 mg by mouth daily noon 11/5/2022 Yes Unknown, Entered By History     fluticasone (FLONASE) 50 MCG/ACT nasal spray Spray 1 spray into both nostrils every 48 hours as needed for rhinitis or allergies   Reported, Patient     clopidogrel (PLAVIX) 75 MG tablet Take 1 tablet (75 mg) by mouth daily   Zeny Brown MD Yes 8/19/22   hydrochlorothiazide (HYDRODIURIL) 12.5 MG tablet Take 1 tablet (12.5 mg) by mouth daily   Zeny Brown MD Yes 5/27/22       The information provided in this note is only as accurate as the sources available at the time of update(s)

## 2022-11-07 NOTE — PROGRESS NOTES
11/07/22 1100   Appointment Info   Signing Clinician's Name / Credentials (PT) Tricia Licha, PT, DPT       Present no   Language English   Living Environment   People in Home spouse   Current Living Arrangements house   Home Accessibility stairs to enter home;stairs within home   Number of Stairs, Main Entrance 1   Stair Railings, Main Entrance none   Number of Stairs, Within Home, Primary seven   Stair Railings, Within Home, Primary railing on right side (ascending)   Living Environment Comments Pt is poor historian, slow processing, and [place], with incr time and wife assisting, reporting they live in house with 1 large step to enter for which pt uses a cane, and has 7 stairs split level up/down with railing on R to accend to bedroom. Pt uses 2WW inside home, and cane w/stairs.   Self-Care   Usual Activity Tolerance moderate   Current Activity Tolerance fair   Regular Exercise No   Equipment Currently Used at Home cane, straight;walker, rolling   Fall history within last six months yes   Number of times patient has fallen within last six months 2  (One fall bacwards while trying to ascend stairs, landed on buttocks, >3mo ago. Unable to recall details of other fall.)   Activity/Exercise/Self-Care Comment Pt reports he does minimal exercising at home, at most 5-10min daily.   General Information   Onset of Illness/Injury or Date of Surgery 11/06/22   Referring Physician Shante Shaffer, DO   Pertinent History of Current Problem (include personal factors and/or comorbidities that impact the POC) Per chart, pt is 87 yo admitted for observation w/ L hip pain. Patient developed L thigh pain a few weeks ago that would shoot up into his hip. He began taking advil which did help relieve the pain however progressive worsening to presentation today. He cannot bear weight on his L leg without significant pain in his hip. He does not have pain at rest or supine. No numbness or tingling. Strength  normal. No leg swelling. No signs of infection or inflammation with normal CRP and WBC. XR imaging with mild OA, degenerative changes to his SI joints and lumbar spine.   Weight-Bearing Status - LLE weight-bearing as tolerated   Cognition   Cognitive Status Comments Per chart pt has mild dementia.  Pt is slow to answer and [place].   Pain Assessment   Patient Currently in Pain Yes, see Vital Sign flowsheet  (3-6/10 with activity)   Integumentary/Edema   Integumentary/Edema no deficits were identifed   Posture    Posture Kyphosis;Scoliosis   Range of Motion (ROM)   ROM Comment B LE WFL; mildly limited TKE B knees.   Strength (Manual Muscle Testing)   Strength (Manual Muscle Testing) Able to perform L SLR;Able to perform R SLR;Deficits observed during functional mobility   Strength Comments Grossly 4/5 B LE, grossly deconditioned   Bed Mobility   Bed Mobility supine-sit   Comment, (Bed Mobility) Naomi due to pain   Transfers   Transfers sit-stand transfer   Sit-Stand Transfer   Assistive Device (Sit-Stand Transfers) walker, front-wheeled   Comment, (Sit-Stand Transfer) CG-Naomi   Gait/Stairs (Locomotion)   Comment, (Gait/Stairs) Amb 40' x 3 with 2WW and CGA   Balance   Balance Comments Impaired static and dynamic balance   Sensory Examination   Sensory Perception Comments L thigh parasthesias, pt is poor historian, difficult to formally assess   Coordination   Coordination no deficits were identified   Muscle Tone   Muscle Tone Comments Gross stiffness   Clinical Impression   Criteria for Skilled Therapeutic Intervention Yes, treatment indicated   PT Diagnosis (PT) Impaired indep w/fn mob   Influenced by the following impairments Pain, strength, balance, sensation, cognition, cardiovasc endurance   Functional limitations due to impairments Bed mob, transfers, amb, stairs   Clinical Presentation (PT Evaluation Complexity) Evolving/Changing   Clinical Presentation Rationale Multiple affecting comorbidities, assessment incl  strength, balance, fn mob.   Clinical Decision Making (Complexity) moderate complexity   Planned Therapy Interventions (PT) balance training;bed mobility training;gait training;stair training;strengthening;transfer training;progressive activity/exercise;risk factor education;patient/family education;home program guidelines;neuromuscular re-education   Risk & Benefits of therapy have been explained evaluation/treatment results reviewed;risks/benefits reviewed;care plan/treatment goals reviewed;participants voiced agreement with care plan;current/potential barriers reviewed;participants included;patient   PT Total Evaluation Time   PT Eval, Moderate Complexity Minutes (97857) 10   Physical Therapy Goals   PT Frequency Daily   PT Predicted Duration/Target Date for Goal Attainment 11/11/22   PT Goals Bed Mobility;Transfers;Gait;Stairs   PT: Bed Mobility Supervision/stand-by assist   PT: Transfers Supervision/stand-by assist   PT: Gait Supervision/stand-by assist   PT: Stairs Supervision/stand-by assist;2 stairs;7 stairs   Interventions   Interventions Quick Adds Therapeutic Activity   Therapeutic Activity   Therapeutic Activities: dynamic activities to improve functional performance Minutes (31126) 40   Symptoms Noted During/After Treatment Fatigue;Increased pain   Treatment Detail/Skilled Intervention Session focused heavily on promoting healing and OOB mobility. Skilled instruction on sup>sit using log rolling technique with UE support. 3/10 pain L thigh in supine, 10/10 wity rolling to R side and coming up to sitting. Pt able to push off using shoulders, Naomi for coming to EOB. No dizziness reported.  Sit<>stand multiple times over course of session, patient pulling up on walker, cued for hand placement. Education with posture and body mechanics for standing and stand pivot transfer to chair. Amb 50' x 3 with 2WW. Sequencing for safety, upper extremity posture and use of UEs. Climbed/desc 3 steps w/cane and 1  "railing, CG-Naomi. No knee buckling, but activity effortful, and pt at increased falls risk.  With amb, pt reports worse pain with trunk flexion, some improvement with upright posture. Patient followed cues well, good recall. Encouraged to ambulate outside of therapy, use of call light. Chair alarm on, call light in reach. RN updated. Incr time spent in shared decision making, educ about falls risk, impairments; Family leaning towards discharge back to home \"in 1-2 days\".   PT Discharge Planning   PT Plan Ambulation, stairs w cane and / no rail (1) and w/rail (7),  caregiver educ on providing CG-Naomi   PT Discharge Recommendation (DC Rec) home with assist;Transitional Care Facility   PT Rationale for DC Rec Pt presents below his mod indep baseline w/2WW, exhibits acute on chronic L LE pain/numbness. Hx of 2 falls in last 6mo. Has B LE weakness, balance deficits, high pain. Of note, per chart has mild dementia as well. Recommend d/c home with Ax1 24/7 with all fn mobility and OPPT. If this level of assistance is not available, TCU recommended to reduce falls risk and risk of re-hospitalization.   PT Brief overview of current status bed mob Naomi with log rolling; transfers CGA w/2WW; amb Naomi w/2WW ~40-50' at a time; stairs CG-Naomi   Total Session Time   Timed Code Treatment Minutes 40   Total Session Time (sum of timed and untimed services) 50     [facility]  OUTPATIENT PHYSICAL THERAPY EVALUATION  PLAN OF TREATMENT FOR OUTPATIENT REHABILITATION  (COMPLETE FOR INITIAL CLAIMS ONLY)  Patient's Last Name, First Name, M.I.  YOB: 1934  Jean Pierre Roblero                        Provider's Name  [facility] Medical Record No.  [id number]                             Onset Date:  11/06/22   Start of Care Date:   11/7/2022   Type:     _X_PT   ___OT   ___SLP Medical Diagnosis:     Thigh pain            PT Diagnosis:  Impaired indep w/fn mob Visits from " SOC:  1     See note for plan of treatment, functional goals and certification details    I CERTIFY THE NEED FOR THESE SERVICES FURNISHED UNDER        THIS PLAN OF TREATMENT AND WHILE UNDER MY CARE     (Physician co-signature of this document indicates review and certification of the therapy plan).

## 2022-11-07 NOTE — ED NOTES
[facility]  ED Nurse Handoff Report    ED Chief complaint: Leg Pain      ED Diagnosis:   Final diagnoses:   Hip pain, left       Code Status: Full Code    Allergies:   Allergies   Allergen Reactions    Chocolate Anaphylaxis     headache    Feathers     Novocaine [Procaine]      Passes out almost immediately    [facility] GI Disturbance     Most berries       Patient Story: Pt was unable to get  out of bed this morning at home due to extreme left hip and knee pain.  Focused Assessment:  A/Ox4, can make needs known. Sats 98% on RA. HR 80. Denies chest pain or dyspnea. Pt. Has left hip and knee pain that has gotten worse, today unable to get out of bed and care for self. Lives at home with wife.    Treatments and/or interventions provided: Iv, labs, meds,xray  Patient's response to treatments and/or interventions: Well    To be done/followed up on inpatient unit:  Monitor.    Does this patient have any cognitive concerns?:  NO    Activity level - Baseline/Home:  Independent  Activity Level - Current:   Total Care    Patient's Preferred language: English   Needed?: No    Isolation: None  Infection: Not Applicable  Patient tested for COVID 19 prior to admission: YES  Bariatric?: No    Vital Signs:   Vitals:    11/06/22 1318 11/06/22 1410 11/06/22 1701 11/06/22 1711   BP: (!) 171/97 (!) 153/90 (!) 166/101    Pulse:  95     Resp:       Temp:       TempSrc:       SpO2:  98%  100%   Weight:       Height:           Cardiac Rhythm:     Was the PSS-3 completed:   Yes  What interventions are required if any?               Family Comments: Wife in room  OBS brochure/video discussed/provided to patient/family: Yes              Name of person given brochure if not patient: Wife              Relationship to patient: Wife    For the majority of the shift this patient's behavior was Green.   Behavioral interventions performed were None.    ED NURSE PHONE NUMBER: RN [phone]

## 2022-11-07 NOTE — CONSULTS
"[facility]    Orthopedic Consultation    Jean Pierre Roblero MRN# [id number]   Age: 88 year old YOB: 1934     Date of Admission: 11/6/2022    Reason for consult: Left hip pain        Requesting provider: Shante Shaffer       Level of consult: Consult, follow and place orders           Assessment and Plan:   Assessment:   Left low back degenerative changes with left leg radiculopathy       Plan:   Patient's pain is improving today.  Able to ambulate with PT in the room with use of a walker.   His radiating pain is likely evolving from his lumbar spine.   Recommend medrol dose oh if medically able, outpatient PT and follow-up with spine provider in clinic.            Chief Complaint:   Left leg/thigh pain          History of Present Illness:   This patient is a 88 year old male who presents with the following condition requiring a hospital admission:  Patient developed left thigh pain a few weeks ago that would \"shoot up into his hip\". He began taking advil which did help relieve the pain however progressive worsening to presentation yesterday. He was unable to bear weight on his left leg without significant pain in his hip/thigh region.   This morning, he was able to ambulate with PT and a walker and reports pain had improved.     Denies numbness/tingling in left leg.  No signs of infection or inflammation with normal CRP and WBC. X-ray imaging left hip shows mild OA, degenerative changes and scoliosis to his SI joints and lumbar spine.  He notes he had lumbar surgery 5+ years ago by Dr Pichardo.              Past Medical History:     Past Medical History:   Diagnosis Date     Allergic state      Fainting episodes     FREQUENT/ EASILY     Gastroesophageal reflux disease      Headaches      Heartburn      Nocturia      Photosensitivity      Swallowing difficulty              Past Surgical History:     Past Surgical History:   Procedure Laterality Date     BACK SURGERY       " CHOLECYSTECTOMY       ENDOSCOPIC SINUS SURGERY  8/20/2012    Procedure: ENDOSCOPIC SINUS SURGERY;  ENDOSCOPIC REMOVAL OF LEFT NASAL MASS WITH LANDMARKS (Fusion Tracking Taylor), BILATERAL INFERIOR TURBINOPLASTY;  Surgeon: Dima Younger MD;  Location: [facility]     EYE SURGERY      cataracts     HERNIA REPAIR       ORTHOPEDIC SURGERY      ingrown toe nail removal     TURBINOPLASTY  8/20/2012    Procedure: TURBINOPLASTY;;  Surgeon: Dima Younger MD;  Location: [facility]             Social History:     Social History     Tobacco Use     Smoking status: Never     Smokeless tobacco: Never   Substance Use Topics     Alcohol use: No             Family History:   No family history on file.          Immunizations:     VACCINE/DOSE   Diptheria   DPT   DTAP   HBIG   Hepatitis A   Hepatitis B   HIB   Influenza   Measles   Meningococcal   MMR   Mumps   Pneumococcal   Polio   Rubella   Small Pox   TDAP   Varicella   Zoster             Allergies:     Allergies   Allergen Reactions     Chocolate Anaphylaxis     headache     Feathers      Novocaine [Procaine]      Passes out almost immediately     [place] [facility] Disturbance     Most berries             Medications:     Current Facility-Administered Medications   Medication     acetaminophen (TYLENOL) tablet 1,000 mg     aspirin EC tablet 81 mg     cholestyramine (QUESTRAN) Packet 4 g     famotidine (PEPCID) tablet 40 mg     finasteride (PROSCAR) tablet 5 mg     melatonin tablet 1 mg     memantine (NAMENDA) tablet 10 mg     naloxone (NARCAN) injection 0.2 mg    Or     naloxone (NARCAN) injection 0.4 mg    Or     naloxone (NARCAN) injection 0.2 mg    Or     naloxone (NARCAN) injection 0.4 mg     ondansetron (ZOFRAN ODT) ODT tab 4 mg    Or     ondansetron (ZOFRAN) injection 4 mg     oxyCODONE (ROXICODONE) tablet 5 mg     sodium chloride (PF) 0.9% PF flush 3 mL             Review of Systems:   ROS:  10 point ROS neg other than the symptoms noted above in the HPI.            Physical  "Exam:   All vitals have been reviewed  Patient Vitals for the past 24 hrs:   BP Temp Temp src Pulse Resp SpO2 Height Weight   11/07/22 1159 117/71 98.1  F (36.7  C) Oral 52 18 96 % -- --   11/07/22 0741 (!) 150/93 97.6  F (36.4  C) Axillary 95 18 98 % -- --   11/07/22 0350 136/78 97.7  F (36.5  C) Oral 67 18 98 % -- --   11/07/22 0001 131/76 98.1  F (36.7  C) Oral 82 16 98 % -- --   11/06/22 2320 -- -- -- -- 16 98 % -- --   11/06/22 2237 -- -- -- -- 16 98 % -- --   11/06/22 2053 (!) 164/96 -- -- -- -- -- -- --   11/06/22 2050 (!) 159/102 97.5  F (36.4  C) Oral 86 16 100 % 1.702 m (5' 7\") 62.6 kg (138 lb)   11/06/22 1728 -- -- -- -- -- 99 % -- --   11/06/22 1711 -- -- -- -- -- 100 % -- --   11/06/22 1701 (!) 166/101 -- -- -- -- -- -- --   11/06/22 1410 (!) 153/90 -- -- 95 -- 98 % -- --   11/06/22 1318 (!) 171/97 -- -- -- -- -- -- --   11/06/22 1314 (!) 171/100 98.1  F (36.7  C) Oral 104 16 100 % 1.715 m (5' 7.5\") 64 kg (141 lb)       Intake/Output Summary (Last 24 hours) at 11/7/2022 1306  Last data filed at 11/7/2022 0800  Gross per 24 hour   Intake 720 ml   Output 800 ml   Net -80 ml         Physical Exam   Temp: 98.1  F (36.7  C) Temp src: Oral BP: 117/71 Pulse: 52   Resp: 18 SpO2: 96 % O2 Device: None (Room air)    Vital Signs with Ranges  Temp:  [97.5  F (36.4  C)-98.1  F (36.7  C)] 98.1  F (36.7  C)  Pulse:  [] 52  Resp:  [16-18] 18  BP: (117-171)/() 117/71  SpO2:  [96 %-100 %] 96 %  138 lbs 0 oz    Constitutional: Pleasant, alert, appropriate, following commands.  HEENT: Head atraumatic normocephalic. Pupils equal round and reactive to light.  Respiratory: Unlabored breathing no audible wheeze  Cardiovascular: Regular rate and rhythm per pulses  GI: Abdomen non-distended.  Lymph/Hematologic: No lymphadenopathy in areas examined  Genitourinary:  No alonzo  Skin: No rashes, no cyanosis, no edema.  Musculoskeletal: On exam of the left LE: no swelling, ecchymosis or erythema.  Able to ambulate with a " walker, reports pain in his left thigh and left medial low leg.  Denies numbness to light touch along left vs right LEs.  Denies pain with passive bilateral hip rotation.  Equal strength with dorsi and plantar flexion and great toe extension.  Pulses intact and equal.  SLR increases left leg pain.    Neurologic: normal without focal findings, mental status, speech normal, alert and oriented x iii  Neuropsychiatric: stable            Data:   All laboratory data reviewed  Results for orders placed or performed during the hospital encounter of 11/06/22   XR Pelvis w Hip Left 1 View     Status: None    Narrative    EXAM: XR PELVIS AND HIP LEFT 1 VIEW  LOCATION: [facility]  DATE/TIME: 11/6/2022 2:26 PM    INDICATION: Left hip pain.  COMPARISON: None.      Impression    IMPRESSION: Left hip and pelvis negative for fracture. Normal hip joint alignment. The pelvic ring appears intact. Generalized demineralization; no focal bone lesion identified. Mild degenerative arthritic changes in both hips, with partial joint space   narrowing and osteophytes. Moderate degenerative changes in the bilateral SI joints. Advanced degenerative disc and facet changes in the lower lumbar spine. Heavy iliofemoral atherosclerotic calcification. Mesh tacks in the pelvis, presumably related to   prior hernia repair.   XR Lumbar Spine 2/3 Views     Status: None    Narrative    EXAM: XR LUMBAR SPINE 2/3 VIEWS  LOCATION: [facility]  DATE/TIME: 11/6/2022 9:36 PM    INDICATION: Low back pain  COMPARISON: None.  TECHNIQUE: CR Lumbar Spine.      Impression    IMPRESSION: 5 lumbar type vertebral bodies. Normal vertebral body heights. Straightened lordosis. 25 degree levoscoliosis apex L4-L5. Severe L2-L3 through L5-S1 interbody degeneration. Advanced L2-L3 through L5-S1 facet arthropathy. Mild degenerative   change of the sacroiliac joints.   Comprehensive metabolic panel     Status: Abnormal   Result  Value Ref Range    Sodium 139 133 - 144 mmol/L    Potassium 3.9 3.4 - 5.3 mmol/L    Chloride 107 94 - 109 mmol/L    Carbon Dioxide (CO2) 27 20 - 32 mmol/L    Anion Gap 5 3 - 14 mmol/L    Urea Nitrogen 12 7 - 30 mg/dL    Creatinine 0.83 0.66 - 1.25 mg/dL    Calcium 8.8 8.5 - 10.1 mg/dL    Glucose 145 (H) 70 - 99 mg/dL    Alkaline Phosphatase 76 40 - 150 U/L    AST 22 0 - 45 U/L    ALT 21 0 - 70 U/L    Protein Total 6.9 6.8 - 8.8 g/dL    Albumin 3.8 3.4 - 5.0 g/dL    Bilirubin Total 1.0 0.2 - 1.3 mg/dL    GFR Estimate 84 >60 mL/min/1.73m2   CBC with platelets and differential     Status: Abnormal   Result Value Ref Range    WBC Count 6.0 4.0 - 11.0 10e3/uL    RBC Count 4.24 (L) 4.40 - 5.90 10e6/uL    Hemoglobin 14.1 13.3 - 17.7 g/dL    Hematocrit 42.4 40.0 - 53.0 %     78 - 100 fL    MCH 33.3 (H) 26.5 - 33.0 pg    MCHC 33.3 31.5 - 36.5 g/dL    RDW 12.7 10.0 - 15.0 %    Platelet Count 153 150 - 450 10e3/uL    % Neutrophils 79 %    % Lymphocytes 16 %    % Monocytes 4 %    % Eosinophils 0 %    % Basophils 0 %    % Immature Granulocytes 1 %    NRBCs per 100 WBC 0 <1 /100    Absolute Neutrophils 4.7 1.6 - 8.3 10e3/uL    Absolute Lymphocytes 1.0 0.8 - 5.3 10e3/uL    Absolute Monocytes 0.3 0.0 - 1.3 10e3/uL    Absolute Eosinophils 0.0 0.0 - 0.7 10e3/uL    Absolute Basophils 0.0 0.0 - 0.2 10e3/uL    Absolute Immature Granulocytes 0.0 <=0.4 10e3/uL    Absolute NRBCs 0.0 10e3/uL   CRP inflammation     Status: Normal   Result Value Ref Range    CRP Inflammation <2.9 0.0 - 8.0 mg/L   Asymptomatic COVID-19 Virus (Coronavirus) by PCR Nasopharyngeal     Status: Normal    Specimen: Nasopharyngeal; Swab   Result Value Ref Range    SARS CoV2 PCR Negative Negative    Narrative    Testing was performed using the Xpert Xpress SARS-CoV-2 Assay on the   Cepheid Gene-Xpert Instrument Systems. Additional information about   this Emergency Use Authorization (EUA) assay can be found via the Lab   Guide. This test should be ordered for  the detection of SARS-CoV-2 in   individuals who meet SARS-CoV-2 clinical and/or epidemiological   criteria. Test performance is unknown in asymptomatic patients. This   test is for in vitro diagnostic use under the FDA EUA for   laboratories certified under CLIA to perform high complexity testing.   This test has not been FDA cleared or approved. A negative result   does not rule out the presence of PCR inhibitors in the specimen or   target RNA in concentration below the limit of detection for the   assay. The possibility of a false negative should be considered if   the patient's recent exposure or clinical presentation suggests   COVID-19. This test was validated by the [facility]. This laboratory is certified under the Clinical Laboratory Improvement Amendments of 1988 (CLIA-88) as qualified to perform high complexity laboratory testing.     CBC with platelets + differential     Status: Abnormal    Narrative    The following orders were created for panel order CBC with platelets + differential.  Procedure                               Abnormality         Status                     ---------                               -----------         ------                     CBC with platelets and d...[[id number]]  Abnormal            Final result                 Please view results for these tests on the individual orders.          Attestation:  I have reviewed today's vital signs, notes, medications, labs and imaging with Dr. Boss.  Amount of time performed on this consult: 45 minutes.    Maria A Cummings PA-C

## 2022-11-07 NOTE — PROGRESS NOTES
RECEIVING UNIT ED HANDOFF REVIEW    ED Nurse Handoff Report was reviewed by: Tito Rasmussen RN on November 6, 2022 at 8:37 PM

## 2022-11-08 ENCOUNTER — APPOINTMENT (OUTPATIENT)
Dept: OCCUPATIONAL THERAPY | Facility: CLINIC | Age: 87
End: 2022-11-08
Attending: STUDENT IN AN ORGANIZED HEALTH CARE EDUCATION/TRAINING PROGRAM
Payer: MEDICARE

## 2022-11-08 ENCOUNTER — APPOINTMENT (OUTPATIENT)
Dept: PHYSICAL THERAPY | Facility: CLINIC | Age: 87
End: 2022-11-08
Payer: MEDICARE

## 2022-11-08 LAB
ANION GAP SERPL CALCULATED.3IONS-SCNC: 8 MMOL/L (ref 3–14)
BUN SERPL-MCNC: 17 MG/DL (ref 7–30)
CALCIUM SERPL-MCNC: 9.3 MG/DL (ref 8.5–10.1)
CHLORIDE BLD-SCNC: 106 MMOL/L (ref 94–109)
CO2 SERPL-SCNC: 26 MMOL/L (ref 20–32)
CREAT SERPL-MCNC: 0.92 MG/DL (ref 0.66–1.25)
ERYTHROCYTE [DISTWIDTH] IN BLOOD BY AUTOMATED COUNT: 12.7 % (ref 10–15)
GFR SERPL CREATININE-BSD FRML MDRD: 80 ML/MIN/1.73M2
GLUCOSE BLD-MCNC: 111 MG/DL (ref 70–99)
HCT VFR BLD AUTO: 41.2 % (ref 40–53)
HGB BLD-MCNC: 13.6 G/DL (ref 13.3–17.7)
MCH RBC QN AUTO: 32.8 PG (ref 26.5–33)
MCHC RBC AUTO-ENTMCNC: 33 G/DL (ref 31.5–36.5)
MCV RBC AUTO: 99 FL (ref 78–100)
PLATELET # BLD AUTO: 145 10E3/UL (ref 150–450)
POTASSIUM BLD-SCNC: 4.1 MMOL/L (ref 3.4–5.3)
RBC # BLD AUTO: 4.15 10E6/UL (ref 4.4–5.9)
SODIUM SERPL-SCNC: 140 MMOL/L (ref 133–144)
WBC # BLD AUTO: 7.6 10E3/UL (ref 4–11)

## 2022-11-08 PROCEDURE — 99224 PR SUBSEQUENT OBSERVATION CARE,LEVEL I: CPT | Performed by: HOSPITALIST

## 2022-11-08 PROCEDURE — 97165 OT EVAL LOW COMPLEX 30 MIN: CPT | Mod: GO | Performed by: OCCUPATIONAL THERAPIST

## 2022-11-08 PROCEDURE — 80048 BASIC METABOLIC PNL TOTAL CA: CPT | Performed by: PHYSICIAN ASSISTANT

## 2022-11-08 PROCEDURE — 250N000013 HC RX MED GY IP 250 OP 250 PS 637: Performed by: STUDENT IN AN ORGANIZED HEALTH CARE EDUCATION/TRAINING PROGRAM

## 2022-11-08 PROCEDURE — 36415 COLL VENOUS BLD VENIPUNCTURE: CPT | Performed by: PHYSICIAN ASSISTANT

## 2022-11-08 PROCEDURE — 99207 PR APP CREDIT; MD BILLING SHARED VISIT: CPT | Mod: FS | Performed by: PHYSICIAN ASSISTANT

## 2022-11-08 PROCEDURE — 97530 THERAPEUTIC ACTIVITIES: CPT | Mod: GP | Performed by: PHYSICAL THERAPIST

## 2022-11-08 PROCEDURE — 85014 HEMATOCRIT: CPT | Performed by: PHYSICIAN ASSISTANT

## 2022-11-08 PROCEDURE — G0378 HOSPITAL OBSERVATION PER HR: HCPCS

## 2022-11-08 PROCEDURE — 250N000012 HC RX MED GY IP 250 OP 636 PS 637: Performed by: PHYSICIAN ASSISTANT

## 2022-11-08 PROCEDURE — 97535 SELF CARE MNGMENT TRAINING: CPT | Mod: GO | Performed by: OCCUPATIONAL THERAPIST

## 2022-11-08 PROCEDURE — 97116 GAIT TRAINING THERAPY: CPT | Mod: GP | Performed by: PHYSICAL THERAPIST

## 2022-11-08 RX ADMIN — FINASTERIDE 5 MG: 5 TABLET, FILM COATED ORAL at 08:00

## 2022-11-08 RX ADMIN — MEMANTINE 10 MG: 10 TABLET ORAL at 08:00

## 2022-11-08 RX ADMIN — ACETAMINOPHEN 1000 MG: 500 TABLET ORAL at 03:02

## 2022-11-08 RX ADMIN — FAMOTIDINE 40 MG: 20 TABLET ORAL at 21:06

## 2022-11-08 RX ADMIN — MEMANTINE 10 MG: 10 TABLET ORAL at 21:05

## 2022-11-08 RX ADMIN — METHYLPREDNISOLONE 4 MG: 4 TABLET ORAL at 11:54

## 2022-11-08 RX ADMIN — ACETAMINOPHEN 1000 MG: 500 TABLET ORAL at 14:12

## 2022-11-08 RX ADMIN — ASPIRIN 81 MG: 81 TABLET, COATED ORAL at 07:59

## 2022-11-08 RX ADMIN — METHYLPREDNISOLONE 4 MG: 4 TABLET ORAL at 06:40

## 2022-11-08 RX ADMIN — CHOLESTYRAMINE 4 G: 4 POWDER, FOR SUSPENSION ORAL at 08:01

## 2022-11-08 RX ADMIN — CHOLESTYRAMINE 4 G: 4 POWDER, FOR SUSPENSION ORAL at 17:00

## 2022-11-08 RX ADMIN — OXYCODONE HYDROCHLORIDE 5 MG: 5 TABLET ORAL at 03:02

## 2022-11-08 RX ADMIN — ACETAMINOPHEN 1000 MG: 500 TABLET ORAL at 08:00

## 2022-11-08 RX ADMIN — METHYLPREDNISOLONE 4 MG: 4 TABLET ORAL at 17:00

## 2022-11-08 RX ADMIN — ACETAMINOPHEN 1000 MG: 500 TABLET ORAL at 21:05

## 2022-11-08 RX ADMIN — OXYCODONE HYDROCHLORIDE 5 MG: 5 TABLET ORAL at 07:59

## 2022-11-08 RX ADMIN — METHYLPREDNISOLONE 8 MG: 4 TABLET ORAL at 21:06

## 2022-11-08 ASSESSMENT — ACTIVITIES OF DAILY LIVING (ADL)
ADLS_ACUITY_SCORE: 40
ADLS_ACUITY_SCORE: 38
ADLS_ACUITY_SCORE: 40
ADLS_ACUITY_SCORE: 38
ADLS_ACUITY_SCORE: 40
DEPENDENT_IADLS:: CLEANING;COOKING;LAUNDRY;SHOPPING;MEAL PREPARATION;MEDICATION MANAGEMENT;MONEY MANAGEMENT;TRANSPORTATION
ADLS_ACUITY_SCORE: 40
ADLS_ACUITY_SCORE: 40
ADLS_ACUITY_SCORE: 41
ADLS_ACUITY_SCORE: 41
PREVIOUS_RESPONSIBILITIES: DRIVING;MEDICATION MANAGEMENT;FINANCES
ADLS_ACUITY_SCORE: 40

## 2022-11-08 NOTE — PLAN OF CARE
Goal Outcome Evaluation:         Orientation/Cognitive: WDL except occasionally disoriented to time; does well with reminders  Observation Goals (Met/ Not Met): Not met  Mobility Level/Assist Equipment: Assist of 1 with walker/gait belt  Fall Risk (Y/N): Yes  Behavior Concerns: None  Pain Management: Left knee and left hip pain controlled with scheduled tylenol; prn oxycodone x 1; and warm packs knee; pt also on a medrol pack taper.  Tele/VS/O2: AVSS; RA; no Tele  ABNL Lab/BG: None  Diet: Regular  Bowel/Bladder: Voiding in good amounts via urinal; no BM overnight  Skin Concerns: None  Drains/Devices: None  Tests/Procedures for next shift: PT/OT consulted  Anticipated DC date & active delays: 1-2 days  Patient Stated Goal for Today: Pain control

## 2022-11-08 NOTE — CONSULTS
Care Management Initial Consult    General Information  Assessment completed with: Spouse or significant other, Patient, [place] Lianet  Type of CM/SW Visit: Initial Assessment    Primary Care Provider verified and updated as needed: Yes   Readmission within the last 30 days: current reason for admission unrelated to previous admission      Reason for Consult: discharge planning  Advance Care Planning: Advance Care Planning Reviewed: no concerns identified          Communication Assessment  Patient's communication style: spoken language (English or Bilingual)    Hearing Difficulty or Deaf: yes        Cognitive  Cognitive/Neuro/Behavioral: WDL  Level of Consciousness: alert  Arousal Level: opens eyes spontaneously  Orientation: disoriented to, time (ok with reminder)  Mood/Behavior: calm, cooperative  Best Language: 0 - No aphasia  Speech: clear, spontaneous    Living Environment:   People in home: spouse     Current living Arrangements: house      Able to return to prior arrangements: yes       Family/Social Support:  Care provided by: spouse/significant other  Provides care for: no one, unable/limited ability to care for self  Marital Status:              Description of Support System:           Current Resources:   Patient receiving home care services: No (has had [facility] in the past)     Community Resources: None  Equipment currently used at home: grab bar, tub/shower, walker, rolling  Supplies currently used at home:      Employment/Financial:  Employment Status: retired        Financial Concerns: No concerns identified           Lifestyle & Psychosocial Needs:  Social Determinants of Health     Tobacco Use: Not on file   Alcohol Use: Not on file   Financial Resource Strain: Not on file   Food Insecurity: Not on file   Transportation Needs: Not on file   Physical Activity: Not on file   Stress: Not on file   Social Connections: Not on file   Intimate Partner Violence: Not on file   Depression: Not on file  "  Housing Stability: Not on file       Functional Status:  Prior to admission patient needed assistance:   Dependent ADLs:: Ambulation-walker  Dependent IADLs:: Cleaning, Cooking, Laundry, Shopping, Meal Preparation, Medication Management, Money Management, Transportation       Mental Health Status:  Mental Health Status: No Current Concerns       Chemical Dependency Status:                Values/Beliefs:  Spiritual, Cultural Beliefs, Jainism Practices, Values that affect care: no               Additional Information:  Met with patient and spouse x 2. Spouse, Lianet asks,\"Is this going to be paid for?\" Writer contacted [facility] to watch for patient as possible home care discharge. Patient has been accepted.  Writer informed patient and spouse. Bedside RN told writer that spouse doesn't know if patient should go to TCU vs. HOme. Writer went into room and asked patient and spouse. Patient shook his head \"No.\" to TCU.    Rosa Brown RN        "

## 2022-11-08 NOTE — PROGRESS NOTES
Orthopedic Surgery  Jean Pierre Roblero  11/08/2022     Admit Date:  11/6/2022  Left thigh/leg pain     Alert and oriented.   Patient had increased pain this morning but is improved this afternoon  Ambulating comfortably with the walker    Temp:  [97.4  F (36.3  C)-98.3  F (36.8  C)] 97.6  F (36.4  C)  Pulse:  [65-91] 91  Resp:  [16-20] 16  BP: (108-141)/(62-82) 114/71  SpO2:  [96 %-98 %] 96 %    Denies pain with hip rotation  Ambulating with a walker with therapy.  Left lower extremity is NVI.  Sensation intact bilateral lower extremities  Patient able to resist dorsi and plantar flexion bilaterally  +Dp pulse    Labs:  Recent Labs   Lab Test 11/08/22  0701 11/06/22  1324 08/19/22  1033   WBC 7.6 6.0 5.0   HGB 13.6 14.1 13.8   * 153 141*     Recent Labs   Lab Test 05/26/22  0640   INR 1.05     Recent Labs   Lab Test 11/06/22  1324 08/17/22  0914 08/16/22  0822   CRP <2.9 23.9* 38.6*     1. Plan:  Patient's pain is gradually improving.    His radiating pain is likely evolving from his lumbar spine.   Continue medrol dose oh.    Probable discharge tomorrow to either home or TCU pending pain and PT progression      Maria A Cummings PA-C

## 2022-11-08 NOTE — UTILIZATION REVIEW
" Admission Status; Secondary Review Determination     Admission Date: 11/6/2022  1:08 PM       Under the authority of the Utilization Management Committee, the utilization review process indicated a secondary review on the above patient.  The review outcome is based on review of the medical records, discussions with staff, and applying clinical experience noted on the date of the review.          (x) Observation Status Appropriate - This patient does not meet hospital inpatient criteria and is placed in observation status. If this patient's primary payer is Medicare and was admitted as an inpatient, Condition Code 44 should be used and patient status changed to \"observation\".       RATIONALE FOR DETERMINATION      Brief clinical presentation, information copied from the chart, abbreviated and edited for relevant content:     Likely could have been discharged today, but family requested more observation.    Jean Pierre Roblero is a 88 year old male admitted on 11/6/202 for L thigh pain a few weeks ago that would shoot up into his hip. He began taking advil which did help relieve the pain however progressive worsening to presentation. He could bear weight on his L leg without significant pain in his hip. He does not have pain at rest or supine. normal CRP and WBC. XR imaging with mild OA, degenerative changes to his SI joints and lumbar spine. Received pain control, ortho consult. Improved and  able to ambulate with PT in the room with use of a walker.  He is now able to bear weight when he was not able to before. On Medrol Dosepak, started 11/7. PT consulted and currently recommending home with 24/7 assist with OP PT versus TCU.  Patient was planned for discharge 11/8, however his spouse who will be helping at home him was uneasy about him coming home today, stating that he seems weaker and would like a another day in the hospital to ensure his safety.  Given his advanced age and frailty, this is reasonable.        The " severity of illness, intensity of cares provided, risk for adverse outcome, and expected LOS make the care appropriate for observation.       The information on this document is developed by the utilization review team in order for the business office to ensure compliance.  This only denotes the appropriateness of proper admission status and does not reflect the quality of care rendered.         The definitions of Inpatient Status and Observation Status used in making the determination above are those provided in the CMS Coverage Manual, Chapter 1 and Chapter 6, section 70.4.      Sincerely,     Evelin Valladares MD   Utilization Review/ Case Management  [facility].

## 2022-11-08 NOTE — PROGRESS NOTES
11/08/22 1021   Appointment Info   Signing Clinician's Name / Credentials (OT) Toni Rivera OTR/L   Living Environment   People in Home spouse   Current Living Arrangements house   Home Accessibility stairs to enter home;stairs within home   Number of Stairs, Main Entrance 1   Stair Railings, Main Entrance none   Number of Stairs, Within Home, Primary seven;six  (Six steps to uppoer level; 7 steps to lower level.)   Stair Railings, Within Home, Primary railing on right side (ascending)   Transportation Anticipated car, drives self;family or friend will provide   Living Environment Comments Kitchen on main level; Bedroom amd bathroom on uppoer level. Walk in shower. 1 large step to enter for which pt uses a cane, and has 7 stairs split level up/down with railing on R to accend to bedroom. Pt uses 2WW inside home, and cane w/stairs.   Self-Care   Usual Activity Tolerance moderate   Current Activity Tolerance fair   Regular Exercise No   Equipment Currently Used at Home grab bar, tub/shower;shower chair;cane, straight;walker, rolling   Fall history within last six months yes   Number of times patient has fallen within last six months 2  (One fall bacwards while trying to ascend stairs, landed on buttocks, >3mo ago. Per spouse pt. had sandals on which fell off.)   Activity/Exercise/Self-Care Comment Pt was IND with dressing, bathing, and toileting at baseline.   Instrumental Activities of Daily Living (IADL)   Previous Responsibilities driving;medication management;finances   IADL Comments Spouse can complete majority of IADL. Pt. completed home finance.   General Information   Onset of Illness/Injury or Date of Surgery 11/06/22   Referring Physician Shante Shaffer, DO   Patient/Family Therapy Goal Statement (OT) return home   Additional Occupational Profile Info/Pertinent History of Current Problem Per chart, pt is 87 yo admitted for observation w/ L hip pain. Patient developed L thigh pain a few weeks ago that  would shoot up into his hip. He began taking advil which did help relieve the pain however progressive worsening to presentation today. He cannot bear weight on his L leg without significant pain in his hip. He does not have pain at rest or supine. No numbness or tingling. Strength normal. No leg swelling. No signs of infection or inflammation with normal CRP and WBC. XR imaging with mild OA, degenerative changes to his SI joints and lumbar spine.   Existing Precautions/Restrictions fall   Cognitive Status Examination   Orientation Status orientation to person, place and time   Visual Perception   Impact of Vision Impairment on Function (Vision) WFL   Pain Assessment   Patient Currently in Pain Yes, see Vital Sign flowsheet   Range of Motion Comprehensive   Comment, General Range of Motion B UE WFL   Strength Comprehensive (MMT)   Comment, General Manual Muscle Testing (MMT) Assessment B UE WFL   Transfers   Transfers sit-stand transfer;toilet transfer   Sit-Stand Transfer   Sit-Stand [place] (Transfers) minimum assist (75% patient effort);nonverbal cues (demo/gesture);verbal cues   Toilet Transfer   Type (Toilet Transfer) sit-stand;stand-sit   [place] Level (Toilet Transfer) nonverbal cues (demo/gesture);verbal cues;moderate assist (50% patient effort)   Assistive Device (Toilet Transfer) commode chair   Activities of Daily Living   BADL Assessment/Intervention lower body dressing   Lower Body Dressing Assessment/Training   Position (Lower Body Dressing) unsupported sitting   Comment, (Lower Body Dressing) LOB with ROM /reaching to LE   [place] Level (Lower Body Dressing) moderate assist (50% patient effort)   Clinical Impression   Criteria for Skilled Therapeutic Interventions Met (OT) Yes, treatment indicated   OT Diagnosis Weakness, decreaed ROM and safety impairment for ADL mobility   OT Problem List-Impairments impacting ADL problems related to;activity tolerance  impaired;balance;cognition;flexibility;mobility;strength;pain   Assessment of Occupational Performance 3-5 Performance Deficits   Identified Performance Deficits Dressing, toileting, bathing IADL   Planned Therapy Interventions (OT) ADL retraining;IADL retraining;strengthening;home program guidelines;risk factor education;progressive activity/exercise   Clinical Decision Making Complexity (OT) low complexity   Anticipated Equipment Needs Upon Discharge (OT) shower chair;commode chair;walker, rolling   Risk & Benefits of therapy have been explained evaluation/treatment results reviewed;care plan/treatment goals reviewed;risks/benefits reviewed;current/potential barriers reviewed;participants voiced agreement with care plan;participants included;patient;spouse/significant other   OT Total Evaluation Time   OT Eval, Low Complexity Minutes (72039) 10   Therapy Certification   Start of Care Date 11/08/22   Certification date from 11/08/22   Certification date to 11/14/22   Medical Diagnosis L Hip Pain   OT Goals   Therapy Frequency (OT) 5 times/wk   OT Predicted Duration/Target Date for Goal Attainment 11/14/22   OT Goals Lower Body Dressing;Transfers;Toilet Transfer/Toileting;Cognition;Upper Body Dressing;Hygiene/Grooming   OT: Hygiene/Grooming modified independent;within precautions;while standing   OT: Upper Body Dressing Modified independent;including set-up/clothing retrieval;within precautions   OT: Lower Body Dressing Modified independent;including set-up/clothing retrieval;within precautions   OT: Transfer within precautions;Supervision/stand-by assist  (Shower transfer to shower bench)   OT: Toilet Transfer/Toileting Modified independent;toilet transfer;cleaning and garment management;within precautions   OT: Cognitive Patient/caregiver will verbalize understanding of cognitive assessment results/recommendations as needed for safe discharge planning   Interventions   Interventions Quick Adds Therapeutic  Activity;Therapeutic Procedures/Exercise   Self-Care/Home Management   Self-Care/Home Mgmt/ADL, Compensatory, Meal Prep Minutes (17762) 25   Symptoms Noted During/After Treatment (Meal Preparation/Planning Training) fatigue;increased pain   Treatment Detail/Skilled Intervention Eval completed and spouse present. Pt. educated in figure four leg posture with forward weight shift to doff/don socks from EOB sit. Pt initally with posterior lean and LOB sitting falling backwards. After education pt completed LE dress with SBA. Set up at sink for standing G/H. VCs and demonstration required for safe sit<>stand from EOB. VCs for safe use of WW at sink. PT tending to try to pull to stand using WW and hold onto WW for stand >sit. Pt. tolerated 7 minutes standing at sink with correct Ww placement after VCs. Sup A to complete tooth brush and hair comb. Pt unable to attempt stand>sit to standard toilet due to pain and decreased mobilty. OT educated pt/spouse on benefit off over tlilet commode. Pt with Over toilet commode in room and demonstrated safe Stand<>sit with MiN A. Pt/spouse given written resource hand out with recommendations for elongated bath bench and over tilet commode for home. Pt. retunred to b/ds chair/ Alarm enaged.Pt left with spouse in room.   OT Discharge Planning   OT Plan SLUMS; Toilet transfer. Standing g/h at sink, TB dressing including set up   OT Discharge Recommendation (DC Rec) home with assist;home with home care occupational therapy;Transitional Care Facility   OT Rationale for DC Rec Pt. is below baseline level of function with recent L LE pain. Pt. lives with spouse and requires to ascend 6 steps to get to bedroom/bathroom level. Pt. demonstrates limited safety awareness with use of WW. He has shower bench yet would benefit from extended shower bench and over toilet commode upon return home. Spouse would need to provide supervision assist for toilet mobility and physical assist for shower  mobility. Would recommend  OT follow up. Alternative would be TCU stay to work on functional mobility for AD/IADL.   OT Brief overview of current status MIN A with MIN A safety cues for WW use. PT required over toilet commode for safe stand<>sit.   Total Session Time   Timed Code Treatment Minutes 25   Total Session Time (sum of timed and untimed services) 35

## 2022-11-08 NOTE — PROGRESS NOTES
[facility]      OUTPATIENT OCCUPATIONAL THERAPY  EVALUATION  PLAN OF TREATMENT FOR OUTPATIENT REHABILITATION  (COMPLETE FOR INITIAL CLAIMS ONLY)  Patient's Last Name, First Name, M.I.  YOB: 1934  Jean Pierre Roblero                          Provider's Name  [facility] Medical Record No.  [id number]                               Onset Date:  11/06/22   Start of Care Date:  11/08/22     Type:     ___PT   _X_OT   ___SLP Medical Diagnosis:  L Hip Pain                        OT Diagnosis:  Weakness, decreaed ROM and safety impairment for ADL mobility   Visits from SOC:  1   _________________________________________________________________________________  Plan of Treatment/Functional Goals    Planned Interventions: ADL retraining, IADL retraining, strengthening, home program guidelines, risk factor education, progressive activity/exercise   Goals: See Occupational Therapy Goals on Care Plan in [employer] electronic health record.    Therapy Frequency:    Predicted Duration of Therapy Intervention:    _________________________________________________________________________________    I CERTIFY THE NEED FOR THESE SERVICES FURNISHED UNDER        THIS PLAN OF TREATMENT AND WHILE UNDER MY CARE     (Physician co-signature of this document indicates review and certification of the therapy plan).              Certification date from: 11/08/22, Certification date to: 11/14/22    Referring Physician: Shante Shaffer,             Initial Assessment        See Occupational Therapy evaluation dated 11/08/22 in [employer] electronic health record.

## 2022-11-08 NOTE — PROGRESS NOTES
diagnostic tests and consults completed and resulted No  -vital signs normal or at patient baseline Yes  -safe disposition plan has been identified No TCU Vs home with assist

## 2022-11-08 NOTE — PROGRESS NOTES
[facility]    Medicine Progress Note - Hospitalist Service    Date of Admission:  11/6/2022    Assessment & Plan   Jean Pierre Roblero is a 88 year old male admitted on 11/6/202 for hip pain.     Left low back degenerative changes with left leg radiculopathy  OA   DDD  Patient developed L thigh pain a few weeks ago that would shoot up into his hip. He began taking advil which did help relieve the pain however progressive worsening to presentation today. He cannot bear weight on his L leg without significant pain in his hip. He does not have pain at rest or supine. No numbness or tingling. Strength normal. No leg swelling. No signs of infection or inflammation with normal CRP and WBC. XR imaging with mild OA, degenerative changes to his SI joints and lumbar spine.  --observation admission  --X-ray imaging left hip shows mild OA, degenerative changes and scoliosis to his SI joints and lumbar spine.  - PRN oxycodone and scheduled tylenol for pain control. Avoid NSAIDS as patient reports taking consistently for several weeks  --Ortho consulted, input appreciated.  --Patient's pain has been gradually improving, able to ambulate with PT in the room with use of a walker.  He is now able to bear weight when he was not able to before.   --Will trial Medrol Dosepak, started 11/7  --PT consulted and currently recommending home with 24/7 assist with OP PT versus TCU.  Patient was planned for discharge 11/8, however his spouse who will be helping at home him was uneasy about him coming home today, stating that he seems weaker and would like a another day in the hospital to ensure his safety.  Given his advanced age and frailty, this is reasonable.  We will reassess on 11/9 and determine disposition plan.  -- Discussed plan with orthopedic surgery JAYLA 11/8    Elevated BP  BP 170s/100s in the ED. Patient denies hx of elevated BP and both he and his wife were surprised to hear the results.  Patient reports his  BP is normally in the 120s  --treat pain and avoid NSAIDs  --monitor for now, BP has slowly trended down, ranging 134/76--141/82 so far today.   --Currently no indication to start scheduled antihypertensive medication     Dementia  --Continue PTA namenda     BPH  --Continue finasteride     Hold other vitamins and nonessential medications due to obs status     Covid negative       Diet: Regular Diet Adult    DVT Prophylaxis: Pneumatic Compression Devices  Puckett Catheter: Not present  Central Lines: None  Cardiac Monitoring: None  Code Status: Full Code      Disposition Plan     PT consulted and currently recommending home with 24/7 assist with OP PT versus TCU.  Patient was planned for discharge 11/8, however his spouse who will be helping at home him was uneasy about him coming home today, stating that he seems weaker and would like a another day in the hospital to ensure his safety.  Given his advanced age and frailty, this is reasonable.  We will reassess on 11/9 and determine disposition plan.    The patient's care was discussed with the patient, bedside RN, and /CC.    Patient was discussed with Dr. Anderson of the hospitalist service.  He is in agreement with my assessment plan of care.    JENIFFER Cain  Hospitalist Service  [facility]  Securely message with the [employer] Web Console (learn more here)  Text page via [employer] Paging/Directory         Clinically Significant Risk Factors Present on Admission                    # Dementia: noted on problem list           ______________________________________________________________________    Interval History   Patient resting in bed reading the newspaper on my arrival.  Spouse present at bedside.  He reports that his pain is gradually improving, as he is now able to bear weight when he was not able to before.  He still has pain in the left leg, but states that it is lower down now to the knee.  Requesting to get up and  ambulate.  No fever, chills, CP, SOB, abdominal pain or nausea.  Tolerated Medrol Dosepak.  Progressing with PT.  Wife plans to take him home tomorrow, uneasy about him discharging today as she states he seems weaker than yesterday.    Data reviewed today: I reviewed all medications, new labs and imaging results over the last 24 hours.     Physical Exam   Vital Signs: Temp: 97.5  F (36.4  C) Temp src: Oral BP: (!) 141/82 Pulse: 80   Resp: 16 SpO2: 98 % O2 Device: None (Room air)    Weight: 138 lbs 0 oz     Constitutional: Alert, oriented to person, place, situation.  Cooperative, lying in bed in NAD.   Respiratory:  Lungs CTAB.  No crackles, or wheezes.  No labored breathing.  Cardiovascular:  Heart RRR, no murmur, no edema.  Skin/Integumen:  Dry, non-diaphoretic.  No rashes on exposed skin  MSK: CMS x4 grossly intact.  Limbs atraumatic.  Psych: Normal mood and affect.      Data   Recent Labs   Lab 11/08/22  0701 11/06/22  1324   WBC 7.6 6.0   HGB 13.6 14.1   MCV 99 100   * 153   NA  --  139   POTASSIUM  --  3.9   CHLORIDE  --  107   CO2  --  27   BUN  --  12   CR  --  0.83   ANIONGAP  --  5   KENYA  --  8.8   GLC  --  145*   ALBUMIN  --  3.8   PROTTOTAL  --  6.9   BILITOTAL  --  1.0   ALKPHOS  --  76   ALT  --  21   AST  --  22     No results found for this or any previous visit (from the past 24 hour(s)).  Medications       acetaminophen  1,000 mg Oral Q6H     aspirin  81 mg Oral Daily     cholestyramine  1 packet Oral BID w/meals     famotidine  40 mg Oral At Bedtime     finasteride  5 mg Oral Daily     memantine  10 mg Oral BID     methylPREDNISolone  8 mg Oral At Bedtime    And     methylPREDNISolone  4 mg Oral QAM AC    And     methylPREDNISolone  4 mg Oral Daily with lunch    And     methylPREDNISolone  4 mg Oral Daily with supper    And     [START ON 11/9/2022] methylPREDNISolone  4 mg Oral At Bedtime

## 2022-11-08 NOTE — PROGRESS NOTES
Observation goals  PRIOR TO DISCHARGE        Comments: -diagnostic tests and consults completed and resulted - not met  -vital signs normal or at patient baseline - met  -safe disposition plan has been identified - met, Home care PT  Nurse to notify provider when observation goals have been met and patient is ready for discharge.

## 2022-11-08 NOTE — PLAN OF CARE
Goal OGoal Outcome Evaluation:  -diagnostic tests and consults completed and resulted Yes  -vital signs normal or at patient baseline  Yes  -safe disposition plan has been identified Home with home PT  Orientation/Cognitive: A&OX3-4, forgetful  Observation Goals (Met/ Not Met): Not met  Mobility Level/Assist Equipment: AX1 with GB and walker  Fall Risk (Y/N): Yes  Behavior Concerns: None, pleasant  Pain Management: Pain in the L hip managed with scheduled tylenol, PRN Oxy, continues on medrol dose pack  Tele/VS/O2: VSS on RA  ABNL Lab/BG: None   Diet: Regular diet  Bowel/Bladder: Continent  Skin Concerns: scattered bruising  Drains/Devices: PIV Sled  Tests/Procedures for next shift: none  Anticipated DC date & active delays: tomorrow to home with home PT pending pain and PT progression per ortho  Patient Stated Goal for Today: Pain management and walk.         utcome Evaluation:

## 2022-11-08 NOTE — PROGRESS NOTES
Observation goals  PRIOR TO DISCHARGE        Comments: -diagnostic tests and consults completed and resulted - not met  -vital signs normal or at patient baseline - met   -safe disposition plan has been identified - not met  Nurse to notify provider when observation goals have been met and patient is ready for discharge.

## 2022-11-08 NOTE — PROGRESS NOTES
Observation goals  PRIOR TO DISCHARGE        Comments: -diagnostic tests and consults completed and resulted - not met  -vital signs normal or at patient baseline - met   -safe disposition plan has been identified - not met  Nurse to notify provider when observation goals have been met and patient is ready for discharge.         Orientation/Cognitive: A&OX3-4, forgetful  Observation Goals (Met/ Not Met): Not met  Mobility Level/Assist Equipment: AX1 with GB and walker. Ambulated in the hallway.  Fall Risk (Y/N): Yes  Behavior Concerns: None  Pain Management: L hip managed with scheduled tylenol, medrol, heatpack & prn oxy.  Tele/VS/O2: VSS on RA  ABNL Lab/BG: None ordered  Diet: Regular diet  Bowel/Bladder: Continent. Upto bathroom. BM this shift.  Skin Concerns: scattered bruising  Drains/Devices: PIV SL.  Tests/Procedures for next shift: none  Anticipated DC date & active delays: possibly tomorrow, PT recommending home with Assist Vs TCU  Patient Stated Goal for Today: Pain management and walk.

## 2022-11-09 VITALS
HEIGHT: 67 IN | TEMPERATURE: 97.8 F | BODY MASS INDEX: 21.66 KG/M2 | OXYGEN SATURATION: 96 % | DIASTOLIC BLOOD PRESSURE: 88 MMHG | HEART RATE: 67 BPM | RESPIRATION RATE: 16 BRPM | SYSTOLIC BLOOD PRESSURE: 156 MMHG | WEIGHT: 138 LBS

## 2022-11-09 PROCEDURE — 99217 PR OBSERVATION CARE DISCHARGE: CPT | Performed by: HOSPITALIST

## 2022-11-09 PROCEDURE — G0378 HOSPITAL OBSERVATION PER HR: HCPCS

## 2022-11-09 PROCEDURE — 250N000012 HC RX MED GY IP 250 OP 636 PS 637: Performed by: PHYSICIAN ASSISTANT

## 2022-11-09 PROCEDURE — 250N000013 HC RX MED GY IP 250 OP 250 PS 637: Performed by: STUDENT IN AN ORGANIZED HEALTH CARE EDUCATION/TRAINING PROGRAM

## 2022-11-09 RX ORDER — METHYLPREDNISOLONE 4 MG/1
4 TABLET ORAL
Qty: 1 TABLET | Refills: 0 | Status: SHIPPED | OUTPATIENT
Start: 2022-11-09 | End: 2022-12-18

## 2022-11-09 RX ORDER — METHYLPREDNISOLONE 4 MG/1
4 TABLET ORAL
Qty: 3 TABLET | Refills: 0 | Status: SHIPPED | OUTPATIENT
Start: 2022-11-10 | End: 2022-12-18

## 2022-11-09 RX ORDER — METHYLPREDNISOLONE 4 MG/1
4 TABLET ORAL AT BEDTIME
Qty: 3 TABLET | Refills: 0 | Status: SHIPPED | OUTPATIENT
Start: 2022-11-09 | End: 2022-12-18

## 2022-11-09 RX ORDER — ACETAMINOPHEN 500 MG
500 TABLET ORAL EVERY 6 HOURS PRN
Qty: 30 TABLET | Refills: 0 | Status: ON HOLD | OUTPATIENT
Start: 2022-11-09 | End: 2022-12-20

## 2022-11-09 RX ORDER — METHYLPREDNISOLONE 4 MG/1
4 TABLET ORAL
Qty: 2 TABLET | Refills: 0 | Status: SHIPPED | OUTPATIENT
Start: 2022-11-09 | End: 2022-12-18

## 2022-11-09 RX ORDER — OXYCODONE HYDROCHLORIDE 5 MG/1
5 TABLET ORAL EVERY 6 HOURS PRN
Qty: 16 TABLET | Refills: 0 | Status: ON HOLD | OUTPATIENT
Start: 2022-11-09 | End: 2022-12-20

## 2022-11-09 RX ADMIN — ACETAMINOPHEN 1000 MG: 500 TABLET ORAL at 09:06

## 2022-11-09 RX ADMIN — ACETAMINOPHEN 1000 MG: 500 TABLET ORAL at 02:34

## 2022-11-09 RX ADMIN — ASPIRIN 81 MG: 81 TABLET, COATED ORAL at 09:06

## 2022-11-09 RX ADMIN — METHYLPREDNISOLONE 4 MG: 4 TABLET ORAL at 06:43

## 2022-11-09 RX ADMIN — CHOLESTYRAMINE 4 G: 4 POWDER, FOR SUSPENSION ORAL at 09:06

## 2022-11-09 RX ADMIN — FINASTERIDE 5 MG: 5 TABLET, FILM COATED ORAL at 09:06

## 2022-11-09 RX ADMIN — MEMANTINE 10 MG: 10 TABLET ORAL at 09:06

## 2022-11-09 ASSESSMENT — ACTIVITIES OF DAILY LIVING (ADL)
ADLS_ACUITY_SCORE: 38

## 2022-11-09 NOTE — PROGRESS NOTES
Observation goals  PRIOR TO DISCHARGE       Comments: -diagnostic tests and consults completed and resulted - not met  -vital signs normal or at patient baseline - met  -safe disposition plan has been identified - met, Home care, PT  Nurse to notify provider when observation goals have been met and patient is ready for discharge

## 2022-11-09 NOTE — PROGRESS NOTES
Observation goals  PRIOR TO DISCHARGE        Comments: -diagnostic tests and consults completed and resulted - not met  -vital signs normal or at patient baseline - met  -safe disposition plan has been identified - met, Home care PT  Nurse to notify provider when observation goals have been met and patient is ready for discharge.     Orientation/Cognitive: A&OX3-4, forgetful.  Observation Goals (Met/ Not Met): Not met  Mobility Level/Assist Equipment: AX1 with GB and walker.  Fall Risk (Y/N): Yes  Behavior Concerns: None  Pain Management: L hip managed with scheduled tylenol,  heatpack & prn oxy.  Tele/VS/O2: VSS on RA  ABNL Lab/BG: None ordered  Diet: Regular diet  Bowel/Bladder: Continent. Upto bathroom.   Skin Concerns: scattered bruising  Drains/Devices: PIV SL.  Tests/Procedures for next shift: Ortho following.  Anticipated DC date & active delays: tomorrow 11/9 home care PT.  Patient Stated Goal for Today: Pain management and walk.

## 2022-11-09 NOTE — PLAN OF CARE
Goal Outcome Evaluation:      Plan of Care Reviewed With: patient  Orientation/Cognitive: AOx4, forgetful  Observation Goals (Met/ Not Met): Met  Mobility Level/Assist Equipment: A1/W/GB  Fall Risk (Y/N): Y  Behavior Concerns: None  Pain Management: L hip pain controlled with scheduled tylenol and heat packs  Tele/VS/O2: VSS on RA, except mild HTN  ABNL Lab/BG: Platelets 145  Diet: Regular  Bowel/Bladder: Voiding using BR  Skin Concerns: None  Drains/Devices: PIV out  Tests/Procedures for next shift: PT/OT consulted  Anticipated DC date & active delays: Today to home with homecare PT  Patient Stated Goal for Today: Pain control

## 2022-11-09 NOTE — PROGRESS NOTES
Observation goals  PRIOR TO DISCHARGE       Comments: -diagnostic tests and consults completed and resulted - met  -vital signs normal or at patient baseline - met  -safe disposition plan has been identified - met, Home care, PT  Nurse to notify provider when observation goals have been met and patient is ready for discharge

## 2022-11-09 NOTE — DISCHARGE SUMMARY
Physician Discharge Summary     Patient ID:  Jean Pierre Roblero  [id number]  88 year old  6/21/1934    Admit date: 11/6/2022    Discharge date and time: 11/9/2022 12:33 PM     Admitting Physician: Shante Shaffer DO     Discharge Physician: Evin lazaro     Admission Diagnoses: Hip pain, left [M25.552]    Discharge Diagnoses: lft hip pain    Admission Condition: fair    Discharged Condition: fair    Indication for Admission: pain management and further work up    Hospital Course:     This is a 88-year-old male with medical history which includes BPH, dementia and has history of elevated blood pressure in the past and is currently not on any medications presented to the hospital on 11/6/2022 for pain over the left hip and he mentioned that his pain does radiates and goes up and down the left thigh and also below the knee and denied any local trauma.  He denied any recent fever or chills.    On presentation to the hospital he had no signs or symptoms of infection and his CRP and WBC count were normal and  he underwent imaging including x-ray of the left hip and x-ray of the lumbar spine which showed mild osteoarthritis, degenerative changes to his SI joint and lumbar spine.  Patient was seen by orthopedics and physical therapy and Occupational Therapy was consulted.    He was also started on as needed Tylenol and oxycodone and patient was started on Medrol Dosepak on 11/7 and he was observed in the hospital.  On day of discharge patient pain is well controlled and he is walking and patient and wife were in agreement with going home.  Home care orders were placed and he will be seen by nurse and physical therapy.  Patient will need to follow as outpatient with primary care physician and orthopedics.  His questions were answered on day of discharge.He will complete the medrol dose pack and continue with tylenol as needed and oxycodone as needed for pain control     Consults: orthopedic surgery    Significant Diagnostic  Studies: labs: cbc and basic and radiology: x ray hip and lumbar spine    Treatments: analgesia: acetaminophen and oxycodone and steroids: medrol dose pack started     Discharge Exam:    General: Patient appears comfortable and in no acute distress.  HEENT: Head is atraumatic, normocephalic.    Neck: Neck is supple and No Lymphadenopathy   Respiratory: Lungs are clear to auscultation bilaterally with no wheeze or crackles   Cardiovascular: Regular rate , S1 and S2 normal with no murmer or rubs or gallops  Abdomen:   soft , non tender , non distended   Skin: No skin rashes   Neurologic:  No facial droop  Musculoskeletal: Normal Range of motion over upper and lower extremities bilaterally   Psychiatric: cooperative               Disposition: home    Patient Instructions:   Discharge Medication List as of 11/9/2022 10:00 AM      START taking these medications    Details   acetaminophen (TYLENOL) 500 MG tablet Take 1 tablet (500 mg) by mouth every 6 hours as needed for mild pain, Disp-30 tablet, R-0, E-Prescribe      !! methylPREDNISolone (MEDROL) 4 MG tablet Take 1 tablet (4 mg) by mouth every morning (before breakfast), Disp-3 tablet, R-0, E-Prescribe      !! methylPREDNISolone (MEDROL) 4 MG tablet Take 1 tablet (4 mg) by mouth daily (with lunch), Disp-2 tablet, R-0, E-Prescribe      !! methylPREDNISolone (MEDROL) 4 MG tablet Take 1 tablet (4 mg) by mouth daily (with dinner), Disp-1 tablet, R-0, E-Prescribe      !! methylPREDNISolone (MEDROL) 4 MG tablet Take 1 tablet (4 mg) by mouth At Bedtime, Disp-3 tablet, R-0, E-Prescribe      oxyCODONE (ROXICODONE) 5 MG tablet Take 1 tablet (5 mg) by mouth every 6 hours as needed for moderate to severe pain, Disp-16 tablet, R-0, Local Print       !! - Potential duplicate medications found. Please discuss with provider.      CONTINUE these medications which have NOT CHANGED    Details   aspirin (ASA) 81 MG EC tablet Take 1 tablet (81 mg) by mouth daily, Disp-30 tablet, R-3,  E-Prescribe      atorvastatin (LIPITOR) 10 MG tablet 1 tablet (10 mg) by Oral or Feeding Tube route every evening, Disp-30 tablet, R-0, E-Prescribe      butalbital-acetaminophen-caffeine (ESGIC) -40 MG tablet Take 1 tablet by mouth every 4 hours as needed for headaches, Historical      cholestyramine (QUESTRAN) 4 G packet Take 1 packet by mouth 2 times daily (with meals), Historical      famotidine (PEPCID) 40 MG tablet Take 40 mg by mouth At Bedtime, Historical      finasteride (PROSCAR) 5 MG tablet Take 5 mg by mouth daily, Historical      guaiFENesin (MUCINEX) 600 MG 12 hr tablet Take 600 mg by mouth daily, Historical      ibuprofen (ADVIL/MOTRIN) 200 MG tablet Take 400 mg by mouth 2 times daily as needed for pain, Historical      magnesium 250 MG tablet Take 1 tablet by mouth every evening, Historical      memantine (NAMENDA) 10 MG tablet Take 10 mg by mouth 2 times daily, Historical      Multiple Vitamin (MULTI-VITAMIN) per tablet Take 1 tablet by mouth daily, Historical      multivitamin  with lutein (OCUVITE WITH LTEIN) CAPS per capsule Take 1 capsule by mouth daily, Historical      Phenylephrine HCl (ROX-SYNEPHRINE NA) Spray 1 spray into both nostrils daily as needed, Historical      sodium chloride (OCEAN) 0.65 % nasal spray Spray 1 spray into both nostrils every 48 hours as needed for congestion, Historical      vitamin B-12 (CYANOCOBALAMIN) 1000 MCG tablet Take 1,000 mcg by mouth daily Noon, Historical      vitamin C (ASCORBIC ACID) 500 MG tablet Take 500 mg by mouth daily, Historical      vitamin D3 (CHOLECALCIFEROL) 2000 units (50 mcg) tablet Take 2,000 Units by mouth daily Noon, Historical      zinc sulfate (ZINCATE) 220 (50 Zn) MG capsule Take 220 mg by mouth daily noon, Historical      fluticasone (FLONASE) 50 MCG/ACT nasal spray Spray 1 spray into both nostrils every 48 hours as needed for rhinitis or allergies, Historical           Activity: activity as tolerated  Diet: regular diet  Wound  Care: none needed    Follow-up with pcp in one week and ortho spine in 1-2 weeks       Time spent on day of discharge is more than 30 minutes     Signed:  Evin Anderson MD  11/9/2022  1:11 PM

## 2022-11-09 NOTE — PLAN OF CARE
Physical Therapy Discharge Summary    Reason for therapy discharge:    Discharged to home with home therapy.    Progress towards therapy goal(s). See goals on Care Plan in [facility] electronic health record for goal details.  Goals not met.  Barriers to achieving goals:   discharge from facility.    Therapy recommendation(s):    Continued therapy is recommended.  Rationale/Recommendations:  In home setting to increase strength, balance, and functional mobility. Wife will need to provide SBA to CGA for mobility on level with WW and on steps with SEC.

## 2022-11-09 NOTE — PLAN OF CARE
Goal Outcome Evaluation:      11-9-22, [id number]  Orientation/Cognitive: A/O x3 not to time; does well with reminders  Observation Goals (Met/ Not Met): Not met  Mobility Level/Assist Equipment: Assist of 1 with walker/gait belt  Fall Risk (Y/N): Yes  Behavior Concerns: None  Pain Management: Left knee and left hip pain controlled with scheduled tylenol  Tele/VS/O2: VSS on RA ex hypertension; no Tele  ABNL Lab/BG: platelets 145  Diet: Regular  Bowel/Bladder: Voiding in BR; no BM overnight  Skin Concerns: None  Drains/Devices: :L PIV SL  Tests/Procedures for next shift: PT/OT consulted  Anticipated DC date & active delays: discharge with homecare and PT  Patient Stated Goal for Today: Pain control   Other: Denies n/v and SOB. CMS intact.

## 2022-11-09 NOTE — PLAN OF CARE
Occupational Therapy Discharge Summary    Reason for therapy discharge:    Discharged to Home with Home PT    Progress towards therapy goal(s). See goals on Care Plan in [facility] electronic health record for goal details.  Goals partially met.  Barriers to achieving goals:   discharge from facility.    Therapy recommendation(s):    Spouse would need to provide supervision assist for toilet mobility and physical assist for shower mobility. Would recommend  OT follow up. Alternative would be TCU stay to work on functional mobility for AD/IADL. Pt has shower bench yet would benefit from extended shower bench and over toilet commode upon return home.

## 2022-11-11 ENCOUNTER — PATIENT OUTREACH (OUTPATIENT)
Dept: CARE COORDINATION | Facility: CLINIC | Age: 87
End: 2022-11-11

## 2022-11-11 NOTE — PROGRESS NOTES
Clinic Care Coordination Contact  [facility]/Voicemail       Clinical Data: Care Coordinator Outreach  Outreach attempted x 2.  Unable to leave a message on patient's voicemail with call back information and requested return call.  Plan:  Care Coordinator will do no further outreaches at this time.    Bonnie COLEMAN Community Health Worker  Clinic Care Coordination  [facility]  Phone: [phone]

## 2022-12-18 ENCOUNTER — HOSPITAL ENCOUNTER (INPATIENT)
Facility: CLINIC | Age: 87
LOS: 3 days | Discharge: HOME HEALTH | DRG: 552 | End: 2022-12-21
Attending: EMERGENCY MEDICINE | Admitting: HOSPITALIST
Payer: MEDICARE

## 2022-12-18 DIAGNOSIS — M54.50 ACUTE LEFT-SIDED LOW BACK PAIN WITHOUT SCIATICA: ICD-10-CM

## 2022-12-18 LAB
ALBUMIN UR-MCNC: 20 MG/DL
ANION GAP SERPL CALCULATED.3IONS-SCNC: 6 MMOL/L (ref 3–14)
APPEARANCE UR: CLEAR
BASOPHILS # BLD AUTO: 0 10E3/UL (ref 0–0.2)
BASOPHILS NFR BLD AUTO: 0 %
BILIRUB UR QL STRIP: NEGATIVE
BUN SERPL-MCNC: 12 MG/DL (ref 7–30)
CALCIUM SERPL-MCNC: 9.4 MG/DL (ref 8.5–10.1)
CHLORIDE BLD-SCNC: 101 MMOL/L (ref 94–109)
CO2 SERPL-SCNC: 29 MMOL/L (ref 20–32)
COLOR UR AUTO: ABNORMAL
CREAT SERPL-MCNC: 0.75 MG/DL (ref 0.66–1.25)
EOSINOPHIL # BLD AUTO: 0 10E3/UL (ref 0–0.7)
EOSINOPHIL NFR BLD AUTO: 0 %
ERYTHROCYTE [DISTWIDTH] IN BLOOD BY AUTOMATED COUNT: 12.3 % (ref 10–15)
GFR SERPL CREATININE-BSD FRML MDRD: 87 ML/MIN/1.73M2
GLUCOSE BLD-MCNC: 137 MG/DL (ref 70–99)
GLUCOSE UR STRIP-MCNC: NEGATIVE MG/DL
HCT VFR BLD AUTO: 45.3 % (ref 40–53)
HGB BLD-MCNC: 14.9 G/DL (ref 13.3–17.7)
HGB UR QL STRIP: ABNORMAL
IMM GRANULOCYTES # BLD: 0 10E3/UL
IMM GRANULOCYTES NFR BLD: 1 %
KETONES UR STRIP-MCNC: 10 MG/DL
LEUKOCYTE ESTERASE UR QL STRIP: NEGATIVE
LYMPHOCYTES # BLD AUTO: 0.8 10E3/UL (ref 0.8–5.3)
LYMPHOCYTES NFR BLD AUTO: 12 %
MCH RBC QN AUTO: 32.7 PG (ref 26.5–33)
MCHC RBC AUTO-ENTMCNC: 32.9 G/DL (ref 31.5–36.5)
MCV RBC AUTO: 100 FL (ref 78–100)
MONOCYTES # BLD AUTO: 0.3 10E3/UL (ref 0–1.3)
MONOCYTES NFR BLD AUTO: 5 %
MUCOUS THREADS #/AREA URNS LPF: PRESENT /LPF
NEUTROPHILS # BLD AUTO: 5.5 10E3/UL (ref 1.6–8.3)
NEUTROPHILS NFR BLD AUTO: 82 %
NITRATE UR QL: NEGATIVE
NRBC # BLD AUTO: 0 10E3/UL
NRBC BLD AUTO-RTO: 0 /100
PH UR STRIP: 7 [PH] (ref 5–7)
PLATELET # BLD AUTO: 141 10E3/UL (ref 150–450)
POTASSIUM BLD-SCNC: 5.1 MMOL/L (ref 3.4–5.3)
RBC # BLD AUTO: 4.55 10E6/UL (ref 4.4–5.9)
RBC URINE: 29 /HPF
SODIUM SERPL-SCNC: 136 MMOL/L (ref 133–144)
SP GR UR STRIP: 1.01 (ref 1–1.03)
SQUAMOUS EPITHELIAL: <1 /HPF
UROBILINOGEN UR STRIP-MCNC: NORMAL MG/DL
WBC # BLD AUTO: 6.7 10E3/UL (ref 4–11)
WBC URINE: 2 /HPF

## 2022-12-18 PROCEDURE — 99223 1ST HOSP IP/OBS HIGH 75: CPT | Mod: AI | Performed by: HOSPITALIST

## 2022-12-18 PROCEDURE — 250N000013 HC RX MED GY IP 250 OP 250 PS 637: Performed by: HOSPITALIST

## 2022-12-18 PROCEDURE — 36415 COLL VENOUS BLD VENIPUNCTURE: CPT | Performed by: EMERGENCY MEDICINE

## 2022-12-18 PROCEDURE — 250N000012 HC RX MED GY IP 250 OP 636 PS 637: Performed by: EMERGENCY MEDICINE

## 2022-12-18 PROCEDURE — 82310 ASSAY OF CALCIUM: CPT | Performed by: EMERGENCY MEDICINE

## 2022-12-18 PROCEDURE — 99285 EMERGENCY DEPT VISIT HI MDM: CPT | Mod: 25

## 2022-12-18 PROCEDURE — 85025 COMPLETE CBC W/AUTO DIFF WBC: CPT | Performed by: EMERGENCY MEDICINE

## 2022-12-18 PROCEDURE — 81001 URINALYSIS AUTO W/SCOPE: CPT | Performed by: EMERGENCY MEDICINE

## 2022-12-18 PROCEDURE — 120N000001 HC R&B MED SURG/OB

## 2022-12-18 RX ORDER — MEMANTINE HYDROCHLORIDE 10 MG/1
10 TABLET ORAL 2 TIMES DAILY
Status: DISCONTINUED | OUTPATIENT
Start: 2022-12-18 | End: 2022-12-21 | Stop reason: HOSPADM

## 2022-12-18 RX ORDER — ACETAMINOPHEN 325 MG/1
650 TABLET ORAL EVERY 6 HOURS PRN
Status: DISCONTINUED | OUTPATIENT
Start: 2022-12-18 | End: 2022-12-19

## 2022-12-18 RX ORDER — LIDOCAINE 40 MG/G
CREAM TOPICAL
Status: DISCONTINUED | OUTPATIENT
Start: 2022-12-18 | End: 2022-12-21 | Stop reason: HOSPADM

## 2022-12-18 RX ORDER — BISACODYL 10 MG
10 SUPPOSITORY, RECTAL RECTAL DAILY PRN
Status: DISCONTINUED | OUTPATIENT
Start: 2022-12-18 | End: 2022-12-21 | Stop reason: HOSPADM

## 2022-12-18 RX ORDER — CHOLESTYRAMINE 4 G/9G
1 POWDER, FOR SUSPENSION ORAL 2 TIMES DAILY WITH MEALS
Status: DISCONTINUED | OUTPATIENT
Start: 2022-12-18 | End: 2022-12-21 | Stop reason: HOSPADM

## 2022-12-18 RX ORDER — GUAIFENESIN 600 MG/1
600 TABLET, EXTENDED RELEASE ORAL DAILY PRN
Status: DISCONTINUED | OUTPATIENT
Start: 2022-12-18 | End: 2022-12-21 | Stop reason: HOSPADM

## 2022-12-18 RX ORDER — ACETAMINOPHEN 650 MG/1
650 SUPPOSITORY RECTAL EVERY 6 HOURS PRN
Status: DISCONTINUED | OUTPATIENT
Start: 2022-12-18 | End: 2022-12-19

## 2022-12-18 RX ORDER — NALOXONE HYDROCHLORIDE 0.4 MG/ML
0.2 INJECTION, SOLUTION INTRAMUSCULAR; INTRAVENOUS; SUBCUTANEOUS
Status: DISCONTINUED | OUTPATIENT
Start: 2022-12-18 | End: 2022-12-21 | Stop reason: HOSPADM

## 2022-12-18 RX ORDER — NALOXONE HYDROCHLORIDE 0.4 MG/ML
0.4 INJECTION, SOLUTION INTRAMUSCULAR; INTRAVENOUS; SUBCUTANEOUS
Status: DISCONTINUED | OUTPATIENT
Start: 2022-12-18 | End: 2022-12-21 | Stop reason: HOSPADM

## 2022-12-18 RX ORDER — AMOXICILLIN 250 MG
2 CAPSULE ORAL 2 TIMES DAILY PRN
Status: DISCONTINUED | OUTPATIENT
Start: 2022-12-18 | End: 2022-12-21 | Stop reason: HOSPADM

## 2022-12-18 RX ORDER — PREDNISONE 20 MG/1
40 TABLET ORAL DAILY
Status: DISCONTINUED | OUTPATIENT
Start: 2022-12-19 | End: 2022-12-19

## 2022-12-18 RX ORDER — FAMOTIDINE 20 MG/1
40 TABLET, FILM COATED ORAL AT BEDTIME
Status: DISCONTINUED | OUTPATIENT
Start: 2022-12-18 | End: 2022-12-21 | Stop reason: HOSPADM

## 2022-12-18 RX ORDER — IBUPROFEN 400 MG/1
400 TABLET, FILM COATED ORAL 2 TIMES DAILY PRN
Status: DISCONTINUED | OUTPATIENT
Start: 2022-12-18 | End: 2022-12-19

## 2022-12-18 RX ORDER — FLUTICASONE PROPIONATE 50 MCG
1 SPRAY, SUSPENSION (ML) NASAL
Status: DISCONTINUED | OUTPATIENT
Start: 2022-12-18 | End: 2022-12-21 | Stop reason: HOSPADM

## 2022-12-18 RX ORDER — PREDNISONE 20 MG/1
40 TABLET ORAL ONCE
Status: COMPLETED | OUTPATIENT
Start: 2022-12-18 | End: 2022-12-18

## 2022-12-18 RX ORDER — FINASTERIDE 5 MG/1
5 TABLET, FILM COATED ORAL DAILY
Status: DISCONTINUED | OUTPATIENT
Start: 2022-12-18 | End: 2022-12-21 | Stop reason: HOSPADM

## 2022-12-18 RX ORDER — ONDANSETRON 4 MG/1
4 TABLET, ORALLY DISINTEGRATING ORAL EVERY 6 HOURS PRN
Status: DISCONTINUED | OUTPATIENT
Start: 2022-12-18 | End: 2022-12-21 | Stop reason: HOSPADM

## 2022-12-18 RX ORDER — HYDROMORPHONE HCL IN WATER/PF 6 MG/30 ML
0.2 PATIENT CONTROLLED ANALGESIA SYRINGE INTRAVENOUS
Status: DISCONTINUED | OUTPATIENT
Start: 2022-12-18 | End: 2022-12-21 | Stop reason: HOSPADM

## 2022-12-18 RX ORDER — ASPIRIN 81 MG/1
81 TABLET ORAL DAILY
Status: DISCONTINUED | OUTPATIENT
Start: 2022-12-18 | End: 2022-12-21 | Stop reason: HOSPADM

## 2022-12-18 RX ORDER — ATORVASTATIN CALCIUM 10 MG/1
10 TABLET, FILM COATED ORAL EVERY EVENING
Status: DISCONTINUED | OUTPATIENT
Start: 2022-12-18 | End: 2022-12-21 | Stop reason: HOSPADM

## 2022-12-18 RX ORDER — ONDANSETRON 2 MG/ML
4 INJECTION INTRAMUSCULAR; INTRAVENOUS EVERY 6 HOURS PRN
Status: DISCONTINUED | OUTPATIENT
Start: 2022-12-18 | End: 2022-12-21 | Stop reason: HOSPADM

## 2022-12-18 RX ORDER — POLYETHYLENE GLYCOL 3350 17 G/17G
17 POWDER, FOR SOLUTION ORAL DAILY PRN
Status: DISCONTINUED | OUTPATIENT
Start: 2022-12-18 | End: 2022-12-21 | Stop reason: HOSPADM

## 2022-12-18 RX ORDER — AMOXICILLIN 250 MG
1 CAPSULE ORAL 2 TIMES DAILY PRN
Status: DISCONTINUED | OUTPATIENT
Start: 2022-12-18 | End: 2022-12-21 | Stop reason: HOSPADM

## 2022-12-18 RX ADMIN — MEMANTINE 10 MG: 10 TABLET ORAL at 21:16

## 2022-12-18 RX ADMIN — PREDNISONE 40 MG: 20 TABLET ORAL at 08:56

## 2022-12-18 RX ADMIN — ASPIRIN 81 MG: 81 TABLET, COATED ORAL at 12:48

## 2022-12-18 RX ADMIN — IBUPROFEN 400 MG: 400 TABLET, FILM COATED ORAL at 17:20

## 2022-12-18 RX ADMIN — ATORVASTATIN CALCIUM 10 MG: 10 TABLET, FILM COATED ORAL at 21:16

## 2022-12-18 RX ADMIN — FINASTERIDE 5 MG: 5 TABLET, FILM COATED ORAL at 12:48

## 2022-12-18 RX ADMIN — CHOLESTYRAMINE 4 G: 4 POWDER, FOR SUSPENSION ORAL at 18:17

## 2022-12-18 RX ADMIN — ACETAMINOPHEN 650 MG: 325 TABLET, FILM COATED ORAL at 21:16

## 2022-12-18 RX ADMIN — MEMANTINE 10 MG: 10 TABLET ORAL at 13:57

## 2022-12-18 RX ADMIN — FAMOTIDINE 40 MG: 20 TABLET ORAL at 21:16

## 2022-12-18 RX ADMIN — ACETAMINOPHEN 650 MG: 325 TABLET, FILM COATED ORAL at 14:55

## 2022-12-18 RX ADMIN — CHOLESTYRAMINE 4 G: 4 POWDER, FOR SUSPENSION ORAL at 13:56

## 2022-12-18 ASSESSMENT — ACTIVITIES OF DAILY LIVING (ADL)
ADLS_ACUITY_SCORE: 44
ADLS_ACUITY_SCORE: 40
ADLS_ACUITY_SCORE: 39
ADLS_ACUITY_SCORE: 35
ADLS_ACUITY_SCORE: 39
ADLS_ACUITY_SCORE: 35

## 2022-12-18 ASSESSMENT — ENCOUNTER SYMPTOMS
BACK PAIN: 1
MYALGIAS: 1
SORE THROAT: 0
SHORTNESS OF BREATH: 0
APPETITE CHANGE: 0
COUGH: 0
FEVER: 0

## 2022-12-18 NOTE — PLAN OF CARE
Goal Outcome Evaluation:    Patient arrived from ED at 1220, A&Ox4, forgetful at times, baseline dementia. , Did score pain on scale of 5/10 PRN Tylenol given. Denied SBO. Not OOB. PIV SL. [place], Bilateral hearing aids in place. Redness noted in the R buttock,  mepilex in place. On regular diet. Voiding via urinal at bedside, can be incontinent of urine at time.

## 2022-12-18 NOTE — ED PROVIDER NOTES
History   Chief Complaint:  Back Pain       The history is limited by the condition of the patient (dementia).      Jean Pierre Roblero is a 88 year old male with history of VPH, dementia, and high blood pressure who presents with constant lower left back that radiates down his leg. He was admitted to the hospital from 11/6-11/11 for left hip, thigh, and lower back pain. His x ray showed degenerative changes to his lumbar spine. He was consulted to orthopedic PT and he has been going. He mentions the pain has been getting better after PT. He was also sent home with Roxicodone and Tylenol which he has been taking daily. He took those meds this morning. This morning he was not able to bend his knees and get out of bed due to pain. He uses a walker. He denies fever, sore throat, cough, chest pain, shortness of breath, or appetite changes. He mentions that he fell a month ago but no trauma recently.  Of note EMS administered a pain medication prior to arrival report was not given as to what this was.    MR Spine Lumbar WO (11/29/22)  1. Multilevel lumbar spondylosis. Grade 1 degenerative anterolisthesis L3-4, L4-5 and S1. Degenerative dextroscoliotic curve.   2. At L2-3, at least moderate central spinal canal stenosis with impingement of traversing left L3 and exiting left L2 nerve roots.   3. At L3-4, mature right hemilaminotomy. Persistent right greater than left neural foraminal stenosis with impingement of the exiting right L3 and probable impingement of traversing right L4 nerve roots.   4. At L4-5, a mature right hemilaminotomy. Right foraminal narrowing with right L4 nerve impingement.   5. At L5-S1, bilateral foraminal stenosis (left greater than right) with bilateral L5 nerve impingement.     Reading per radiology.    Review of Systems   Constitutional: Negative for appetite change and fever.   HENT: Negative for sore throat.    Respiratory: Negative for cough and shortness of breath.    Cardiovascular: Negative  "for chest pain.   Musculoskeletal: Positive for back pain and myalgias.   All other systems reviewed and are negative.        Allergies:  Chocolate  Feathers  Novocaine [Procaine]  Strawberry    Medications:  ASA 81  Lipitor  Esgic  Pepcid  Flonase  Mucinex  Medrol  Namenda  Roxicodone  [place]    Past Medical History:     Pneumonia  TIA  Infectious encephalopathy  Hyponatremia  Thrombocytopenia  Hyperlipidemia  BPH  Dementia  VPH  Hypertension     Past Surgical History:    Back surgery  Cholecystectomy  Endoscopic sinus surgery  Eye surgery  Hernia repair  Orthopedic surgery  Turbinoplasty    Social History:  The patient presents to the ED alone. His wife presented later.   PCP: Zaki Alaniz     Physical Exam     Patient Vitals for the past 24 hrs:   BP Temp Temp src Pulse Resp SpO2 Height Weight   12/18/22 0830 (!) 142/83 -- -- 80 14 99 % -- --   12/18/22 0800 (!) 157/91 -- -- 83 -- 96 % -- --   12/18/22 0747 -- -- -- 83 -- -- 1.702 m (5' 7\") 64.9 kg (143 lb)   12/18/22 0745 (!) 182/101 98.2  F (36.8  C) Temporal -- 16 98 % -- --       Physical Exam    Physical Exam   Constitutional:  Patient is oriented to person, place, and time.  Frail and elderly.  Mild distress secondary to lower back pain Appears sleepy.  HENT:   Mouth/Throat:   Oropharynx is clear and tacky.   Eyes:    Conjunctivae normal and EOM are normal. Pupils are equal, round, and reactive to light.   Neck:    Normal range of motion.   Cardiovascular: Normal rate, regular rhythm and normal heart sounds.  Exam reveals no gallop and no friction rub.  No murmur heard.  Pulmonary/Chest:  Effort normal and breath sounds normal. Patient has no wheezes. Patient has no rales.   Abdominal:   Soft. Bowel sounds are normal. Patient exhibits no mass. There is no tenderness. There is no rebound and no guarding.   Musculoskeletal:  Patient is unable to move about in bed without significant left low back pain.  Patient exhibits no edema.   Neurological: "   Patient is somnolent and oriented to person, place, and time. Patient is generally weak. No cranial nerve deficit or sensory deficit. GCS 14.  Patient is able to lift his left leg off the bed approximately 1 inch.  He has a negative straight leg raise on both sides.  He can dorsi and plantarflex weakly on both sides.  He states he has normal sensation to soft touch on both sides.  When trying to sit up he complains of left low back pain.  Skin:   Skin is warm and dry. No rash noted. No erythema.   Psychiatric:   Normal mood.    Emergency Department Course     Laboratory:  Labs Ordered and Resulted from Time of ED Arrival to Time of ED Departure   BASIC METABOLIC PANEL - Abnormal       Result Value    Sodium 136      Potassium 5.1      Chloride 101      Carbon Dioxide (CO2) 29      Anion Gap 6      Urea Nitrogen 12      Creatinine 0.75      Calcium 9.4      Glucose 137 (*)     GFR Estimate 87     CBC WITH PLATELETS AND DIFFERENTIAL - Abnormal    WBC Count 6.7      RBC Count 4.55      Hemoglobin 14.9      Hematocrit 45.3            MCH 32.7      MCHC 32.9      RDW 12.3      Platelet Count 141 (*)     % Neutrophils 82      % Lymphocytes 12      % Monocytes 5      % Eosinophils 0      % Basophils 0      % Immature Granulocytes 1      NRBCs per 100 WBC 0      Absolute Neutrophils 5.5      Absolute Lymphocytes 0.8      Absolute Monocytes 0.3      Absolute Eosinophils 0.0      Absolute Basophils 0.0      Absolute Immature Granulocytes 0.0      Absolute NRBCs 0.0     ROUTINE UA WITH MICROSCOPIC REFLEX TO CULTURE      Emergency Department Course:     Reviewed:  I reviewed nursing notes, vitals, past medical history and Care Everywhere    Assessments:  0750 I obtained history and examined the patient as noted above.     1869 I rechecked the patient and explained findings. I spoke with the patient's wife.     Consults:  3492 I spoke with Dr. Buck, hospitalist, who agreed to admit the  patient.    Interventions:  0856 Deltasone 2 tabs PO    Disposition:  The patient was admitted to the hospital under the care of Dr. Buck.     Impression & Plan     Medical Decision Making:  Jean Pierre Roblero is an 88-year-old male presenting to the emergency department with acute on chronic left low back pain.  His wife arrived and stated he is really not had any injury or illness since he was last discharged from the hospital.  He did get an MRI of his back on 1129 and was supposed to follow-up with [facility] orthopedic spine this Tuesday for the results and discussion about interventions.  I was able to bowl to review the recorded results as noted above.  There is multiple level disease with spinal stenosis and foraminal stenosis with nerve root impingement.  This is likely the cause of his pain.  It appears to be bilateral at some levels.  At this time he is too painful to get up out of bed.  I am giving him steroids to help with the pain.  I will admit him to hospital and he will have to see the spine specialist in the hospital.  There is no evidence on his exam of focal sensory deficit, cauda equina, abscess, discitis, he is not anticoagulated.  No recent trauma.  Admit to hospitalist for further management and evaluation      Diagnosis:    ICD-10-CM    1. Acute left-sided low back pain without sciatica  M54.50           Scribe Disclosure:  RADHA, Harlan Marquez, am serving as a scribe at 7:41 AM on 12/18/2022 to document services personally performed by Parris Sellers MD based on my observations and the provider's statements to me.          Parris Sellers MD  12/18/22 1896

## 2022-12-18 NOTE — PHARMACY-ADMISSION MEDICATION HISTORY
Pharmacy Medication History  Admission medication history interview status for the 12/18/2022  admission is complete. See EPIC admission navigator for prior to admission medications     Location of Interview: Patient room  Medication history sources: Patient, Patient's family/friend, and Surescripts    Significant changes made to the medication list:  1. Removed Medrol DosPak  2. Changed Mucinex to as needed    In the past week, patient estimated taking medication this percent of the time: 50-90% due to illness    Additional medication history information:   1. Finasteride last filled April x90 days. Family reports still taking.     Medication reconciliation completed by provider prior to medication history? No    Time spent in this activity: 10 min    Prior to Admission medications    Medication Sig Last Dose Taking? Auth Provider Long Term End Date   acetaminophen (TYLENOL) 500 MG tablet Take 1 tablet (500 mg) by mouth every 6 hours as needed for mild pain  Yes Evin Anderson MD     aspirin (ASA) 81 MG EC tablet Take 1 tablet (81 mg) by mouth daily 12/17/2022 Yes Zeny Brown MD     atorvastatin (LIPITOR) 10 MG tablet 1 tablet (10 mg) by Oral or Feeding Tube route every evening 12/17/2022 Yes Zeny Brown MD Yes    butalbital-acetaminophen-caffeine (ESGIC) -40 MG tablet Take 1 tablet by mouth every 4 hours as needed for headaches  Yes Unknown, Entered By History     cholestyramine (QUESTRAN) 4 G packet Take 1 packet by mouth 2 times daily (with meals) 12/17/2022 Yes Reported, Patient     famotidine (PEPCID) 40 MG tablet Take 40 mg by mouth At Bedtime 12/17/2022 Yes Unknown, Entered By History     finasteride (PROSCAR) 5 MG tablet Take 5 mg by mouth daily 12/17/2022 Yes Unknown, Entered By History     fluticasone (FLONASE) 50 MCG/ACT nasal spray Spray 1 spray into both nostrils every 48 hours as needed for rhinitis or allergies  Yes Reported, Patient     guaiFENesin (MUCINEX) 600 MG 12 hr  tablet Take 600 mg by mouth daily as needed  Yes Unknown, Entered By History     ibuprofen (ADVIL/MOTRIN) 200 MG tablet Take 400 mg by mouth 2 times daily as needed for pain  Yes Unknown, Entered By History     magnesium 250 MG tablet Take 1 tablet by mouth every evening 12/17/2022 Yes Unknown, Entered By History     memantine (NAMENDA) 10 MG tablet Take 10 mg by mouth 2 times daily 12/17/2022 Yes Unknown, Entered By History     Multiple Vitamin (MULTI-VITAMIN) per tablet Take 1 tablet by mouth daily 12/17/2022 Yes Reported, Patient     multivitamin  with lutein (OCUVITE WITH LTEIN) CAPS per capsule Take 1 capsule by mouth daily 12/17/2022 Yes Unknown, Entered By History     oxyCODONE (ROXICODONE) 5 MG tablet Take 1 tablet (5 mg) by mouth every 6 hours as needed for moderate to severe pain  Yes Evin Anderson MD     Phenylephrine HCl (ROX-SYNEPHRINE NA) Spray 1 spray into both nostrils daily as needed  Yes Unknown, Entered By History     sodium chloride (OCEAN) 0.65 % nasal spray Spray 1 spray into both nostrils every 48 hours as needed for congestion  Yes Unknown, Entered By History     vitamin B-12 (CYANOCOBALAMIN) 1000 MCG tablet Take 1,000 mcg by mouth daily Noon 12/17/2022 Yes Unknown, Entered By History     vitamin C (ASCORBIC ACID) 500 MG tablet Take 500 mg by mouth daily 12/17/2022 Yes Unknown, Entered By History     vitamin D3 (CHOLECALCIFEROL) 2000 units (50 mcg) tablet Take 2,000 Units by mouth daily Noon 12/17/2022 Yes Unknown, Entered By History     zinc sulfate (ZINCATE) 220 (50 Zn) MG capsule Take 220 mg by mouth daily noon 12/17/2022 Yes Unknown, Entered By History       The information provided in this note is only as accurate as the sources available at the time of update(s)   Tomy COVARRUBIAS PharmD

## 2022-12-18 NOTE — ED TRIAGE NOTES
Presents with left lower back pain that is worsening. Unable to get out of bed today due to pain. Paramedics administered Fentanyl 25mcg.

## 2022-12-18 NOTE — ED NOTES
"[facility]  ED Nurse Handoff Report    ED Chief complaint: Back Pain      ED Diagnosis:   Final diagnoses:   Acute left-sided low back pain without sciatica       Code Status: hospitalist to address      Allergies:   Allergies   Allergen Reactions     Chocolate Anaphylaxis     headache     Feathers      Novocaine [Procaine]      Passes out almost immediately     Abie GI Disturbance     Most berries       Patient Story: Presents with left lower back pain that is worsening. Unable to get out of bed today due to pain. Paramedics administered Fentanyl 25mcg.      Focused Assessment:  A&Ox4. Skin intact. VS stable. Lungs clear. Heart RRR. Arrived in saturated depends.   Left lumbar paraspinal tenderness to palpation.     Treatments and/or interventions provided: labs, prednisone PO please see MAR  Patient's response to treatments and/or interventions: stable    To be done/followed up on inpatient unit:  admission orders, Urine     Does this patient have any cognitive concerns?: Baseline dementia    Activity level - Baseline/Home:  Walker  Activity Level - Current:   bedrest    Patient's Preferred language: English   Needed?: No    Isolation: None  Infection: Not Applicable  Patient tested for COVID 19 prior to admission: NO  Bariatric?: No    Vital Signs:   Vitals:    12/18/22 0745 12/18/22 0747 12/18/22 0800 12/18/22 0830   BP: (!) 182/101  (!) 157/91 (!) 142/83   Pulse:  83 83 80   Resp: 16   14   Temp: 98.2  F (36.8  C)      TempSrc: Temporal      SpO2: 98%  96% 99%   Weight:  64.9 kg (143 lb)     Height:  1.702 m (5' 7\")       Labs Ordered and Resulted from Time of ED Arrival to Time of ED Departure   BASIC METABOLIC PANEL - Abnormal       Result Value    Sodium 136      Potassium 5.1      Chloride 101      Carbon Dioxide (CO2) 29      Anion Gap 6      Urea Nitrogen 12      Creatinine 0.75      Calcium 9.4      Glucose 137 (*)     GFR Estimate 87     CBC WITH PLATELETS AND " DIFFERENTIAL - Abnormal    WBC Count 6.7      RBC Count 4.55      Hemoglobin 14.9      Hematocrit 45.3            MCH 32.7      MCHC 32.9      RDW 12.3      Platelet Count 141 (*)     % Neutrophils 82      % Lymphocytes 12      % Monocytes 5      % Eosinophils 0      % Basophils 0      % Immature Granulocytes 1      NRBCs per 100 WBC 0      Absolute Neutrophils 5.5      Absolute Lymphocytes 0.8      Absolute Monocytes 0.3      Absolute Eosinophils 0.0      Absolute Basophils 0.0      Absolute Immature Granulocytes 0.0      Absolute NRBCs 0.0     ROUTINE UA WITH MICROSCOPIC REFLEX TO CULTURE       Cardiac Rhythm:     Was the PSS-3 completed:   Yes  What interventions are required if any?               Family Comments: wife at bedside.   OBS brochure/video discussed/provided to patient/family: N/A              Name of person given brochure if not patient:               Relationship to patient: =    For the majority of the shift this patient's behavior was Green.   Behavioral interventions performed were none.    ED NURSE PHONE NUMBER: *[phone]

## 2022-12-18 NOTE — H&P
[facility]    History and Physical  Hospitalist       Date of Admission:  12/18/2022  Date of Service (when I saw the patient): 12/18/22    Assessment & Plan   Jean Pierre Roblero is a 88 year old male who presents with back pain.  Admitted for further evaluation and treatment.    Acute on chronic back pain, weakness, deconditioning, low back degenerative changes, history of left leg radiculopathy, osteoarthritis, degenerative disc disease: Patient with MRI of the lumbar spine completed on November 29, 2022 with results available in care everywhere showing multilevel lumbar spondylosis, see report for further details, but does have L2-L3 moderate central spinal canal stenosis with impingement of traversing left L3 and exiting L left L2 nerve roots, see report for further details.  Patient recently discharged on November 9, 2022 with admission for left hip pain, started on Tylenol and oxycodone and steroids, outpatient follow-up with orthopedics, see discharge note for further details.  Patient endorses back pain.  Denies headache, ear pain, eye pain, sore throat, cough, chest pain, abdominal pain, nausea, vomiting, dysuria, blood in the stool or urine, rash, fever.  Sodium is 136 on admission with a creatinine of 0.75.  Patient presenting to the emergency department with left lower back pain with radiation down the leg, previous admission for similar issues, seeing orthopedics, also seeing physical therapy, improved somewhat with narcotics and Tylenol, however, continues to have issues getting out of bed, uses a walker.  Endorses back pain and myalgias.  Plan:  -Orthospine consult.  -Pain control.  -Supportive management.  -Therapy consults.  -Prednisone 40mg daily.     Thrombocytopenia: Patient with a platelet count of 141 on admission.  -Monitor.    History of BPH: Patient previously on finasteride.  -Continue prior to admission finasteride when verified by pharmacy.    Dementia: Patient with  increased risk for delirium.  -Delirium precautions.    Cardiac, hypertension, hyperlipidemia: Patient previously on antihypertensives in the past.  -Await pharmacy medication reconciliation.    DVT Prophylaxis: Pneumatic Compression Devices  Code Status: Full Code    Disposition: Inpatient but could potentially transition to observation status depending on utilization management review.    Clinically Significant Risk Factors Present on Admission                    # Dementia: noted on problem list              Dr. Conor Buck DO, DEA, A  [facility]  Pager [phone]    Conor Buck DO  [facility]  Securely message with the Vocera Web Console (learn more here)  Text page via [facility] Paging/Directory      Primary Care Physician   Zaki Alaniz    Chief Complaint   Back pain    History is obtained from the patient and medical records.     History of Present Illness   Jean Pierre Roblero is a 88 year old male who presents with back pain.  Admitted for further evaluation and treatment. Patient with MRI of the lumbar spine completed on November 29, 2022 with results available in care everywhere showing multilevel lumbar spondylosis, see report for further details, but does have L2-L3 moderate central spinal canal stenosis with impingement of traversing left L3 and exiting L left L2 nerve roots, see report for further details.  Patient recently discharged on November 9, 2022 with admission for left hip pain, started on Tylenol and oxycodone and steroids, outpatient follow-up with orthopedics, see discharge note for further details.  Patient endorses back pain.  Denies headache, ear pain, eye pain, sore throat, cough, chest pain, abdominal pain, nausea, vomiting, dysuria, blood in the stool or urine, rash, fever.  Sodium is 136 on admission with a creatinine of 0.75.  Patient presenting to the emergency department with left lower back pain with radiation down  the leg, previous admission for similar issues, seeing orthopedics, also seeing physical therapy, improved somewhat with narcotics and Tylenol, however, continues to have issues getting out of bed, uses a walker.  Endorses back pain and myalgias.    Past Medical History    I have reviewed this patient's medical history and updated it with pertinent information if needed.   Past Medical History:   Diagnosis Date     Allergic state      Fainting episodes     FREQUENT/ EASILY     Gastroesophageal reflux disease      Headaches      Heartburn      Nocturia      Photosensitivity      Swallowing difficulty        Past Surgical History   I have reviewed this patient's surgical history and updated it with pertinent information if needed.  Past Surgical History:   Procedure Laterality Date     BACK SURGERY       CHOLECYSTECTOMY       ENDOSCOPIC SINUS SURGERY  8/20/2012    Procedure: ENDOSCOPIC SINUS SURGERY;  ENDOSCOPIC REMOVAL OF LEFT NASAL MASS WITH LANDMARKS (Fusion Tracking [place]), BILATERAL INFERIOR TURBINOPLASTY;  Surgeon: Dima Younger MD;  Location: [facility]     EYE SURGERY      cataracts     HERNIA REPAIR       ORTHOPEDIC SURGERY      ingrown toe nail removal     TURBINOPLASTY  8/20/2012    Procedure: TURBINOPLASTY;;  Surgeon: Dima Younger MD;  Location: [facility]       Prior to Admission Medications   Prior to Admission Medications   Prescriptions Last Dose Informant Patient Reported? Taking?   Multiple Vitamin (MULTI-VITAMIN) per tablet  Spouse/Significant Other Yes No   Sig: Take 1 tablet by mouth daily   Phenylephrine HCl (ROX-SYNEPHRINE NA)   Yes No   Sig: Spray 1 spray into both nostrils daily as needed   acetaminophen (TYLENOL) 500 MG tablet   No No   Sig: Take 1 tablet (500 mg) by mouth every 6 hours as needed for mild pain   aspirin (ASA) 81 MG EC tablet  Spouse/Significant Other No No   Sig: Take 1 tablet (81 mg) by mouth daily   atorvastatin (LIPITOR) 10 MG tablet  Spouse/Significant Other No No    Si tablet (10 mg) by Oral or Feeding Tube route every evening   butalbital-acetaminophen-caffeine (ESGIC) -40 MG tablet  Spouse/Significant Other Yes No   Sig: Take 1 tablet by mouth every 4 hours as needed for headaches   cholestyramine (QUESTRAN) 4 G packet  Spouse/Significant Other Yes No   Sig: Take 1 packet by mouth 2 times daily (with meals)   famotidine (PEPCID) 40 MG tablet  Spouse/Significant Other Yes No   Sig: Take 40 mg by mouth At Bedtime   finasteride (PROSCAR) 5 MG tablet  Spouse/Significant Other Yes No   Sig: Take 5 mg by mouth daily   fluticasone (FLONASE) 50 MCG/ACT nasal spray  Spouse/Significant Other Yes No   Sig: Spray 1 spray into both nostrils every 48 hours as needed for rhinitis or allergies   guaiFENesin (MUCINEX) 600 MG 12 hr tablet   Yes No   Sig: Take 600 mg by mouth daily   ibuprofen (ADVIL/MOTRIN) 200 MG tablet   Yes No   Sig: Take 400 mg by mouth 2 times daily as needed for pain   magnesium 250 MG tablet  Spouse/Significant Other Yes No   Sig: Take 1 tablet by mouth every evening   memantine (NAMENDA) 10 MG tablet  Spouse/Significant Other Yes No   Sig: Take 10 mg by mouth 2 times daily   methylPREDNISolone (MEDROL) 4 MG tablet   No No   Sig: Take 1 tablet (4 mg) by mouth every morning (before breakfast)   methylPREDNISolone (MEDROL) 4 MG tablet   No No   Sig: Take 1 tablet (4 mg) by mouth daily (with lunch)   methylPREDNISolone (MEDROL) 4 MG tablet   No No   Sig: Take 1 tablet (4 mg) by mouth daily (with dinner)   methylPREDNISolone (MEDROL) 4 MG tablet   No No   Sig: Take 1 tablet (4 mg) by mouth At Bedtime   multivitamin  with lutein (OCUVITE WITH LTEIN) CAPS per capsule  Spouse/Significant Other Yes No   Sig: Take 1 capsule by mouth daily   oxyCODONE (ROXICODONE) 5 MG tablet   No No   Sig: Take 1 tablet (5 mg) by mouth every 6 hours as needed for moderate to severe pain   sodium chloride (OCEAN) 0.65 % nasal spray  Spouse/Significant Other Yes No   Sig: Spray 1 spray  into both nostrils every 48 hours as needed for congestion   vitamin B-12 (CYANOCOBALAMIN) 1000 MCG tablet  Spouse/Significant Other Yes No   Sig: Take 1,000 mcg by mouth daily Noon   vitamin C (ASCORBIC ACID) 500 MG tablet  Spouse/Significant Other Yes No   Sig: Take 500 mg by mouth daily   vitamin D3 (CHOLECALCIFEROL) 2000 units (50 mcg) tablet  Spouse/Significant Other Yes No   Sig: Take 2,000 Units by mouth daily Noon   zinc sulfate (ZINCATE) 220 (50 Zn) MG capsule  Spouse/Significant Other Yes No   Sig: Take 220 mg by mouth daily noon      Facility-Administered Medications: None     Allergies   Allergies   Allergen Reactions     Chocolate Anaphylaxis     headache     Feathers      Novocaine [Procaine]      Passes out almost immediately     [place] GI Disturbance     Most berries       Social History   I have reviewed this patient's social history and updated it with pertinent information if needed. Jean Pierre Roblero  reports that he has never smoked. He has never used smokeless tobacco. He reports that he does not drink alcohol and does not use drugs.    Family History   I have reviewed this patient's family history and updated it with pertinent information if needed.       Review of Systems   The 10 point Review of Systems is negative other than noted in the HPI or here.     Physical Exam   Temp: 98.2  F (36.8  C) Temp src: Temporal BP: (!) 182/101 Pulse: 83   Resp: 16 SpO2: 98 % O2 Device: None (Room air)    Vital Signs with Ranges  Temp:  [98.2  F (36.8  C)] 98.2  F (36.8  C)  Pulse:  [83] 83  Resp:  [16] 16  BP: (182)/(101) 182/101  SpO2:  [98 %] 98 %  143 lbs 0 oz    GENERAL: Alert. NAD. Conversational.   HEENT: Normocephalic. Nares normal.   LUNGS: Clear to auscultation. No dyspnea at rest.   HEART: Regular rate. Extremities perfused.   ABDOMEN: Soft, nontender, and nondistended. Positive bowel sounds.   EXTREMITIES: No LE edema noted.  Low back pain to the left with radiation.  NEUROLOGIC: Moves  extremities x4, increased pain to LLE with movement, distal cms in tact.     Data   Data reviewed today:  I personally reviewed both laboratory and imaging data.   Recent Labs   Lab 12/18/22  0800   WBC 6.7   HGB 14.9      *      POTASSIUM 5.1   CHLORIDE 101   CO2 29   BUN 12   CR 0.75   ANIONGAP 6   KENYA 9.4   *       No results found for this or any previous visit (from the past 24 hour(s)).

## 2022-12-18 NOTE — TREATMENT PLAN
ORTHOPEDICS  Treatment Plan    87 yo M with acute on chronic back pain.  H/o decompression with Dr Pichardo in 2005.  XR 11/2022 without fracture.  MRI Lumbar Spine 11/29/2022 at [facility] most notable for multilevel degenerative scoliosis, convex right, with grade 1 anterolisthesis at L3-S1, a prior right L3-5 hemilaminectomy, L2-3 moderate-to-severe central stenosis with lateral recess narrowing catching traversing L3 nerve root, and right-sided L3-4 dis protrusion catching exiting right L3 and traversing right L4 nerve roots.    Patient reportedly has an appointment with [facility] this week prompting consult.  Chief complaint was acute on chronic pain.  By review, no symptomatology to raise concerns for myelopathy, central cord impingement, cauda equina syndrome, and/or acute motor or neuro deficits.      [facility] available tomorrow.      In meantime, recommend mobilizing with PT/OT - minimize bending, twisting, and lifting.  Pain control.  Ice/heat.  Manage spasms if present.  In light of multilevel degenerative scoli, will also place order for Orthotics to see for custom TLSO.    [facility]  Ortho Trauma  [facility]

## 2022-12-18 NOTE — ED NOTES
RECEIVING UNIT ED HANDOFF REVIEW    ED Nurse Handoff Report was reviewed by: Alysa Josue RN on December 18, 2022 at 10:29 AM

## 2022-12-18 NOTE — CONSULTS
[facility]    Orthopedics Consultation    Date of Admission:  12/18/2022    Assessment & Plan     Jean Pierre Roblero is a 87 yo M with acute on chronic back pain.  H/o decompression with Dr Pichardo in 2005.  XR 11/2022 without fracture.  MRI Lumbar Spine 11/29/2022 at [facility] most notable for multilevel degenerative scoliosis, convex right, with grade 1 anterolisthesis at L3-S1, a prior right L3-5 hemilaminectomy, L2-3 moderate-to-severe central stenosis with lateral recess narrowing catching traversing L3 nerve root, and right-sided L3-4 dis protrusion catching exiting right L3 and traversing right L4 nerve roots.     On exam, patient is noted to be neuro intact.  No neurodeficits.  He has ongoing left lower extremity radicular pain in a L3, L4 distribution.    Recommend continued symptom treatment including physical therapy  Avoiding bending, twisting, lifting and modifying activities as needed  Pain control  TLSO ordered orthotics  Follow-up with spine surgeon.    Principal Problem:    Acute left-sided low back pain without sciatica    ASHLEY RAMOS MD     Code Status    Full Code    Reason for Consult   Reason for consult: Back pain    Primary Care Physician   Zaki Alaniz    Chief Complaint   Back pain with radicular symptoms    History is obtained from the patient    History of Present Illness   Jean Pierre Roblero is a 88 year old male who presents with chronic back pain with left lower extremity radicular symptoms.  He has a history of a prior decompression in 2005.  A recent MRI was obtained in November through [facility].  He was scheduled to follow-up with [facility] and has spine appointment this upcoming Tuesday.  His current symptoms are worsened prompting him to seek medical attention.  He denies any numbness or paresthesias.  No weakness.  He locates pain on his left leg from his anterior thigh to his anterior knee.  He reports some pain travels past his knee towards his ankle.  He  has intermittent right lower extremity symptoms, but the pain is significantly less.  He has trouble ambulating secondary to the pain.    Past Medical History   I have reviewed this patient's medical history and updated it with pertinent information if needed.   Past Medical History:   Diagnosis Date     Allergic state      Fainting episodes     FREQUENT/ EASILY     Gastroesophageal reflux disease      Headaches      Heartburn      Nocturia      Photosensitivity      Swallowing difficulty        Past Surgical History   I have reviewed this patient's surgical history and updated it with pertinent information if needed.  Past Surgical History:   Procedure Laterality Date     BACK SURGERY       CHOLECYSTECTOMY       ENDOSCOPIC SINUS SURGERY  2012    Procedure: ENDOSCOPIC SINUS SURGERY;  ENDOSCOPIC REMOVAL OF LEFT NASAL MASS WITH LANDMARKS (Fusion Tracking [place]), BILATERAL INFERIOR TURBINOPLASTY;  Surgeon: Dima Younger MD;  Location: [facility]     EYE SURGERY      cataracts     HERNIA REPAIR       ORTHOPEDIC SURGERY      ingrown toe nail removal     TURBINOPLASTY  2012    Procedure: TURBINOPLASTY;;  Surgeon: Dima Younger MD;  Location: [facility]       Prior to Admission Medications   Prior to Admission Medications   Prescriptions Last Dose Informant Patient Reported? Taking?   Multiple Vitamin (MULTI-VITAMIN) per tablet 2022 Spouse/Significant Other Yes Yes   Sig: Take 1 tablet by mouth daily   Phenylephrine HCl (ROX-SYNEPHRINE NA)  Spouse/Significant Other Yes Yes   Sig: Spray 1 spray into both nostrils daily as needed   acetaminophen (TYLENOL) 500 MG tablet  Spouse/Significant Other No Yes   Sig: Take 1 tablet (500 mg) by mouth every 6 hours as needed for mild pain   aspirin (ASA) 81 MG EC tablet 2022 Spouse/Significant Other No Yes   Sig: Take 1 tablet (81 mg) by mouth daily   atorvastatin (LIPITOR) 10 MG tablet 2022 Spouse/Significant Other No Yes   Si tablet (10 mg) by Oral or  Feeding Tube route every evening   butalbital-acetaminophen-caffeine (ESGIC) -40 MG tablet  Spouse/Significant Other Yes Yes   Sig: Take 1 tablet by mouth every 4 hours as needed for headaches   cholestyramine (QUESTRAN) 4 G packet 12/17/2022 Spouse/Significant Other Yes Yes   Sig: Take 1 packet by mouth 2 times daily (with meals)   famotidine (PEPCID) 40 MG tablet 12/17/2022 Spouse/Significant Other Yes Yes   Sig: Take 40 mg by mouth At Bedtime   finasteride (PROSCAR) 5 MG tablet 12/17/2022 Spouse/Significant Other Yes Yes   Sig: Take 5 mg by mouth daily   fluticasone (FLONASE) 50 MCG/ACT nasal spray  Spouse/Significant Other Yes Yes   Sig: Spray 1 spray into both nostrils every 48 hours as needed for rhinitis or allergies   guaiFENesin (MUCINEX) 600 MG 12 hr tablet  Spouse/Significant Other Yes Yes   Sig: Take 600 mg by mouth daily as needed   ibuprofen (ADVIL/MOTRIN) 200 MG tablet  Spouse/Significant Other Yes Yes   Sig: Take 400 mg by mouth 2 times daily as needed for pain   magnesium 250 MG tablet 12/17/2022 Spouse/Significant Other Yes Yes   Sig: Take 1 tablet by mouth every evening   memantine (NAMENDA) 10 MG tablet 12/17/2022 Spouse/Significant Other Yes Yes   Sig: Take 10 mg by mouth 2 times daily   multivitamin  with lutein (OCUVITE WITH LTEIN) CAPS per capsule 12/17/2022 Spouse/Significant Other Yes Yes   Sig: Take 1 capsule by mouth daily   oxyCODONE (ROXICODONE) 5 MG tablet  Spouse/Significant Other No Yes   Sig: Take 1 tablet (5 mg) by mouth every 6 hours as needed for moderate to severe pain   sodium chloride (OCEAN) 0.65 % nasal spray  Spouse/Significant Other Yes Yes   Sig: Spray 1 spray into both nostrils every 48 hours as needed for congestion   vitamin B-12 (CYANOCOBALAMIN) 1000 MCG tablet 12/17/2022 Spouse/Significant Other Yes Yes   Sig: Take 1,000 mcg by mouth daily Noon   vitamin C (ASCORBIC ACID) 500 MG tablet 12/17/2022 Spouse/Significant Other Yes Yes   Sig: Take 500 mg by mouth  daily   vitamin D3 (CHOLECALCIFEROL) 2000 units (50 mcg) tablet 12/17/2022 Spouse/Significant Other Yes Yes   Sig: Take 2,000 Units by mouth daily Noon   zinc sulfate (ZINCATE) 220 (50 Zn) MG capsule 12/17/2022 Spouse/Significant Other Yes Yes   Sig: Take 220 mg by mouth daily noon      Facility-Administered Medications: None     Allergies   Allergies   Allergen Reactions     Chocolate Anaphylaxis     headache     Feathers      Novocaine [Procaine]      Passes out almost immediately     [place] GI Disturbance     Most berries       Social History   I have reviewed this patient's social history and updated it with pertinent information if needed. Jean Pierre Roblero  reports that he has never smoked. He has never used smokeless tobacco. He reports that he does not drink alcohol and does not use drugs.    Family History   I have reviewed this patient's family history and updated it with pertinent information if needed.   No family history on file.    Review of Systems   The 10 point Review of Systems is negative other than noted in the HPI or here.     Physical Exam   Temp: 98.1  F (36.7  C) Temp src: Oral BP: (!) 145/89 Pulse: 93   Resp: 16 SpO2: 97 % O2 Device: None (Room air)    Vital Signs with Ranges  Temp:  [97.9  F (36.6  C)-98.2  F (36.8  C)] 98.1  F (36.7  C)  Pulse:  [80-93] 93  Resp:  [14-17] 16  BP: (130-182)/() 145/89  SpO2:  [96 %-99 %] 97 %  143 lbs 0 oz    Constitutional: awake, alert, cooperative, no apparent distress, and appears stated age  Eyes: extra-ocular muscles intact, no scleral icterus  ENT: normocepalic, atraumatic without obvious abnormality  Hematologic / Lymphatic: No lymphedema   Respiratory: no increased work of breathing, symmetric chest wall expansion      Neuro exam:  5 out of 5 strength with great toe flexion and extension, dorsi flexion and plantar flexion, knee flexion and extension, hip flexion and extension, and hip abduction and adduction  L2-S1 sensation is intact some  baseline numbness.  Good capillary refill distally  no palpation over the spine      Data   Most Recent 3 CBC's:Recent Labs   Lab Test 12/18/22  0800 11/08/22  0701 11/06/22  1324   WBC 6.7 7.6 6.0   HGB 14.9 13.6 14.1    99 100   * 145* 153     Most Recent 3 BMP's:Recent Labs   Lab Test 12/18/22  0800 11/08/22  0701 11/06/22  1324    140 139   POTASSIUM 5.1 4.1 3.9   CHLORIDE 101 106 107   CO2 29 26 27   BUN 12 17 12   CR 0.75 0.92 0.83   ANIONGAP 6 8 5   KENYA 9.4 9.3 8.8   * 111* 145*     Most Recent 3 INR's:Recent Labs   Lab Test 05/26/22  0640   INR 1.05

## 2022-12-18 NOTE — ED NOTES
Bed: ED26  Expected date:   Expected time:   Means of arrival:   Comments:  512  88 M back pain/chronic  Here now

## 2022-12-19 ENCOUNTER — APPOINTMENT (OUTPATIENT)
Dept: OCCUPATIONAL THERAPY | Facility: CLINIC | Age: 87
DRG: 552 | End: 2022-12-19
Attending: HOSPITALIST
Payer: MEDICARE

## 2022-12-19 LAB
ANION GAP SERPL CALCULATED.3IONS-SCNC: 7 MMOL/L (ref 3–14)
BUN SERPL-MCNC: 17 MG/DL (ref 7–30)
CALCIUM SERPL-MCNC: 9 MG/DL (ref 8.5–10.1)
CHLORIDE BLD-SCNC: 102 MMOL/L (ref 94–109)
CO2 SERPL-SCNC: 29 MMOL/L (ref 20–32)
CREAT SERPL-MCNC: 0.84 MG/DL (ref 0.66–1.25)
ERYTHROCYTE [DISTWIDTH] IN BLOOD BY AUTOMATED COUNT: 12.4 % (ref 10–15)
GFR SERPL CREATININE-BSD FRML MDRD: 84 ML/MIN/1.73M2
GLUCOSE BLD-MCNC: 100 MG/DL (ref 70–99)
HCT VFR BLD AUTO: 41.6 % (ref 40–53)
HGB BLD-MCNC: 14.3 G/DL (ref 13.3–17.7)
MAGNESIUM SERPL-MCNC: 2.4 MG/DL (ref 1.6–2.3)
MCH RBC QN AUTO: 33.3 PG (ref 26.5–33)
MCHC RBC AUTO-ENTMCNC: 34.4 G/DL (ref 31.5–36.5)
MCV RBC AUTO: 97 FL (ref 78–100)
PLATELET # BLD AUTO: 150 10E3/UL (ref 150–450)
POTASSIUM BLD-SCNC: 3.5 MMOL/L (ref 3.4–5.3)
POTASSIUM BLD-SCNC: 3.5 MMOL/L (ref 3.4–5.3)
RBC # BLD AUTO: 4.3 10E6/UL (ref 4.4–5.9)
SODIUM SERPL-SCNC: 138 MMOL/L (ref 133–144)
WBC # BLD AUTO: 8.4 10E3/UL (ref 4–11)

## 2022-12-19 PROCEDURE — 85027 COMPLETE CBC AUTOMATED: CPT | Performed by: HOSPITALIST

## 2022-12-19 PROCEDURE — G0378 HOSPITAL OBSERVATION PER HR: HCPCS

## 2022-12-19 PROCEDURE — 97165 OT EVAL LOW COMPLEX 30 MIN: CPT | Mod: GO | Performed by: OCCUPATIONAL THERAPIST

## 2022-12-19 PROCEDURE — 97535 SELF CARE MNGMENT TRAINING: CPT | Mod: GO | Performed by: OCCUPATIONAL THERAPIST

## 2022-12-19 PROCEDURE — 250N000013 HC RX MED GY IP 250 OP 250 PS 637: Performed by: HOSPITALIST

## 2022-12-19 PROCEDURE — 99231 SBSQ HOSP IP/OBS SF/LOW 25: CPT | Performed by: HOSPITALIST

## 2022-12-19 PROCEDURE — 250N000012 HC RX MED GY IP 250 OP 636 PS 637: Performed by: HOSPITALIST

## 2022-12-19 PROCEDURE — 36415 COLL VENOUS BLD VENIPUNCTURE: CPT | Performed by: HOSPITALIST

## 2022-12-19 PROCEDURE — 80048 BASIC METABOLIC PNL TOTAL CA: CPT | Performed by: HOSPITALIST

## 2022-12-19 PROCEDURE — 250N000013 HC RX MED GY IP 250 OP 250 PS 637: Performed by: PHYSICIAN ASSISTANT

## 2022-12-19 PROCEDURE — 83735 ASSAY OF MAGNESIUM: CPT | Performed by: HOSPITALIST

## 2022-12-19 PROCEDURE — 84132 ASSAY OF SERUM POTASSIUM: CPT | Performed by: HOSPITALIST

## 2022-12-19 PROCEDURE — 120N000001 HC R&B MED SURG/OB

## 2022-12-19 RX ORDER — ACETAMINOPHEN 325 MG/1
650 TABLET ORAL EVERY 6 HOURS
Status: DISCONTINUED | OUTPATIENT
Start: 2022-12-19 | End: 2022-12-21 | Stop reason: HOSPADM

## 2022-12-19 RX ORDER — IBUPROFEN 200 MG
200 TABLET ORAL EVERY 6 HOURS PRN
Status: DISCONTINUED | OUTPATIENT
Start: 2022-12-19 | End: 2022-12-21 | Stop reason: HOSPADM

## 2022-12-19 RX ORDER — IBUPROFEN 400 MG/1
400 TABLET, FILM COATED ORAL 2 TIMES DAILY
Status: DISCONTINUED | OUTPATIENT
Start: 2022-12-19 | End: 2022-12-21 | Stop reason: HOSPADM

## 2022-12-19 RX ORDER — ACETAMINOPHEN 650 MG/1
650 SUPPOSITORY RECTAL EVERY 6 HOURS
Status: DISCONTINUED | OUTPATIENT
Start: 2022-12-19 | End: 2022-12-21 | Stop reason: HOSPADM

## 2022-12-19 RX ADMIN — PREDNISONE 40 MG: 20 TABLET ORAL at 08:36

## 2022-12-19 RX ADMIN — ATORVASTATIN CALCIUM 10 MG: 10 TABLET, FILM COATED ORAL at 20:34

## 2022-12-19 RX ADMIN — IBUPROFEN 400 MG: 400 TABLET, FILM COATED ORAL at 03:38

## 2022-12-19 RX ADMIN — FAMOTIDINE 40 MG: 20 TABLET ORAL at 22:28

## 2022-12-19 RX ADMIN — ACETAMINOPHEN 650 MG: 325 TABLET, FILM COATED ORAL at 08:36

## 2022-12-19 RX ADMIN — ASPIRIN 81 MG: 81 TABLET, COATED ORAL at 08:36

## 2022-12-19 RX ADMIN — CHOLESTYRAMINE 4 G: 4 POWDER, FOR SUSPENSION ORAL at 08:36

## 2022-12-19 RX ADMIN — MEMANTINE 10 MG: 10 TABLET ORAL at 08:36

## 2022-12-19 RX ADMIN — ACETAMINOPHEN 650 MG: 325 TABLET, FILM COATED ORAL at 02:31

## 2022-12-19 RX ADMIN — ACETAMINOPHEN 650 MG: 325 TABLET, FILM COATED ORAL at 15:04

## 2022-12-19 RX ADMIN — CHOLESTYRAMINE 4 G: 4 POWDER, FOR SUSPENSION ORAL at 17:11

## 2022-12-19 RX ADMIN — FINASTERIDE 5 MG: 5 TABLET, FILM COATED ORAL at 08:36

## 2022-12-19 RX ADMIN — IBUPROFEN 400 MG: 400 TABLET, FILM COATED ORAL at 17:11

## 2022-12-19 RX ADMIN — OXYCODONE HYDROCHLORIDE 2.5 MG: 5 TABLET ORAL at 10:40

## 2022-12-19 RX ADMIN — ACETAMINOPHEN 650 MG: 325 TABLET, FILM COATED ORAL at 20:34

## 2022-12-19 RX ADMIN — MEMANTINE 10 MG: 10 TABLET ORAL at 20:34

## 2022-12-19 ASSESSMENT — ACTIVITIES OF DAILY LIVING (ADL)
ADLS_ACUITY_SCORE: 47
ADLS_ACUITY_SCORE: 46
ADLS_ACUITY_SCORE: 46
ADLS_ACUITY_SCORE: 49
ADLS_ACUITY_SCORE: 46
ADLS_ACUITY_SCORE: 46
ADLS_ACUITY_SCORE: 49
ADLS_ACUITY_SCORE: 46
ADLS_ACUITY_SCORE: 49
ADLS_ACUITY_SCORE: 46

## 2022-12-19 NOTE — PROGRESS NOTES
"Orthopedic Surgery  Jean Pierre Roblero  12/19/2022     Admit Date:  12/18/2022    Acute on chronic low back pain    History is somewhat limited due to patient being hard of hearing despite hearing aids being in place. The patient is resting comfortably in bed. Notes that his low back pain is the \"best it's been in awhile.\" He does report having burning over the anterior aspect of his left thigh. He states that he has occasionally experienced these symptoms in the past. Sometimes the discomfort and burning begins at the knee and travels proximally, and sometimes begins in the thigh and ends at the knee. The patient reports that he was told he had left knee osteoarthritis in the past. Patient notes sporadic tingling in both feet, which is chronic. Denies any changes with bowel or bladder habits. Denies loss of sensation in the genital region and inner thighs. The patient notes increased symptoms with ambulation. A TLSO brace was ordered yesterday. He has yet to be measured for this/receive a brace. He is on PRN Tylenol and started prednisone 40 mg QD yesterday. Tolerating oral intake. No chest pain, shortness of breath, nausea/vomiting, or abdominal pain. No acute events overnight.    Of note, patient is s/p spine decompression with Dr. Pichardo ([facility]) performed in 2005. The patient was scheduled to see Dr. Daniel Bentley on 12/20/22 at [facility] but this was cancelled due to his hospitalization.    Temp:  [97.6  F (36.4  C)-98.1  F (36.7  C)] 97.6  F (36.4  C)  Pulse:  [] 100  Resp:  [16] 16  BP: (127-180)/() 164/99  SpO2:  [96 %-98 %] 98 %    Patient hard of hearing.  Alert and oriented to self.   Skin intact in examined areas.  Left lower extremity is noticeably smaller circumferentially throughout compared to the contralateral leg. Patient states this is chronic.   Nontender over the lumbar spine or paraspinal musculature.   Nontender over the bilateral hips, thighs, and knee " joints.  Bilateral calves are soft, nontender.  Trace pitting edema at bilateral medial ankles.  Bilateral lower extremities are NVI.  5/5 great toe and ankle dorsiflexion, bilaterally.  5-/5 left hip flexion secondary to pain per patient, otherwise 5/5 bilateral hip extension and right hip flexion.  Negative straight leg raise.  Negative bilateral hip log roll.   Sensation intact to light touch.  DP pulse palpable, bilaterally.    Labs:  Recent Labs   Lab Test 12/19/22  0818 12/18/22  0800 11/08/22  0701   WBC 8.4 6.7 7.6   HGB 14.3 14.9 13.6    141* 145*     Recent Labs   Lab Test 05/26/22  0640   INR 1.05     Recent Labs   Lab Test 11/06/22  1324 08/17/22  0914 08/16/22  0822   CRP <2.9 23.9* 38.6*     Imaging:    Lumbar spine x-rays dated 11/6/22 were reviewed.  IMPRESSION:    5 lumbar type vertebral bodies. Normal vertebral body heights. Straightened  lordosis. 25 degree levoscoliosis apex L4-L5. Severe L2-L3 through L5-S1  interbody degeneration. Advanced L2-L3 through L5-S1 facet arthropathy. Mild  degenerative change of the sacroiliac joints.    Lumbar spine MRI report dated 11/29/22 was reviewed.  IMPRESSION:  1. Multilevel lumbar spondylosis. Grade 1 degenerative anterolisthesis L3-4, L4-5 and S1. Degenerative dextroscoliotic curve.   2. At L2-3, at least moderate central spinal canal stenosis with impingement of traversing left L3 and exiting left L2 nerve roots.   3. At L3-4, mature right hemilaminotomy. Persistent right greater than left neural foraminal stenosis with impingement of the exiting right L3 and probable impingement of traversing right L4 nerve roots.   4. At L4-5, a mature right hemilaminotomy. Right foraminal narrowing with right L4 nerve impingement.   5. At L5-S1, bilateral foraminal stenosis (left greater than right) with bilateral L5 nerve impingement.    A/P    1. Acute on chronic low back pain with intermittent left lower extremity paresthesias in the L3-4 distribution   No  motor deficits on exam today aside from trace weakness with left hip flexion which the patient attributes to pain. Distally, he has 5/5 strength throughout the bilateral lower extremities. No saddle anesthesia or changes in bowel/bladder function.   Discussed patient with on-call spine surgeon with [facility], Dr. Shrestha. He will review the patient's chart, but based off of current findings recommends scheduled Tylenol, NSAIDs, and outpatient follow up within the next 1-2 weeks with himself or Dr. Pichardo to discuss further options possibly to include an ESTEBAN. Patient voiced understanding of this.   Tylenol switched to scheduled. Patient is on prednisone 40 mg QD which was started while inpatient. Will defer dosing and tapering to hospital team, but would recommend transition to scheduled ibuprofen (800 mg TID with meals if tolerated) or Naproxen (440 mg BID with meals) as patient has normal creatinine and eGFR when able and no opioids at discharge if possible.   Mobilize with PT/OT.    WBAT with assistive device and nursing assistance.      Avoid repetitive twisting, bending, and lifting. Otherwise may complete activities as tolerated.   TLSO previously ordered. Awaiting fitting.   Follow-up in 1-2 weeks with Dr. Hermes Shrestha ([facility] - Care Coordinator Phone: [phone]) or Dr. Pichardo ([facility]).     Roselia Lakhani PA-C  [facility]

## 2022-12-19 NOTE — PLAN OF CARE
Goal Outcome Evaluation:      Plan of Care Reviewed With: patient    Summary: Acute on chronic back pain, weakness, deconditioning, low back degenerative changes   DATE & TIME: 12/18/2022, 9936-9397     Cognitive Concerns/ Orientation : A & O x 4, calm and cooperative, forgetful    BEHAVIOR & AGGRESSION TOOL COLOR: Green   CIWA SCORE: NA   ABNL VS/O2: VSS on RA ex HTN (140s systolic), asymptomatic   MOBILITY: remained in bed today, turn and repo q2hr, up with a walker. PT/OT consulted  PAIN MANAGMENT: reporting left lower back pain 6/10. Managed well with PRN tylenol, ibuprofen   DIET: regular, tolerating   BOWEL/BLADDER: BS active x 4, incontinent of B&B   ABNL LAB/BG: platelets 141   DRAIN/DEVICES: PIV/SL   TELEMETRY RHYTHM: NA   SKIN: pale, scattered bruising, intact, L. Buttock skin tear- mepilex in place.-> WOC consult may be needed?  TESTS/PROCEDURES: NA  D/C DAY/GOALS/PLACE: pending clinical improvement  OTHER IMPORTANT INFO: started on prednisone. Per MD note minimize bending, twisting, and lifting. Orthospine and orthotics consulted.

## 2022-12-19 NOTE — PROGRESS NOTES
RN called orthosis clinic inquiring about back brace. Was told that pt would be later consulted for brace this afternoon.

## 2022-12-19 NOTE — PROGRESS NOTES
12/19/22 1108   Appointment Info   Signing Clinician's Name / Credentials (OT) Gerard Hugo EdD, OTR/L   Rehab Comments (OT) Initial evaluation   Quick Adds   Quick Adds Certification   Living Environment   People in Home spouse   Current Living Arrangements house  (3 level split - living room, kitchen on entry level, go up to bedroom and bath, down to family room and bath)   Home Accessibility stairs within home   Number of Stairs, Within Home, Primary seven   Stair Railings, Within Home, Primary railing on right side (ascending)   Transportation Anticipated car, drives self;family or friend will provide   Living Environment Comments pt uses all levels, has an exercise bike in lower level.  Has not gone downstairs as much lately.   Self-Care   Usual Activity Tolerance moderate   Current Activity Tolerance fair   Regular Exercise Yes   Activity/Exercise Type biking;walking  (using stationary bike)   Exercise Amount/Frequency 3-5 times/wk;10 mins   Equipment Currently Used at Home grab bar, toilet;grab bar, tub/shower;raised toilet seat;shower chair;walker, rolling;cane, straight   Fall history within last six months yes   Number of times patient has fallen within last six months 2   Activity/Exercise/Self-Care Comment pt dresses and bathes self at baseline.  Uses cane inside and walker when he goes out.   General Information   Onset of Illness/Injury or Date of Surgery 12/18/22   Referring Physician Wai Buck   Patient/Family Therapy Goal Statement (OT) Pt hopes to return to regular activities.  Pain mostly in left leg from hip on down   Additional Occupational Profile Info/Pertinent History of Current Problem Pt is an 88 year old male admitted with ongoing, progressive back pain, weakness, deconditioning.  Past testing has shown multilevel degenerative scoliosis anterolisthesis at L3-S1, prior Right L3-5 hemilaminextomy, L2-3 moderate to severe central stenosis with lateral recess narrowing catching  traverse L3 nerve root, right sided L3-4 disprotrusion catching right Le traversing and right L4 nerve root.   Performance Patterns (Routines, Roles, Habits) pt has been more sedentary lately with ongoing pain.  Has been doing basic ADLS at home, spouse does most if not all housework.  Pt has hired someone for outside work   Existing Precautions/Restrictions fall;brace worn when out of bed  (brace has been ordered, waiting for the orthotist appointment)   General Observations and Info pt is [place], has bilateral hearing aides.  Still had some difficulty hearing despite hearing aides   Cognitive Status Examination   Orientation Status orientation to person, place and time   Affect/Mental Status (Cognitive) WFL  (spouse answering for pt at times)   Visual Perception   Visual Impairment/Limitations corrective lenses full-time   Pain Assessment   Patient Currently in Pain Yes, see Vital Sign flowsheet  (pt rating left sided pain 7/8 of 10 with movement, 2/3 of 10 when being still)   Range of Motion Comprehensive   General Range of Motion no range of motion deficits identified   Comment, General Range of Motion B UEs   Strength Comprehensive (MMT)   General Manual Muscle Testing (MMT) Assessment upper extremity strength deficits identified   Comment, General Manual Muscle Testing (MMT) Assessment general weakness, hard to do a full test secondary to pt's pain   Coordination   Upper Extremity Coordination No deficits were identified   Bed Mobility   Bed Mobility scooting/bridging;supine-sit;sit-supine   Scooting/Bridging [place] (Bed Mobility) verbal cues;maximum assist (25% patient effort)   Supine-Sit [place] (Bed Mobility) verbal cues;moderate assist (50% patient effort)   Sit-Supine [place] (Bed Mobility) verbal cues;moderate assist (50% patient effort)   Assistive Device (Bed Mobility) bed rails   Comment (Bed Mobility) pt trying to use logrolling technique, only partially successful   Clinical Impression    Criteria for Skilled Therapeutic Interventions Met (OT) Yes, treatment indicated   OT Diagnosis decreased independence and endurance with ADLS   OT Problem List-Impairments impacting ADL problems related to;activity tolerance impaired;range of motion (ROM);pain;strength;post-surgical precautions   Assessment of Occupational Performance 3-5 Performance Deficits   Identified Performance Deficits decreased independence in dressing, bathing, functional mobility, exercise routine   Planned Therapy Interventions (OT) ADL retraining;strengthening;home program guidelines;progressive activity/exercise   Clinical Decision Making Complexity (OT) low complexity   Anticipated Equipment Needs Upon Discharge (OT) dressing equipment   Risk & Benefits of therapy have been explained evaluation/treatment results reviewed;care plan/treatment goals reviewed;patient;spouse/significant other   OT Total Evaluation Time   OT Eval, Low Complexity Minutes (03873) 15   Therapy Certification   Medical Diagnosis acute on chronic back pain   Start of Care Date 12/19/22   Certification date from 12/19/22   Certification date to 12/23/22   OT Goals   Therapy Frequency (OT) Daily   OT Predicted Duration/Target Date for Goal Attainment 12/23/22   OT Goals Upper Body Dressing;Lower Body Dressing;Hygiene/Grooming;Toilet Transfer/Toileting;OT Goal 1   OT: Hygiene/Grooming modified independent;using adaptive equipment;within precautions;from wheelchair   OT: Upper Body Dressing Modified independent;including orthotic;using adaptive equipment;within precautions;including set-up/clothing retrieval   OT: Lower Body Dressing Modified independent;including orthotic;including set-up/clothing retrieval;within precautions;using adaptive equipment   OT: Toilet Transfer/Toileting Modified independent;toilet transfer;cleaning and garment management;using adaptive equipment;within precautions   OT: Goal 1 Pt will demonstrate proper logrolling technique for  managing bed mobility with SBA   OT Discharge Planning   OT Plan OT: Check on brace status, will need cane or walker for mobility.  LE dressing, toilet transfers, grooming at sink with AE as needed.  Practice logrolling technique for bed mobility.   OT Discharge Recommendation (DC Rec) home with assist   OT Rationale for DC Rec PT has been independent at baseline for basic ADLS and climbing some stairs.  Pt has three levels with 7 stairs each level, reports not doing as much on lower level lately secondary to pain.  Has supportive spouse and some AE at home already.  Anticipate pt will be able to move better once brace is placed, will need skilled OT here to make adjustments to complete basic ADLS while using brace.  Anticipate he will be able to return home with help of sposue and AE.  If pain becomes too much to manage stairs, dressing, and or bathroom transfers pt may need to think about a TCU stay.   OT Brief overview of current status Pt is [place], does not always hear everything with hearing aides in.  SBA to sit EOB using logroll technique, tolerance for sitting limited secondary to pain.  Will need to see how brace helps mobility to provide any further DC recommendations.   Total Session Time   Total Session Time (sum of timed and untimed services) 15      [facility]  OUTPATIENT OCCUPATIONAL THERAPY  EVALUATION  PLAN OF TREATMENT FOR OUTPATIENT REHABILITATION  (COMPLETE FOR INITIAL CLAIMS ONLY)  Patient's Last Name, First Name, M.I.  YOB: 1934  Jean Pierre Roblero                          Provider's Name  [facility] Medical Record No.  [id number]                             Onset Date:  12/18/22   Start of Care Date:  12/19/22   Type:     ___PT   _X_OT   ___SLP Medical Diagnosis:  acute on chronic back pain                    OT Diagnosis:  decreased independence and endurance with ADLS Visits from SOC:  1     See note for plan of  treatment, functional goals and certification details    I CERTIFY THE NEED FOR THESE SERVICES FURNISHED UNDER        THIS PLAN OF TREATMENT AND WHILE UNDER MY CARE     (Physician co-signature of this document indicates review and certification of the therapy plan).

## 2022-12-19 NOTE — PLAN OF CARE
Goal Outcome Evaluation:      Plan of Care Reviewed With: patient     Summary: Acute on chronic back pain, weakness, deconditioning, low back degenerative changes   DATE & TIME: 12/19/22 6544-7833  Cognitive Concerns/ Orientation : A & O x 4, calm and cooperative, forgetful    BEHAVIOR & AGGRESSION TOOL COLOR: Green   CIWA SCORE: NA   ABNL VS/O2: VSS on RA   MOBILITY: Not OOB this shift, ambulates with a walker at baseline; turn and repo for comfort.  PT/OT consulted  PAIN MANAGMENT: PRN Tylenol given x1, PRN Ibuprofen given x1 and ice packs for L lower back pain.   DIET: regular  BOWEL/BLADDER: BS active x 4, continent   ABNL LAB/BG: None new  DRAIN/DEVICES: PIV/SL   TELEMETRY RHYTHM: NA   SKIN: pale, scattered bruising, intact, L. Buttock skin tear- mepilex in place.    TESTS/PROCEDURES: NA  D/C DAY/GOALS/PLACE: pending clinical improvement  OTHER IMPORTANT INFO: started on prednisone. Per MD note minimize bending, twisting, and lifting. Orthospine and orthotics consulted.

## 2022-12-19 NOTE — UTILIZATION REVIEW
"Admission Status; Secondary Review Determination         Under the authority of the Utilization Management Committee, the utilization review process indicated a secondary review on the above patient.  The review outcome is based on review of the medical records, discussions with staff, and applying clinical experience noted on the date of the review.          (x) Observation Status Appropriate - This patient does not meet hospital inpatient criteria and is placed in observation status. If this patient's primary payer is Medicare and was admitted as an inpatient, Condition Code 44 should be used and patient status changed to \"observation\".     RATIONALE FOR DETERMINATION     Jean Pierre Roblero is an 88 year old male with history of GERD and difficulty swallowing.  He also had spine decompression surgery in 2005.  He presented to the emergency department on 12/18/2022 with back pain.    He had MRI of the lumbar spine completed on November 29, 2022 showing multilevel lumbar spondylosis and L2-L3 moderate central spinal canal stenosis with impingement of traversing left L3 and exiting L left L2 nerve roots.  Emergency department evaluation showed elevated blood pressure but otherwise unremarkable vital signs.  Laboratory evaluation was unremarkable except for some hematuria on urine analysis.  He was admitted to the hospital with back pain.  He was seen by orthopedic surgery and TLSO brace has been recommended.  No surgical intervention is planned.  Fitting is pending.  Pain has been managed with oral pain medication.  Observation care is appropriate for ongoing conservative management of back pain.      The severity of illness, intensity of service provided, expected LOS and risk for adverse outcome make the care appropriate for further observation; however, doesn't meet criteria for hospital inpatient admission. Dr Conor Ma was notified of this determination by [employer] page.    This document was " produced using voice recognition software.      The information on this document is developed by the utilization review team in order for the business office to ensure compliance.  This only denotes the appropriateness of proper admission status and does not reflect the quality of care rendered.         The definitions of Inpatient Status and Observation Status used in making the determination above are those provided in the CMS Coverage Manual, Chapter 1 and Chapter 6, section 70.4.      Sincerely,    Bernabe Berg MD    Utilization Review  Physician Advisor  [facility].

## 2022-12-19 NOTE — PROGRESS NOTES
[facility]  Hospitalist Progress Note  [facility]        Date of Service (when I saw the patient): 12/19/2022        Interval History:      Patient awaiting ortho consult, discussed plan of care, verbalized understanding.          Assessment and Plan:        Jean Pierre Roblero is a 88 year old male who presents with back pain.  Admitted for further evaluation and treatment.     Acute on chronic back pain, weakness, deconditioning, low back degenerative changes, history of left leg radiculopathy, osteoarthritis, degenerative disc disease: Patient with MRI of the lumbar spine completed on November 29, 2022 with results available in care everywhere showing multilevel lumbar spondylosis, see report for further details, but does have L2-L3 moderate central spinal canal stenosis with impingement of traversing left L3 and exiting L left L2 nerve roots, see report for further details.  Patient recently discharged on November 9, 2022 with admission for left hip pain, started on Tylenol and oxycodone and steroids, outpatient follow-up with orthopedics, see discharge note for further details.  Patient endorses back pain.  Denies headache, ear pain, eye pain, sore throat, cough, chest pain, abdominal pain, nausea, vomiting, dysuria, blood in the stool or urine, rash, fever.  Sodium is 136 on admission with a creatinine of 0.75.  Patient presenting to the emergency department with left lower back pain with radiation down the leg, previous admission for similar issues, seeing orthopedics, also seeing physical therapy, improved somewhat with narcotics and Tylenol, however, continues to have issues getting out of bed, uses a walker.  Plan:  -Orthospine consult.  -Pain control.  -Supportive management.  -Therapy consults.  -Ibuprofen.      Thrombocytopenia: Patient with a platelet count of 141 on admission.  -Monitor.     History of BPH: Patient previously on finasteride.  -Continue prior to  "admission finasteride      Dementia: Patient with increased risk for delirium.  -Delirium precautions.  -Memantine.      Cardiac, hypertension, hyperlipidemia: Patient previously on antihypertensives in the past.  -ASA, statin.     DVT Prophylaxis: SCD's.      Disposition: Pending orthopedics recommendations.     Puckett Catheter: Not present      Diet: Orders Placed This Encounter      Regular Diet Adult      Code: Full Code    Length of Stay:  LOS: 1 day /LOS: 1    Dr. Conor Buck DO, DEA, MHA  [facility] Hospitalist  Pager [phone]    DO TOR [facility]  Securely message with the Vocera Web Console (learn more here)  Text page via [employer] Paging/Directory      Clinically Significant Risk Factors                                                   Physical Exam:           Blood pressure (!) 164/99, pulse 100, temperature 97.6  F (36.4  C), temperature source Oral, resp. rate 16, height 1.702 m (5' 7\"), weight 64.9 kg (143 lb), SpO2 98 %.    Vital Sign Ranges  Temperature Temp  Av.9  F (36.6  C)  Min: 97.6  F (36.4  C)  Max: 98.1  F (36.7  C)   Blood pressure Systolic (24hrs), Av , Min:127 , Max:180        Diastolic (24hrs), Av, Min:73, Max:110      Pulse Pulse  Av.6  Min: 82  Max: 100   Respirations Resp  Av.1  Min: 16  Max: 17   Pulse oximetry SpO2  Av.4 %  Min: 96 %  Max: 98 %     Vital Signs with Ranges  Temp:  [97.6  F (36.4  C)-98.1  F (36.7  C)] 97.6  F (36.4  C)  Pulse:  [] 100  Resp:  [16-17] 16  BP: (127-180)/() 164/99  SpO2:  [96 %-98 %] 98 %  Temp:  [97.6  F (36.4  C)-98.1  F (36.7  C)] 97.6  F (36.4  C)  Pulse:  [] 100  Resp:  [16-17] 16  BP: (127-180)/() 164/99  SpO2:  [96 %-98 %] 98 %    I/O Last 3 Shifts:   I/O last 3 completed shifts:  In: 440 [P.O.:440]  Out: 400 [Urine:400]    I/O past 24 hours:     Intake/Output Summary (Last 24 hours) at 2022 0852  Last data filed at 2022 " 0700  Gross per 24 hour   Intake 440 ml   Output 525 ml   Net -85 ml       Oxygen  Temp: 97.6  F (36.4  C) Temp src: Oral BP: (!) 164/99 Pulse: 100   Resp: 16 SpO2: 98 % O2 Device: None (Room air)    Vitals:    12/18/22 0747   Weight: 64.9 kg (143 lb)       Lines  Peripheral IV 12/18/22 Anterior;Left Upper forearm (Active)   Site Assessment WDL 12/19/22 0012   Line Status Saline locked 12/19/22 0012   Phlebitis Scale 0-->no symptoms 12/19/22 0012   Infiltration Scale 0 12/19/22 0012   Infiltration Site Treatment Method  None 12/18/22 1540   Number of days: 1     GENERAL: Alert. NAD. Conversational. Pleasant.   HEENT: Normocephalic. Nares normal.   LUNGS: Clear to auscultation. No dyspnea at rest.   HEART: Regular rate. Extremities perfused.   ABDOMEN: Soft, nontender, and nondistended. Positive bowel sounds.   EXTREMITIES: No LE edema noted.  Low back pain to the left with radiation.  NEUROLOGIC: Moves extremities x4, increased pain to LLE with movement, distal cms in tact.          Prior to Admission Medications:        Medications Prior to Admission   Medication Sig Dispense Refill Last Dose     acetaminophen (TYLENOL) 500 MG tablet Take 1 tablet (500 mg) by mouth every 6 hours as needed for mild pain 30 tablet 0      aspirin (ASA) 81 MG EC tablet Take 1 tablet (81 mg) by mouth daily 30 tablet 3 12/17/2022     atorvastatin (LIPITOR) 10 MG tablet 1 tablet (10 mg) by Oral or Feeding Tube route every evening 30 tablet 0 12/17/2022     butalbital-acetaminophen-caffeine (ESGIC) -40 MG tablet Take 1 tablet by mouth every 4 hours as needed for headaches        cholestyramine (QUESTRAN) 4 G packet Take 1 packet by mouth 2 times daily (with meals)   12/17/2022     famotidine (PEPCID) 40 MG tablet Take 40 mg by mouth At Bedtime   12/17/2022     finasteride (PROSCAR) 5 MG tablet Take 5 mg by mouth daily   12/17/2022     fluticasone (FLONASE) 50 MCG/ACT nasal spray Spray 1 spray into both nostrils every 48 hours as  needed for rhinitis or allergies        guaiFENesin (MUCINEX) 600 MG 12 hr tablet Take 600 mg by mouth daily as needed        ibuprofen (ADVIL/MOTRIN) 200 MG tablet Take 400 mg by mouth 2 times daily as needed for pain        magnesium 250 MG tablet Take 1 tablet by mouth every evening   12/17/2022     memantine (NAMENDA) 10 MG tablet Take 10 mg by mouth 2 times daily   12/17/2022     Multiple Vitamin (MULTI-VITAMIN) per tablet Take 1 tablet by mouth daily   12/17/2022     multivitamin  with lutein (OCUVITE WITH LTEIN) CAPS per capsule Take 1 capsule by mouth daily   12/17/2022     oxyCODONE (ROXICODONE) 5 MG tablet Take 1 tablet (5 mg) by mouth every 6 hours as needed for moderate to severe pain 16 tablet 0      Phenylephrine HCl (ROX-SYNEPHRINE NA) Spray 1 spray into both nostrils daily as needed        sodium chloride (OCEAN) 0.65 % nasal spray Spray 1 spray into both nostrils every 48 hours as needed for congestion        vitamin B-12 (CYANOCOBALAMIN) 1000 MCG tablet Take 1,000 mcg by mouth daily Noon   12/17/2022     vitamin C (ASCORBIC ACID) 500 MG tablet Take 500 mg by mouth daily   12/17/2022     vitamin D3 (CHOLECALCIFEROL) 2000 units (50 mcg) tablet Take 2,000 Units by mouth daily Noon   12/17/2022     zinc sulfate (ZINCATE) 220 (50 Zn) MG capsule Take 220 mg by mouth daily noon   12/17/2022            Medications:        Current Facility-Administered Medications   Medication Last Rate     Current Facility-Administered Medications   Medication Dose Route Frequency     acetaminophen  650 mg Oral Q6H    Or     acetaminophen  650 mg Rectal Q6H     aspirin  81 mg Oral Daily     atorvastatin  10 mg Oral or Feeding Tube QPM     cholestyramine  1 packet Oral BID w/meals     famotidine  40 mg Oral At Bedtime     finasteride  5 mg Oral Daily     memantine  10 mg Oral BID     predniSONE  40 mg Oral Daily     sodium chloride (PF)  3 mL Intracatheter Q8H     Current Facility-Administered Medications   Medication Dose  Route Frequency     bisacodyl  10 mg Rectal Daily PRN     fluticasone  1 spray Both Nostrils Q48H PRN     guaiFENesin  600 mg Oral Daily PRN     HYDROmorphone  0.2 mg Intravenous Q2H PRN     ibuprofen  400 mg Oral BID PRN     lidocaine 4%   Topical Q1H PRN     lidocaine (buffered or not buffered)  0.1-1 mL Other Q1H PRN     melatonin  1 mg Oral At Bedtime PRN     naloxone  0.2 mg Intravenous Q2 Min PRN    Or     naloxone  0.4 mg Intravenous Q2 Min PRN    Or     naloxone  0.2 mg Intramuscular Q2 Min PRN    Or     naloxone  0.4 mg Intramuscular Q2 Min PRN     ondansetron  4 mg Oral Q6H PRN    Or     ondansetron  4 mg Intravenous Q6H PRN     oxyCODONE IR  2.5 mg Oral Q4H PRN     polyethylene glycol  17 g Oral Daily PRN     senna-docusate  1 tablet Oral BID PRN    Or     senna-docusate  2 tablet Oral BID PRN     sodium chloride  1 spray Both Nostrils Q48H PRN     sodium chloride (PF)  3 mL Intracatheter q1 min prn     ___________________________________________________________________________  Medications       acetaminophen  650 mg Oral Q6H    Or     acetaminophen  650 mg Rectal Q6H     aspirin  81 mg Oral Daily     atorvastatin  10 mg Oral or Feeding Tube QPM     cholestyramine  1 packet Oral BID w/meals     famotidine  40 mg Oral At Bedtime     finasteride  5 mg Oral Daily     memantine  10 mg Oral BID     predniSONE  40 mg Oral Daily     sodium chloride (PF)  3 mL Intracatheter Q8H          Data:      Lab data reviewed.     Data   Recent Labs   Lab 12/19/22  0818 12/18/22  0800   WBC 8.4 6.7   HGB 14.3 14.9   MCV 97 100    141*    136   POTASSIUM 3.5  3.5 5.1   CHLORIDE 102 101   CO2 29 29   BUN 17 12   CR 0.84 0.75   ANIONGAP 7 6   KENYA 9.0 9.4   * 137*           Imaging:      Imaging data reviewed.     No results found for this or any previous visit (from the past 24 hour(s)).    Dr. Conor Buck DO, DEA, MHA  [facility]  Pager [phone]

## 2022-12-20 ENCOUNTER — APPOINTMENT (OUTPATIENT)
Dept: GENERAL RADIOLOGY | Facility: CLINIC | Age: 87
DRG: 552 | End: 2022-12-20
Attending: ORTHOPAEDIC SURGERY
Payer: MEDICARE

## 2022-12-20 LAB
ANION GAP SERPL CALCULATED.3IONS-SCNC: 8 MMOL/L (ref 3–14)
BUN SERPL-MCNC: 26 MG/DL (ref 7–30)
CALCIUM SERPL-MCNC: 8.8 MG/DL (ref 8.5–10.1)
CHLORIDE BLD-SCNC: 104 MMOL/L (ref 94–109)
CO2 SERPL-SCNC: 27 MMOL/L (ref 20–32)
CREAT SERPL-MCNC: 0.82 MG/DL (ref 0.66–1.25)
ERYTHROCYTE [DISTWIDTH] IN BLOOD BY AUTOMATED COUNT: 12.5 % (ref 10–15)
GFR SERPL CREATININE-BSD FRML MDRD: 84 ML/MIN/1.73M2
GLUCOSE BLD-MCNC: 100 MG/DL (ref 70–99)
HCT VFR BLD AUTO: 42.4 % (ref 40–53)
HGB BLD-MCNC: 14.3 G/DL (ref 13.3–17.7)
MAGNESIUM SERPL-MCNC: 2.5 MG/DL (ref 1.6–2.3)
MCH RBC QN AUTO: 33.5 PG (ref 26.5–33)
MCHC RBC AUTO-ENTMCNC: 33.7 G/DL (ref 31.5–36.5)
MCV RBC AUTO: 99 FL (ref 78–100)
PLATELET # BLD AUTO: 162 10E3/UL (ref 150–450)
POTASSIUM BLD-SCNC: 3.4 MMOL/L (ref 3.4–5.3)
POTASSIUM BLD-SCNC: 4.1 MMOL/L (ref 3.4–5.3)
RBC # BLD AUTO: 4.27 10E6/UL (ref 4.4–5.9)
SODIUM SERPL-SCNC: 139 MMOL/L (ref 133–144)
WBC # BLD AUTO: 9.1 10E3/UL (ref 4–11)

## 2022-12-20 PROCEDURE — 250N000009 HC RX 250: Performed by: HOSPITALIST

## 2022-12-20 PROCEDURE — 99231 SBSQ HOSP IP/OBS SF/LOW 25: CPT | Performed by: HOSPITALIST

## 2022-12-20 PROCEDURE — 250N000011 HC RX IP 250 OP 636: Performed by: HOSPITALIST

## 2022-12-20 PROCEDURE — 62323 NJX INTERLAMINAR LMBR/SAC: CPT

## 2022-12-20 PROCEDURE — 84132 ASSAY OF SERUM POTASSIUM: CPT | Performed by: HOSPITALIST

## 2022-12-20 PROCEDURE — 250N000013 HC RX MED GY IP 250 OP 250 PS 637: Performed by: HOSPITALIST

## 2022-12-20 PROCEDURE — 83735 ASSAY OF MAGNESIUM: CPT | Performed by: HOSPITALIST

## 2022-12-20 PROCEDURE — 85027 COMPLETE CBC AUTOMATED: CPT | Performed by: HOSPITALIST

## 2022-12-20 PROCEDURE — 80048 BASIC METABOLIC PNL TOTAL CA: CPT | Performed by: HOSPITALIST

## 2022-12-20 PROCEDURE — 250N000013 HC RX MED GY IP 250 OP 250 PS 637: Performed by: PHYSICIAN ASSISTANT

## 2022-12-20 PROCEDURE — 255N000002 HC RX 255 OP 636: Performed by: HOSPITALIST

## 2022-12-20 PROCEDURE — 3E0R33Z INTRODUCTION OF ANTI-INFLAMMATORY INTO SPINAL CANAL, PERCUTANEOUS APPROACH: ICD-10-PCS | Performed by: PHYSICIAN ASSISTANT

## 2022-12-20 PROCEDURE — 36415 COLL VENOUS BLD VENIPUNCTURE: CPT | Performed by: HOSPITALIST

## 2022-12-20 PROCEDURE — 120N000001 HC R&B MED SURG/OB

## 2022-12-20 RX ORDER — OXYCODONE HYDROCHLORIDE 5 MG/1
2.5 TABLET ORAL EVERY 6 HOURS PRN
Qty: 15 TABLET | Refills: 0 | Status: ON HOLD | OUTPATIENT
Start: 2022-12-20 | End: 2024-02-05

## 2022-12-20 RX ORDER — POTASSIUM CHLORIDE 1.5 G/1.58G
40 POWDER, FOR SOLUTION ORAL ONCE
Status: COMPLETED | OUTPATIENT
Start: 2022-12-20 | End: 2022-12-20

## 2022-12-20 RX ORDER — IOPAMIDOL 408 MG/ML
10 INJECTION, SOLUTION INTRATHECAL ONCE
Status: COMPLETED | OUTPATIENT
Start: 2022-12-20 | End: 2022-12-20

## 2022-12-20 RX ORDER — DEXAMETHASONE SODIUM PHOSPHATE 10 MG/ML
20 INJECTION, SOLUTION INTRAMUSCULAR; INTRAVENOUS ONCE
Status: COMPLETED | OUTPATIENT
Start: 2022-12-20 | End: 2022-12-20

## 2022-12-20 RX ADMIN — POTASSIUM CHLORIDE 40 MEQ: 1.5 POWDER, FOR SOLUTION ORAL at 10:49

## 2022-12-20 RX ADMIN — MEMANTINE 10 MG: 10 TABLET ORAL at 08:15

## 2022-12-20 RX ADMIN — ASPIRIN 81 MG: 81 TABLET, COATED ORAL at 08:15

## 2022-12-20 RX ADMIN — FAMOTIDINE 40 MG: 20 TABLET ORAL at 21:00

## 2022-12-20 RX ADMIN — ACETAMINOPHEN 650 MG: 325 TABLET, FILM COATED ORAL at 10:50

## 2022-12-20 RX ADMIN — MEMANTINE 10 MG: 10 TABLET ORAL at 20:57

## 2022-12-20 RX ADMIN — CHOLESTYRAMINE 4 G: 4 POWDER, FOR SUSPENSION ORAL at 08:16

## 2022-12-20 RX ADMIN — LIDOCAINE HYDROCHLORIDE 2 ML: 10 INJECTION, SOLUTION EPIDURAL; INFILTRATION; INTRACAUDAL; PERINEURAL at 15:49

## 2022-12-20 RX ADMIN — LIDOCAINE HYDROCHLORIDE 3 ML: 10 INJECTION, SOLUTION EPIDURAL; INFILTRATION; INTRACAUDAL; PERINEURAL at 15:56

## 2022-12-20 RX ADMIN — IOPAMIDOL 1 ML: 408 INJECTION, SOLUTION INTRATHECAL at 15:53

## 2022-12-20 RX ADMIN — FINASTERIDE 5 MG: 5 TABLET, FILM COATED ORAL at 08:14

## 2022-12-20 RX ADMIN — CHOLESTYRAMINE 4 G: 4 POWDER, FOR SUSPENSION ORAL at 17:59

## 2022-12-20 RX ADMIN — ACETAMINOPHEN 650 MG: 325 TABLET, FILM COATED ORAL at 16:34

## 2022-12-20 RX ADMIN — ACETAMINOPHEN 650 MG: 325 TABLET, FILM COATED ORAL at 23:23

## 2022-12-20 RX ADMIN — IBUPROFEN 400 MG: 400 TABLET, FILM COATED ORAL at 08:15

## 2022-12-20 RX ADMIN — DEXAMETHASONE SODIUM PHOSPHATE 20 MG: 10 INJECTION, SOLUTION INTRAMUSCULAR; INTRAVENOUS at 15:55

## 2022-12-20 RX ADMIN — ATORVASTATIN CALCIUM 10 MG: 10 TABLET, FILM COATED ORAL at 20:57

## 2022-12-20 RX ADMIN — IBUPROFEN 400 MG: 400 TABLET, FILM COATED ORAL at 17:59

## 2022-12-20 ASSESSMENT — ACTIVITIES OF DAILY LIVING (ADL)
ADLS_ACUITY_SCORE: 52
CHANGE_IN_FUNCTIONAL_STATUS_SINCE_ONSET_OF_CURRENT_ILLNESS/INJURY: YES
TOILETING: 1-->ASSISTANCE (EQUIPMENT/PERSON) NEEDED
DRESSING/BATHING: BATHING DIFFICULTY, REQUIRES EQUIPMENT
ADLS_ACUITY_SCORE: 58
VISION_MANAGEMENT: GLASSES
WEAR_GLASSES_OR_BLIND: YES
DRESS: 1-->ASSISTANCE (EQUIPMENT/PERSON) NEEDED
FALL_HISTORY_WITHIN_LAST_SIX_MONTHS: YES
TOILETING: 1-->ASSISTANCE (EQUIPMENT/PERSON) NEEDED (NOT DEVELOPMENTALLY APPROPRIATE)
ADLS_ACUITY_SCORE: 52
TRANSFERRING: 1-->ASSISTANCE (EQUIPMENT/PERSON) NEEDED (NOT DEVELOPMENTALLY APPROPRIATE)
ADLS_ACUITY_SCORE: 58
ADLS_ACUITY_SCORE: 52
ADLS_ACUITY_SCORE: 52
DIFFICULTY_EATING/SWALLOWING: NO
ADLS_ACUITY_SCORE: 58
ADLS_ACUITY_SCORE: 52
WALKING_OR_CLIMBING_STAIRS: AMBULATION DIFFICULTY, REQUIRES EQUIPMENT
DOING_ERRANDS_INDEPENDENTLY_DIFFICULTY: OTHER (SEE COMMENTS)
DEPENDENT_IADLS:: CLEANING;COOKING;LAUNDRY;SHOPPING;MEAL PREPARATION;MEDICATION MANAGEMENT;MONEY MANAGEMENT;TRANSPORTATION
DIFFICULTY_COMMUNICATING: NO
ADLS_ACUITY_SCORE: 59
CONCENTRATING,_REMEMBERING_OR_MAKING_DECISIONS_DIFFICULTY: YES
DRESSING/BATHING_DIFFICULTY: YES
TOILETING_ISSUES: YES
DRESS: 1-->ASSISTANCE (EQUIPMENT/PERSON) NEEDED (NOT DEVELOPMENTALLY APPROPRIATE)
ADLS_ACUITY_SCORE: 52
WALKING_OR_CLIMBING_STAIRS_DIFFICULTY: YES
ADLS_ACUITY_SCORE: 58
ADLS_ACUITY_SCORE: 52
BATHING: 1-->ASSISTANCE NEEDED
TRANSFERRING: 1-->ASSISTANCE (EQUIPMENT/PERSON) NEEDED
NUMBER_OF_TIMES_PATIENT_HAS_FALLEN_WITHIN_LAST_SIX_MONTHS: 2

## 2022-12-20 NOTE — PROGRESS NOTES
RADIOLOGY PROCEDURE NOTE  Patient name: Jean Pierre Roblero  MRN: [id number]  : 1934    Pre-procedure diagnosis: Back and left leg pain  Post-procedure diagnosis: Same    Procedure Date/Time: 2022  3:59 PM  Procedure: Left L2-L3 TFESI  Estimated blood loss: None  Specimen(s) collected with description: none  The patient tolerated the procedure well with no immediate complications.  Significant findings:none    See imaging dictation for procedural details.    Provider name: Fady Hart PA-C  Assistant(s):None

## 2022-12-20 NOTE — PROGRESS NOTES
[facility]  Hospitalist Progress Note  [facility]        Date of Service (when I saw the patient): 12/20/2022        Interval History:      Patient states he is doing ok, denies questions.          Assessment and Plan:        Jean Pierre Roblero is a 88 year old male who presents with back pain.  Admitted for further evaluation and treatment.     Acute on chronic back pain, weakness, deconditioning, low back degenerative changes, history of left leg radiculopathy, osteoarthritis, degenerative disc disease: Patient with MRI of the lumbar spine completed on November 29, 2022 with results available in care everywhere showing multilevel lumbar spondylosis, see report for further details, but does have L2-L3 moderate central spinal canal stenosis with impingement of traversing left L3 and exiting L left L2 nerve roots, see report for further details.  Patient recently discharged on November 9, 2022 with admission for left hip pain, started on Tylenol and oxycodone and steroids, outpatient follow-up with orthopedics, see discharge note for further details.  Patient endorses back pain.  Denies headache, ear pain, eye pain, sore throat, cough, chest pain, abdominal pain, nausea, vomiting, dysuria, blood in the stool or urine, rash, fever.  Sodium is 136 on admission with a creatinine of 0.75.  Patient presenting to the emergency department with left lower back pain with radiation down the leg, previous admission for similar issues, seeing orthopedics, also seeing physical therapy, improved somewhat with narcotics and Tylenol, however, continues to have issues getting out of bed, uses a walker.  Plan:  -Orthospine consulted.  -Pain control.  -Supportive management.  -Therapy consults.  -Ibuprofen.   -Possible epidural.      Thrombocytopenia: Patient with a platelet count of 141 on admission.  -Monitor.     History of BPH: Patient previously on finasteride.  -Continue prior to admission  "finasteride      Dementia: Patient with increased risk for delirium.  -Delirium precautions.  -Memantine.      Cardiac, hypertension, hyperlipidemia: Patient previously on antihypertensives in the past.  -ASA, statin.      DVT Prophylaxis: SCD's.      Disposition: Today or tomorrow, possible epidural on .     Puckett Catheter: Not present      Diet: Orders Placed This Encounter      Regular Diet Adult      Code: Full Code    Length of Stay:  LOS: 1 day /LOS: 1    Dr. Conor Buck DO, DEA, MHA  M [facility] Hospitalist  Pager [phone]    DO [facility]  Securely message with the Voc[employer] Web Console (learn more here)  Text page via [employer] Paging/Directory      Clinically Significant Risk Factors                        # Severe Obesity: Estimated body mass index is 40.12 kg/m  as calculated from the following:    Height as of this encounter: 1.702 m (5' 7\").    Weight as of this encounter: 116.2 kg (256 lb 2.8 oz)., PRESENT ON ADMISSION                            Physical Exam:           Blood pressure (!) 156/95, pulse 85, temperature 97.6  F (36.4  C), temperature source Oral, resp. rate 18, height 1.702 m (5' 7\"), weight 116.2 kg (256 lb 2.8 oz), SpO2 98 %.    Vital Sign Ranges  Temperature Temp  Av.8  F (36.6  C)  Min: 97.6  F (36.4  C)  Max: 97.9  F (36.6  C)   Blood pressure Systolic (24hrs), Av , Min:127 , Max:164        Diastolic (24hrs), Av, Min:76, Max:99      Pulse Pulse  Av.7  Min: 80  Max: 95   Respirations Resp  Av.7  Min: 16  Max: 18   Pulse oximetry SpO2  Av %  Min: 94 %  Max: 98 %     Vital Signs with Ranges  Temp:  [97.6  F (36.4  C)-97.9  F (36.6  C)] 97.6  F (36.4  C)  Pulse:  [80-95] 85  Resp:  [16-18] 18  BP: (127-164)/(76-99) 156/95  SpO2:  [94 %-98 %] 98 %  Temp:  [97.6  F (36.4  C)-97.9  F (36.6  C)] 97.6  F (36.4  C)  Pulse:  [80-95] 85  Resp:  [16-18] 18  BP: (127-164)/(76-99) 156/95  SpO2:  [94 %-98 " %] 98 %    I/O Last 3 Shifts:   I/O last 3 completed shifts:  In: 640 [P.O.:640]  Out: 125 [Urine:125]    I/O past 24 hours:     Intake/Output Summary (Last 24 hours) at 12/20/2022 0820  Last data filed at 12/20/2022 0543  Gross per 24 hour   Intake 640 ml   Output --   Net 640 ml       Oxygen  Temp: 97.6  F (36.4  C) Temp src: Oral BP: (!) 156/95 Pulse: 85   Resp: 18 SpO2: 98 % O2 Device: None (Room air)    Vitals:    12/18/22 0747 12/20/22 0645   Weight: 64.9 kg (143 lb) 116.2 kg (256 lb 2.8 oz)       Lines  Peripheral IV 12/18/22 Anterior;Left Upper forearm (Active)   Site Assessment WDL 12/19/22 0900   Line Status Saline locked 12/19/22 0900   Phlebitis Scale 0-->no symptoms 12/19/22 0900   Infiltration Scale 0 12/19/22 0900   Infiltration Site Treatment Method  None 12/19/22 0900   Number of days: 2         GENERAL: Alert. NAD. Conversational. Pleasant.   HEENT: Normocephalic. Nares normal.   LUNGS: Clear to auscultation. No dyspnea at rest.   HEART: Regular rate. Extremities perfused.   ABDOMEN: Soft, nontender, and nondistended. Positive bowel sounds.   EXTREMITIES: No LE edema noted.  Low back pain to the left with radiation.  NEUROLOGIC: Moves extremities x4, increased pain to LLE with movement, distal cms in tact.               Prior to Admission Medications:        Medications Prior to Admission   Medication Sig Dispense Refill Last Dose     acetaminophen (TYLENOL) 500 MG tablet Take 1 tablet (500 mg) by mouth every 6 hours as needed for mild pain 30 tablet 0      aspirin (ASA) 81 MG EC tablet Take 1 tablet (81 mg) by mouth daily 30 tablet 3 12/17/2022     atorvastatin (LIPITOR) 10 MG tablet 1 tablet (10 mg) by Oral or Feeding Tube route every evening 30 tablet 0 12/17/2022     butalbital-acetaminophen-caffeine (ESGIC) -40 MG tablet Take 1 tablet by mouth every 4 hours as needed for headaches        cholestyramine (QUESTRAN) 4 G packet Take 1 packet by mouth 2 times daily (with meals)   12/17/2022      famotidine (PEPCID) 40 MG tablet Take 40 mg by mouth At Bedtime   12/17/2022     finasteride (PROSCAR) 5 MG tablet Take 5 mg by mouth daily   12/17/2022     fluticasone (FLONASE) 50 MCG/ACT nasal spray Spray 1 spray into both nostrils every 48 hours as needed for rhinitis or allergies        guaiFENesin (MUCINEX) 600 MG 12 hr tablet Take 600 mg by mouth daily as needed        ibuprofen (ADVIL/MOTRIN) 200 MG tablet Take 400 mg by mouth 2 times daily as needed for pain        magnesium 250 MG tablet Take 1 tablet by mouth every evening   12/17/2022     memantine (NAMENDA) 10 MG tablet Take 10 mg by mouth 2 times daily   12/17/2022     Multiple Vitamin (MULTI-VITAMIN) per tablet Take 1 tablet by mouth daily   12/17/2022     multivitamin  with lutein (OCUVITE WITH LTEIN) CAPS per capsule Take 1 capsule by mouth daily   12/17/2022     oxyCODONE (ROXICODONE) 5 MG tablet Take 1 tablet (5 mg) by mouth every 6 hours as needed for moderate to severe pain 16 tablet 0      Phenylephrine HCl (ROX-SYNEPHRINE NA) Spray 1 spray into both nostrils daily as needed        sodium chloride (OCEAN) 0.65 % nasal spray Spray 1 spray into both nostrils every 48 hours as needed for congestion        vitamin B-12 (CYANOCOBALAMIN) 1000 MCG tablet Take 1,000 mcg by mouth daily Noon   12/17/2022     vitamin C (ASCORBIC ACID) 500 MG tablet Take 500 mg by mouth daily   12/17/2022     vitamin D3 (CHOLECALCIFEROL) 2000 units (50 mcg) tablet Take 2,000 Units by mouth daily Noon   12/17/2022     zinc sulfate (ZINCATE) 220 (50 Zn) MG capsule Take 220 mg by mouth daily noon   12/17/2022            Medications:        Current Facility-Administered Medications   Medication Last Rate     Current Facility-Administered Medications   Medication Dose Route Frequency     acetaminophen  650 mg Oral Q6H    Or     acetaminophen  650 mg Rectal Q6H     aspirin  81 mg Oral Daily     atorvastatin  10 mg Oral or Feeding Tube QPM     cholestyramine  1 packet Oral  BID w/meals     famotidine  40 mg Oral At Bedtime     finasteride  5 mg Oral Daily     ibuprofen  400 mg Oral BID     memantine  10 mg Oral BID     sodium chloride (PF)  3 mL Intracatheter Q8H     Current Facility-Administered Medications   Medication Dose Route Frequency     bisacodyl  10 mg Rectal Daily PRN     fluticasone  1 spray Both Nostrils Q48H PRN     guaiFENesin  600 mg Oral Daily PRN     HYDROmorphone  0.2 mg Intravenous Q2H PRN     ibuprofen  200 mg Oral Q6H PRN     lidocaine 4%   Topical Q1H PRN     lidocaine (buffered or not buffered)  0.1-1 mL Other Q1H PRN     melatonin  1 mg Oral At Bedtime PRN     naloxone  0.2 mg Intravenous Q2 Min PRN    Or     naloxone  0.4 mg Intravenous Q2 Min PRN    Or     naloxone  0.2 mg Intramuscular Q2 Min PRN    Or     naloxone  0.4 mg Intramuscular Q2 Min PRN     ondansetron  4 mg Oral Q6H PRN    Or     ondansetron  4 mg Intravenous Q6H PRN     oxyCODONE IR  2.5 mg Oral Q4H PRN     polyethylene glycol  17 g Oral Daily PRN     senna-docusate  1 tablet Oral BID PRN    Or     senna-docusate  2 tablet Oral BID PRN     sodium chloride  1 spray Both Nostrils Q48H PRN     sodium chloride (PF)  3 mL Intracatheter q1 min prn     ___________________________________________________________________________  Medications       acetaminophen  650 mg Oral Q6H    Or     acetaminophen  650 mg Rectal Q6H     aspirin  81 mg Oral Daily     atorvastatin  10 mg Oral or Feeding Tube QPM     cholestyramine  1 packet Oral BID w/meals     famotidine  40 mg Oral At Bedtime     finasteride  5 mg Oral Daily     ibuprofen  400 mg Oral BID     memantine  10 mg Oral BID     sodium chloride (PF)  3 mL Intracatheter Q8H          Data:      Lab data reviewed.     Data   Recent Labs   Lab 12/19/22  0818 12/18/22  0800   WBC 8.4 6.7   HGB 14.3 14.9   MCV 97 100    141*    136   POTASSIUM 3.5  3.5 5.1   CHLORIDE 102 101   CO2 29 29   BUN 17 12   CR 0.84 0.75   ANIONGAP 7 6   KENYA 9.0 9.4   GLC  100* 137*           Imaging:      Imaging data reviewed.     No results found for this or any previous visit (from the past 24 hour(s)).    Dr. Conor Buck DO, DEA, A  [facility] Hospitalist  Pager [phone]

## 2022-12-20 NOTE — PROGRESS NOTES
Orthopaedic Spine Consult - Dictated   No advanced imaging available. Was done at an [facility] facility recently, radiologist interpretation reviewed.   Impression:  89 yo man with degenerative scoliosis, spondylolisthesis and multileve foraminal stenosis.  Symptoms are consistent with left L3 probably from the central stenosis at L2-3 as the majority of the foraminal stenosis is on the right. .     Recommendations:  Have outside imaging imported into the [employer] system so actual films can be reviewed.   Patient may benefit from a transforaminal epidural steroid injection on the right L2-3. Will schedule with radiology.  This could be done as an outpatient if patient is comfortable enough to be discharged.

## 2022-12-20 NOTE — H&P
Admitted: 12/18/2022    I was asked to evaluate Mr. Roblero for his low back and left leg pain by Dr. Lakhani.      CHIEF COMPLAINT:  Low back and left leg pain.    HISTORY OF PRESENT ILLNESS:  Mr. Roblero is an 88-year-old man who has a long history of low back problems.  He is status post lumbar decompression surgery with Dr. Bashir in the distant past.  He is mildly confused, but does answer questions and states that he had good result following that surgery, until approximately a year ago when he developed recurrent symptoms of left low back and left leg pain.  His symptoms have been progressive and worse over the last several weeks.  He was admitted to the hospital 2 days ago for pain control.  While in-house, he has been treated with oral steroids, ibuprofen, Tylenol.  He feels that the pain is better, but it is still difficult for him to ambulate.  His symptoms are relieved lying down and sitting in a chair and worse when he is standing and walking.    PAST MEDICAL HISTORY:  Significant for admission to [facility] with same complaints in November.  He has a history of thrombocytopenia, benign prostatic hypertrophy, dementia, hypertension, hyperlipidemia.    SOCIAL HISTORY:  He lives with his wife in their home.  He has been using a walker for a couple of years.      PHYSICAL EXAMINATION:      VITALS: temperature of 97.6, pulse of 85, blood pressure 136/95, respirations 18, oxygen saturation 98% on room air.    GENERAL:  Mr. Roblero appeared to be resting comfortably in bed.  He answered questions appropriately and was cooperative with the physical examination.      SKIN: He had a well-healed surgical scar in the midline, approximately the L4 to L5 level.     MUSCULOSKELETAL:  Examination of his back showed no abnormal skin markings or hairy patches.   He was tender with palpation over the lumbar paraspinal muscles, left greater than right.  There was no deformity.  He had a full range of motion about his  hips and knees without pain.  There was no muscular atrophy.    NEUROLOGIC EXAMINATION:  He was mildly confused. He had no facial asymmetry. Sensation: intact to light touch.  Motor: grade 5/5 throughout the lower extremities. Tension signs were negative.    IMAGING STUDIES:  AP and lateral of the lumbar spine were reviewed from 11/06/2022.  They showed marked osteoporosis with a degenerative scoliosis, convexity to the left from L3 to the sacrum, and to the right more proximally.  The lateral view showed a degenerative spondylolisthesis of L3 on L4, L4 on L5 and L5 on S1, again with marked osteopenia.    Report of an outside MRI scan showed moderate to severe central stenosis at the L2-3 level with evidence of old decompression at the L4-5 level foraminal stenosis at multiple levels which was predominantly on the right side.    LABORATORY DATA:  His CBC from today showed WBC of 9.1, hemoglobin 14.3.  No differential was obtained.    IMPRESSION:  Jean Pierre Dickerson is an 88-year-old man with the following diagnoses:  1.  Degenerative scoliosis.  2.  Degenerative spondylolisthesis.  3.  Marked osteoporosis.  4.  Multilevel foraminal stenosis, which was prominently on the right, moderate to severe central stenosis at the L2 to L3 level.    IMPRESSION:  I suspect his symptoms of back pain are related to his degenerative scoliosis and spondylolisthesis, and the leg symptoms are related to the central stenosis at the L2 to L3 level.  He may benefit from a transforaminal epidural steroid injection on the left at L2 to L3 level, and I ordered this with radiology.  He can follow up in my clinic after discharge if he has become comfortable enough with oral pain medication during this hospitalization, this injection could be done as an outpatient.  Please reconsult me during this admission if further orthopedic spine care is needed.    Hermes Shrestha MD        D: 12/20/2022   T: 12/20/2022   MT: RAIZA    Name:     JEAN PIERRE DICKERSON  IMAN BURGOSN:      [id number]        Account:     [id number]   :      1934           Admitted:    2022       Document: [id number]

## 2022-12-20 NOTE — PROVIDER NOTIFICATION
MD Notification    Notified Person: MD    Notified Person Name: Dr. Clayton Shrestha    Notification Date/Time: 12/20/22, 11:52    Notification Interaction: Paged clinic    Purpose of Notification: Pt in 66-17, pt would like epidural during this hospital stay. Wife is asking for more information about procedure when you or a PA have time, thanks    Orders Received:    Comments:

## 2022-12-20 NOTE — UTILIZATION REVIEW
"Admission Status; Secondary Review Determination     Under the authority of the Utilization Management Commitee, the utilization review process indicated a secondary review on the above patient. The review outcome is based on review of the medical records, discussions with staff, and applying clinical experience noted on the date of the review.     (x) Inpatient Status Appropriate - This patient's medical care is consistent with medical management for inpatient care and reasonable inpatient medical practice.     RATIONALE FOR DETERMINATION: Per admit H&P:      \"88 year old male who presents with back pain.....Patient with MRI of the lumbar spine completed on November 29, 2022 with results available in care everywhere showing multilevel lumbar spondylosis, see report for further details, but does have L2-L3 moderate central spinal canal stenosis with impingement of traversing left L3 and exiting L left L2 nerve roots\"    VS notable for mildly elevated blood pressure    Labs unremarkable    No imaging performed    Orthopedic surgery evaluated the patient.  They recommended conservative management with TLSO fitting yesterday.  Today orthopedics is recommending epidural steroid injection given ongoing pain.  This has been ordered.    Pain is being managed with oral oxycodone prn    It is anticipated at least one more night will be required in the hospital for management of his symptoms, including ESTEBAN being ordered given persistent pain.  Inpatient status appears appropriate.  Dr. Buck notified of this recommendation via [employer] paging system today    At the time of admission with the information available to the attending physician more than 2 nights Hospital complex care was anticipated, based on patient risk of adverse outcome if treated as outpatient and complex care required. Inpatient admission is appropriate based on the Medicare guidelines.    The information on this document is developed by the utilization " review team in order for the business office to ensure compliance. This only denotes the appropriateness of proper admission status and does not reflect the quality of care rendered.   The definitions of Inpatient Status and Observation Status used in making the determination above are those provided in the CMS Coverage Manual, Chapter 1 and Chapter 6, section 70.4.     Sincerely,     Esau Clayton MD  Utilization Review   Physician Advisor   [facility]

## 2022-12-20 NOTE — PROGRESS NOTES
Summary: Acute on chronic back pain, weakness, deconditioning, low back degenerative changes   DATE & TIME: 12/19/22-12/20/22 3300-1947  Cognitive Concerns/ Orientation : A & O x 3, disoriented to place. calm and cooperative, forgetful    BEHAVIOR & AGGRESSION TOOL COLOR: Green   CIWA SCORE: NA   ABNL VS/O2: VSS on RA  MOBILITY: Ax1 gb + walker; no bending/twisting.   PAIN MANAGMENT: Scheduled Tylenol given  DIET: regular  BOWEL/BLADDER: Incontinent of urine this shift, had 1 soft  BM  ABNL LAB/BG: None  DRAIN/DEVICES: PIV/SL   TELEMETRY RHYTHM: NA   SKIN: pale, scattered bruising, L. Buttock skin tear- mepilex in place.    TESTS/PROCEDURES: NA  D/C DAY/GOALS/PLACE: pending clinical improvement  OTHER IMPORTANT INFO: Per Orthopedics note -- minimize bending, twisting, and lifting. PT/OT following

## 2022-12-20 NOTE — CONSULTS
"Care Management Initial Consult    General Information  Assessment completed with: Patient, Spouse or significant other,    Type of CM/SW Visit: Initial Assessment    Primary Care Provider verified and updated as needed: Yes   Readmission within the last 30 days:        Reason for Consult: discharge planning, emotional/coping/adjustment concerns, financial concerns  Advance Care Planning: Advance Care Planning Reviewed: no concerns identified        Communication Assessment  Patient's communication style: spoken language (English or Bilingual)    Hearing Difficulty or Deaf: yes      Cognitive  Cognitive/Neuro/Behavioral: .WDL except, orientation  Level of Consciousness: alert  Arousal Level: opens eyes spontaneously  Orientation: oriented x 4, other (see comments) (confused at times)  Mood/Behavior: calm, cooperative  Best Language: 0 - No aphasia  Speech: clear, slow    Living Environment:   People in home: spouse     Current living Arrangements: house      Able to return to prior arrangements: yes     Family/Social Support:  Care provided by: homecare agency, spouse/significant other  Provides care for: no one  Marital Status:   Wife, Children  Lianet       Description of Support System: Supportive, Involved    Support Assessment: Caregiver experiencing high stress, Caregiver difficulty providing physical care/safety  Lianet (spouse) verbalizes many concerns about \"the plan\" for discharge. She expressed frustration that the ortho MD was not available to meet with her while she was here. Writer attempted to reassure her that the doctor can call her to review the plan, or she can be on speaker phone while MD is meeting with Rex. Writer also reassured her that therapies are working with Rex, and that nursing staff are getting him up in his room for meals and toileting. Orthotics should be delivering the back brace today, at which time PT can evaluate him. Reviewed follow up plan at discharge (PCP, ortho, C). " Provided Senior [employer] line brochure.    Current Resources:   Patient receiving home care services: Yes  Skilled Home Care Services: Phys[facility] Therapy  Emailed with BERENICE Clement liaison with [facility] about Rex. He is open to them for PT. She is following for discharge orders. Writer discussed adding OT and HHA at discharge.    Community Resources: None  Equipment currently used at home: grab bar, toilet, grab bar, tub/shower, raised toilet seat, shower chair, walker, rolling, cane, straight  Supplies currently used at home: Incontinence Supplies, Hearing Aid Batteries    Employment/Financial:  Employment Status: retired        Financial Concerns: other (see comments) (Spouse expresses concerns about insurance coverage)   Referral to Financial Worker: No  Writer encouraged Lianet to reach out to Medicare (via phone # on MOON) about specifics of their insurance coverage. Offered to provide additional numbers but she declined. Rex and Lianet have Medicare and BCBS supplement.       Lifestyle & Psychosocial Needs:  Social Determinants of Health     Tobacco Use: Not on file   Alcohol Use: Not on file   Financial Resource Strain: Not on file   Food Insecurity: Not on file   Transportation Needs: Not on file   Physical Activity: Not on file   Stress: Not on file   Social Connections: Not on file   Intimate Partner Violence: Not on file   Depression: Not on file   Housing Stability: Not on file       Functional Status:  Prior to admission patient needed assistance:   Dependent ADLs:: Ambulation-walker  Dependent IADLs:: Cleaning, Cooking, Laundry, Shopping, Meal Preparation, Medication Management, Money Management, Transportation     Mental Health Status:  Mental Health Status: No Current Concerns       Chemical Dependency Status:  Chemical Dependency Status: No Current Concerns           Values/Beliefs:  Spiritual, Cultural Beliefs, Nondenominational Practices, Values that affect care: no               Additional Information:  Rex was  admitted to [facility] for acute on chronic back pain limiting his mobility at home to the degree that EMS brought him in when his son couldn't assist him out of bed. CM is consulted for discharge planning. Writer met with Rex and Lianet (spouse) at bedside. Rex is A&Ox3 at time of assessment. Writer introduced self and role. Rex reports that he is feeling better today. Writer explained that home care with [facility] will be resumed at discharge as OT is recommending home with assist. Reassured Lianet that discharge plans are fluid and CC will be following to ensure safety and to coordinate discharge needs. Writer followed up with bedside RNs about spouse's concerns.   PLAN: Rex will discharge to home with home care through [facility]. Will need new orders for OT/HHA at discharge. Awaiting PT and ortho recommendations, as well as delivery of TLSO brace.     Brittani Melton, RN, CWCN  Inpatient Care Coordinator

## 2022-12-20 NOTE — PLAN OF CARE
Goal Outcome Evaluation:         Summary: Acute on chronic back pain, weakness, deconditioning, low back degenerative changes   DATE & TIME: 12/19/22 8604-1054  Cognitive Concerns/ Orientation : A & O x 4, calm and cooperative, forgetful    BEHAVIOR & AGGRESSION TOOL COLOR: Green   CIWA SCORE: NA   ABNL VS/O2: 146/84 at 1141; otherwise VSS on RA  MOBILITY: Ax1 gb + walker; turn and repo for comfort  PAIN MANAGMENT: 2.5 mg PRN oxy given; scheduled ibuprofen 2x daily. Ice packs for L lower back pain.   DIET: regular  BOWEL/BLADDER: BS normoactive, continent   ABNL LAB/BG: None  DRAIN/DEVICES: PIV/SL   TELEMETRY RHYTHM: NA   SKIN: pale, scattered bruising, intact, L. Buttock skin tear- mepilex in place.    TESTS/PROCEDURES: NA  D/C DAY/GOALS/PLACE: pending clinical improvement  OTHER IMPORTANT INFO: Per MD note -- minimize bending, twisting, and lifting. Surgical Spine and Orthotics consulted. Pt does not have brace yet.

## 2022-12-20 NOTE — PROGRESS NOTES
[facility]  Patient is currently open to home care services with [facility]. The patient is currently receiving PT services.  and home health team have been notified of patient admission. [facility] liaison will continue to follow patient during stay. If appropriate provide orders to resume home care at time of discharge.

## 2022-12-21 ENCOUNTER — APPOINTMENT (OUTPATIENT)
Dept: PHYSICAL THERAPY | Facility: CLINIC | Age: 87
DRG: 552 | End: 2022-12-21
Attending: HOSPITALIST
Payer: MEDICARE

## 2022-12-21 VITALS
SYSTOLIC BLOOD PRESSURE: 165 MMHG | BODY MASS INDEX: 21.59 KG/M2 | DIASTOLIC BLOOD PRESSURE: 92 MMHG | OXYGEN SATURATION: 98 % | HEART RATE: 97 BPM | RESPIRATION RATE: 18 BRPM | TEMPERATURE: 97.4 F | HEIGHT: 67 IN | WEIGHT: 137.57 LBS

## 2022-12-21 LAB
ANION GAP SERPL CALCULATED.3IONS-SCNC: 9 MMOL/L (ref 3–14)
BUN SERPL-MCNC: 31 MG/DL (ref 7–30)
CALCIUM SERPL-MCNC: 8.5 MG/DL (ref 8.5–10.1)
CHLORIDE BLD-SCNC: 106 MMOL/L (ref 94–109)
CO2 SERPL-SCNC: 22 MMOL/L (ref 20–32)
CREAT SERPL-MCNC: 0.76 MG/DL (ref 0.66–1.25)
ERYTHROCYTE [DISTWIDTH] IN BLOOD BY AUTOMATED COUNT: 12.4 % (ref 10–15)
GFR SERPL CREATININE-BSD FRML MDRD: 86 ML/MIN/1.73M2
GLUCOSE BLD-MCNC: 127 MG/DL (ref 70–99)
HCT VFR BLD AUTO: 40.6 % (ref 40–53)
HGB BLD-MCNC: 13.6 G/DL (ref 13.3–17.7)
MAGNESIUM SERPL-MCNC: 2.2 MG/DL (ref 1.6–2.3)
MCH RBC QN AUTO: 33.2 PG (ref 26.5–33)
MCHC RBC AUTO-ENTMCNC: 33.5 G/DL (ref 31.5–36.5)
MCV RBC AUTO: 99 FL (ref 78–100)
PLATELET # BLD AUTO: 159 10E3/UL (ref 150–450)
POTASSIUM BLD-SCNC: 4 MMOL/L (ref 3.4–5.3)
POTASSIUM BLD-SCNC: 4 MMOL/L (ref 3.4–5.3)
RBC # BLD AUTO: 4.1 10E6/UL (ref 4.4–5.9)
SODIUM SERPL-SCNC: 137 MMOL/L (ref 133–144)
WBC # BLD AUTO: 8.5 10E3/UL (ref 4–11)

## 2022-12-21 PROCEDURE — 97162 PT EVAL MOD COMPLEX 30 MIN: CPT | Mod: GP

## 2022-12-21 PROCEDURE — 250N000013 HC RX MED GY IP 250 OP 250 PS 637: Performed by: HOSPITALIST

## 2022-12-21 PROCEDURE — 84132 ASSAY OF SERUM POTASSIUM: CPT | Performed by: HOSPITALIST

## 2022-12-21 PROCEDURE — 97116 GAIT TRAINING THERAPY: CPT | Mod: GP

## 2022-12-21 PROCEDURE — 83735 ASSAY OF MAGNESIUM: CPT | Performed by: HOSPITALIST

## 2022-12-21 PROCEDURE — 85027 COMPLETE CBC AUTOMATED: CPT | Performed by: HOSPITALIST

## 2022-12-21 PROCEDURE — 99239 HOSP IP/OBS DSCHRG MGMT >30: CPT | Performed by: HOSPITALIST

## 2022-12-21 PROCEDURE — 36415 COLL VENOUS BLD VENIPUNCTURE: CPT | Performed by: HOSPITALIST

## 2022-12-21 PROCEDURE — 97530 THERAPEUTIC ACTIVITIES: CPT | Mod: GP

## 2022-12-21 PROCEDURE — 250N000013 HC RX MED GY IP 250 OP 250 PS 637: Performed by: PHYSICIAN ASSISTANT

## 2022-12-21 RX ADMIN — ACETAMINOPHEN 650 MG: 325 TABLET, FILM COATED ORAL at 05:16

## 2022-12-21 RX ADMIN — ASPIRIN 81 MG: 81 TABLET, COATED ORAL at 08:03

## 2022-12-21 RX ADMIN — IBUPROFEN 400 MG: 400 TABLET, FILM COATED ORAL at 08:03

## 2022-12-21 RX ADMIN — CHOLESTYRAMINE 4 G: 4 POWDER, FOR SUSPENSION ORAL at 08:03

## 2022-12-21 RX ADMIN — MEMANTINE 10 MG: 10 TABLET ORAL at 08:03

## 2022-12-21 RX ADMIN — ACETAMINOPHEN 650 MG: 325 TABLET, FILM COATED ORAL at 11:21

## 2022-12-21 RX ADMIN — FINASTERIDE 5 MG: 5 TABLET, FILM COATED ORAL at 08:03

## 2022-12-21 ASSESSMENT — ACTIVITIES OF DAILY LIVING (ADL)
ADLS_ACUITY_SCORE: 59

## 2022-12-21 NOTE — PLAN OF CARE
Goal Outcome Evaluation:         Summary: Acute on chronic back pain, weakness, deconditioning, low back degenerative changes   DATE & TIME: 12/20/22 6818-3773  Cognitive Concerns/ Orientation : A & O x 3-4, disoriented to place at times. Calm and cooperative, forgetful at times   BEHAVIOR & AGGRESSION TOOL COLOR: Green   CIWA SCORE: NA   ABNL VS/O2: VSS on RA  MOBILITY: Ax1 gb + walker; no bending/twisting.   PAIN MANAGMENT: Scheduled Tylenol and ibuprofen given  DIET: regular  BOWEL/BLADDER: Incontinent of urine this shift x1, BM x1; urinal at bedside  ABNL LAB/BG: None  DRAIN/DEVICES: PIV/SL   TELEMETRY RHYTHM: NA   SKIN: pale, scattered bruising, L. Buttock skin tear - mepilex in place    TESTS/PROCEDURES: Lumbar epidural performed this afternoon - dressing CDI  D/C DAY/GOALS/PLACE: pending clinical improvement  OTHER IMPORTANT INFO: Per Orthopedics note -- minimize bending, twisting, and lifting. PT/OT following

## 2022-12-21 NOTE — PROGRESS NOTES
Care Management Discharge Note    Discharge Date: 12/21/2022       Discharge Disposition: Home, Home Care    Discharge Services: None    Discharge DME: None    Discharge Transportation: family or friend will provide    Private pay costs discussed: Not applicable    PAS Confirmation Code:    Patient/family educated on Medicare website which has current facility and service quality ratings:      Education Provided on the Discharge Plan:    Persons Notified of Discharge Plans: charge RN, hospitalist, bedside RN  Patient/Family in Agreement with the Plan: yes    Handoff Referral Completed: No    Additional Information:  Rex discharged home with his spouse. HHC through [facility] will be resumed.     Brittani Melton RN, CWCN  Inpatient Care Coordinator

## 2022-12-21 NOTE — PROGRESS NOTES
I called patient's wife, Lianet to discuss his condition and explain his condition and the epidural injection I recommended. She had all of her questions answered.

## 2022-12-21 NOTE — PLAN OF CARE
Goal Outcome Evaluation:       Summary: Acute on chronic back pain, weakness, deconditioning, low back degenerative changes   DATE & TIME: 12/20/22-12/21/22 2742-0548  Cognitive Concerns/ Orientation : A & O x 3, disoriented to place. Calm and cooperative, forgetful at times . [place]-uses Bilateral hearing aids  BEHAVIOR & AGGRESSION TOOL COLOR: Green   CIWA SCORE: NA   ABNL VS/O2: VSS on RA  MOBILITY: Ax1 gb + walker; no bending/twisting.   PAIN MANAGMENT: Scheduled Tylenol given, Ice pack applied. Patient reported pain came back after lumbar epidural injection.  DIET: regular  BOWEL/BLADDER:Incontinent this shift. Uses the urinal at bedside also. No BM this shift  ABNL LAB/BG: BUN 31  DRAIN/DEVICES: PIV/SL   TELEMETRY RHYTHM: NA   SKIN: pale, scattered bruising, L. Buttock skin tear - mepilex in place    TESTS/PROCEDURES: Lumbar epidural performed 12/21 - dressing CDI  D/C DAY/GOALS/PLACE: pending clinical improvement of symptoms  OTHER IMPORTANT INFO: Per Orthopedics note -- minimize bending, twisting, and lifting. PT/OT following

## 2022-12-21 NOTE — DISCHARGE SUMMARY
Physician Discharge Summary        Primary Care Physician:  Zaki Alaniz Admission Date: 12/18/2022   Discharge Provider: Conor Buck DO Discharge Date: 12/21/2022   Disposition: home Code Status: Full Code   Activity: as directed by therapy Diet: Orders Placed This Encounter      Regular Diet Adult      Diet        Condition at Discharge: stable.       Discharge Diagnoses/Hospital Summary:      Jean Pierre Roblero is a 88 year old male who presents with back pain.  Admitted for further evaluation and treatment.     Acute on chronic back pain, weakness, deconditioning, low back degenerative changes, history of left leg radiculopathy, osteoarthritis, degenerative disc disease: Patient with MRI of the lumbar spine completed on November 29, 2022 with results available in care everywhere showing multilevel lumbar spondylosis, see report for further details, but does have L2-L3 moderate central spinal canal stenosis with impingement of traversing left L3 and exiting L left L2 nerve roots, see report for further details.  Patient recently discharged on November 9, 2022 with admission for left hip pain, started on Tylenol and oxycodone and steroids, outpatient follow-up with orthopedics, see discharge note for further details.  Patient endorses back pain.  Denies headache, ear pain, eye pain, sore throat, cough, chest pain, abdominal pain, nausea, vomiting, dysuria, blood in the stool or urine, rash, fever.  Sodium is 136 on admission with a creatinine of 0.75.  Patient presenting to the emergency department with left lower back pain with radiation down the leg, previous admission for similar issues, seeing orthopedics, also seeing physical therapy, improved somewhat with narcotics and Tylenol, however, continues to have issues getting out of bed, uses a walker.  Plan:  -Orthospine consulted.   -Close outpatient follow up.   -Therapy ordered for home.  -Completed on December 20 an epidural, see report for  procedure note for further details.      Thrombocytopenia: Patient with a platelet count of 141 on admission.  -Monitor. Close outpatient follow up.      History of BPH: Patient previously on finasteride.  -Continue prior to admission finasteride      Dementia: Patient with increased risk for delirium.  -Memantine.      Cardiac, hypertension, hyperlipidemia: Patient previously on antihypertensives in the past.  -ASA, statin.     Discharge Medications:      Review of your medicines      CONTINUE these medicines which may have CHANGED, or have new prescriptions. If we are uncertain of the size of tablets/capsules you have at home, strength may be listed as something that might have changed.      Dose / Directions   oxyCODONE 5 MG tablet  Commonly known as: ROXICODONE  This may have changed:     how much to take    reasons to take this      Dose: 2.5 mg  Take 0.5 tablets (2.5 mg) by mouth every 6 hours as needed for breakthrough pain  Quantity: 15 tablet  Refills: 0        CONTINUE these medicines which have NOT CHANGED      Dose / Directions   aspirin 81 MG EC tablet  Commonly known as: ASA  Used for: TIA (transient ischemic attack)      Dose: 81 mg  Take 1 tablet (81 mg) by mouth daily  Quantity: 30 tablet  Refills: 3     atorvastatin 10 MG tablet  Commonly known as: LIPITOR  Used for: TIA (transient ischemic attack)      Dose: 10 mg  1 tablet (10 mg) by Oral or Feeding Tube route every evening  Quantity: 30 tablet  Refills: 0     butalbital-acetaminophen-caffeine -40 MG tablet  Commonly known as: ESGIC      Dose: 1 tablet  Take 1 tablet by mouth every 4 hours as needed for headaches  Refills: 0     cholestyramine 4 g packet  Commonly known as: QUESTRAN      Dose: 1 packet  Take 1 packet by mouth 2 times daily (with meals)  Refills: 0     famotidine 40 MG tablet  Commonly known as: PEPCID      Dose: 40 mg  Take 40 mg by mouth At Bedtime  Refills: 0     finasteride 5 MG tablet  Commonly known as: PROSCAR       Dose: 5 mg  Take 5 mg by mouth daily  Refills: 0     fluticasone 50 MCG/ACT nasal spray  Commonly known as: FLONASE      Dose: 1 spray  Spray 1 spray into both nostrils every 48 hours as needed for rhinitis or allergies  Refills: 0     guaiFENesin 600 MG 12 hr tablet  Commonly known as: MUCINEX      Dose: 600 mg  Take 600 mg by mouth daily as needed  Refills: 0     ibuprofen 200 MG tablet  Commonly known as: ADVIL/MOTRIN      Dose: 400 mg  Take 400 mg by mouth 2 times daily as needed for pain  Refills: 0     memantine 10 MG tablet  Commonly known as: NAMENDA      Dose: 10 mg  Take 10 mg by mouth 2 times daily  Refills: 0     multivitamin  with lutein Caps per capsule      Dose: 1 capsule  Take 1 capsule by mouth daily  Refills: 0     sodium chloride 0.65 % nasal spray  Commonly known as: OCEAN      Dose: 1 spray  Spray 1 spray into both nostrils every 48 hours as needed for congestion  Refills: 0     vitamin B-12 1000 MCG tablet  Commonly known as: CYANOCOBALAMIN      Dose: 1,000 mcg  Take 1,000 mcg by mouth daily Noon  Refills: 0     vitamin C 500 MG tablet  Commonly known as: ASCORBIC ACID      Dose: 500 mg  Take 500 mg by mouth daily  Refills: 0     vitamin D3 50 mcg (2000 units) tablet  Commonly known as: CHOLECALCIFEROL      Dose: 2,000 Units  Take 2,000 Units by mouth daily Noon  Refills: 0        STOP taking    acetaminophen 500 MG tablet  Commonly known as: TYLENOL        magnesium 250 MG tablet        Multi-vitamin tablet  Generic drug: multivitamin w/minerals        ROX-SYNEPHRINE NA        zinc sulfate 220 (50 Zn) MG capsule  Commonly known as: ZINCATE              Where to get your medicines      Some of these will need a paper prescription and others can be bought over the counter. Ask your nurse if you have questions.    Bring a paper prescription for each of these medications    oxyCODONE 5 MG tablet               Discharge Instructions:  Follow up appointment with Primary Care Physician: Katty  Zaki SADLER  Follow up appointment with Specialist: Ortho spine.        Vital Signs in last 24 hours:    Temp:  [97.4  F (36.3  C)-97.9  F (36.6  C)] 97.4  F (36.3  C)  Pulse:  [82-97] 97  Resp:  [16-18] 18  BP: (121-192)/() 165/92  SpO2:  [96 %-98 %] 98 %    GENERAL: Alert. NAD. Conversational. Pleasant.  Conversational.  HEENT: Normocephalic. Nares normal.   LUNGS: Clear to auscultation. No dyspnea at rest.   HEART: Regular rate. Extremities perfused.   ABDOMEN: Soft, nontender, and nondistended. Positive bowel sounds.   EXTREMITIES: No LE edema noted.  Low back pain to the left with radiation.  NEUROLOGIC: Moves extremities x4, increased pain to LLE with movement, distal cms in tact.     Total Time on discharge process is: 33 minutes    Dr. Conor Buck DO, MBA, A  Internal Medicine Hospitalist  12/21/2022

## 2022-12-21 NOTE — PLAN OF CARE
Occupational Therapy Discharge Summary    Reason for therapy discharge:    Discharged to home.    Progress towards therapy goal(s). See goals on Care Plan in [facility] electronic health record for goal details.  Goals partially met.  Barriers to achieving goals:   discharge from facility.    Therapy recommendation(s):    Has supportive spouse and some AE at home already.  Anticipate pt will be able to move better once brace is placed, will need skilled OT here to make adjustments to complete basic ADLS while using brace.  Anticipate he will be able to return home with help of sposue and AE.  If pain becomes too much to manage stairs, dressing, and or bathroom transfers pt may need to think about a TCU stay.    **Of note, writing therapist did not evaluate nor treat this patient - See OT flowsheet for details regarding previous therapist recommendation.

## 2022-12-21 NOTE — PROGRESS NOTES
Summary: Acute on chronic back pain, weakness, deconditioning, low back degenerative changes   DATE & TIME: 12/20/22-12/21/22 2078-4565  Cognitive Concerns/ Orientation : A & O x 3, disoriented to place. Calm and cooperative, forgetful at times . [place]-uses Bilateral hearing aids  BEHAVIOR & AGGRESSION TOOL COLOR: Green   CIWA SCORE: NA   ABNL VS/O2: VSS on RA  MOBILITY: Ax1 gb + walker; no bending/twisting.   PAIN MANAGMENT: Scheduled Tylenol given, Ice pack applied. Patient reported pain came back after lumbar epidural injection.   DIET: regular  BOWEL/BLADDER: Uses the urinal at bedside. No BM this shift  ABNL LAB/BG: Magnesium 2.5  DRAIN/DEVICES: PIV/SL   TELEMETRY RHYTHM: NA   SKIN: pale, scattered bruising, L. Buttock skin tear - mepilex in place    TESTS/PROCEDURES: Lumbar epidural performed 12/21 - dressing CDI  D/C DAY/GOALS/PLACE: pending clinical improvement of symptoms  OTHER IMPORTANT INFO: Per Orthopedics note -- minimize bending, twisting, and lifting. PT/OT following

## 2022-12-21 NOTE — PROGRESS NOTES
"   12/21/22 1140   Appointment Info   Signing Clinician's Name / Credentials (PT) Jacky Mclain DPT       Present no   Living Environment   People in Home spouse   Current Living Arrangements house   Home Accessibility stairs within home   Number of Stairs, Within Home, Primary seven   Stair Railings, Within Home, Primary railing on right side (ascending)   Transportation Anticipated family or friend will provide   Living Environment Comments Pt lives in split level home with 2 ALEXEY and 7 stairs up to upstairs or downstairs. Spouse drives.   Self-Care   Usual Activity Tolerance moderate   Current Activity Tolerance fair   Regular Exercise No   Equipment Currently Used at Home grab bar, toilet;grab bar, tub/shower;raised toilet seat;shower chair;walker, rolling;cane, straight   Fall history within last six months yes   Number of times patient has fallen within last six months 1   Activity/Exercise/Self-Care Comment Pt uses FWW at baseline for mobility. Pt claiming that he is IND with ADLs, but needing assistance at times with toiletting. Pt has FWW, cane, shower chair, raises toilet seat, and grab bars throughout his bathroom.   General Information   Onset of Illness/Injury or Date of Surgery 12/18/22   Referring Physician Conor Buck, DO   Patient/Family Therapy Goals Statement (PT) return home   Pertinent History of Current Problem (include personal factors and/or comorbidities that impact the POC) Per chart review, \"Mr. Roblero is an 88-year-old man who has a long history of low back problems.  He is status post lumbar decompression surgery with Dr. Bashir in the distant past.  He is mildly confused, but does answer questions and states that he had good result following that surgery, until approximately a year ago when he developed recurrent symptoms of left low back and left leg pain.  His symptoms have been progressive and worse over the last several weeks.  He was admitted to the " "hospital 2 days ago for pain control.  While in-house, he has been treated with oral steroids, ibuprofen, Tylenol.  He feels that the pain is better, but it is still difficult for him to ambulate.  His symptoms are relieved lying down and sitting in a chair and worse when he is standing and walking.\"   Existing Precautions/Restrictions spinal   General Observations Per ortho note, minimize bending, lifting, and twisting through lumbar spine.   Cognition   Affect/Mental Status (Cognition) WFL   Orientation Status (Cognition) oriented x 3   Cognitive Status Comments pt is [place] with use of bilateral hearing aids. Able to answer most questions appropriately throughout, but mildly confused at times.   Integumentary/Edema   Integumentary/Edema no deficits were identifed   Posture    Posture Forward head position;Protracted shoulders   Range of Motion (ROM)   ROM Comment LE AROM mildly limited secondary to weakness   Strength (Manual Muscle Testing)   Strength Comments pt testing grossly 4/5 in bilateral LEs in hip flexion, knee extension, and ankle DF. no focal deficits identified   Bed Mobility   Comment, (Bed Mobility) not assessed during evaluation   Transfers   Comment, (Transfers) SBA sit>stand w/ FWW and toilet transfer   Gait/Stairs (Locomotion)   Distance in Feet 10' eval   Distance in Feet (Gait) 200'   Comment, (Gait/Stairs) Pt ambulated 10' w/ FWW and SBA for evaluation. Demonstrating slow, but steady gait speed. Decreased bilateral step length throughout. Forward stooped posture   Balance   Balance Comments dynamic balance impaired secondary to needing FWW for ambulation   Sensory Examination   Sensory Perception Comments light touch sensation intact throughout bilateral lower extremities   Clinical Impression   Criteria for Skilled Therapeutic Intervention Yes, treatment indicated   PT Diagnosis (PT) impaired functional mobility   Influenced by the following impairments impaired activity tolerance, functional " LE strength deficit, impaired dynamic balance   Functional limitations due to impairments limited independence with functional mobility   Clinical Presentation (PT Evaluation Complexity) Evolving/Changing   Clinical Presentation Rationale clinical judgement   Clinical Decision Making (Complexity) moderate complexity   Planned Therapy Interventions (PT) balance training;bed mobility training;gait training;patient/family education;stair training;strengthening;transfer training;progressive activity/exercise;home program guidelines   Anticipated Equipment Needs at Discharge (PT) walker, rolling   Risk & Benefits of therapy have been explained evaluation/treatment results reviewed;care plan/treatment goals reviewed;risks/benefits reviewed;current/potential barriers reviewed;participants voiced agreement with care plan;participants included;patient   PT Total Evaluation Time   PT Eval, Moderate Complexity Minutes (13523) 8   Physical Therapy Goals   PT Frequency Daily   PT Predicted Duration/Target Date for Goal Attainment 12/28/22   PT Goals Bed Mobility;Transfers;Gait;Stairs   PT: Bed Mobility Supervision/stand-by assist;Supine to/from sit   PT: Transfers Supervision/stand-by assist;Sit to/from stand;Bed to/from chair;Assistive device;Completed;Goal Met   PT: Gait Supervision/stand-by assist;Rolling walker;Greater than 200 feet   PT: Stairs Supervision/stand-by assist;7 stairs;Rail on right;Completed;Goal Met   Interventions   Interventions Quick Adds Gait Training;Therapeutic Activity   Therapeutic Activity   Therapeutic Activities: dynamic activities to improve functional performance Minutes (22425) 15   Symptoms Noted During/After Treatment None   Treatment Detail/Skilled Intervention Greeted pt upon arrival to room. Pt agreeable to working with PT. PT evaluation completed and role of IP PT explained to pt. Pt sitting up in recliner upon entering room. Requesting to use restroom at beginning of session. Sit>stand w/  FWW and CGA. Cueing for safe and efficient sit<>stand procedure including scooting to the front edge of the chair, to lean forward with nose over toes, and hand and foot placement. Pt ambulated short distance to restroom w/ use of FWW and SBA. Toilet transfer w/ FWW and SBA. Pt able to perform some jennifer cares while standing, but ultimately requiring assistance. Able to stand at sink and wash hands with SBA and FWW. Following ambulation, pt stand>sit to recliner w/ SBA and FWW. Cueing to feel the chair with both legs and reach back to find the arm rest prior to sitting. At end of session, pt left sitting up in recliner with all needs met, call light within reach, and chair alarm on. RN and care coordinator updated.   Gait Training   Gait Training Minutes (09437) 12   Symptoms Noted During/After Treatment (Gait Training) fatigue   Treatment Detail/Skilled Intervention Pt ambulated 100' w/ FWW and SBA. Demonstrating slow, but steady gait speed throughout. Noting pt demonstrating forward head  posture with head and eyes facing downward and stooped forward posture. Cueing to stand tall with head and eyes up. Improved upright posture following cueing. Pt ascended and descended 10 stairs with handrail support, hand hold support, and CGA. Cueing for use of step-to pattern while going up and going down. Pt demonstrating good stability with step-to pattern and hand hold assist.   PT Discharge Planning   PT Plan PT: bed mobility, log roll technique, increase ambulation distance, review stairs   PT Discharge Recommendation (DC Rec) home with assist;home with home care physical therapy   PT Rationale for DC Rec Pt is near or slightly below baseline mobility levels at this time. At baseline, pt uses a FWW for mobility and has intermittent assistance with ADLs from spouse. Demonstrating SBA with FWW for most mobility including transfers and ambulation. Lives with spouse who can provide 24/7 assist with functional mobility as  needed. Pt was able to navigate 10 stairs with hand hold assist. Also recommend HHPT to continue improving safety and independence functional mobility and to decrease back pain.   PT Brief overview of current status SBA transfers and ambulation w/ FWW, CGA with handrail and hand hold assist for stair navigation   Total Session Time   Timed Code Treatment Minutes 27   Total Session Time (sum of timed and untimed services) 35

## 2022-12-21 NOTE — PROGRESS NOTES
[facility]  Hospitalist Progress Note  [facility]        Date of Service (when I saw the patient): 12/21/2022        Interval History:      Patient debating whether or not he feels comfortable going home today, extensive discussions with care management, social work, nursing staff, family members including patient's son and patient.         Assessment and Plan:        Jean Pierre Roblero is a 88 year old male who presents with back pain.  Admitted for further evaluation and treatment.     Acute on chronic back pain, weakness, deconditioning, low back degenerative changes, history of left leg radiculopathy, osteoarthritis, degenerative disc disease: Patient with MRI of the lumbar spine completed on November 29, 2022 with results available in care everywhere showing multilevel lumbar spondylosis, see report for further details, but does have L2-L3 moderate central spinal canal stenosis with impingement of traversing left L3 and exiting L left L2 nerve roots, see report for further details.  Patient recently discharged on November 9, 2022 with admission for left hip pain, started on Tylenol and oxycodone and steroids, outpatient follow-up with orthopedics, see discharge note for further details.  Patient endorses back pain.  Denies headache, ear pain, eye pain, sore throat, cough, chest pain, abdominal pain, nausea, vomiting, dysuria, blood in the stool or urine, rash, fever.  Sodium is 136 on admission with a creatinine of 0.75.  Patient presenting to the emergency department with left lower back pain with radiation down the leg, previous admission for similar issues, seeing orthopedics, also seeing physical therapy, improved somewhat with narcotics and Tylenol, however, continues to have issues getting out of bed, uses a walker.  Plan:  -Orthospine consulted.  -Pain control.  -Supportive management.  -Therapy consults.  -Ibuprofen.   -Completed on December 20 an epidural, see  "report for procedure note for further details.      Thrombocytopenia: Patient with a platelet count of 141 on admission.  -Monitor.     History of BPH: Patient previously on finasteride.  -Continue prior to admission finasteride      Dementia: Patient with increased risk for delirium.  -Delirium precautions.  -Memantine.      Cardiac, hypertension, hyperlipidemia: Patient previously on antihypertensives in the past.  -ASA, statin.      DVT Prophylaxis: SCD's.      Disposition: Today or tomorrow, epidural on  completed with some versus minimal benefit per patient report.     Puckett Catheter: Not present      Diet: Orders Placed This Encounter      Regular Diet Adult      Code: Full Code    Length of Stay:  LOS: 3 days /LOS: 3    Dr. Conor Buck DO, DEA, MHA  [facility] Hospitalist  Pager [phone]    Conor Buck DO  [facility]  Securely message with the Vocera Web Console (learn more here)  Text page via [employer] Paging/Directory      Clinically Significant Risk Factors                                                   Physical Exam:           Blood pressure (!) 165/92, pulse 97, temperature 97.4  F (36.3  C), temperature source Oral, resp. rate 18, height 1.702 m (5' 7\"), weight 62.4 kg (137 lb 9.1 oz), SpO2 98 %.    Vital Sign Ranges  Temperature Temp  Av.7  F (36.5  C)  Min: 97.4  F (36.3  C)  Max: 97.9  F (36.6  C)   Blood pressure Systolic (24hrs), Av , Min:121 , Max:192        Diastolic (24hrs), Av, Min:76, Max:121      Pulse Pulse  Av.3  Min: 82  Max: 97   Respirations Resp  Av  Min: 16  Max: 18   Pulse oximetry SpO2  Av.5 %  Min: 96 %  Max: 98 %     Vital Signs with Ranges  Temp:  [97.4  F (36.3  C)-97.9  F (36.6  C)] 97.4  F (36.3  C)  Pulse:  [82-97] 97  Resp:  [16-18] 18  BP: (121-192)/() 165/92  SpO2:  [96 %-98 %] 98 %  Temp:  [97.4  F (36.3  C)-97.9  F (36.6  C)] 97.4  F (36.3  C)  Pulse:  [82-97] 97  Resp:  [16-18] " 18  BP: (121-192)/() 165/92  SpO2:  [96 %-98 %] 98 %    I/O Last 3 Shifts:   I/O last 3 completed shifts:  In: 480 [P.O.:480]  Out: 200 [Urine:200]    I/O past 24 hours:     Intake/Output Summary (Last 24 hours) at 12/21/2022 0824  Last data filed at 12/21/2022 0531  Gross per 24 hour   Intake 480 ml   Output 200 ml   Net 280 ml       Oxygen  Temp: 97.4  F (36.3  C) Temp src: Oral BP: (!) 165/92 Pulse: 97   Resp: 18 SpO2: 98 % O2 Device: None (Room air)    Vitals:    12/18/22 0747 12/20/22 0645 12/21/22 0610   Weight: 64.9 kg (143 lb) 116.2 kg (256 lb 2.8 oz) 62.4 kg (137 lb 9.1 oz)       Lines  Peripheral IV 12/18/22 Anterior;Left Upper forearm (Active)   Site Assessment WDL 12/19/22 0900   Line Status Saline locked 12/19/22 0900   Phlebitis Scale 0-->no symptoms 12/19/22 0900   Infiltration Scale 0 12/19/22 0900   Infiltration Site Treatment Method  None 12/19/22 0900   Number of days: 3     GENERAL: Alert. NAD. Conversational. Pleasant.  Conversational.  HEENT: Normocephalic. Nares normal.   LUNGS: Clear to auscultation. No dyspnea at rest.   HEART: Regular rate. Extremities perfused.   ABDOMEN: Soft, nontender, and nondistended. Positive bowel sounds.   EXTREMITIES: No LE edema noted.  Low back pain to the left with radiation.  NEUROLOGIC: Moves extremities x4, increased pain to LLE with movement, distal cms in tact.           Prior to Admission Medications:        Medications Prior to Admission   Medication Sig Dispense Refill Last Dose     aspirin (ASA) 81 MG EC tablet Take 1 tablet (81 mg) by mouth daily 30 tablet 3 12/17/2022     atorvastatin (LIPITOR) 10 MG tablet 1 tablet (10 mg) by Oral or Feeding Tube route every evening 30 tablet 0 12/17/2022     butalbital-acetaminophen-caffeine (ESGIC) -40 MG tablet Take 1 tablet by mouth every 4 hours as needed for headaches        cholestyramine (QUESTRAN) 4 G packet Take 1 packet by mouth 2 times daily (with meals)   12/17/2022     famotidine (PEPCID)  40 MG tablet Take 40 mg by mouth At Bedtime   12/17/2022     finasteride (PROSCAR) 5 MG tablet Take 5 mg by mouth daily   12/17/2022     fluticasone (FLONASE) 50 MCG/ACT nasal spray Spray 1 spray into both nostrils every 48 hours as needed for rhinitis or allergies        guaiFENesin (MUCINEX) 600 MG 12 hr tablet Take 600 mg by mouth daily as needed        ibuprofen (ADVIL/MOTRIN) 200 MG tablet Take 400 mg by mouth 2 times daily as needed for pain        memantine (NAMENDA) 10 MG tablet Take 10 mg by mouth 2 times daily   12/17/2022     multivitamin  with lutein (OCUVITE WITH LTEIN) CAPS per capsule Take 1 capsule by mouth daily   12/17/2022     sodium chloride (OCEAN) 0.65 % nasal spray Spray 1 spray into both nostrils every 48 hours as needed for congestion        vitamin B-12 (CYANOCOBALAMIN) 1000 MCG tablet Take 1,000 mcg by mouth daily Noon   12/17/2022     vitamin C (ASCORBIC ACID) 500 MG tablet Take 500 mg by mouth daily   12/17/2022     vitamin D3 (CHOLECALCIFEROL) 2000 units (50 mcg) tablet Take 2,000 Units by mouth daily Noon   12/17/2022     [DISCONTINUED] acetaminophen (TYLENOL) 500 MG tablet Take 1 tablet (500 mg) by mouth every 6 hours as needed for mild pain 30 tablet 0      [DISCONTINUED] magnesium 250 MG tablet Take 1 tablet by mouth every evening   12/17/2022     [DISCONTINUED] Multiple Vitamin (MULTI-VITAMIN) per tablet Take 1 tablet by mouth daily   12/17/2022     [DISCONTINUED] oxyCODONE (ROXICODONE) 5 MG tablet Take 1 tablet (5 mg) by mouth every 6 hours as needed for moderate to severe pain 16 tablet 0      [DISCONTINUED] Phenylephrine HCl (ROX-SYNEPHRINE NA) Spray 1 spray into both nostrils daily as needed        [DISCONTINUED] zinc sulfate (ZINCATE) 220 (50 Zn) MG capsule Take 220 mg by mouth daily noon   12/17/2022            Medications:        Current Facility-Administered Medications   Medication Last Rate     Current Facility-Administered Medications   Medication Dose Route Frequency      acetaminophen  650 mg Oral Q6H    Or     acetaminophen  650 mg Rectal Q6H     aspirin  81 mg Oral Daily     atorvastatin  10 mg Oral or Feeding Tube QPM     cholestyramine  1 packet Oral BID w/meals     famotidine  40 mg Oral At Bedtime     finasteride  5 mg Oral Daily     ibuprofen  400 mg Oral BID     memantine  10 mg Oral BID     sodium chloride (PF)  3 mL Intracatheter Q8H     Current Facility-Administered Medications   Medication Dose Route Frequency     bisacodyl  10 mg Rectal Daily PRN     fluticasone  1 spray Both Nostrils Q48H PRN     guaiFENesin  600 mg Oral Daily PRN     HYDROmorphone  0.2 mg Intravenous Q2H PRN     ibuprofen  200 mg Oral Q6H PRN     lidocaine 4%   Topical Q1H PRN     lidocaine (buffered or not buffered)  0.1-1 mL Other Q1H PRN     melatonin  1 mg Oral At Bedtime PRN     naloxone  0.2 mg Intravenous Q2 Min PRN    Or     naloxone  0.4 mg Intravenous Q2 Min PRN    Or     naloxone  0.2 mg Intramuscular Q2 Min PRN    Or     naloxone  0.4 mg Intramuscular Q2 Min PRN     ondansetron  4 mg Oral Q6H PRN    Or     ondansetron  4 mg Intravenous Q6H PRN     oxyCODONE IR  2.5 mg Oral Q4H PRN     polyethylene glycol  17 g Oral Daily PRN     senna-docusate  1 tablet Oral BID PRN    Or     senna-docusate  2 tablet Oral BID PRN     sodium chloride  1 spray Both Nostrils Q48H PRN     sodium chloride (PF)  3 mL Intracatheter q1 min prn     ___________________________________________________________________________  Medications       acetaminophen  650 mg Oral Q6H    Or     acetaminophen  650 mg Rectal Q6H     aspirin  81 mg Oral Daily     atorvastatin  10 mg Oral or Feeding Tube QPM     cholestyramine  1 packet Oral BID w/meals     famotidine  40 mg Oral At Bedtime     finasteride  5 mg Oral Daily     ibuprofen  400 mg Oral BID     memantine  10 mg Oral BID     sodium chloride (PF)  3 mL Intracatheter Q8H          Data:      Lab data reviewed.     Data   Recent Labs   Lab 12/21/22  0612 12/20/22 2056  12/20/22  0927 12/19/22  0818   WBC 8.5  --  9.1 8.4   HGB 13.6  --  14.3 14.3   MCV 99  --  99 97     --  162 150     --  139 138   POTASSIUM 4.0  4.0 4.1 3.4 3.5  3.5   CHLORIDE 106  --  104 102   CO2 22  --  27 29   BUN 31*  --  26 17   CR 0.76  --  0.82 0.84   ANIONGAP 9  --  8 7   KENYA 8.5  --  8.8 9.0   *  --  100* 100*           Imaging:      Imaging data reviewed.     No results found for this or any previous visit (from the past 24 hour(s)).    Dr. Conor Buck DO, DEA, A  [facility]  Pager [phone]

## 2022-12-21 NOTE — PLAN OF CARE
Physical Therapy Discharge Summary    Reason for therapy discharge:    Discharged to home with home therapy.    Progress towards therapy goal(s). See goals on Care Plan in [facility] electronic health record for goal details.  Goals partially met.  Barriers to achieving goals:   discharge from facility.    Therapy recommendation(s):    Continued therapy is recommended.  Rationale/Recommendations:  Patient is near or slightly below baseline mobility levels at this time. At baseline, pt uses a FWW for mobility and has intermittent assistance with ADLs from spouse. Demonstrating SBA with FWW for most mobility including transfers and ambulation. Lives with spouse who can provide 24/7 assist with functional mobility as needed. Pt was able to navigate 10 stairs with hand hold assist. Also recommend HHPT to continue improving safety and independence functional mobility and to decrease back pain.

## 2022-12-21 NOTE — DISCHARGE SUMMARY
Pt discharged home with wife. Iv discontinued. AVS printed and pt educated. Prescription given and all questions answered. All belongings discharged with pt.

## 2022-12-22 ENCOUNTER — PATIENT OUTREACH (OUTPATIENT)
Dept: CARE COORDINATION | Facility: CLINIC | Age: 87
End: 2022-12-22

## 2022-12-22 NOTE — PROGRESS NOTES
Clinic Care Coordination Contact  [facility]: Post-Discharge Note  SITUATION                                                      Admission:    Admission Date: 12/18/22   Reason for Admission: Back pain  Discharge:   Discharge Date: 12/21/22  Discharge Diagnosis: Back pain    BACKGROUND                                                      Per hospital discharge summary and inpatient provider notes:    Mr. Roblero is an 88-year-old man who has a long history of low back problems.  He is status post lumbar decompression surgery with Dr. Bashir in the distant past.  He is mildly confused, but does answer questions and states that he had good result following that surgery, until approximately a year ago when he developed recurrent symptoms of left low back and left leg pain.  His symptoms have been progressive and worse over the last several weeks.  He was admitted to the hospital 2 days ago for pain control.  While in-house, he has been treated with oral steroids, ibuprofen, Tylenol.  He feels that the pain is better, but it is still difficult for him to ambulate.  His symptoms are relieved lying down and sitting in a chair and worse when he is standing and walking.    ASSESSMENT           Discharge Assessment  How are you doing now that you are home?: I am doing okay.  How are your symptoms? (Red Flag symptoms escalate to triage hotline per guidelines): Improved  Do you feel your condition is stable enough to be safe at home until your provider visit?: Yes  Does the patient have their discharge instructions? : Yes  Does the patient have questions regarding their discharge instructions? : No  Were you started on any new medications or were there changes to any of your previous medications? : Yes  Does the patient have all of their medications?: Yes  Do you have questions regarding any of your medications? : No  Do you have all of your needed medical supplies or equipment (DME)?  (i.e. oxygen tank, CPAP, cane,  etc.): Yes  Discharge follow-up appointment scheduled within 14 calendar days? : Yes  Discharge Follow Up Appointment Date: 12/30/22  Discharge Follow Up Appointment Scheduled with?: Primary Care Provider                  PLAN                                                      Outpatient Plan:Please call to schedule an outpatient appointment with Dr. Hermes Shrestha at [facility] within 1-2 weeks  to discuss further options for management of acute on chronic low back pain. His care coordinator's, Tahira, phone  number is [phone]. You may also see your previous surgeon, Dr. Pichardo, who works at [facility].     No future appointments.      For any urgent concerns, please contact our 24 hour nurse triage line: [phone] ([phone])         MIGUEL Hernandez  [phone]  Connected Care Resource [facility]

## 2023-01-28 ENCOUNTER — APPOINTMENT (OUTPATIENT)
Dept: ULTRASOUND IMAGING | Facility: CLINIC | Age: 88
End: 2023-01-28
Attending: EMERGENCY MEDICINE
Payer: MEDICARE

## 2023-01-28 ENCOUNTER — HOSPITAL ENCOUNTER (EMERGENCY)
Facility: CLINIC | Age: 88
Discharge: HOME | End: 2023-01-28
Attending: EMERGENCY MEDICINE | Admitting: EMERGENCY MEDICINE
Payer: MEDICARE

## 2023-01-28 VITALS
HEART RATE: 98 BPM | DIASTOLIC BLOOD PRESSURE: 100 MMHG | BODY MASS INDEX: 22.44 KG/M2 | OXYGEN SATURATION: 100 % | TEMPERATURE: 97.8 F | RESPIRATION RATE: 16 BRPM | SYSTOLIC BLOOD PRESSURE: 161 MMHG | HEIGHT: 67 IN | WEIGHT: 143 LBS

## 2023-01-28 DIAGNOSIS — M79.89 LEG SWELLING: ICD-10-CM

## 2023-01-28 LAB
ALBUMIN SERPL BCG-MCNC: 4.4 G/DL (ref 3.5–5.2)
ALP SERPL-CCNC: 69 U/L (ref 40–129)
ALT SERPL W P-5'-P-CCNC: 13 U/L (ref 10–50)
ANION GAP SERPL CALCULATED.3IONS-SCNC: 12 MMOL/L (ref 7–15)
AST SERPL W P-5'-P-CCNC: 31 U/L (ref 10–50)
BASOPHILS # BLD AUTO: 0 10E3/UL (ref 0–0.2)
BASOPHILS NFR BLD AUTO: 0 %
BILIRUB SERPL-MCNC: 0.4 MG/DL
BUN SERPL-MCNC: 11.6 MG/DL (ref 8–23)
CALCIUM SERPL-MCNC: 9.7 MG/DL (ref 8.8–10.2)
CHLORIDE SERPL-SCNC: 104 MMOL/L (ref 98–107)
CREAT SERPL-MCNC: 0.84 MG/DL (ref 0.67–1.17)
DEPRECATED HCO3 PLAS-SCNC: 24 MMOL/L (ref 22–29)
EOSINOPHIL # BLD AUTO: 0 10E3/UL (ref 0–0.7)
EOSINOPHIL NFR BLD AUTO: 1 %
ERYTHROCYTE [DISTWIDTH] IN BLOOD BY AUTOMATED COUNT: 13.2 % (ref 10–15)
GFR SERPL CREATININE-BSD FRML MDRD: 84 ML/MIN/1.73M2
GLUCOSE SERPL-MCNC: 96 MG/DL (ref 70–99)
HCT VFR BLD AUTO: 44.2 % (ref 40–53)
HGB BLD-MCNC: 14.2 G/DL (ref 13.3–17.7)
IMM GRANULOCYTES # BLD: 0 10E3/UL
IMM GRANULOCYTES NFR BLD: 1 %
LYMPHOCYTES # BLD AUTO: 1 10E3/UL (ref 0.8–5.3)
LYMPHOCYTES NFR BLD AUTO: 16 %
MCH RBC QN AUTO: 33.4 PG (ref 26.5–33)
MCHC RBC AUTO-ENTMCNC: 32.1 G/DL (ref 31.5–36.5)
MCV RBC AUTO: 104 FL (ref 78–100)
MONOCYTES # BLD AUTO: 0.4 10E3/UL (ref 0–1.3)
MONOCYTES NFR BLD AUTO: 6 %
NEUTROPHILS # BLD AUTO: 4.7 10E3/UL (ref 1.6–8.3)
NEUTROPHILS NFR BLD AUTO: 76 %
NRBC # BLD AUTO: 0 10E3/UL
NRBC BLD AUTO-RTO: 0 /100
PLATELET # BLD AUTO: 146 10E3/UL (ref 150–450)
POTASSIUM SERPL-SCNC: 4.2 MMOL/L (ref 3.4–5.3)
PROT SERPL-MCNC: 7 G/DL (ref 6.4–8.3)
RBC # BLD AUTO: 4.25 10E6/UL (ref 4.4–5.9)
SODIUM SERPL-SCNC: 140 MMOL/L (ref 136–145)
WBC # BLD AUTO: 6.1 10E3/UL (ref 4–11)

## 2023-01-28 PROCEDURE — 93971 EXTREMITY STUDY: CPT | Mod: LT

## 2023-01-28 PROCEDURE — 99284 EMERGENCY DEPT VISIT MOD MDM: CPT | Mod: 25

## 2023-01-28 PROCEDURE — 85025 COMPLETE CBC W/AUTO DIFF WBC: CPT | Performed by: EMERGENCY MEDICINE

## 2023-01-28 PROCEDURE — 80053 COMPREHEN METABOLIC PANEL: CPT | Performed by: EMERGENCY MEDICINE

## 2023-01-28 PROCEDURE — 36415 COLL VENOUS BLD VENIPUNCTURE: CPT | Performed by: EMERGENCY MEDICINE

## 2023-01-28 RX ORDER — FUROSEMIDE 20 MG
20 TABLET ORAL DAILY
Qty: 7 TABLET | Refills: 0 | Status: ON HOLD | OUTPATIENT
Start: 2023-01-28 | End: 2024-02-05

## 2023-01-28 ASSESSMENT — ACTIVITIES OF DAILY LIVING (ADL): ADLS_ACUITY_SCORE: 35

## 2023-01-28 ASSESSMENT — ENCOUNTER SYMPTOMS: MYALGIAS: 1

## 2023-01-28 NOTE — DISCHARGE INSTRUCTIONS
Start wearing stockings on both legs to the level of the knees.  We will start you on Lasix 20 mg in the morning for the next week.  Follow-up with your primary care doctor to reassess the leg swelling so that we can reassess whether this medication needs to be continued or not.

## 2023-01-28 NOTE — ED TRIAGE NOTES
LLE swelling x2 days.  No reddness.  Warm to touch.      Triage Assessment     Row Name 01/28/23 0952       Triage Assessment (Adult)    Airway WDL WDL       Respiratory WDL    Respiratory WDL WDL       Skin Circulation/Temperature WDL    Skin Circulation/Temperature WDL WDL       Cardiac WDL    Cardiac WDL WDL       Peripheral/Neurovascular WDL    Peripheral Neurovascular WDL WDL       Cognitive/Neuro/Behavioral WDL    Cognitive/Neuro/Behavioral WDL WDL

## 2023-01-28 NOTE — ED PROVIDER NOTES
History     Chief Complaint:  Leg Swelling       The history is provided by the patient.      Jean Pierre Roblero is a 88 year old male who presents with leg swelling. The patient reports that 2-3 days ago, he developed swelling to this left lower extremity. The patients wife states that the patient always has swelling in his right leg and wears a compression stocking for it. Patient has no history of blood clots. He endorses pain to his left front thigh, radiating into his left hip and left knee.  He has some mild chronic back discomfort as well.  The patient is concerned that he might have a mild pinched nerve.  He denies fever, chills, and rash.    Independent Historian: Patient, in addition the wife provided some history.    Review of External Notes:     ROS:  Review of Systems   Cardiovascular: Positive for leg swelling (left).   Musculoskeletal: Positive for myalgias (left thigh/knee).   The patient's wife notes that he has chronic swelling in the right lower extremity and uses a compressive stocking but he generally does not have significant swelling involving the left shin.      Allergies:  Chocolate  Feathers  Novocaine   Warren     Medications:    Lipitor  Esgic  Questran  Pepcid  Proscar  Namenda  Oxycodone  Cyanocobalamin    Past Medical History:    GERD  Nocturia  Dementia  Lumbar and sacral osteoarthritis  Somatization disorder  Osteoarthritis hands  Spinal stenosis lumbar region  Presbyesophagus  Hearing loss  Anal sphincter incompetence  Somatization disorder    Past Surgical History:    Back surgery  Cholecystectomy  Endoscopic sinus surgery  Eye surgery  Hernia repair  Orthopedic surgery  Turbinoplasty    Family History:    Brother: Dementia, leukemia  Mother: Arthritis  Son: Prostate cancer    Social History:  Patient accompanied by wife.  PCP: Zaki Alaniz     Physical Exam     Patient Vitals for the past 24 hrs:   BP Temp Temp src Pulse Resp SpO2 Height Weight   01/28/23 1310 (!) 161/100  "-- -- 98 16 100 % -- --   01/28/23 0945 (!) 185/99 97.8  F (36.6  C) Temporal 98 16 98 % 1.702 m (5' 7\") 64.9 kg (143 lb)        Physical Exam  General: Resting comfortably on the gurney  Head:  The scalp, face, and head appear normal  Eyes:  The pupils are equal, round, and reactive to light    There is no nystagmus    Extraocular muscles are intact    Conjunctivae and sclerae are normal  ENT:    The nose is normal    Pinnae are normal    The oropharynx is normal    Uvula is in the midline  Neck:  Normal range of motion    There is no rigidity noted    There is no midline cervical spine pain/tenderness    Trachea is in the midline    No mass is detected  CV:  Regular rate and underlying rhythm     Normal S1/S2, no S3/S4    No pathological murmur detected  Resp:  Lungs are clear    There is no tachypnea    Non-labored    No rales    No wheezing   GI:  Abdomen is soft, there is no rigidity    No distension    No tympani    No rebound tenderness     Non-surgical without peritoneal features  MS:  The patient has subjective discomfort in the anterior left thigh and roughly an L3 or L4 dermatome.  This does not extend beyond the level of the knee.  There is no obvious motor weakness.  Reflexes are normal.  There is 2+ edema involving the shins of both legs and ankles and feet, this appears quite symmetric.  He has a stocking on the right leg no stocking on the left leg.  This represents pitting edema which is fairly symmetric but reportedly new to the left leg.  There is no swelling involving the thighs.  There is no groin swelling.    Skin:  No rash or acute skin lesions noted there is no evidence of cellulitis involving either leg.  The skin is slightly shiny involving both legs with more of a subacute to chronic appearance of the probable swelling.  Neuro: Speech is normal and fluent  Psych:  Awake. Alert.      Normal affect.  Appropriate interactions.  Lymph: No anterior cervical lymphadenopathy noted no inguinal " lymphadenopathy      Emergency Department Course     Imaging:  US Lower Extremity Venous Duplex Left   Final Result   IMPRESSION:   1.  No deep venous thrombosis in the left lower extremity. However, there is limited visualization of the left peroneal veins.               Report per radiology    Laboratory:  Labs Ordered and Resulted from Time of ED Arrival to Time of ED Departure   CBC WITH PLATELETS AND DIFFERENTIAL - Abnormal       Result Value    WBC Count 6.1      RBC Count 4.25 (*)     Hemoglobin 14.2      Hematocrit 44.2       (*)     MCH 33.4 (*)     MCHC 32.1      RDW 13.2      Platelet Count 146 (*)     % Neutrophils 76      % Lymphocytes 16      % Monocytes 6      % Eosinophils 1      % Basophils 0      % Immature Granulocytes 1      NRBCs per 100 WBC 0      Absolute Neutrophils 4.7      Absolute Lymphocytes 1.0      Absolute Monocytes 0.4      Absolute Eosinophils 0.0      Absolute Basophils 0.0      Absolute Immature Granulocytes 0.0      Absolute NRBCs 0.0     COMPREHENSIVE METABOLIC PANEL - Normal    Sodium 140      Potassium 4.2      Chloride 104      Carbon Dioxide (CO2) 24      Anion Gap 12      Urea Nitrogen 11.6      Creatinine 0.84      Calcium 9.7      Glucose 96      Alkaline Phosphatase 69      AST 31      ALT 13      Protein Total 7.0      Albumin 4.4      Bilirubin Total 0.4      GFR Estimate 84          Emergency Department Course & Assessments:       Consultations/Discussion of Management or Tests:  All test were reviewed with the patient and his wife.         Assessments:  1115 I spoke with the patient and assessed symptoms.  1341 I rechecked patient and explained findings.    Disposition:  The patient was discharged to home.     Impression & Plan      Medical Decision Making:  This patient presents to the emergency department with reported new onset left lower extremity swelling to the level of the knee.  He has chronic right lower extremity swelling.  The patient underwent  "duplex venous ultrasonography of the left leg to exclude DVT and nothing is seen that is acute.  He underwent blood work testing to look for significant hyponatremia, acute renal insufficiency, hypoproteinemia or hypoalbuminemia none of which are obvious or present.  There is no evidence of infection or cellulitis in the leg.  There is no evidence of shingles in the lower extremity.  The patient has been complaining of some chronic back pain with some referred pain into the anterior aspect of the left thigh and roughly an L3/L4 dermatome.  This could represent an \"herniated disc or osteoarthritis involving the lumbar spine.  This complaint was not specifically evaluated in great detail.  The main purpose of the visit was to attempt to ascertain why there is swelling now present in the left leg.  There is a broad differential diagnosis to lymphedema and lower extremity swelling.  At this juncture it does appear somewhat symmetric.  Congestive heart failure would be in the differential diagnosis.  Increased portal pressure is also a potential etiology.  There is no acute life-threatening etiology noted.  The patient will be started on low-dose Lasix for the next week to see if this will be helpful.  He should start wearing compressive stockings involving the left lower extremity as well.  Follow-up with primary care in 1 to 2 weeks for reassessment of the leg swelling.      Diagnosis:    ICD-10-CM    1. Leg swelling  M79.89            Discharge Medications:  New Prescriptions    FUROSEMIDE (LASIX) 20 MG TABLET    Take 1 tablet (20 mg) by mouth daily for 7 days          Eduardo Duff MD  1/28/2023   Eduardo Duff MD Rock, Michael P, MD  01/28/23 1417    "

## 2023-01-30 NOTE — PLAN OF CARE
PT:  Discharge Planner PT   Patient plan for discharge: Return home  Current status: Currently pt requires SBA for transfers, CGA progressing to SBA for gait of 300 ft without AD with mild dec in balance but no overt LOB noted. O2 sats 94% pre and 91% post on RA with mild SOB noted. Pt was independent with bed mobility and used the bathroom independently with no balance issues noted in a small space. Pt demonstrates dec balance, strength, activity tolerance and difficulty ambulating and would benefit from skilled PT services in order to improve this.  Barriers to return to prior living situation: None  Recommendations for discharge: Return home with home PT  Rationale for recommendations: Pt would benefit from continued PT while admitted and at discharge to improve balance, mobility to increase independence, reduce falls risk.       Entered by: Myrna Curtis 03/03/2020 3:47 PM        30-Jan-2023

## 2023-02-09 ENCOUNTER — TRANSFERRED RECORDS (OUTPATIENT)
Dept: HEALTH INFORMATION MANAGEMENT | Facility: CLINIC | Age: 88
End: 2023-02-09

## 2023-05-04 ENCOUNTER — HOSPITAL ENCOUNTER (EMERGENCY)
Facility: CLINIC | Age: 88
Discharge: HOME | End: 2023-05-05
Attending: EMERGENCY MEDICINE | Admitting: EMERGENCY MEDICINE
Payer: MEDICARE

## 2023-05-04 DIAGNOSIS — I82.411 ACUTE DEEP VEIN THROMBOSIS (DVT) OF FEMORAL VEIN OF RIGHT LOWER EXTREMITY (H): ICD-10-CM

## 2023-05-04 DIAGNOSIS — J06.9 UPPER RESPIRATORY TRACT INFECTION, UNSPECIFIED TYPE: ICD-10-CM

## 2023-05-04 DIAGNOSIS — R50.9 FEVER, UNSPECIFIED FEVER CAUSE: ICD-10-CM

## 2023-05-04 LAB
HCO3 BLDV-SCNC: 27 MMOL/L (ref 21–28)
LACTATE BLD-SCNC: 0.6 MMOL/L
PCO2 BLDV: 41 MM HG (ref 40–50)
PH BLDV: 7.43 [PH] (ref 7.32–7.43)
PO2 BLDV: 40 MM HG (ref 25–47)
SAO2 % BLDV: 77 % (ref 94–100)

## 2023-05-04 PROCEDURE — 84145 PROCALCITONIN (PCT): CPT | Performed by: EMERGENCY MEDICINE

## 2023-05-04 PROCEDURE — 87637 SARSCOV2&INF A&B&RSV AMP PRB: CPT | Performed by: EMERGENCY MEDICINE

## 2023-05-04 PROCEDURE — 83735 ASSAY OF MAGNESIUM: CPT | Performed by: EMERGENCY MEDICINE

## 2023-05-04 PROCEDURE — 36415 COLL VENOUS BLD VENIPUNCTURE: CPT | Performed by: EMERGENCY MEDICINE

## 2023-05-04 PROCEDURE — 96361 HYDRATE IV INFUSION ADD-ON: CPT

## 2023-05-04 PROCEDURE — 80053 COMPREHEN METABOLIC PANEL: CPT | Performed by: EMERGENCY MEDICINE

## 2023-05-04 PROCEDURE — 93005 ELECTROCARDIOGRAM TRACING: CPT

## 2023-05-04 PROCEDURE — 87040 BLOOD CULTURE FOR BACTERIA: CPT | Performed by: EMERGENCY MEDICINE

## 2023-05-04 PROCEDURE — C9803 HOPD COVID-19 SPEC COLLECT: HCPCS

## 2023-05-04 PROCEDURE — 96360 HYDRATION IV INFUSION INIT: CPT

## 2023-05-04 PROCEDURE — 99285 EMERGENCY DEPT VISIT HI MDM: CPT | Mod: 25,CS

## 2023-05-04 PROCEDURE — 85025 COMPLETE CBC W/AUTO DIFF WBC: CPT | Performed by: EMERGENCY MEDICINE

## 2023-05-04 PROCEDURE — 83605 ASSAY OF LACTIC ACID: CPT

## 2023-05-04 RX ORDER — SODIUM CHLORIDE 9 MG/ML
INJECTION, SOLUTION INTRAVENOUS CONTINUOUS
Status: DISCONTINUED | OUTPATIENT
Start: 2023-05-05 | End: 2023-05-05 | Stop reason: HOSPADM

## 2023-05-04 RX ORDER — ACETAMINOPHEN 500 MG
1000 TABLET ORAL ONCE
Status: COMPLETED | OUTPATIENT
Start: 2023-05-04 | End: 2023-05-05

## 2023-05-04 ASSESSMENT — ENCOUNTER SYMPTOMS
SPEECH DIFFICULTY: 1
MYALGIAS: 0
DIARRHEA: 0
COUGH: 1
FACIAL ASYMMETRY: 1
WEAKNESS: 1
VOMITING: 0

## 2023-05-05 ENCOUNTER — APPOINTMENT (OUTPATIENT)
Dept: ULTRASOUND IMAGING | Facility: CLINIC | Age: 88
End: 2023-05-05
Attending: EMERGENCY MEDICINE
Payer: MEDICARE

## 2023-05-05 ENCOUNTER — APPOINTMENT (OUTPATIENT)
Dept: CT IMAGING | Facility: CLINIC | Age: 88
End: 2023-05-05
Attending: EMERGENCY MEDICINE
Payer: MEDICARE

## 2023-05-05 ENCOUNTER — APPOINTMENT (OUTPATIENT)
Dept: GENERAL RADIOLOGY | Facility: CLINIC | Age: 88
End: 2023-05-05
Attending: EMERGENCY MEDICINE
Payer: MEDICARE

## 2023-05-05 VITALS
HEART RATE: 86 BPM | DIASTOLIC BLOOD PRESSURE: 88 MMHG | TEMPERATURE: 99.3 F | OXYGEN SATURATION: 97 % | SYSTOLIC BLOOD PRESSURE: 154 MMHG | RESPIRATION RATE: 15 BRPM

## 2023-05-05 LAB
ALBUMIN SERPL BCG-MCNC: 4.1 G/DL (ref 3.5–5.2)
ALBUMIN UR-MCNC: NEGATIVE MG/DL
ALP SERPL-CCNC: 71 U/L (ref 40–129)
ALT SERPL W P-5'-P-CCNC: 13 U/L (ref 10–50)
ANION GAP SERPL CALCULATED.3IONS-SCNC: 11 MMOL/L (ref 7–15)
APPEARANCE UR: CLEAR
AST SERPL W P-5'-P-CCNC: 24 U/L (ref 10–50)
ATRIAL RATE - MUSE: 117 BPM
BASOPHILS # BLD AUTO: 0 10E3/UL (ref 0–0.2)
BASOPHILS NFR BLD AUTO: 0 %
BILIRUB SERPL-MCNC: 0.3 MG/DL
BILIRUB UR QL STRIP: NEGATIVE
BUN SERPL-MCNC: 13.5 MG/DL (ref 8–23)
CALCIUM SERPL-MCNC: 9.1 MG/DL (ref 8.8–10.2)
CHLORIDE SERPL-SCNC: 99 MMOL/L (ref 98–107)
COLOR UR AUTO: ABNORMAL
CREAT SERPL-MCNC: 0.95 MG/DL (ref 0.67–1.17)
DEPRECATED HCO3 PLAS-SCNC: 26 MMOL/L (ref 22–29)
DIASTOLIC BLOOD PRESSURE - MUSE: NORMAL MMHG
EOSINOPHIL # BLD AUTO: 0 10E3/UL (ref 0–0.7)
EOSINOPHIL NFR BLD AUTO: 0 %
ERYTHROCYTE [DISTWIDTH] IN BLOOD BY AUTOMATED COUNT: 12.1 % (ref 10–15)
FLUAV RNA SPEC QL NAA+PROBE: NEGATIVE
FLUBV RNA RESP QL NAA+PROBE: NEGATIVE
GFR SERPL CREATININE-BSD FRML MDRD: 77 ML/MIN/1.73M2
GLUCOSE SERPL-MCNC: 105 MG/DL (ref 70–99)
GLUCOSE UR STRIP-MCNC: NEGATIVE MG/DL
HCT VFR BLD AUTO: 39.6 % (ref 40–53)
HGB BLD-MCNC: 13.1 G/DL (ref 13.3–17.7)
HGB UR QL STRIP: ABNORMAL
HOLD SPECIMEN: NORMAL
IMM GRANULOCYTES # BLD: 0 10E3/UL
IMM GRANULOCYTES NFR BLD: 1 %
INTERPRETATION ECG - MUSE: NORMAL
KETONES UR STRIP-MCNC: NEGATIVE MG/DL
LEUKOCYTE ESTERASE UR QL STRIP: NEGATIVE
LYMPHOCYTES # BLD AUTO: 0.9 10E3/UL (ref 0.8–5.3)
LYMPHOCYTES NFR BLD AUTO: 16 %
MAGNESIUM SERPL-MCNC: 2.1 MG/DL (ref 1.7–2.3)
MCH RBC QN AUTO: 33.7 PG (ref 26.5–33)
MCHC RBC AUTO-ENTMCNC: 33.1 G/DL (ref 31.5–36.5)
MCV RBC AUTO: 102 FL (ref 78–100)
MONOCYTES # BLD AUTO: 0.4 10E3/UL (ref 0–1.3)
MONOCYTES NFR BLD AUTO: 7 %
NEUTROPHILS # BLD AUTO: 4.3 10E3/UL (ref 1.6–8.3)
NEUTROPHILS NFR BLD AUTO: 76 %
NITRATE UR QL: NEGATIVE
NRBC # BLD AUTO: 0 10E3/UL
NRBC BLD AUTO-RTO: 0 /100
P AXIS - MUSE: 75 DEGREES
PH UR STRIP: 7.5 [PH] (ref 5–7)
PLATELET # BLD AUTO: 137 10E3/UL (ref 150–450)
POTASSIUM SERPL-SCNC: 3.9 MMOL/L (ref 3.4–5.3)
PR INTERVAL - MUSE: 160 MS
PROCALCITONIN SERPL IA-MCNC: 0.04 NG/ML
PROT SERPL-MCNC: 6.7 G/DL (ref 6.4–8.3)
QRS DURATION - MUSE: 84 MS
QT - MUSE: 320 MS
QTC - MUSE: 446 MS
R AXIS - MUSE: -53 DEGREES
RBC # BLD AUTO: 3.89 10E6/UL (ref 4.4–5.9)
RBC URINE: 6 /HPF
RSV RNA SPEC NAA+PROBE: NEGATIVE
SARS-COV-2 RNA RESP QL NAA+PROBE: NEGATIVE
SODIUM SERPL-SCNC: 136 MMOL/L (ref 136–145)
SP GR UR STRIP: 1.01 (ref 1–1.03)
SYSTOLIC BLOOD PRESSURE - MUSE: NORMAL MMHG
T AXIS - MUSE: 58 DEGREES
TRANSITIONAL EPI: <1 /HPF
UROBILINOGEN UR STRIP-MCNC: NORMAL MG/DL
VENTRICULAR RATE- MUSE: 117 BPM
WBC # BLD AUTO: 5.6 10E3/UL (ref 4–11)
WBC URINE: <1 /HPF

## 2023-05-05 PROCEDURE — 250N000013 HC RX MED GY IP 250 OP 250 PS 637: Performed by: EMERGENCY MEDICINE

## 2023-05-05 PROCEDURE — 71046 X-RAY EXAM CHEST 2 VIEWS: CPT

## 2023-05-05 PROCEDURE — 93970 EXTREMITY STUDY: CPT

## 2023-05-05 PROCEDURE — 70450 CT HEAD/BRAIN W/O DYE: CPT | Mod: MG

## 2023-05-05 PROCEDURE — 258N000003 HC RX IP 258 OP 636: Performed by: EMERGENCY MEDICINE

## 2023-05-05 PROCEDURE — 87086 URINE CULTURE/COLONY COUNT: CPT | Performed by: EMERGENCY MEDICINE

## 2023-05-05 PROCEDURE — 81001 URINALYSIS AUTO W/SCOPE: CPT | Performed by: EMERGENCY MEDICINE

## 2023-05-05 RX ORDER — CEPHALEXIN 500 MG/1
500 CAPSULE ORAL ONCE
Status: COMPLETED | OUTPATIENT
Start: 2023-05-05 | End: 2023-05-05

## 2023-05-05 RX ORDER — CEPHALEXIN 500 MG/1
500 CAPSULE ORAL 4 TIMES DAILY
Qty: 28 CAPSULE | Refills: 0 | Status: SHIPPED | OUTPATIENT
Start: 2023-05-05 | End: 2023-05-12

## 2023-05-05 RX ADMIN — CEPHALEXIN 500 MG: 500 CAPSULE ORAL at 05:07

## 2023-05-05 RX ADMIN — SODIUM CHLORIDE 1000 ML: 9 INJECTION, SOLUTION INTRAVENOUS at 00:36

## 2023-05-05 RX ADMIN — SODIUM CHLORIDE: 9 INJECTION, SOLUTION INTRAVENOUS at 01:42

## 2023-05-05 RX ADMIN — ACETAMINOPHEN 1000 MG: 500 TABLET ORAL at 00:21

## 2023-05-05 RX ADMIN — RIVAROXABAN 15 MG: 15 TABLET, FILM COATED ORAL at 05:07

## 2023-05-05 ASSESSMENT — ACTIVITIES OF DAILY LIVING (ADL)
ADLS_ACUITY_SCORE: 41

## 2023-05-05 ASSESSMENT — ENCOUNTER SYMPTOMS: RHINORRHEA: 1

## 2023-05-05 NOTE — DISCHARGE INSTRUCTIONS
Continue the blood thinner for the blood clot in your leg  Take the antibiotics for possible skin infection  It is possible that you have a virus causing the runny nose, slight cough but no signs of pneumonia on the xray  Call your doctor and get seen as soon as you can. You will need more than one month of the blood thinning medication  Tylenol for pain or fever  Stop aspirin until you talk to your doctor about this and no ibuprofen  Watch for shortness of breath, chest pain, confusion, unsteady gait

## 2023-05-05 NOTE — ED PROVIDER NOTES
History     Chief Complaint:  Altered Mental Status       HPI   Jean Pierre Roblero is a 88 year old male with a history of TIA who presents with altered mental status. Per EMS, Jean Pierre's wife states that her 's last known well time was at 1800. Following this, Rex's family notes that Rex started to have some difficulty walking and possible right sided weakness. En route, EMS also note that Rex has some possible left-sided facial droop and some nonsensical speech. Additionally, EMS report that Rex had a blood sugar of 129 and improved during the drive here. During evaluation, Rex was able to deny that he was in any pain.     When speaking with Rex's wife, she notes that Rex has been experiencing a slight cough, rhinorrhea, and congestion recently, but denies that he's had any diarrhea or emesis. She also notes that the weakness she saw was more generalized and not necessarily unilateral. She also states that his smile looks normal.     Independent Historian:   EMS and wife supplement as above      ROS:  Review of Systems   Unable to perform ROS: Mental status change   HENT: Positive for congestion and rhinorrhea.    Respiratory: Positive for cough.    Gastrointestinal: Negative for diarrhea and vomiting.   Musculoskeletal: Positive for gait problem. Negative for myalgias.   Neurological: Positive for facial asymmetry (right), speech difficulty and weakness (right).       Allergies:  Procaine  Amoxicillin  Amitriptyline  Lamotrigine   Nabumetone   Oxycodone   Propranolol   Topiramate   Verpamil  Zonisamide     Medications:    Celecoxib   Memantine   Bumetanide   Hydrochlorothiazide   Aspirin 81 mg  Atorvastatin   Cholecalciferol   Cholestyramine   Cyanocobalamin   Finasteride   Furosemide     Past Medical History:    Thrombocytopenia  Dementia  Osteoarthritis   Spinal stenosis  Presbyesophagus   Somatization disorder   Diverticulosis   BPH   GERD  Inguinal hernia  Sensorineural hearing loss   Insomnia  Tortuous  colon   Sciatica  Heberden's node  Laly's node   Osteopenia   ASHD  TIA  Migraine     Past Surgical History:    Cholecystectomy  Bilateral hernia repair  Flexible sigmoidoscopy  Hemilaminotomy, L5, S1  Endoscopic sinus surgery  Cataract extraction  Ingrown toe nail removal  Turbinoplasty      Family History:    Dementia - brother, mother  Leukemia - father  Arthritis - mother  Prostate cancer - son    Social History:  Patient arrived via EMS.  Patient is accompanied in the ED.  Patient ambulates with a walker at baseline.   PCP: Zaki Alaniz     Physical Exam     Patient Vitals for the past 24 hrs:   BP Temp Temp src Pulse Resp SpO2   05/05/23 0230 (!) 151/108 -- -- 98 15 96 %   05/05/23 0200 (!) 142/89 -- -- 112 15 97 %   05/05/23 0130 (!) 145/85 -- -- 100 16 95 %   05/05/23 0100 (!) 150/93 99.3  F (37.4  C) Oral 108 10 95 %   05/05/23 0000 (!) 155/93 -- -- 117 (!) 8 96 %   05/04/23 2337 (!) 172/101 -- -- 120 27 95 %   05/04/23 2336 (!) 172/101 (!) 102.1  F (38.9  C) Oral 118 20 --        Physical Exam  General: Sitting up in bed  Eyes:  The pupils are equal and round    Conjunctivae and sclerae are normal  ENT:    Wearing a mask. rhinorrhea  Neck:  Normal range of motion  CV:  Regular rate, regular rhythm     Skin warm and well perfused   Resp:  Non labored breathing on room air    No tachypnea    No cough heard, lungs clear bilaterally  GI:  Abdomen is soft, there is no rigidity    No distension    No rebound tenderness     No abdominal tenderness  MS:  Bilateral LE edema R>L  Skin:  Mild erythema as well as chronic appearing skin discoloration on left anterior shin  Neuro:   Awake, alert. Has some confusion     Speech is normal and fluent.    Face is symmetric.     No slurred speech    SILT on bilateral UE/LE    Moves all extremities equally  Psych: Normal affect.  Appropriate interactions.    Emergency Department Course   ECG  ECG taken at 2332, ECG read at 2339  Sinus tachycardia  Possible left  atrial enlargement  Left axis deviation  Abnormal ECG   No significant difference as compared to prior, dated 8/13/22.  Rate 117 bpm. MO interval 160 ms. QRS duration 84 ms. QT/QTc 320/446 ms. P-R-T axes 75 -53 58.     Imaging:  US Lower Extremity Venous Duplex Bilateral   Final Result   IMPRESSION:   1.  Nonocclusive thrombus in the right deep femoral vein compatible with DVT and new since 1/28/2023.      2.  No evidence for DVT elsewhere in either lower extremity.      Findings called to Dr. Rivera 5/5/2023 3:28 AM CDT.      Head CT w/o contrast   Final Result   IMPRESSION:   1.  No CT evidence for acute intracranial process.   2.  Brain atrophy and presumed chronic microvascular ischemic changes as above.      XR Chest 2 Views   Final Result   IMPRESSION: Negative chest.         Report per radiology    Laboratory:  Labs Ordered and Resulted from Time of ED Arrival to Time of ED Departure   COMPREHENSIVE METABOLIC PANEL - Abnormal       Result Value    Sodium 136      Potassium 3.9      Chloride 99      Carbon Dioxide (CO2) 26      Anion Gap 11      Urea Nitrogen 13.5      Creatinine 0.95      Calcium 9.1      Glucose 105 (*)     Alkaline Phosphatase 71      AST 24      ALT 13      Protein Total 6.7      Albumin 4.1      Bilirubin Total 0.3      GFR Estimate 77     ROUTINE UA WITH MICROSCOPIC - Abnormal    Color Urine Straw      Appearance Urine Clear      Glucose Urine Negative      Bilirubin Urine Negative      Ketones Urine Negative      Specific Gravity Urine 1.010      Blood Urine Trace (*)     pH Urine 7.5 (*)     Protein Albumin Urine Negative      Urobilinogen Urine Normal      Nitrite Urine Negative      Leukocyte Esterase Urine Negative      RBC Urine 6 (*)     WBC Urine <1      Transitional Epithelials Urine <1     CBC WITH PLATELETS AND DIFFERENTIAL - Abnormal    WBC Count 5.6      RBC Count 3.89 (*)     Hemoglobin 13.1 (*)     Hematocrit 39.6 (*)      (*)     MCH 33.7 (*)     MCHC 33.1      RDW  12.1      Platelet Count 137 (*)     % Neutrophils 76      % Lymphocytes 16      % Monocytes 7      % Eosinophils 0      % Basophils 0      % Immature Granulocytes 1      NRBCs per 100 WBC 0      Absolute Neutrophils 4.3      Absolute Lymphocytes 0.9      Absolute Monocytes 0.4      Absolute Eosinophils 0.0      Absolute Basophils 0.0      Absolute Immature Granulocytes 0.0      Absolute NRBCs 0.0     ISTAT GASES LACTATE VENOUS POCT - Abnormal    Lactic Acid POCT 0.6      Bicarbonate Venous POCT 27      O2 Sat, Venous POCT 77 (*)     pCO2V Venous POCT 41      pH Venous POCT 7.43      pO2 Venous POCT 40     PROCALCITONIN - Normal    Procalcitonin 0.04     INFLUENZA A/B, RSV, & SARS-COV2 PCR - Normal    Influenza A PCR Negative      Influenza B PCR Negative      RSV PCR Negative      SARS CoV2 PCR Negative     MAGNESIUM - Normal    Magnesium 2.1     URINE CULTURE   BLOOD CULTURE   BLOOD CULTURE        Procedures   none    Emergency Department Course & Assessments:       Interventions:  Medications   sodium chloride (PF) 0.9% PF flush 3 mL (has no administration in time range)   sodium chloride (PF) 0.9% PF flush 3 mL (3 mLs Intracatheter $Given 5/5/23 0021)   0.9% sodium chloride BOLUS (0 mLs Intravenous Stopped 5/5/23 0142)     Followed by   sodium chloride 0.9% infusion ( Intravenous $New Bag 5/5/23 0142)   acetaminophen (TYLENOL) tablet 1,000 mg (1,000 mg Oral $Given 5/5/23 0021)        Assessments:  2328 I obtained history and examined the patient as noted above.   0207 I rechecked and updated the patient. We discussed admission and he had his wife agreed to the plan of care.   0225 I rechecked the patient as his wife expressed concerns for leg swelling.   0358 I rechecked and updated the patient and his wife.   0441 I rechecked the patient. The patient and his wife expressed wishes to be discharged home instead of being admitted, and I agreed that the patient was safe to do this.     Independent Interpretation  (X-rays, CTs, rhythm strip):  I independently reviewed chest xray - no pneumonia seen    Consultations/Discussion of Management or Tests:  None       Social Determinants of Health affecting care:   None    Disposition:  The patient was discharged to home.     Impression & Plan    CMS Diagnoses: None      Medical Decision Making:  Jean Pierre Roblero is a 88-year-old male who presented to the emergency department with altered mental status.  He seems to have slight confusion on arrival to the emergency department but a nonfocal exam.  He is febrile on arrival to the emergency department and suspect that he has encephalopathy from the fever causing his symptoms and presentation.  Doubt stroke or TIA.  CT head with no acute pathology.  Work-up for fever was obtained.  No clear evidence of source of fever based on his blood work, chest x-ray and urine analysis.  He has no abdominal tenderness on exam to suggest intra-abdominal process.  On reevaluation he is back to normal.  This was after his fever had resolved.  He was able to ambulate here in the emergency department well and felt back to normal.  They do note some chronic lower extremity edema so ultrasounds were obtained.  This shows a DVT in the right lower extremity.  Unclear how long he has had this DVT because he has had lower extremity edema for months.  There is some chronic skin changes in the left lower extremity as well as some slight erythema.  It is possible that this could be early cellulitis causing the fever though he also just developed rhinorrhea, slight cough and congestion this evening and I suspect that the fever may actually be due to viral illness.  He has no shortness of breath or chest pain to suggest PE.  He would like to go home.  He does not want to stay in the hospital.  Wife is in agreement with this plan.  Blood cultures are pending.  I discussed that if he develops shortness of breath, chest pain, persistent fever, abdominal pain, confusion  or unsteady gait, he should return to the emergency department.  I discussed anticoagulation with them and risks of bleeding. will start on anticoagulation and recommend follow-up with primary care provider as soon as possible.  Given the slight erythema on the left lower extremity, will start on Keflex for possible cellulitis.  Again suspicion is that he may actually have a viral illness causing rhinorrhea, congestion and slight cough.    Diagnosis:    ICD-10-CM    1. Acute deep vein thrombosis (DVT) of femoral vein of right lower extremity (H)  I82.411       2. Fever, unspecified fever cause  R50.9       3. Upper respiratory tract infection, unspecified type  J06.9            Discharge Medications:  Discharge Medication List as of 5/5/2023  5:10 AM      START taking these medications    Details   cephALEXin (KEFLEX) 500 MG capsule Take 1 capsule (500 mg) by mouth 4 times daily for 7 days, Disp-28 capsule, R-0, E-Prescribe      Rivaroxaban ANTICOAGULANT 15 & 20 MG TBPK Starter Therapy Pack Take 15 mg by mouth 2 times daily (with meals) for 21 days, THEN 20 mg daily with food for 9 days., Disp-51 each, R-0, E-Prescribe             Scribe Disclosure:  I, Chloé Chapin, am serving as a scribe at 11:39 PM on 5/4/2023 to document services personally performed by Kizzy Rivera MD based on my observations and the provider's statements to me.     5/4/2023   Kizzy Rivera MD Goertz, Maria Kristine, MD  05/05/23 1211

## 2023-05-05 NOTE — ED NOTES
"[facility]  ED Nurse Handoff Report    ED Chief complaint: Altered Mental Status      ED Diagnosis:   Final diagnoses:   None       Code Status: Full Code    Allergies:   Allergies   Allergen Reactions     Chocolate Anaphylaxis     headache     Feathers      Novocaine [Procaine]      Passes out almost immediately     Strawberry Extract GI Disturbance     Most berries       Patient Story:   Jean Pierre Roblero is a 88 year old male with a history of TIA who presents with altered mental status. Per EMS, Jean Pierre's wife states that her 's last known well time was at 1800. Following this, Rex's family notes that Rex started to have some difficulty walking and right sided weakness. En route, EMS also note that Rex has some possible left-sided facial droop and some nonsensical speech. Additionally, EMS report that Rex had a blood sugar of 129 and improved during the drive here. During evaluation, Rex was able to deny that he was in any pain.  Pt has generalized weakness and has wife has noticed that pt has more difficulty with walking, and RLE is more edematous compared to LLE and LLE looks more \"pinker\".    Focused Assessment:    A+Ox3 (Disoriented to situation)  SBP's 140-150's  >90%RA  Febrile at 102.1 F  Generalized weakness  BLE edema R>L  Skin on BLE is taught; with redness on LLE    Treatments and/or interventions provided:   1000 Mg Tylenol PO  1 L NS Bolus    Patient's response to treatments and/or interventions:   Improved body temp.    To be done/followed up on inpatient unit:    Continue with plan of care    Does this patient have any cognitive concerns?: Baseline dementia    Activity level - Baseline/Home:  Independent and Walker  Activity Level - Current:   Unknown    Patient's Preferred language: English   Needed?: No    Isolation: None  Infection: Not Applicable  Patient tested for COVID 19 prior to admission: NO  Bariatric?: No    Vital Signs:   Vitals:    05/05/23 0100 " 05/05/23 0130 05/05/23 0200 05/05/23 0230   BP: (!) 150/93 (!) 145/85 (!) 142/89 (!) 151/108   Pulse: 108 100 112 98   Resp: 10 16 15 15   Temp: 99.3  F (37.4  C)      TempSrc: Oral      SpO2: 95% 95% 97% 96%       Cardiac Rhythm:Cardiac Rhythm: (S) Sinus tachycardia    Was the PSS-3 completed:   Yes  What interventions are required if any?               Family Comments: Wife at bedside in ED.  OBS brochure/video discussed/provided to patient/family: Yes    For the majority of the shift this patient's behavior was Green.   Behavioral interventions performed were None.    ED NURSE PHONE NUMBER: *[phone]

## 2023-05-05 NOTE — ED TRIAGE NOTES
Coming from home where he lives with his wife, last known well 1800. Warm to touch. Questionable left facial droop. Blood sugar 129.

## 2023-05-06 NOTE — RESULT ENCOUNTER NOTE
[facility] Emergency Dept discharge antibiotic (if prescribed): Cephalexin (Keflex) 500 mg capsule, 1 capsule (500 mg) by mouth 4 times daily for 7 days.   Date of Rx (if applicable):  5/5/23  No changes in treatment per [facility] ED Lab Result Urine culture protocol.

## 2023-05-07 LAB — BACTERIA UR CULT: NO GROWTH

## 2023-05-07 NOTE — RESULT ENCOUNTER NOTE
"Final urine culture report shows \"NO GROWTH\" and is NEGATIVE.  [facility] Emergency Dept discharge antibiotic: Cephalexin (Keflex) 500 mg capsule, 1 capsule  Recommendations in treatment per [facility] ED Lab result Urine culture protocol.  "

## 2023-05-10 LAB
BACTERIA BLD CULT: NO GROWTH
BACTERIA BLD CULT: NO GROWTH

## 2023-05-27 ENCOUNTER — APPOINTMENT (OUTPATIENT)
Dept: GENERAL RADIOLOGY | Facility: CLINIC | Age: 88
End: 2023-05-27
Attending: EMERGENCY MEDICINE
Payer: MEDICARE

## 2023-05-27 ENCOUNTER — HOSPITAL ENCOUNTER (EMERGENCY)
Facility: CLINIC | Age: 88
Discharge: HOME | End: 2023-05-27
Attending: EMERGENCY MEDICINE | Admitting: EMERGENCY MEDICINE
Payer: MEDICARE

## 2023-05-27 ENCOUNTER — APPOINTMENT (OUTPATIENT)
Dept: CT IMAGING | Facility: CLINIC | Age: 88
End: 2023-05-27
Attending: EMERGENCY MEDICINE
Payer: MEDICARE

## 2023-05-27 VITALS
OXYGEN SATURATION: 95 % | TEMPERATURE: 98.6 F | RESPIRATION RATE: 18 BRPM | SYSTOLIC BLOOD PRESSURE: 157 MMHG | DIASTOLIC BLOOD PRESSURE: 95 MMHG | HEART RATE: 96 BPM

## 2023-05-27 DIAGNOSIS — S00.81XA ABRASION OF FACE, INITIAL ENCOUNTER: ICD-10-CM

## 2023-05-27 DIAGNOSIS — S51.019A SKIN TEAR OF ELBOW WITHOUT COMPLICATION, INITIAL ENCOUNTER: ICD-10-CM

## 2023-05-27 DIAGNOSIS — Z23 NEED FOR TDAP VACCINATION: ICD-10-CM

## 2023-05-27 LAB
HOLD SPECIMEN: NORMAL

## 2023-05-27 PROCEDURE — 90471 IMMUNIZATION ADMIN: CPT | Performed by: EMERGENCY MEDICINE

## 2023-05-27 PROCEDURE — 99285 EMERGENCY DEPT VISIT HI MDM: CPT | Mod: 25

## 2023-05-27 PROCEDURE — 250N000013 HC RX MED GY IP 250 OP 250 PS 637: Performed by: EMERGENCY MEDICINE

## 2023-05-27 PROCEDURE — 70450 CT HEAD/BRAIN W/O DYE: CPT | Mod: MA

## 2023-05-27 PROCEDURE — 12004 RPR S/N/AX/GEN/TRK7.6-12.5CM: CPT

## 2023-05-27 PROCEDURE — 250N000011 HC RX IP 250 OP 636: Performed by: EMERGENCY MEDICINE

## 2023-05-27 PROCEDURE — 90715 TDAP VACCINE 7 YRS/> IM: CPT | Performed by: EMERGENCY MEDICINE

## 2023-05-27 PROCEDURE — 73080 X-RAY EXAM OF ELBOW: CPT | Mod: RT

## 2023-05-27 RX ORDER — LORATADINE 10 MG/1
10 TABLET ORAL ONCE
Status: COMPLETED | OUTPATIENT
Start: 2023-05-27 | End: 2023-05-27

## 2023-05-27 RX ADMIN — LORATADINE 10 MG: 10 TABLET ORAL at 20:45

## 2023-05-27 RX ADMIN — CLOSTRIDIUM TETANI TOXOID ANTIGEN (FORMALDEHYDE INACTIVATED), CORYNEBACTERIUM DIPHTHERIAE TOXOID ANTIGEN (FORMALDEHYDE INACTIVATED), BORDETELLA PERTUSSIS TOXOID ANTIGEN (GLUTARALDEHYDE INACTIVATED), BORDETELLA PERTUSSIS FILAMENTOUS HEMAGGLUTININ ANTIGEN (FORMALDEHYDE INACTIVATED), BORDETELLA PERTUSSIS PERTACTIN ANTIGEN, AND BORDETELLA PERTUSSIS FIMBRIAE 2/3 ANTIGEN 0.5 ML: 5; 2; 2.5; 5; 3; 5 INJECTION, SUSPENSION INTRAMUSCULAR at 20:41

## 2023-05-27 ASSESSMENT — ACTIVITIES OF DAILY LIVING (ADL): ADLS_ACUITY_SCORE: 35

## 2023-05-27 NOTE — ED NOTES
Bed: ED22  Expected date: 5/27/23  Expected time: 5:18 PM  Means of arrival: Ambulance  Comments:  541 88m fall head, thinners ETA [id number]

## 2023-05-27 NOTE — ED TRIAGE NOTES
Patient arrives via EMS after reported loss of balance and fall on his driveway today. Fell on R elbow, R face. C/o pain in elbow most. Currently on thinners, hx of CVA. A/O x4.     Triage Assessment     Row Name 05/27/23 6834       Triage Assessment (Adult)    Airway WDL WDL       Respiratory WDL    Respiratory WDL WDL       Skin Circulation/Temperature WDL    Skin Circulation/Temperature WDL WDL       Cardiac WDL    Cardiac WDL WDL       Peripheral/Neurovascular WDL    Peripheral Neurovascular WDL WDL       Cognitive/Neuro/Behavioral WDL    Cognitive/Neuro/Behavioral WDL WDL

## 2023-05-27 NOTE — ED PROVIDER NOTES
History     Chief Complaint:  Fall       HPI   Jean Pierre Roblero is an 88 year old male brought in by ambulance after he fell in his driveway.  He indicates that he lost his balance and fell onto his right elbow.  He did also hit his forehead, however he does not have a headache or any neck pain.  He also denies any loss of consciousness.  He is left-hand dominant.  He denies any numbness, tingling, or weakness to his right hand.  His main complaint is the injury to his right elbow, which is mainly a skin tear.  He was recently diagnosed with a DVT and started on Xarelto.  He denies any nausea, vomiting, back pain, chest pain, abdominal pain, or shortness of breath.  He is unsure of his last tetanus shot.  Additionally, he and his wife agree that it is not unusual for him to lose his balance like this and they are not concerned about that.      Independent Historian:   Spouse/Partner - They report The additional history of his losing balance.    Review of External Notes:   Clinic note from 5/15/2023.      Medications:    aspirin (ASA) 81 MG EC tablet  atorvastatin (LIPITOR) 10 MG tablet  butalbital-acetaminophen-caffeine (ESGIC) -40 MG tablet  cholestyramine (QUESTRAN) 4 G packet  famotidine (PEPCID) 40 MG tablet  finasteride (PROSCAR) 5 MG tablet  fluticasone (FLONASE) 50 MCG/ACT nasal spray  furosemide (LASIX) 20 MG tablet  guaiFENesin (MUCINEX) 600 MG 12 hr tablet  ibuprofen (ADVIL/MOTRIN) 200 MG tablet  memantine (NAMENDA) 10 MG tablet  multivitamin  with lutein (OCUVITE WITH LTEIN) CAPS per capsule  oxyCODONE (ROXICODONE) 5 MG tablet  Rivaroxaban ANTICOAGULANT 15 & 20 MG TBPK Starter Therapy Pack  sodium chloride (OCEAN) 0.65 % nasal spray  vitamin B-12 (CYANOCOBALAMIN) 1000 MCG tablet  vitamin C (ASCORBIC ACID) 500 MG tablet  vitamin D3 (CHOLECALCIFEROL) 2000 units (50 mcg) tablet        Past Medical History:    Past Medical History:   Diagnosis Date     Allergic state      Fainting episodes       Gastroesophageal reflux disease      Headaches      Heartburn      Nocturia      Photosensitivity      Swallowing difficulty        Past Surgical History:    Past Surgical History:   Procedure Laterality Date     BACK SURGERY       CHOLECYSTECTOMY       ENDOSCOPIC SINUS SURGERY  8/20/2012    Procedure: ENDOSCOPIC SINUS SURGERY;  ENDOSCOPIC REMOVAL OF LEFT NASAL MASS WITH LANDMARKS (Fusion Tracking [place]), BILATERAL INFERIOR TURBINOPLASTY;  Surgeon: Dima Younger MD;  Location: [facility]     EYE SURGERY      cataracts     HERNIA REPAIR       ORTHOPEDIC SURGERY      ingrown toe nail removal     TURBINOPLASTY  8/20/2012    Procedure: TURBINOPLASTY;;  Surgeon: Dima Younger MD;  Location: [facility]        Physical Exam     Patient Vitals for the past 24 hrs:   BP Temp Temp src Pulse Resp   05/27/23 1742 (!) 157/95 98.6  F (37  C) Oral 96 18        Physical Exam  Nursing note and vitals reviewed.  Constitutional:  Oriented to person, place, and time. Cooperative.  C-collar in place.  HENT:   Nose:    Nose normal.   Mouth/Throat:   Mucous membranes are normal.   Eyes:    Conjunctivae normal and EOM are normal.      Pupils are equal, round, and reactive to light.   Neck:    Trachea normal.   Cardiovascular:  Normal rate, regular rhythm, normal heart sounds and normal pulses. No murmur heard.  Pulmonary/Chest:  Effort normal and breath sounds normal.   Abdominal:   Soft. Normal appearance and bowel sounds are normal.      There is no tenderness.      There is no rebound and no CVA tenderness.   Musculoskeletal:  Large approximately 8 cm skin tear to the right elbow, which is slightly tender to palpation.  Full range of motion of the right elbow.  Extremities otherwise atraumatic x 4.  No cervical spine tenderness to palpation.  Lymphadenopathy:  No cervical adenopathy.   Neurological:   Alert and oriented to person, place, and time. Normal strength.      No cranial nerve deficit or sensory deficit. GCS eye subscore is  4. GCS verbal subscore is 5. GCS motor subscore is 6.   Skin:    Small curvilinear abrasion just above the right eyebrow.  Large approximately 8 cm skin tear to the right elbow.   Psychiatric:   Normal mood and affect.      Emergency Department Course   Imaging:  CT Head w/o Contrast   Final Result   IMPRESSION:   1.  No acute traumatic intracranial abnormality.            XR Elbow Right G/E 3 Views   Final Result   IMPRESSION: Soft tissue fullness posterior to the proximal ulna. No fracture, effusion or calcified intra-articular body. Arterial calcifications.         Report per radiology    Laboratory:  Labs Ordered and Resulted from Time of ED Arrival to Time of ED Departure - No data to display     Procedures     Laceration Repair      Procedure: Skin tear repair    Indication: Skin tear    Consent: Verbal    Location: Right forearm    Length: 8 cm    Preparation: Irrigation with Sterile Saline.    Anesthesia/Sedation: None      Treatment/Exploration: Wound explored, no foreign bodies found     Closure: The wound was closed with Tissue Adhesive.    Patient Status: The patient tolerated the procedure well: Yes. There were no complications.    Emergency Department Course & Assessments:             Interventions:  Medications   Tdap (tetanus-diphtheria-acell pertussis) (ADACEL) injection 0.5 mL (0.5 mLs Intramuscular $Given 5/27/23 2041)   loratadine (CLARITIN) tablet 10 mg (10 mg Oral $Given 5/27/23 2045)          Independent Interpretation (X-rays, CTs, rhythm strip):  I reviewed the patient's right elbow x-rays and agree with the reading of no fracture.    Consultations/Discussion of Management or Tests:  None        Social Determinants of Health affecting care:   None    Disposition:  The patient was discharged to home.     Impression & Plan    Medical Decision Making:  This is an 88-year-old male who came in for further evaluation of a right elbow injury after falling.  He also did hit his head, and he is  anticoagulated.  Therefore I felt it was reasonable and appropriate to obtain a CT scan of his head to rule out an intracranial hemorrhage.  His head CT is negative.  I also obtained x-rays of the right elbow to rule out any fractures, and those x-rays also are negative.  He was provided an update of his tetanus and pertussis status.  We subsequently cleaned the skin tear to his right elbow, which I then repaired using skin adhesive.  He had a dressing applied as well.  I recommended close outpatient follow-up to ensure that this heals properly and does not become infected.  He knows to return here with any concerns or worsening symptoms as well.    Diagnosis:    ICD-10-CM    1. Skin tear of right elbow without complication, initial encounter  S51.019A       2. Abrasion of face, initial encounter  S00.81XA       3. Need for Tdap vaccination  Z23            Discharge Medications:  New Prescriptions    No medications on file            5/27/2023   Shawn Andrews MD Lashkowitz, Seth H, MD  05/28/23 0053

## 2023-06-04 ENCOUNTER — APPOINTMENT (OUTPATIENT)
Dept: ULTRASOUND IMAGING | Facility: CLINIC | Age: 88
End: 2023-06-04
Attending: EMERGENCY MEDICINE
Payer: MEDICARE

## 2023-06-04 ENCOUNTER — HOSPITAL ENCOUNTER (EMERGENCY)
Facility: CLINIC | Age: 88
Discharge: HOME | End: 2023-06-04
Attending: EMERGENCY MEDICINE | Admitting: EMERGENCY MEDICINE
Payer: MEDICARE

## 2023-06-04 VITALS
RESPIRATION RATE: 18 BRPM | BODY MASS INDEX: 21.98 KG/M2 | WEIGHT: 145 LBS | OXYGEN SATURATION: 98 % | TEMPERATURE: 98.8 F | DIASTOLIC BLOOD PRESSURE: 94 MMHG | SYSTOLIC BLOOD PRESSURE: 164 MMHG | HEIGHT: 68 IN | HEART RATE: 99 BPM

## 2023-06-04 DIAGNOSIS — L03.113 CELLULITIS OF RIGHT ARM: ICD-10-CM

## 2023-06-04 DIAGNOSIS — M79.89 SWELLING OF RIGHT UPPER EXTREMITY: ICD-10-CM

## 2023-06-04 DIAGNOSIS — S51.011D SKIN TEAR OF RIGHT ELBOW WITHOUT COMPLICATION, SUBSEQUENT ENCOUNTER: ICD-10-CM

## 2023-06-04 PROCEDURE — 99284 EMERGENCY DEPT VISIT MOD MDM: CPT | Mod: 25

## 2023-06-04 PROCEDURE — 93971 EXTREMITY STUDY: CPT | Mod: RT

## 2023-06-04 RX ORDER — CEPHALEXIN 500 MG/1
500 CAPSULE ORAL 4 TIMES DAILY
Qty: 28 CAPSULE | Refills: 0 | Status: SHIPPED | OUTPATIENT
Start: 2023-06-04 | End: 2023-06-11

## 2023-06-04 ASSESSMENT — ACTIVITIES OF DAILY LIVING (ADL): ADLS_ACUITY_SCORE: 35

## 2023-06-04 NOTE — ED TRIAGE NOTES
Pt had skin tear repair to right elbow last week. The area is scabbing over but continues to be swollen and bruised in forearm and hand. CMS intact. Pt takes xarelto.      Triage Assessment     Row Name 06/04/23 8095       Triage Assessment (Adult)    Airway WDL WDL       Respiratory WDL    Respiratory WDL WDL       Skin Circulation/Temperature WDL    Skin Circulation/Temperature WDL X       Cardiac WDL    Cardiac WDL WDL       Peripheral/Neurovascular WDL    Peripheral Neurovascular WDL WDL       Cognitive/Neuro/Behavioral WDL    Cognitive/Neuro/Behavioral WDL WDL

## 2023-06-05 NOTE — ED PROVIDER NOTES
History     Chief Complaint:  Wound Check       HPI   Jean Pierre Roblero is a 88 year old male anticoagulated on Xarelto who presents for evaluation of a right arm wound.  The patient was evaluated in the emergency department approximately 8 days ago for a fall in which she sustained a skin tear of the right elbow.  This was repaired with Dermabond and the patient was advised to follow-up with his primary care provider as soon as he was able to for reevaluation.  The patient states that the wound appeared to be healing well until 2 days ago when he developed increased pain, swelling, and redness that eventually extended into his right hand and fingers.  He has not noticed any redness, pain, or swelling extending into his upper arm.  His most significant pain is located over his elbow, however, he is still able to flex and extend at the elbow without significant difficulty.  He does not have any other concerns today and denies any recent fever, malaise, or nausea.    Independent Historian:   The patient and his wife provide history.    Review of External Notes:   5/27/2023: ED note reviewed as noted in the HPI.    Medications:    cephALEXin (KEFLEX) 500 MG capsule  aspirin (ASA) 81 MG EC tablet  atorvastatin (LIPITOR) 10 MG tablet  butalbital-acetaminophen-caffeine (ESGIC) -40 MG tablet  cholestyramine (QUESTRAN) 4 G packet  famotidine (PEPCID) 40 MG tablet  finasteride (PROSCAR) 5 MG tablet  fluticasone (FLONASE) 50 MCG/ACT nasal spray  furosemide (LASIX) 20 MG tablet  guaiFENesin (MUCINEX) 600 MG 12 hr tablet  ibuprofen (ADVIL/MOTRIN) 200 MG tablet  memantine (NAMENDA) 10 MG tablet  multivitamin  with lutein (OCUVITE WITH LTEIN) CAPS per capsule  oxyCODONE (ROXICODONE) 5 MG tablet  Rivaroxaban ANTICOAGULANT 15 & 20 MG TBPK Starter Therapy Pack  sodium chloride (OCEAN) 0.65 % nasal spray  vitamin B-12 (CYANOCOBALAMIN) 1000 MCG tablet  vitamin C (ASCORBIC ACID) 500 MG tablet  vitamin D3 (CHOLECALCIFEROL) 2000  "units (50 mcg) tablet        Past Medical History:    Past Medical History:   Diagnosis Date     Allergic state      Fainting episodes      Gastroesophageal reflux disease      Headaches      Heartburn      Nocturia      Photosensitivity      Swallowing difficulty        Past Surgical History:    Past Surgical History:   Procedure Laterality Date     BACK SURGERY       CHOLECYSTECTOMY       ENDOSCOPIC SINUS SURGERY  8/20/2012    Procedure: ENDOSCOPIC SINUS SURGERY;  ENDOSCOPIC REMOVAL OF LEFT NASAL MASS WITH LANDMARKS (Fusion Tracking [place]), BILATERAL INFERIOR TURBINOPLASTY;  Surgeon: Dima Younger MD;  Location: [facility]     EYE SURGERY      cataracts     HERNIA REPAIR       ORTHOPEDIC SURGERY      ingrown toe nail removal     TURBINOPLASTY  8/20/2012    Procedure: TURBINOPLASTY;;  Surgeon: Dima Younger MD;  Location: [facility]        Physical Exam     Patient Vitals for the past 24 hrs:   BP Temp Temp src Pulse Resp SpO2 Height Weight   06/04/23 1759 (!) 164/94 98.8  F (37.1  C) Temporal 99 18 98 % 1.715 m (5' 7.5\") 65.8 kg (145 lb)        Physical Exam  General: Alert, appears well-developed and well-nourished. Cooperative.     In mild distress  HEENT:  Head:  Atraumatic  Ears:  External ears are normal  Eyes:   Conjunctivae normal and EOM are normal. No scleral icterus.  Neck:   Normal range of motion. Neck supple.  CV:  Radial pulses are 2+ and symmetric.  Resp:  Non-labored, no retractions or accessory muscle use  MS:  There is approximately an 8 cm healing skin tear to the right elbow, no active drainage.  There is erythema and edema extending from the wound down to the patient's hand.  There is a 1 cm sanguinous nodule just distal to the wound.  There is also an area of induration with tenderness to palpation just inferior to the wound.  Patient is able to completely flex elbow, near full and extension limited secondary to pain.  5/5 strength of elbow flexion/extension.  Upper arm compartments are " soft and nontender.  Able to fully supinate and pronate.  Normal  strength.  Distal CMS intact.    Normal strength in all 4 extremities.   Skin:  Warm and dry.    Neuro: Alert. Normal strength.  Sensation intact in all 4 extremities. GCS: 15  Psych:  Normal mood and affect.    Emergency Department Course   Imaging:  US Upper Extremity Venous Duplex Right   Final Result   IMPRESSION:   1.  No deep venous thrombosis in the right upper extremity.   2.  Small forearm subcutaneous complex fluid collection. Lack of hyperemia favors hematoma/seroma over abscess.         Report per radiology    Procedures   None    Emergency Department Course & Assessments:    Assessments:  1958: Initial evaluation assessment.  2201: Patient reassessed, in agreement with plan for discharge home and outpatient antibiotics with close PCP follow-up.    Independent Interpretation (X-rays, CTs, rhythm strip):  None    Consultations/Discussion of Management or Tests:  None        Social Determinants of Health affecting care:   None    Disposition:  The patient was discharged to home.     Impression & Plan    CMS Diagnoses: None    Medical Decision Making:  Jean Pierre Roblero is an 88-year-old male anticoagulated on Xarelto who presents for evaluation of a right arm wound.  On exam, the patient has a healing skin tear with rubor and edema extending from the wound through his forearm wrist and hand.  He has a superficial hematoma and an area of induration near the wound as well.  Due to concern for possible DVT or vascular occlusion, ultrasound was obtained to evaluate the right upper extremity which showed a subcutaneous complex fluid collection and lack of hyperemia lowering the likelihood of abscess.  This did not show any evidence of DVT.  Signs and symptoms are most likely due to anticoagulation status and traumatic right arm injury.  However, it is difficult to tell if there is underlying cellulitis and therefore we elected to treat the patient  prophylactically with oral antibiotics.  A prescription for Keflex was sent to the patient's pharmacy and he should start this as soon as he is able to.  He was also advised to follow-up with his primary care provider within the next week for reevaluation.  He should return to the emergency department if he develops any new or worsening symptoms in the interim.  The patient is understanding of this plan and all questions were answered.    Diagnosis:    ICD-10-CM    1. Skin tear of right elbow without complication, subsequent encounter  S51.011D       2. Swelling of right upper extremity  M79.89       3. Cellulitis of right arm  L03.113            Discharge Medications:  Discharge Medication List as of 6/4/2023 10:10 PM      START taking these medications    Details   cephALEXin (KEFLEX) 500 MG capsule Take 1 capsule (500 mg) by mouth 4 times daily for 7 days, Disp-28 capsule, R-0, E-Prescribe            Lisa Leary PA-C  6/4/2023

## 2023-06-05 NOTE — ED PROVIDER NOTES
ED ATTENDING PHYSICIAN NOTE:   I evaluated this patient in conjunction with Lisa Leary PA-C  I have participated in the care of the patient and personally performed key elements of the history, exam, and medical decision making.      HPI:   Jean Pierre Roblero is a 88 year old male anticoagulated on Xarelto who presents for evaluation of a right arm wound.  The patient was evaluated in the emergency department approximately 8 days ago for a fall in which she sustained a skin tear of the right elbow.  This was repaired with Dermabond and the patient was advised to follow-up with his primary care provider as soon as he is able to for reevaluation.  Patient states that the wound appeared to be healing well until 2 days ago when he developed increased pain, swelling, and redness that eventually extended into his right hand and fingers.  He has not noticed any redness pain or swelling extending into his upper arm.  His most significant pain is located over his elbow, however, he is still able to flex and extend at the elbow.  He does not have any other concerns today and denies any recent fever, malaise, or nausea.    Independent Historian:   None - Patient Only    Review of External Notes: Reviewed previous ED visit note.      EXAM:   General: Well-nourished, no acute distress  Eyes: PERRL, conjunctivae pink no scleral icterus or conjunctival injection  ENT:  Moist mucus membranes  Respiratory:  No respiratory distress  CV: Normal rate.  Right hand and arm with normal radial pulse, distal capillary refill and temperature.  Skin: Warm, dry.  Right elbow with laceration that appears to be healing.  Dependent edema over the forearm, wrist and hand.  Ecchymoses extending from the wound in a circumferential fashion down to the hand.  No significant warmth.  No drainage.  Musculoskeletal: Right forearm and hand edema is noted.  Able to range at the elbow without significant difficulty.  Forearm compartments are  soft.  Neuro: Alert and oriented to person/place/time.  Normal distal sensation to light touch and normal distal hand function on the right.  Psychiatric: Normal affect    Independent Interpretation (X-rays, CTs, rhythm strip):  None    Consultations/Discussion of Management or Tests:  None     Social Determinants of Health affecting care:   None     MEDICAL DECISION MAKING/ASSESSMENT AND PLAN:   Jean Pierre Roblero is a 88 year old male with swelling of his forearm distal to somewhat complicated skin tear.  No signs of compartment syndrome.  Does not seem like cellulitis but this is a high risk wound and so we will go ahead and treat him for possible developing cellulitis.  There was an area of swelling which is likely hematoma but given the edema and the other physical exam findings, I did obtain an ultrasound which showed no evidence of a blood clot and did not show a vascular pseudoaneurysm or other concerning finding.  I suspect this is dependent edema from a large hematoma tracking down the tissue planes.  We will treat with antibiotics as noted for possibility of developing infection.  At this time, the patient is safe for discharge home to follow-up as an outpatient with his doctor.     DIAGNOSIS:     ICD-10-CM    1. Skin tear of right elbow without complication, subsequent encounter  S51.011D       2. Swelling of right upper extremity  M79.89       3. Cellulitis of right arm  L03.113                DISPOSITION:   Home    [facility] EMERGENCY DEPT     Madelin Walter MD  06/05/23 0159

## 2024-01-22 ENCOUNTER — HOSPITAL ENCOUNTER (EMERGENCY)
Facility: CLINIC | Age: 89
Discharge: HOME | End: 2024-01-22
Attending: EMERGENCY MEDICINE | Admitting: EMERGENCY MEDICINE
Payer: MEDICARE

## 2024-01-22 ENCOUNTER — APPOINTMENT (OUTPATIENT)
Dept: CT IMAGING | Facility: CLINIC | Age: 89
End: 2024-01-22
Attending: EMERGENCY MEDICINE
Payer: MEDICARE

## 2024-01-22 VITALS
TEMPERATURE: 97.8 F | SYSTOLIC BLOOD PRESSURE: 167 MMHG | HEIGHT: 67 IN | BODY MASS INDEX: 23.54 KG/M2 | HEART RATE: 100 BPM | RESPIRATION RATE: 18 BRPM | WEIGHT: 150 LBS | OXYGEN SATURATION: 98 % | DIASTOLIC BLOOD PRESSURE: 108 MMHG
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
## 2024-01-22 DIAGNOSIS — S00.81XA ABRASION OF FOREHEAD, INITIAL ENCOUNTER: ICD-10-CM

## 2024-01-22 LAB
ANION GAP SERPL CALCULATED.3IONS-SCNC: 9 MMOL/L (ref 7–15)
ATRIAL RATE - MUSE: 80 BPM
BASOPHILS # BLD AUTO: 0 10E3/UL (ref 0–0.2)
BASOPHILS NFR BLD AUTO: 0 %
BUN SERPL-MCNC: 16.8 MG/DL (ref 8–23)
CALCIUM SERPL-MCNC: 9.3 MG/DL (ref 8.8–10.2)
CHLORIDE SERPL-SCNC: 107 MMOL/L (ref 98–107)
CREAT SERPL-MCNC: 0.98 MG/DL (ref 0.67–1.17)
DEPRECATED HCO3 PLAS-SCNC: 28 MMOL/L (ref 22–29)
DIASTOLIC BLOOD PRESSURE - MUSE: NORMAL MMHG
EGFRCR SERPLBLD CKD-EPI 2021: 74 ML/MIN/1.73M2
EOSINOPHIL # BLD AUTO: 0.1 10E3/UL (ref 0–0.7)
EOSINOPHIL NFR BLD AUTO: 2 %
ERYTHROCYTE [DISTWIDTH] IN BLOOD BY AUTOMATED COUNT: 12.5 % (ref 10–15)
GLUCOSE SERPL-MCNC: 98 MG/DL (ref 70–99)
HCT VFR BLD AUTO: 41.3 % (ref 40–53)
HGB BLD-MCNC: 13.7 G/DL (ref 13.3–17.7)
IMM GRANULOCYTES # BLD: 0 10E3/UL
IMM GRANULOCYTES NFR BLD: 1 %
INTERPRETATION ECG - MUSE: NORMAL
LYMPHOCYTES # BLD AUTO: 2.2 10E3/UL (ref 0.8–5.3)
LYMPHOCYTES NFR BLD AUTO: 40 %
MCH RBC QN AUTO: 33.2 PG (ref 26.5–33)
MCHC RBC AUTO-ENTMCNC: 33.2 G/DL (ref 31.5–36.5)
MCV RBC AUTO: 100 FL (ref 78–100)
MONOCYTES # BLD AUTO: 0.4 10E3/UL (ref 0–1.3)
MONOCYTES NFR BLD AUTO: 8 %
NEUTROPHILS # BLD AUTO: 2.7 10E3/UL (ref 1.6–8.3)
NEUTROPHILS NFR BLD AUTO: 49 %
NRBC # BLD AUTO: 0 10E3/UL
NRBC BLD AUTO-RTO: 0 /100
P AXIS - MUSE: 74 DEGREES
PLATELET # BLD AUTO: 144 10E3/UL (ref 150–450)
POTASSIUM SERPL-SCNC: 3.7 MMOL/L (ref 3.4–5.3)
PR INTERVAL - MUSE: 156 MS
QRS DURATION - MUSE: 86 MS
QT - MUSE: 394 MS
QTC - MUSE: 454 MS
R AXIS - MUSE: -7 DEGREES
RBC # BLD AUTO: 4.13 10E6/UL (ref 4.4–5.9)
SODIUM SERPL-SCNC: 144 MMOL/L (ref 135–145)
SYSTOLIC BLOOD PRESSURE - MUSE: NORMAL MMHG
T AXIS - MUSE: 25 DEGREES
VENTRICULAR RATE- MUSE: 80 BPM
WBC # BLD AUTO: 5.4 10E3/UL (ref 4–11)

## 2024-01-22 PROCEDURE — 85025 COMPLETE CBC W/AUTO DIFF WBC: CPT | Performed by: EMERGENCY MEDICINE

## 2024-01-22 PROCEDURE — 70450 CT HEAD/BRAIN W/O DYE: CPT | Mod: ME

## 2024-01-22 PROCEDURE — 99285 EMERGENCY DEPT VISIT HI MDM: CPT | Mod: 25

## 2024-01-22 PROCEDURE — 36415 COLL VENOUS BLD VENIPUNCTURE: CPT | Performed by: EMERGENCY MEDICINE

## 2024-01-22 PROCEDURE — G1010 CDSM STANSON: HCPCS

## 2024-01-22 PROCEDURE — 80048 BASIC METABOLIC PNL TOTAL CA: CPT | Performed by: EMERGENCY MEDICINE

## 2024-01-22 PROCEDURE — 93005 ELECTROCARDIOGRAM TRACING: CPT

## 2024-01-22 ASSESSMENT — ACTIVITIES OF DAILY LIVING (ADL): ADLS_ACUITY_SCORE: 35

## 2024-01-22 NOTE — ED PROVIDER NOTES
"History     Chief Complaint:  Fall       The history is provided by the EMS personnel and the patient.      Jean Pierre Roblero is a 89 year old male, anticoagulated on Xarelto, with a history of TIA who presents via EMS after an unwitnessed fall. EMS reports that the patient fell at home, unwitnessed by anyone. His wife called EMS after finding him. EMS states that Jean Pierre fell forward, hitting the left side of his head. The patient reports some pain on his head but denies any neck pain or other pain. He denied any loss of consciousness to EMS. He denies any recent sickness. EMS notes that he is hypertensive, but likely due to having missed his morning medications.  Tetanus 2023    Independent Historian:    EMS - provided history    Medications:    Apsirin 81 mg  Lipitor  Esgic  Questran  Pepcid  Proscar  Lasix  Mucinex  Memantine  Xarelto    Past Medical History:    Anal sphincter incompetence   ASHD   Arthritis of hand   BPH  Laly's node   Dementia   Diverticulosis of colon   Fainting episodes  GERD  Headaches  Heartburn  Heberden's node   Hyperlipidemia   Hyponatremia  Infectious encephalopathy  Inguinal hernia   Lumbar and sacral osteoarthritis   Nocturia  Pneumonia   Presbyesophagus   Photosensitivity  Somatization disorder   Spinal stenosis of lumbar region   Skin cancer   TIA  Thrombocytopenia  Xerostomia     Past Surgical History:    Cholecystectomy  Endoscopic sinus surgery  Cataract surgery  Hernia repair  Ingrown toenail removal  Turbinoplasty  Flexible sigmoidoscopy  Hemilaminotomy, lumbar spine    Physical Exam   Patient Vitals for the past 24 hrs:   BP Temp Temp src Pulse Resp SpO2 Height Weight   01/22/24 0545 (!) 182/97 97.8  F (36.6  C) Temporal -- -- -- -- --   01/22/24 0537 -- -- -- 96 18 97 % 1.702 m (5' 7\") 68 kg (150 lb)        Physical Exam  General: Sitting up in bed  Eyes:  The pupils are equal and round    Conjunctivae and sclerae are normal  ENT:    Abrasion on the left side of his " head  Neck:  In cervical collar  CV:  Regular rate, regular rhythm     Skin warm and well perfused   Resp:  Non labored breathing on room air    No tachypnea    No cough heard    Lungs clear bilaterally  GI:  Abdomen is soft, there is no rigidity    No distension    No rebound tenderness     No abdominal tenderness  MS:  Non tender pelvis, non tender spine  Skin:  Abrasion on left side of forehead  Neuro:   Awake, alert.      Speech is normal and fluent.    Face is symmetric.     Moves all extremities equally  Psych: Normal affect.  Appropriate interactions.    Emergency Department Course   ECG    ECG results from 01/22/24   EKG 12-lead, tracing only     Value    Systolic Blood Pressure     Diastolic Blood Pressure     Ventricular Rate 80    Atrial Rate 80    NE Interval 156    QRS Duration 86        QTc 454    P Axis 74    R AXIS -7    T Axis 25    Interpretation ECG      Sinus rhythm  Cannot rule out Anterior infarct , age undetermined  Abnormal ECG  When compared with ECG of 04-MAY-2023 23:32,  QRS axis Shifted right  Minimal criteria for Anterior infarct are now Present       Imaging:  CT Cervical Spine w/o Contrast   Preliminary Result   IMPRESSION:   1.  No fracture or posttraumatic subluxation.   2.  Multilevel degenerative changes, as above.                              Head CT w/o contrast   Final Result   IMPRESSION:   1.  No CT evidence for acute intracranial process.   2.  Brain atrophy and presumed chronic microvascular ischemic changes as above.        Report per radiology    Laboratory:  Labs Ordered and Resulted from Time of ED Arrival to Time of ED Departure   CBC WITH PLATELETS AND DIFFERENTIAL - Abnormal       Result Value    WBC Count 5.4      RBC Count 4.13 (*)     Hemoglobin 13.7      Hematocrit 41.3            MCH 33.2 (*)     MCHC 33.2      RDW 12.5      Platelet Count 144 (*)     % Neutrophils 49      % Lymphocytes 40      % Monocytes 8      % Eosinophils 2      % Basophils 0       % Immature Granulocytes 1      NRBCs per 100 WBC 0      Absolute Neutrophils 2.7      Absolute Lymphocytes 2.2      Absolute Monocytes 0.4      Absolute Eosinophils 0.1      Absolute Basophils 0.0      Absolute Immature Granulocytes 0.0      Absolute NRBCs 0.0     BASIC METABOLIC PANEL - Normal    Sodium 144      Potassium 3.7      Chloride 107      Carbon Dioxide (CO2) 28      Anion Gap 9      Urea Nitrogen 16.8      Creatinine 0.98      GFR Estimate 74      Calcium 9.3      Glucose 98       Emergency Department Course & Assessments:    Assessments:  0534 I obtained history and examined the patient as noted above.     Independent Interpretation (X-rays, CTs, rhythm strip):  I independently reviewed CT head - no acute intracranial hemorrhage seen     Disposition:  The patient was discharged to home.     Impression & Plan      Medical Decision Making:  Jean Pierre Roblero is a 89-year-old male who presented to the emergency department after a fall.  This was a mechanical fall per wife as he was trying to get out of bed and hit his head on a dresser.  He has not been ill recently.  EKG shows sinus rhythm.  Labs unremarkable labs.  CT head and CT cervical spine done.  Imaging shows no acute hemorrhage seen. No other injuries from the fall.  She walked in the emergency department and did very well.  Patient discharged home    Diagnosis:    ICD-10-CM    1. Fall, initial encounter  W19.XXXA       2. Abrasion of forehead, initial encounter  S00.81XA         Scribe Disclosure:  Lindy GARCIA, am serving as a scribe at 5:41 AM on 1/22/2024 to document services personally performed by Kizzy Rivera MD, based on my observations and the provider's statements to me.  1/22/2024   Kizzy Rivera MD Goertz, Maria Kristine, MD  01/22/24 0715

## 2024-01-22 NOTE — ED TRIAGE NOTES
EMS report: Lives with wife indepoendent. Pt got up to go to bathroom, not sure why he fell but hit L forehead on bedside table. Denies LOC no Nausea, pt on Plavix. Arrived in C collar     Triage Assessment (Adult)       Row Name 01/22/24 0542          Triage Assessment    Airway WDL WDL        Respiratory WDL    Respiratory WDL WDL        Skin Circulation/Temperature WDL    Skin Circulation/Temperature WDL X        Cardiac WDL    Cardiac WDL WDL  denies CP and SOB        Peripheral/Neurovascular WDL    Peripheral Neurovascular WDL WDL        Cognitive/Neuro/Behavioral WDL    Cognitive/Neuro/Behavioral WDL X  unwitnessed fall, denies LOC

## 2024-02-01 ENCOUNTER — HOSPITAL ENCOUNTER (EMERGENCY)
Facility: CLINIC | Age: 89
Discharge: HOME | End: 2024-02-01
Attending: EMERGENCY MEDICINE | Admitting: EMERGENCY MEDICINE
Payer: MEDICARE

## 2024-02-01 VITALS
OXYGEN SATURATION: 97 % | DIASTOLIC BLOOD PRESSURE: 87 MMHG | HEART RATE: 83 BPM | BODY MASS INDEX: 23.23 KG/M2 | RESPIRATION RATE: 13 BRPM | HEIGHT: 67 IN | WEIGHT: 148 LBS | SYSTOLIC BLOOD PRESSURE: 145 MMHG | TEMPERATURE: 97.1 F

## 2024-02-01 DIAGNOSIS — T14.8XXA SKIN ABRASION: ICD-10-CM

## 2024-02-01 DIAGNOSIS — L03.116 CELLULITIS OF LEFT LOWER EXTREMITY: ICD-10-CM

## 2024-02-01 LAB
ALBUMIN SERPL BCG-MCNC: 4.3 G/DL (ref 3.5–5.2)
ALP SERPL-CCNC: 81 U/L (ref 40–150)
ALT SERPL W P-5'-P-CCNC: 18 U/L (ref 0–70)
ANION GAP SERPL CALCULATED.3IONS-SCNC: 6 MMOL/L (ref 7–15)
AST SERPL W P-5'-P-CCNC: 26 U/L (ref 0–45)
BILIRUB SERPL-MCNC: 0.4 MG/DL
BUN SERPL-MCNC: 13.9 MG/DL (ref 8–23)
CALCIUM SERPL-MCNC: 9.2 MG/DL (ref 8.8–10.2)
CHLORIDE SERPL-SCNC: 103 MMOL/L (ref 98–107)
CREAT SERPL-MCNC: 1.07 MG/DL (ref 0.67–1.17)
DEPRECATED HCO3 PLAS-SCNC: 31 MMOL/L (ref 22–29)
EGFRCR SERPLBLD CKD-EPI 2021: 66 ML/MIN/1.73M2
GLUCOSE SERPL-MCNC: 98 MG/DL (ref 70–99)
POTASSIUM SERPL-SCNC: 3.9 MMOL/L (ref 3.4–5.3)
PROT SERPL-MCNC: 6.8 G/DL (ref 6.4–8.3)
SODIUM SERPL-SCNC: 140 MMOL/L (ref 135–145)

## 2024-02-01 PROCEDURE — 80053 COMPREHEN METABOLIC PANEL: CPT | Performed by: EMERGENCY MEDICINE

## 2024-02-01 PROCEDURE — 250N000011 HC RX IP 250 OP 636: Performed by: EMERGENCY MEDICINE

## 2024-02-01 PROCEDURE — 99284 EMERGENCY DEPT VISIT MOD MDM: CPT | Mod: 25

## 2024-02-01 PROCEDURE — 96365 THER/PROPH/DIAG IV INF INIT: CPT | Mod: 59

## 2024-02-01 PROCEDURE — 36415 COLL VENOUS BLD VENIPUNCTURE: CPT | Performed by: EMERGENCY MEDICINE

## 2024-02-01 RX ORDER — CEFAZOLIN SODIUM 1 G/3ML
1 INJECTION, POWDER, FOR SOLUTION INTRAMUSCULAR; INTRAVENOUS ONCE
Status: COMPLETED | OUTPATIENT
Start: 2024-02-01 | End: 2024-02-01

## 2024-02-01 RX ADMIN — CEFAZOLIN 1 G: 1 INJECTION, POWDER, FOR SOLUTION INTRAMUSCULAR; INTRAVENOUS at 06:53

## 2024-02-01 ASSESSMENT — ACTIVITIES OF DAILY LIVING (ADL): ADLS_ACUITY_SCORE: 35

## 2024-02-01 NOTE — ED PROVIDER NOTES
"  History     Chief Complaint:  Wound Check    HPI   Jean Pierre Roblero is a 89 year old male on Xarelto with history of BPH, dementia, TIA, and hyperlipidemia, who presents for a wound check on the left lower leg. Patient had a fall on 1/22/24 wherein he hit his forehead and scraped his leg. At the time he had a negative workup for any head injury. He has two abrasions that he states have not been healing for the past 10 days; they have tried antibiotic ointment, but due to lack of improving symptoms he presented to urgent care two days ago and was prescribed Keflex for presumed infection. Wife is concerned because his wound has not gotten any better.  He wears compression socks but has not been wearing it on his left leg. He takes daily Aspirin as well.    Independent Historian:   Patient and wife report history as above.    Review of External Notes:   Chart review    Medications:    Aspirin  Atorvastatin  Cholestyramine  Famotidine  Finasteride  Furosemide  Guaifenesin  Memantine  Oxycodone  Rivaroxaban     Past Medical History:    GERD  BPH  Hyperlipidemia  Dementia  TIA  Encephalopathy     Past Surgical History:    Back surgery  Cholecystectomy  Endoscopic sinus surgery  Cataract removal  Hernia repair  Ingrown toenail removal  Turbinoplasty      Physical Exam   Patient Vitals for the past 24 hrs:   BP Temp Temp src Pulse Resp SpO2 Height Weight   02/01/24 0620 (!) 140/83 -- -- 91 13 96 % 1.702 m (5' 7\") 67.1 kg (148 lb)   02/01/24 0609 (!) 162/89 97.1  F (36.2  C) Temporal 92 16 96 % -- --        Physical Exam  GENERAL: well developed, pleasant  HEAD: atraumatic  EYES: pupils reactive, extraocular muscles intact, conjunctivae normal  ENT:  mucus membranes moist  NECK:  trachea midline, normal range of motion  RESPIRATORY: no tachypnea, breath sounds clear to auscultation   CVS: normal S1/S2, no murmurs, intact distal pulses  ABDOMEN: soft, nontender, nondistention  MUSCULOSKELETAL: no deformities  SKIN: warm and " dry, no acute rashes or ulceration. Redness to the left lower leg is noted in the photos below with some mild edema.  NEURO: GCS 15, cranial nerves intact, alert and oriented x3  PSYCH:  Mood/affect normal                  Emergency Department Course     Laboratory:  Labs Ordered and Resulted from Time of ED Arrival to Time of ED Departure   COMPREHENSIVE METABOLIC PANEL - Abnormal       Result Value    Sodium 140      Potassium 3.9      Carbon Dioxide (CO2) 31 (*)     Anion Gap 6 (*)     Urea Nitrogen 13.9      Creatinine 1.07      GFR Estimate 66      Calcium 9.2      Chloride 103      Glucose 98      Alkaline Phosphatase 81      AST 26      ALT 18      Protein Total 6.8      Albumin 4.3      Bilirubin Total 0.4          Emergency Department Course & Assessments:       Interventions:  Medications   ceFAZolin (ANCEF) 1 g vial to attach to  ml bag for ADULT or 50 ml bag for PEDS (1 g Intravenous $New Bag 2/1/24 0653)        Assessments:  0634 I entered the patient's room and obtained history. I believe he is safe for discharge at this time.     Independent Interpretation (X-rays, CTs, rhythm strip):  None    Consultations/Discussion of Management or Tests:  None        Social Determinants of Health affecting care:   None    Disposition:  The patient was discharged.     Impression & Plan    CMS Diagnoses: None    Medical Decision Making:    Patient presents with wound to the left lower leg after an abrasion.  Wife is concerned that it is not healing.  Does have some mild erythema but looks to be more inflamed.  Certainly could have some cellulitis is already on Keflex.  He has not been showering and has only been doing sponge baths.  Encouraged normal soap and water pat it dry ointment and follow-up.  The wound is fairly superficial would not expect him to require wound clinic but was considered.  Patient was given a dose of IV antibiotics.  Discussed with him ongoing wound care at home.    Diagnosis:     ICD-10-CM    1. Skin abrasion  T14.8XXA       2. Cellulitis of left lower extremity  L03.116          Scribe Disclosure:  Jaswant GARCIA Hired, am serving as a scribe at 6:13 AM on 2/1/2024 to document services personally performed by Fam Nolan MD based on my observations and the provider's statements to me.   2/1/2024   Fam Nolan MD Adams, Shaun L, MD  02/01/24 1312

## 2024-02-01 NOTE — ED TRIAGE NOTES
Pt ambulatory into ED with walker reporting wound to left lower leg that has not been healing for past 10 days. Denies fevers     Triage Assessment (Adult)       Row Name 02/01/24 0608          Respiratory WDL    Respiratory WDL WDL        Cardiac WDL    Cardiac WDL WDL        Cognitive/Neuro/Behavioral WDL    Cognitive/Neuro/Behavioral WDL WDL

## 2024-02-05 ENCOUNTER — APPOINTMENT (OUTPATIENT)
Dept: OCCUPATIONAL THERAPY | Facility: CLINIC | Age: 89
DRG: 552 | End: 2024-02-05
Attending: STUDENT IN AN ORGANIZED HEALTH CARE EDUCATION/TRAINING PROGRAM
Payer: MEDICARE

## 2024-02-05 ENCOUNTER — APPOINTMENT (OUTPATIENT)
Dept: CT IMAGING | Facility: CLINIC | Age: 89
DRG: 552 | End: 2024-02-05
Attending: EMERGENCY MEDICINE
Payer: MEDICARE

## 2024-02-05 ENCOUNTER — HOSPITAL ENCOUNTER (INPATIENT)
Facility: CLINIC | Age: 89
LOS: 3 days | Discharge: NURSING, REHAB OR LONG-TERM CARE | DRG: 552 | End: 2024-02-08
Attending: EMERGENCY MEDICINE | Admitting: STUDENT IN AN ORGANIZED HEALTH CARE EDUCATION/TRAINING PROGRAM
Payer: MEDICARE

## 2024-02-05 ENCOUNTER — APPOINTMENT (OUTPATIENT)
Dept: MRI IMAGING | Facility: CLINIC | Age: 89
DRG: 552 | End: 2024-02-05
Attending: INTERNAL MEDICINE
Payer: MEDICARE

## 2024-02-05 ENCOUNTER — APPOINTMENT (OUTPATIENT)
Dept: SPEECH THERAPY | Facility: CLINIC | Age: 89
DRG: 552 | End: 2024-02-05
Attending: STUDENT IN AN ORGANIZED HEALTH CARE EDUCATION/TRAINING PROGRAM
Payer: MEDICARE

## 2024-02-05 ENCOUNTER — APPOINTMENT (OUTPATIENT)
Dept: MRI IMAGING | Facility: CLINIC | Age: 89
DRG: 552 | End: 2024-02-05
Attending: STUDENT IN AN ORGANIZED HEALTH CARE EDUCATION/TRAINING PROGRAM
Payer: MEDICARE

## 2024-02-05 ENCOUNTER — APPOINTMENT (OUTPATIENT)
Dept: ULTRASOUND IMAGING | Facility: CLINIC | Age: 89
DRG: 552 | End: 2024-02-05
Payer: MEDICARE

## 2024-02-05 DIAGNOSIS — R29.6 FALLS FREQUENTLY: ICD-10-CM

## 2024-02-05 DIAGNOSIS — D64.9 ANEMIA, UNSPECIFIED TYPE: ICD-10-CM

## 2024-02-05 DIAGNOSIS — M54.50 ACUTE LEFT-SIDED LOW BACK PAIN WITHOUT SCIATICA: Primary | ICD-10-CM

## 2024-02-05 DIAGNOSIS — R53.1 GENERALIZED WEAKNESS: ICD-10-CM

## 2024-02-05 DIAGNOSIS — R29.898 RIGHT LEG WEAKNESS: ICD-10-CM

## 2024-02-05 DIAGNOSIS — Z86.73 HISTORY OF TIA (TRANSIENT ISCHEMIC ATTACK) AND STROKE: ICD-10-CM

## 2024-02-05 PROBLEM — I63.9 STROKE (H): Status: ACTIVE | Noted: 2024-02-05

## 2024-02-05 LAB
ANION GAP SERPL CALCULATED.3IONS-SCNC: 9 MMOL/L (ref 7–15)
APTT PPP: 54 SECONDS (ref 22–38)
ATRIAL RATE - MUSE: 109 BPM
BASOPHILS # BLD AUTO: 0 10E3/UL (ref 0–0.2)
BASOPHILS NFR BLD AUTO: 0 %
BUN SERPL-MCNC: 22.5 MG/DL (ref 8–23)
CALCIUM SERPL-MCNC: 9 MG/DL (ref 8.8–10.2)
CHLORIDE SERPL-SCNC: 102 MMOL/L (ref 98–107)
CHOLEST SERPL-MCNC: 100 MG/DL
CHOLEST SERPL-MCNC: 103 MG/DL
CREAT SERPL-MCNC: 1.16 MG/DL (ref 0.67–1.17)
DEPRECATED HCO3 PLAS-SCNC: 27 MMOL/L (ref 22–29)
DIASTOLIC BLOOD PRESSURE - MUSE: NORMAL MMHG
EGFRCR SERPLBLD CKD-EPI 2021: 60 ML/MIN/1.73M2
EOSINOPHIL # BLD AUTO: 0.1 10E3/UL (ref 0–0.7)
EOSINOPHIL NFR BLD AUTO: 1 %
ERYTHROCYTE [DISTWIDTH] IN BLOOD BY AUTOMATED COUNT: 12.9 % (ref 10–15)
ERYTHROCYTE [DISTWIDTH] IN BLOOD BY AUTOMATED COUNT: 13 % (ref 10–15)
FERRITIN SERPL-MCNC: 95 NG/ML (ref 31–409)
FOLATE SERPL-MCNC: 27.5 NG/ML (ref 4.6–34.8)
GLUCOSE BLDC GLUCOMTR-MCNC: 115 MG/DL (ref 70–99)
GLUCOSE BLDC GLUCOMTR-MCNC: 118 MG/DL (ref 70–99)
GLUCOSE SERPL-MCNC: 121 MG/DL (ref 70–99)
HBA1C MFR BLD: 5.3 %
HCT VFR BLD AUTO: 30.7 % (ref 40–53)
HCT VFR BLD AUTO: 32.7 % (ref 40–53)
HDLC SERPL-MCNC: 41 MG/DL
HDLC SERPL-MCNC: 41 MG/DL
HGB BLD-MCNC: 10.3 G/DL (ref 13.3–17.7)
HGB BLD-MCNC: 10.7 G/DL (ref 13.3–17.7)
IMM GRANULOCYTES # BLD: 0.1 10E3/UL
IMM GRANULOCYTES NFR BLD: 1 %
INR PPP: 3.5 (ref 0.85–1.15)
INTERPRETATION ECG - MUSE: NORMAL
IRON BINDING CAPACITY (ROCHE): 251 UG/DL (ref 240–430)
IRON SATN MFR SERPL: 10 % (ref 15–46)
IRON SERPL-MCNC: 25 UG/DL (ref 61–157)
LDLC SERPL CALC-MCNC: 46 MG/DL
LDLC SERPL CALC-MCNC: 49 MG/DL
LYMPHOCYTES # BLD AUTO: 2.3 10E3/UL (ref 0.8–5.3)
LYMPHOCYTES NFR BLD AUTO: 24 %
MCH RBC QN AUTO: 33.2 PG (ref 26.5–33)
MCH RBC QN AUTO: 33.7 PG (ref 26.5–33)
MCHC RBC AUTO-ENTMCNC: 32.7 G/DL (ref 31.5–36.5)
MCHC RBC AUTO-ENTMCNC: 33.6 G/DL (ref 31.5–36.5)
MCV RBC AUTO: 100 FL (ref 78–100)
MCV RBC AUTO: 102 FL (ref 78–100)
MONOCYTES # BLD AUTO: 0.6 10E3/UL (ref 0–1.3)
MONOCYTES NFR BLD AUTO: 6 %
NEUTROPHILS # BLD AUTO: 6.3 10E3/UL (ref 1.6–8.3)
NEUTROPHILS NFR BLD AUTO: 68 %
NONHDLC SERPL-MCNC: 59 MG/DL
NONHDLC SERPL-MCNC: 62 MG/DL
NRBC # BLD AUTO: 0 10E3/UL
NRBC BLD AUTO-RTO: 0 /100
P AXIS - MUSE: 66 DEGREES
PLATELET # BLD AUTO: 126 10E3/UL (ref 150–450)
PLATELET # BLD AUTO: 133 10E3/UL (ref 150–450)
POTASSIUM SERPL-SCNC: 4.5 MMOL/L (ref 3.4–5.3)
PR INTERVAL - MUSE: 160 MS
QRS DURATION - MUSE: 78 MS
QT - MUSE: 344 MS
QTC - MUSE: 463 MS
R AXIS - MUSE: -33 DEGREES
RBC # BLD AUTO: 3.06 10E6/UL (ref 4.4–5.9)
RBC # BLD AUTO: 3.22 10E6/UL (ref 4.4–5.9)
SODIUM SERPL-SCNC: 138 MMOL/L (ref 135–145)
SYSTOLIC BLOOD PRESSURE - MUSE: NORMAL MMHG
T AXIS - MUSE: 55 DEGREES
TRIGL SERPL-MCNC: 66 MG/DL
TRIGL SERPL-MCNC: 67 MG/DL
TROPONIN T SERPL HS-MCNC: 61 NG/L
TROPONIN T SERPL HS-MCNC: 65 NG/L
VENTRICULAR RATE- MUSE: 109 BPM
VIT B12 SERPL-MCNC: 1046 PG/ML (ref 232–1245)
WBC # BLD AUTO: 7.8 10E3/UL (ref 4–11)
WBC # BLD AUTO: 9.3 10E3/UL (ref 4–11)

## 2024-02-05 PROCEDURE — 250N000011 HC RX IP 250 OP 636: Performed by: EMERGENCY MEDICINE

## 2024-02-05 PROCEDURE — G0463 HOSPITAL OUTPT CLINIC VISIT: HCPCS | Mod: 25

## 2024-02-05 PROCEDURE — 250N000013 HC RX MED GY IP 250 OP 250 PS 637: Performed by: INTERNAL MEDICINE

## 2024-02-05 PROCEDURE — 80061 LIPID PANEL: CPT | Performed by: STUDENT IN AN ORGANIZED HEALTH CARE EDUCATION/TRAINING PROGRAM

## 2024-02-05 PROCEDURE — 82728 ASSAY OF FERRITIN: CPT | Performed by: STUDENT IN AN ORGANIZED HEALTH CARE EDUCATION/TRAINING PROGRAM

## 2024-02-05 PROCEDURE — 99418 PROLNG IP/OBS E/M EA 15 MIN: CPT | Performed by: STUDENT IN AN ORGANIZED HEALTH CARE EDUCATION/TRAINING PROGRAM

## 2024-02-05 PROCEDURE — 80048 BASIC METABOLIC PNL TOTAL CA: CPT | Performed by: EMERGENCY MEDICINE

## 2024-02-05 PROCEDURE — 84484 ASSAY OF TROPONIN QUANT: CPT | Performed by: EMERGENCY MEDICINE

## 2024-02-05 PROCEDURE — 36415 COLL VENOUS BLD VENIPUNCTURE: CPT | Performed by: EMERGENCY MEDICINE

## 2024-02-05 PROCEDURE — 97166 OT EVAL MOD COMPLEX 45 MIN: CPT | Mod: GO

## 2024-02-05 PROCEDURE — G1010 CDSM STANSON: HCPCS

## 2024-02-05 PROCEDURE — 93970 EXTREMITY STUDY: CPT

## 2024-02-05 PROCEDURE — 85730 THROMBOPLASTIN TIME PARTIAL: CPT | Performed by: EMERGENCY MEDICINE

## 2024-02-05 PROCEDURE — 250N000011 HC RX IP 250 OP 636: Performed by: STUDENT IN AN ORGANIZED HEALTH CARE EDUCATION/TRAINING PROGRAM

## 2024-02-05 PROCEDURE — 92610 EVALUATE SWALLOWING FUNCTION: CPT | Mod: GN | Performed by: SPEECH-LANGUAGE PATHOLOGIST

## 2024-02-05 PROCEDURE — 99221 1ST HOSP IP/OBS SF/LOW 40: CPT | Mod: GC | Performed by: PSYCHIATRY & NEUROLOGY

## 2024-02-05 PROCEDURE — 120N000001 HC R&B MED SURG/OB

## 2024-02-05 PROCEDURE — 70450 CT HEAD/BRAIN W/O DYE: CPT | Mod: MG

## 2024-02-05 PROCEDURE — 82607 VITAMIN B-12: CPT | Performed by: STUDENT IN AN ORGANIZED HEALTH CARE EDUCATION/TRAINING PROGRAM

## 2024-02-05 PROCEDURE — 70496 CT ANGIOGRAPHY HEAD: CPT | Mod: MG

## 2024-02-05 PROCEDURE — A9585 GADOBUTROL INJECTION: HCPCS | Performed by: EMERGENCY MEDICINE

## 2024-02-05 PROCEDURE — 70553 MRI BRAIN STEM W/O & W/DYE: CPT | Mod: MG

## 2024-02-05 PROCEDURE — 82746 ASSAY OF FOLIC ACID SERUM: CPT | Performed by: STUDENT IN AN ORGANIZED HEALTH CARE EDUCATION/TRAINING PROGRAM

## 2024-02-05 PROCEDURE — 255N000002 HC RX 255 OP 636: Performed by: EMERGENCY MEDICINE

## 2024-02-05 PROCEDURE — 258N000003 HC RX IP 258 OP 636: Performed by: STUDENT IN AN ORGANIZED HEALTH CARE EDUCATION/TRAINING PROGRAM

## 2024-02-05 PROCEDURE — 250N000013 HC RX MED GY IP 250 OP 250 PS 637: Performed by: HOSPITALIST

## 2024-02-05 PROCEDURE — 250N000009 HC RX 250: Performed by: EMERGENCY MEDICINE

## 2024-02-05 PROCEDURE — 92526 ORAL FUNCTION THERAPY: CPT | Mod: GN | Performed by: SPEECH-LANGUAGE PATHOLOGIST

## 2024-02-05 PROCEDURE — 84484 ASSAY OF TROPONIN QUANT: CPT | Performed by: STUDENT IN AN ORGANIZED HEALTH CARE EDUCATION/TRAINING PROGRAM

## 2024-02-05 PROCEDURE — 85025 COMPLETE CBC W/AUTO DIFF WBC: CPT | Performed by: EMERGENCY MEDICINE

## 2024-02-05 PROCEDURE — 83036 HEMOGLOBIN GLYCOSYLATED A1C: CPT | Performed by: STUDENT IN AN ORGANIZED HEALTH CARE EDUCATION/TRAINING PROGRAM

## 2024-02-05 PROCEDURE — 0042T CT HEAD PERFUSION W CONTRAST: CPT

## 2024-02-05 PROCEDURE — 99285 EMERGENCY DEPT VISIT HI MDM: CPT | Mod: 25

## 2024-02-05 PROCEDURE — 36415 COLL VENOUS BLD VENIPUNCTURE: CPT | Performed by: STUDENT IN AN ORGANIZED HEALTH CARE EDUCATION/TRAINING PROGRAM

## 2024-02-05 PROCEDURE — 83550 IRON BINDING TEST: CPT | Performed by: STUDENT IN AN ORGANIZED HEALTH CARE EDUCATION/TRAINING PROGRAM

## 2024-02-05 PROCEDURE — 99223 1ST HOSP IP/OBS HIGH 75: CPT | Mod: AI | Performed by: STUDENT IN AN ORGANIZED HEALTH CARE EDUCATION/TRAINING PROGRAM

## 2024-02-05 PROCEDURE — 99207 PR APP CREDIT; MD BILLING SHARED VISIT: CPT | Performed by: INTERNAL MEDICINE

## 2024-02-05 PROCEDURE — 85610 PROTHROMBIN TIME: CPT | Performed by: EMERGENCY MEDICINE

## 2024-02-05 PROCEDURE — 97530 THERAPEUTIC ACTIVITIES: CPT | Mod: GO

## 2024-02-05 PROCEDURE — 85027 COMPLETE CBC AUTOMATED: CPT | Performed by: STUDENT IN AN ORGANIZED HEALTH CARE EDUCATION/TRAINING PROGRAM

## 2024-02-05 RX ORDER — ATORVASTATIN CALCIUM 10 MG/1
10 TABLET, FILM COATED ORAL EVERY EVENING
Status: DISCONTINUED | OUTPATIENT
Start: 2024-02-05 | End: 2024-02-08 | Stop reason: HOSPADM

## 2024-02-05 RX ORDER — CALCIUM CARBONATE 500 MG/1
1000 TABLET, CHEWABLE ORAL 4 TIMES DAILY PRN
Status: DISCONTINUED | OUTPATIENT
Start: 2024-02-05 | End: 2024-02-08 | Stop reason: HOSPADM

## 2024-02-05 RX ORDER — CLOTRIMAZOLE AND BETAMETHASONE DIPROPIONATE 10; .64 MG/G; MG/G
CREAM TOPICAL 2 TIMES DAILY PRN
COMMUNITY

## 2024-02-05 RX ORDER — LIDOCAINE 40 MG/G
CREAM TOPICAL
Status: DISCONTINUED | OUTPATIENT
Start: 2024-02-05 | End: 2024-02-08 | Stop reason: HOSPADM

## 2024-02-05 RX ORDER — GADOBUTROL 604.72 MG/ML
7 INJECTION INTRAVENOUS ONCE
Status: COMPLETED | OUTPATIENT
Start: 2024-02-05 | End: 2024-02-05

## 2024-02-05 RX ORDER — FAMOTIDINE 20 MG/1
20 TABLET, FILM COATED ORAL AT BEDTIME
Status: DISCONTINUED | OUTPATIENT
Start: 2024-02-05 | End: 2024-02-08 | Stop reason: HOSPADM

## 2024-02-05 RX ORDER — POLYETHYLENE GLYCOL 3350 17 G/17G
17 POWDER, FOR SOLUTION ORAL DAILY
Status: DISCONTINUED | OUTPATIENT
Start: 2024-02-05 | End: 2024-02-08 | Stop reason: HOSPADM

## 2024-02-05 RX ORDER — ACETAMINOPHEN 650 MG/1
650 SUPPOSITORY RECTAL EVERY 4 HOURS PRN
Status: DISCONTINUED | OUTPATIENT
Start: 2024-02-05 | End: 2024-02-08 | Stop reason: HOSPADM

## 2024-02-05 RX ORDER — MEMANTINE HYDROCHLORIDE 10 MG/1
10 TABLET ORAL 2 TIMES DAILY
Status: DISCONTINUED | OUTPATIENT
Start: 2024-02-05 | End: 2024-02-08 | Stop reason: HOSPADM

## 2024-02-05 RX ORDER — CALCIUM CARBONATE 500 MG/1
500 TABLET, CHEWABLE ORAL DAILY PRN
Status: DISCONTINUED | OUTPATIENT
Start: 2024-02-05 | End: 2024-02-06

## 2024-02-05 RX ORDER — OLANZAPINE 10 MG/2ML
5 INJECTION, POWDER, FOR SOLUTION INTRAMUSCULAR DAILY PRN
Status: DISCONTINUED | OUTPATIENT
Start: 2024-02-05 | End: 2024-02-08 | Stop reason: HOSPADM

## 2024-02-05 RX ORDER — CEFAZOLIN SODIUM 1 G/3ML
1 INJECTION, POWDER, FOR SOLUTION INTRAMUSCULAR; INTRAVENOUS EVERY 8 HOURS
Status: DISCONTINUED | OUTPATIENT
Start: 2024-02-05 | End: 2024-02-08 | Stop reason: HOSPADM

## 2024-02-05 RX ORDER — ACETAMINOPHEN 325 MG/1
650 TABLET ORAL EVERY 4 HOURS PRN
Status: DISCONTINUED | OUTPATIENT
Start: 2024-02-05 | End: 2024-02-08 | Stop reason: HOSPADM

## 2024-02-05 RX ORDER — LABETALOL HYDROCHLORIDE 5 MG/ML
10-20 INJECTION, SOLUTION INTRAVENOUS EVERY 10 MIN PRN
Status: DISCONTINUED | OUTPATIENT
Start: 2024-02-05 | End: 2024-02-08 | Stop reason: HOSPADM

## 2024-02-05 RX ORDER — SODIUM CHLORIDE 9 MG/ML
INJECTION, SOLUTION INTRAVENOUS CONTINUOUS
Status: DISCONTINUED | OUTPATIENT
Start: 2024-02-05 | End: 2024-02-06

## 2024-02-05 RX ORDER — FINASTERIDE 5 MG/1
5 TABLET, FILM COATED ORAL EVERY EVENING
Status: DISCONTINUED | OUTPATIENT
Start: 2024-02-05 | End: 2024-02-08 | Stop reason: HOSPADM

## 2024-02-05 RX ORDER — ONDANSETRON 4 MG/1
4 TABLET, ORALLY DISINTEGRATING ORAL EVERY 6 HOURS PRN
Status: DISCONTINUED | OUTPATIENT
Start: 2024-02-05 | End: 2024-02-08 | Stop reason: HOSPADM

## 2024-02-05 RX ORDER — FERROUS SULFATE 325(65) MG
325 TABLET ORAL EVERY OTHER DAY
Status: DISCONTINUED | OUTPATIENT
Start: 2024-02-05 | End: 2024-02-08 | Stop reason: HOSPADM

## 2024-02-05 RX ORDER — IOPAMIDOL 755 MG/ML
117 INJECTION, SOLUTION INTRAVASCULAR ONCE
Status: COMPLETED | OUTPATIENT
Start: 2024-02-05 | End: 2024-02-05

## 2024-02-05 RX ORDER — ONDANSETRON 2 MG/ML
4 INJECTION INTRAMUSCULAR; INTRAVENOUS EVERY 6 HOURS PRN
Status: DISCONTINUED | OUTPATIENT
Start: 2024-02-05 | End: 2024-02-08 | Stop reason: HOSPADM

## 2024-02-05 RX ORDER — CEPHALEXIN 500 MG/1
500 CAPSULE ORAL 4 TIMES DAILY
Status: ON HOLD | COMMUNITY
End: 2024-02-08

## 2024-02-05 RX ORDER — TRIAMCINOLONE ACETONIDE 55 UG/1
1 SPRAY, METERED NASAL DAILY PRN
COMMUNITY

## 2024-02-05 RX ORDER — SENNOSIDES 8.6 MG
8.6 TABLET ORAL 2 TIMES DAILY PRN
Status: DISCONTINUED | OUTPATIENT
Start: 2024-02-05 | End: 2024-02-08 | Stop reason: HOSPADM

## 2024-02-05 RX ORDER — LORATADINE 10 MG/1
10 TABLET ORAL DAILY
COMMUNITY

## 2024-02-05 RX ORDER — HYDRALAZINE HYDROCHLORIDE 20 MG/ML
10-20 INJECTION INTRAMUSCULAR; INTRAVENOUS
Status: DISCONTINUED | OUTPATIENT
Start: 2024-02-05 | End: 2024-02-08 | Stop reason: HOSPADM

## 2024-02-05 RX ORDER — ACETAMINOPHEN 500 MG
500-1000 TABLET ORAL EVERY 8 HOURS PRN
COMMUNITY
End: 2024-02-15

## 2024-02-05 RX ADMIN — RIVAROXABAN 20 MG: 20 TABLET, FILM COATED ORAL at 16:15

## 2024-02-05 RX ADMIN — FERROUS SULFATE TAB 325 MG (65 MG ELEMENTAL FE) 325 MG: 325 (65 FE) TAB at 16:15

## 2024-02-05 RX ADMIN — ATORVASTATIN CALCIUM 10 MG: 10 TABLET, FILM COATED ORAL at 20:10

## 2024-02-05 RX ADMIN — FAMOTIDINE 20 MG: 20 TABLET ORAL at 21:38

## 2024-02-05 RX ADMIN — SODIUM CHLORIDE 100 ML: 9 INJECTION, SOLUTION INTRAVENOUS at 00:24

## 2024-02-05 RX ADMIN — MEMANTINE 10 MG: 10 TABLET ORAL at 20:07

## 2024-02-05 RX ADMIN — CEFAZOLIN 1 G: 1 INJECTION, POWDER, FOR SOLUTION INTRAMUSCULAR; INTRAVENOUS at 04:11

## 2024-02-05 RX ADMIN — CEFAZOLIN 1 G: 1 INJECTION, POWDER, FOR SOLUTION INTRAMUSCULAR; INTRAVENOUS at 20:06

## 2024-02-05 RX ADMIN — ACETAMINOPHEN 650 MG: 325 TABLET, FILM COATED ORAL at 20:25

## 2024-02-05 RX ADMIN — IOPAMIDOL 117 ML: 755 INJECTION, SOLUTION INTRAVENOUS at 00:24

## 2024-02-05 RX ADMIN — FINASTERIDE 5 MG: 5 TABLET, FILM COATED ORAL at 20:10

## 2024-02-05 RX ADMIN — CEFAZOLIN 1 G: 1 INJECTION, POWDER, FOR SOLUTION INTRAMUSCULAR; INTRAVENOUS at 12:22

## 2024-02-05 RX ADMIN — GADOBUTROL 7 ML: 604.72 INJECTION INTRAVENOUS at 03:30

## 2024-02-05 RX ADMIN — SODIUM CHLORIDE: 9 INJECTION, SOLUTION INTRAVENOUS at 05:24

## 2024-02-05 ASSESSMENT — ACTIVITIES OF DAILY LIVING (ADL)
ADLS_ACUITY_SCORE: 35
ADLS_ACUITY_SCORE: 36
ADLS_ACUITY_SCORE: 35
DEPENDENT_IADLS:: CLEANING;COOKING;LAUNDRY;SHOPPING;MEAL PREPARATION;TRANSPORTATION;MEDICATION MANAGEMENT;MONEY MANAGEMENT
ADLS_ACUITY_SCORE: 35
ADLS_ACUITY_SCORE: 31
WALKING_OR_CLIMBING_STAIRS: AMBULATION DIFFICULTY, REQUIRES EQUIPMENT;STAIR CLIMBING DIFFICULTY, REQUIRES EQUIPMENT;TRANSFERRING DIFFICULTY, REQUIRES EQUIPMENT
ADLS_ACUITY_SCORE: 35
ADLS_ACUITY_SCORE: 35
ADLS_ACUITY_SCORE: 31
WALKING_OR_CLIMBING_STAIRS_DIFFICULTY: YES
ADLS_ACUITY_SCORE: 35
ADLS_ACUITY_SCORE: 35

## 2024-02-05 NOTE — PROGRESS NOTES
[facility]    Hospitalist Progress Note    Assessment & Plan   89M hx of TIA HLD, RLE DVT on xeralto, cognitive impairment, arthritis, CKD, HTN, BPH constipation, GERD presenting with a fall 2/2 RLE weakness.      RLE weakness, L sided decreased Sensation. R/O Stroke  Recurrent Falls  Coagulopathy 2/2 Medication Use  Hx TIA  Hx HLD  Hx: Patient somewhat poor historian but states he fell today as 'legs gave out'. Wife states that for the entire day prior to presentation, he was dragging his R leg, and he fell on his way out of the bathroom due to R leg weakness. Patient denies HA/blurry vision/CP/SOB. Denies numbness. Denies any pain, and states he was asymptomatic. Endorses chronic urinary incotinencen Wife reports TIA years ago and was then started on xeralto. No headtruama/LOC, though state that weeks ago when he fell there was Headtraume wihtout LOC.   On examination had decrease sensation LUE and LLE, L facial droop on admission as per notes.  LLE abrasians with cellulitis covered with wrapping.  Mental status was baseline.  EKG: sinus tachycardia, LAD, borderline Qtc prolongation  Imaging: Area of prolonged Tmax along the right inferior temporo-occipital lobes. MRI negative   --Neuro stroke consulted, MRI negative, they signed out.  --PT/OT/speech to evaluate patient, will order MRI of thoracolumbar area and consult general neurology for further evaluation for his recurrent falls and weakness.  --Echocardiogram pending, continue neurochecks  --Continue PTA meds, encourage oral intake.       RLE DVT  diagnosed in 2023, while on xeralto for the TIA per the wife.  Restart xarelto      Cognitive Impairment  wife states patient at baseline, Aox3, not to president. But gave wrong time to ER staff  doesn't follow all commands, but when discussing GOC, did understand the implications of the discussion and requested to be Full code  --PRN zyprexa for agitation     Macrocytic Anemia  acute drop  in the last 2 weeks  has facial bruising in the setting of a recent fall in the past few weeks. Unclear as to the origin of the acute anemia, but family denies any recent GI blood loss  --Iron workup ordered, deficiency noted, since hemoglobin is about 10 will start on p.o. iron supplementation.     LLE Celluliits  Failed topical  abx, and presented to the ER 4 days prior to current presentation for persistance of the infection. No evidence that he was discharged with PO abx  --Resume ancef  --WOC     Hypertension  --PRNs in place     CKD2  --limit nsaids/contrast  --renal diet once NPO completed     Constipation  --miralax/senna     BPH  Urinary Incontinence  --purewick     Progressive Pulmonary Nodule Infiltrate  seen on previous CTs  --outpt follow-up        Arthritis  --tylenol      GERD  --tums PRN      DVT Prophylaxis: Pneumatic Compression Devices  Code Status: Full Code     52 MINUTES SPENT BY ME on the date of service doing chart review, history, exam, documentation & further activities per the note.  Disposition: Expected discharge 2/6  Clinically Significant Risk Factors Present on Admission               # Drug Induced Coagulation Defect: home medication list includes an anticoagulant medication  # Thrombocytopenia: Lowest platelets = 126 in last 2 days, will monitor for bleeding     # Dementia: noted on problem list        # Financial/Environmental Concerns:           Lilia Rivero MD  Text Page   (7am to 6pm)    Interval History   Patient had multiple episodes of fall prior to this admission, wife mentions his left leg gave out and he fell while using a walker.    -Data reviewed today: I reviewed all new labs and imaging results over the last 24 hours.   Physical Exam     Vital Signs with Ranges  Temp:  [97.9  F (36.6  C)-98.8  F (37.1  C)] 98.3  F (36.8  C)  Pulse:  [] 88  Resp:  [13-16] 16  BP: (126-166)/() 126/70  SpO2:  [93 %-98 %] 96 %  No intake/output data  recorded.    Constitutional: Awake, alert, cooperative, no apparent distress  Respiratory: Clear to auscultation bilaterally, no crackles or wheezing  Cardiovascular: Regular rate and rhythm, normal S1 and S2, and no murmur noted  GI: Normal bowel sounds, soft, non-distended, non-tender  Skin/Integumen: No rashes, no cyanosis, no edema  Neuro : moving all 4 extremities, gait not tested, left lower extremity weakness noted    Medications    - MEDICATION INSTRUCTIONS -      - MEDICATION INSTRUCTIONS -      sodium chloride 50 mL/hr at 02/05/24 0524      ceFAZolin  1 g Intravenous Q8H    polyethylene glycol  17 g Oral Daily    sodium chloride (PF)  3 mL Intracatheter Q8H       Data   Recent Labs   Lab 02/05/24  1056 02/05/24  0718 02/05/24  0020 02/01/24  0631   WBC  --  7.8 9.3  --    HGB  --  10.3* 10.7*  --    MCV  --  100 102*  --    PLT  --  126* 133*  --    INR  --   --  3.50*  --    NA  --   --  138 140   POTASSIUM  --   --  4.5 3.9   CHLORIDE  --   --  102 103   CO2  --   --  27 31*   BUN  --   --  22.5 13.9   CR  --   --  1.16 1.07   ANIONGAP  --   --  9 6*   KENYA  --   --  9.0 9.2   *  --  121* 98   ALBUMIN  --   --   --  4.3   PROTTOTAL  --   --   --  6.8   BILITOTAL  --   --   --  0.4   ALKPHOS  --   --   --  81   ALT  --   --   --  18   AST  --   --   --  26     Recent Labs   Lab 02/05/24  1056 02/05/24  0020 02/01/24  0631   * 121* 98       Imaging:   Recent Results (from the past 24 hour(s))   CT Head w/o Contrast    Narrative    EXAM: CT HEAD W/O CONTRAST  LOCATION: [facility]  DATE: 2/5/2024    INDICATION: Code Stroke to evaluate for potential thrombolysis and thrombectomy. Right leg weakness.  COMPARISON: 01/22/2024.  TECHNIQUE: Routine CT Head without IV contrast. Multiplanar reformats. Dose reduction techniques were used.    FINDINGS:  INTRACRANIAL CONTENTS: No intracranial hemorrhage, extraaxial collection, or mass effect. No CT evidence of acute infarct.  Moderate presumed chronic small vessel ischemic changes. Mild to moderate generalized volume loss. No hydrocephalus.     VISUALIZED ORBITS/SINUSES/MASTOIDS: No intraorbital abnormality. No paranasal sinus mucosal disease. No middle ear or mastoid effusion.    BONES/SOFT TISSUES: Interval decreased left frontal scalp contusion. No fracture.      Impression    IMPRESSION:  1.  No CT evidence for acute intracranial process.  2.  Brain atrophy and presumed chronic microvascular ischemic changes as above.   CTA Head Neck with Contrast    Narrative    EXAM: CTA HEAD NECK W CONTRAST  LOCATION: [facility]  DATE: 2/5/2024    INDICATION: Code Stroke to evaluate for potential thrombolysis and thrombectomy. Right leg weakness.  COMPARISON: None.  CONTRAST: 67 mL Isovue 370.  TECHNIQUE: Head and neck CT angiogram with IV contrast. Axial helical CT images of the head and neck vessels obtained during the arterial phase of intravenous contrast administration. Axial 2D reconstructed images and multiplanar 3D MIP reconstructed   images of the head and neck vessels were performed by the technologist. Dose reduction techniques were used. All stenosis measurements made according to NASCET criteria unless otherwise specified.    FINDINGS:  HEAD CTA:  ANTERIOR CIRCULATION: Calcified plaque within the carotid siphons. No large artery stenosis or occlusion. No discrete aneurysm. Standard [place] of [place] anatomy.    POSTERIOR CIRCULATION: Minimal plaque within the proximal left vertebral artery. No large artery stenosis or occlusion. No aneurysm. Dominant left and smaller right vertebral artery contribute to a normal basilar artery.     DURAL VENOUS SINUSES: Expected enhancement of the major dural venous sinuses.    NECK CTA:  RIGHT CAROTID: Mild calcified plaque at the carotid bifurcation. No measurable stenosis or dissection.    LEFT CAROTID: Mild calcified plaque at the carotid bifurcation. No measurable  stenosis or dissection.    VERTEBRAL ARTERIES: No focal stenosis or dissection. Dominant left and smaller right vertebral arteries.    AORTIC ARCH: Scattered calcified plaque within the aortic arch. No significant stenosis of the great vessels.    NONVASCULAR STRUCTURES: Moderate to advanced cervical spondylosis.      Impression    IMPRESSION:   HEAD CTA:   1.  No large artery stenosis or occlusion. No aneurysm.    NECK CTA:  1.  No measurable carotid or vertebral artery stenosis.    The results were communicated to Dr. Gavin at 12:48 AM on 02/05/2024.   CT Head Perfusion w Contrast - For Tier 2 Stroke    Narrative    EXAM: CT HEAD PERFUSION W CONTRAST  LOCATION: [facility]  DATE: 2/5/2024    INDICATION: Code Stroke to evaluate for potential thrombolysis and thrombectomy. Evaluate mismatch between penumbra and core infarct.   COMPARISON: None.  TECHNIQUE: CT cerebral perfusion was performed utilizing a second contrast bolus. Perfusion data were post processed with generation of standard perfusion maps and estimation of ischemic/infarcted volumes utilizing standard threshold values. Dose   reduction techniques were used. All stenosis measurements made according to NASCET criteria unless otherwise specified.  CONTRAST: 50 mL Isovue 370.    FINDINGS:   PERFUSION MAPS: There is an area of prolonged Tmax along the right inferior temporo-occipital lobes. No other perfusion abnormality.    RAPID ANALYSIS:  CBF<30%: 0 mL.  Tmax>6sec: 14 mL, right temporo-occipital region.  Mismatch volume: 14 mL.  Mismatch ratio: Infinite.      Impression    IMPRESSION:   1.  Area of prolonged Tmax along the right inferior temporo-occipital lobes, artifact versus ischemic change. MRI may be of value for further evaluation.   MR Brain w/o & w Contrast    Narrative    EXAM: MR BRAIN W/O AND W CONTRAST  LOCATION: [facility]  DATE: 2/5/2024    INDICATION: Right lower extremity weakness. Rule  out stroke.  COMPARISON: CTA head and neck 02/05/2024. Brain MRI 05/26/2022.  CONTRAST: 7 mL Gadavist.  TECHNIQUE: Routine multiplanar multisequence head MRI without and with intravenous contrast.    FINDINGS: Image quality is mildly degraded by motion.    INTRACRANIAL CONTENTS: No acute or subacute infarct. No mass, acute hemorrhage, or extra-axial fluid collections. Patchy and confluent nonspecific T2/FLAIR hyperintensities within the cerebral white matter most consistent with moderate chronic   microvascular ischemic change. Moderate generalized cerebral atrophy. No hydrocephalus. Normal position of the cerebellar tonsils. No pathologic contrast enhancement.    SELLA: No abnormality accounting for technique.    OSSEOUS STRUCTURES/SOFT TISSUES: Normal marrow signal. The major intracranial vascular flow voids are maintained.     ORBITS: No abnormality accounting for technique.     SINUSES/MASTOIDS: No paranasal sinus mucosal disease. No middle ear or mastoid effusion.       Impression    IMPRESSION:  1.  No acute intracranial process.  2.  Generalized brain atrophy and presumed microvascular ischemic changes as detailed above.   US Lower Extremity Venous Duplex Bilateral    Narrative    VENOUS ULTRASOUND BOTH LEGS  2/5/2024 9:16 AM     HISTORY: Hx of RLE DVT on may/23, on chronic anticoagulation with  Xarelto    COMPARISON: 5/5/2023    FINDINGS:  Examination of the deep veins with graded compression and  color flow Doppler with spectral wave form analysis was performed.      Impression    IMPRESSION: No evidence of deep venous thrombosis.    EJ MANCINI MD         SYSTEM ID:  [id number]

## 2024-02-05 NOTE — CONSULTS
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 CTH and CTA were negative (no bleeding or LVO). TNK not given due to uncertain window as well as DOAC dose < 48 hours.  MRI did not reveal acute stroke.    Neuroimaging Evaluation Summarized    MRI and/or Head CT CTH 2/5 - no bleeding.  MRI Brain 2/5 - no acute stroke or microbleeds.   Intracranial Vasculature CTA H&N 2/5 - No LVO / significant stenosis.   Cervical Vasculature CTA H&N 2/5 - No LVO / significant stenosis.     Echocardiogram Pending.   EKG/Telemetry SR.   Other Testing Not Applicable     LDL No lab value available in past 30 days   A1C 2/5/2024: 5.3 %       ABCD2 Patients Score   Age ? 60 years 1 point 1   Blood Pressure    SBP ? 140 or DBP ?  90    1 point 1   Clinical Features    - Unilateral weakness    - Speech disturbance w/o weakness    - Other    2 points  1 point    0 points 2   Duration of symptoms    ? 60 minutes    10-59 minutes    < 10 minutes   2 points  1 point  0 points 2   Diabetes  1 point 0   Patient s ABCD2 Score (0-7) = 6       Impression  Transient right leg weakness of unclear etiology vs TIA  Chronic Instability and Recurrent Falls    Recommendations   - Daily aspirin 81 mg for secondary stroke prevention  - Xarelto per Hospitalist   Ordered new BLE U/S to follow up on prior DVT status (may/23)  - Statin: Continue Lipitor PTA  - Pending TTE  - 24-hour Telemetry  - Bedside Glucose Monitoring  - A1c, Lipid Panel  - PT/OT/SLP    Patient Follow-up    No further stroke evaluation is recommended, so we will sign off. Please contact us with any additional questions or if Echo  "results reveal significant abnormalities (e.g. thrombus).    Thank you for this consult. Consider General Neurology consult for diagnostic workup of unsteady gait / recurrent falls with normal MRI of the Brain.    The Stroke/Neurocritical Care Staff is Dr. Morales.    Chemo Arias MD  Neurocritical Care Fellow    To page me or covering stroke neurology team member, click here: [facility]  Choose \"On Call\" tab at top, then select \"NEUROLOGY/ALL SITES\" from middle drop-down box, press Enter, then look for \"stroke\" or \"telestroke\" for your site.  _____________________________________________________    Clinically Significant Risk Factors Present on Admission               # Drug Induced Coagulation Defect: home medication list includes an anticoagulant medication  # Drug Induced Platelet Defect: home medication list includes an antiplatelet medication     # Dementia: noted on problem list        # Financial/Environmental Concerns:            Past Medical History    Past Medical History:   Diagnosis Date    Allergic state     Fainting episodes     FREQUENT/ EASILY    Gastroesophageal reflux disease     Headaches     Heartburn     Nocturia     Photosensitivity     Swallowing difficulty      Medications   Home Meds  Prior to Admission medications    Medication Sig Start Date End Date Taking? Authorizing Provider   aspirin (ASA) 81 MG EC tablet Take 1 tablet (81 mg) by mouth daily 5/28/22   Zeny Brown MD   atorvastatin (LIPITOR) 10 MG tablet 1 tablet (10 mg) by Oral or Feeding Tube route every evening 5/27/22   Zeny Brown MD   butalbital-acetaminophen-caffeine (ESGIC) -40 MG tablet Take 1 tablet by mouth every 4 hours as needed for headaches    Unknown, Entered By History   cholestyramine (QUESTRAN) 4 G packet Take 1 packet by mouth 2 times daily (with meals)    Reported, Patient   famotidine (PEPCID) 40 MG tablet Take 40 mg by mouth At Bedtime    Unknown, Entered By " History   finasteride (PROSCAR) 5 MG tablet Take 5 mg by mouth daily    Unknown, Entered By History   fluticasone (FLONASE) 50 MCG/ACT nasal spray Spray 1 spray into both nostrils every 48 hours as needed for rhinitis or allergies    Reported, Patient   furosemide (LASIX) 20 MG tablet Take 1 tablet (20 mg) by mouth daily for 7 days 1/28/23 2/4/23  Eduardo Duff MD   guaiFENesin (MUCINEX) 600 MG 12 hr tablet Take 600 mg by mouth daily as needed    Unknown, Entered By History   ibuprofen (ADVIL/MOTRIN) 200 MG tablet Take 400 mg by mouth 2 times daily as needed for pain    Unknown, Entered By History   memantine (NAMENDA) 10 MG tablet Take 10 mg by mouth 2 times daily    Unknown, Entered By History   multivitamin  with lutein (OCUVITE WITH LTEIN) CAPS per capsule Take 1 capsule by mouth daily    Unknown, Entered By History   oxyCODONE (ROXICODONE) 5 MG tablet Take 0.5 tablets (2.5 mg) by mouth every 6 hours as needed for breakthrough pain 12/20/22   Conor Buck DO   Rivaroxaban ANTICOAGULANT 15 & 20 MG TBPK Starter Therapy Pack Take 15 mg by mouth 2 times daily (with meals) for 21 days, THEN 20 mg daily with food for 9 days. 5/5/23 6/4/23  Kizzy Rivera MD   sodium chloride (OCEAN) 0.65 % nasal spray Spray 1 spray into both nostrils every 48 hours as needed for congestion    Unknown, Entered By History   vitamin B-12 (CYANOCOBALAMIN) 1000 MCG tablet Take 1,000 mcg by mouth daily Noon    Unknown, Entered By History   vitamin C (ASCORBIC ACID) 500 MG tablet Take 500 mg by mouth daily    Unknown, Entered By History   vitamin D3 (CHOLECALCIFEROL) 2000 units (50 mcg) tablet Take 2,000 Units by mouth daily Noon    Unknown, Entered By History   clopidogrel (PLAVIX) 75 MG tablet Take 1 tablet (75 mg) by mouth daily 5/28/22 8/19/22  Zeny Brown MD   hydrochlorothiazide (HYDRODIURIL) 12.5 MG tablet Take 1 tablet (12.5 mg) by mouth daily 5/27/22 5/27/22  Zeny Brown MD       Scheduled  Meds   ceFAZolin  1 g Intravenous Q8H    polyethylene glycol  17 g Oral Daily    sodium chloride (PF)  3 mL Intracatheter Q8H       Infusion Meds   - MEDICATION INSTRUCTIONS -      - MEDICATION INSTRUCTIONS -      sodium chloride 50 mL/hr at 02/05/24 0524       Allergies   Allergies   Allergen Reactions    Chocolate Anaphylaxis     headache    Feathers     Novocaine [Procaine]      Passes out almost immediately    Strawberry Extract GI Disturbance     Most berries          PHYSICAL EXAMINATION   Temp:  [97.9  F (36.6  C)-98.8  F (37.1  C)] 97.9  F (36.6  C)  Pulse:  [] 100  Resp:  [13-16] 16  BP: (132-166)/() 153/88  SpO2:  [93 %-98 %] 98 %    Neurologic  Mental Status: alert, follows commands, speech clear and fluent  Cranial Nerves: Blink to threat bilaterally, PERRL, EOMI with normal smooth pursuit, minor facial asymmetry (left nasolabial fold effacement),no dysarthria  Motor:  normal muscle tone and bulk, no abnormal movements, able to move all limbs spontaneously, strength 5/5 throughout upper and lower extremities, no pronator drift  Reflexes:  deferred  Sensory:  light touch sensation intact and symmetric throughout upper and lower extremities  Coordination:  normal finger-to-nose and heel-to-shin bilaterally without dysmetria  Station/Gait:  deferred    Stroke Scales    NIHSS  1a. Level of Consciousness 0-->Alert, keenly responsive   1b. LOC Questions 0-->Answers both questions correctly   1c. LOC Commands 0-->Performs both tasks correctly   2.   Best Gaze 0-->Normal   3.   Visual 0-->No visual loss   4.   Facial Palsy 1-->Minor paralysis (flattened nasolabial fold, asymmetry on smiling)   5a. Motor Arm, Left 0-->No drift, limb holds 90 (or 45) degrees for full 10 secs   5b. Motor Arm, Right 0-->No drift, limb holds 90 (or 45) degrees for full 10 secs   6a. Motor Leg, Left 0-->No drift, leg holds 30 degree position for full 5 secs   6b. Motor Leg, right 0-->No drift, leg holds 30 degree position  for full 5 secs   7.   Limb Ataxia 0-->Absent   8.   Sensory 0-->Normal, no sensory loss   9.   Best Language 0-->No aphasia, normal   10. Dysarthria 0-->Normal   11. Extinction and Inattention  0-->No abnormality   Total 1 (02/05/24 1417)       Imaging  I personally reviewed all imaging; relevant findings per HPI.    Labs Data   CBC  Recent Labs   Lab 02/05/24  0718 02/05/24  0020   WBC 7.8 9.3   RBC 3.06* 3.22*   HGB 10.3* 10.7*   HCT 30.7* 32.7*   * 133*     Basic Metabolic Panel   Recent Labs   Lab 02/05/24  0020 02/01/24  0631    140   POTASSIUM 4.5 3.9   CHLORIDE 102 103   CO2 27 31*   BUN 22.5 13.9   CR 1.16 1.07   * 98   KENYA 9.0 9.2     Liver Panel  Recent Labs   Lab 02/01/24  0631   PROTTOTAL 6.8   ALBUMIN 4.3   BILITOTAL 0.4   ALKPHOS 81   AST 26   ALT 18     INR    Recent Labs   Lab Test 02/05/24  0020 05/26/22  0640   INR 3.50* 1.05           Stroke Consult Data Data   This was a non-emergent, non-telestroke consult.

## 2024-02-05 NOTE — ED NOTES
[facility]  ED Nurse Handoff Report    ED Chief complaint: Altered Mental Status, Stroke Symptoms, and Fall      ED Diagnosis:   Final diagnoses:   Generalized weakness   Right leg weakness   Falls frequently   Anemia, unspecified type       Code Status: Full Code    Allergies:   Allergies   Allergen Reactions    Chocolate Anaphylaxis     headache    Feathers     Novocaine [Procaine]      Passes out almost immediately    Strawberry Extract GI Disturbance     Most berries       Patient Story: Pt comes in with R leg weakness and left facial droop. Being admitted for further workup by Neuro. Possible aphasia and some mild cognitive impairment.   Focused Assessment:  Left sided facial droop still and right leg weakness. Patient extremely [place].    Treatments and/or interventions provided: IV abx and fluids  Patient's response to treatments and/or interventions: Pt resting comfortably    To be done/followed up on inpatient unit:  PT/OT, WOC for LLE cellulitis    Does this patient have any cognitive concerns?: Forgetful    Activity level - Baseline/Home:  Stand with Assist  Activity Level - Current:   Total Care    Patient's Preferred language: English   Needed?: No    Isolation: None  Infection: Not Applicable  Patient tested for COVID 19 prior to admission: NO  Bariatric?: No    Vital Signs:   Vitals:    02/05/24 0040 02/05/24 0041 02/05/24 0100 02/05/24 0206   BP:  (!) 151/87 (!) 145/80 (!) 147/88   Pulse:  120 105 101   Resp:   13 13   Temp:  97.9  F (36.6  C)     SpO2: 94%  95% 94%   Weight: 71 kg (156 lb 8.4 oz)          Cardiac Rhythm:Cardiac Rhythm: Sinus tachycardia    Was the PSS-3 completed:   Yes  What interventions are required if any?               Family Comments: Wife, Lianet, at bedside  OBS brochure/video discussed/provided to patient/family: N/A              Name of person given brochure if not patient:               Relationship to patient:     For the majority of the shift  this patient's behavior was Green.   Behavioral interventions performed were Extremely pleasant, none needed.    ED NURSE PHONE NUMBER: *[phone]

## 2024-02-05 NOTE — PROGRESS NOTES
02/05/24 1300   Appointment Info   Signing Clinician's Name / Credentials (OT) Marilyn Pelaez, OTR/L   Rehab Comments (OT) Initial OT Eval   Living Environment   People in Home spouse   Current Living Arrangements house   Home Accessibility stairs to enter home   Number of Stairs, Main Entrance 1   Living Environment Comments wife reports 1 large stair to enter the home then pt ha a chair lift inside the house. tub shower and walk in shower   Self-Care   Usual Activity Tolerance moderate   Current Activity Tolerance fair   Equipment Currently Used at Home walker, standard;cane, straight;lift device   Fall history within last six months yes   Number of times patient has fallen within last six months 3   Activity/Exercise/Self-Care Comment pt wife reports that he needs A with dressing. indp with bathing in shower in standing with walker and indp toileting. uses a walker in the house and out of the house. wife reports typically he has not problem walking in the house   Instrumental Activities of Daily Living (IADL)   IADL Comments dep from wife   General Information   Onset of Illness/Injury or Date of Surgery 02/05/23   Referring Physician Catarina Betancur MD   Patient/Family Therapy Goal Statement (OT) wife agreeable   Additional Occupational Profile Info/Pertinent History of Current Problem 89M hx of TIA HLD, RLE DVT on xeralto, cognitive impairment, arthritis, CKD, HTN, BPH constipation, GERD presenting with a fall 2/2 RLE weakness.   General Observations and Info pt [place] but pleasant   Cognitive Status Examination   Orientation Status orientation to person, place and time   Affect/Mental Status (Cognitive) WFL   Follows Commands WFL   Cognitive Status Comments baseline cog impairment. very [place]. appears to be having word finding difficulties   Visual Perception   Visual Impairment/Limitations corrective lenses full-time   Impact of Vision Impairment on Function (Vision) pt reports he had double vision yesterday  but not today. screen negative   Sensory   Sensory Comments pt unable to answer appropiately. no numbness or tingling   Pain Assessment   Patient Currently in Pain Yes, see Vital Sign flowsheet   Posture   Posture forward head position;protracted shoulders   Range of Motion Comprehensive   Comment, General Range of Motion ROM to 75% full B UE   Strength Comprehensive (MMT)   Comment, General Manual Muscle Testing (MMT) Assessment 3+/5 R UE, 5/5 L UE   Muscle Tone Assessment   Muscle Tone Quick Adds No deficits were identified   Coordination   Upper Extremity Coordination Right UE impaired   Coordination Comments mild R UE finger to nose at EOB but also with LOB noted posterior, difficult to assess   Bed Mobility   Bed Mobility supine-sit   Supine-Sit [place] (Bed Mobility) moderate assist (50% patient effort)   Comment (Bed Mobility) VC/ TC   Transfers   Transfers sit-stand transfer   Sit-Stand Transfer   Sit-Stand [place] (Transfers) maximum assist (25% patient effort)   Assistive Device (Sit-Stand Transfers) walker, front-wheeled   Activities of Daily Living   BADL Assessment/Intervention bathing;upper body dressing;lower body dressing;toileting   Bathing Assessment/Intervention   [place] Level (Bathing) maximum assist (25% patient effort)   Upper Body Dressing Assessment/Training   [place] Level (Upper Body Dressing) moderate assist (50% patient effort)   Lower Body Dressing Assessment/Training   [place] Level (Lower Body Dressing) maximum assist (25% patient effort)   Toileting   [place] Level (Toileting) maximum assist (25% patient effort)   Clinical Impression   Criteria for Skilled Therapeutic Interventions Met (OT) Yes, treatment indicated   OT Diagnosis decreasd function in ADL   OT Problem List-Impairments impacting ADL problems related to;activity tolerance impaired;mobility;strength;pain;post-surgical precautions;coordination;cognition   Assessment of Occupational  Performance 5 or more Performance Deficits   Identified Performance Deficits all ADL and IADL   Planned Therapy Interventions (OT) ADL retraining;IADL retraining;bed mobility training;strengthening;ROM;progressive activity/exercise;cognition   Clinical Decision Making Complexity (OT) detailed assessment/moderate complexity   Risk & Benefits of therapy have been explained evaluation/treatment results reviewed;care plan/treatment goals reviewed;risks/benefits reviewed;current/potential barriers reviewed;participants voiced agreement with care plan;participants included;patient   Clinical Impression Comments decreased function in ADL warrants skilled therapy   OT Total Evaluation Time   OT Eval, Moderate Complexity Minutes (21534) 15   OT Goals   Therapy Frequency (OT) 5 times/week   OT Predicted Duration/Target Date for Goal Attainment 02/19/24   OT Goals Hygiene/Grooming;Transfers;Toilet Transfer/Toileting;Cognition;OT Goal 1   OT: Hygiene/Grooming modified independent   OT: Transfer Minimal assist  (shower tx)   OT: Toilet Transfer/Toileting Minimal assist   OT: Cognitive Patient/caregiver will verbalize understanding of cognitive assessment results/recommendations as needed for safe discharge planning   OT: Goal 1 Pt will demo neuor re ed HEP to increase self care function and indp   Interventions   Interventions Quick Adds Therapeutic Activity   Therapeutic Activities   Therapeutic Activity Minutes (41869) 20   Symptoms noted during/after treatment fatigue   Treatment Detail/Skilled Intervention seen for OT tx session this date. wife present. pt and wife needs cues to redirect to therapy importance and safety with progression of therapy. completed supine>sit EOB, pt with poor body awarness and sequencing, needs mod A to complete. at EOB leaning to L side, cues to re attend to midline and correct, pt eventually able to set both hands on lap, upright with CGA- progressing with fatigue to min A to times. completed  sit>Stand 3x from EOB with mod A, cues for hand placement. able to demo weight shift. not able to take steps. dep for brief mgmt. all needs in reach, alarm on, supine in bed, positioned for comfort.   OT Discharge Planning   OT Plan progress stand pivot to commode. trial SS to toilet, check PT note for mobility update   OT Discharge Recommendation (DC Rec) Transitional Care Facility   OT Rationale for DC Rec pt below baseline function in self care, ADL. currently demonstrating deficits in coordination, endurance, strength, cognition. recommend discharge to TCU. lives at home with his wife and possible pt may need higher level of care eventually   Total Session Time   Timed Code Treatment Minutes 20   Total Session Time (sum of timed and untimed services) 35

## 2024-02-05 NOTE — CONSULTS
DATE OF SERVICE : 2/5/2024    DATE OF ADMISSION: 2/5/2024    NEUROLOGICAL CONSULTATION    REQUESTED BY Catarina Roth MD    SOURCE OF INFORMATION:Patient/Son and  EHR    REASON FOR CONSULTATION:     Right leg weakness     HISTORY OF PRESENT ILLNESS:     89 years old man with a history of hypertension hyperlipidemia, DVT on Xarelto presenting with right leg weakness.  He was initially evaluated for stroke which was negative.  Prior to arrival legs were giving out, dragging the right leg he fell on his way to the bathroom.  Has chronic urinary incontinence.    He does reports right leg being stiff      Past Medical History:   Diagnosis Date    Allergic state     Fainting episodes     FREQUENT/ EASILY    Gastroesophageal reflux disease     Headaches     Heartburn     Nocturia     Photosensitivity     Swallowing difficulty          PSHx:   Past Surgical History:   Procedure Laterality Date    BACK SURGERY      CHOLECYSTECTOMY      ENDOSCOPIC SINUS SURGERY  8/20/2012    Procedure: ENDOSCOPIC SINUS SURGERY;  ENDOSCOPIC REMOVAL OF LEFT NASAL MASS WITH LANDMARKS (Fusion Tracking [place]), BILATERAL INFERIOR TURBINOPLASTY;  Surgeon: Dima Younger MD;  Location: [facility]    EYE SURGERY      cataracts    HERNIA REPAIR      ORTHOPEDIC SURGERY      ingrown toe nail removal    TURBINOPLASTY  8/20/2012    Procedure: TURBINOPLASTY;;  Surgeon: Dima Younger MD;  Location: [facility]       Medications Prior to Admission   Medication Sig Dispense Refill Last Dose    acetaminophen (TYLENOL) 500 MG tablet Take 500-1,000 mg by mouth every 8 hours as needed for mild pain   Unknown at PRN    atorvastatin (LIPITOR) 10 MG tablet 1 tablet (10 mg) by Oral or Feeding Tube route every evening 30 tablet 0 2/4/2024 at pm    butalbital-acetaminophen-caffeine (ESGIC) -40 MG tablet Take 1 tablet by mouth every 4 hours as needed for headaches   Unknown at PRN, no recent use    cephALEXin (KEFLEX) 500 MG capsule Take 500 mg by mouth  4 times daily For 10 days starting 1/30/24 2/4/2024 at X4 doses    clotrimazole-betamethasone (LOTRISONE) 1-0.05 % external cream Apply topically 2 times daily as needed (take for 7 days at a time for facial rash flares)   Past Month at PRN    famotidine (PEPCID) 40 MG tablet Take 40 mg by mouth At Bedtime   2/4/2024 at pm    finasteride (PROSCAR) 5 MG tablet Take 5 mg by mouth daily   2/4/2024 at pm    loratadine (CLARITIN) 10 MG tablet Take 10 mg by mouth daily   2/4/2024 at unknown time    memantine (NAMENDA) 10 MG tablet Take 10 mg by mouth 2 times daily   2/4/2024 at pm    multivitamin  with lutein (OCUVITE WITH LTEIN) CAPS per capsule Take 1 capsule by mouth daily   2/4/2024 at unknown time    rivaroxaban ANTICOAGULANT (XARELTO) 20 MG TABS tablet Take 20 mg by mouth daily (with dinner)   2/4/2024 at pm    sodium chloride (OCEAN) 0.65 % nasal spray Spray 1 spray into both nostrils daily as needed for congestion (alternates use with Nasacort)   Unknown at PRN    triamcinolone (NASACORT) 55 MCG/ACT nasal aerosol Spray 1 spray into both nostrils daily as needed (congestion, alternates use with Ocean spray)   Unknown at PRN    vitamin C (ASCORBIC ACID) 500 MG tablet Take 500 mg by mouth daily   Unknown at unsure if taking    vitamin D3 (CHOLECALCIFEROL) 2000 units (50 mcg) tablet Take 2,000 Units by mouth daily Noon   2/4/2024 at midday     Current Facility-Administered Medications   Medication Dose Route Frequency    atorvastatin  10 mg Oral or Feeding Tube QPM    ceFAZolin  1 g Intravenous Q8H    famotidine  20 mg Oral At Bedtime    ferrous sulfate  325 mg Oral Every Other Day    finasteride  5 mg Oral QPM    memantine  10 mg Oral BID    polyethylene glycol  17 g Oral Daily    rivaroxaban ANTICOAGULANT  20 mg Oral Daily with supper    sodium chloride (PF)  3 mL Intracatheter Q8H     Current Facility-Administered Medications   Medication Dose Route Frequency    calcium carbonate  1,000 mg Oral or NG Tube 4x  Daily PRN    calcium carbonate  500 mg Oral Daily PRN    labetalol  10-20 mg Intravenous Q10 Min PRN    Or    hydrALAZINE  10-20 mg Intravenous Q1H PRN    lidocaine 4%   Topical Q1H PRN    lidocaine (buffered or not buffered)  0.1-1 mL Other Q1H PRN    - MEDICATION INSTRUCTIONS -   Does not apply Continuous PRN    - MEDICATION INSTRUCTIONS -   Intravenous Continuous PRN    OLANZapine  5 mg Intramuscular Daily PRN    ondansetron  4 mg Oral Q6H PRN    Or    ondansetron  4 mg Intravenous Q6H PRN    sennosides  8.6 mg Oral BID PRN    sodium chloride (PF)  3 mL Intracatheter q1 min prn              Allergies   Allergen Reactions    Chocolate Anaphylaxis     headache    Feathers     Novocaine [Procaine]      Passes out almost immediately    Strawberry Extract GI Disturbance     Most berries         SocHx:  reports that he has never smoked. He has never used smokeless tobacco. He reports that he does not drink alcohol and does not use drugs.      PHYSICAL EXAM    BP (!) 155/84 (BP Location: Left arm)   Pulse 98   Temp 98.7  F (37.1  C) (Oral)   Resp 16   Wt 71 kg (156 lb 8.4 oz)   SpO2 96%   BMI 24.52 kg/m        No acute distress no labored breathing normal mood    Alert and oriented to self follow simple commands  Pupils equal round face is symmetric wears hearing aids    Able to move bilateral upper limbs against gravity, intact , no tremors or involuntary movements    Bilateral lower extremities minimal effort against the gravity, able to perform baldo knee flexion equally     Reflexes 2+ upper and lower extremities no ankle clonus plantars are equivocal  Sensations-impaired to vibration to light touch and temperature bilateral lower extremities    Lab and X-ray:   Recent Labs   Lab Test 02/05/24  0718 02/05/24  0020   WBC 7.8 9.3   HGB 10.3* 10.7*   * 133*   INR  --  3.50*   POTASSIUM  --  4.5   LDL 46 49     Recent Labs   Lab Test 02/05/24  0020 02/01/24  0631   POTASSIUM 4.5 3.9   CHLORIDE 102 103    BUN 22.5 13.9     Recent Labs   Lab Test 02/05/24  0718 02/05/24  0020 05/27/22  0823 05/26/22  0640   WBC 7.8 9.3   < > 7.7   HGB 10.3* 10.7*   < > 13.2*    102*   < > 99   * 133*   < > 142*   INR  --  3.50*  --  1.05    < > = values in this interval not displayed.     Recent Labs   Lab Test 02/01/24  0631 05/04/23  2355   AST 26 24   ALT 18 13   ALKPHOS 81 71     Recent Labs   Lab Test 02/05/24  0718 02/05/24  0020   HDL 41 41   LDL 46 49     No lab results found.    Laboratory results were personally interpreted and reviewed in detail.  Imaging studies reviewed and interpreted in detail      Summary: List Problems:   Patient Active Problem List   Diagnosis    Bilateral thigh pain    Community acquired pneumonia    TIA (transient ischemic attack)    Pneumonia due to infectious organism, unspecified laterality, unspecified part of lung    Infection due to 2019 novel coronavirus    Infectious encephalopathy    Hyponatremia    Thrombocytopenia (H24)    Mixed hyperlipidemia    BPH (benign prostatic hyperplasia)    Dementia (H)    History of TIA (transient ischemic attack) and stroke    Acute left-sided low back pain without sciatica    Stroke (H)    Generalized weakness    Falls frequently    Right leg weakness    Anemia, unspecified type       ASSESSMENT:     89 years old history of DVT on anticoagulation s/p fall with left facial bruising associated right lower extremity being stiff    Mildly elevated reflexes    Eval for cord compression    MR thoracic/lumbar spine  pending  Physical therapies  Will reevaluate tomorrow  Call us with any worsening or new neuro changes    Thank you for the opportunity to provide consultation on Jean Pierre Roblero

## 2024-02-05 NOTE — CONSULTS
Care Management Initial Consult    General Information  Assessment completed with: Patient, Spouse or significant other, patient and spouse Lianet at bedside  Type of CM/SW Visit: Initial Assessment    Primary Care Provider verified and updated as needed: Yes (Dr. Alaniz)   Readmission within the last 30 days: no previous admission in last 30 days      Reason for Consult: discharge planning  Advance Care Planning: Advance Care Planning Reviewed: other (see comments)  patient does not have a HCD; blank copies given to patient/spouse       Communication Assessment  Patient's communication style: spoken language (English or Bilingual)    Hearing Difficulty or Deaf: yes   Wear Glasses or Blind: yes    Cognitive  Cognitive/Neuro/Behavioral: .WDL except, orientation  Level of Consciousness: intermittent confusion  Arousal Level: opens eyes spontaneously  Orientation: disoriented to, situation  Mood/Behavior: calm, cooperative  Best Language: 1 - Mild to moderate  Speech: clear, logical    Living Environment:   People in home: spouse     Current living Arrangements: house      Able to return to prior arrangements: yes       Family/Social Support:  Care provided by: spouse/significant other, self  Provides care for: no one  Marital Status:   , Children (3 sons supportive)  Lianet       Description of Support System: Supportive, Involved         Current Resources:   Patient receiving home care services: No     Community Resources: None  Equipment currently used at home: shower chair, walker, rolling, walker, nestor  Supplies currently used at home:      Employment/Financial:  Employment Status: retired        Financial Concerns: none (denies)           Does the patient's insurance plan have a 3 day qualifying hospital stay waiver?  No    Lifestyle & Psychosocial Needs:  Social Determinants of Health     Food Insecurity: Not on file   Depression: Not on file   Housing Stability: Not on file   Tobacco Use: Low Risk   (1/22/2024)    Patient History     Smoking Tobacco Use: Never     Smokeless Tobacco Use: Never     Passive Exposure: Not on file   Financial Resource Strain: Not on file   Alcohol Use: Not on file   Transportation Needs: Not on file   Physical Activity: Not on file   Interpersonal Safety: Not on file   Stress: Not on file   Social Connections: Not on file       Functional Status:  Prior to admission patient needed assistance:   Dependent ADLs:: Ambulation-walker, Dressing  Dependent IADLs:: Cleaning, Cooking, Laundry, Shopping, Meal Preparation, Transportation, Medication Management, Money Management       Mental Health Status:          Chemical Dependency Status:                Values/Beliefs:  Spiritual, Cultural Beliefs, Church Practices, Values that affect care: yes       Cultural/Church Practices Patient Routinely Participates In: other (see comments) (Yarsani communion if possible)  Values/Beliefs Comment: would like Yarsani communion if possible; consult placed to spiritual health    Additional Information:  Met patient and spouse Lianet at bedside; explained role in discharge planning.    Patient lives with spouse in a house.  He is able to ambulate on his own with his walkers but has had falls; he is able to shower himself by taking the walker in the shower with him, he has a chair he could use but doesn't.  His spouse Lianet assists him to dress, meals, transportation.  She does all the home chores including cooking.  Lianet is looking into moving to an assistive living situation with services to help her .    - Senior Linkage line card given to spouse with encouragement to connect with them for [facility] resources.    - list of TCU's was given.  Lianet has been at [facility] in the past and satisfied with them.  She will review the list; looking for TCU in [facility].    - blank [place] HCD's given to spouse as they has been meaning to complete.    In the meantime, OT has recommended a  TCU.  Care Management will continue to follow.      Day Gallegos RN  Inpatient Float Care Coordinator  [facility]

## 2024-02-05 NOTE — PLAN OF CARE
Patient here for CVA workup after recent fall. A&Ox3, disoriented to situation at times. [place], bilateral hearing aids. R-sided hemiparesis 4/5. Left droop. Slow speech with slight WFD. Ataxia to right side improved to slow/deliberate throughout day. Reported double vision after returning from ultrasound. NeuroCVA aware and no new orders. VSS. Tele NSR. SLP advanced diet from NPO to soft/bite sized diet with thin liquids. Tolerating well. Reports generalized back pain. Declined intervention. LLE abrasion and cellulitis; WOC consult placed. Up with assist x2 w/ juan pablo steady. Neuro CVA signed off. Gen neuro consulted. Plan for MR lumbar/spine and Echo. Discharge plan pending therapy evals.

## 2024-02-05 NOTE — PROVIDER NOTIFICATION
"MD Notification    Notified Person: MD    Notified Person Name:  Khalil    Notification Date/Time: 2/5/24 at 1003    Notification Interaction: linda    Purpose of Notification: \"Patient reported double vision during transfer from cart to bed (using lift sheet). Upon reassessment he says its resolved, no dizziness, and neuros otherwise unchanged \"    Orders Received: no new orders    Comments: acknowledged; will be rounding on patient shortly    "

## 2024-02-05 NOTE — PROVIDER NOTIFICATION
"MD Notification    Notified Person: MD    Notified Person Name: Dr. Rivero    Notification Date/Time: 2/5/24 at 1441    Notification Interaction: vocrick    Purpose of Notification: \"Can you please order WOC consult for assessment of wound to anterior left shin? looks like an abrasion/skin tear but has scant bleeding \"    Orders Received:    Comments:    "

## 2024-02-05 NOTE — PLAN OF CARE
Reason for Admission: TIA/CVA workup    Cognitive/Mentation: A/Ox 4  Neuros/CMS: Intact ex L droop, WFD, RUE/RLE slightly weaker  VS: stable on RA.   Tele: NSR.  GI: Incontinent.  : External catheter - incontinent.  Pulmonary: LS clear.  Pain: denies.     Drains/Lines: 2 PIV. R PIV - NS @ 50 ml/hr  Skin: LLE cellulitis - wrapped  Activity: Not OOB this shift.  Diet: NPO - L droop, automatic swallow study fail.     Aggression Stoplight Tool: green    End of shift summary: pt arrived to the unit from ED @ 0430. Very pleasant, cooperative. Belongings remain with pt, 2 bags, bilateral hearing aids charging in case. Plan pending workup

## 2024-02-05 NOTE — CONSULTS
"[facility]  WOC Nurse Inpatient Assessment     Consulted for: Wound L anterior shin LE    Summary: patient with POA skin damage to LLE related to trauma, stable 2/5    Patient History (according to provider note(s):      \"89M hx of TIA HLD, RLE DVT on xeralto, cognitive impairment, arthritis, CKD, HTN, BPH constipation, GERD presenting with a fall 2/2 RLE weakness.  \"    Assessment:      Areas visualized during today's visit: Focused: and Lower extremities     Wound location: LLE    Last photo: 2/5  Wound due to: Trauma  Wound history/plan of care: patient states he fell and unknown surface caused injury to shin   Wound base:  dermis, serous scabbing, and superficial scab,      Palpation of the wound bed: normal      Drainage: scant     Description of drainage: serosanguinous     Measurements (length x width x depth, in cm): 4.5  x 4.5  x  0.5 cm      Tunneling: N/A     Undermining: N/A  Periwound skin: Intact and Edematous      Color: pink      Temperature: normal   Odor: none  Pain: denies , none  Pain interventions prior to dressing change: patient tolerated well  Treatment goal: Heal   STATUS: initial assessment  Supplies ordered: supplies stored on unit       Treatment Plan:       Wound care  Start:  02/06/24 0600,   DAILY,   Routine        Comments: Location: LLE  Care: provided daily by primary RN  1. Cleanse daily with 4x4\" gauze and commercial wound cleanser, pat dry  2. Cover with mepilex 4x4\" ok to use same mepilex up to 5 days each, ok to lift and reapply for routine assessments          Orders: Written    RECOMMEND PRIMARY TEAM ORDER: None, at this time  Education provided: plan of care  Discussed plan of care with: Patient and Nurse  WOC nurse follow-up plan: weekly  Notify WOC if wound(s) deteriorate.  Nursing to notify the Provider(s) and re-consult the WOC Nurse if new skin concern.    DATA:     Current support surface: Standard  Low air loss (VANESSA pump, Isolibrium, Pulsate, " skin guard, etc)  Containment of urine/stool: Incontinence Protocol and Incontinent pad in bed  BMI: Body mass index is 24.52 kg/m .   Active diet order: Orders Placed This Encounter      Combination Diet Soft and Bite Sized Diet (level 6); Thin Liquids (level 0) (Supervision, sit at 90 degrees, small bites/sips, alternate textures, extra swallows, chew carefully)     Output: I/O last 3 completed shifts:  In: -   Out: 550 [Urine:550]     Labs:   Recent Labs   Lab 02/05/24  0718 02/05/24 0020 02/05/24  0020 02/01/24  0631   ALBUMIN  --   --   --  4.3   HGB 10.3*   < > 10.7*  --    INR  --   --  3.50*  --    WBC 7.8   < > 9.3  --    A1C  --   --  5.3  --     < > = values in this interval not displayed.     Pressure injury risk assessment:   Sensory Perception: 3-->slightly limited  Moisture: 3-->occasionally moist  Activity: 2-->chairfast  Mobility: 3-->slightly limited  Nutrition: 2-->probably inadequate  Friction and Shear: 2-->potential problem  Cong Score: 15    May CWOCN   1st choice: Securely message with [employer] ([facility] [employer])   (2nd option: [facility] Office Phone [phone], messages checked periodically Mon-Fri 8a-4p)

## 2024-02-05 NOTE — PHARMACY-ADMISSION MEDICATION HISTORY
"Pharmacist Admission Medication History    Admission medication history is complete. The information provided in this note is only as accurate as the sources available at the time of the update.    Information Source(s): Family member and CareEverywhere/SureScripts via in-person    Pertinent Information: spouse Lianet at bedside is a reliable historian.     Changes made to PTA medication list:  Added: acetaminophen, keflex, clotrimazole-betamethasone, nasacort, claritin   Deleted: aspirin 81 mg daily (spouse states patient not taking for >1 year, \"since he started on blood thinners\", cholestyramine 4 g BID (no fill history, spouse confirms not taking), flonase PRN (spouse states ineffective), furosemide 20 mg daily X7days (complete, ended 2/2023), guaifenesin 600 mg daily PRN (spouse stated this was effective but patient \"saw bugs and things\" while taking, no longer using), ibuprofen PRN (spouse states patient takes tylenol PRN for pain, Esgic for breakthrough/severe headache), oxycodone PRN (Rx from 12/2022, no longer taking), B12 1000 mcg daily (spouse states previous lab levels high, patient no longer taking)  Changed: xarelto starter pack --> 20 mg daily, adjusted saline nasal spray to reflect patient going back and forth between it and Nasacort (per spouse, likes to \"mix it up\" in regard to which nasal spray patient uses)     Allergies reviewed with patient and updates made in EHR: no - already reviewed this encounter     Medication History Completed By: Shobha Howell [facility] 2/5/2024 10:08 AM    hospitals Med List   Medication Sig Last Dose    acetaminophen (TYLENOL) 500 MG tablet Take 500-1,000 mg by mouth every 8 hours as needed for mild pain Unknown at PRN    atorvastatin (LIPITOR) 10 MG tablet 1 tablet (10 mg) by Oral or Feeding Tube route every evening 2/4/2024 at pm    butalbital-acetaminophen-caffeine (ESGIC) -40 MG tablet Take 1 tablet by mouth every 4 hours as needed for headaches Unknown at PRN, no " recent use    cephALEXin (KEFLEX) 500 MG capsule Take 500 mg by mouth 4 times daily For 10 days starting 1/30/24 2/4/2024 at X4 doses    clotrimazole-betamethasone (LOTRISONE) 1-0.05 % external cream Apply topically 2 times daily as needed (take for 7 days at a time for facial rash flares) Past Month at PRN    famotidine (PEPCID) 40 MG tablet Take 40 mg by mouth At Bedtime 2/4/2024 at pm    finasteride (PROSCAR) 5 MG tablet Take 5 mg by mouth daily 2/4/2024 at pm    loratadine (CLARITIN) 10 MG tablet Take 10 mg by mouth daily 2/4/2024 at unknown time    memantine (NAMENDA) 10 MG tablet Take 10 mg by mouth 2 times daily 2/4/2024 at pm    multivitamin  with lutein (OCUVITE WITH LTEIN) CAPS per capsule Take 1 capsule by mouth daily 2/4/2024 at unknown time    rivaroxaban ANTICOAGULANT (XARELTO) 20 MG TABS tablet Take 20 mg by mouth daily (with dinner) 2/4/2024 at pm    sodium chloride (OCEAN) 0.65 % nasal spray Spray 1 spray into both nostrils daily as needed for congestion (alternates use with Nasacort) Unknown at PRN    triamcinolone (NASACORT) 55 MCG/ACT nasal aerosol Spray 1 spray into both nostrils daily as needed (congestion, alternates use with Ocean spray) Unknown at PRN    vitamin C (ASCORBIC ACID) 500 MG tablet Take 500 mg by mouth daily Unknown at unsure if taking    vitamin D3 (CHOLECALCIFEROL) 2000 units (50 mcg) tablet Take 2,000 Units by mouth daily Noon 2/4/2024 at midday

## 2024-02-05 NOTE — H&P
Assessment/Plan    89M hx of TIA HLD, RLE DVT on xeralto, cognitive impairment, arthritis, CKD, HTN, BPH constipation, GERD presenting with a fall 2/2 RLE weakness.     RLE weakness, L sided decreased Sensation. R/O Stroke  Recurrent Falls  Coagulopathy 2/2 Medication Use  Hx TIA  Hx HLD  ``Hx: Patient somewhat poor historian but states he fell today as 'legs gave out'. Wife states that for the entire day prior to presentation, he was dragging his R leg, and he fell on his way out of the bathroom due to R leg weakness. Patient denies HA/blurry vision/CP/SOB. Denies numbness. Denies any pain, and states he was asymptomatic. Endorses chronic urinary incotinencen Wife reports TIA years ago and was then started on xeralto. No headtruama/LOC, though state that weeks ago when he fell there was Headtraume wihtout LOC.   ``Vitals/Exam: SBP 140s. No focal strength, decrease sensation LUE and LLE, L facial droop. Doesn't follow all commands with possible aphasia., RLE edema pittingL cheeck bruise, LLE abrasians with cellulitis covered with wrapping. Not oriented towards the presidency. At baseline mentation.   ``EKG: sinus tachycardia, LAD, borderline Qtc prolongation  ``Imaging: Area of prolonged Tmax along the right inferior temporo-occipital lobes. MRI negative   ``ER Course: no medications given.   --Neuro stroke consulted and updated on negative MRI  --repeat trop in AM  --neuro and vitals check q4  --resume statin once confirmed  --NPO, IVF  --PT OT    RLE DVT  ``diagnosed in 2023, while on xeralto for the TIA per the wife  --hold xeralto for now, considering stroke like symptoms and anemia  --SCDs    Cognitive Impairment  ``wife states patient at baseline, Aox3, not to president. But gave wrong time to ER staff  ``doesn't follow all commands, but when discussing GOC, did understand the implications of the discussion and requested to be Full code  --PRN zyprexa for agitation    Macrocytic Anemia  ``acute drop in the  last 2 weeks  ``has facial bruising in the setting of a recent fall in the past few weeks. Unclear as to the origin of the acute anemia, but family denies any recent GI blood loss  --Iron workup ordered  --cbc in AM    LLE Celluliits  ``Failed tpoical abx, and presented to the ER 4 days prior to current presentation for persistance of the infection. No evidence that he was discharged with PO abx  --Resume ancef  --WOC    Hypertension  --PRNs in place    CKD2  --limit nsaids/contrast  --renal diet once NPO completed    Constipation  --miralax/senna    BPH  Urinary Incontinence  --purewick    Progressive Pulmonary Nodule Infiltrate  ``seen on previous CTs  --outpt follow-up      Arthritis  ``nodules on the hand present  --tylenol     GERD  --tums PRN    Supportive Care  DVT ppx:  Diet:  Pain Control:  Upper GI ppx:  Lower GI ppx:  Lines/Catheters:  TTE/Dopplers:  Contact Precautions:  PT/OT/Social/Wound/Palliative Consults:  Code Status: Full    History of Present Illness  Subjective: Patient somewhat poor historian but states he fell today as 'legs gave out'. Wife states that for the entire day prior to presentation, he was dragging his R leg, and he fell on his way out of the bathroom due to R leg weakness. Patient denies HA/blurry vision/CP/SOB. Denies numbness. Denies any pain, and states he was asymptomatic. Endorses chronic urinary incotinencen Wife reports TIA years ago and was then started on xeralto. No headtruama/LOC, though state that weeks ago when he fell there was Headtraume wihtout LOC.     ROS: 10 point ROS neg other than the symptoms noted above in the HPI.     Physical Exam  BP (!) 145/80   Pulse 105   Temp 97.9  F (36.6  C)   Resp 13   Wt 71 kg (156 lb 8.4 oz)   SpO2 95%   BMI 24.52 kg/m      Constitutional: Alert and oriented to person, place and time, not to president; no apparent distress.   HEENT: Normocephalic, no scleral icterus  Abdomen: soft, no distention/tenderness/guarding  Lungs:  lungs clear to auscultation bilaterally  Heart: Regular s1s2, no evidence of murmurs  Extremities/Neuro:No focal strength, decrease sensation LUE and LLE, L facial droop. Doesn't follow all commands with possible receptive aphasia, RLE edema pitting  Skin: L cheeck bruise, LLE abrasians with cellulitis covered with wrapping  Psychiatric: appropriate affect, insight and judgment    I have personally spent 87 minutes total time today in preparing to see the patient (eg, review of tests), obtaining and/or reviewing separately obtained history, performing a medically appropriate examination and/or evaluation, counseling and educating the patient/family/caregiver, ordering medications, tests, or procedures, referring and communicating with other health care professionals, documenting clinical information in the electronic or other health record, independently interpreting results and communicating results to the patient/ family/caregiver and care coordination.    EMR History    Past Medical Hx:   Past Medical History:   Diagnosis Date    Allergic state     Fainting episodes     FREQUENT/ EASILY    Gastroesophageal reflux disease     Headaches     Heartburn     Nocturia     Photosensitivity     Swallowing difficulty         Home Medications: Present in Med Rec    Allergies:   Allergies   Allergen Reactions    Chocolate Anaphylaxis     headache    Feathers     Novocaine [Procaine]      Passes out almost immediately    Strawberry Extract GI Disturbance     Most berries        Pertinent Family Hx: No family history on file.     Surgical Hx:   Past Surgical History:   Procedure Laterality Date    BACK SURGERY      CHOLECYSTECTOMY      ENDOSCOPIC SINUS SURGERY  8/20/2012    Procedure: ENDOSCOPIC SINUS SURGERY;  ENDOSCOPIC REMOVAL OF LEFT NASAL MASS WITH LANDMARKS (Fusion Tracking Pierson), BILATERAL INFERIOR TURBINOPLASTY;  Surgeon: Dima Younger MD;  Location: [facility]    EYE SURGERY      cataracts    HERNIA REPAIR       ORTHOPEDIC SURGERY      ingrown toe nail removal    TURBINOPLASTY  8/20/2012    Procedure: TURBINOPLASTY;;  Surgeon: Dima Younger MD;  Location: [facility]        Substance Hx:   History   Drug Use No   ,  Social History    Substance and Sexual Activity      Alcohol use: No  ,   History   Smoking Status    Never   Smokeless Tobacco    Never   ,   History   Sexual Activity    Sexual activity: Yes    Partners: Female        Imaging/Labs    Imaging: CT Head Perfusion w Contrast - For Tier 2 Stroke    Result Date: 2/5/2024  EXAM: CT HEAD PERFUSION W CONTRAST LOCATION: [facility] DATE: 2/5/2024 INDICATION: Code Stroke to evaluate for potential thrombolysis and thrombectomy. Evaluate mismatch between penumbra and core infarct. COMPARISON: None. TECHNIQUE: CT cerebral perfusion was performed utilizing a second contrast bolus. Perfusion data were post processed with generation of standard perfusion maps and estimation of ischemic/infarcted volumes utilizing standard threshold values. Dose reduction techniques were used. All stenosis measurements made according to NASCET criteria unless otherwise specified. CONTRAST: 50 mL Isovue 370. FINDINGS: PERFUSION MAPS: There is an area of prolonged Tmax along the right inferior temporo-occipital lobes. No other perfusion abnormality. RAPID ANALYSIS: CBF<30%: 0 mL. Tmax>6sec: 14 mL, right temporo-occipital region. Mismatch volume: 14 mL. Mismatch ratio: Infinite.     IMPRESSION: 1.  Area of prolonged Tmax along the right inferior temporo-occipital lobes, artifact versus ischemic change. MRI may be of value for further evaluation.    CTA Head Neck with Contrast    Result Date: 2/5/2024  EXAM: CTA HEAD NECK W CONTRAST LOCATION: [facility] DATE: 2/5/2024 INDICATION: Code Stroke to evaluate for potential thrombolysis and thrombectomy. Right leg weakness. COMPARISON: None. CONTRAST: 67 mL Isovue 370. TECHNIQUE: Head and neck CT  angiogram with IV contrast. Axial helical CT images of the head and neck vessels obtained during the arterial phase of intravenous contrast administration. Axial 2D reconstructed images and multiplanar 3D MIP reconstructed images of the head and neck vessels were performed by the technologist. Dose reduction techniques were used. All stenosis measurements made according to NASCET criteria unless otherwise specified. FINDINGS: HEAD CTA: ANTERIOR CIRCULATION: Calcified plaque within the carotid siphons. No large artery stenosis or occlusion. No discrete aneurysm. Standard [place] anatomy. POSTERIOR CIRCULATION: Minimal plaque within the proximal left vertebral artery. No large artery stenosis or occlusion. No aneurysm. Dominant left and smaller right vertebral artery contribute to a normal basilar artery. DURAL VENOUS SINUSES: Expected enhancement of the major dural venous sinuses. NECK CTA: RIGHT CAROTID: Mild calcified plaque at the carotid bifurcation. No measurable stenosis or dissection. LEFT CAROTID: Mild calcified plaque at the carotid bifurcation. No measurable stenosis or dissection. VERTEBRAL ARTERIES: No focal stenosis or dissection. Dominant left and smaller right vertebral arteries. AORTIC ARCH: Scattered calcified plaque within the aortic arch. No significant stenosis of the great vessels. NONVASCULAR STRUCTURES: Moderate to advanced cervical spondylosis.     IMPRESSION: HEAD CTA: 1.  No large artery stenosis or occlusion. No aneurysm. NECK CTA: 1.  No measurable carotid or vertebral artery stenosis. The results were communicated to Dr. Gavin at 12:48 AM on 02/05/2024.    CT Head w/o Contrast    Result Date: 2/5/2024  EXAM: CT HEAD W/O CONTRAST LOCATION: [facility] DATE: 2/5/2024 INDICATION: Code Stroke to evaluate for potential thrombolysis and thrombectomy. Right leg weakness. COMPARISON: 01/22/2024. TECHNIQUE: Routine CT Head without IV contrast. Multiplanar  reformats. Dose reduction techniques were used. FINDINGS: INTRACRANIAL CONTENTS: No intracranial hemorrhage, extraaxial collection, or mass effect. No CT evidence of acute infarct. Moderate presumed chronic small vessel ischemic changes. Mild to moderate generalized volume loss. No hydrocephalus. VISUALIZED ORBITS/SINUSES/MASTOIDS: No intraorbital abnormality. No paranasal sinus mucosal disease. No middle ear or mastoid effusion. BONES/SOFT TISSUES: Interval decreased left frontal scalp contusion. No fracture.     IMPRESSION: 1.  No CT evidence for acute intracranial process. 2.  Brain atrophy and presumed chronic microvascular ischemic changes as above.       Recent Labs   Lab Test 02/05/24  0020 02/01/24  0631    140   POTASSIUM 4.5 3.9   CHLORIDE 102 103   CO2 27 31*   ANIONGAP 9 6*   * 98   BUN 22.5 13.9   CR 1.16 1.07   KENYA 9.0 9.2     CBC RESULTS:   Recent Labs   Lab Test 02/05/24  0020   WBC 9.3   RBC 3.22*   HGB 10.7*   HCT 32.7*   *   MCH 33.2*   MCHC 32.7   RDW 12.9   *      Liver Function Studies -   Recent Labs   Lab Test 02/01/24  0631   PROTTOTAL 6.8   ALBUMIN 4.3   BILITOTAL 0.4   ALKPHOS 81   AST 26   ALT 18      Troponin I ES   Date Value Ref Range Status   03/02/2020 <0.015 0.000 - 0.045 ug/L Final     Comment:     The 99th percentile for upper reference range is 0.045 ug/L.  Troponin values   in the range of 0.045 - 0.120 ug/L may be associated with risks of adverse   clinical events.

## 2024-02-05 NOTE — PROGRESS NOTES
"[facility]    Stroke Telephone Note    I was called by Xiomara Hinton on 02/05/24 regarding patient Jean Pierre Roblero. The patient is a 89 year old male with HLD on xarelto presents to the ED after he was noted to have right leg weakness with LKW- sometime on 2/4/24, exact time not known as per ED.    Vitals  BP: 138/76   Pulse: 104   Resp: 16          Vitals  BP: (!) 151/87   Pulse: 120   Resp: 16   Temp: 97.9  F (36.6  C)   Weight: 71 kg (156 lb 8.4 oz)    Stroke Code Data (for stroke code without tele)  Stroke code activated 02/05/24  0016   Stroke provider first response 02/05/24  0017   Last known normal 02/05/24     Unknown   Time of discovery (or onset of symptoms) 02/05/24      Head CT read by Stroke Neuro Provider 02/05/24  0042   Was stroke code de-escalated?         Yes     Imaging Findings  CT head: no bleed  CTA head/neck: no LVO    Intravenous Thrombolysis  Not given due to:   - DOAC dose within 48 hours or INR > 1.7    Endovascular Treatment  Not initiated due to absence of proximal vessel occlusion    Impression  #Possible acute ischemic stroke of left SRINI territory given right leg weakness    Recommendations  - MRI Brain w/w/o contrast and please page on call stroke neurology after MRI.    The Stroke Staff is Dr. Mayberry.    Keshawn Villeda MD  Vascular Neurology Fellow    To page me or covering stroke neurology team member, click here: [facility]  Choose \"On Call\" tab at top, then select \"NEUROLOGY/ALL SITES\" from middle drop-down box, press Enter, then look for \"stroke\" or \"telestroke\" for your site       My recommendations are based on the information provided over the phone by Jean Pierre Roblero's in-person providers. They are not intended to replace the clinical judgment of his in-person providers. I was not requested to personally see or examine the patient at this time.     "

## 2024-02-05 NOTE — ED PROVIDER NOTES
History     Chief Complaint:  Altered Mental Status, Stroke Symptoms, and Fall    The history is provided by the patient and the EMS personnel.      Jean Pierre Roblero is a 89 year old male on Xarelto with a history of cancer, dementia, and arthritis presenting from home with altered mental status and stroke symptoms. Fire was initially called for lift assist after the patient fell. Patient's wife believes he fell due to R leg weakness. Patient's R leg was turned out. The fall occurred on carpeted michelle. Patient is typically ambulatory with a walker. When EMS arrived, sudden onset of confusion was noted and he was brought to the ED. BG was measured at 125, then 155. Patient's  and speech was normal for EMS. They noted a L sided facial droop. Patient denies pain in the head or neck. Jean Pierre is typically alert and oriented. Patient sustained a separate fall a few days ago. He currently has cellulitis in the L leg.     Independent Historian:   History provided by patient and EMS personnel.     Review of External Notes:   No prior notes reviewed.     Medications:    Aspirin 81 mg   Atorvastatin   Famotidine   Finasteride   Furosemide   Memantine   Oxycodone   Xarelto   Tamsulosin     Past Medical History:    Allergic state  Fainting episodes  GERD  Headaches  Heartburn  Nocturia  Photosensitivity  Swallowing difficulty  Covid   Dementia   Osteoarthritis   Somatization disorder   Spinal stenosis   Presbyesophagus   Hearing loss   Anal sphincter incompetence   Seasonal allergies   Arthritis   Thrombocytopenia   Diverticulosis   Xerostomia   Insomnia   Heberden's node   Laly's node   Osteopenia  ASHD  Skin cancer     Past Surgical History:    Back surgery   Cholecystectomy   Sinus surgery   Cataract surgery   Hernia repair   Ingrown toe nail removal   Turbinoplasty      Physical Exam   Patient Vitals for the past 24 hrs:   BP Temp Pulse Resp SpO2 Weight   02/05/24 0100 (!) 145/80 -- 105 13 95 % --   02/05/24 0041  (!) 151/87 97.9  F (36.6  C) 120 -- -- --   02/05/24 0040 -- -- -- -- 94 % 71 kg (156 lb 8.4 oz)   02/05/24 0017 138/76 -- 104 16 -- --      Physical Exam  General: Resting on the gurney, appears uncomfortable  Head:  The scalp, face, and head appear normal  Mouth/Throat: Mucus membranes are moist  CV:  Regular rate    Normal S1 and S2  No pathological murmur   Resp:  Breath sounds clear and equal bilaterally    Non-labored, no retractions or accessory muscle use    No coarseness    No wheezing   GI:  Abdomen is soft and non-tender.     No tenderness to palpation  MS:  Right lower extremity weaker than Left. Left lower extremity wrapped with underlying cellulitis.     Good capillary refill noted.  Skin:  No rash or lesions noted. Contusion over the Left temporal region and cheek  Neuro:   Speech is normal and fluent. No apparent deficit. Oriented to self and location, but believes it is April. Does not know the current president. Slight facial asymmetry through no true droop on my exam  Psych: Awake. Alert.  Normal affect.      Appropriate interactions.    Emergency Department Course   ECG  ECG taken at 0040, ECG read at 0050  Sinus tachycardia   Left axis deviation   Abnormal ECG   Rate 109 bpm. NC interval 160 ms. QRS duration 78 ms. QT/QTc 344/463 ms. P-R-T axes 66 -33 55.     Imaging:  CT Head Perfusion w Contrast - For Tier 2 Stroke   Final Result   IMPRESSION:    1.  Area of prolonged Tmax along the right inferior temporo-occipital lobes, artifact versus ischemic change. MRI may be of value for further evaluation.      CTA Head Neck with Contrast   Final Result   IMPRESSION:    HEAD CTA:    1.  No large artery stenosis or occlusion. No aneurysm.      NECK CTA:   1.  No measurable carotid or vertebral artery stenosis.      The results were communicated to Dr. Gavin at 12:48 AM on 02/05/2024.      CT Head w/o Contrast   Final Result   IMPRESSION:   1.  No CT evidence for acute intracranial process.   2.  Brain  atrophy and presumed chronic microvascular ischemic changes as above.         Laboratory:  Labs Ordered and Resulted from Time of ED Arrival to Time of ED Departure   BASIC METABOLIC PANEL - Abnormal       Result Value    Sodium 138      Potassium 4.5      Chloride 102      Carbon Dioxide (CO2) 27      Anion Gap 9      Urea Nitrogen 22.5      Creatinine 1.16      GFR Estimate 60 (*)     Calcium 9.0      Glucose 121 (*)    INR - Abnormal    INR 3.50 (*)    PARTIAL THROMBOPLASTIN TIME - Abnormal    aPTT 54 (*)    TROPONIN T, HIGH SENSITIVITY - Abnormal    Troponin T, High Sensitivity 65 (*)    CBC WITH PLATELETS AND DIFFERENTIAL - Abnormal    WBC Count 9.3      RBC Count 3.22 (*)     Hemoglobin 10.7 (*)     Hematocrit 32.7 (*)      (*)     MCH 33.2 (*)     MCHC 32.7      RDW 12.9      Platelet Count 133 (*)     % Neutrophils 68      % Lymphocytes 24      % Monocytes 6      % Eosinophils 1      % Basophils 0      % Immature Granulocytes 1      NRBCs per 100 WBC 0      Absolute Neutrophils 6.3      Absolute Lymphocytes 2.3      Absolute Monocytes 0.6      Absolute Eosinophils 0.1      Absolute Basophils 0.0      Absolute Immature Granulocytes 0.1      Absolute NRBCs 0.0     GLUCOSE MONITOR NURSING POCT      Emergency Department Course & Assessments:    Interventions:  Medications   iopamidol (ISOVUE-370) solution 117 mL (117 mLs Intravenous $Given 2/5/24 0024)   Saline (100 mLs Intravenous $Given 2/5/24 0024)      Independent Interpretation (X-rays, CTs, rhythm strip):  I independently interpreted the head CT, no evidence of intercranial hemorrhage.    Assessments/Consultations/Discussion of Management or Tests:  ED Course as of 02/05/24 0159   Mon Feb 05, 2024   0012 I obtained the history and examined the patient as noted above.      0021 I spoke with Stroke Neurology regarding the patient's presentation and plan of care.    0047 I spoke with Radiology regarding the patient's presentation and plan of care.       0057 I spoke with Stroke Neurology regarding the patient's presentation and plan of care.      0159 I spoke with Dr. Betancur from the hospitalist service regarding the patient's presentation, findings here in the ED, and plan of care.       Social Determinants of Health affecting care:   None    Disposition:  The patient was admitted to the hospital under the care of Dr. Betancur.     Impression & Plan      Medical Decision Making:  Jean Pierre Roblero is a 89 year old male who presents to the emergency department with weakness.  Patient has had right leg weakness all day though the exact time of the start of his pain is unclear.  He had been dragging the leg for much of the day and then fell causing his wife to call EMS.  EMS was concerned for stroke and the patient was brought here to the hospital.  He does continue to have right leg weakness on my exam.  Additionally he has facial bruising.  He is anticoagulated and had a recent fall.  He had a negative CT at that time, however, given his weakness with prior injury and immediate head CT was obtained.  Laboratory evaluation was undertaken as well.  The patient was found to be anemic though source of bleeding is not clear.  Given his weakness and anemia he will be admitted to the hospitalist.  He was discussed with the stroke neurology service who recommended MRI and admission.  He will also require further evaluation of his anemia while in the hospital.  He was discussed with Dr. Jackson who is graciously excepted the patient for admission.    Diagnosis:    ICD-10-CM    1. Generalized weakness  R53.1       2. Right leg weakness  R29.898       3. Falls frequently  R29.6       4. Anemia, unspecified type  D64.9            Scribe Disclosure:  I, Myrna Landis, am serving as a scribe at 12:17 AM on 2/5/2024 to document services personally performed by Xiomara Gavin MD based on my observations and the provider's statements to me.  2/5/2024   Xiomara Gavin,  Xiomara Woo MD  02/05/24 0717

## 2024-02-05 NOTE — ED NOTES
Bed: ST  Expected date: 2/5/24  Expected time: 12:10 AM  Means of arrival: Ambulance  Comments:  Hali 543 59M stroke symptoms

## 2024-02-05 NOTE — ED TRIAGE NOTES
Pt BIBA from home due to a fall. Per EMS on scene pt developed R leg weakness and L facial droop. Pt is AxO4 at baseline but was disoriented to time, place and situation. Pt denies any pain. Hx of cellulitis and is on blood thinners. Last known well time 2304.       Triage Assessment (Adult)       Row Name 02/05/24 0019          Triage Assessment    Airway WDL WDL        Respiratory WDL    Respiratory WDL WDL        Skin Circulation/Temperature WDL    Skin Circulation/Temperature WDL WDL        Cardiac WDL    Cardiac WDL WDL        Peripheral/Neurovascular WDL    Peripheral Neurovascular WDL WDL        Cognitive/Neuro/Behavioral WDL    Cognitive/Neuro/Behavioral WDL WDL

## 2024-02-05 NOTE — DISCHARGE INSTRUCTIONS
"Your risk factors for stroke or TIA (transient ischemic attack):     Your Risk Factors Your Results Goals   [x] High blood pressure BP: (!) 153/88 (02/05/24 0740) Less than 120/80   [] Cholesterol          Total No lab value available in past 30 days   Less than 150    Triglycerides   No lab value available in past 30 days Less than 150    LDL No lab value available in past 30 days    Less than 70    HDL No lab value available in past 30 days         Greater than 40 (men)  Greater than 50 (women)   [] Diabetes                A1C 2/5/2024: 5.3 % Less than 5.7   [] Atrial fibrillation Atrial fibrillation noted on cardiac monitoring Manage per physician orders   [] Smoking/tobacco use   Tobacco Use      Smoking status: Never      Smokeless tobacco: Never   Quit smoking and tobacco   [] Overweight Body mass index is 24.52 kg/m .  Less than 25     Other risk factors include: carotid (neck) artery disease, other heart diseases, prior stroke or TIA, poor diet, lack of exercise, and excessive alcohol consumption.     [x] Written stroke educational materials given to patient including:   Understanding Stroke Handbook       Know the warning signs and symptoms of stroke: BE FAST     B = Balance loss   E = Eyesight changes   F = Facial droop or numbness   A = Arm or leg weakness   S = Speech difficulty, slurred speech   T = Time to call 911 for help    WOUND CARES  Location: LLE   Care: provided daily by primary RN   1. Cleanse daily with 4x4\" gauze and commercial wound cleanser, pat dry   2. Cover with mepilex 4x4\" ok to use same mepilex up to 5 days each, ok to lift and reapply for routine assessments     Follow up with [facility] when close to discharge at TCU  "

## 2024-02-06 ENCOUNTER — APPOINTMENT (OUTPATIENT)
Dept: PHYSICAL THERAPY | Facility: CLINIC | Age: 89
DRG: 552 | End: 2024-02-06
Attending: STUDENT IN AN ORGANIZED HEALTH CARE EDUCATION/TRAINING PROGRAM
Payer: MEDICARE

## 2024-02-06 ENCOUNTER — APPOINTMENT (OUTPATIENT)
Dept: MRI IMAGING | Facility: CLINIC | Age: 89
DRG: 552 | End: 2024-02-06
Payer: MEDICARE

## 2024-02-06 ENCOUNTER — APPOINTMENT (OUTPATIENT)
Dept: CARDIOLOGY | Facility: CLINIC | Age: 89
DRG: 552 | End: 2024-02-06
Payer: MEDICARE

## 2024-02-06 ENCOUNTER — APPOINTMENT (OUTPATIENT)
Dept: SPEECH THERAPY | Facility: CLINIC | Age: 89
DRG: 552 | End: 2024-02-06
Payer: MEDICARE

## 2024-02-06 LAB
ANION GAP SERPL CALCULATED.3IONS-SCNC: 8 MMOL/L (ref 7–15)
BUN SERPL-MCNC: 13.2 MG/DL (ref 8–23)
CALCIUM SERPL-MCNC: 8.5 MG/DL (ref 8.8–10.2)
CHLORIDE SERPL-SCNC: 108 MMOL/L (ref 98–107)
CREAT SERPL-MCNC: 0.82 MG/DL (ref 0.67–1.17)
DEPRECATED HCO3 PLAS-SCNC: 26 MMOL/L (ref 22–29)
EGFRCR SERPLBLD CKD-EPI 2021: 84 ML/MIN/1.73M2
ERYTHROCYTE [DISTWIDTH] IN BLOOD BY AUTOMATED COUNT: 12.6 % (ref 10–15)
GLUCOSE SERPL-MCNC: 103 MG/DL (ref 70–99)
HCT VFR BLD AUTO: 28.8 % (ref 40–53)
HGB BLD-MCNC: 9.5 G/DL (ref 13.3–17.7)
LVEF ECHO: NORMAL
MCH RBC QN AUTO: 33.3 PG (ref 26.5–33)
MCHC RBC AUTO-ENTMCNC: 33 G/DL (ref 31.5–36.5)
MCV RBC AUTO: 101 FL (ref 78–100)
PLATELET # BLD AUTO: 120 10E3/UL (ref 150–450)
POTASSIUM SERPL-SCNC: 3.8 MMOL/L (ref 3.4–5.3)
RBC # BLD AUTO: 2.85 10E6/UL (ref 4.4–5.9)
SODIUM SERPL-SCNC: 142 MMOL/L (ref 135–145)
WBC # BLD AUTO: 6.2 10E3/UL (ref 4–11)

## 2024-02-06 PROCEDURE — 97530 THERAPEUTIC ACTIVITIES: CPT | Mod: GP | Performed by: PHYSICAL THERAPIST

## 2024-02-06 PROCEDURE — 999N000208 ECHOCARDIOGRAM COMPLETE

## 2024-02-06 PROCEDURE — 250N000011 HC RX IP 250 OP 636: Performed by: STUDENT IN AN ORGANIZED HEALTH CARE EDUCATION/TRAINING PROGRAM

## 2024-02-06 PROCEDURE — 255N000002 HC RX 255 OP 636: Performed by: STUDENT IN AN ORGANIZED HEALTH CARE EDUCATION/TRAINING PROGRAM

## 2024-02-06 PROCEDURE — 120N000001 HC R&B MED SURG/OB

## 2024-02-06 PROCEDURE — 85027 COMPLETE CBC AUTOMATED: CPT | Performed by: STUDENT IN AN ORGANIZED HEALTH CARE EDUCATION/TRAINING PROGRAM

## 2024-02-06 PROCEDURE — 250N000013 HC RX MED GY IP 250 OP 250 PS 637: Performed by: INTERNAL MEDICINE

## 2024-02-06 PROCEDURE — 93306 TTE W/DOPPLER COMPLETE: CPT | Mod: 26 | Performed by: INTERNAL MEDICINE

## 2024-02-06 PROCEDURE — 36415 COLL VENOUS BLD VENIPUNCTURE: CPT | Performed by: STUDENT IN AN ORGANIZED HEALTH CARE EDUCATION/TRAINING PROGRAM

## 2024-02-06 PROCEDURE — 80048 BASIC METABOLIC PNL TOTAL CA: CPT | Performed by: STUDENT IN AN ORGANIZED HEALTH CARE EDUCATION/TRAINING PROGRAM

## 2024-02-06 PROCEDURE — 97162 PT EVAL MOD COMPLEX 30 MIN: CPT | Mod: GP | Performed by: PHYSICAL THERAPIST

## 2024-02-06 PROCEDURE — 99233 SBSQ HOSP IP/OBS HIGH 50: CPT | Performed by: INTERNAL MEDICINE

## 2024-02-06 PROCEDURE — 99221 1ST HOSP IP/OBS SF/LOW 40: CPT

## 2024-02-06 PROCEDURE — 92526 ORAL FUNCTION THERAPY: CPT | Mod: GN | Performed by: SPEECH-LANGUAGE PATHOLOGIST

## 2024-02-06 PROCEDURE — 250N000013 HC RX MED GY IP 250 OP 250 PS 637: Performed by: HOSPITALIST

## 2024-02-06 PROCEDURE — 72141 MRI NECK SPINE W/O DYE: CPT | Mod: MF

## 2024-02-06 PROCEDURE — C8929 TTE W OR WO FOL WCON,DOPPLER: HCPCS

## 2024-02-06 PROCEDURE — 250N000013 HC RX MED GY IP 250 OP 250 PS 637: Performed by: STUDENT IN AN ORGANIZED HEALTH CARE EDUCATION/TRAINING PROGRAM

## 2024-02-06 PROCEDURE — 97116 GAIT TRAINING THERAPY: CPT | Mod: GP | Performed by: PHYSICAL THERAPIST

## 2024-02-06 RX ORDER — ACYCLOVIR 200 MG/1
10 CAPSULE ORAL ONCE
Status: DISCONTINUED | OUTPATIENT
Start: 2024-02-06 | End: 2024-02-08 | Stop reason: HOSPADM

## 2024-02-06 RX ADMIN — ACETAMINOPHEN 650 MG: 325 TABLET, FILM COATED ORAL at 20:54

## 2024-02-06 RX ADMIN — ACETAMINOPHEN 650 MG: 325 TABLET, FILM COATED ORAL at 00:31

## 2024-02-06 RX ADMIN — POLYETHYLENE GLYCOL 3350 17 G: 17 POWDER, FOR SOLUTION ORAL at 08:28

## 2024-02-06 RX ADMIN — MEMANTINE 10 MG: 10 TABLET ORAL at 08:28

## 2024-02-06 RX ADMIN — FAMOTIDINE 20 MG: 20 TABLET ORAL at 21:01

## 2024-02-06 RX ADMIN — MEMANTINE 10 MG: 10 TABLET ORAL at 20:54

## 2024-02-06 RX ADMIN — RIVAROXABAN 20 MG: 20 TABLET, FILM COATED ORAL at 17:38

## 2024-02-06 RX ADMIN — CEFAZOLIN 1 G: 1 INJECTION, POWDER, FOR SOLUTION INTRAMUSCULAR; INTRAVENOUS at 03:01

## 2024-02-06 RX ADMIN — ATORVASTATIN CALCIUM 10 MG: 10 TABLET, FILM COATED ORAL at 20:54

## 2024-02-06 RX ADMIN — HUMAN ALBUMIN MICROSPHERES AND PERFLUTREN 6 ML: 10; .22 INJECTION, SOLUTION INTRAVENOUS at 09:56

## 2024-02-06 RX ADMIN — CEFAZOLIN 1 G: 1 INJECTION, POWDER, FOR SOLUTION INTRAMUSCULAR; INTRAVENOUS at 18:31

## 2024-02-06 RX ADMIN — CEFAZOLIN 1 G: 1 INJECTION, POWDER, FOR SOLUTION INTRAMUSCULAR; INTRAVENOUS at 11:40

## 2024-02-06 RX ADMIN — FINASTERIDE 5 MG: 5 TABLET, FILM COATED ORAL at 20:54

## 2024-02-06 ASSESSMENT — ACTIVITIES OF DAILY LIVING (ADL)
ADLS_ACUITY_SCORE: 36
ADLS_ACUITY_SCORE: 35
ADLS_ACUITY_SCORE: 36

## 2024-02-06 NOTE — PLAN OF CARE
Pt here for CVA workup. A&O x4. Neuros L droop, R sided weakness, and forgetful. VSS, SBP<220. Tele NSR. Soft and bite size diet, thin liquids. Takes pills whole. Up with Ax2 SS. External cath in place. Wound care done per orders on LLE, CDI. Denies pain. T/R q2h. Pt scoring green on the Aggression Stop Light Tool. Plan TCU. Discharge pending.

## 2024-02-06 NOTE — PLAN OF CARE
Reason for Admission: TIA/CVA workup  Cognitive/Mentation: A/Ox3-4. D/O to situation. Forgetful. Neuros/CMS: Intact ex L droop, RUE & RLE weakness  VS: stable on RA.   Tele: NSR.  GI: Incontinent.  : External catheter - incontinent.  Pulmonary: LS dim  Pain: denies   Drains/Lines: 2 PIV. R PIV - NS @ 50 ml/hr  Skin: LLE cellulitis. Dressing changed by WOC today.   Activity: A2 serasteady  Diet: Soft & bite sized diet. Supervision, sit at 90 degrees, small bite/sips, alternate textures, extra swallows, chew carefully.   Discharge. MRI this shift per neuro. PT/OT/SLP/WOC/neuro following.

## 2024-02-06 NOTE — CONSULTS
"[facility]    Neurosurgery Consultation     Date of Admission:  2/5/2024  Date of Consult (When I saw the patient): 02/06/24    Assessment & Plan   Jean Pierre Roblero is a 89 year old male with history of TIA HLD, RLE DVT on xeralto, cognitive impairment, arthritis, CKD, HTN, BPH constipation, GERD who was admitted on 2/5/2024 following a fall due to \"legs giving out.\" Neurosurgery was consulted for \"leg weakness, spinal stenosis\". Radiographic findings include     Lumbar MRI from 2/5/24 reviewed   IMPRESSION:  1.  No acute findings.  2.  At L3-L4, moderate to severe central stenosis.  3.  High-grade bilateral foraminal stenosis at L3-L4 and L5-S1.  4.  Additional high-grade left foraminal stenosis at L2-L3.  5.  Additional high-grade right foraminal stenosis at L4-L5.    Thoracic MRI from 2/5/24 reviewed   IMPRESSION:  1.  No acute findings.  2.  No high-grade central or foraminal stenosis.    Plan:   -Obtain Cervical MRI scan  -Pain control, PT/OT per primary team  -For lumbar spine MRI findings, would recommend outpatient follow up with established spine care team for consideration of further injection therapy as indicated.      Further recommendations once MRI Cervical spine available.     I have discussed the following assessment and plan with Dr. Claudio.     Rere Pelaez PA-C  [facility]  [address] [address]    Code Status    Full Code    Reason for Consult   Reason for consult: I was asked by hospitalist to evaluate this patient for leg weakness, spinal stenosis.    Primary Care Physician     Zaki Alaniz    Chief Complaint   Frequent falls    History is obtained from the patient and their spouse    History of Present Illness   Jean Pierre Roblero is a 89 year old male who presents following mechanical fall at home.  He reports that he has been falling a lot more recently due to his legs giving out on " him.  He denies any focal weakness in any of extremities however notes for the last year he has had more difficulties with his legs generally feeling weaker and causing them to give out.  He does not have any pain in his lower extremities currently. He reports he was struggling with radicular type symptoms about a year ago but then underwent an injection at [facility] that helped.  He also has dealt with chronic lower back pain that is worse when he is sitting or laying down, and improved with movement.  He was admitted to the hospital for further workup to rule out potential stroke, and thus far workup has been negative.  Neurology currently recommends outpatient EMG studies and outpatient clinic appointment.    Past Medical History   I have reviewed this patient's medical history and updated it with pertinent information if needed.   Past Medical History:   Diagnosis Date    Allergic state     Fainting episodes     FREQUENT/ EASILY    Gastroesophageal reflux disease     Headaches     Heartburn     Nocturia     Photosensitivity     Swallowing difficulty        Past Surgical History   I have reviewed this patient's surgical history and updated it with pertinent information if needed.  Past Surgical History:   Procedure Laterality Date    BACK SURGERY      CHOLECYSTECTOMY      ENDOSCOPIC SINUS SURGERY  8/20/2012    Procedure: ENDOSCOPIC SINUS SURGERY;  ENDOSCOPIC REMOVAL OF LEFT NASAL MASS WITH LANDMARKS (Fusion Tracking [place]), BILATERAL INFERIOR TURBINOPLASTY;  Surgeon: Dima Younger MD;  Location: [facility]    EYE SURGERY      cataracts    HERNIA REPAIR      ORTHOPEDIC SURGERY      ingrown toe nail removal    TURBINOPLASTY  8/20/2012    Procedure: TURBINOPLASTY;;  Surgeon: Dima Younger MD;  Location: [facility]       Prior to Admission Medications   Prior to Admission Medications   Prescriptions Last Dose Informant Patient Reported? Taking?   acetaminophen (TYLENOL) 500 MG tablet Unknown at PRN Spouse/Significant  Other Yes Yes   Sig: Take 500-1,000 mg by mouth every 8 hours as needed for mild pain   atorvastatin (LIPITOR) 10 MG tablet 2024 at pm Spouse/Significant Other No Yes   Si tablet (10 mg) by Oral or Feeding Tube route every evening   butalbital-acetaminophen-caffeine (ESGIC) -40 MG tablet Unknown at PRN, no recent use Spouse/Significant Other Yes Yes   Sig: Take 1 tablet by mouth every 4 hours as needed for headaches   cephALEXin (KEFLEX) 500 MG capsule 2024 at X4 doses Spouse/Significant Other Yes Yes   Sig: Take 500 mg by mouth 4 times daily For 10 days starting 24   clotrimazole-betamethasone (LOTRISONE) 1-0.05 % external cream Past Month at PRN Spouse/Significant Other Yes Yes   Sig: Apply topically 2 times daily as needed (take for 7 days at a time for facial rash flares)   famotidine (PEPCID) 40 MG tablet 2024 at pm Spouse/Significant Other Yes Yes   Sig: Take 40 mg by mouth At Bedtime   finasteride (PROSCAR) 5 MG tablet 2024 at pm Spouse/Significant Other Yes Yes   Sig: Take 5 mg by mouth daily   loratadine (CLARITIN) 10 MG tablet 2024 at unknown time Spouse/Significant Other Yes Yes   Sig: Take 10 mg by mouth daily   memantine (NAMENDA) 10 MG tablet 2024 at pm Spouse/Significant Other Yes Yes   Sig: Take 10 mg by mouth 2 times daily   multivitamin  with lutein (OCUVITE WITH LTEIN) CAPS per capsule 2024 at unknown time Spouse/Significant Other Yes Yes   Sig: Take 1 capsule by mouth daily   rivaroxaban ANTICOAGULANT (XARELTO) 20 MG TABS tablet 2024 at pm Spouse/Significant Other Yes Yes   Sig: Take 20 mg by mouth daily (with dinner)   sodium chloride (OCEAN) 0.65 % nasal spray Unknown at PRN Spouse/Significant Other Yes Yes   Sig: Spray 1 spray into both nostrils daily as needed for congestion (alternates use with Nasacort)   triamcinolone (NASACORT) 55 MCG/ACT nasal aerosol Unknown at PRN Spouse/Significant Other Yes Yes   Sig: Spray 1 spray into both nostrils  daily as needed (congestion, alternates use with Ocean spray)   vitamin C (ASCORBIC ACID) 500 MG tablet Unknown at unsure if taking Spouse/Significant Other Yes Yes   Sig: Take 500 mg by mouth daily   vitamin D3 (CHOLECALCIFEROL) 2000 units (50 mcg) tablet 2/4/2024 at midday Spouse/Significant Other Yes Yes   Sig: Take 2,000 Units by mouth daily Noon      Facility-Administered Medications: None     Allergies   Allergies   Allergen Reactions    Chocolate Anaphylaxis     headache    Feathers     Novocaine [Procaine]      Passes out almost immediately    Strawberry Extract GI Disturbance     Most berries       Social History   I have reviewed this patient's social history and updated it with pertinent information if needed. Jean Pierre Roblero  reports that he has never smoked. He has never used smokeless tobacco. He reports that he does not drink alcohol and does not use drugs.    Family History   I have reviewed this patient's family history and updated it with pertinent information if needed.   No family history on file.    Review of Systems   10 point ROS negative other than symptoms noted in HPI.    Physical Exam   Temp: 99  F (37.2  C) Temp src: Oral BP: (!) 144/99 Pulse: 95   Resp: 16 SpO2: 97 % O2 Device: None (Room air)    Vital Signs with Ranges  Temp:  [97.7  F (36.5  C)-99  F (37.2  C)] 99  F (37.2  C)  Pulse:  [87-98] 95  Resp:  [16] 16  BP: (126-158)/(76-99) 144/99  SpO2:  [96 %-98 %] 97 %  156 lbs 8.43 oz     , Blood pressure (!) 144/99, pulse 95, temperature 99  F (37.2  C), temperature source Oral, resp. rate 16, weight 71 kg (156 lb 8.4 oz), SpO2 97%.  156 lbs 8.43 oz  HEENT:  Normocephalic, atraumatic  Heart:  No peripheral edema  Lungs:  No SOB  Skin:  Warm and dry, good capillary refill.    NEUROLOGICAL EXAMINATION:   Mental status:  Alert and Oriented x 3, speech is fluent.  Cranial nerves:  II-XII intact.   Motor:   Shoulder Abduction  Right:  5/5   Left:  5/5  Biceps                      Right:  5/5    Left:  5/5  Triceps                     Right:  5/5   Left:  5/5  Wrist Extensors        Right:  5/5   Left:  5/5  Wrist Flexors            Right:  5/5   Left:  5/5  Intrinsics                   Right:  5/5   Left:  5/5  Generalized weakness in bilateral lower extremities, possible left EHL weakness, slight.  Sensation:  Intact to light touch   Reflexes:   Negative Clonus.  Negative Acevedo's sign.     Data     CBC RESULTS:   Recent Labs   Lab Test 02/06/24  0746   WBC 6.2   RBC 2.85*   HGB 9.5*   HCT 28.8*   *   MCH 33.3*   MCHC 33.0   RDW 12.6   *     Basic Metabolic Panel:  Lab Results   Component Value Date     02/06/2024     03/03/2020      Lab Results   Component Value Date    POTASSIUM 3.8 02/06/2024    POTASSIUM 4.0 12/21/2022    POTASSIUM 4.0 12/21/2022    POTASSIUM 3.6 03/03/2020     Lab Results   Component Value Date    CHLORIDE 108 02/06/2024    CHLORIDE 106 12/21/2022    CHLORIDE 108 03/03/2020     Lab Results   Component Value Date    KENYA 8.5 02/06/2024    KENYA 8.1 03/03/2020     Lab Results   Component Value Date    CO2 26 02/06/2024    CO2 22 12/21/2022    CO2 28 03/03/2020     Lab Results   Component Value Date    BUN 13.2 02/06/2024    BUN 31 12/21/2022    BUN 17 03/03/2020     Lab Results   Component Value Date    CR 0.82 02/06/2024    CR 0.89 03/03/2020     Lab Results   Component Value Date     02/06/2024     02/05/2024     12/21/2022    GLC 95 03/03/2020     INR:  Lab Results   Component Value Date    INR 3.50 02/05/2024    INR 1.05 05/26/2022

## 2024-02-06 NOTE — PROGRESS NOTES
ASSESSMENT:     89 years old history of hypertension hyperlipidemia DVT on Xarelto presenting with recurrent falls legs giving out    Mildly increased reflexes upper and lower extremities  Diminished sensations in the legs    Etiology considerations lumbar spinal degenerative disc process/physical deconditioning, other differential diagnosis be considered ? Cervical spine    Physical therapies. Recommend EMG of both legs as an outpatient.    Outpatient follow-up with neurology in 4 to 6 weeks is recommended    Thank you for the opportunity to provide consultation on Jean Pierre Roblero    CLINICAL PROBLEMS:    Lumbar degenerative disc disease    24 HOUR EVENTS:      Mild lower back pain    EXAMINATION:   Past medical history, family history, social history and review of systems are unchanged except as noted below.    VITAL SIGNS:     BP (!) 158/91 (BP Location: Left arm)   Pulse 96   Temp 98.5  F (36.9  C) (Oral)   Resp 16   Wt 71 kg (156 lb 8.4 oz)   SpO2 98%   BMI 24.52 kg/m      Patient is alert and oriented to current situations, spontaneous speech and comprehension intact    Pupils equal and round visual fields intact face is symmetric wears hearing aids    Limited right shoulder abduction  Able to move bilateral upper limbs against gravity  Bilateral lower extremities nearly intact strength except ?left EHL  Reflexes 2+ upper and lower extremities plantars equivocal no ankle clonus negative finger flexors  Sensations he does reports of having diminished to vibration light touch in the legs no tremors or involuntary movements    PERTINENT DATA:  Lab and X-ray:   Recent Labs   Lab Test 02/06/24  0746 02/05/24  0718 02/05/24  0020   WBC 6.2 7.8 9.3   HGB 9.5* 10.3* 10.7*   * 126* 133*   INR  --   --  3.50*   POTASSIUM 3.8  --  4.5   LDL  --  46 49     [unfilled]  Recent Labs   Lab Test 02/06/24  0746 02/05/24  0020   POTASSIUM 3.8 4.5   CHLORIDE 108* 102   BUN 13.2 22.5     Recent Labs   Lab Test  02/06/24  0746 02/05/24  0718 02/05/24  0020 05/27/22  0823 05/26/22  0640   WBC 6.2 7.8 9.3   < > 7.7   HGB 9.5* 10.3* 10.7*   < > 13.2*   * 100 102*   < > 99   * 126* 133*   < > 142*   INR  --   --  3.50*  --  1.05    < > = values in this interval not displayed.     Recent Labs   Lab Test 02/01/24  0631 05/04/23  2355   AST 26 24   ALT 18 13   ALKPHOS 81 71     Recent Labs   Lab Test 02/05/24  0718 02/05/24  0020   HDL 41 41   LDL 46 49     No lab results found.    Laboratory results were personally interpreted and reviewed in detail.    Imaging studies reviewed and interpreted in detail    Summary: List Problems:   Patient Active Problem List   Diagnosis    Bilateral thigh pain    Community acquired pneumonia    TIA (transient ischemic attack)    Pneumonia due to infectious organism, unspecified laterality, unspecified part of lung    Infection due to 2019 novel coronavirus    Infectious encephalopathy    Hyponatremia    Thrombocytopenia (H24)    Mixed hyperlipidemia    BPH (benign prostatic hyperplasia)    Dementia (H)    History of TIA (transient ischemic attack) and stroke    Acute left-sided low back pain without sciatica    Stroke (H)    Generalized weakness    Falls frequently    Right leg weakness    Anemia, unspecified type        Transportation service

## 2024-02-06 NOTE — CONSULTS
SPIRITUAL HEALTH SERVICES Consult Note  [facility]     Referral Source/Reason for Visit: [facility] consult request for Confucianist/ritual support.    Summary and Recommendations -  Pt requested and received communion from EM  Pt anointed by Mecca easley (02/06)..  Pt appears to enjoy talking about personal and family history.    Plan: No plan for follow-up as pt expects to discharge in the next 24 hours.    Beck Basurto  Associate   [facility] Phone Line [phone]  Spiritual Health Pager [phone]    [facility] available 24/7 for emergent requests/referrals, either by paging the on-call  or by entering an ASAP/STAT consult in [facility], which will also page the on-call .    Assessment    Saw pt Jean Pierre Roblero per [facility] consult request..    Patient/Family Understanding of Illness and Goals of Care - Pt states that he feels much improved since entering hospital and expects to discharge from hospital as early as today. He appears to be unaware of any plan following discharge other than returning home where he lives with his wife.    Distress and Loss - None reported by pt or observed.by .    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 He also talked about the untimely and early death of his father. Jean Pierre has three sons with which he is close and stated that he would have liked to have had more children.    Meaning, Beliefs, and Spirituality - Jean Pierre identifies as Restorationist and has maintained his gayla throughout his life. He accepted offers of sacraments (communion and anointing) and indicates that he has a strong belief in the power of prayer. He states that his Zoroastrianism and spiritual beliefs have helped both he and his wife through many  difficult times.

## 2024-02-06 NOTE — PROGRESS NOTES
[facility]    Hospitalist Progress Note    Assessment & Plan   89M hx of TIA HLD, RLE DVT on xeralto, cognitive impairment, arthritis, CKD, HTN, BPH constipation, GERD presenting with a fall 2/2 RLE weakness.      RLE weakness, L sided decreased Sensation. R/O Stroke  Recurrent Falls  Coagulopathy 2/2 Medication Use  Hx TIA  Hx HLD  Hx: Patient somewhat poor historian but states he fell today as 'legs gave out'. Wife states that for the entire day prior to presentation, he was dragging his R leg, and he fell on his way out of the bathroom due to R leg weakness. Patient denies HA/blurry vision/CP/SOB. Denies numbness. Denies any pain, and states he was asymptomatic. Endorses chronic urinary incotinencen Wife reports TIA years ago and was then started on xeralto. No headtruama/LOC, though state that weeks ago when he fell there was Headtraume wihtout LOC.   On examination had decrease sensation LUE and LLE, L facial droop on admission as per notes.  LLE abrasians with cellulitis covered with wrapping.  Mental status was baseline.  EKG: sinus tachycardia, LAD, borderline Qtc prolongation  Imaging: Area of prolonged Tmax along the right inferior temporo-occipital lobes. MRI negative   --Neuro stroke consulted, MRI negative, they signed out.  --PT/OT/speech to evaluate patient, recommended TCU, appreciate input from general neurology, MRI of the thoracolumbar area reviewed with family, degenerative disc disease noted along with spinal stenosis, neurosurgery consult requested, discussed with social work regarding discharge planning.  --Echocardiogram results noted, discussed with cardiology, concern is interatrial shunt noted in the echo, limited bubble ordered.  Possibly this will not  but with his multiple falls and history of DVT would help to decide continued Xarelto use.  --Stroke neurology recommended continuing aspirin 81 mg daily, patient was not taking this prior to  this, for now its  is on hold.  --Continue PTA meds, encourage oral intake       RLE DVT  diagnosed in 2023, while on xeralto for the TIA per the wife.  Continue Xarelto     Cognitive Impairment  wife states patient at baseline, Aox3, not to president. But gave wrong time to ER staff  doesn't follow all commands, but when discussing GOC, did understand the implications of the discussion and requested to be Full code  --PRN zyprexa for agitation     Macrocytic Anemia  Iron deficiency anemia  acute drop in the last 2 weeks  has facial bruising in the setting of a recent fall in the past few weeks. Unclear as to the origin of the acute anemia, but family denies any recent GI blood loss  -- Anemia workup noted, iron deficiency noted, since hemoglobin is about 10 grams per deciliter will start on p.o. iron supplementation.     LLE Celluliits  Failed topical  abx, and presented to the ER 4 days prior to current presentation for persistance of the infection. No evidence that he was discharged with PO abx  --Resume ancef, will change to p.o. on discharge.  --WOC     Hypertension  --PRNs in place     CKD2  --limit nsaids/contrast  --renal diet once NPO completed     Constipation  --miralax/senna     BPH  Urinary Incontinence  --purewick     Progressive Pulmonary Nodule Infiltrate  seen on previous CTs  --outpt follow-up        Arthritis  --tylenol      GERD  --tums PRN      DVT Prophylaxis: Pneumatic Compression Devices  Code Status: Full Code     52 MINUTES SPENT BY ME on the date of service doing chart review, history, exam, documentation & further activities per the note.  Disposition: Expected discharge 2/6 to TCU, time spent on discussing discharge plans, plan of care etc. with patient's family and social work.  Clinically Significant Risk Factors               # Coagulation Defect: INR = 3.50 (Ref range: 0.85 - 1.15) and/or PTT = 54 Seconds (Ref range: 22 - 38 Seconds), will monitor for bleeding  # Thrombocytopenia:  Lowest platelets = 120 in last 2 days, will monitor for bleeding       # Dementia: noted on problem list        # Financial/Environmental Concerns: none (denies)         Lilia Rivero MD  Text Page   (7am to 6pm)    Interval History   Patient is resting, slept well, he continues to have lower extremity weakness, discussed MRI results, discussed about  neurosurgery consult, echocardiogram obtained, due to the shunt noted limited echo ordered.    -Data reviewed today: I reviewed all new labs and imaging results over the last 24 hours.   Physical Exam     Vital Signs with Ranges  Temp:  [97.7  F (36.5  C)-99  F (37.2  C)] 99  F (37.2  C)  Pulse:  [87-98] 95  Resp:  [16] 16  BP: (126-158)/(76-99) 144/99  SpO2:  [96 %-98 %] 97 %  I/O last 3 completed shifts:  In: -   Out: 550 [Urine:550]    Constitutional: Awake, alert, cooperative, no apparent distress  Respiratory: Clear to auscultation bilaterally, no crackles or wheezing  Cardiovascular: Regular rate and rhythm, normal S1 and S2, and no murmur noted  GI: Normal bowel sounds, soft, non-distended, non-tender  Skin/Integumen: No rashes, no cyanosis, no edema  Neuro : moving all 4 extremities, gait not tested, left lower extremity weakness noted    Medications    - MEDICATION INSTRUCTIONS -      - MEDICATION INSTRUCTIONS -        atorvastatin  10 mg Oral or Feeding Tube QPM    ceFAZolin  1 g Intravenous Q8H    famotidine  20 mg Oral At Bedtime    ferrous sulfate  325 mg Oral Every Other Day    finasteride  5 mg Oral QPM    memantine  10 mg Oral BID    polyethylene glycol  17 g Oral Daily    rivaroxaban ANTICOAGULANT  20 mg Oral Daily with supper    sodium chloride (PF)  3 mL Intracatheter Q8H    sodium chloride bacteriostatic  10 mL Intravenous Once       Data   Recent Labs   Lab 02/06/24  0746 02/05/24  2215 02/05/24  1056 02/05/24  0718 02/05/24  0020 02/01/24  0631   WBC 6.2  --   --  7.8 9.3  --    HGB 9.5*  --   --  10.3* 10.7*  --    *  --   --  100 102*   --    *  --   --  126* 133*  --    INR  --   --   --   --  3.50*  --      --   --   --  138 140   POTASSIUM 3.8  --   --   --  4.5 3.9   CHLORIDE 108*  --   --   --  102 103   CO2 26  --   --   --  27 31*   BUN 13.2  --   --   --  22.5 13.9   CR 0.82  --   --   --  1.16 1.07   ANIONGAP 8  --   --   --  9 6*   KENYA 8.5*  --   --   --  9.0 9.2   * 115* 118*  --  121* 98   ALBUMIN  --   --   --   --   --  4.3   PROTTOTAL  --   --   --   --   --  6.8   BILITOTAL  --   --   --   --   --  0.4   ALKPHOS  --   --   --   --   --  81   ALT  --   --   --   --   --  18   AST  --   --   --   --   --  26     Recent Labs   Lab 02/06/24  0746 02/05/24  2215 02/05/24  1056 02/05/24  0020 02/01/24  0631   * 115* 118* 121* 98       Imaging:   Recent Results (from the past 24 hour(s))   MR Lumbar Spine w/o Contrast    Narrative    For Patients: As a result of the 21st Century Cures Act, medical imaging exams and procedure reports are released immediately into your electronic medical record. You may view this report before your referring provider. If you have questions, please   contact your health care provider.    EXAM: MR LUMBAR SPINE W/O CONTRAST  LOCATION: [facility]  DATE/TIME: 2/5/2024 7:16 PM CST    INDICATION: Frequent falls, leg weakness.  COMPARISON: None.  TECHNIQUE: Routine lumbar spine MRI without IV contrast.    FINDINGS:   Nomenclature: Nomenclature based on 5 lumbar type vertebral bodies.   Alignment: Grade 1 anterolisthesis L3-L4, L4-L5 secondary to facet arthropathy. Grade 1 anterolisthesis L5-S1 secondary to bilateral L5 spondylolysis. Thoracolumbar mild dextrocurvature, and levocurvature lower lumbar spine.  Vertebrae: Normal vertebral body heights.   Marrow signal: Normal marrow signal.  Spinal cord: Normal distal spinal cord and cauda equina with conus medullaris at T12-L1.   Extraspinal: Moderate soft tissue paraspinal muscle edema L3-L5.  Pelvis: Unremarkable  visualized bony pelvis.    T12-L1: Normal height of disc. Desiccated disc without herniation. Mild facet arthrosis. No recess stenosis. No central spinal stenosis, no right foramen stenosis, no left foramen stenosis.    L1-L2: Normal height of disc. Desiccated disc without herniation. Mild facet arthrosis. Mild left recess stenosis. No central spinal stenosis, no right foramen stenosis, no left foramen stenosis.    L2-L3: Moderate loss of disc height. Degenerated disc with prominent left eccentric disc bulge. Moderate bilateral facet hypertrophy. Severe left recess stenosis. Moderate to severe central spinal stenosis, mild right foramen stenosis, severe left   foramen stenosis.    L3-L4: Near complete loss of disc height. Severely degenerated disc with prominent disc bulge. Severe bilateral facet hypertrophy. Severe bilateral recess stenosis. Moderate central spinal stenosis, severe right foramen stenosis, severe left foramen   stenosis.    L4-L5: Near complete loss of disc height. Severely degenerated disc with disc bulge and unroofing of disc due to anterolisthesis. Severe bilateral facet hypertrophy. No recess stenosis. No central spinal stenosis, severe right foramen stenosis, mild left   foramen stenosis.    L5-S1: Marked loss of height. Degenerated disc with disc bulge and unroofing of disc due to anterolisthesis. Moderate to severe bilateral facet hypertrophy. No recess stenosis. No central spinal stenosis, severe right foramen stenosis, severe left   foramen stenosis.      Impression    IMPRESSION:  1.  No acute findings.  2.  At L3-L4, moderate to severe central stenosis.  3.  High-grade bilateral foraminal stenosis at L3-L4 and L5-S1.  4.  Additional high-grade left foraminal stenosis at L2-L3.  5.  Additional high-grade right foraminal stenosis at L4-L5.   MR Thoracic Spine w/o Contrast    Narrative    For Patients: As a result of the 21st Century Cures Act, medical imaging exams and procedure reports are  released immediately into your electronic medical record. You may view this report before your referring provider. If you have questions, please   contact your health care provider.    EXAM: MR THORACIC SPINE W/O CONTRAST  LOCATION: [facility]  DATE/TIME: 2024 7:17 PM CST    INDICATION: Unsteady gait. Left leg weakness.  COMPARISON: None.  TECHNIQUE: Routine Thoracic Spine MRI without IV contrast.    FINDINGS:   Alignment: Mild thoracolumbar dextrocurvature.   Vertebral height: No acute fracture. Normal height of vertebral bodies.  Marrow signal: Normal    Spinal cord: No abnormal signal.    Discs: Mild multi level interbody degeneration.    Facets: Mild scattered facet arthropathy  Spinal Canal/Foramina: No high-grade central stenosis. No high-grade foraminal stenosis.    Extraspinal: No extraspinal abnormality.       Impression    IMPRESSION:  1.  No acute findings.  2.  No high-grade central or foraminal stenosis.   Echocardiogram Complete   Result Value    LVEF  65-70%    Narrative    [id number]  [id number]  [id number]^MARY KAY WHATLEY^ROSI     [facility]  Echocardiography Laboratory  [address] [place] [address]     Name: MITZY DICKERSON  MRN: [id number]  : 1934  Study Date: 2024 09:36 AM  Age: 89 yrs  Gender: Male  Patient Location: [facility]  Reason For Study: Cerebral Thrombosis, Embolism, Artery Occlusion  Ordering Physician: ROSI RUELAS  Referring Physician: ROSI RUELAS  Performed By: Montserrat Marcos     BSA: 1.8 m2  Height: 67 in  Weight: 156 lb  HR: 93  ______________________________________________________________________________  Procedure  Complete Echo Adult. Optison (NDC #[id number]) given intravenously.  ______________________________________________________________________________  Interpretation Summary     Technically difficult study with some improvement after  contrast use.     Normal left ventricular size and systolic function.  Normal RV size and systolic function  Mitral annular calcification noted with mean inflow gradient of 4 to 5 mmHg  consistent with mild mitral stenosis.  Aorta is dilated. Sinuses of Valsalva measures 4.6 cm. Proximal visualized  ascending aorta measures 4.5 cm. There is mild aortic insufficiency.  IVC is normal.  No pericardial effusion.  Although imaging is difficult, on subcostal views, there appears to be a shunt  across the interatrial septum.  ______________________________________________________________________________  Left Ventricle  The left ventricle is normal in size. Hyperdynamic left ventricular function.  The visual ejection fraction is 65-70%. No regional wall motion abnormalities  noted.     Right Ventricle  The right ventricle is normal in size and function.     Atria  Normal left atrial size. The left atrium is not well visualized. Right atrial  size is normal. Right atrium not well visualized.     Mitral Valve  There is moderate mitral annular calcification. There is trace mitral  regurgitation. The mean mitral valve gradient is 4.4 mmHg. The peak mitral  valve gradient is 11.0 mmHg. Calcified mitral apparatus causing mitral  stenosis. There is mild mitral stenosis.     Tricuspid Valve  The tricuspid valve is not well visualized, but is grossly normal. Right  ventricular systolic pressure could not be approximated due to inadequate  tricuspid regurgitation.     Aortic Valve  The aortic valve is trileaflet with aortic valve sclerosis. There is mild (1+)  aortic regurgitation. No hemodynamically significant valvular aortic stenosis.  The mean AoV pressure gradient is 4.5 mmHg. The peak AoV pressure gradient is  8.0 mmHg.     Pulmonic Valve  The pulmonic valve is not well visualized.     Vessels  Aortic root dilatation is present. Ascending aorta dilatation is present. The  inferior vena cava was normal in size with preserved  respiratory variability.     Pericardium  There is no pericardial effusion.     Rhythm  Sinus rhythm was noted.  ______________________________________________________________________________  MMode/2D Measurements & Calculations     IVSd: 1.0 cm  LVIDd: 4.6 cm  LVIDs: 3.1 cm  LVPWd: 0.90 cm  FS: 33.0 %  LV mass(C)d: 148.1 grams  LV mass(C)dI: 81.4 grams/m2  Ao root diam: 4.6 cm  asc Aorta Diam: 4.5 cm  LVOT diam: 2.0 cm  LVOT area: 3.0 cm2  Ao root diam index Ht(cm/m): 2.7  Ao root diam index BSA (cm/m2): 2.5  Asc Ao diam index BSA (cm/m2): 2.4  Asc Ao diam index Ht(cm/m): 2.6  RWT: 0.39  TAPSE: 1.9 cm     Doppler Measurements & Calculations  MV E max blake: 88.1 cm/sec  MV A max blake: 152.1 cm/sec  MV E/A: 0.58     MV max P.0 mmHg  MV mean P.4 mmHg  MV V2 VTI: 25.9 cm  MVA(VTI): 2.7 cm2  Ao V2 max: 140.5 cm/sec  Ao max P.0 mmHg  Ao V2 mean: 103.0 cm/sec  Ao mean P.5 mmHg  Ao V2 VTI: 25.4 cm  DIONTE(I,D): 2.8 cm2  DIONTE(V,D): 2.9 cm2  LV V1 max P.4 mmHg  LV V1 max: 136.2 cm/sec  LV V1 VTI: 23.4 cm  SV(LVOT): 71.2 ml  SI(LVOT): 39.1 ml/m2  PA V2 max: 118.1 cm/sec  PA max P.6 mmHg  PA acc time: 0.11 sec  AV Blake Ratio (DI): 0.97  DIONTE Index (cm2/m2): 1.5  E/E' av.7  Lateral E/e': 9.0  Medial E/e': 18.4     ______________________________________________________________________________  Report approved by: Jodie Dennis 2024 11:17 AM

## 2024-02-06 NOTE — PROGRESS NOTES
Care Management Follow Up    Length of Stay (days): 1    Expected Discharge Date: 02/07/2024     Concerns to be Addressed: discharge planning     Patient plan of care discussed at interdisciplinary rounds: Yes    Anticipated Discharge Disposition:  TCU  Anticipated Discharge Services:  therapies, transport  Anticipated Discharge DME: none     Patient/family educated on Medicare website which has current facility and service quality ratings:  yes, spoke in depth about each specific rating   Education Provided on the Discharge Plan:  yes  Patient/Family in Agreement with the Plan: yes    Referrals Placed by CM/SW:  TCU  Private pay costs discussed: private room/amenity fees    Additional Information:  SW met with patient and his spouse that was at the bedside to discuss discharge to TCU and placements. SW spoke in depth with spouse about medicare ratings for each placement and had conversation about weighing priorities with location near [place] vs medicare ratings. They are wanting a shared room. She selected several preferences ([place], [facility], [facility], [facility], [facility]) and SW will send them once it is closer to patient's discharge date. Awaiting echo and finalized workup today.     Josee Marc, MSW, LGSW   Social Work   [facility]

## 2024-02-06 NOTE — PROGRESS NOTES
"   02/06/24 1155   Appointment Info   Signing Clinician's Name / Credentials (PT) Kandace Haider, PT   Living Environment   People in Home spouse   Current Living Arrangements house   Home Accessibility stairs to enter home;stairs within home   Number of Stairs, Main Entrance 1  (\"Bigger than normal step.\")   Stair Railings, Main Entrance none   Number of Stairs, Within Home, Primary seven  (7 up and 6 down-Has a chair lift on both of these sets.)   Transportation Anticipated family or friend will provide  (Wife does the driving.)   Living Environment Comments Had been I with bed mobilty and with gait with 2ww but started getting weaker and this got harder.   Self-Care   Usual Activity Tolerance moderate   Current Activity Tolerance fair   Equipment Currently Used at Home shower chair;walker, rolling;walker, nestor   Fall history within last six months yes   Number of times patient has fallen within last six months 3   General Information   Onset of Illness/Injury or Date of Surgery 02/06/24   Referring Physician Catarina Betancur MD   Patient/Family Therapy Goals Statement (PT) Agreeable to rehab.   Pertinent History of Current Problem (include personal factors and/or comorbidities that impact the POC) 89 years old man with a history of hypertension hyperlipidemia, DVT on Xarelto presenting with right leg weakness.  He was initially evaluated for stroke which was negative.  Prior to arrival legs were giving out, dragging the right leg he fell on his way to the bathroom. No difinitive diagnosis yet. Still sanjuanita worked up.   Existing Precautions/Restrictions fall   General Observations Resting with shoulers shifted to the left.   Cognition   Affect/Mental Status (Cognition) WFL   Orientation Status (Cognition) oriented x 3   Follows Commands (Cognition) WNL   Cognitive Status Comments Slow to respond. ? if due to being [place].   Pain Assessment   Patient Currently in Pain Yes, see Vital Sign flowsheet  (Sharp pain in back " at times with amb.)   Posture    Posture Forward head position;Protracted shoulders   Posture Comments Stays flexed at hips with amb. Needs cues to look up. Head tilted to the left. Leans left when sitting.   Range of Motion (ROM)   ROM Comment Passive range is functional but limited hip flex when transferring to standing. ? if structural or if fear of falling and resisting movement.   Strength (Manual Muscle Testing)   Strength (Manual Muscle Testing) MMT: Hip;MMT: Knee;MMT: Ankle   Left Hip (Manual Muscle Testing)   Hip Flexion - Left Side (3-/5) fair minus, left  (Low bed clearance and knee extension lag with SLR)   Hip ABduction - Left Side (3/5) fair, left   Hip ADduction - Left Side (3/5) fair, left   Right Hip (Manual Muscle Testing)   Hip Flexion - Right Side (4/5) good, right  (Can do SLR)   Hip ABduction - Right Side (3/5) fair, right   Hip ADduction - Right Side (3/5) fair, right   Left Knee (Manual Muscle Testing)   Knee Extension - Left Side (5/5) normal,left   Right Knee (Manual Muscle Testing)   Knee Extension - Right Side (3+/5) fair plus, right   Left Ankle/Foot (Manual Muscle Testing)   Ankle Dorsiflexion - Left Side (3-/5) fair minus, left   Ankle Plantarflexion - Left Side (4/5) good, left   Right Ankle/Foot (Manual Muscle Testing)   Ankle Dorsiflexion - Right Side (3+/5) fair plus, right   Ankle Plantarflexion - Right Side (4/5) good, right   Bed Mobility   Comment, (Bed Mobility) Sup to sit wtih min A. Extra time as pateint wanted to do on his own.   Transfers   Comment, (Transfers) Sit to stand from bed with cues for technique and mod A.   Gait/Stairs (Locomotion)   Comment, (Gait/Stairs) 3 steps bed to chair min A and step by step cues with use of 2ww.   Balance   Balance Comments Impaired sitting balance initially. Leans left and needed UE support.   Clinical Impression   Criteria for Skilled Therapeutic Intervention Yes, treatment indicated   PT Diagnosis (PT) Impaired functional  independence   Influenced by the following impairments Decreased strength, decreased flexibility with sit to stand, impaired posture   Functional limitations due to impairments Needs assist for all functional mobility.   Clinical Presentation (PT Evaluation Complexity) evolving   Clinical Presentation Rationale Still no diagnosis and strength seems to fluctuate.   Clinical Decision Making (Complexity) moderate complexity   Planned Therapy Interventions (PT) balance training;bed mobility training;gait training;strengthening;transfer training   Risk & Benefits of therapy have been explained evaluation/treatment results reviewed;care plan/treatment goals reviewed;risks/benefits reviewed;current/potential barriers reviewed;participants voiced agreement with care plan;participants included;patient   PT Total Evaluation Time   PT Eval, Moderate Complexity Minutes (36414) 18   Physical Therapy Goals   PT Frequency Daily   PT Predicted Duration/Target Date for Goal Attainment 02/12/24   PT Goals Bed Mobility;Transfers;Gait;PT Goal 1   PT: Bed Mobility Supervision/stand-by assist;Supine to/from sit;Rolling   PT: Transfers Modified independent;Bed to/from chair;Sit to/from stand;Assistive device   PT: Gait Supervision/stand-by assist;Assistive device;Rolling walker;Greater than 200 feet   Interventions   Interventions Quick Adds Therapeutic Activity;Gait Training   Therapeutic Activity   Therapeutic Activities: dynamic activities to improve functional performance Minutes (99272) 18   Symptoms Noted During/After Treatment Fatigue   Treatment Detail/Skilled Intervention Patient needed extra time and cues for technique with sup to sit. Slow but able to get LE's to edge of bed. Needed mod A to power trunk into sitting position. Initially leaning left and needing UE support for balance but with cues able to eventually maintain sitting balnace with just supervision. Sit to stand from bed and from recliner with with min A . Needed  verbal and manual cues to lean forward more. Left up in chair with wife present and chair alarm on.   Gait Training   Gait Training Minutes (44748) 15   Symptoms Noted During/After Treatment (Gait Training) fatigue;increased pain  (Reported occassional sharp pain in his back.)   Treatment Detail/Skilled Intervention Eval complete and treatment initated. Gait training with ww with cues for posture and increased step length with fair follow through. Bilateral LE's slightly externally rotated and short step length. CGA-min provided with no LOB. Cues for technique with turning.   Distance in Feet 125   Addison Level (Gait Training) minimum assist (75% patient effort)   Physical Assistance Level (Gait Training) 1 person assist   Assistive Device (Gait Training) rolling walker   Pattern Analysis (Gait Training) swing-to gait   Gait Analysis Deviations decreased montrell;increased time in double stance;decreased step length   Impairments (Gait Analysis/Training) balance impaired;flexibility decreased;strength decreased   PT Discharge Planning   PT Plan Progress independence with bed mobility, transfers and amb with ww. May want a w/c follow.   PT Discharge Recommendation (DC Rec) Acute Rehab Center-Motivated patient will benefit from intensive, interdisciplinary therapy.  Anticipate will be able to tolerate 3 hours of therapy per day   PT Rationale for DC Rec Patient currently well below mod I baseline with use of ww at home. Currently needing min-mod A for bed mobilty and transfers and CGA -min A for amb with ww. Pateint is motivated and has supportive wife. Anticipate he would tolerate 3 hours of therapy.   PT Brief overview of current status Up with ww with A of 1.   Total Session Time   Timed Code Treatment Minutes 33   Total Session Time (sum of timed and untimed services) 51

## 2024-02-07 ENCOUNTER — APPOINTMENT (OUTPATIENT)
Dept: OCCUPATIONAL THERAPY | Facility: CLINIC | Age: 89
DRG: 552 | End: 2024-02-07
Payer: MEDICARE

## 2024-02-07 ENCOUNTER — TELEPHONE (OUTPATIENT)
Dept: NEUROSURGERY | Facility: CLINIC | Age: 89
End: 2024-02-07

## 2024-02-07 ENCOUNTER — APPOINTMENT (OUTPATIENT)
Dept: CARDIOLOGY | Facility: CLINIC | Age: 89
DRG: 552 | End: 2024-02-07
Attending: INTERNAL MEDICINE
Payer: MEDICARE

## 2024-02-07 ENCOUNTER — APPOINTMENT (OUTPATIENT)
Dept: SPEECH THERAPY | Facility: CLINIC | Age: 89
DRG: 552 | End: 2024-02-07
Payer: MEDICARE

## 2024-02-07 PROCEDURE — 97535 SELF CARE MNGMENT TRAINING: CPT | Mod: GO | Performed by: OCCUPATIONAL THERAPIST

## 2024-02-07 PROCEDURE — 250N000013 HC RX MED GY IP 250 OP 250 PS 637: Performed by: HOSPITALIST

## 2024-02-07 PROCEDURE — 92507 TX SP LANG VOICE COMM INDIV: CPT | Mod: GN | Performed by: SPEECH-LANGUAGE PATHOLOGIST

## 2024-02-07 PROCEDURE — 250N000013 HC RX MED GY IP 250 OP 250 PS 637: Performed by: INTERNAL MEDICINE

## 2024-02-07 PROCEDURE — 250N000011 HC RX IP 250 OP 636: Performed by: STUDENT IN AN ORGANIZED HEALTH CARE EDUCATION/TRAINING PROGRAM

## 2024-02-07 PROCEDURE — 92526 ORAL FUNCTION THERAPY: CPT | Mod: GN | Performed by: SPEECH-LANGUAGE PATHOLOGIST

## 2024-02-07 PROCEDURE — 250N000013 HC RX MED GY IP 250 OP 250 PS 637: Performed by: STUDENT IN AN ORGANIZED HEALTH CARE EDUCATION/TRAINING PROGRAM

## 2024-02-07 PROCEDURE — 99232 SBSQ HOSP IP/OBS MODERATE 35: CPT | Performed by: INTERNAL MEDICINE

## 2024-02-07 PROCEDURE — 93321 DOPPLER ECHO F-UP/LMTD STD: CPT | Mod: 26 | Performed by: INTERNAL MEDICINE

## 2024-02-07 PROCEDURE — 93308 TTE F-UP OR LMTD: CPT | Mod: 26 | Performed by: INTERNAL MEDICINE

## 2024-02-07 PROCEDURE — 93325 DOPPLER ECHO COLOR FLOW MAPG: CPT

## 2024-02-07 PROCEDURE — 120N000001 HC R&B MED SURG/OB

## 2024-02-07 PROCEDURE — 250N000012 HC RX MED GY IP 250 OP 636 PS 637

## 2024-02-07 PROCEDURE — 93325 DOPPLER ECHO COLOR FLOW MAPG: CPT | Mod: 26 | Performed by: INTERNAL MEDICINE

## 2024-02-07 RX ORDER — DEXAMETHASONE 2 MG/1
4 TABLET ORAL EVERY 6 HOURS SCHEDULED
Status: DISCONTINUED | OUTPATIENT
Start: 2024-02-07 | End: 2024-02-08

## 2024-02-07 RX ORDER — LIDOCAINE 4 G/G
1-2 PATCH TOPICAL
Status: DISCONTINUED | OUTPATIENT
Start: 2024-02-07 | End: 2024-02-08 | Stop reason: HOSPADM

## 2024-02-07 RX ADMIN — RIVAROXABAN 20 MG: 20 TABLET, FILM COATED ORAL at 16:28

## 2024-02-07 RX ADMIN — ACETAMINOPHEN 650 MG: 325 TABLET, FILM COATED ORAL at 03:35

## 2024-02-07 RX ADMIN — FAMOTIDINE 20 MG: 20 TABLET ORAL at 21:47

## 2024-02-07 RX ADMIN — MEMANTINE 10 MG: 10 TABLET ORAL at 08:41

## 2024-02-07 RX ADMIN — CEFAZOLIN 1 G: 1 INJECTION, POWDER, FOR SOLUTION INTRAMUSCULAR; INTRAVENOUS at 18:32

## 2024-02-07 RX ADMIN — ACETAMINOPHEN 650 MG: 325 TABLET, FILM COATED ORAL at 09:25

## 2024-02-07 RX ADMIN — FERROUS SULFATE TAB 325 MG (65 MG ELEMENTAL FE) 325 MG: 325 (65 FE) TAB at 08:41

## 2024-02-07 RX ADMIN — CEFAZOLIN 1 G: 1 INJECTION, POWDER, FOR SOLUTION INTRAMUSCULAR; INTRAVENOUS at 12:40

## 2024-02-07 RX ADMIN — CEFAZOLIN 1 G: 1 INJECTION, POWDER, FOR SOLUTION INTRAMUSCULAR; INTRAVENOUS at 03:24

## 2024-02-07 RX ADMIN — ACETAMINOPHEN 650 MG: 325 TABLET, FILM COATED ORAL at 16:28

## 2024-02-07 RX ADMIN — FINASTERIDE 5 MG: 5 TABLET, FILM COATED ORAL at 20:17

## 2024-02-07 RX ADMIN — ACETAMINOPHEN 650 MG: 325 TABLET, FILM COATED ORAL at 21:47

## 2024-02-07 RX ADMIN — ATORVASTATIN CALCIUM 10 MG: 10 TABLET, FILM COATED ORAL at 20:18

## 2024-02-07 RX ADMIN — MEMANTINE 10 MG: 10 TABLET ORAL at 20:17

## 2024-02-07 RX ADMIN — LIDOCAINE 1 PATCH: 4 PATCH TOPICAL at 03:38

## 2024-02-07 RX ADMIN — DEXAMETHASONE 4 MG: 2 TABLET ORAL at 18:32

## 2024-02-07 RX ADMIN — POLYETHYLENE GLYCOL 3350 17 G: 17 POWDER, FOR SOLUTION ORAL at 08:41

## 2024-02-07 RX ADMIN — LIDOCAINE 1 PATCH: 4 PATCH TOPICAL at 20:19

## 2024-02-07 ASSESSMENT — ACTIVITIES OF DAILY LIVING (ADL)
ADLS_ACUITY_SCORE: 34
ADLS_ACUITY_SCORE: 35
ADLS_ACUITY_SCORE: 34
ADLS_ACUITY_SCORE: 31
ADLS_ACUITY_SCORE: 35
ADLS_ACUITY_SCORE: 34
ADLS_ACUITY_SCORE: 35
ADLS_ACUITY_SCORE: 34

## 2024-02-07 NOTE — PROGRESS NOTES
Neurosurgery progress note:    Obtained records from [facility] that patient received left L4-5 and left L5-S1 TFESI that were beneficial.  Discussed potentially repeating these, however patient is on Xarelto for previous DVT in 2023.  Due to risks of stopping medication, we will first try steroids, Decadron 4 mg every 6 hours to see if this helps with lower back discomfort. If this is beneficial, can discharge patient on MDP.    Discussed with Dr. Claudio and Mallory Pelaez PA-C  [facility] Neurosurgery  [address] [id number] [phone]

## 2024-02-07 NOTE — PLAN OF CARE
Pt here with back pain and CVA workup. A&O x4. Neuros L droop, R sided weakness, and forgetful. VSS, SBP<220. Tele NSR. Easy to chew diet, thin liquids. Takes pills whole. Up with Ax2 GBW. PRN Tylenol given x2 for back pain. Wound care preformed per orders on LLE, CDI. External cath in place. Pt scoring green on the Aggression Stop Light Tool. Pt had Echo completed today. Discharge ARU vs TCU pending.

## 2024-02-07 NOTE — PROGRESS NOTES
Care Management Follow Up    Length of Stay (days): 2    Expected Discharge Date: 02/08/2024     Concerns to be Addressed: discharge planning     Patient plan of care discussed at interdisciplinary rounds: No    Anticipated Discharge Disposition:  ARU vs TCU    Referrals Placed by CM/SW:  ARU, TCU  Private pay costs discussed: private room/amenity fees    Additional Information:  Patient now has PT recommendation for ARU. Juli in admissions at [facility] is going to review for appropriateness. There is a wait list for ARU with no potential openings until this weekend at the earliest. SW already received preferences for TCU and sent referrals via DOD. Neurosurg still following patient, hospitalist states he is nearly ready to discharge.     Josee Marc, MSW, LGSW   Social Work   [facility]

## 2024-02-07 NOTE — PLAN OF CARE
Krista Hernadez, RN on 2/6/2024 at 10:42 PM    Reason for Admission: CVA workup    Cognitive/Mentation: A/Ox 3-4, disoriented to place and situation at times  Neuros/CMS: Intact   VS: Stable on RA  Tele: NSR  GI: Incontinent, no BM  : ncontinent, purewick in place  Pulmonary: LS clear  Pain: Back pain, PRN Tylenol given x2 and Lidocaine patched applied to center of back.     Drains/Lines: PIV SL   Skin: Intact ex LLE wound   Activity: Assist x2 juan pablo steady   Diet: Soft and bite sized with thin liquids. Takes pills whole with water.     Therapies recs: TCU  Discharge: pending     Aggression Spotlight Tool: Yellow for impulsiveness     End of shift summary: MRI of spine completed

## 2024-02-07 NOTE — PROGRESS NOTES
"[facility]    Neurosurgery  Daily Note    Assessment & Plan   Jean Pierre Roblero is a 89 year old male with history of TIA HLD, RLE DVT on xeralto, cognitive impairment, arthritis, CKD, HTN, BPH constipation, GERD who was admitted on 2/5/2024 following a fall due to \"legs giving out.\" Neurosurgery was consulted for \"leg weakness, spinal stenosis\". Radiographic findings include     Narrative & Impression   EXAM: MR CERVICAL SPINE W/O CONTRAST  LOCATION: [facility]  DATE: 2/6/2024     INDICATION: FREQUENT falls d t legs giving out, generalized LE weakness, mild hyper reflexia  COMPARISON: None.  TECHNIQUE: MRI Cervical Spine without IV contrast.     FINDINGS:   Motion artifact limits aspects of exam.     Prevertebral edema. No evidence for ligamentous injury.     Normal vertebral body heights.  No concerning bone marrow lesions.       C3-C4 severe thecal sac stenosis with spinal cord equivocal patchy increased T2/STIR signal versus motion artifact. Please correlate for cord edema versus myelomalacia and gliosis. Otherwise, no abnormal cord signal.     Paraspinal muscles and soft tissues overlying the right C3-C4 facet joints demonstrate mild edema/inflammation. Right C3 and C4 articular pillars demonstrate patchy bone marrow edema. Findings may reflect reactive inflammatory facet arthropathy versus   infectious septic facet arthropathy and osteomyelitis. Please correlate clinically.     Multilevel degenerative disc disease. C3-C4 degenerative type I Modic changes. C4-C5, C5-C6, C6-C7 moderate degree of degenerative type I Modic changes. Mild degenerative type II Modic changes C5-C6, C6-C7. Multilevel uncovertebral and facet arthropathy.     C2-C3 slight grade 1 anterolisthesis. C4-C5, C5-C6 mild grade 1 retrolisthesis. C7-T1, T1-T2 mild 1 anterolisthesis.     Craniocervical junction: C1-C2 posterior prominent postinflammatory pannus measuring 7 mm. C2 demonstrates " prominent synovial cyst. No significant thecal sac stenosis.     C2-C3: Mild bulge. Uncovertebral facet arthropathy. No significant thecal sac stenosis. Moderate to severe left foraminal stenosis. Mild right foraminal stenosis.     C3-C4: Bulge. Small superimposed posterior disc extrusion extending mildly above and below the disc margin. Uncovertebral facet arthropathy. Severe thecal sac stenosis underlying cord compression. Severe bilateral foraminal stenosis.     C4-C5: Bulge with posterior disc osteophyte ridge. Uncovertebral facet arthropathy. Severe thecal sac stenosis with underlying cord compression. Severe bilateral foraminal stenosis.     C5-C6: Bulge with posterior disc osteophyte ridge. Uncovertebral facet arthropathy. Severe thecal sac stenosis. Severe bilateral foraminal stenosis.     C6-C7: Bulge. Uncovertebral facet arthropathy. Moderate thecal sac stenosis. Severe bilateral foraminal stenosis.     C7-T1: Bulge. Facet arthropathy. No significant thecal sac stenosis. Mild left foraminal stenosis. Moderate right foraminal stenosis.     EXTRASPINAL FINDINGS:  No significant findings.                                                                       IMPRESSION:  1.  Prevertebral edema, nonspecific.      2.  No evidence for ligamentous injury.     3.  Findings described above may reflect right C3-C4 reactive inflammatory facet arthropathy versus infectious septic facet arthropathy and osteomyelitis. Please correlate clinically.     4.  Multilevel spondylosis described above.     5.  C3-C4, C4-C5, C5-C6 severe thecal sac stenosis.     6.  C3-C4 spinal cord equivocal patchy increased T2/STIR signal versus motion artifact. Please correlate for cord edema versus myelomalacia and gliosis.      7.  Otherwise, no abnormal cord signal.     8.  Multilevel severe neural foraminal stenosis.      Lumbar MRI from 2/5/24 reviewed   IMPRESSION:  1.  No acute findings.  2.  At L3-L4, moderate to severe central  stenosis.  3.  High-grade bilateral foraminal stenosis at L3-L4 and L5-S1.  4.  Additional high-grade left foraminal stenosis at L2-L3.  5.  Additional high-grade right foraminal stenosis at L4-L5.     Thoracic MRI from 2/5/24 reviewed   IMPRESSION:  1.  No acute findings.  2.  No high-grade central or foraminal stenosis.     Plan:   -imaging reviewed extensively with Dr. Claudio and patient and wife. Patient explaining more lower back pain with BLE claudication type symptoms. No neck symptoms or upper extremity symptoms. He is not hyperreflexic, negative Hoffmanns and negative clonus. Typically cervical stenosis will affect upper extremities>lower extremities. He also admits pain is similar to prior to lumbar injection at [facility] that worked x 1 year. Will obtain [facility] records and injection details to try to perform similar injection. Patient and wife in agreement.     Addendum- our office still working on obtaining records from [facility]. Also updated RN to try and track records down from [facility].     -Pain control, PT/OT per primary team     Imaging and plans reviewed with Dr. Claudio who agreed    Mallory Betancourt PA-C  [facility] [place] [address]    Pager [phone]    Principal Problem:    Stroke (H)  Active Problems:    Generalized weakness    Falls frequently    Right leg weakness    Anemia, unspecified type     Mallory Betancourt PA-C    Interval History   Stable     Physical Exam   Temp: 98.4  F (36.9  C) Temp src: Oral BP: (!) 141/72 Pulse: 83   Resp: 18 SpO2: 97 % O2 Device: None (Room air)    Vitals:    02/05/24 0040   Weight: 156 lb 8.4 oz (71 kg)     Vital Signs with Ranges  Temp:  [98.2  F (36.8  C)-99.5  F (37.5  C)] 98.4  F (36.9  C)  Pulse:  [] 83  Resp:  [17-18] 18  BP: (120-170)/(71-96) 141/72  SpO2:  [97 %-100 %] 97 %  I/O last 3 completed shifts:  In: -   Out: 1900 [Urine:1900]    Awake, alert, appropriate    FROM c spine   No TTP along c spine   Negative spurlings BL   5/5 motor strength BUE  Negative Hoffmanns bilaterally   DTRs 2+ bicep, 1 tricep, 2 patellar and symmetric   Negative clonus     Generalized weakness in BLE, symmetric strength         Medications    - MEDICATION INSTRUCTIONS -      - MEDICATION INSTRUCTIONS -         atorvastatin  10 mg Oral or Feeding Tube QPM    ceFAZolin  1 g Intravenous Q8H    famotidine  20 mg Oral At Bedtime    ferrous sulfate  325 mg Oral Every Other Day    finasteride  5 mg Oral QPM    lidocaine  1-2 patch Transdermal Q24h    memantine  10 mg Oral BID    polyethylene glycol  17 g Oral Daily    rivaroxaban ANTICOAGULANT  20 mg Oral Daily with supper    sodium chloride (PF)  3 mL Intracatheter Q8H    sodium chloride bacteriostatic  10 mL Intravenous Once       Plans discussed with Dr. Claudio who was in agreement with plans    Mallory LENTZ [facility] Neurosurgery  [address] [address]    Pager [phone]

## 2024-02-07 NOTE — PROGRESS NOTES
[facility]    Hospitalist Progress Note    Assessment & Plan   89M hx of TIA HLD, RLE DVT on xeralto, cognitive impairment, arthritis, CKD, HTN, BPH constipation, GERD presenting with a fall 2/2 RLE weakness.      RLE weakness, L sided decreased Sensation. R/O Stroke  Recurrent Falls  Coagulopathy 2/2 Medication Use  Hx TIA  Hx HLD  Hx: Patient somewhat poor historian but states he fell today as 'legs gave out'. Wife states that for the entire day prior to presentation, he was dragging his R leg, and he fell on his way out of the bathroom due to R leg weakness. Patient denies HA/blurry vision/CP/SOB. Denies numbness. Denies any pain, and states he was asymptomatic. Endorses chronic urinary incotinencen Wife reports TIA years ago and was then started on xeralto. No headtruama/LOC, though state that weeks ago when he fell there was Headtraume wihtout LOC.   On examination had decrease sensation LUE and LLE, L facial droop on admission as per notes.  LLE abrasians with cellulitis covered with wrapping.  Mental status was baseline.  EKG: sinus tachycardia, LAD, borderline Qtc prolongation  Imaging: Area of prolonged Tmax along the right inferior temporo-occipital lobes. MRI negative   --Neuro stroke consulted, MRI negative, they signed out.  --PT/OT/speech to evaluate patient, recommended TCU, appreciate input from general neurology, MRI of the thoracolumbar area reviewed with family, degenerative disc disease noted along with spinal stenosis, neurosurgery consult requested, discussed with social work regarding discharge planning.  --Neurosurgery ordered a cervical MRI, patient did not have any concerns regarding cervical area, no pain, neurosurgery to discuss results with the patient and family.  Currently they are recommending BRENNEN for the lower lumbar area.  (Neurosurgery will arrange)  --Echocardiogram results noted, discussed with cardiology, concern is interatrial shunt noted in the echo,  limited bubble ordered. Bubble study negative for any interatrial shunt possibly this will not  but with his multiple falls and history of DVT would help to decide continued Xarelto use.  --Stroke neurology recommended continuing aspirin 81 mg daily, patient was not taking this prior to this, for now its  is on hold.  --Continue PTA meds, encourage oral intake       RLE DVT  diagnosed in 2023, while on xeralto for the TIA per the wife.  Continue Xarelto     Cognitive Impairment  wife states patient at baseline, Aox3, not to president. But gave wrong time to ER staff  doesn't follow all commands, but when discussing GOC, did understand the implications of the discussion and requested to be Full code  --PRN zyprexa for agitation     Macrocytic Anemia  Iron deficiency anemia  acute drop in the last 2 weeks  has facial bruising in the setting of a recent fall in the past few weeks. Unclear as to the origin of the acute anemia, but family denies any recent GI blood loss  -- Anemia workup noted, iron deficiency noted, since hemoglobin is about 10 grams per deciliter will start on p.o. iron supplementation.     LLE Celluliits  Failed topical  abx, and presented to the ER 4 days prior to current presentation for persistance of the infection. No evidence that he was discharged with PO abx  --Resume ancef, will change to p.o. on discharge.  --WOC     Hypertension  --PRNs in place     CKD2  --limit nsaids/contrast  --renal diet once NPO completed     Constipation  --miralax/senna     BPH  Urinary Incontinence  --purewick     Progressive Pulmonary Nodule Infiltrate  seen on previous CTs  --outpt follow-up        Arthritis  --tylenol      GERD  --tums PRN      DVT Prophylaxis: Pneumatic Compression Devices  Code Status: Full Code     40 MINUTES SPENT BY ME on the date of service doing chart review, history, exam, documentation & further activities per the note.  Disposition: Expected discharge 2/8 to TCU,  patient is currently medically stable for discharge to TCU, neurosurgery to recommend timing of the procedure.  Clinically Significant Risk Factors               # Coagulation Defect: INR = 3.50 (Ref range: 0.85 - 1.15) and/or PTT = 54 Seconds (Ref range: 22 - 38 Seconds), will monitor for bleeding  # Thrombocytopenia: Lowest platelets = 120 in last 2 days, will monitor for bleeding       # Dementia: noted on problem list        # Financial/Environmental Concerns: none (denies)         Lilia Rivero MD  Text Page   (7am to 6pm)    Interval History   Very sleepy today, denies any new concerns, spouse near bedside, discussed about ongoing issues, patient did have an MRI cervical spine, the results will be discussed with the patient by neurosurgery team, family wanted me to obtain a CT of the chest, I reviewed recent CT abdomen which showed progressive nature of a lung nodule which was 7 mm in 2021, there was no mention of the size of the current nodule in the CT report from [facility], discussed with patient to follow-up with the physician who ordered CT chest outpatient for further discussion.    -Data reviewed today: I reviewed all new labs and imaging results over the last 24 hours.   Physical Exam     Vital Signs with Ranges  Temp:  [98.2  F (36.8  C)-99.5  F (37.5  C)] 98.6  F (37  C)  Pulse:  [] 88  Resp:  [17-18] 18  BP: (120-170)/(70-96) 121/70  SpO2:  [97 %-100 %] 97 %  I/O last 3 completed shifts:  In: -   Out: 1900 [Urine:1900]    Constitutional: Awake, alert, cooperative, no apparent distress  Respiratory: Clear to auscultation bilaterally, no crackles or wheezing  Cardiovascular: Regular rate and rhythm, normal S1 and S2, and no murmur noted  GI: Normal bowel sounds, soft, non-distended, non-tender  Skin/Integumen: No rashes, no cyanosis, no edema  Neuro : moving all 4 extremities, gait not tested, left lower extremity weakness noted    Medications    - MEDICATION INSTRUCTIONS -      - MEDICATION  INSTRUCTIONS -        atorvastatin  10 mg Oral or Feeding Tube QPM    ceFAZolin  1 g Intravenous Q8H    famotidine  20 mg Oral At Bedtime    ferrous sulfate  325 mg Oral Every Other Day    finasteride  5 mg Oral QPM    lidocaine  1-2 patch Transdermal Q24h    memantine  10 mg Oral BID    polyethylene glycol  17 g Oral Daily    rivaroxaban ANTICOAGULANT  20 mg Oral Daily with supper    sodium chloride (PF)  3 mL Intracatheter Q8H    sodium chloride bacteriostatic  10 mL Intravenous Once       Data   Recent Labs   Lab 02/06/24  0746 02/05/24 2215 02/05/24  1056 02/05/24  0718 02/05/24  0020 02/01/24  0631   WBC 6.2  --   --  7.8 9.3  --    HGB 9.5*  --   --  10.3* 10.7*  --    *  --   --  100 102*  --    *  --   --  126* 133*  --    INR  --   --   --   --  3.50*  --      --   --   --  138 140   POTASSIUM 3.8  --   --   --  4.5 3.9   CHLORIDE 108*  --   --   --  102 103   CO2 26  --   --   --  27 31*   BUN 13.2  --   --   --  22.5 13.9   CR 0.82  --   --   --  1.16 1.07   ANIONGAP 8  --   --   --  9 6*   KENYA 8.5*  --   --   --  9.0 9.2   * 115* 118*  --  121* 98   ALBUMIN  --   --   --   --   --  4.3   PROTTOTAL  --   --   --   --   --  6.8   BILITOTAL  --   --   --   --   --  0.4   ALKPHOS  --   --   --   --   --  81   ALT  --   --   --   --   --  18   AST  --   --   --   --   --  26     Recent Labs   Lab 02/06/24  0746 02/05/24 2215 02/05/24  1056 02/05/24  0020 02/01/24  0631   * 115* 118* 121* 98       Imaging:   Recent Results (from the past 24 hour(s))   MR Cervical Spine w/o Contrast    Narrative    EXAM: MR CERVICAL SPINE W/O CONTRAST  LOCATION: [facility]  DATE: 2/6/2024    INDICATION: FREQUENT falls d t legs giving out, generalized LE weakness, mild hyper reflexia  COMPARISON: None.  TECHNIQUE: MRI Cervical Spine without IV contrast.    FINDINGS:   Motion artifact limits aspects of exam.    Prevertebral edema. No evidence for ligamentous  injury.    Normal vertebral body heights.  No concerning bone marrow lesions.      C3-C4 severe thecal sac stenosis with spinal cord equivocal patchy increased T2/STIR signal versus motion artifact. Please correlate for cord edema versus myelomalacia and gliosis. Otherwise, no abnormal cord signal.    Paraspinal muscles and soft tissues overlying the right C3-C4 facet joints demonstrate mild edema/inflammation. Right C3 and C4 articular pillars demonstrate patchy bone marrow edema. Findings may reflect reactive inflammatory facet arthropathy versus   infectious septic facet arthropathy and osteomyelitis. Please correlate clinically.    Multilevel degenerative disc disease. C3-C4 degenerative type I Modic changes. C4-C5, C5-C6, C6-C7 moderate degree of degenerative type I Modic changes. Mild degenerative type II Modic changes C5-C6, C6-C7. Multilevel uncovertebral and facet arthropathy.    C2-C3 slight grade 1 anterolisthesis. C4-C5, C5-C6 mild grade 1 retrolisthesis. C7-T1, T1-T2 mild 1 anterolisthesis.    Craniocervical junction: C1-C2 posterior prominent postinflammatory pannus measuring 7 mm. C2 demonstrates prominent synovial cyst. No significant thecal sac stenosis.    C2-C3: Mild bulge. Uncovertebral facet arthropathy. No significant thecal sac stenosis. Moderate to severe left foraminal stenosis. Mild right foraminal stenosis.    C3-C4: Bulge. Small superimposed posterior disc extrusion extending mildly above and below the disc margin. Uncovertebral facet arthropathy. Severe thecal sac stenosis underlying cord compression. Severe bilateral foraminal stenosis.    C4-C5: Bulge with posterior disc osteophyte ridge. Uncovertebral facet arthropathy. Severe thecal sac stenosis with underlying cord compression. Severe bilateral foraminal stenosis.    C5-C6: Bulge with posterior disc osteophyte ridge. Uncovertebral facet arthropathy. Severe thecal sac stenosis. Severe bilateral foraminal stenosis.    C6-C7: Bulge.  Uncovertebral facet arthropathy. Moderate thecal sac stenosis. Severe bilateral foraminal stenosis.    C7-T1: Bulge. Facet arthropathy. No significant thecal sac stenosis. Mild left foraminal stenosis. Moderate right foraminal stenosis.    EXTRASPINAL FINDINGS:  No significant findings.      Impression     IMPRESSION:  1.  Prevertebral edema, nonspecific.     2.  No evidence for ligamentous injury.    3.  Findings described above may reflect right C3-C4 reactive inflammatory facet arthropathy versus infectious septic facet arthropathy and osteomyelitis. Please correlate clinically.    4.  Multilevel spondylosis described above.    5.  C3-C4, C4-C5, C5-C6 severe thecal sac stenosis.    6.  C3-C4 spinal cord equivocal patchy increased T2/STIR signal versus motion artifact. Please correlate for cord edema versus myelomalacia and gliosis.     7.  Otherwise, no abnormal cord signal.    8.  Multilevel severe neural foraminal stenosis.         Echocardiogram Limited    Narrative    [id number]  [id number]  [id number]  [id number]^[id number]^ROSELIA     [facility]  Echocardiography Laboratory  [address] [place] [address]     Name: MITZY DICKERSON  MRN: [id number]  : 1934  Study Date: 2024 11:47 AM  Age: 89 yrs  Gender: Male  Patient Location: [facility]  Reason For Study: Cerebrovascular Incident  Ordering Physician: ROSELIA MIN  Referring Physician: Zaki Alaniz  Performed By: Esau Goldman RDCS     BSA: 1.8 m2  Height: 67 in  Weight: 156 lb  HR: 89  BP: 141/72 mmHg  ______________________________________________________________________________  Procedure  Limited Portable Bubble Echo Adult.  ______________________________________________________________________________  Interpretation Summary     A contrast injection (Bubble Study) was performed that was negative for flow  across the interatrial septum.  A Valsalva maneuver was performed.  The study was technically  difficult.  ______________________________________________________________________________  Atria  A contrast injection (Bubble Study) was performed that was negative for flow  across the interatrial septum. A Valsalva maneuver was performed.  ______________________________________________________________________________  Report approved by: Jodie Gomez 02/07/2024 12:16 PM     ______________________________________________________________________________

## 2024-02-08 ENCOUNTER — LAB REQUISITION (OUTPATIENT)
Dept: LAB | Facility: CLINIC | Age: 89
End: 2024-02-08

## 2024-02-08 ENCOUNTER — MEDICAL CORRESPONDENCE (OUTPATIENT)
Dept: HEALTH INFORMATION MANAGEMENT | Facility: CLINIC | Age: 89
End: 2024-02-08

## 2024-02-08 ENCOUNTER — APPOINTMENT (OUTPATIENT)
Dept: SPEECH THERAPY | Facility: CLINIC | Age: 89
DRG: 552 | End: 2024-02-08
Payer: MEDICARE

## 2024-02-08 VITALS
RESPIRATION RATE: 18 BRPM | HEART RATE: 91 BPM | WEIGHT: 156.53 LBS | DIASTOLIC BLOOD PRESSURE: 64 MMHG | OXYGEN SATURATION: 98 % | TEMPERATURE: 98.2 F | BODY MASS INDEX: 24.52 KG/M2 | SYSTOLIC BLOOD PRESSURE: 113 MMHG

## 2024-02-08 VITALS
HEART RATE: 100 BPM | OXYGEN SATURATION: 99 % | DIASTOLIC BLOOD PRESSURE: 64 MMHG | SYSTOLIC BLOOD PRESSURE: 125 MMHG | TEMPERATURE: 97.7 F | RESPIRATION RATE: 18 BRPM

## 2024-02-08 DIAGNOSIS — Z11.1 ENCOUNTER FOR SCREENING FOR RESPIRATORY TUBERCULOSIS: ICD-10-CM

## 2024-02-08 PROCEDURE — 250N000011 HC RX IP 250 OP 636: Performed by: STUDENT IN AN ORGANIZED HEALTH CARE EDUCATION/TRAINING PROGRAM

## 2024-02-08 PROCEDURE — 250N000012 HC RX MED GY IP 250 OP 636 PS 637

## 2024-02-08 PROCEDURE — 250N000013 HC RX MED GY IP 250 OP 250 PS 637: Performed by: STUDENT IN AN ORGANIZED HEALTH CARE EDUCATION/TRAINING PROGRAM

## 2024-02-08 PROCEDURE — 99239 HOSP IP/OBS DSCHRG MGMT >30: CPT | Performed by: INTERNAL MEDICINE

## 2024-02-08 PROCEDURE — 92526 ORAL FUNCTION THERAPY: CPT | Mod: GN | Performed by: SPEECH-LANGUAGE PATHOLOGIST

## 2024-02-08 PROCEDURE — 250N000011 HC RX IP 250 OP 636

## 2024-02-08 PROCEDURE — 250N000013 HC RX MED GY IP 250 OP 250 PS 637: Performed by: INTERNAL MEDICINE

## 2024-02-08 PROCEDURE — 250N000013 HC RX MED GY IP 250 OP 250 PS 637: Performed by: HOSPITALIST

## 2024-02-08 RX ORDER — PANTOPRAZOLE SODIUM 40 MG/1
40 TABLET, DELAYED RELEASE ORAL DAILY
DISCHARGE
Start: 2024-02-08 | End: 2024-02-16

## 2024-02-08 RX ORDER — FERROUS SULFATE 325(65) MG
325 TABLET ORAL EVERY OTHER DAY
DISCHARGE
Start: 2024-02-09

## 2024-02-08 RX ORDER — METHYLPREDNISOLONE 4 MG
TABLET, DOSE PACK ORAL
DISCHARGE
Start: 2024-02-08 | End: 2024-02-15

## 2024-02-08 RX ORDER — LIDOCAINE 4 G/G
1-2 PATCH TOPICAL EVERY 24 HOURS
DISCHARGE
Start: 2024-02-08

## 2024-02-08 RX ORDER — BUTALBITAL, ACETAMINOPHEN AND CAFFEINE 50; 325; 40 MG/1; MG/1; MG/1
1 TABLET ORAL EVERY 4 HOURS PRN
Qty: 10 TABLET | Refills: 0 | Status: ON HOLD | OUTPATIENT
Start: 2024-02-08 | End: 2024-02-26

## 2024-02-08 RX ORDER — DEXAMETHASONE SODIUM PHOSPHATE 4 MG/ML
4 INJECTION, SOLUTION INTRA-ARTICULAR; INTRALESIONAL; INTRAMUSCULAR; INTRAVENOUS; SOFT TISSUE EVERY 6 HOURS
Status: DISCONTINUED | OUTPATIENT
Start: 2024-02-08 | End: 2024-02-08 | Stop reason: HOSPADM

## 2024-02-08 RX ORDER — FAMOTIDINE 40 MG/1
40 TABLET, FILM COATED ORAL AT BEDTIME
DISCHARGE
Start: 2024-02-08 | End: 2024-02-16

## 2024-02-08 RX ORDER — POLYETHYLENE GLYCOL 3350 17 G/17G
17 POWDER, FOR SOLUTION ORAL DAILY
DISCHARGE
Start: 2024-02-08

## 2024-02-08 RX ORDER — SENNOSIDES 8.6 MG
1 TABLET ORAL 2 TIMES DAILY PRN
DISCHARGE
Start: 2024-02-08

## 2024-02-08 RX ADMIN — DEXAMETHASONE 4 MG: 2 TABLET ORAL at 06:00

## 2024-02-08 RX ADMIN — ACETAMINOPHEN 650 MG: 325 TABLET, FILM COATED ORAL at 04:48

## 2024-02-08 RX ADMIN — CEFAZOLIN 1 G: 1 INJECTION, POWDER, FOR SOLUTION INTRAMUSCULAR; INTRAVENOUS at 11:19

## 2024-02-08 RX ADMIN — MEMANTINE 10 MG: 10 TABLET ORAL at 09:38

## 2024-02-08 RX ADMIN — DEXAMETHASONE 4 MG: 2 TABLET ORAL at 00:10

## 2024-02-08 RX ADMIN — DEXAMETHASONE SODIUM PHOSPHATE 4 MG: 4 INJECTION, SOLUTION INTRAMUSCULAR; INTRAVENOUS at 14:03

## 2024-02-08 RX ADMIN — CEFAZOLIN 1 G: 1 INJECTION, POWDER, FOR SOLUTION INTRAMUSCULAR; INTRAVENOUS at 03:30

## 2024-02-08 RX ADMIN — POLYETHYLENE GLYCOL 3350 17 G: 17 POWDER, FOR SOLUTION ORAL at 09:37

## 2024-02-08 ASSESSMENT — ACTIVITIES OF DAILY LIVING (ADL)
ADLS_ACUITY_SCORE: 34
ADLS_ACUITY_SCORE: 34
ADLS_ACUITY_SCORE: 36
ADLS_ACUITY_SCORE: 34
ADLS_ACUITY_SCORE: 34
ADLS_ACUITY_SCORE: 36
ADLS_ACUITY_SCORE: 34
ADLS_ACUITY_SCORE: 34

## 2024-02-08 NOTE — PLAN OF CARE
Occupational Therapy Discharge Summary    Reason for therapy discharge:    Discharged to transitional care facility.    Progress towards therapy goal(s). See goals on Care Plan in [facility] electronic health record for goal details.  Goals partially met.  Barriers to achieving goals:   discharge from facility.    Therapy recommendation(s):      Continued therapy is recommended.  Rationale/Recommendations:   .OT Rationale for DC Rec: pt below baseline function in self care, ADL. currently demonstrating deficits in coordination, endurance, strength, cognition. recommend discharge to TCU. Pt family reports they would like him to DC to [facility] for TCU so he can then transition to their [facility] post rehab.    Writing therapist did not treat pt, note written based on previous treating therapist notes and recommendations. Please see chart and flow sheet for additional details.

## 2024-02-08 NOTE — PLAN OF CARE
Physical Therapy Discharge Summary    Reason for therapy discharge:    Discharged to transitional care facility.    Progress towards therapy goal(s). See goals on Care Plan in [facility] electronic health record for goal details.  Goals not met.  Barriers to achieving goals:   Seen for eval only.    Therapy recommendation(s):    Continued therapy is recommended.  Rationale/Recommendations:  Patient would benefit from continued skilled PT to progress activity tolerance and independence prior to discharging to home.    Goal Outcome Evaluation:

## 2024-02-08 NOTE — PLAN OF CARE
Pt here with back pain, fall, CVA workup negative    Neuros unchanged, oriented this shift. Intermittent confusion. L droop, slight R sided weakness---4/5. Strength more equal on last assessment 5/5. Tele NSR. Easy to chew; thin liquids. Pills whole. A1-2/GB/W. External catheter, incontinent. Tylenol PRN. LLE cellulitis. Discharge to [facility] via [facility] with spouse.

## 2024-02-08 NOTE — PROGRESS NOTES
Care Management Follow Up    Length of Stay (days): 3    Expected Discharge Date: 02/09/2024     Concerns to be Addressed: discharge planning     Patient plan of care discussed at interdisciplinary rounds: Yes    Anticipated Discharge Disposition:       Anticipated Discharge Services:    Anticipated Discharge DME:      Patient/family educated on Medicare website which has current facility and service quality ratings:    Education Provided on the Discharge Plan:    Patient/Family in Agreement with the Plan: yes    Referrals Placed by CM/SW:    Private pay costs discussed: Not applicable    Additional Information:  Writer met with patient and spouse Lianet who was present at bedside to discuss discharge recommendations/plans. Writer reviewed new therapy recommendations for ARU (previous recommendation for TCU). Writer educated both patient and Lianet on the difference between ARU and TCU. Lianet states that she does not wish to drive to [place] and would prefer [facility] or [facility]. Writer reached out to [facility] who states they will not have a bed until Saturday or Monday. Writer spoke with Ly at [facility] in admissions who stated they could accept patient today.   Writer updated patient and Lianet of this and she is in agreement. Patient plans to discharge today at [address] [facility] with spouse.     Writer to fax orders and PAS upon completion.     SEBASTIAN Trujillo, SW    [facility]

## 2024-02-08 NOTE — PROGRESS NOTES
"[facility]    Neurosurgery  Daily Note    Assessment & Plan   Jean Pierre Roblero is a 89 year old male with history of TIA HLD, RLE DVT on xeralto, cognitive impairment, arthritis, CKD, HTN, BPH constipation, GERD who was admitted on 2/5/2024 following a fall due to \"legs giving out.\" Neurosurgery was consulted for \"leg weakness, spinal stenosis\". Radiographic findings include:    AM ROUNDS: back pain improving since IV decadron started last night. Pain limiting leg movement also feeling better.      Narrative & Impression   EXAM: MR CERVICAL SPINE W/O CONTRAST  LOCATION: [facility]  DATE: 2/6/2024                                        IMPRESSION:  1.  Prevertebral edema, nonspecific.   2.  No evidence for ligamentous injury.  3.  Findings described above may reflect right C3-C4 reactive inflammatory facet arthropathy versus infectious septic facet arthropathy and osteomyelitis. Please correlate clinically.  4.  Multilevel spondylosis described above.  5.  C3-C4, C4-C5, C5-C6 severe thecal sac stenosis.  6.  C3-C4 spinal cord equivocal patchy increased T2/STIR signal versus motion artifact. Please correlate for cord edema versus myelomalacia and gliosis.   7.  Otherwise, no abnormal cord signal.  8.  Multilevel severe neural foraminal stenosis.      Lumbar MRI from 2/5/24 reviewed   IMPRESSION:  1.  No acute findings.  2.  At L3-L4, moderate to severe central stenosis.  3.  High-grade bilateral foraminal stenosis at L3-L4 and L5-S1.  4.  Additional high-grade left foraminal stenosis at L2-L3.  5.  Additional high-grade right foraminal stenosis at L4-L5.     Thoracic MRI from 2/5/24 reviewed   IMPRESSION:  1.  No acute findings.  2.  No high-grade central or foraminal stenosis.     Plan:   -2/7/24 discusison:imaging reviewed extensively with Dr. Claudio and patient and wife. Patient explaining more lower back pain with BLE claudication type symptoms. No neck symptoms " or upper extremity symptoms. He is not hyperreflexic, negative Hoffmanns and negative clonus. Typically cervical stenosis will affect upper extremities>lower extremities. Due to primary complaint being back pain, will trial IV steroids.     -Started 4q6 IV decadron yesterday. Continue x 24-48 hours then medrol dosepak. Patient and wife will continue outpatient follow up with [facility].     -Cleared for discharge from NSGY standpoint to ARU VS TCU     Dr. Claudio reviewed all history, imaging and in agreement with plans    Mallory Betancourt PA-C  [facility] Neurosurgery  [address] [address]    Pager [phone]    Principal Problem:    Stroke (H)  Active Problems:    Generalized weakness    Falls frequently    Right leg weakness    Anemia, unspecified type     Mallory Betancourt PA-C    Interval History   Stable/improving    Physical Exam   Temp: 98  F (36.7  C) Temp src: Oral BP: 122/78 Pulse: 87   Resp: 18 SpO2: 100 % O2 Device: None (Room air)    Vitals:    02/05/24 0040   Weight: 156 lb 8.4 oz (71 kg)     Vital Signs with Ranges  Temp:  [98  F (36.7  C)-98.8  F (37.1  C)] 98  F (36.7  C)  Pulse:  [] 87  Resp:  [16-18] 18  BP: (106-151)/(69-83) 122/78  SpO2:  [97 %-100 %] 100 %  I/O last 3 completed shifts:  In: -   Out: 1075 [Urine:1075]    Awake, alert, appropriate   Moving BLE better with less back pain with movement   Sensation intact BLE   Negative clonus   Negative SLR BL       Medications    - MEDICATION INSTRUCTIONS -      - MEDICATION INSTRUCTIONS -         atorvastatin  10 mg Oral or Feeding Tube QPM    ceFAZolin  1 g Intravenous Q8H    dexAMETHasone  4 mg Oral Q6H DAYNA    famotidine  20 mg Oral At Bedtime    ferrous sulfate  325 mg Oral Every Other Day    finasteride  5 mg Oral QPM    lidocaine  1-2 patch Transdermal Q24h    memantine  10 mg Oral BID    polyethylene glycol  17 g Oral Daily    rivaroxaban ANTICOAGULANT  20 mg Oral Daily with  supper    sodium chloride (PF)  3 mL Intracatheter Q8H    sodium chloride bacteriostatic  10 mL Intravenous Once       Plans discussed with Dr. Claudio who was in agreement with plans    Mallory LENTZ [facility] Neurosurgery  [address] [id number]  [place] [address]    Pager [phone]

## 2024-02-08 NOTE — PROGRESS NOTES
Care Management Discharge Note    Discharge Date: 02/08/2024       Discharge Disposition: Transitional Care    Discharge Services:      Discharge DME:      Discharge Transportation: family or friend will provide (Wife does the driving.)    Private pay costs discussed: Not applicable    Does the patient's insurance plan have a 3 day qualifying hospital stay waiver?  Yes     Which insurance plan 3 day waiver is available? Alternative insurance waiver    Will the waiver be used for post-acute placement? Undetermined at this time    PAS Confirmation Code: [id number]  Patient/family educated on Medicare website which has current facility and service quality ratings:      Education Provided on the Discharge Plan:    Persons Notified of Discharge Plans: Patient, spouse, care team  Patient/Family in Agreement with the Plan: yes    Handoff Referral Completed: No    Additional Information:  Received discharge orders - writer faxed via DOD. Patient is discharging to [facility] today at 1600 via [facility] with spouse.   PAS completed and in front of chart.   Patient and spouse Lianet are in agreement with discharge plans.     PAS-RR    D: Per DHS regulation, SW completed and submitted PAS-RR to [place] [employer] Direct Connect via the Senior LinkAge Line.  PAS-RR confirmation # is : [id number]      P: Further questions may be directed to [facility] LinkAge Line at #[phone], option #4 for PAS-RR staff.      SEBASTIAN Trujillo, LGSW    [facility]

## 2024-02-08 NOTE — DISCHARGE SUMMARY
[facility]    Discharge Summary  Hospitalist    Date of Admission:  2/5/2024  Date of Discharge:  2/8/2024  Discharging Provider: Lilia Rivero MD    Discharge Diagnoses   Lower extremity weakness secondary to multiple issues including severe central canal stenosis, degenerative lumbosacral spine disease.  C3-C4, C4-C5, C5-C6 severe thecal sac stenosis.   L3-L4, moderate to severe central stenosis.   History of Present Illness   Please review admission history and physical.    Hospital Course   Jean Pierre Roblero was admitted on 2/5/2024.  The following problems were addressed during his hospitalization:    Principal Problem:    Stroke (H)  Active Problems:    Generalized weakness    Falls frequently    Right leg weakness    Anemia, unspecified type  89M hx of TIA HLD, RLE DVT on xeralto, cognitive impairment, arthritis, CKD, HTN, BPH constipation, GERD presenting with a fall 2/2 RLE weakness.      RLE weakness, L sided decreased Sensation. R/O Stroke  Recurrent Falls  Coagulopathy 2/2 Medication Use  Hx TIA  Hx HLD  Hx: Patient somewhat poor historian but states he fell today as 'legs gave out'. Wife states that for the entire day prior to presentation, he was dragging his R leg, and he fell on his way out of the bathroom due to R leg weakness. Patient denies HA/blurry vision/CP/SOB. Denies numbness. Denies any pain, and states he was asymptomatic. Endorses chronic urinary incotinencen Wife reports TIA years ago and was then started on xeralto. No headtruama/LOC, though state that weeks ago when he fell there was Headtraume wihtout LOC.   On examination had decrease sensation LUE and LLE, L facial droop on admission as per notes.  LLE abrasians with cellulitis covered with wrapping.  Mental status was baseline.  EKG: sinus tachycardia, LAD, borderline Qtc prolongation  Imaging: Area of prolonged Tmax along the right inferior temporo-occipital lobes. MRI negative   --Neuro stroke consulted,  MRI negative, they signed out.  --PT/OT/speech to evaluated patient, recommended TCU, appreciate input from general neurology, MRI of the thoracolumbar area reviewed with family, degenerative disc disease noted along with spinal stenosis, neurosurgery consult requested,Neurosurgery ordered a cervical MRI, patient did not have any concerns regarding cervical area, no pain, neurosurgery to discuss results with the patient and family.  Currently they are recommending BRENNEN for the lower lumbar area.  (Neurosurgery will arrange), they also started the patient on IV Decadron, after 24 hours transition to Medrol Dosepak, patient to follow-up with them outpatient for spinal steroid injection as per their recommendations.  --Echocardiogram results noted, discussed with cardiology, concern is interatrial shunt noted in the echo, limited bubble ordered. Bubble study negative for any interatrial shunt.  Stroke neurology recommended continuing aspirin 81 mg daily, patient was not taking this prior to this, for now its  is on hold.  Since patient did not have any CVA this admission recommended to discuss this aspirin need to primary care.  --Continue PTA meds, encourage oral intake        RLE DVT  diagnosed in 2023, while on xeralto for the TIA per the wife.  Continue Xarelto     Cognitive Impairment  wife states patient at baseline, Aox3, not to president. But gave wrong time to ER staff  doesn't follow all commands, but when discussing GOC, did understand the implications of the discussion and requested to be Full code       Macrocytic Anemia  Iron deficiency anemia  acute drop in the last 2 weeks  has facial bruising in the setting of a recent fall in the past few weeks. Unclear as to the origin of the acute anemia, but family denies any recent GI blood loss  -- Anemia workup noted, iron deficiency noted, since hemoglobin is above 10 grams per deciliter will start on p.o. iron supplementation.     LLE Celluliits  Failed topical   abx, and presented to the ER 4 days prior to current presentation for persistance of the infection. No evidence that he was discharged with PO abx  --Resume ancef, will change to p.o. on discharge.  --WOC     Hypertension  --PRNs in place     CKD2  --limit nsaids/contrast  --renal diet once NPO completed     Constipation  --miralax/senna     BPH  Urinary Incontinence  --purewick     Progressive Pulmonary Nodule Infiltrate  seen on previous CTs, I had reviewed with recent CT which identified progressive increase in size of the pulmonary nodules, family had requested a CT scan to be obtained here, that is within 2-week window, discussed with to follow-up with a primary care to decide the timeline of it, the prior nodule was only 7 mm, I could not identify the size of the nodule as per recent CT, patient and family will discuss with primary care regarding the timeline of neck CT chest.  --outpt follow-up        Arthritis  --tylenol      GERD  --tums PRN    Lilia Rivero MD    Significant Results and Procedures       Pending Results     Unresulted Labs Ordered in the Past 30 Days of this Admission       No orders found from 1/6/2024 to 2/6/2024.            Code Status   Full Code       Primary Care Physician   Zaki Alaniz    Physical Exam   Temp: 98.2  F (36.8  C) Temp src: Oral BP: 113/64 Pulse: 91   Resp: 18 SpO2: 98 % O2 Device: None (Room air)    Vitals:    02/05/24 0040   Weight: 71 kg (156 lb 8.4 oz)     Vital Signs with Ranges  Temp:  [98  F (36.7  C)-98.8  F (37.1  C)] 98.2  F (36.8  C)  Pulse:  [] 91  Resp:  [16-18] 18  BP: (106-151)/(64-83) 113/64  SpO2:  [97 %-100 %] 98 %  I/O last 3 completed shifts:  In: -   Out: 1075 [Urine:1075]    The patient was examined on the day of discharge.    Discharge Disposition   Discharged to nursing home  Condition at discharge: Stable    Consultations This Hospital Stay   NEUROLOGY IP STROKE CONSULT  PHYSICAL THERAPY ADULT IP CONSULT  OCCUPATIONAL THERAPY  ADULT IP CONSULT  REHAB ADMISSIONS LIAISON IP CONSULT  CARE MANAGEMENT / SOCIAL WORK IP CONSULT  WOUND OSTOMY CONTINENCE NURSE  IP CONSULT  SPEECH LANGUAGE PATH ADULT IP CONSULT  PHARMACY IP CONSULT  PHARMACY IP CONSULT  PHARMACY IP CONSULT  NEUROLOGY IP CONSULT  SPIRITUAL HEALTH SERVICES IP CONSULT  NEUROSURGERY IP CONSULT  PHYSICAL THERAPY ADULT IP CONSULT  OCCUPATIONAL THERAPY ADULT IP CONSULT  SPEECH LANGUAGE PATH ADULT IP CONSULT  SMOKING CESSATION PROGRAM IP CONSULT    Time Spent on this Encounter   I, Lilia Rivero MD, personally saw the patient today and spent greater than 30 minutes discharging this patient.    Discharge Orders      General info for SNF    Length of Stay Estimate: Short Term Care: Estimated # of Days <30  Condition at Discharge: Improving  Level of care:skilled   Rehabilitation Potential: Excellent  Admission H&P remains valid and up-to-date: Yes  Recent Chemotherapy: N/A  Use Nursing Home Standing Orders: Yes     Mantoux instructions    Give two-step Mantoux (PPD) Per Facility Policy Yes     Follow Up and recommended labs and tests    [facility].  The following labs/tests are recommended: basic metabolic panel in 1 week.follow up with neurosurgery as recommended for BRENNEN, at the end of medrol dose pack .     Reason for your hospital stay    Left lower extremity weakness     Wound care (specify)    Site:   lower extremity ,anterior aspect   Instructions:     Activity - Up with nursing assistance     Physical Therapy Adult Consult    Evaluate and treat as clinically indicated.    Reason:  deconditioning, leg weakness     Occupational Therapy Adult Consult    Evaluate and treat as clinically indicated.    Reason:  deconditioning, weakness     Speech Language Path Adult Consult    Evaluate and treat as clinically indicated.    Reason:  dysphagia     Fall precautions     Diet    Follow this diet upon discharge: Orders Placed This Encounter      Room Service       Combination Diet Easy to Chew (level 7); Thin Liquids (level 0) (Direct supervision, sit at 90 degrees, small bites/sips, alternate textures, extra swallows, chew carefully)     Discharge Medications   Current Discharge Medication List        START taking these medications    Details   ferrous sulfate (FEROSUL) 325 (65 Fe) MG tablet Take 1 tablet (325 mg) by mouth every other day    Associated Diagnoses: Right leg weakness      Lidocaine (LIDOCARE) 4 % Patch Place 1-2 patches onto the skin every 24 hours To prevent lidocaine toxicity, patient should be patch free for 12 hrs daily.    Associated Diagnoses: Right leg weakness      methylPREDNISolone (MEDROL DOSEPAK) 4 MG tablet therapy pack Follow Package Directions    Associated Diagnoses: Falls frequently      pantoprazole (PROTONIX) 40 MG EC tablet Take 1 tablet (40 mg) by mouth daily    Comments: Continue for few days until medrol dose pack is complete  Associated Diagnoses: Falls frequently      polyethylene glycol (MIRALAX) 17 GM/Dose powder Take 17 g by mouth daily    Associated Diagnoses: Right leg weakness      sennosides (SENOKOT) 8.6 MG tablet Take 1 tablet by mouth 2 times daily as needed for constipation    Associated Diagnoses: Right leg weakness           CONTINUE these medications which have CHANGED    Details   famotidine (PEPCID) 40 MG tablet Take 1 tablet (40 mg) by mouth at bedtime    Comments: Hold while on protonix  Associated Diagnoses: Falls frequently           CONTINUE these medications which have NOT CHANGED    Details   acetaminophen (TYLENOL) 500 MG tablet Take 500-1,000 mg by mouth every 8 hours as needed for mild pain      atorvastatin (LIPITOR) 10 MG tablet 1 tablet (10 mg) by Oral or Feeding Tube route every evening  Qty: 30 tablet, Refills: 0    Associated Diagnoses: TIA (transient ischemic attack)      butalbital-acetaminophen-caffeine (ESGIC) -40 MG tablet Take 1 tablet by mouth every 4 hours as needed for headaches       clotrimazole-betamethasone (LOTRISONE) 1-0.05 % external cream Apply topically 2 times daily as needed (take for 7 days at a time for facial rash flares)      finasteride (PROSCAR) 5 MG tablet Take 5 mg by mouth daily      loratadine (CLARITIN) 10 MG tablet Take 10 mg by mouth daily      memantine (NAMENDA) 10 MG tablet Take 10 mg by mouth 2 times daily      multivitamin  with lutein (OCUVITE WITH LTEIN) CAPS per capsule Take 1 capsule by mouth daily      rivaroxaban ANTICOAGULANT (XARELTO) 20 MG TABS tablet Take 20 mg by mouth daily (with dinner)      sodium chloride (OCEAN) 0.65 % nasal spray Spray 1 spray into both nostrils daily as needed for congestion (alternates use with Nasacort)      triamcinolone (NASACORT) 55 MCG/ACT nasal aerosol Spray 1 spray into both nostrils daily as needed (congestion, alternates use with Ocean spray)      vitamin C (ASCORBIC ACID) 500 MG tablet Take 500 mg by mouth daily      vitamin D3 (CHOLECALCIFEROL) 2000 units (50 mcg) tablet Take 2,000 Units by mouth daily Noon           STOP taking these medications       cephALEXin (KEFLEX) 500 MG capsule Comments:   Reason for Stopping:             Allergies   Allergies   Allergen Reactions    Chocolate Anaphylaxis     headache    Feathers     Novocaine [Procaine]      Passes out almost immediately    Strawberry Extract GI Disturbance     Most berries     Data   Most Recent 3 CBC's:  Recent Labs   Lab Test 02/06/24  0746 02/05/24  0718 02/05/24  0020   WBC 6.2 7.8 9.3   HGB 9.5* 10.3* 10.7*   * 100 102*   * 126* 133*      Most Recent 3 BMP's:  Recent Labs   Lab Test 02/06/24  0746 02/05/24  2215 02/05/24  1056 02/05/24  0020 02/01/24  0631     --   --  138 140   POTASSIUM 3.8  --   --  4.5 3.9   CHLORIDE 108*  --   --  102 103   CO2 26  --   --  27 31*   BUN 13.2  --   --  22.5 13.9   CR 0.82  --   --  1.16 1.07   ANIONGAP 8  --   --  9 6*   KENYA 8.5*  --   --  9.0 9.2   * 115* 118* 121* 98     Most Recent 2  LFT's:  Recent Labs   Lab Test 02/01/24  0631 05/04/23  2355   AST 26 24   ALT 18 13   ALKPHOS 81 71   BILITOTAL 0.4 0.3     Most Recent INR's and Anticoagulation Dosing History:  Anticoagulation Dose History          Latest Ref Rng & Units 5/26/2022 2/5/2024   Recent Dosing and Labs   INR 0.85 - 1.15 1.05  3.50      Most Recent 3 Troponin's:  Recent Labs   Lab Test 03/02/20  0850 03/02/20  0547   TROPI <0.015 <0.015     Most Recent Cholesterol Panel:  Recent Labs   Lab Test 02/05/24  0718   CHOL 100   LDL 46   HDL 41   TRIG 66     Most Recent 6 Bacteria Isolates From Any Culture (See EPIC Reports for Culture Details):  Recent Labs   Lab Test 03/02/20  1000 03/02/20  0856 03/02/20  0850   CULT No growth No growth No growth     Most Recent TSH, T4 and A1c Labs:  Recent Labs   Lab Test 02/05/24  0020   A1C 5.3     Results for orders placed or performed during the hospital encounter of 02/05/24   CT Head w/o Contrast    Narrative    EXAM: CT HEAD W/O CONTRAST  LOCATION: [facility]  DATE: 2/5/2024    INDICATION: Code Stroke to evaluate for potential thrombolysis and thrombectomy. Right leg weakness.  COMPARISON: 01/22/2024.  TECHNIQUE: Routine CT Head without IV contrast. Multiplanar reformats. Dose reduction techniques were used.    FINDINGS:  INTRACRANIAL CONTENTS: No intracranial hemorrhage, extraaxial collection, or mass effect. No CT evidence of acute infarct. Moderate presumed chronic small vessel ischemic changes. Mild to moderate generalized volume loss. No hydrocephalus.     VISUALIZED ORBITS/SINUSES/MASTOIDS: No intraorbital abnormality. No paranasal sinus mucosal disease. No middle ear or mastoid effusion.    BONES/SOFT TISSUES: Interval decreased left frontal scalp contusion. No fracture.      Impression    IMPRESSION:  1.  No CT evidence for acute intracranial process.  2.  Brain atrophy and presumed chronic microvascular ischemic changes as above.   CTA Head Neck with Contrast     Narrative    EXAM: CTA HEAD NECK W CONTRAST  LOCATION: [facility]  DATE: 2/5/2024    INDICATION: Code Stroke to evaluate for potential thrombolysis and thrombectomy. Right leg weakness.  COMPARISON: None.  CONTRAST: 67 mL Isovue 370.  TECHNIQUE: Head and neck CT angiogram with IV contrast. Axial helical CT images of the head and neck vessels obtained during the arterial phase of intravenous contrast administration. Axial 2D reconstructed images and multiplanar 3D MIP reconstructed   images of the head and neck vessels were performed by the technologist. Dose reduction techniques were used. All stenosis measurements made according to NASCET criteria unless otherwise specified.    FINDINGS:  HEAD CTA:  ANTERIOR CIRCULATION: Calcified plaque within the carotid siphons. No large artery stenosis or occlusion. No discrete aneurysm. Standard [place] anatomy.    POSTERIOR CIRCULATION: Minimal plaque within the proximal left vertebral artery. No large artery stenosis or occlusion. No aneurysm. Dominant left and smaller right vertebral artery contribute to a normal basilar artery.     DURAL VENOUS SINUSES: Expected enhancement of the major dural venous sinuses.    NECK CTA:  RIGHT CAROTID: Mild calcified plaque at the carotid bifurcation. No measurable stenosis or dissection.    LEFT CAROTID: Mild calcified plaque at the carotid bifurcation. No measurable stenosis or dissection.    VERTEBRAL ARTERIES: No focal stenosis or dissection. Dominant left and smaller right vertebral arteries.    AORTIC ARCH: Scattered calcified plaque within the aortic arch. No significant stenosis of the great vessels.    NONVASCULAR STRUCTURES: Moderate to advanced cervical spondylosis.      Impression    IMPRESSION:   HEAD CTA:   1.  No large artery stenosis or occlusion. No aneurysm.    NECK CTA:  1.  No measurable carotid or vertebral artery stenosis.    The results were communicated to Dr. Gavin at 12:48 AM  on 02/05/2024.   CT Head Perfusion w Contrast - For Tier 2 Stroke    Narrative    EXAM: CT HEAD PERFUSION W CONTRAST  LOCATION: [facility]  DATE: 2/5/2024    INDICATION: Code Stroke to evaluate for potential thrombolysis and thrombectomy. Evaluate mismatch between penumbra and core infarct.   COMPARISON: None.  TECHNIQUE: CT cerebral perfusion was performed utilizing a second contrast bolus. Perfusion data were post processed with generation of standard perfusion maps and estimation of ischemic/infarcted volumes utilizing standard threshold values. Dose   reduction techniques were used. All stenosis measurements made according to NASCET criteria unless otherwise specified.  CONTRAST: 50 mL Isovue 370.    FINDINGS:   PERFUSION MAPS: There is an area of prolonged Tmax along the right inferior temporo-occipital lobes. No other perfusion abnormality.    RAPID ANALYSIS:  CBF<30%: 0 mL.  Tmax>6sec: 14 mL, right temporo-occipital region.  Mismatch volume: 14 mL.  Mismatch ratio: Infinite.      Impression    IMPRESSION:   1.  Area of prolonged Tmax along the right inferior temporo-occipital lobes, artifact versus ischemic change. MRI may be of value for further evaluation.   MR Brain w/o & w Contrast    Narrative    EXAM: MR BRAIN W/O AND W CONTRAST  LOCATION: [facility]  DATE: 2/5/2024    INDICATION: Right lower extremity weakness. Rule out stroke.  COMPARISON: CTA head and neck 02/05/2024. Brain MRI 05/26/2022.  CONTRAST: 7 mL Gadavist.  TECHNIQUE: Routine multiplanar multisequence head MRI without and with intravenous contrast.    FINDINGS: Image quality is mildly degraded by motion.    INTRACRANIAL CONTENTS: No acute or subacute infarct. No mass, acute hemorrhage, or extra-axial fluid collections. Patchy and confluent nonspecific T2/FLAIR hyperintensities within the cerebral white matter most consistent with moderate chronic   microvascular ischemic change. Moderate  generalized cerebral atrophy. No hydrocephalus. Normal position of the cerebellar tonsils. No pathologic contrast enhancement.    SELLA: No abnormality accounting for technique.    OSSEOUS STRUCTURES/SOFT TISSUES: Normal marrow signal. The major intracranial vascular flow voids are maintained.     ORBITS: No abnormality accounting for technique.     SINUSES/MASTOIDS: No paranasal sinus mucosal disease. No middle ear or mastoid effusion.       Impression    IMPRESSION:  1.  No acute intracranial process.  2.  Generalized brain atrophy and presumed microvascular ischemic changes as detailed above.   US Lower Extremity Venous Duplex Bilateral    Narrative    VENOUS ULTRASOUND BOTH LEGS  2/5/2024 9:16 AM     HISTORY: Hx of RLE DVT on may/23, on chronic anticoagulation with  Xarelto    COMPARISON: 5/5/2023    FINDINGS:  Examination of the deep veins with graded compression and  color flow Doppler with spectral wave form analysis was performed.      Impression    IMPRESSION: No evidence of deep venous thrombosis.    EJ MANCINI MD         SYSTEM ID:  [id number]   MR Lumbar Spine w/o Contrast    Narrative    For Patients: As a result of the 21st Century Cures Act, medical imaging exams and procedure reports are released immediately into your electronic medical record. You may view this report before your referring provider. If you have questions, please   contact your health care provider.    EXAM: MR LUMBAR SPINE W/O CONTRAST  LOCATION: [facility]  DATE/TIME: 2/5/2024 7:16 PM CST    INDICATION: Frequent falls, leg weakness.  COMPARISON: None.  TECHNIQUE: Routine lumbar spine MRI without IV contrast.    FINDINGS:   Nomenclature: Nomenclature based on 5 lumbar type vertebral bodies.   Alignment: Grade 1 anterolisthesis L3-L4, L4-L5 secondary to facet arthropathy. Grade 1 anterolisthesis L5-S1 secondary to bilateral L5 spondylolysis. Thoracolumbar mild dextrocurvature, and levocurvature lower  lumbar spine.  Vertebrae: Normal vertebral body heights.   Marrow signal: Normal marrow signal.  Spinal cord: Normal distal spinal cord and cauda equina with conus medullaris at T12-L1.   Extraspinal: Moderate soft tissue paraspinal muscle edema L3-L5.  Pelvis: Unremarkable visualized bony pelvis.    T12-L1: Normal height of disc. Desiccated disc without herniation. Mild facet arthrosis. No recess stenosis. No central spinal stenosis, no right foramen stenosis, no left foramen stenosis.    L1-L2: Normal height of disc. Desiccated disc without herniation. Mild facet arthrosis. Mild left recess stenosis. No central spinal stenosis, no right foramen stenosis, no left foramen stenosis.    L2-L3: Moderate loss of disc height. Degenerated disc with prominent left eccentric disc bulge. Moderate bilateral facet hypertrophy. Severe left recess stenosis. Moderate to severe central spinal stenosis, mild right foramen stenosis, severe left   foramen stenosis.    L3-L4: Near complete loss of disc height. Severely degenerated disc with prominent disc bulge. Severe bilateral facet hypertrophy. Severe bilateral recess stenosis. Moderate central spinal stenosis, severe right foramen stenosis, severe left foramen   stenosis.    L4-L5: Near complete loss of disc height. Severely degenerated disc with disc bulge and unroofing of disc due to anterolisthesis. Severe bilateral facet hypertrophy. No recess stenosis. No central spinal stenosis, severe right foramen stenosis, mild left   foramen stenosis.    L5-S1: Marked loss of height. Degenerated disc with disc bulge and unroofing of disc due to anterolisthesis. Moderate to severe bilateral facet hypertrophy. No recess stenosis. No central spinal stenosis, severe right foramen stenosis, severe left   foramen stenosis.      Impression    IMPRESSION:  1.  No acute findings.  2.  At L3-L4, moderate to severe central stenosis.  3.  High-grade bilateral foraminal stenosis at L3-L4 and  L5-S1.  4.  Additional high-grade left foraminal stenosis at L2-L3.  5.  Additional high-grade right foraminal stenosis at L4-L5.   MR Thoracic Spine w/o Contrast    Narrative    For Patients: As a result of the 21st Century Cures Act, medical imaging exams and procedure reports are released immediately into your electronic medical record. You may view this report before your referring provider. If you have questions, please   contact your health care provider.    EXAM: MR THORACIC SPINE W/O CONTRAST  LOCATION: [facility]  DATE/TIME: 2/5/2024 7:17 PM CST    INDICATION: Unsteady gait. Left leg weakness.  COMPARISON: None.  TECHNIQUE: Routine Thoracic Spine MRI without IV contrast.    FINDINGS:   Alignment: Mild thoracolumbar dextrocurvature.   Vertebral height: No acute fracture. Normal height of vertebral bodies.  Marrow signal: Normal    Spinal cord: No abnormal signal.    Discs: Mild multi level interbody degeneration.    Facets: Mild scattered facet arthropathy  Spinal Canal/Foramina: No high-grade central stenosis. No high-grade foraminal stenosis.    Extraspinal: No extraspinal abnormality.       Impression    IMPRESSION:  1.  No acute findings.  2.  No high-grade central or foraminal stenosis.   MR Cervical Spine w/o Contrast    Narrative    EXAM: MR CERVICAL SPINE W/O CONTRAST  LOCATION: [facility]  DATE: 2/6/2024    INDICATION: FREQUENT falls d t legs giving out, generalized LE weakness, mild hyper reflexia  COMPARISON: None.  TECHNIQUE: MRI Cervical Spine without IV contrast.    FINDINGS:   Motion artifact limits aspects of exam.    Prevertebral edema. No evidence for ligamentous injury.    Normal vertebral body heights.  No concerning bone marrow lesions.      C3-C4 severe thecal sac stenosis with spinal cord equivocal patchy increased T2/STIR signal versus motion artifact. Please correlate for cord edema versus myelomalacia and gliosis. Otherwise, no  abnormal cord signal.    Paraspinal muscles and soft tissues overlying the right C3-C4 facet joints demonstrate mild edema/inflammation. Right C3 and C4 articular pillars demonstrate patchy bone marrow edema. Findings may reflect reactive inflammatory facet arthropathy versus   infectious septic facet arthropathy and osteomyelitis. Please correlate clinically.    Multilevel degenerative disc disease. C3-C4 degenerative type I Modic changes. C4-C5, C5-C6, C6-C7 moderate degree of degenerative type I Modic changes. Mild degenerative type II Modic changes C5-C6, C6-C7. Multilevel uncovertebral and facet arthropathy.    C2-C3 slight grade 1 anterolisthesis. C4-C5, C5-C6 mild grade 1 retrolisthesis. C7-T1, T1-T2 mild 1 anterolisthesis.    Craniocervical junction: C1-C2 posterior prominent postinflammatory pannus measuring 7 mm. C2 demonstrates prominent synovial cyst. No significant thecal sac stenosis.    C2-C3: Mild bulge. Uncovertebral facet arthropathy. No significant thecal sac stenosis. Moderate to severe left foraminal stenosis. Mild right foraminal stenosis.    C3-C4: Bulge. Small superimposed posterior disc extrusion extending mildly above and below the disc margin. Uncovertebral facet arthropathy. Severe thecal sac stenosis underlying cord compression. Severe bilateral foraminal stenosis.    C4-C5: Bulge with posterior disc osteophyte ridge. Uncovertebral facet arthropathy. Severe thecal sac stenosis with underlying cord compression. Severe bilateral foraminal stenosis.    C5-C6: Bulge with posterior disc osteophyte ridge. Uncovertebral facet arthropathy. Severe thecal sac stenosis. Severe bilateral foraminal stenosis.    C6-C7: Bulge. Uncovertebral facet arthropathy. Moderate thecal sac stenosis. Severe bilateral foraminal stenosis.    C7-T1: Bulge. Facet arthropathy. No significant thecal sac stenosis. Mild left foraminal stenosis. Moderate right foraminal stenosis.    EXTRASPINAL FINDINGS:  No significant  findings.      Impression     IMPRESSION:  1.  Prevertebral edema, nonspecific.     2.  No evidence for ligamentous injury.    3.  Findings described above may reflect right C3-C4 reactive inflammatory facet arthropathy versus infectious septic facet arthropathy and osteomyelitis. Please correlate clinically.    4.  Multilevel spondylosis described above.    5.  C3-C4, C4-C5, C5-C6 severe thecal sac stenosis.    6.  C3-C4 spinal cord equivocal patchy increased T2/STIR signal versus motion artifact. Please correlate for cord edema versus myelomalacia and gliosis.     7.  Otherwise, no abnormal cord signal.    8.  Multilevel severe neural foraminal stenosis.         Echocardiogram Complete     Value    LVEF  65-70%    Narrative    [id number]  [id number]  [id number]^MARY KAY WHATLEY^ROSI     [facility]  Echocardiography Laboratory  [address] [place] [address]     Name: MITZY DICKERSON  MRN: [id number]  : 1934  Study Date: 2024 09:36 AM  Age: 89 yrs  Gender: Male  Patient Location: [facility]  Reason For Study: Cerebral Thrombosis, Embolism, Artery Occlusion  Ordering Physician: ROSI RUELAS  Referring Physician: ROSI RUELAS  Performed By: Montserrat Marcos     BSA: 1.8 m2  Height: 67 in  Weight: 156 lb  HR: 93  ______________________________________________________________________________  Procedure  Complete Echo Adult. Optison (NDC #[id number]) given intravenously.  ______________________________________________________________________________  Interpretation Summary     Technically difficult study with some improvement after contrast use.     Normal left ventricular size and systolic function.  Normal RV size and systolic function  Mitral annular calcification noted with mean inflow gradient of 4 to 5 mmHg  consistent with mild mitral stenosis.  Aorta is dilated. Sinuses of Valsalva measures 4.6 cm. Proximal  visualized  ascending aorta measures 4.5 cm. There is mild aortic insufficiency.  IVC is normal.  No pericardial effusion.  Although imaging is difficult, on subcostal views, there appears to be a shunt  across the interatrial septum.  ______________________________________________________________________________  Left Ventricle  The left ventricle is normal in size. Hyperdynamic left ventricular function.  The visual ejection fraction is 65-70%. No regional wall motion abnormalities  noted.     Right Ventricle  The right ventricle is normal in size and function.     Atria  Normal left atrial size. The left atrium is not well visualized. Right atrial  size is normal. Right atrium not well visualized.     Mitral Valve  There is moderate mitral annular calcification. There is trace mitral  regurgitation. The mean mitral valve gradient is 4.4 mmHg. The peak mitral  valve gradient is 11.0 mmHg. Calcified mitral apparatus causing mitral  stenosis. There is mild mitral stenosis.     Tricuspid Valve  The tricuspid valve is not well visualized, but is grossly normal. Right  ventricular systolic pressure could not be approximated due to inadequate  tricuspid regurgitation.     Aortic Valve  The aortic valve is trileaflet with aortic valve sclerosis. There is mild (1+)  aortic regurgitation. No hemodynamically significant valvular aortic stenosis.  The mean AoV pressure gradient is 4.5 mmHg. The peak AoV pressure gradient is  8.0 mmHg.     Pulmonic Valve  The pulmonic valve is not well visualized.     Vessels  Aortic root dilatation is present. Ascending aorta dilatation is present. The  inferior vena cava was normal in size with preserved respiratory variability.     Pericardium  There is no pericardial effusion.     Rhythm  Sinus rhythm was noted.  ______________________________________________________________________________  MMode/2D Measurements & Calculations     IVSd: 1.0 cm  LVIDd: 4.6 cm  LVIDs: 3.1 cm  LVPWd: 0.90  cm  FS: 33.0 %  LV mass(C)d: 148.1 grams  LV mass(C)dI: 81.4 grams/m2  Ao root diam: 4.6 cm  asc Aorta Diam: 4.5 cm  LVOT diam: 2.0 cm  LVOT area: 3.0 cm2  Ao root diam index Ht(cm/m): 2.7  Ao root diam index BSA (cm/m2): 2.5  Asc Ao diam index BSA (cm/m2): 2.4  Asc Ao diam index Ht(cm/m): 2.6  RWT: 0.39  TAPSE: 1.9 cm     Doppler Measurements & Calculations  MV E max blake: 88.1 cm/sec  MV A max blake: 152.1 cm/sec  MV E/A: 0.58     MV max P.0 mmHg  MV mean P.4 mmHg  MV V2 VTI: 25.9 cm  MVA(VTI): 2.7 cm2  Ao V2 max: 140.5 cm/sec  Ao max P.0 mmHg  Ao V2 mean: 103.0 cm/sec  Ao mean P.5 mmHg  Ao V2 VTI: 25.4 cm  DIONTE(I,D): 2.8 cm2  DIONTE(V,D): 2.9 cm2  LV V1 max P.4 mmHg  LV V1 max: 136.2 cm/sec  LV V1 VTI: 23.4 cm  SV(LVOT): 71.2 ml  SI(LVOT): 39.1 ml/m2  PA V2 max: 118.1 cm/sec  PA max P.6 mmHg  PA acc time: 0.11 sec  AV Blake Ratio (DI): 0.97  DIONTE Index (cm2/m2): 1.5  E/E' av.7  Lateral E/e': 9.0  Medial E/e': 18.4     ______________________________________________________________________________  Report approved by: Jodie Dennis 2024 11:17 AM         Echocardiogram Limited    Narrative    [id number]  [id number]  [id number]^PAKO^ROSELIA     [facility]  Echocardiography Laboratory  [address] [place] [address]     Name: MITZY DICKERSON  MRN: [id number]  : 1934  Study Date: 2024 11:47 AM  Age: 89 yrs  Gender: Male  Patient Location: [facility]  Reason For Study: Cerebrovascular Incident  Ordering Physician: ROSELIA MIN  Referring Physician: Zaki Alaniz  Performed By: Esau Goldman RDCS     BSA: 1.8 m2  Height: 67 in  Weight: 156 lb  HR: 89  BP: 141/72 mmHg  ______________________________________________________________________________  Procedure  Limited Portable Bubble Echo Adult.  ______________________________________________________________________________  Interpretation Summary     A contrast injection (Bubble Study) was  performed that was negative for flow  across the interatrial septum.  A Valsalva maneuver was performed.  The study was technically difficult.  ______________________________________________________________________________  Atria  A contrast injection (Bubble Study) was performed that was negative for flow  across the interatrial septum. A Valsalva maneuver was performed.  ______________________________________________________________________________  Report approved by: Jodie Gomez 02/07/2024 12:16 PM     ______________________________________________________________________________

## 2024-02-08 NOTE — PLAN OF CARE
Reason for Admission: CVA workup, back pain/stenosis      Cognitive/Mentation: A/Ox 3-4, disoriented to place and situation at times  Neuros/CMS: Intact ex L droop, R sided weakness, forgetful and [place]  VS: Stable on RA  Tele: NSR  GI: Incontinent, no BM  : Incontinent, external cath in place  Pulmonary: LS clear  Pain: Back pain, PRN Tylenol given x2 and Lidocaine patched applied.     Drains/Lines: PIV SL   Skin: Intact ex LLE wound, cares performed per orders  Activity: Assist x2 juan pablo steady   Diet: Soft and bite sized with thin liquids. Takes pills whole with water.      Therapies recs: ARU or TCU  Discharge: pending      Aggression Spotlight Tool: Green

## 2024-02-09 ENCOUNTER — PATIENT OUTREACH (OUTPATIENT)
Dept: CARE COORDINATION | Facility: CLINIC | Age: 89
End: 2024-02-09

## 2024-02-09 ENCOUNTER — TRANSITIONAL CARE UNIT VISIT (OUTPATIENT)
Dept: GERIATRICS | Facility: CLINIC | Age: 89
End: 2024-02-09
Payer: MEDICARE

## 2024-02-09 ENCOUNTER — DOCUMENTATION ONLY (OUTPATIENT)
Dept: GERIATRICS | Facility: CLINIC | Age: 89
End: 2024-02-09

## 2024-02-09 DIAGNOSIS — Z74.09 IMPAIRED MOBILITY AND ACTIVITIES OF DAILY LIVING: ICD-10-CM

## 2024-02-09 DIAGNOSIS — R91.8 PULMONARY NODULES: ICD-10-CM

## 2024-02-09 DIAGNOSIS — M48.061 SPINAL STENOSIS OF LUMBAR REGION, UNSPECIFIED WHETHER NEUROGENIC CLAUDICATION PRESENT: ICD-10-CM

## 2024-02-09 DIAGNOSIS — M62.81 GENERALIZED MUSCLE WEAKNESS: ICD-10-CM

## 2024-02-09 DIAGNOSIS — K59.04 CHRONIC IDIOPATHIC CONSTIPATION: ICD-10-CM

## 2024-02-09 DIAGNOSIS — E61.1 IRON DEFICIENCY: ICD-10-CM

## 2024-02-09 DIAGNOSIS — D64.9 ANEMIA, UNSPECIFIED TYPE: ICD-10-CM

## 2024-02-09 DIAGNOSIS — R29.6 FALLS FREQUENTLY: Primary | ICD-10-CM

## 2024-02-09 DIAGNOSIS — R41.89 COGNITIVE IMPAIRMENT: ICD-10-CM

## 2024-02-09 DIAGNOSIS — N40.0 BENIGN PROSTATIC HYPERPLASIA, UNSPECIFIED WHETHER LOWER URINARY TRACT SYMPTOMS PRESENT: ICD-10-CM

## 2024-02-09 DIAGNOSIS — Z78.9 IMPAIRED MOBILITY AND ACTIVITIES OF DAILY LIVING: ICD-10-CM

## 2024-02-09 DIAGNOSIS — R53.81 PHYSICAL DECONDITIONING: ICD-10-CM

## 2024-02-09 DIAGNOSIS — Z86.73 HISTORY OF TIA (TRANSIENT ISCHEMIC ATTACK) AND STROKE: ICD-10-CM

## 2024-02-09 PROCEDURE — 86481 TB AG RESPONSE T-CELL SUSP: CPT | Performed by: PHYSICIAN ASSISTANT

## 2024-02-09 PROCEDURE — 99310 SBSQ NF CARE HIGH MDM 45: CPT | Performed by: PHYSICIAN ASSISTANT

## 2024-02-09 PROCEDURE — 36415 COLL VENOUS BLD VENIPUNCTURE: CPT | Performed by: PHYSICIAN ASSISTANT

## 2024-02-09 PROCEDURE — P9604 ONE-WAY ALLOW PRORATED TRIP: HCPCS | Performed by: PHYSICIAN ASSISTANT

## 2024-02-09 NOTE — PLAN OF CARE
"Speech Language Therapy Discharge Summary    Reason for therapy discharge:    Discharged to transitional care facility.    Progress towards therapy goal(s). See goals on Care Plan in Epic electronic health record for goal details.  Goals not met.  Barriers to achieving goals:   discharge from facility.    Therapy recommendation(s):    Continued therapy is recommended.  Rationale/Recommendations:  dysphagia intervention.    Per last SLP \"Pt making progress in diet advancement, however must be supervised during meals to ensure use of safe swallow strategies. Recommend continue on IDDSI 7 (easy to chew) with thin. Strategies: sit at 90 degrees, small bites with a second swallow, slow rate of intake, and alternating food/liquid.\"    *Pt not seen by discharging therapist on this date, note written based on previous treating therapist's notes and recommendations     "

## 2024-02-09 NOTE — PROGRESS NOTES
Connected Care Resource Center: [facility] [place]    Background: Transitional Care Management program identified per system criteria and reviewed by Connected Care Resource Center team for possible outreach.    Assessment: Upon chart review, CCRC Team member will not proceed with patient outreach related to this episode of Transitional Care Management program due to reason below:    Non-[facility] TCU: CCRC team member noted patient discharged to TCU/ARU/LTACH. Patient is not established with a [facility] currently supported by Primary Care-Care Coordination therefore handoff to Primary Care-Care Coordination is not appropriate at this time.    Plan: Transitional Care Management episode addressed appropriately per reason noted above.      Jenifer Araujo RN  [facility] [place], [facility]    *Connected Care Resource Team does NOT follow patient ongoing. Referrals are identified based on internal discharge reports and the outreach is to ensure patient has an understanding of their discharge instructions.

## 2024-02-09 NOTE — PROGRESS NOTES
[facility] GERIATRICS    PRIMARY CARE PROVIDER AND CLINIC:  Zaki Alaniz MD, [address] / [facility] [address]  Chief Complaint   Patient presents with    Hospital F/U      [facility] Medical Record Number:  [id number]  Place of Service where encounter took place:  [facility] (TCU) [[id number]]      HPI:    88yo male with PMHx dementia, TIA on chronic AC with xarelto, CKD, HTN, BPH, constipation, GERD who was admitted at [facility] from 2/5 - 2/8, 2024 after presenting following a fall with RLE weakness. He was seen by vascular neurology for code CVA, initial imaging with area of prolonged perfusion along right inferior temporo-occipital lobes. MRI negative, recommended ASA 81mg/d. Signed off. Seen by general neurology for weakness, MRI lumbar spine revealed stenosis, degenerative disease and family requested neurosurgical consult. Cspine MRI also performed with severe stenosis. Outpatient ESTEBAN recommended, started on medrol dosepak. Was also noted to be newly anemic without evidence of bleeding, iron studies consistent with deficiency. Received Abx for LLE cellulitis. Referred to TCU for rehab, med management.    Pt is presently evaluated as initial visit. Wife and son are at bedside. Son expresses concern that patient reported not having therapy session today, notes he may have forgotten but wanted to make sure he will be seen by therapy as expected. Feels pain is controlled at this time, they also feel the steroid seems to be helping for now. Denies cp, sob. Facility staff are without concern. Prior to hospitalization lives with wife in single family home. Requested full code status.    CODE STATUS/ADVANCE DIRECTIVES DISCUSSION:  Prior  CPR/Full code   ALLERGIES:   Allergies   Allergen Reactions    Chocolate Anaphylaxis     headache    Feathers     Novocaine [Procaine]      Passes out almost immediately    Strawberry Extract GI Disturbance     Most berries      PAST MEDICAL  HISTORY:   Past Medical History:   Diagnosis Date    Allergic state     Fainting episodes     FREQUENT/ EASILY    Gastroesophageal reflux disease     Headaches     Heartburn     Nocturia     Photosensitivity     Swallowing difficulty       PAST SURGICAL HISTORY:   has a past surgical history that includes Eye surgery; hernia repair; Cholecystectomy; orthopedic surgery; Endoscopic sinus surgery (8/20/2012); Turbinoplasty (8/20/2012); and back surgery.  FAMILY HISTORY: family history is not on file.  SOCIAL HISTORY:   reports that he has never smoked. He has never used smokeless tobacco. He reports that he does not drink alcohol and does not use drugs.  Patient's living condition: lives with spouse    Post Discharge Medication Reconciliation Status:   MED REC REQUIRED  Post Medication Reconciliation Status: discharge medications reconciled, continue medications without change       Current Outpatient Medications   Medication Sig    acetaminophen (TYLENOL) 500 MG tablet Take 500-1,000 mg by mouth every 8 hours as needed for mild pain    atorvastatin (LIPITOR) 10 MG tablet 1 tablet (10 mg) by Oral or Feeding Tube route every evening    butalbital-acetaminophen-caffeine (ESGIC) -40 MG tablet Take 1 tablet by mouth every 4 hours as needed for headaches    clotrimazole-betamethasone (LOTRISONE) 1-0.05 % external cream Apply topically 2 times daily as needed (take for 7 days at a time for facial rash flares)    famotidine (PEPCID) 40 MG tablet Take 1 tablet (40 mg) by mouth at bedtime    ferrous sulfate (FEROSUL) 325 (65 Fe) MG tablet Take 1 tablet (325 mg) by mouth every other day    finasteride (PROSCAR) 5 MG tablet Take 5 mg by mouth daily    Lidocaine (LIDOCARE) 4 % Patch Place 1-2 patches onto the skin every 24 hours To prevent lidocaine toxicity, patient should be patch free for 12 hrs daily.    loratadine (CLARITIN) 10 MG tablet Take 10 mg by mouth daily    memantine (NAMENDA) 10 MG tablet Take 10 mg by mouth 2  times daily    methylPREDNISolone (MEDROL DOSEPAK) 4 MG tablet therapy pack Follow Package Directions    methylPREDNISolone (MEDROL DOSEPAK) 4 MG tablet therapy pack Follow Package Directions    multivitamin  with lutein (OCUVITE WITH LTEIN) CAPS per capsule Take 1 capsule by mouth daily    pantoprazole (PROTONIX) 40 MG EC tablet Take 1 tablet (40 mg) by mouth daily    polyethylene glycol (MIRALAX) 17 GM/Dose powder Take 17 g by mouth daily    rivaroxaban ANTICOAGULANT (XARELTO) 20 MG TABS tablet Take 20 mg by mouth daily (with dinner)    sennosides (SENOKOT) 8.6 MG tablet Take 1 tablet by mouth 2 times daily as needed for constipation    sodium chloride (OCEAN) 0.65 % nasal spray Spray 1 spray into both nostrils daily as needed for congestion (alternates use with Nasacort)    triamcinolone (NASACORT) 55 MCG/ACT nasal aerosol Spray 1 spray into both nostrils daily as needed (congestion, alternates use with Ocean spray)    vitamin C (ASCORBIC ACID) 500 MG tablet Take 500 mg by mouth daily    vitamin D3 (CHOLECALCIFEROL) 2000 units (50 mcg) tablet Take 2,000 Units by mouth daily Noon     No current facility-administered medications for this visit.       ROS:  4 point ROS including Respiratory, CV, GI and , other than that noted in the HPI,  is negative    Vitals:  /64   Pulse 100   Temp 97.7  F (36.5  C)   Resp 18   SpO2 99%   Exam:  GEN: well-developed, well-nourished, appears comfortable  HEENT: Normocephalic, bruising surrounding lateral/inferior aspect of left orbit, EOM intact bilaterally, sclera clear, conjunctiva normal, nose & mouth patent, mucous membranes dry  CHEST: lungs CTA bilaterally, no increased work of breathing, no wheeze, crackles, rhonchi  HEART: RRR, S1 & S2  ABD: soft, nontender, nondistended, no guarding or rigidity, +BS in all 4 quadrants  MSK: AROM bilateral UE/LE, pedal & radial pulses 2+ bilaterally  NEURO: awake, alert. Flat affect. CN II-XII grossly intact. Sensation grossly  intact to light touch.   SKIN: warm & dry without rash    Lab/Diagnostic data:  Most Recent 3 CBC's:  Recent Labs   Lab Test 02/06/24  0746 02/05/24  0718 02/05/24  0020   WBC 6.2 7.8 9.3   HGB 9.5* 10.3* 10.7*   * 100 102*   * 126* 133*     Most Recent 3 BMP's:  Recent Labs   Lab Test 02/06/24  0746 02/05/24  2215 02/05/24  1056 02/05/24  0020 02/01/24  0631     --   --  138 140   POTASSIUM 3.8  --   --  4.5 3.9   CHLORIDE 108*  --   --  102 103   CO2 26  --   --  27 31*   BUN 13.2  --   --  22.5 13.9   CR 0.82  --   --  1.16 1.07   ANIONGAP 8  --   --  9 6*   KENYA 8.5*  --   --  9.0 9.2   * 115* 118* 121* 98       ASSESSMENT/PLAN:    Falls frequently  Generalized weakness  Physical deconditioning  Impaired mobility and ADLs  -PT/OT evaluations  -SW for discharge planning    Lumbar spinal stenosis  As above, seen by neurology for RLE weakness which had resolved upon hospital admission. Workup negative for CVA. Lumbar MRI with evidence of spinal stenosis, suspected to be contributing to weakness. Seen by neurosurgery, ESTEBAN recommended as outpatient. Started on medrol dosepak. Appears to be improving.  -Continues on medrol dosepak as ordered  -Follow-up with Neurosurgery as an outpatient for consideration of ESTEBAN  -Pain control with PRN tylenol, lidoderm patch    Cognitive impairment  Has hx dementia listed on chart review, mini mental evaluations as outpatient have been consistent with impairment. Last SLUMS 20/30 in 2020. Appears to be solely oriented to self today.  -OT for cog eval  -Supportive management  -Continues on namenda    Anemia  Iron deficiency  Hgb noted to drop from 13.7--10.7 in two weeks. No clinical s/s of bleeding. Iron studies consistent with deficiency, started on supplement. Value at discharge 9.5.  -Continues on iron supplement  -On PPI while on medrol dosepak  -Monitor periodically    Hx TIA  On chronic AC with xarelto.  -Continue xarelto, statin    Hypertension  Not  on meds.  -Monitor BP trend    CKD2  Cr nonelevated at baseline.  -Monitor periodically    BPH  Chronic, stable.  -Continues on proscar    Constipation  Chronic.  -Continues on miralax, senna  -Monitor with addition of iron supplement    Pulmonary nodule  CT performed as an outpatient 1/31 for evaluation of possible abdominal mass revealed progressive nodular infiltrate in the right middle lobe.  -Follow up with PCP    Total time spent with patient visit at the [facility] was 45 min including patient visit and review of past records.     Electronically signed by:  Abhishek Zarco PA-C

## 2024-02-11 LAB
GAMMA INTERFERON BACKGROUND BLD IA-ACNC: 0 IU/ML
M TB IFN-G BLD-IMP: NEGATIVE
M TB IFN-G CD4+ BCKGRND COR BLD-ACNC: 2.64 IU/ML
MITOGEN IGNF BCKGRD COR BLD-ACNC: 0 IU/ML
MITOGEN IGNF BCKGRD COR BLD-ACNC: 0 IU/ML
QUANTIFERON MITOGEN: 2.64 IU/ML
QUANTIFERON NIL TUBE: 0 IU/ML
QUANTIFERON TB1 TUBE: 0 IU/ML
QUANTIFERON TB2 TUBE: 0

## 2024-02-13 ENCOUNTER — TRANSITIONAL CARE UNIT VISIT (OUTPATIENT)
Dept: GERIATRICS | Facility: CLINIC | Age: 89
End: 2024-02-13
Payer: MEDICARE

## 2024-02-13 VITALS
OXYGEN SATURATION: 94 % | HEART RATE: 71 BPM | WEIGHT: 188 LBS | TEMPERATURE: 98.7 F | RESPIRATION RATE: 18 BRPM | SYSTOLIC BLOOD PRESSURE: 167 MMHG | BODY MASS INDEX: 29.44 KG/M2 | DIASTOLIC BLOOD PRESSURE: 76 MMHG

## 2024-02-13 DIAGNOSIS — N40.0 BENIGN PROSTATIC HYPERPLASIA, UNSPECIFIED WHETHER LOWER URINARY TRACT SYMPTOMS PRESENT: ICD-10-CM

## 2024-02-13 DIAGNOSIS — Z78.9 IMPAIRED MOBILITY AND ACTIVITIES OF DAILY LIVING: ICD-10-CM

## 2024-02-13 DIAGNOSIS — R53.81 PHYSICAL DECONDITIONING: ICD-10-CM

## 2024-02-13 DIAGNOSIS — M62.81 GENERALIZED MUSCLE WEAKNESS: ICD-10-CM

## 2024-02-13 DIAGNOSIS — D64.9 ANEMIA, UNSPECIFIED TYPE: ICD-10-CM

## 2024-02-13 DIAGNOSIS — R41.89 COGNITIVE IMPAIRMENT: ICD-10-CM

## 2024-02-13 DIAGNOSIS — Z86.73 HISTORY OF TIA (TRANSIENT ISCHEMIC ATTACK) AND STROKE: ICD-10-CM

## 2024-02-13 DIAGNOSIS — E61.1 IRON DEFICIENCY: ICD-10-CM

## 2024-02-13 DIAGNOSIS — K59.04 CHRONIC IDIOPATHIC CONSTIPATION: ICD-10-CM

## 2024-02-13 DIAGNOSIS — M48.061 SPINAL STENOSIS OF LUMBAR REGION, UNSPECIFIED WHETHER NEUROGENIC CLAUDICATION PRESENT: ICD-10-CM

## 2024-02-13 DIAGNOSIS — R29.6 FALLS FREQUENTLY: Primary | ICD-10-CM

## 2024-02-13 DIAGNOSIS — Z74.09 IMPAIRED MOBILITY AND ACTIVITIES OF DAILY LIVING: ICD-10-CM

## 2024-02-13 PROCEDURE — 99309 SBSQ NF CARE MODERATE MDM 30: CPT | Performed by: PHYSICIAN ASSISTANT

## 2024-02-13 NOTE — PROGRESS NOTES
[facility] GERIATRICS    Chief Complaint   Patient presents with    RECHECK     HPI:  Jean Pierre Roblero is a 89 year old  (6/21/1934), who is being seen today for an episodic care visit at: [facility] (TCU) [[id number]].     Brief summary: 88yo male with PMHx dementia, TIA on chronic AC with xarelto, CKD, HTN, BPH, constipation, GERD who was admitted at [facility] from 2/5 - 2/8, 2024 after presenting following a fall with RLE weakness. He was seen by vascular neurology for code CVA, initial imaging with area of prolonged perfusion along right inferior temporo-occipital lobes. MRI negative, recommended ASA 81mg/d. Signed off. Seen by general neurology for weakness, MRI lumbar spine revealed stenosis, degenerative disease and family requested neurosurgical consult. Cspine MRI also performed with severe stenosis. Outpatient ESTEBAN recommended, started on medrol dosepak. Was also noted to be newly anemic without evidence of bleeding, iron studies consistent with deficiency. Received Abx for LLE cellulitis. Referred to TCU for rehab, med management.     Today, pt is seen in follow-up. Wife at bedside. Continues to work in therapies, pain appears to be controlled. Tolerating oral intake - wife states he's eating too much. Constipation improving, at baseline. Denies cp, sob. Facility staff are without concerns.    Allergies, and PMH/PSH reviewed in EPIC today.  REVIEW OF SYSTEMS:  4 point ROS including Respiratory, CV, GI and , other than that noted in the HPI,  is negative    Objective:   BP (!) 167/76   Pulse 71   Temp 98.7  F (37.1  C)   Resp 18   Wt 85.3 kg (188 lb)   SpO2 94%   BMI 29.44 kg/m    GEN: well-developed, thin elderly male, appears comfortable  HEENT: Normocephalic, bruising surrounding lateral/inferior aspect of left orbit, EOM intact bilaterally, sclera clear, conjunctiva normal, nose & mouth patent, mucous membranes dry  CHEST: lungs CTA bilaterally, no increased work of  breathing, no wheeze, crackles, rhonchi  HEART: RRR, S1 & S2  ABD: soft, nontender, nondistended, no guarding or rigidity, +BS in all 4 quadrants  MSK: AROM bilateral UE/LE, pedal & radial pulses 2+ bilaterally  NEURO: awake, alert. Flat affect. CN II-XII grossly intact. Sensation grossly intact to light touch.   SKIN: warm & dry without rash    Most Recent 3 CBC's:  Recent Labs   Lab Test 02/06/24  0746 02/05/24  0718 02/05/24  0020   WBC 6.2 7.8 9.3   HGB 9.5* 10.3* 10.7*   * 100 102*   * 126* 133*     Most Recent 3 BMP's:  Recent Labs   Lab Test 02/06/24  0746 02/05/24  2215 02/05/24  1056 02/05/24  0020 02/01/24  0631     --   --  138 140   POTASSIUM 3.8  --   --  4.5 3.9   CHLORIDE 108*  --   --  102 103   CO2 26  --   --  27 31*   BUN 13.2  --   --  22.5 13.9   CR 0.82  --   --  1.16 1.07   ANIONGAP 8  --   --  9 6*   KENYA 8.5*  --   --  9.0 9.2   * 115* 118* 121* 98       Assessment/Plan:    Falls frequently  Generalized weakness  Physical deconditioning  Impaired mobility and ADLs  -PT/OT continuing  -SW for discharge planning     Lumbar spinal stenosis  As above, seen by neurology for RLE weakness which had resolved upon hospital admission. Workup negative for CVA. Lumbar MRI with evidence of spinal stenosis, suspected to be contributing to weakness. Seen by neurosurgery, ESTEBAN recommended as outpatient. Started on medrol dosepak. Appears to be improving.  -Continues on medrol dosepak as ordered, last dose scheduled for 2/14  -Follow-up with Neurosurgery as an outpatient for consideration of ESTEBAN  -Pain control with PRN tylenol, lidoderm patch     Cognitive impairment  Has hx dementia listed on chart review, mini mental evaluations as outpatient have been consistent with impairment. Last SLUMS 20/30 in 2020. Appears to be solely oriented to self today. SLUMS in U 20/30.  -Supportive management  -Continues on namenda     Anemia  Iron deficiency  Hgb noted to drop from 13.7--10.7 in  two weeks. No clinical s/s of bleeding. Iron studies consistent with deficiency, started on supplement. Value at discharge 9.5.  -Continues on iron supplement  -On PPI while on medrol dosepak  -Monitor periodically     Hx TIA  On chronic AC with xarelto.  -Continue xarelto, statin     Hypertension  Not on meds.  -Monitor BP trend     CKD2  Cr nonelevated at baseline.  -Monitor periodically     BPH  Chronic, stable.  -Continues on proscar     Constipation  Chronic. Improved.  -Continues on miralax, senna  -Monitor with addition of iron supplement     Pulmonary nodule  CT performed as an outpatient 1/31 for evaluation of possible abdominal mass revealed progressive nodular infiltrate in the right middle lobe.  -Follow up with PCP    MED REC REQUIRED  Post Medication Reconciliation Status: patient was not discharged from an inpatient facility or TCU      Electronically signed by: Abhishek Zarco PA-C

## 2024-02-15 RX ORDER — ACETAMINOPHEN 500 MG
1000 TABLET ORAL EVERY 8 HOURS PRN
COMMUNITY

## 2024-02-15 NOTE — PROGRESS NOTES
[facility]  INITIAL VISIT NOTE  February 16, 2024    PRIMARY CARE PROVIDER AND CLINIC:  Zaki Alaniz [id number] [facility] / [facility] [id number]    Chief Complaint   Patient presents with     Hospital F/U       HPI:    Jean Pierre Roblero is a 89 year old  (6/21/1934) male who was seen at [place] [facility] on February 16, 2024 for an initial visit.     Medical history is notable for TIA, DVT, dyslipidemia, GERD, presbyesophagus, CKD, BPH, seasonal allergies, migraine, diverticulosis, osteopenia, osteoarthritis, spinal stenosis, COVID-19 patient, and cognitive impairment.     Summary of hospital course:  Patient was hospitalized at [facility] from February 5 through February 8, 2024 for recurrent falls and strokelike symptoms (right lower extremity weakness).  Neuro exam was significant for mildly increased reflexes in upper and lower extremities and diminished sensation in the legs. EKG was remarkable for sinus tachycardia.  Brain MRI was negative for acute intracranial process.  CT angio head and neck showed no significant stenosis, occlusion, or aneurysm.  MRI lumbar spine demonstrated moderate to severe canal stenosis at L3-L4, high-grade bilateral foraminal stenosis at L3-L4 and L5-S1, and additional high-grade foraminal stenosis at L2-L3 and L4-L5.  MRI thoracic spine was negative for acute findings or high-grade stenosis.  MRI cervical spine showed multilevel spondylosis, C3-C4, C4-C5, C5-C6 severe thecal sac stenosis, and equivocal patchy increased T2/STIR signal versus motion artifact in C3-C4 spinal cord, and multilevel severe neuroforaminal stenosis.  Notably echocardiogram revealed LVEF 65 to 70%, ascending aorta dilation, mild mitral stenosis, and negative bubble study.  Patient was evaluated by neurology and neurosurgery. He was started on Medrol Dosepak with recommendations for outpatient ESTEBAN.  Hemoglobin trended down in the hospital and iron profile  showed low serum iron.  He was started on iron supplement.  Inpatient, he was treated with IV cefazolin for left leg cellulitis.  TCU was recommended per therapies.    Patient is admitted to this facility for medical management, nursing care, and rehab.     Of note, history was obtained from patient, facility RN, and extensive review of the chart.    Today's visit:  Patient was seen in his room, lying in bed.  He appears frail but comfortable.  He does complain of feeling sore in his left heel.  He denies neck or back pain.  Weakness in his right leg is improving.  He denies fever, chills, chest pain, palpitation, dyspnea, nausea, vomiting, abdominal pain, or urinary symptoms.  He had a BM yesterday.  He denies melena or hematochezia.      CODE STATUS:   CPR/Full code     PAST MEDICAL HISTORY:   Dyslipidemia  TIA  RLE DVT in May 2023  Mild mitral stenosis  Ascending aorta dilation (measuring 4.5 cm, per Echo on February 6, 2024)  GERD  Presbyesophagus  CKD stage II-IIIa, baseline creatinine 0.9-1.2  BPH  RML pulmonary nodular infiltrate (per CT on February 1, 2024)  Seasonal allergies  Migraine  Diverticulosis of colon  COVID-19 infection  Sensorineural hearing loss  Osteopenia  Osteoarthritis  Spinal stenosis  Cognitive impairment       Past Medical History:   Diagnosis Date     Allergic state      Fainting episodes     FREQUENT/ EASILY     Gastroesophageal reflux disease      Headaches      Heartburn      Nocturia      Photosensitivity      Swallowing difficulty        PAST SURGICAL HISTORY:   Past Surgical History:   Procedure Laterality Date     BACK SURGERY       CHOLECYSTECTOMY       ENDOSCOPIC SINUS SURGERY  8/20/2012    Procedure: ENDOSCOPIC SINUS SURGERY;  ENDOSCOPIC REMOVAL OF LEFT NASAL MASS WITH LANDMARKS ([employer] [place]), BILATERAL INFERIOR TURBINOPLASTY;  Surgeon: Dima Younger MD;  Location: [facility]     EYE SURGERY      cataracts     HERNIA REPAIR       ORTHOPEDIC SURGERY      ingrown toe  nail removal     TURBINOPLASTY  2012    Procedure: TURBINOPLASTY;;  Surgeon: Dima Younger MD;  Location: [facility]       FAMILY HISTORY:   Family history is significant for leukemia in his father who  at age 46 and dementia in his brother.    SOCIAL HISTORY:  Social History     Tobacco Use     Smoking status: Never     Smokeless tobacco: Never   Substance Use Topics     Alcohol use: No       MEDICATIONS:  Current Outpatient Medications   Medication Sig Dispense Refill     acetaminophen (TYLENOL) 500 MG tablet Take 500-1,000 mg by mouth every 8 hours as needed for mild pain       atorvastatin (LIPITOR) 10 MG tablet 1 tablet (10 mg) by Oral or Feeding Tube route every evening 30 tablet 0     butalbital-acetaminophen-caffeine (ESGIC) -40 MG tablet Take 1 tablet by mouth every 4 hours as needed for headaches 10 tablet 0     clotrimazole-betamethasone (LOTRISONE) 1-0.05 % external cream Apply topically 2 times daily as needed (take for 7 days at a time for facial rash flares)       famotidine (PEPCID) 40 MG tablet Take 1 tablet (40 mg) by mouth at bedtime       ferrous sulfate (FEROSUL) 325 (65 Fe) MG tablet Take 1 tablet (325 mg) by mouth every other day       finasteride (PROSCAR) 5 MG tablet Take 5 mg by mouth daily       Lidocaine (LIDOCARE) 4 % Patch Place 1-2 patches onto the skin every 24 hours To prevent lidocaine toxicity, patient should be patch free for 12 hrs daily.       loratadine (CLARITIN) 10 MG tablet Take 10 mg by mouth daily       memantine (NAMENDA) 10 MG tablet Take 10 mg by mouth 2 times daily       methylPREDNISolone (MEDROL DOSEPAK) 4 MG tablet therapy pack Follow Package Directions       methylPREDNISolone (MEDROL DOSEPAK) 4 MG tablet therapy pack Follow Package Directions       multivitamin  with lutein (OCUVITE WITH LTEIN) CAPS per capsule Take 1 capsule by mouth daily       pantoprazole (PROTONIX) 40 MG EC tablet Take 1 tablet (40 mg) by mouth daily       polyethylene glycol  "(MIRALAX) 17 GM/Dose powder Take 17 g by mouth daily       rivaroxaban ANTICOAGULANT (XARELTO) 20 MG TABS tablet Take 20 mg by mouth daily (with dinner)       sennosides (SENOKOT) 8.6 MG tablet Take 1 tablet by mouth 2 times daily as needed for constipation       sodium chloride (OCEAN) 0.65 % nasal spray Spray 1 spray into both nostrils daily as needed for congestion (alternates use with Nasacort)       triamcinolone (NASACORT) 55 MCG/ACT nasal aerosol Spray 1 spray into both nostrils daily as needed (congestion, alternates use with Ocean spray)       vitamin C (ASCORBIC ACID) 500 MG tablet Take 500 mg by mouth daily       vitamin D3 (CHOLECALCIFEROL) 2000 units (50 mcg) tablet Take 2,000 Units by mouth daily Noon         MED REC REQUIRED  Post Medication Reconciliation Status: medication reconcilation previously completed during another office visit      ALLERGIES:  Allergies   Allergen Reactions     Chocolate Anaphylaxis     headache     Feathers      Novocaine [Procaine]      Passes out almost immediately     Strawberry Extract GI Disturbance     Most berries       ROS:  10 point ROS were negative other than the symptoms noted above in the HPI.    PHYSICAL EXAM:  Vital signs were reviewed in the chart.  Vital Signs: /74   Pulse 61   Temp 97.4  F (36.3  C)   Resp 18   Ht 1.702 m (5' 7\")   Wt 60.6 kg (133 lb 9.6 oz)   SpO2 90%   BMI 20.92 kg/m    General: Frail appearing but comfortable and in no acute distress  HEENT: Mild conjunctival pallor, no scleral icterus or injection, moist oral mucosa  Cardiovascular: Normal S1, S2, RRR  Respiratory: Lungs clear to auscultation bilaterally  GI: Abdomen soft, non-tender, non-distended, +BS  Extremities: No LE edema  Neuro: CX II-XII grossly intact; ROM in all four extremities grossly intact  Psych: Alert and oriented almost x3; normal affect  Skin: There is a left anterior shin wound which is dressed, there is no heel wound    LABORATORY/IMAGING DATA:  All " relevant labs and imaging data in [facility] and/or Care Everywhere were personally reviewed today.      Most Recent 3 CBC's:Recent Labs   Lab Test 02/06/24  0746 02/05/24  0718 02/05/24  0020   WBC 6.2 7.8 9.3   HGB 9.5* 10.3* 10.7*   * 100 102*   * 126* 133*     Most Recent 3 BMP's:Recent Labs   Lab Test 02/06/24  0746 02/05/24  2215 02/05/24  1056 02/05/24  0020 02/01/24  0631     --   --  138 140   POTASSIUM 3.8  --   --  4.5 3.9   CHLORIDE 108*  --   --  102 103   CO2 26  --   --  27 31*   BUN 13.2  --   --  22.5 13.9   CR 0.82  --   --  1.16 1.07   ANIONGAP 8  --   --  9 6*   KENYA 8.5*  --   --  9.0 9.2   * 115* 118* 121* 98     Most Recent 2 LFT's:Recent Labs   Lab Test 02/01/24  0631 05/04/23  2355   AST 26 24   ALT 18 13   ALKPHOS 81 71   BILITOTAL 0.4 0.3         ASSESSMENT/PLAN:  Frequent falls, subsequent counter,  Multilevel cervical and lumbar spinal stenosis,  Right lower extremity weakness,  Physical deconditioning.  Patient was empirically treated with Medrol Dosepak.  He denies neck or back pain.    RLE weakness is improving.  Plan:  Fall precautions  Staff to assist with daily care and mobility  Continue pain management with lidocaine patch and and acetaminophen 1000 mg every 8 hours as needed  Continue PT/OT evaluation and therapy  Follow-up with neurosurgery as an outpatient for consideration of ESTEBAN    Acute anemia,  Iron deficiency.  Hemoglobin has gradually trended down ( Hgb 9.5 on February 6, 2024, down from previous 13.7 on January 22, 2024).  Iron profile on February 5, 2024 with serum iron 25, TIBC 251, iron saturation 10%, ferritin 95.  Plan:  Continue ferrous sulfate 325 mg daily  Monitor for signs or symptoms of GI bleed  Monitor hemoglobin periodically  Follow-up as outpatient    Dyslipidemia,  History of TIA.  Plan:  Continue atorvastatin 10 mg daily  No indication for antiplatelet therapy given chronic anticoagulation with rivaroxaban    History of RLE DVT in May  2023.  Plan:  Continue rivaroxaban 20 mg daily    Mild mitral stenosis,  Ascending aorta dilation (measuring 4.5 cm, per  Echo on February 6, 2024).  Plan:  Follow-up as outpatient    GERD,  Presbyesophagus.  Patient is on a regular diet.  Plan:  Discontinue pantoprazole (started in the hospital for the duration of Medrol Dosepak)   Resume PTA famotidine 40 mg daily     CKD stage II-IIIa.  Baseline creatinine 0.9-1.2.  Last creatinine 0.82 on February 6.  Plan:  Avoid NSAIDs and nephrotoxins  Monitor renal function periodically    BPH.  Plan:  Continue finasteride 5 mg daily    Slow transit constipation.  Plan:  Continue the bowel regimen    Cognitive impairment.  SLUMS 20/30 this TCU stay.  Today, he is oriented almost x 3.  Plan:  Continue memantine 10 mg twice daily  Standard delirium precautions  Continue cognitive evaluation for OT for safe discharge planning    INCIDENTAL FINDINGS:  Pulmonary nodule.  Progressive RML nodular infiltrate, noted per CT on February 1, 2024.  Plan:  Follow-up as outpatient      Orders written by provider at facility:  Discontinue pantoprazole  Resume famotidine 40 mg p.o. daily, DX: GERD        Disclaimer: This note contains text created using speech-recognition software and may have unintended word substitutions.    Electronically signed by:  Jr Garnica MD

## 2024-02-16 ENCOUNTER — TRANSITIONAL CARE UNIT VISIT (OUTPATIENT)
Dept: GERIATRICS | Facility: CLINIC | Age: 89
End: 2024-02-16
Payer: MEDICARE

## 2024-02-16 VITALS
WEIGHT: 133.6 LBS | TEMPERATURE: 97.4 F | OXYGEN SATURATION: 90 % | BODY MASS INDEX: 20.97 KG/M2 | HEIGHT: 67 IN | DIASTOLIC BLOOD PRESSURE: 74 MMHG | SYSTOLIC BLOOD PRESSURE: 134 MMHG | HEART RATE: 61 BPM | RESPIRATION RATE: 18 BRPM

## 2024-02-16 DIAGNOSIS — E61.1 IRON DEFICIENCY: ICD-10-CM

## 2024-02-16 DIAGNOSIS — R91.1 PULMONARY NODULE: ICD-10-CM

## 2024-02-16 DIAGNOSIS — R29.6 FALLS FREQUENTLY: Primary | ICD-10-CM

## 2024-02-16 DIAGNOSIS — R53.81 PHYSICAL DECONDITIONING: ICD-10-CM

## 2024-02-16 DIAGNOSIS — N18.2 STAGE 2 CHRONIC KIDNEY DISEASE: ICD-10-CM

## 2024-02-16 DIAGNOSIS — Z86.73 HISTORY OF TIA (TRANSIENT ISCHEMIC ATTACK): ICD-10-CM

## 2024-02-16 DIAGNOSIS — E78.5 DYSLIPIDEMIA: ICD-10-CM

## 2024-02-16 DIAGNOSIS — M48.02 SPINAL STENOSIS OF CERVICAL REGION: ICD-10-CM

## 2024-02-16 DIAGNOSIS — K21.9 GASTROESOPHAGEAL REFLUX DISEASE, UNSPECIFIED WHETHER ESOPHAGITIS PRESENT: ICD-10-CM

## 2024-02-16 DIAGNOSIS — R41.89 COGNITIVE IMPAIRMENT: ICD-10-CM

## 2024-02-16 DIAGNOSIS — D64.9 ACUTE ANEMIA: ICD-10-CM

## 2024-02-16 DIAGNOSIS — Z86.718 HISTORY OF DEEP VENOUS THROMBOSIS: ICD-10-CM

## 2024-02-16 DIAGNOSIS — M48.061 SPINAL STENOSIS OF LUMBAR REGION, UNSPECIFIED WHETHER NEUROGENIC CLAUDICATION PRESENT: ICD-10-CM

## 2024-02-16 DIAGNOSIS — N40.0 BENIGN PROSTATIC HYPERPLASIA, UNSPECIFIED WHETHER LOWER URINARY TRACT SYMPTOMS PRESENT: ICD-10-CM

## 2024-02-16 PROCEDURE — 99305 1ST NF CARE MODERATE MDM 35: CPT | Performed by: INTERNAL MEDICINE

## 2024-02-16 RX ORDER — FAMOTIDINE 40 MG/1
40 TABLET, FILM COATED ORAL AT BEDTIME
COMMUNITY
End: 2024-02-20

## 2024-02-19 VITALS
WEIGHT: 133 LBS | DIASTOLIC BLOOD PRESSURE: 75 MMHG | HEART RATE: 98 BPM | RESPIRATION RATE: 18 BRPM | TEMPERATURE: 97.6 F | BODY MASS INDEX: 20.88 KG/M2 | OXYGEN SATURATION: 97 % | SYSTOLIC BLOOD PRESSURE: 125 MMHG | HEIGHT: 67 IN

## 2024-02-20 ENCOUNTER — TRANSITIONAL CARE UNIT VISIT (OUTPATIENT)
Dept: GERIATRICS | Facility: CLINIC | Age: 89
End: 2024-02-20
Payer: MEDICARE

## 2024-02-20 ENCOUNTER — HOSPITAL ENCOUNTER (INPATIENT)
Facility: CLINIC | Age: 89
LOS: 6 days | Discharge: NURSING, REHAB OR LONG-TERM CARE | DRG: 813 | End: 2024-02-26
Attending: EMERGENCY MEDICINE | Admitting: HOSPITALIST
Payer: MEDICARE

## 2024-02-20 ENCOUNTER — APPOINTMENT (OUTPATIENT)
Dept: INTERVENTIONAL RADIOLOGY/VASCULAR | Facility: CLINIC | Age: 89
DRG: 813 | End: 2024-02-20
Attending: RADIOLOGY
Payer: MEDICARE

## 2024-02-20 ENCOUNTER — MEDICAL CORRESPONDENCE (OUTPATIENT)
Dept: HEALTH INFORMATION MANAGEMENT | Facility: CLINIC | Age: 89
End: 2024-02-20

## 2024-02-20 ENCOUNTER — TRANSFERRED RECORDS (OUTPATIENT)
Dept: HEALTH INFORMATION MANAGEMENT | Facility: CLINIC | Age: 89
End: 2024-02-20

## 2024-02-20 ENCOUNTER — APPOINTMENT (OUTPATIENT)
Dept: CT IMAGING | Facility: CLINIC | Age: 89
DRG: 813 | End: 2024-02-20
Attending: EMERGENCY MEDICINE
Payer: MEDICARE

## 2024-02-20 DIAGNOSIS — R00.0 TACHYCARDIA: ICD-10-CM

## 2024-02-20 DIAGNOSIS — I77.810 DILATATION OF THORACIC AORTA (H): ICD-10-CM

## 2024-02-20 DIAGNOSIS — R91.8 PULMONARY NODULES: ICD-10-CM

## 2024-02-20 DIAGNOSIS — M62.81 GENERALIZED MUSCLE WEAKNESS: ICD-10-CM

## 2024-02-20 DIAGNOSIS — R53.81 PHYSICAL DECONDITIONING: ICD-10-CM

## 2024-02-20 DIAGNOSIS — E61.1 IRON DEFICIENCY: ICD-10-CM

## 2024-02-20 DIAGNOSIS — Z86.73 HISTORY OF TIA (TRANSIENT ISCHEMIC ATTACK): ICD-10-CM

## 2024-02-20 DIAGNOSIS — D64.9 ACUTE ANEMIA: ICD-10-CM

## 2024-02-20 DIAGNOSIS — F03.90 DEMENTIA WITHOUT BEHAVIORAL DISTURBANCE, PSYCHOTIC DISTURBANCE, MOOD DISTURBANCE, OR ANXIETY, UNSPECIFIED DEMENTIA SEVERITY, UNSPECIFIED DEMENTIA TYPE (H): ICD-10-CM

## 2024-02-20 DIAGNOSIS — Z86.73 HISTORY OF TIA (TRANSIENT ISCHEMIC ATTACK) AND STROKE: ICD-10-CM

## 2024-02-20 DIAGNOSIS — Z79.01 LONG TERM (CURRENT) USE OF ANTICOAGULANTS: ICD-10-CM

## 2024-02-20 DIAGNOSIS — T14.8XXA HEMATOMA OF MUSCLE: ICD-10-CM

## 2024-02-20 DIAGNOSIS — Z74.09 IMPAIRED MOBILITY AND ACTIVITIES OF DAILY LIVING: ICD-10-CM

## 2024-02-20 DIAGNOSIS — R29.6 FALLS FREQUENTLY: ICD-10-CM

## 2024-02-20 DIAGNOSIS — Z86.718 HISTORY OF DEEP VENOUS THROMBOSIS: ICD-10-CM

## 2024-02-20 DIAGNOSIS — D69.6 THROMBOCYTOPENIA (H): ICD-10-CM

## 2024-02-20 DIAGNOSIS — D62 ANEMIA DUE TO BLOOD LOSS, ACUTE: ICD-10-CM

## 2024-02-20 DIAGNOSIS — K92.1 MELENA: Primary | ICD-10-CM

## 2024-02-20 DIAGNOSIS — I82.4Y1 DEEP VEIN THROMBOSIS (DVT) OF PROXIMAL VEIN OF RIGHT LOWER EXTREMITY, UNSPECIFIED CHRONICITY (H): ICD-10-CM

## 2024-02-20 DIAGNOSIS — Z78.9 IMPAIRED MOBILITY AND ACTIVITIES OF DAILY LIVING: ICD-10-CM

## 2024-02-20 DIAGNOSIS — K92.2 LOWER GI BLEEDING: ICD-10-CM

## 2024-02-20 DIAGNOSIS — S32.010A CLOSED COMPRESSION FRACTURE OF BODY OF L1 VERTEBRA (H): ICD-10-CM

## 2024-02-20 DIAGNOSIS — K59.04 CHRONIC IDIOPATHIC CONSTIPATION: ICD-10-CM

## 2024-02-20 DIAGNOSIS — M54.50 ACUTE LEFT-SIDED LOW BACK PAIN WITHOUT SCIATICA: Primary | ICD-10-CM

## 2024-02-20 DIAGNOSIS — E78.5 DYSLIPIDEMIA: ICD-10-CM

## 2024-02-20 DIAGNOSIS — M48.061 SPINAL STENOSIS OF LUMBAR REGION, UNSPECIFIED WHETHER NEUROGENIC CLAUDICATION PRESENT: ICD-10-CM

## 2024-02-20 LAB
ABO/RH(D): NORMAL
ALBUMIN SERPL BCG-MCNC: 3.6 G/DL (ref 3.5–5.2)
ALP SERPL-CCNC: 74 U/L (ref 40–150)
ALT SERPL W P-5'-P-CCNC: 19 U/L (ref 0–70)
ANION GAP SERPL CALCULATED.3IONS-SCNC: 11 MMOL/L (ref 7–15)
ANTIBODY SCREEN: NEGATIVE
AST SERPL W P-5'-P-CCNC: 27 U/L (ref 0–45)
BASOPHILS # BLD AUTO: 0 10E3/UL (ref 0–0.2)
BASOPHILS NFR BLD AUTO: 0 %
BILIRUB SERPL-MCNC: 0.5 MG/DL
BLD PROD TYP BPU: NORMAL
BLD PROD TYP BPU: NORMAL
BLOOD COMPONENT TYPE: NORMAL
BLOOD COMPONENT TYPE: NORMAL
BUN SERPL-MCNC: 31.5 MG/DL (ref 8–23)
CALCIUM SERPL-MCNC: 8.6 MG/DL (ref 8.8–10.2)
CHLORIDE SERPL-SCNC: 101 MMOL/L (ref 98–107)
CODING SYSTEM: NORMAL
CODING SYSTEM: NORMAL
CREAT SERPL-MCNC: 1.02 MG/DL (ref 0.67–1.17)
CROSSMATCH: NORMAL
CROSSMATCH: NORMAL
DEPRECATED HCO3 PLAS-SCNC: 23 MMOL/L (ref 22–29)
EGFRCR SERPLBLD CKD-EPI 2021: 70 ML/MIN/1.73M2
EOSINOPHIL # BLD AUTO: 0.1 10E3/UL (ref 0–0.7)
EOSINOPHIL NFR BLD AUTO: 2 %
ERYTHROCYTE [DISTWIDTH] IN BLOOD BY AUTOMATED COUNT: 14.6 % (ref 10–15)
GLUCOSE SERPL-MCNC: 131 MG/DL (ref 70–99)
HCT VFR BLD AUTO: 29.2 % (ref 40–53)
HGB BLD-MCNC: 8.5 G/DL (ref 13.3–17.7)
HGB BLD-MCNC: 8.7 G/DL (ref 13.3–17.7)
HGB BLD-MCNC: 9.6 G/DL (ref 13.3–17.7)
HOLD SPECIMEN: NORMAL
IMM GRANULOCYTES # BLD: 0.1 10E3/UL
IMM GRANULOCYTES NFR BLD: 1 %
LACTATE SERPL-SCNC: 1.3 MMOL/L (ref 0.7–2)
LYMPHOCYTES # BLD AUTO: 1.1 10E3/UL (ref 0.8–5.3)
LYMPHOCYTES NFR BLD AUTO: 15 %
MCH RBC QN AUTO: 34.2 PG (ref 26.5–33)
MCHC RBC AUTO-ENTMCNC: 32.9 G/DL (ref 31.5–36.5)
MCV RBC AUTO: 104 FL (ref 78–100)
MONOCYTES # BLD AUTO: 0.5 10E3/UL (ref 0–1.3)
MONOCYTES NFR BLD AUTO: 8 %
NEUTROPHILS # BLD AUTO: 5.3 10E3/UL (ref 1.6–8.3)
NEUTROPHILS NFR BLD AUTO: 74 %
NRBC # BLD AUTO: 0 10E3/UL
NRBC BLD AUTO-RTO: 0 /100
PLATELET # BLD AUTO: 154 10E3/UL (ref 150–450)
POTASSIUM SERPL-SCNC: 4.3 MMOL/L (ref 3.4–5.3)
PROT SERPL-MCNC: 6.1 G/DL (ref 6.4–8.3)
RADIOLOGIST FLAGS: NORMAL
RBC # BLD AUTO: 2.81 10E6/UL (ref 4.4–5.9)
SODIUM SERPL-SCNC: 135 MMOL/L (ref 135–145)
SPECIMEN EXPIRATION DATE: NORMAL
UNIT ABO/RH: NORMAL
UNIT ABO/RH: NORMAL
UNIT NUMBER: NORMAL
UNIT NUMBER: NORMAL
UNIT STATUS: NORMAL
UNIT STATUS: NORMAL
UNIT TYPE ISBT: 5100
UNIT TYPE ISBT: 5100
WBC # BLD AUTO: 7.1 10E3/UL (ref 4–11)

## 2024-02-20 PROCEDURE — 86923 COMPATIBILITY TEST ELECTRIC: CPT | Performed by: EMERGENCY MEDICINE

## 2024-02-20 PROCEDURE — 85018 HEMOGLOBIN: CPT | Performed by: EMERGENCY MEDICINE

## 2024-02-20 PROCEDURE — 93005 ELECTROCARDIOGRAM TRACING: CPT

## 2024-02-20 PROCEDURE — C1769 GUIDE WIRE: HCPCS

## 2024-02-20 PROCEDURE — 75726 ARTERY X-RAYS ABDOMEN: CPT

## 2024-02-20 PROCEDURE — B4151ZZ FLUOROSCOPY OF INFERIOR MESENTERIC ARTERY USING LOW OSMOLAR CONTRAST: ICD-10-PCS | Performed by: RADIOLOGY

## 2024-02-20 PROCEDURE — 80053 COMPREHEN METABOLIC PANEL: CPT | Performed by: EMERGENCY MEDICINE

## 2024-02-20 PROCEDURE — 272N000196 HC ACCESSORY CR5

## 2024-02-20 PROCEDURE — 250N000009 HC RX 250: Performed by: EMERGENCY MEDICINE

## 2024-02-20 PROCEDURE — 74174 CTA ABD&PLVS W/CONTRAST: CPT | Mod: MA

## 2024-02-20 PROCEDURE — 250N000011 HC RX IP 250 OP 636: Mod: JZ | Performed by: EMERGENCY MEDICINE

## 2024-02-20 PROCEDURE — 36415 COLL VENOUS BLD VENIPUNCTURE: CPT | Performed by: EMERGENCY MEDICINE

## 2024-02-20 PROCEDURE — 36246 INS CATH ABD/L-EXT ART 2ND: CPT

## 2024-02-20 PROCEDURE — 85025 COMPLETE CBC W/AUTO DIFF WBC: CPT | Performed by: EMERGENCY MEDICINE

## 2024-02-20 PROCEDURE — 99223 1ST HOSP IP/OBS HIGH 75: CPT | Mod: AI | Performed by: HOSPITALIST

## 2024-02-20 PROCEDURE — 272N000127 HC CATH CR16

## 2024-02-20 PROCEDURE — B4141ZZ FLUOROSCOPY OF SUPERIOR MESENTERIC ARTERY USING LOW OSMOLAR CONTRAST: ICD-10-PCS | Performed by: RADIOLOGY

## 2024-02-20 PROCEDURE — C1887 CATHETER, GUIDING: HCPCS

## 2024-02-20 PROCEDURE — BW111ZZ FLUOROSCOPY OF ABDOMEN AND PELVIS USING LOW OSMOLAR CONTRAST: ICD-10-PCS | Performed by: RADIOLOGY

## 2024-02-20 PROCEDURE — 120N000013 HC R&B IMCU

## 2024-02-20 PROCEDURE — 83605 ASSAY OF LACTIC ACID: CPT | Performed by: EMERGENCY MEDICINE

## 2024-02-20 PROCEDURE — C1760 CLOSURE DEV, VASC: HCPCS

## 2024-02-20 PROCEDURE — 30283B1 TRANSFUSION OF NONAUTOLOGOUS 4-FACTOR PROTHROMBIN COMPLEX CONCENTRATE INTO VEIN, PERCUTANEOUS APPROACH: ICD-10-PCS | Performed by: EMERGENCY MEDICINE

## 2024-02-20 PROCEDURE — 36245 INS CATH ABD/L-EXT ART 1ST: CPT

## 2024-02-20 PROCEDURE — 86900 BLOOD TYPING SEROLOGIC ABO: CPT | Performed by: EMERGENCY MEDICINE

## 2024-02-20 PROCEDURE — 250N000011 HC RX IP 250 OP 636: Performed by: EMERGENCY MEDICINE

## 2024-02-20 PROCEDURE — 272N000567 HC SHEATH CR4

## 2024-02-20 PROCEDURE — 99310 SBSQ NF CARE HIGH MDM 45: CPT | Performed by: PHYSICIAN ASSISTANT

## 2024-02-20 PROCEDURE — 99291 CRITICAL CARE FIRST HOUR: CPT | Mod: 25

## 2024-02-20 PROCEDURE — 84484 ASSAY OF TROPONIN QUANT: CPT | Performed by: EMERGENCY MEDICINE

## 2024-02-20 RX ORDER — PANTOPRAZOLE SODIUM 40 MG/1
40 TABLET, DELAYED RELEASE ORAL 2 TIMES DAILY
COMMUNITY

## 2024-02-20 RX ORDER — NALOXONE HYDROCHLORIDE 0.4 MG/ML
0.4 INJECTION, SOLUTION INTRAMUSCULAR; INTRAVENOUS; SUBCUTANEOUS
Status: DISCONTINUED | OUTPATIENT
Start: 2024-02-20 | End: 2024-02-21 | Stop reason: HOSPADM

## 2024-02-20 RX ORDER — NALOXONE HYDROCHLORIDE 0.4 MG/ML
0.2 INJECTION, SOLUTION INTRAMUSCULAR; INTRAVENOUS; SUBCUTANEOUS
Status: DISCONTINUED | OUTPATIENT
Start: 2024-02-20 | End: 2024-02-21 | Stop reason: HOSPADM

## 2024-02-20 RX ORDER — FENTANYL CITRATE 50 UG/ML
25-50 INJECTION, SOLUTION INTRAMUSCULAR; INTRAVENOUS EVERY 5 MIN PRN
Status: DISCONTINUED | OUTPATIENT
Start: 2024-02-20 | End: 2024-02-21 | Stop reason: HOSPADM

## 2024-02-20 RX ORDER — FAMOTIDINE 40 MG/1
40 TABLET, FILM COATED ORAL AT BEDTIME
COMMUNITY

## 2024-02-20 RX ORDER — IOPAMIDOL 755 MG/ML
72 INJECTION, SOLUTION INTRAVASCULAR ONCE
Status: COMPLETED | OUTPATIENT
Start: 2024-02-20 | End: 2024-02-20

## 2024-02-20 RX ORDER — FLUMAZENIL 0.1 MG/ML
0.2 INJECTION, SOLUTION INTRAVENOUS
Status: DISCONTINUED | OUTPATIENT
Start: 2024-02-20 | End: 2024-02-21 | Stop reason: HOSPADM

## 2024-02-20 RX ADMIN — IOPAMIDOL 72 ML: 755 INJECTION, SOLUTION INTRAVENOUS at 21:04

## 2024-02-20 RX ADMIN — SODIUM CHLORIDE 80 ML: 9 INJECTION, SOLUTION INTRAVENOUS at 21:05

## 2024-02-20 RX ADMIN — PROTHROMBIN, COAGULATION FACTOR VII HUMAN, COAGULATION FACTOR IX HUMAN, COAGULATION FACTOR X HUMAN, PROTEIN C, PROTEIN S HUMAN, AND WATER 2830 UNITS: KIT at 22:47

## 2024-02-20 ASSESSMENT — ACTIVITIES OF DAILY LIVING (ADL)
ADLS_ACUITY_SCORE: 39
ADLS_ACUITY_SCORE: 39

## 2024-02-20 NOTE — PROGRESS NOTES
[facility]    Chief Complaint   Patient presents with    RECHECK     HPI:  Jean Pierre Roblero is a 89 year old  (6/21/1934), who is being seen today for an episodic care visit at: [facility] (TCU) [[id number]].     Brief summary: 89yo male with PMHx dementia, TIA on chronic AC with xarelto, CKD, HTN, BPH, constipation, GERD who was admitted at [facility] from 2/5 - 2/8, 2024 after presenting following a fall with RLE weakness. He was seen by vascular neurology for code CVA, initial imaging with area of prolonged perfusion along right inferior temporo-occipital lobes. MRI negative, recommended ASA 81mg/d. Signed off. Seen by general neurology for weakness, MRI lumbar spine revealed stenosis, degenerative disease and family requested neurosurgical consult. Cspine MRI also performed with severe stenosis. Outpatient ESTEBAN recommended, started on medrol dosepak. Was also noted to be newly anemic without evidence of bleeding, iron studies consistent with deficiency. Received Abx for LLE cellulitis. Referred to TCU for rehab, med management.      Today, pt is seen in follow-up. History obtained from wife, review of chart, discussion with facility staff, and chart review in [facility]. Staff report that this morning he was noted to have small blood clots in brief upon rising, subsequently this afternoon shortly before seeing as a visit pt had a moderate amount of loose stool, maroon in color. Wife states he has had diarrhea for the past few days, she has been asking for a bland diet but reports he continues to eat all regular foods. Patient himself denies nausea, abdominal pain, distention. Today is the first event of apparent bleeding. Pt further states he is fatigued, otherwise denies any specific symptoms including lightheadedness, dizziness, sensation of passing out, chest pain, shortness of breath.     Allergies, and PMH/PSH reviewed in [facility] today.  REVIEW OF SYSTEMS:  4 point ROS including  "Respiratory, CV, GI and , other than that noted in the HPI,  is negative    Objective:   /75   Pulse 98   Temp 97.6  F (36.4  C)   Resp 18   Ht 1.702 m (5' 7\")   Wt 60.3 kg (133 lb)   SpO2 97%   BMI 20.83 kg/m    GEN: well-developed, thin elderly male, appears comfortable  HEENT: Normocephalic, bruising surrounding lateral/inferior aspect of left orbit, EOM intact bilaterally, sclera clear, conjunctiva normal, nose & mouth patent, mucous membranes dry  CHEST: lungs CTA bilaterally, no increased work of breathing, no wheeze, crackles, rhonchi  HEART: RRR, S1 & S2  ABD: soft, nontender, nondistended, no guarding or rigidity, +BS in all 4 quadrants  MSK: AROM bilateral UE/LE, pedal & radial pulses 2+ bilaterally  NEURO: awake, alert. Flat affect. CN II-XII grossly intact. Sensation grossly intact to light touch.   SKIN: warm & dry without rash    Most Recent 3 CBC's:  Recent Labs   Lab Test 02/06/24  0746 02/05/24  0718 02/05/24  0020   WBC 6.2 7.8 9.3   HGB 9.5* 10.3* 10.7*   * 100 102*   * 126* 133*     Most Recent 3 BMP's:  Recent Labs   Lab Test 02/06/24  0746 02/05/24  2215 02/05/24  1056 02/05/24  0020 02/01/24  0631     --   --  138 140   POTASSIUM 3.8  --   --  4.5 3.9   CHLORIDE 108*  --   --  102 103   CO2 26  --   --  27 31*   BUN 13.2  --   --  22.5 13.9   CR 0.82  --   --  1.16 1.07   ANIONGAP 8  --   --  9 6*   KENYA 8.5*  --   --  9.0 9.2   * 115* 118* 121* 98       Assessment/Plan:    Melena  Anemia  Iron deficiency  During hospitalization pt with new anemia, Hgb dropping from 13.7--10.7 in two weeks. Iron studies with evidence of deficiency and no outward evidence of bleeding. Started on iron supplement, discharge Hgb value 9.5. stable at TCU until two episodes of melena, wife reporting ongoing diarrhea over the preceding days. Hemodynamics are stable, pt is without concerning symptoms. On Xarelto for hx DVT and TIA.  -Hold xarelto tonight  -Hgb, BMP in " AM  -Discontinue famotidine, protonix 40mg BID  -Vital signs qshift  -Notify provider if change in symptoms, hemodynamics, presence of bright red blood    Falls frequently  Generalized weakness  Physical deconditioning  Impaired mobility and ADLs  -PT/OT continuing  -SW for discharge planning     Lumbar spinal stenosis  As above, seen by neurology for RLE weakness which had resolved upon hospital admission. Workup negative for CVA. Lumbar MRI with evidence of spinal stenosis, suspected to be contributing to weakness. Seen by neurosurgery, ESTEBAN recommended as outpatient. Completed course of medrol dosepak. Pain appears resolved.  -Follow-up with Neurosurgery as an outpatient for consideration of ESTEBAN  -Pain control with PRN tylenol, lidoderm patch     Cognitive impairment  Has hx dementia listed on chart review, mini mental evaluations as outpatient have been consistent with impairment. Last SLUMS 20/30 in 2020. Appears to be solely oriented to self. SLUMS in TCU 20/30.  -Supportive management  -Continues on namenda     Hx TIA  Hx RLE DVT (5/2023)  On chronic AC with xarelto.  -Holding xarelto as above  -Continue statin     Hypertension  Not on meds.  -Monitor BP trend     CKD2  Cr nonelevated at baseline.  -Monitor periodically     BPH  Chronic, stable.  -Continues on proscar     Constipation  Chronic.   -Continues on senna prn  -Change miralax to PRN  -Monitor with addition of iron supplement     Pulmonary nodule  CT performed as an outpatient 1/31 for evaluation of possible abdominal mass revealed progressive nodular infiltrate in the right middle lobe.  -Follow up with PCP    MED REC REQUIRED  Post Medication Reconciliation Status: patient was not discharged from an inpatient facility or TCU      Electronically signed by: Abhishek Zarco PA-C

## 2024-02-20 NOTE — PATIENT INSTRUCTIONS
PHYSICIAN ORDERS    Change miralax to 17g daily PRN constipation    Abhishek Zarco PA-C  2/20/2024, 5:05 PM

## 2024-02-21 ENCOUNTER — TELEPHONE (OUTPATIENT)
Dept: NEUROSURGERY | Facility: CLINIC | Age: 89
End: 2024-02-21

## 2024-02-21 ENCOUNTER — APPOINTMENT (OUTPATIENT)
Dept: ULTRASOUND IMAGING | Facility: CLINIC | Age: 89
DRG: 813 | End: 2024-02-21
Attending: HOSPITALIST
Payer: MEDICARE

## 2024-02-21 LAB
ANION GAP SERPL CALCULATED.3IONS-SCNC: 7 MMOL/L (ref 7–15)
BASOPHILS # BLD AUTO: 0 10E3/UL (ref 0–0.2)
BASOPHILS NFR BLD AUTO: 0 %
BUN SERPL-MCNC: 22.8 MG/DL (ref 8–23)
CALCIUM SERPL-MCNC: 8.1 MG/DL (ref 8.8–10.2)
CHLORIDE SERPL-SCNC: 105 MMOL/L (ref 98–107)
CREAT SERPL-MCNC: 0.95 MG/DL (ref 0.67–1.17)
DEPRECATED HCO3 PLAS-SCNC: 25 MMOL/L (ref 22–29)
EGFRCR SERPLBLD CKD-EPI 2021: 77 ML/MIN/1.73M2
EOSINOPHIL # BLD AUTO: 0.2 10E3/UL (ref 0–0.7)
EOSINOPHIL NFR BLD AUTO: 2 %
ERYTHROCYTE [DISTWIDTH] IN BLOOD BY AUTOMATED COUNT: 14.8 % (ref 10–15)
GLUCOSE BLDC GLUCOMTR-MCNC: 93 MG/DL (ref 70–99)
GLUCOSE SERPL-MCNC: 111 MG/DL (ref 70–99)
HCT VFR BLD AUTO: 25.8 % (ref 40–53)
HGB BLD-MCNC: 7.4 G/DL (ref 13.3–17.7)
HGB BLD-MCNC: 8 G/DL (ref 13.3–17.7)
HGB BLD-MCNC: 8.1 G/DL (ref 13.3–17.7)
HGB BLD-MCNC: 8.1 G/DL (ref 13.3–17.7)
HGB BLD-MCNC: 8.3 G/DL (ref 13.3–17.7)
HGB BLD-MCNC: 8.6 G/DL (ref 13.3–17.7)
IMM GRANULOCYTES # BLD: 0.1 10E3/UL
IMM GRANULOCYTES NFR BLD: 1 %
LYMPHOCYTES # BLD AUTO: 0.9 10E3/UL (ref 0.8–5.3)
LYMPHOCYTES NFR BLD AUTO: 14 %
MCH RBC QN AUTO: 32.7 PG (ref 26.5–33)
MCHC RBC AUTO-ENTMCNC: 31.4 G/DL (ref 31.5–36.5)
MCV RBC AUTO: 104 FL (ref 78–100)
MONOCYTES # BLD AUTO: 0.6 10E3/UL (ref 0–1.3)
MONOCYTES NFR BLD AUTO: 9 %
NEUTROPHILS # BLD AUTO: 4.5 10E3/UL (ref 1.6–8.3)
NEUTROPHILS NFR BLD AUTO: 74 %
NRBC # BLD AUTO: 0 10E3/UL
NRBC BLD AUTO-RTO: 0 /100
PLATELET # BLD AUTO: 139 10E3/UL (ref 150–450)
POTASSIUM SERPL-SCNC: 4.1 MMOL/L (ref 3.4–5.3)
RBC # BLD AUTO: 2.48 10E6/UL (ref 4.4–5.9)
SODIUM SERPL-SCNC: 137 MMOL/L (ref 135–145)
TROPONIN T SERPL HS-MCNC: 51 NG/L
TROPONIN T SERPL HS-MCNC: 59 NG/L
TROPONIN T SERPL HS-MCNC: 60 NG/L
WBC # BLD AUTO: 6.2 10E3/UL (ref 4–11)

## 2024-02-21 PROCEDURE — C1769 GUIDE WIRE: HCPCS

## 2024-02-21 PROCEDURE — 258N000003 HC RX IP 258 OP 636: Performed by: HOSPITALIST

## 2024-02-21 PROCEDURE — 84484 ASSAY OF TROPONIN QUANT: CPT | Performed by: HOSPITALIST

## 2024-02-21 PROCEDURE — 85025 COMPLETE CBC W/AUTO DIFF WBC: CPT | Performed by: HOSPITALIST

## 2024-02-21 PROCEDURE — 85018 HEMOGLOBIN: CPT | Performed by: HOSPITALIST

## 2024-02-21 PROCEDURE — 250N000013 HC RX MED GY IP 250 OP 250 PS 637: Performed by: INTERNAL MEDICINE

## 2024-02-21 PROCEDURE — C1887 CATHETER, GUIDING: HCPCS

## 2024-02-21 PROCEDURE — 93970 EXTREMITY STUDY: CPT

## 2024-02-21 PROCEDURE — 272N000196 HC ACCESSORY CR5

## 2024-02-21 PROCEDURE — C1760 CLOSURE DEV, VASC: HCPCS

## 2024-02-21 PROCEDURE — 272N000127 HC CATH CR16

## 2024-02-21 PROCEDURE — 99233 SBSQ HOSP IP/OBS HIGH 50: CPT | Performed by: INTERNAL MEDICINE

## 2024-02-21 PROCEDURE — 250N000013 HC RX MED GY IP 250 OP 250 PS 637: Performed by: HOSPITALIST

## 2024-02-21 PROCEDURE — 36415 COLL VENOUS BLD VENIPUNCTURE: CPT | Performed by: HOSPITALIST

## 2024-02-21 PROCEDURE — 250N000011 HC RX IP 250 OP 636: Performed by: HOSPITALIST

## 2024-02-21 PROCEDURE — 272N000567 HC SHEATH CR4

## 2024-02-21 PROCEDURE — 250N000011 HC RX IP 250 OP 636: Performed by: RADIOLOGY

## 2024-02-21 PROCEDURE — 80048 BASIC METABOLIC PNL TOTAL CA: CPT | Performed by: HOSPITALIST

## 2024-02-21 PROCEDURE — 84484 ASSAY OF TROPONIN QUANT: CPT | Performed by: INTERNAL MEDICINE

## 2024-02-21 PROCEDURE — 120N000013 HC R&B IMCU

## 2024-02-21 PROCEDURE — 99221 1ST HOSP IP/OBS SF/LOW 40: CPT | Performed by: NURSE PRACTITIONER

## 2024-02-21 RX ORDER — AMOXICILLIN 250 MG
2 CAPSULE ORAL 2 TIMES DAILY PRN
Status: DISCONTINUED | OUTPATIENT
Start: 2024-02-21 | End: 2024-02-26 | Stop reason: HOSPADM

## 2024-02-21 RX ORDER — HYDROMORPHONE HCL IN WATER/PF 6 MG/30 ML
0.4 PATIENT CONTROLLED ANALGESIA SYRINGE INTRAVENOUS
Status: DISCONTINUED | OUTPATIENT
Start: 2024-02-21 | End: 2024-02-25

## 2024-02-21 RX ORDER — LIDOCAINE 40 MG/G
CREAM TOPICAL
Status: DISCONTINUED | OUTPATIENT
Start: 2024-02-21 | End: 2024-02-22

## 2024-02-21 RX ORDER — MEMANTINE HYDROCHLORIDE 10 MG/1
10 TABLET ORAL 2 TIMES DAILY
Status: DISCONTINUED | OUTPATIENT
Start: 2024-02-21 | End: 2024-02-26 | Stop reason: HOSPADM

## 2024-02-21 RX ORDER — LORATADINE 10 MG/1
10 TABLET ORAL DAILY
Status: DISCONTINUED | OUTPATIENT
Start: 2024-02-21 | End: 2024-02-26 | Stop reason: HOSPADM

## 2024-02-21 RX ORDER — LIDOCAINE 40 MG/G
CREAM TOPICAL
Status: DISCONTINUED | OUTPATIENT
Start: 2024-02-21 | End: 2024-02-26 | Stop reason: HOSPADM

## 2024-02-21 RX ORDER — ACETAMINOPHEN 650 MG/1
650 SUPPOSITORY RECTAL EVERY 4 HOURS PRN
Status: DISCONTINUED | OUTPATIENT
Start: 2024-02-21 | End: 2024-02-25

## 2024-02-21 RX ORDER — POLYETHYLENE GLYCOL 3350 17 G/17G
17 POWDER, FOR SOLUTION ORAL 2 TIMES DAILY PRN
Status: DISCONTINUED | OUTPATIENT
Start: 2024-02-21 | End: 2024-02-22

## 2024-02-21 RX ORDER — ACETAMINOPHEN 325 MG/1
650 TABLET ORAL
Status: DISCONTINUED | OUTPATIENT
Start: 2024-02-21 | End: 2024-02-25

## 2024-02-21 RX ORDER — NALOXONE HYDROCHLORIDE 0.4 MG/ML
0.2 INJECTION, SOLUTION INTRAMUSCULAR; INTRAVENOUS; SUBCUTANEOUS
Status: DISCONTINUED | OUTPATIENT
Start: 2024-02-21 | End: 2024-02-26 | Stop reason: HOSPADM

## 2024-02-21 RX ORDER — VIT C/E/ZN/COPPR/LUTEIN/ZEAXAN 60 MG-6 MG
1 CAPSULE ORAL DAILY
Status: DISCONTINUED | OUTPATIENT
Start: 2024-02-21 | End: 2024-02-26 | Stop reason: HOSPADM

## 2024-02-21 RX ORDER — FERROUS SULFATE 325(65) MG
325 TABLET ORAL EVERY OTHER DAY
Status: DISCONTINUED | OUTPATIENT
Start: 2024-02-22 | End: 2024-02-26 | Stop reason: HOSPADM

## 2024-02-21 RX ORDER — FAMOTIDINE 20 MG/1
20 TABLET, FILM COATED ORAL AT BEDTIME
Status: DISCONTINUED | OUTPATIENT
Start: 2024-02-21 | End: 2024-02-26 | Stop reason: HOSPADM

## 2024-02-21 RX ORDER — CALCIUM CARBONATE 500 MG/1
1000 TABLET, CHEWABLE ORAL 4 TIMES DAILY PRN
Status: DISCONTINUED | OUTPATIENT
Start: 2024-02-21 | End: 2024-02-26 | Stop reason: HOSPADM

## 2024-02-21 RX ORDER — FINASTERIDE 5 MG/1
5 TABLET, FILM COATED ORAL DAILY
Status: DISCONTINUED | OUTPATIENT
Start: 2024-02-21 | End: 2024-02-26 | Stop reason: HOSPADM

## 2024-02-21 RX ORDER — HEPARIN SODIUM 200 [USP'U]/100ML
1 INJECTION, SOLUTION INTRAVENOUS CONTINUOUS PRN
Status: DISCONTINUED | OUTPATIENT
Start: 2024-02-21 | End: 2024-02-21 | Stop reason: HOSPADM

## 2024-02-21 RX ORDER — BUTALBITAL, ACETAMINOPHEN AND CAFFEINE 50; 325; 40 MG/1; MG/1; MG/1
1 TABLET ORAL EVERY 4 HOURS PRN
Status: DISCONTINUED | OUTPATIENT
Start: 2024-02-21 | End: 2024-02-26 | Stop reason: HOSPADM

## 2024-02-21 RX ORDER — LIDOCAINE 4 G/G
1 PATCH TOPICAL
Status: DISCONTINUED | OUTPATIENT
Start: 2024-02-22 | End: 2024-02-26 | Stop reason: HOSPADM

## 2024-02-21 RX ORDER — ATORVASTATIN CALCIUM 10 MG/1
10 TABLET, FILM COATED ORAL EVERY EVENING
Status: DISCONTINUED | OUTPATIENT
Start: 2024-02-21 | End: 2024-02-26 | Stop reason: HOSPADM

## 2024-02-21 RX ORDER — AMOXICILLIN 250 MG
1 CAPSULE ORAL 2 TIMES DAILY PRN
Status: DISCONTINUED | OUTPATIENT
Start: 2024-02-21 | End: 2024-02-26 | Stop reason: HOSPADM

## 2024-02-21 RX ORDER — GUAIFENESIN/DEXTROMETHORPHAN 100-10MG/5
10 SYRUP ORAL EVERY 4 HOURS PRN
Status: DISCONTINUED | OUTPATIENT
Start: 2024-02-21 | End: 2024-02-26 | Stop reason: HOSPADM

## 2024-02-21 RX ORDER — HYDROMORPHONE HYDROCHLORIDE 2 MG/1
2 TABLET ORAL EVERY 4 HOURS PRN
Status: DISCONTINUED | OUTPATIENT
Start: 2024-02-21 | End: 2024-02-25

## 2024-02-21 RX ORDER — IOPAMIDOL 612 MG/ML
100 INJECTION, SOLUTION INTRAVASCULAR ONCE
Status: DISCONTINUED | OUTPATIENT
Start: 2024-02-21 | End: 2024-02-21 | Stop reason: HOSPADM

## 2024-02-21 RX ORDER — ACETAMINOPHEN 325 MG/1
650 TABLET ORAL EVERY 4 HOURS PRN
Status: DISCONTINUED | OUTPATIENT
Start: 2024-02-21 | End: 2024-02-25

## 2024-02-21 RX ORDER — SODIUM CHLORIDE 9 MG/ML
INJECTION, SOLUTION INTRAVENOUS CONTINUOUS
Status: DISCONTINUED | OUTPATIENT
Start: 2024-02-21 | End: 2024-02-22

## 2024-02-21 RX ORDER — HYDROMORPHONE HCL IN WATER/PF 6 MG/30 ML
0.2 PATIENT CONTROLLED ANALGESIA SYRINGE INTRAVENOUS
Status: DISCONTINUED | OUTPATIENT
Start: 2024-02-21 | End: 2024-02-25

## 2024-02-21 RX ORDER — NALOXONE HYDROCHLORIDE 0.4 MG/ML
0.4 INJECTION, SOLUTION INTRAMUSCULAR; INTRAVENOUS; SUBCUTANEOUS
Status: DISCONTINUED | OUTPATIENT
Start: 2024-02-21 | End: 2024-02-26 | Stop reason: HOSPADM

## 2024-02-21 RX ORDER — ONDANSETRON 2 MG/ML
4 INJECTION INTRAMUSCULAR; INTRAVENOUS EVERY 6 HOURS PRN
Status: DISCONTINUED | OUTPATIENT
Start: 2024-02-21 | End: 2024-02-26 | Stop reason: HOSPADM

## 2024-02-21 RX ORDER — PANTOPRAZOLE SODIUM 40 MG/1
40 TABLET, DELAYED RELEASE ORAL 2 TIMES DAILY
Status: DISCONTINUED | OUTPATIENT
Start: 2024-02-21 | End: 2024-02-26 | Stop reason: HOSPADM

## 2024-02-21 RX ORDER — LIDOCAINE 4 G/G
1-2 PATCH TOPICAL EVERY 24 HOURS
Status: DISCONTINUED | OUTPATIENT
Start: 2024-02-21 | End: 2024-02-21

## 2024-02-21 RX ORDER — ONDANSETRON 4 MG/1
4 TABLET, ORALLY DISINTEGRATING ORAL EVERY 6 HOURS PRN
Status: DISCONTINUED | OUTPATIENT
Start: 2024-02-21 | End: 2024-02-26 | Stop reason: HOSPADM

## 2024-02-21 RX ORDER — LANOLIN ALCOHOL/MO/W.PET/CERES
3 CREAM (GRAM) TOPICAL
Status: DISCONTINUED | OUTPATIENT
Start: 2024-02-21 | End: 2024-02-26 | Stop reason: HOSPADM

## 2024-02-21 RX ADMIN — SODIUM CHLORIDE: 9 INJECTION, SOLUTION INTRAVENOUS at 23:09

## 2024-02-21 RX ADMIN — MIDAZOLAM 0.5 MG: 1 INJECTION INTRAMUSCULAR; INTRAVENOUS at 00:39

## 2024-02-21 RX ADMIN — HYDROMORPHONE HYDROCHLORIDE 0.4 MG: 0.2 INJECTION, SOLUTION INTRAMUSCULAR; INTRAVENOUS; SUBCUTANEOUS at 05:07

## 2024-02-21 RX ADMIN — HEPARIN SODIUM 4 BAG: 200 INJECTION, SOLUTION INTRAVENOUS at 01:11

## 2024-02-21 RX ADMIN — MEMANTINE 10 MG: 10 TABLET ORAL at 20:50

## 2024-02-21 RX ADMIN — ACETAMINOPHEN 650 MG: 325 TABLET, FILM COATED ORAL at 20:50

## 2024-02-21 RX ADMIN — PANTOPRAZOLE SODIUM 40 MG: 40 TABLET, DELAYED RELEASE ORAL at 20:50

## 2024-02-21 RX ADMIN — MIDAZOLAM 0.5 MG: 1 INJECTION INTRAMUSCULAR; INTRAVENOUS at 00:54

## 2024-02-21 RX ADMIN — FAMOTIDINE 20 MG: 20 TABLET ORAL at 20:50

## 2024-02-21 RX ADMIN — FENTANYL CITRATE 25 MCG: 50 INJECTION, SOLUTION INTRAMUSCULAR; INTRAVENOUS at 01:02

## 2024-02-21 RX ADMIN — SODIUM CHLORIDE: 9 INJECTION, SOLUTION INTRAVENOUS at 02:15

## 2024-02-21 RX ADMIN — FINASTERIDE 5 MG: 5 TABLET, FILM COATED ORAL at 14:37

## 2024-02-21 RX ADMIN — SODIUM CHLORIDE: 9 INJECTION, SOLUTION INTRAVENOUS at 12:48

## 2024-02-21 RX ADMIN — PANTOPRAZOLE SODIUM 40 MG: 40 TABLET, DELAYED RELEASE ORAL at 14:37

## 2024-02-21 RX ADMIN — Medication 1 CAPSULE: at 14:48

## 2024-02-21 RX ADMIN — ACETAMINOPHEN 650 MG: 325 TABLET, FILM COATED ORAL at 14:47

## 2024-02-21 RX ADMIN — ATORVASTATIN CALCIUM 10 MG: 10 TABLET, FILM COATED ORAL at 20:50

## 2024-02-21 RX ADMIN — LORATADINE 10 MG: 10 TABLET ORAL at 14:37

## 2024-02-21 RX ADMIN — MEMANTINE 10 MG: 10 TABLET ORAL at 14:37

## 2024-02-21 RX ADMIN — FENTANYL CITRATE 25 MCG: 50 INJECTION, SOLUTION INTRAMUSCULAR; INTRAVENOUS at 00:39

## 2024-02-21 ASSESSMENT — ACTIVITIES OF DAILY LIVING (ADL)
ADLS_ACUITY_SCORE: 45
ADLS_ACUITY_SCORE: 47
ADLS_ACUITY_SCORE: 48
ADLS_ACUITY_SCORE: 43
ADLS_ACUITY_SCORE: 43
ADLS_ACUITY_SCORE: 47
ADLS_ACUITY_SCORE: 47
ADLS_ACUITY_SCORE: 39
ADLS_ACUITY_SCORE: 43
ADLS_ACUITY_SCORE: 45
ADLS_ACUITY_SCORE: 43
ADLS_ACUITY_SCORE: 43

## 2024-02-21 NOTE — PROGRESS NOTES
"  Interventional Radiology - Progress Note  Inpatient - [facility]  2/21/2024    S:  No pain at CFA access site. Family at bedside. Drifting in and out of sleep.      O:  /68   Pulse 92   Temp 97.9  F (36.6  C) (Axillary)   Resp 10   Wt 60.3 kg (132 lb 15 oz)   SpO2 100%   BMI 20.82 kg/m    General:  Stable.  In no acute distress.    Neuro:  A&O x 3. Moves all extremities equally.  Heart: RRR  Ext: Rt CFA access site dressing c/d/I, no tenderness or appreciable hematoma      Imaging:  Visceral angiogram 2/21/24:   \"The digital subtraction descending left colic branch arteriography shows no identified site of extravasation. This vessel supplies the vascular territory suspicious for extravasation on the CT from earlier today.                                                                      IMPRESSION:    No identified extravasation on the selective or superselective mesenteric arteriography.\"    Labs (Last four results)  CMP  Recent Labs   Lab 02/21/24  0632 02/21/24  0224 02/20/24 1947   POTASSIUM 4.1  --  4.3   * 93 131*   BUN 22.8  --  31.5*   CR 0.95  --  1.02   GFRESTIMATED 77  --  70     CBC  Recent Labs   Lab 02/21/24  1049 02/21/24  0632 02/21/24  0241 02/20/24  2346 02/20/24  2230 02/20/24 1947   WBC  --  6.2  --   --   --  7.1   RBC  --  2.48*  --   --   --  2.81*   HGB 8.1* 8.1* 8.3* 8.7*   < > 9.6*   HCT  --  25.8*  --   --   --  29.2*   PLT  --  139*  --   --   --  154    < > = values in this interval not displayed.     INRNo lab results found in last 7 days.      A:  Acute GI bleed  H/O TIA  H/O DVT (May 2023) on DOAC (held)    P:    -Continue serial HGB check  -If signs/symptoms of re-bleeding, recommend STAT CTA Abd/Pel or GI intervention  -Thank you for allowing us to participate in this patient's care. IR will no longer follow. Please contact our service with any questions, concerns, or requests for further intervention.    Bernabe Hayden PA-C  Interventional " Radiology  *[id number]  [phone]       Total Time: 25 minutes

## 2024-02-21 NOTE — IR NOTE
Interventional Radiology Intra-procedural Nursing Note    Patient Name: Jean Pierre Roblero  Medical Record Number: [id number]  Today's Date: February 21, 2024    Procedure: visceral angiogram  Start time: 0039  End time: 0104  Report provided to: c RN  Patient depart [place] and location: 0107 to [facility]     Note: Patient entered Interventional Radiology Suite number 1 via cart. Patient awake, alert and oriented. Assisted onto procedural table in supine position. Prepped and draped.  Dr. Watson in room. Time out and procedure started. Vital signs stable. Telemetry reading sinus tach.    Procedure well tolerated by patient without complications. Procedure end with debrief by Dr. Watson.  Manual pressure applied until hemostasis achieved. Perclose closure device, tegaderm/gauze dressing applied to right femoral arterial interventional procedure access site.    Bedrest for 2 hours.    Administered medication totals:  Lidocaine 1% 10 mL Intradermal  Versed 1 mg IVP  Fentanyl 50 mcg IVP    Last dose of sedation administered at 0054.

## 2024-02-21 NOTE — ED NOTES
Pt was unable to get to the bathroom in enough time to have BM, pt cleaned and changed into new depends by this writer and EDT. BM was large, loose and deep red in color.

## 2024-02-21 NOTE — ED PROVIDER NOTES
History     Chief Complaint:  Rectal Bleeding       HPI   Jean Pierre Roblero is a 89 year old male that presents to the emergency room with rectal bleeding with his wife. He states to bleed from his rectum this morning. This has only occurred while going to the bathroom with bowel movements and he has not noticed it with urination  No fever.  No vomiting.  No prior occurrence.  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  He denies abdominal pain though notes some soreness during exam.  Some fatigue but no dizziness or chest pain.  Had a fall and was hospitalized earlier this month.  Resolving bruising left face and left leg.    Independent Historian:    Patient and wife    Review of External Notes:  TCU note today, plan to hold Xarelto today.  Mentions melena as the primary diagnosis but the description is of bloody and maroon stools.  Had previously been anemic but this was the first visualized bloody stools.      Medications:    Lipitor   Esgic   Ferosul   Proscar  Namenda   Ocuvite with ltein   Xarelto   Senokot   Nasacort     Past Medical History:    Allergic state   Fainting episodes  Gastroesophageal reflux   Headaches   Heartburn   Nocturia   Photosensitivity   Swallowing difficulty     Past Surgical History:    Back surgery   Cholecystectomy   Endoscopic sinus surgery   Cataract surgery   Ingrown toenail removal     Physical Exam   Patient Vitals for the past 24 hrs:   BP Temp Temp src Pulse Resp SpO2 Weight   02/20/24 2315 133/73 -- -- 96 15 97 % --   02/20/24 2230 (!) 145/79 -- -- 101 -- 97 % --   02/20/24 2055 -- -- -- -- -- 98 % --   02/20/24 2040 133/78 -- -- -- -- 98 % --   02/20/24 2025 -- -- -- -- -- 99 % --   02/20/24 2010 (!) 142/83 -- -- -- -- 96 % --   02/20/24 2009 -- -- -- -- 18 -- --   02/20/24 1955 (!) 146/80 -- -- -- -- 97 % --   02/20/24 1936 -- -- -- -- -- -- 60.3 kg (132 lb 15 oz)   02/20/24 1930 (!) 148/78 99.6  F (37.6  C) Oral 105 -- 98 % --        Physical  Exam  Eyes:  Sclera white; Pupils are equal and round  ENT:    External ears and nares normal  CV:  Rate as above with regular rhythm   Resp:  Breath sounds clear and equal bilaterally    Non-labored, no retractions or accessory muscle use  GI:  Abdomen is soft, mild lower abdominal tenderness    No rebound tenderness or peritoneal features    Rectal: no external hemorrhoid or fissure, bright red blood tinged color at anus  MS:  Moves all extremities  Skin:  Warm and dry, faint yellow green bruising L cheek without swelling, left medial thigh old appearing bruising from inguinal crease down 1/2 to 2/3 of the upper leg  Neuro:  Speech is normal and fluent. No apparent deficit.      Emergency Department Course     Imaging:  CTA Abdomen Pelvis with Contrast   Final Result   IMPRESSION:    1.  Active arterial extravasation identified within the distal descending colon.   2.  High attenuation lesion of the left abductor triston muscle belly, favoring an intramuscular hematoma. MR imaging follow-up recommended in 3 months to document resolution of this finding, as an underlying malignancy cannot be excluded.   3.  Acute, nondisplaced anterior column fracture of the L1 vertebral body.   4.  No evidence of acute gastrointestinal inflammation or obstruction.   5.  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 Nonemergent CT angiography of the chest is recommended, when clinically feasible.      Impression 1, 2, and 3 discussed verbally via telephone with Dr. Harden at 10:10 PM on 02/20/2024.         [Recommend Follow Up: Proximal left thigh MR in 3 months.]      This report will be copied to the [facility] to ensure a provider acknowledges the finding.          IR Visceral Angiogram    (Results Pending)     Report per radiology    Laboratory:  Labs Ordered and Resulted from Time of ED Arrival to Time of ED Departure   COMPREHENSIVE METABOLIC PANEL - Abnormal       Result  Value    Sodium 135      Potassium 4.3      Carbon Dioxide (CO2) 23      Anion Gap 11      Urea Nitrogen 31.5 (*)     Creatinine 1.02      GFR Estimate 70      Calcium 8.6 (*)     Chloride 101      Glucose 131 (*)     Alkaline Phosphatase 74      AST 27      ALT 19      Protein Total 6.1 (*)     Albumin 3.6      Bilirubin Total 0.5     CBC WITH PLATELETS AND DIFFERENTIAL - Abnormal    WBC Count 7.1      RBC Count 2.81 (*)     Hemoglobin 9.6 (*)     Hematocrit 29.2 (*)      (*)     MCH 34.2 (*)     MCHC 32.9      RDW 14.6      Platelet Count 154      % Neutrophils 74      % Lymphocytes 15      % Monocytes 8      % Eosinophils 2      % Basophils 0      % Immature Granulocytes 1      NRBCs per 100 WBC 0      Absolute Neutrophils 5.3      Absolute Lymphocytes 1.1      Absolute Monocytes 0.5      Absolute Eosinophils 0.1      Absolute Basophils 0.0      Absolute Immature Granulocytes 0.1      Absolute NRBCs 0.0     HEMOGLOBIN - Abnormal    Hemoglobin 8.5 (*)    LACTIC ACID WHOLE BLOOD - Normal    Lactic Acid 1.3     HEMOGLOBIN   TROPONIN T, HIGH SENSITIVITY   TYPE AND SCREEN, ADULT    ABO/RH(D) O POS      Antibody Screen Negative      SPECIMEN EXPIRATION DATE [id number]     PREPARE RED BLOOD CELLS (UNIT)    Blood Component Type Red Blood Cells      Product Code F6012F58      Unit Status Ready for issue      Unit Number [id number]      CROSSMATCH Compatible      CODING SYSTEM [id number]     PREPARE RED BLOOD CELLS (UNIT)    Blood Component Type Red Blood Cells      Product Code S6134M84      Unit Status Ready for issue      Unit Number [id number]      CROSSMATCH Compatible      CODING SYSTEM [id number]     PREPARE RED BLOOD CELLS (UNIT)   ABO/RH TYPE AND SCREEN        Procedures   No    Emergency Department Course & Assessments:       Interventions:  Medications   midazolam (VERSED) injection 0.5-2 mg (has no administration in time range)   flumazenil (ROMAZICON) injection 0.2 mg (has no administration in time  range)   fentaNYL (PF) (SUBLIMAZE) injection 25-50 mcg (has no administration in time range)   naloxone (NARCAN) injection 0.2 mg (has no administration in time range)     Or   naloxone (NARCAN) injection 0.4 mg (has no administration in time range)     Or   naloxone (NARCAN) injection 0.2 mg (has no administration in time range)     Or   naloxone (NARCAN) injection 0.4 mg (has no administration in time range)   Saline (80 mLs As instructed $Given 2/20/24 2105)   iopamidol (ISOVUE-370) solution 72 mL (72 mLs Intravenous $Given 2/20/24 2104)   prothrombin 4 factor complex concentrate (KCENTRA) infusion 2,830 Units (2,830 Units Intravenous $Given 2/20/24 2247)        Assessments:  1940 I obtained history and examined the patient as noted above.   2215 I spoke with   2225 I spoke with Dr. White from IR  2231 I spoke with IR    Independent Interpretation (X-rays, CTs, rhythm strip):  None    Consultations/Discussion of Management or Tests:  As above       Social Determinants of Health affecting care:  None     Disposition:  The patient was admitted to the hospital under the care of Dr. Del Castillo.     Impression & Plan      Medical Decision Making:  Bloody stools in the setting of blood thinner use is concerning for potential high volume blood loss.  No upper abdominal pain or symptoms to suggest that this is upper GI bleed though rapid upper GI bleeding can present with bloody stools.  Hemoglobin is similar to the most recent values of this month.  However earlier this month it was higher 10.7 and in January 22 it was significantly higher at 13.7.  CT results were discussed with the reading radiologist as showing active extravasation into the intestines.  Blood vessel may be amenable to embolization.  Kcentra and repeat hemoglobin ordered.  Has had 2 more bloody stools since arrival.  I spoke with the ED pharmacist about expediting the Kcentra.  IR was consulted and reviewed the images.  Blood vessel appearance  may make embolization difficult but may be possible.  They called back after finishing the case they were currently in.  Hemoglobin had dropped to 8.5 after 3 hours.  Will attempt embolization.  Patient has a signed blood transfusion consent on file that I went over with him.  Admission arranged.    Addendum: muscular hematoma noted on CT is consistent with physical exam of old appearing, resolving injury.  Suspect it was sustained in the fall earlier this month.  Imaging recommendations added to diagnoses.  L1 area is tender to palpation, favor acute to subacute injury.  Aortic dilation was not discussed by phone but noted in CT read, included in diagnoses for evaluation later.    Critical Care time:  was 30 minutes for this patient excluding procedures.    Diagnosis:    ICD-10-CM    1. Lower GI bleeding  K92.2       2. Long term (current) use of anticoagulants  Z79.01       3. Anemia due to blood loss, acute  D62       4. Closed compression fracture of body of L1 vertebra (H)  S32.010A       5. Dilatation of thoracic aorta (H24)  I77.810     Incompletely visualized - recommended nonemergent CTA chest      6. Hematoma of muscle  T14.8XXA     Left abductor triston, radiology recommended follow up imaging in 3 months to ensure complete resolution           Scribe Disclosure:  I, Siria Pena, am serving as a scribe at 7:50 PM on 2/20/2024 to document services personally performed by Dunia Harden, based on my observations and the provider's statements to me.  2/20/2024                Dunia Harden MD  02/21/24 0157       Dunia Harden MD  02/21/24 0224

## 2024-02-21 NOTE — CONSULTS
Neurosurgery Consultation      Date of Service:  02/21/2024  Date of Admission:  2/20/2024  Hospital Day: 2    Assessment/Plan  Jean Pierre Roblero is a 89 year old male with a past medical history of TIA and DVT on DOAC, dementia admitted on 2/20/2024 for anticoagulation associated lower GI bleed.  L1 compression fracture noted on CTA abdomen pelvis but was not noted on MRI lumbar spine that was done on February 5, 2024.    Neuro  # L1 compression fracture  -No brace needed  -Pain control  -Upright lumbar x-rays prior to discharge  -Follow-up in clinic in 3 weeks with x-ray prior, our office will schedule      Clinically Significant Risk Factors Present on Admission          # Hypocalcemia: Lowest Ca = 8.1 mg/dL in last 2 days, will monitor and replace as appropriate      # Drug Induced Coagulation Defect: home medication list includes an anticoagulant medication      # Dementia: noted on problem list        # Financial/Environmental Concerns:               TIME SPENT ON THIS ENCOUNTER   I spent 35 minutes of time on the unit/floor managing the care of Jean Pierre Roblero excluding time performing procedures. I have personally reviewed the following data/imaging over the past 24 hours.     The patient was discussed with Dr. Mcmillan.    [address], [place] [id number]    Tel [phone]    History of Present Illness:  Jean Pierre Roblero is a 89 year old male with a past medical history of TIA and DVT on DOAC, dementia admitted on 2/20/2024 for anticoagulation associated lower GI bleed.  L1 compression fracture noted on CTA abdomen pelvis but was not noted on MRI lumbar spine that was done on February 5, 2024.    Patient is not a good historian was not able to provide health history.  Through chart review it appears patient has frequent falls and history of spinal stenosis with remote history of prior lumbar spine decompression.  He was recently admitted earlier  "this month for falls and right lower extremity weakness.  No fracture seen on MRI at that time but it demonstrated moderate to severe canal stenosis at L3-L4, high-grade bilateral foraminal stenosis at L3-L4 and L5-S1.  Patient was discharged with Medrol Dosepak and recommendations for outpatient steroid injection.    Allergies   Allergen Reactions    Chocolate Anaphylaxis     headache    Lamotrigine Unknown     Other Reaction(s): severe muscle pain, trouble walking    Propranolol Unknown     Other Reaction(s): swelling of tongue, irritation to tongue and lips, weight gain    Valproic Acid      Other Reaction(s): tongue swelling    Verapamil Other (See Comments)     Other Reaction(s): swollen tongue and swollen lips    Per patient possible reason for tongue swelling in the past    Zonisamide Unknown    Amoxicillin Unknown     He doesn't recall any allergy to this medication    Feathers     Mirabegron Unknown    Nabumetone Diarrhea    Novocaine [Procaine]      Passes out almost immediately    Oxycodone Other (See Comments)     Hallucinations    Solifenacin Unknown    Strawberry Extract GI Disturbance     Most berries    Tramadol Other (See Comments)    Amitriptyline Fatigue and Other (See Comments)     Other Reaction(s): ??back pain, memory loss    \"reduced memory\"    Topiramate Other (See Comments)     Other Reaction(s): ?? heart issues, elevated HR and elevated BP    \"reduce memory\"       Current Medications:   atorvastatin  10 mg Oral or Feeding Tube QPM    famotidine  20 mg Oral At Bedtime    [START ON 2/22/2024] ferrous sulfate  325 mg Oral Every Other Day    finasteride  5 mg Oral Daily    [START ON 2/22/2024] lidocaine  1 patch Transdermal Q24H    lidocaine  10 mL Intradermal Once    loratadine  10 mg Oral Daily    memantine  10 mg Oral BID    multivitamin  with lutein  1 capsule Oral Daily    pantoprazole  40 mg Oral BID    sodium chloride (PF)  3 mL Intracatheter Q8H    sodium chloride (PF)  3 mL " Intracatheter Q8H       PRN Medications:  acetaminophen **OR** acetaminophen, acetaminophen, butalbital-acetaminophen-caffeine, calcium carbonate, - MEDICATION INSTRUCTIONS -, guaiFENesin-dextromethorphan, HOLD MEDICATION, HYDROmorphone, HYDROmorphone, HYDROmorphone, HYDROmorphone, lidocaine 4%, lidocaine 4%, lidocaine (buffered or not buffered), lidocaine (buffered or not buffered), melatonin, naloxone **OR** naloxone **OR** naloxone **OR** naloxone, ondansetron **OR** ondansetron, polyethylene glycol, senna-docusate **OR** senna-docusate, sodium chloride (PF), sodium chloride (PF)    Infusions:   - MEDICATION INSTRUCTIONS -      sodium chloride 100 mL/hr at 02/21/24 1248       Physical Examination:  Vitals: /78   Pulse 86   Temp 97.6  F (36.4  C) (Axillary)   Resp 28   Wt 60.3 kg (132 lb 15 oz)   SpO2 99%   BMI 20.82 kg/m    General: Adult male patient, lying in bed  HEENT: Normocephalic, atraumatic, no icterus, oral cavity/oropharynx pink and moist  Cardiac: Adequately perfused  Pulm: Unlabored, expansion symmetric, no retractions or use of accessory muscles  Abdomen: Soft, non-distended  Extremities: Warm, no edema, distal pulses +2, well perfused  Skin: Multiple contusions seen in lower extremities.  Psych: Baseline dementia, withdrawn  Neuro:  Patient drowsy requiring repeated stimulation during exam.  Was able to move all extremities with equal strength.  Denies numbness and tingling had tenderness to palpation of lumbar spine.  No clonus noted    Labs and Imaging:    Results for orders placed or performed during the hospital encounter of 02/20/24 (from the past 24 hour(s))   [facility] Draw    Narrative    The following orders were created for panel order [facility] Draw.  Procedure                               Abnormality         Status                     ---------                               -----------         ------                     Extra Blue Top Tube[[id number]]                               Final result               Extra Red Top Tube[[id number]]                                                          Extra Green Top (Lithium...[[id number]]                                                 Extra Purple Top Tube[[id number]]                                                         Please view results for these tests on the individual orders.   CBC with platelets differential    Narrative    The following orders were created for panel order CBC with platelets differential.  Procedure                               Abnormality         Status                     ---------                               -----------         ------                     CBC with platelets and d...[[id number]]  Abnormal            Final result                 Please view results for these tests on the individual orders.   Comprehensive metabolic panel   Result Value Ref Range    Sodium 135 135 - 145 mmol/L    Potassium 4.3 3.4 - 5.3 mmol/L    Carbon Dioxide (CO2) 23 22 - 29 mmol/L    Anion Gap 11 7 - 15 mmol/L    Urea Nitrogen 31.5 (H) 8.0 - 23.0 mg/dL    Creatinine 1.02 0.67 - 1.17 mg/dL    GFR Estimate 70 >60 mL/min/1.73m2    Calcium 8.6 (L) 8.8 - 10.2 mg/dL    Chloride 101 98 - 107 mmol/L    Glucose 131 (H) 70 - 99 mg/dL    Alkaline Phosphatase 74 40 - 150 U/L    AST 27 0 - 45 U/L    ALT 19 0 - 70 U/L    Protein Total 6.1 (L) 6.4 - 8.3 g/dL    Albumin 3.6 3.5 - 5.2 g/dL    Bilirubin Total 0.5 <=1.2 mg/dL   Lactic acid whole blood   Result Value Ref Range    Lactic Acid 1.3 0.7 - 2.0 mmol/L   ABO/Rh type and screen    Narrative    The following orders were created for panel order ABO/Rh type and screen.  Procedure                               Abnormality         Status                     ---------                               -----------         ------                     Adult Type and Screen[[id number]]                            Final result                 Please view results for these tests on the individual orders.   Extra  Blue Top Tube   Result Value Ref Range    Hold Specimen JIC    CBC with platelets and differential   Result Value Ref Range    WBC Count 7.1 4.0 - 11.0 10e3/uL    RBC Count 2.81 (L) 4.40 - 5.90 10e6/uL    Hemoglobin 9.6 (L) 13.3 - 17.7 g/dL    Hematocrit 29.2 (L) 40.0 - 53.0 %     (H) 78 - 100 fL    MCH 34.2 (H) 26.5 - 33.0 pg    MCHC 32.9 31.5 - 36.5 g/dL    RDW 14.6 10.0 - 15.0 %    Platelet Count 154 150 - 450 10e3/uL    % Neutrophils 74 %    % Lymphocytes 15 %    % Monocytes 8 %    % Eosinophils 2 %    % Basophils 0 %    % Immature Granulocytes 1 %    NRBCs per 100 WBC 0 <1 /100    Absolute Neutrophils 5.3 1.6 - 8.3 10e3/uL    Absolute Lymphocytes 1.1 0.8 - 5.3 10e3/uL    Absolute Monocytes 0.5 0.0 - 1.3 10e3/uL    Absolute Eosinophils 0.1 0.0 - 0.7 10e3/uL    Absolute Basophils 0.0 0.0 - 0.2 10e3/uL    Absolute Immature Granulocytes 0.1 <=0.4 10e3/uL    Absolute NRBCs 0.0 10e3/uL   Adult Type and Screen   Result Value Ref Range    ABO/RH(D) O POS     Antibody Screen Negative Negative    SPECIMEN EXPIRATION DATE [id number]    CTA Abdomen Pelvis with Contrast   Result Value Ref Range    Radiologist flags Proximal left thigh MR in 3 months.     Narrative    EXAM: CTA ABDOMEN PELVIS WITH CONTRAST  LOCATION: [facility]  DATE: 2/20/2024    INDICATION: Rectal bleeding; on anticoagulation.  COMPARISON: None available.  TECHNIQUE: CT angiogram abdomen pelvis during precontrast, arterial, and portal venous phase of injection of IV contrast. 2D and 3D MIP reconstructions were performed by the CT technologist. Dose reduction techniques were used.  CONTRAST: 72 mL Isovue 370.    FINDINGS:    AORTO-ILIAC: Dilated aortic root, measuring at least 4.3 cm above the sinotubular junction, incompletely imaged and evaluated.    Moderate to severe atheromatous disease of the abdominal aorta and branch vessels. No evidence of aneurysm or dissection.    There is classic hepatic arterial anatomy.      There are single bilateral renal arteries.    LOWER CHEST: Tree-in-bud opacities, greatest within the right middle lobe and possibly chronic. Atherosclerosis of the visualized thoracic aorta and coronary arteries.    HEPATOBILIARY: Liver enhances normally and is normal in size.    Cholecystectomy.    No intrahepatic or intrahepatic biliary ductal dilatation.    PANCREAS: Enhances normally. No peripancreatic inflammatory fat stranding.    SPLEEN: Enhances normally. Normal size.    ADRENAL GLANDS: Normal.    KIDNEYS: Both kidneys enhance symmetrically, without hydronephrosis. Low-attenuation subcentimeter renal lesion(s). These are compatible with small benign cysts and no specific imaging evaluation or follow-up is recommended.    No nephroureterolithiasis.    Urinary bladder is unremarkable.    PELVIC ORGANS: No suspicious abnormality.    BOWEL: No evidence of acute gastrointestinal inflammation or obstruction. Colonic diverticulosis, without evidence of diverticulitis.     Arterial extravasation involving the distal descending colon, series 7 image 247, which accumulates on the venous phase imaging.    No intraperitoneal free fluid or free air.    LYMPH NODES: No suspicious abdominal or pelvic lymphadenopathy.    VASCULATURE: See arterial findings, as detailed above.    MUSCULOSKELETAL: Acute, nondisplaced fracture involving the anterior one half of the L1 vertebral body. Posterior decompression changes of the lower lumbar spine. High attenuation lesion within the proximal aspect of the left adductor triston muscle   belly, measuring 3.8 x 4.1 cm in cross-section, incompletely imaged distally.    OTHER: No additionally significant abnormalities.      Impression    IMPRESSION:   1.  Active arterial extravasation identified within the distal descending colon.  2.  High attenuation lesion of the left abductor triston muscle belly, favoring an intramuscular hematoma. MR imaging follow-up recommended in 3 months to  document resolution of this finding, as an underlying malignancy cannot be excluded.  3.  Acute, nondisplaced anterior column fracture of the L1 vertebral body.  4.  No evidence of acute gastrointestinal inflammation or obstruction.  5.  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 Nonemergent CT angiography of the chest is recommended, when clinically feasible.    Impression 1, 2, and 3 discussed verbally via telephone with Dr. Harden at 10:10 PM on 02/20/2024.      [Recommend Follow Up: Proximal left thigh MR in 3 months.]    This report will be copied to the [facility] to ensure a provider acknowledges the finding.      Prepare red blood cells (unit)   Result Value Ref Range    Blood Component Type Red Blood Cells     Product Code W5904K05     Unit Status Ready for issue     Unit Number [id number]     CROSSMATCH Compatible     CODING SYSTEM [id number]    Prepare red blood cells (unit)   Result Value Ref Range    Blood Component Type Red Blood Cells     Product Code J0380E40     Unit Status Ready for issue     Unit Number [id number]     CROSSMATCH Compatible     CODING SYSTEM [id number]    Hemoglobin   Result Value Ref Range    Hemoglobin 8.5 (L) 13.3 - 17.7 g/dL   Hemoglobin   Result Value Ref Range    Hemoglobin 8.7 (L) 13.3 - 17.7 g/dL   Troponin T, High Sensitivity   Result Value Ref Range    Troponin T, High Sensitivity 51 (H) <=22 ng/L   IR Visceral Angiogram    Narrative    [facility]  LOCATION: [facility]  DATE: 2/21/2024    PROCEDURE: SELECTIVE AND SUPERSELECTIVE MESENTERIC ARTERIOGRAPHY  1.  Ultrasound-guided access of the right common femoral artery. A permanent image was stored.  2.  Digital subtraction superior mesenteric arteriography (first order).  3.  Digital subtraction celiac arteriography (first order).  4.  Digital subtraction inferior mesenteric arteriography (first order).  5.  Digital subtraction  median sacral arteriography (second order).  6.  Digital subtraction distal sigmoidal branch arteriography (second order).  7.  Digital subtraction proximal sigmoidal branch arteriography (second order).  8.  Digital subtraction descending left colic arteriography (second order).  9.  Arterial closure device.  10.  Moderate sedation.    INTERVENTIONAL RADIOLOGIST: Cheng Watson MD.    INDICATION: 89-year-old male with blood loss anemia in the setting of gastrointestinal hemorrhage. CT arteriography performed earlier this evening suggests localization to the lower descending colon.    CONSENT: The risks, benefits and alternatives of the stated procedure were discussed with the patient  in detail. All questions were answered. Informed consent was given to proceed with the procedure.    MODERATE SEDATION: Versed 1 mg IV; Fentanyl 50 mcg IV.  During the timeout, immediately prior to the administration of medications, the patient was reassessed for adequacy to receive conscious sedation. Under physician supervision, Versed and fentanyl   were administered for moderate sedation. Pulse oximetry, heart rate and blood pressure were continuously monitored by an independent trained observer. The physician spent 25 minutes of face-to-face sedation time with the patient.    CONTRAST: 55 mL Isovue-300.  ANTIBIOTICS: None.  ADDITIONAL MEDICATIONS: None.    FLUOROSCOPIC TIME: 10.0 minutes.  RADIATION DOSE: Air Kerma: 439 mGy.    COMPLICATIONS: No immediate complications.    STERILE BARRIER TECHNIQUE: Maximum sterile barrier technique was used. Cutaneous antisepsis was performed at the operative site with application of 2% chlorhexidine and large sterile drape. Prior to the procedure, the  and assistant performed   hand hygiene and wore hat, mask, sterile gown, and sterile gloves during the entire procedure.    PROCEDURE:    Using real-time ultrasound guidance, access was achieved into the right common femoral artery and  conversion was made for a 5 Bulgarian vascular sheath which was attached to a continuous heparinized saline infusion.    A Alonso 1 catheter was utilized to selectively engage the superior mesenteric artery and digital subtraction arteriography was obtained.    The Alonso 1 catheter was utilized to selectively engage the celiac artery and digital subtraction arteriography was obtained.    The Alonso 1 catheter was utilized to selectively engage the inferior mesenteric artery and digital subtraction arteriography was obtained. A Progreat microcatheter was utilized to selectively catheterize the median sacral artery, distal sigmoidal   artery, proximal sigmoidal artery and descending left colic artery with digital subtraction arteriography performed in each.    The catheters and sheath were removed with hemostasis achieved via a Perclose suture device with additional manual compression.    FINDINGS:  Ultrasound demonstrates a patent and pulsatile right common femoral artery. A permanent image was stored.    The digital subtraction superior mesenteric arteriography shows no identified site of extravasation.    The digital subtraction celiac arteriography shows no identified site of extravasation.    The digital subtraction inferior mesenteric arteriography shows no identified site of extravasation.    The digital subtraction median sacral arteriography shows no identified site of extravasation.    The digital subtraction distal sigmoidal branch arteriography shows no identified site of extravasation.    The digital subtraction proximal sigmoidal branch arteriography shows no identified site of extravasation.    The digital subtraction descending left colic branch arteriography shows no identified site of extravasation. This vessel supplies the vascular territory suspicious for extravasation on the CT from earlier today.      Impression    IMPRESSION:    No identified extravasation on the selective or superselective  mesenteric arteriography.     Glucose by meter   Result Value Ref Range    GLUCOSE BY METER POCT 93 70 - 99 mg/dL   Hemoglobin   Result Value Ref Range    Hemoglobin 8.3 (L) 13.3 - 17.7 g/dL   Basic metabolic panel   Result Value Ref Range    Sodium 137 135 - 145 mmol/L    Potassium 4.1 3.4 - 5.3 mmol/L    Chloride 105 98 - 107 mmol/L    Carbon Dioxide (CO2) 25 22 - 29 mmol/L    Anion Gap 7 7 - 15 mmol/L    Urea Nitrogen 22.8 8.0 - 23.0 mg/dL    Creatinine 0.95 0.67 - 1.17 mg/dL    GFR Estimate 77 >60 mL/min/1.73m2    Calcium 8.1 (L) 8.8 - 10.2 mg/dL    Glucose 111 (H) 70 - 99 mg/dL   CBC with platelets differential    Narrative    The following orders were created for panel order CBC with platelets differential.  Procedure                               Abnormality         Status                     ---------                               -----------         ------                     CBC with platelets and d...[[id number]]  Abnormal            Final result                 Please view results for these tests on the individual orders.   Troponin T, High Sensitivity   Result Value Ref Range    Troponin T, High Sensitivity 60 (H) <=22 ng/L   CBC with platelets and differential   Result Value Ref Range    WBC Count 6.2 4.0 - 11.0 10e3/uL    RBC Count 2.48 (L) 4.40 - 5.90 10e6/uL    Hemoglobin 8.1 (L) 13.3 - 17.7 g/dL    Hematocrit 25.8 (L) 40.0 - 53.0 %     (H) 78 - 100 fL    MCH 32.7 26.5 - 33.0 pg    MCHC 31.4 (L) 31.5 - 36.5 g/dL    RDW 14.8 10.0 - 15.0 %    Platelet Count 139 (L) 150 - 450 10e3/uL    % Neutrophils 74 %    % Lymphocytes 14 %    % Monocytes 9 %    % Eosinophils 2 %    % Basophils 0 %    % Immature Granulocytes 1 %    NRBCs per 100 WBC 0 <1 /100    Absolute Neutrophils 4.5 1.6 - 8.3 10e3/uL    Absolute Lymphocytes 0.9 0.8 - 5.3 10e3/uL    Absolute Monocytes 0.6 0.0 - 1.3 10e3/uL    Absolute Eosinophils 0.2 0.0 - 0.7 10e3/uL    Absolute Basophils 0.0 0.0 - 0.2 10e3/uL    Absolute Immature  Granulocytes 0.1 <=0.4 10e3/uL    Absolute NRBCs 0.0 10e3/uL   US Lower Extremity Venous Duplex Bilateral    Narrative    VENOUS ULTRASOUND BOTH LEGS  2/21/2024 10:13 AM     HISTORY: leg pain rule out DVT    COMPARISON: None.    FINDINGS:  Examination of the deep veins with graded compression and  color flow Doppler with spectral wave form analysis was performed.   There is no evidence for DVT in the lower extremities.      Impression    IMPRESSION: No evidence of deep venous thrombosis.    EJ MANCINI MD         SYSTEM ID:  [id number]   Hemoglobin   Result Value Ref Range    Hemoglobin 8.1 (L) 13.3 - 17.7 g/dL   Troponin T, High Sensitivity   Result Value Ref Range    Troponin T, High Sensitivity 59 (H) <=22 ng/L   Hemoglobin   Result Value Ref Range    Hemoglobin 8.6 (L) 13.3 - 17.7 g/dL       All relevant imaging and laboratory values personally reviewed.

## 2024-02-21 NOTE — PROCEDURES
Interventional Radiology Post-Procedure Note   ?   Brief Procedure Note:   Patient name: Jean Pierre Roblero Pt MRN:[id number]   Date of procedure: 2/21/2024     Procedure(s): Mesenteric angiogram  Sedation method: Moderate sedation was employed. The patient was monitored by an interventional radiology nurse at all times throughout the procedure under my direct guidance.  Pre Procedure Diagnosis: GI hemorrhage  Post Procedure Diagnosis: Same  ?   Attending: Cheng Watson M.D.  Specimen(s) removed: None   Additional studies ordered: None  Drains: None  Estimated Blood Loss: Minimal  Complications: None  Vascular closure method: Right CFA Perclose    Findings/Notes/Comments:   No extravasation on the selective or superselective angiography.   ?   Please see dictation in PACS or under the Imaging tab in EPIC for detailed procedure note.     Cheng Watson M.D.   Vascular and Interventional Radiology     2/21/2024  1:34 AM

## 2024-02-21 NOTE — PHARMACY-ADMISSION MEDICATION HISTORY
Pharmacist Admission Medication History    Admission medication history is complete. The information provided in this note is only as accurate as the sources available at the time of the update.    Information Source(s): Facility ([facility]/) medication list/MAR ([facility] [address]) via N/A    Pertinent Information: Recently completed Medrol Dosepak  -Planned to change famotidine to protonix tonight 2/20  -Xarelto put on hold today and Miralax put on hold yesterday    Changes made to PTA medication list:  Added: None  Deleted: None  Changed: None    Medication History Completed By: Haley Menezes [facility] 2/20/2024 10:15 PM    Prior to Admission medications    Medication Sig Last Dose Taking? Auth Provider Long Term End Date   acetaminophen (TYLENOL) 500 MG tablet Take 1,000 mg by mouth every 8 hours as needed for pain 2/19/2024 at 0900 Yes Reported, Patient No    atorvastatin (LIPITOR) 10 MG tablet 1 tablet (10 mg) by Oral or Feeding Tube route every evening 2/19/2024 at 2000 Yes Zeny Brown MD Yes    butalbital-acetaminophen-caffeine (ESGIC) -40 MG tablet Take 1 tablet by mouth every 4 hours as needed for headaches  Yes Lilia Rivero MD     clotrimazole-betamethasone (LOTRISONE) 1-0.05 % external cream Apply topically 2 times daily as needed (take for 7 days at a time for facial rash flares)  Yes Unknown, Entered By History     famotidine (PEPCID) 40 MG tablet Take 40 mg by mouth at bedtime 2/19/2024 at hs-plan change to protonix 40mg BID 2/20 Yes Unknown, Entered By History     ferrous sulfate (FEROSUL) 325 (65 Fe) MG tablet Take 1 tablet (325 mg) by mouth every other day 2/20/2024 at am Yes Lilia Rivero MD     finasteride (PROSCAR) 5 MG tablet Take 5 mg by mouth daily 2/20/2024 at am Yes Unknown, Entered By History     Lidocaine (LIDOCARE) 4 % Patch Place 1-2 patches onto the skin every 24 hours To prevent lidocaine toxicity, patient should be patch free for 12 hrs daily.  Patient taking  differently: Place 1 patch onto the skin every 24 hours To prevent lidocaine toxicity, patient should be patch free for 12 hrs daily. 2/20/2024 at 0800 to back Yes Lilia Rivero MD     loratadine (CLARITIN) 10 MG tablet Take 10 mg by mouth daily 2/20/2024 at am Yes Unknown, Entered By History     memantine (NAMENDA) 10 MG tablet Take 10 mg by mouth 2 times daily 2/20/2024 at am Yes Unknown, Entered By History     multivitamin  with lutein (OCUVITE WITH LTEIN) CAPS per capsule Take 1 capsule by mouth daily 2/20/2024 at am Yes Unknown, Entered By History     polyethylene glycol (MIRALAX) 17 GM/Dose powder Take 17 g by mouth daily 2/18/2024 at Held 2/19 & 2/20 Yes Lilia Rivero MD     rivaroxaban ANTICOAGULANT (XARELTO) 20 MG TABS tablet Take 20 mg by mouth daily (with dinner) 2/19/2024 at 1700- held 2/20 Yes Unknown, Entered By History No    sennosides (SENOKOT) 8.6 MG tablet Take 1 tablet by mouth 2 times daily as needed for constipation  Yes Lilia Rivero MD     sodium chloride (OCEAN) 0.65 % nasal spray Spray 1 spray into both nostrils daily as needed for congestion (alternates use with Nasacort)  Yes Unknown, Entered By History     triamcinolone (NASACORT) 55 MCG/ACT nasal aerosol Spray 1 spray into both nostrils daily as needed (congestion, alternates use with Ocean spray)  Yes Unknown, Entered By History     vitamin C (ASCORBIC ACID) 500 MG tablet Take 500 mg by mouth daily 2/20/2024 at mid-day Yes Unknown, Entered By History     vitamin D3 (CHOLECALCIFEROL) 2000 units (50 mcg) tablet Take 2,000 Units by mouth daily Noon 2/20/2024 at 1200 Yes Unknown, Entered By History     Pantoprazole 40mg tablet Take 40mg by mouth twice daily Plan to start 2/20/24

## 2024-02-21 NOTE — CONSULTS
"[facility]  Colon and Rectal Surgery Consult Note  Name: Jean Pierre Roblero    MRN: [id number]  YOB: 1934    Age: 89 year old  Date of admission: 2/20/2024  Primary care provider: Zaki Alaniz     Requesting Physician:  Dr. Del Castillo  Reason for consult:  Lower GI bleed           History of Present Illness:   Jean Pierre Roblero is a 89 year old male with a history of TIA and DVT on DOAC, dementia, diverticular disease, recent hospitalization for back pain due to spinal stenosis, seen at the request of Dr. Del Castillo, who is admitted with lower GI bleeding and acute L1 lumbar spine fracture.  See H&P for details of recent hospitalization.  He presented to the ED yesterday for acute lower back pain and hematochezia.  He was tachycardic but otherwise vitally stable at presentation.  WBC was normal as was lactate.  Hgb was 9.6 initially and dropped to 8.5 on recheck a few hours later, it had been 13.7 on 1/22/2024.  CTA abdomen/pelvis showed active arterial extravasation in the distal descending colon as well as a high attenuation lesion in the left abductor triston muscle belly favoring an intramuscular hematoma.  There is also an acute L1 vertebral body fracture.  He was admitted for further evaluation and management.  IR was consulted and performed angiography but no extravasation was identified.  Subsequent Hgb checks have been 8.7, 8.3, and now 8.1 this morning.    I met with Rex this morning and he fell asleep multiple times when I was speaking with him so it was difficult to get information from him directly.  He denies abdominal or rectal pain.  He does not think he has had any further bleeding.  Discussed with RN who noted a few small BMs with old blood overnight but no obvious ongoing active bleeding.  Remains hemodynamically stable. Per note from an annual visit with his PCP in care everywhere from 1/19/24: \"His primary complaint remains that of his chronic constipation. He states that " "despite eating 2-3 prunes with each meal, taking a stool softener twice per day with meals, and drinking 30 mL of milk of magnesia every 2 or 3 days, he is passing a firm stool once every 2 to 3 days followed by the passage of loose or liquid stool for several minutes thereafter.\" There is another note from 02/2021 that mentions he wanted to have a coloscopy due to changes in his bowel habits.       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 ml/min     Cognitive impairment     Diverticular disease of colon     DVT (deep venous thrombosis) (H) 05/2023    RLE    Gastroesophageal reflux disease     Hyperlipidemia     Iron deficiency     Migraine     Mitral stenosis     Osteoarthritis     Photosensitivity     Presbyesophagus     Seasonal allergies     Spinal stenosis     TIA (transient ischemic attack)              Past Surgical History:     Past Surgical History:   Procedure Laterality Date    BACK SURGERY      CHOLECYSTECTOMY      ENDOSCOPIC SINUS SURGERY  8/20/2012    Procedure: ENDOSCOPIC SINUS SURGERY;  ENDOSCOPIC REMOVAL OF LEFT NASAL MASS WITH LANDMARKS (Fusion Tracking [place]), BILATERAL INFERIOR TURBINOPLASTY;  Surgeon: Dima Younger MD;  Location: [facility]    EYE SURGERY      cataracts    HERNIA REPAIR      IR VISCERAL ANGIOGRAM  2/21/2024    ORTHOPEDIC SURGERY      ingrown toe nail removal    TURBINOPLASTY  8/20/2012    Procedure: TURBINOPLASTY;;  Surgeon: Dima Younger MD;  Location: [facility]               Social History:     Social History     Tobacco Use    Smoking status: Never    Smokeless tobacco: Never   Substance Use Topics    Alcohol use: No             Family History:   No family history on file.          Allergies:     Allergies   Allergen " "Reactions    Chocolate Anaphylaxis     headache    Lamotrigine Unknown     Other Reaction(s): severe muscle pain, trouble walking    Propranolol Unknown     Other Reaction(s): swelling of tongue, irritation to tongue and lips, weight gain    Valproic Acid      Other Reaction(s): tongue swelling    Verapamil Other (See Comments)     Other Reaction(s): swollen tongue and swollen lips    Per patient possible reason for tongue swelling in the past    Zonisamide Unknown    Amoxicillin Unknown     He doesn't recall any allergy to this medication    Feathers     Mirabegron Unknown    Nabumetone Diarrhea    Novocaine [Procaine]      Passes out almost immediately    Oxycodone Other (See Comments)     Hallucinations    Solifenacin Unknown    Strawberry Extract GI Disturbance     Most berries    Tramadol Other (See Comments)    Amitriptyline Fatigue and Other (See Comments)     Other Reaction(s): ??back pain, memory loss    \"reduced memory\"    Topiramate Other (See Comments)     Other Reaction(s): ?? heart issues, elevated HR and elevated BP    \"reduce memory\"             Medications:      lidocaine  10 mL Intradermal Once    sodium chloride (PF)  3 mL Intracatheter Q8H    sodium chloride (PF)  3 mL Intracatheter Q8H             Review of Systems:   A comprehensive greater than 10 system review of systems was carried out.  Pertinent positives and negatives are noted above.  Otherwise negative for contributory info.            Physical Exam:     Blood pressure 104/59, pulse 96, temperature 98.6  F (37  C), temperature source Oral, resp. rate 14, weight 60.3 kg (132 lb 15 oz), SpO2 97%.    Intake/Output Summary (Last 24 hours) at 2/21/2024 0741  Last data filed at 2/21/2024 0600  Gross per 24 hour   Intake 375 ml   Output --   Net 375 ml     Exam:  General - Awake alert and oriented, appears stated age. Tired and falls asleep multiple times while speaking.   Pulm - Non-labored breathing with normal respiratory effort  CVS - reg " rate and rhythm, no peripheral edema  Abd - soft, non-tender, non-distended.  No guarding, rigidity or peritoneal signs.   Neuro - CN II-XII grossly intact  Musculoskeletal - extremities with no clubbing, cyanosis or edema; able to ambulate  Psych - responsive, alert, cooperative; oriented x3; appropriate mood and affect  External/skin - inspection reveals no rashes, lesions or ulcers, normal coloring             Data Reviewed:     Recent Results (from the past 48 hour(s))   CTA Abdomen Pelvis with Contrast   Result Value    Radiologist flags Proximal left thigh MR in 3 months.    Narrative    EXAM: CTA ABDOMEN PELVIS WITH CONTRAST  LOCATION: [facility]  DATE: 2/20/2024    INDICATION: Rectal bleeding; on anticoagulation.  COMPARISON: None available.  TECHNIQUE: CT angiogram abdomen pelvis during precontrast, arterial, and portal venous phase of injection of IV contrast. 2D and 3D MIP reconstructions were performed by the CT technologist. Dose reduction techniques were used.  CONTRAST: 72 mL Isovue 370.    FINDINGS:    AORTO-ILIAC: Dilated aortic root, measuring at least 4.3 cm above the sinotubular junction, incompletely imaged and evaluated.    Moderate to severe atheromatous disease of the abdominal aorta and branch vessels. No evidence of aneurysm or dissection.    There is classic hepatic arterial anatomy.     There are single bilateral renal arteries.    LOWER CHEST: Tree-in-bud opacities, greatest within the right middle lobe and possibly chronic. Atherosclerosis of the visualized thoracic aorta and coronary arteries.    HEPATOBILIARY: Liver enhances normally and is normal in size.    Cholecystectomy.    No intrahepatic or intrahepatic biliary ductal dilatation.    PANCREAS: Enhances normally. No peripancreatic inflammatory fat stranding.    SPLEEN: Enhances normally. Normal size.    ADRENAL GLANDS: Normal.    KIDNEYS: Both kidneys enhance symmetrically, without hydronephrosis.  Low-attenuation subcentimeter renal lesion(s). These are compatible with small benign cysts and no specific imaging evaluation or follow-up is recommended.    No nephroureterolithiasis.    Urinary bladder is unremarkable.    PELVIC ORGANS: No suspicious abnormality.    BOWEL: No evidence of acute gastrointestinal inflammation or obstruction. Colonic diverticulosis, without evidence of diverticulitis.     Arterial extravasation involving the distal descending colon, series 7 image 247, which accumulates on the venous phase imaging.    No intraperitoneal free fluid or free air.    LYMPH NODES: No suspicious abdominal or pelvic lymphadenopathy.    VASCULATURE: See arterial findings, as detailed above.    MUSCULOSKELETAL: Acute, nondisplaced fracture involving the anterior one half of the L1 vertebral body. Posterior decompression changes of the lower lumbar spine. High attenuation lesion within the proximal aspect of the left adductor triston muscle   belly, measuring 3.8 x 4.1 cm in cross-section, incompletely imaged distally.    OTHER: No additionally significant abnormalities.      Impression    IMPRESSION:   1.  Active arterial extravasation identified within the distal descending colon.  2.  High attenuation lesion of the left abductor tirston muscle belly, favoring an intramuscular hematoma. MR imaging follow-up recommended in 3 months to document resolution of this finding, as an underlying malignancy cannot be excluded.  3.  Acute, nondisplaced anterior column fracture of the L1 vertebral body.  4.  No evidence of acute gastrointestinal inflammation or obstruction.  5.  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 Nonemergent CT angiography of the chest is recommended, when clinically feasible.    Impression 1, 2, and 3 discussed verbally via telephone with Dr. Harden at 10:10 PM on 02/20/2024.      [Recommend Follow Up: Proximal left thigh MR in 3 months.]    This  report will be copied to the [facility] to ensure a provider acknowledges the finding.      IR Visceral Angiogram    Narrative    [facility]  LOCATION: [facility]  DATE: 2/21/2024    PROCEDURE: SELECTIVE AND SUPERSELECTIVE MESENTERIC ARTERIOGRAPHY  1.  Ultrasound-guided access of the right common femoral artery. A permanent image was stored.  2.  Digital subtraction superior mesenteric arteriography (first order).  3.  Digital subtraction celiac arteriography (first order).  4.  Digital subtraction inferior mesenteric arteriography (first order).  5.  Digital subtraction median sacral arteriography (second order).  6.  Digital subtraction distal sigmoidal branch arteriography (second order).  7.  Digital subtraction proximal sigmoidal branch arteriography (second order).  8.  Digital subtraction descending left colic arteriography (second order).  9.  Arterial closure device.  10.  Moderate sedation.    INTERVENTIONAL RADIOLOGIST: Cheng Watson MD.    INDICATION: 89-year-old male with blood loss anemia in the setting of gastrointestinal hemorrhage. CT arteriography performed earlier this evening suggests localization to the lower descending colon.    CONSENT: The risks, benefits and alternatives of the stated procedure were discussed with the patient  in detail. All questions were answered. Informed consent was given to proceed with the procedure.    MODERATE SEDATION: Versed 1 mg IV; Fentanyl 50 mcg IV.  During the timeout, immediately prior to the administration of medications, the patient was reassessed for adequacy to receive conscious sedation. Under physician supervision, Versed and fentanyl   were administered for moderate sedation. Pulse oximetry, heart rate and blood pressure were continuously monitored by an independent trained observer. The physician spent 25 minutes of face-to-face sedation time with the patient.    CONTRAST: 55 mL Isovue-300.  ANTIBIOTICS:  None.  ADDITIONAL MEDICATIONS: None.    FLUOROSCOPIC TIME: 10.0 minutes.  RADIATION DOSE: Air Kerma: 439 mGy.    COMPLICATIONS: No immediate complications.    STERILE BARRIER TECHNIQUE: Maximum sterile barrier technique was used. Cutaneous antisepsis was performed at the operative site with application of 2% chlorhexidine and large sterile drape. Prior to the procedure, the  and assistant performed   hand hygiene and wore hat, mask, sterile gown, and sterile gloves during the entire procedure.    PROCEDURE:    Using real-time ultrasound guidance, access was achieved into the right common femoral artery and conversion was made for a 5 Danish vascular sheath which was attached to a continuous heparinized saline infusion.    A Alonso 1 catheter was utilized to selectively engage the superior mesenteric artery and digital subtraction arteriography was obtained.    The Alonso 1 catheter was utilized to selectively engage the celiac artery and digital subtraction arteriography was obtained.    The Alonso 1 catheter was utilized to selectively engage the inferior mesenteric artery and digital subtraction arteriography was obtained. A Progreat microcatheter was utilized to selectively catheterize the median sacral artery, distal sigmoidal   artery, proximal sigmoidal artery and descending left colic artery with digital subtraction arteriography performed in each.    The catheters and sheath were removed with hemostasis achieved via a Perclose suture device with additional manual compression.    FINDINGS:  Ultrasound demonstrates a patent and pulsatile right common femoral artery. A permanent image was stored.    The digital subtraction superior mesenteric arteriography shows no identified site of extravasation.    The digital subtraction celiac arteriography shows no identified site of extravasation.    The digital subtraction inferior mesenteric arteriography shows no identified site of extravasation.    The  digital subtraction median sacral arteriography shows no identified site of extravasation.    The digital subtraction distal sigmoidal branch arteriography shows no identified site of extravasation.    The digital subtraction proximal sigmoidal branch arteriography shows no identified site of extravasation.    The digital subtraction descending left colic branch arteriography shows no identified site of extravasation. This vessel supplies the vascular territory suspicious for extravasation on the CT from earlier today.      Impression    IMPRESSION:    No identified extravasation on the selective or superselective mesenteric arteriography.         Recent Labs   Lab 02/21/24  0632 02/21/24  0241 02/20/24  2346 02/20/24  2230 02/20/24 1947   WBC 6.2  --   --   --  7.1   HGB 8.1* 8.3* 8.7*   < > 9.6*   HCT 25.8*  --   --   --  29.2*   *  --   --   --  104*   *  --   --   --  154    < > = values in this interval not displayed.     Recent Labs   Lab 02/21/24  0632 02/21/24  0224 02/20/24 1947     --  135   POTASSIUM 4.1  --  4.3   CHLORIDE 105  --  101   CO2 25  --  23   ANIONGAP 7  --  11   * 93 131*   BUN 22.8  --  31.5*   CR 0.95  --  1.02   GFRESTIMATED 77  --  70   KENYA 8.1*  --  8.6*   PROTTOTAL  --   --  6.1*   ALBUMIN  --   --  3.6   BILITOTAL  --   --  0.5   ALKPHOS  --   --  74   AST  --   --  27   ALT  --   --  19         Assessment and Plan:   Jean Pierre Roblero is a 89 year old male with a history of TIA and DVT on DOAC, dementia, recent admission for back pain, who presented with acute low back pain and hematochezia.  He was found to be anemic with CTA suggestive of active extravasation in the distal descending colon.  IR attempted angiography but did not find any active bleeding.  Per RN report he has not had any additional episodes of active bleeding, he has just passed a little bit of old blood. He fell asleep multiple times when talking to me this morning so most of the history  is gathered from chart review, but he does deny abdominal or rectal pain.  There are notes in care everywhere that mention concerns about changes in bowel movements for at least the last 3 years. Hgb has slowly drifted down from 9.6 initially to 8.1 this morning.  Remains hemodynamically stable.  Abdominal exam is benign.    No indication for urgent surgical intervention at this time.  Continue bowel rest, holding anticoagulation, and serial Hgb checks. Differential includes diverticular bleeding, AVM, or potentially underlying malignancy.  Agree with GI consult for endoscopic evaluation, he has not had a colonoscopy in 17 years.  If he has recurrent active bleeding that cannot be addressed by GI or IR interventions surgery may become necessary, however this would be high risk given his age and comorbidities.  Unclear what his baseline mental status is but he was not able to discuss this this morning, so if it seems to be heading that direction would need to discuss with family.    Plan:  Admit to medicine  Surgery: No indication for urgent surgery at this time.  Diet: NPO  IV Fluids: Per medicine  Antibiotics:  No indication  Medications: Hold DOAC  I&O s:  strict I&O s   Labs:   - Reviewed: Yes  - Serial Hgb checks   Imaging:   - Dr. Mxion and myself have personally viewed: CTA abd/pelvis  Activity:  OOB, ambulate as able  DVT prophylaxis: Per medicine  This plan has been discussed with Dr. Mixon    Patient specific identified risk factors considered as part of today s evaluation include: anticoagulation, CKD, dementia, prior abdominal surgery     Additional history obtained from chart review.  Time spent on consultation: 40 minutes, greater than 50% spent on counseling and/or coordination of care.      Roshan Tian PA-C  Colorectal Physician Assistant    [facility]  [address] [place] [address]  T: [phone]  F: [phone]

## 2024-02-21 NOTE — ED TRIAGE NOTES
Patient presents with two episodes of bright red blood today. Denies feeling light headed or dizzy. He fell approximately one week ago and has bruising noted to his left knee and face.      Triage Assessment (Adult)       Row Name 02/20/24 1930          Triage Assessment    Airway WDL WDL        Respiratory WDL    Respiratory WDL WDL  bruises in varrious stanges of healing to left knee and face.        Skin Circulation/Temperature WDL    Skin Circulation/Temperature WDL all        Cardiac WDL    Cardiac WDL X;rhythm     Cardiac Rhythm ST        Peripheral/Neurovascular WDL    Peripheral Neurovascular WDL WDL        Cognitive/Neuro/Behavioral WDL    Cognitive/Neuro/Behavioral WDL WDL

## 2024-02-21 NOTE — ED NOTES
"[facility]    ED Nurse Handoff Report    ED Chief complaint: Rectal Bleeding      ED Diagnosis:   Final diagnoses:   None       Code Status: Full Code    Allergies:   Allergies   Allergen Reactions    Chocolate Anaphylaxis     headache    Lamotrigine Unknown     Other Reaction(s): severe muscle pain, trouble walking    Propranolol Unknown     Other Reaction(s): swelling of tongue, irritation to tongue and lips, weight gain    Valproic Acid      Other Reaction(s): tongue swelling    Verapamil Other (See Comments)     Other Reaction(s): swollen tongue and swollen lips    Per patient possible reason for tongue swelling in the past    Zonisamide Unknown    Amoxicillin Unknown     He doesn't recall any allergy to this medication    Feathers     Mirabegron Unknown    Nabumetone Diarrhea    Novocaine [Procaine]      Passes out almost immediately    Oxycodone Other (See Comments)     Hallucinations    Solifenacin Unknown    Strawberry Extract GI Disturbance     Most berries    Tramadol Other (See Comments)    Amitriptyline Fatigue and Other (See Comments)     Other Reaction(s): ??back pain, memory loss    \"reduced memory\"    Topiramate Other (See Comments)     Other Reaction(s): ?? heart issues, elevated HR and elevated BP    \"reduce memory\"       Patient Story:  Pt BIBA for having 2 episodes of bright red diarrhea. Pt also reports having had diarrhea all week. Of note: pt fell last week and has significant bruising on left thigh and mild bruising on left cheek.     Focused Assessment:    Pt is Alert to self, disoriented to situation, time and place. Pt is able to move well in bed but needs assistance in cares. While in ER, the pt has had 1 episode of large amount of dark red diarrhea. Pt has healing bruises on left thigh, left cheek     Addendum 2255: CT results show active intestinal bleeding. KCentra being administered. IR being consulted. Serial repeat Hgb. Blood transfusion being held at the moment per " ER MD.     Labs Ordered and Resulted from Time of ED Arrival to Time of ED Departure   COMPREHENSIVE METABOLIC PANEL - Abnormal       Result Value    Sodium 135      Potassium 4.3      Carbon Dioxide (CO2) 23      Anion Gap 11      Urea Nitrogen 31.5 (*)     Creatinine 1.02      GFR Estimate 70      Calcium 8.6 (*)     Chloride 101      Glucose 131 (*)     Alkaline Phosphatase 74      AST 27      ALT 19      Protein Total 6.1 (*)     Albumin 3.6      Bilirubin Total 0.5     CBC WITH PLATELETS AND DIFFERENTIAL - Abnormal    WBC Count 7.1      RBC Count 2.81 (*)     Hemoglobin 9.6 (*)     Hematocrit 29.2 (*)      (*)     MCH 34.2 (*)     MCHC 32.9      RDW 14.6      Platelet Count 154      % Neutrophils 74      % Lymphocytes 15      % Monocytes 8      % Eosinophils 2      % Basophils 0      % Immature Granulocytes 1      NRBCs per 100 WBC 0      Absolute Neutrophils 5.3      Absolute Lymphocytes 1.1      Absolute Monocytes 0.5      Absolute Eosinophils 0.1      Absolute Basophils 0.0      Absolute Immature Granulocytes 0.1      Absolute NRBCs 0.0     LACTIC ACID WHOLE BLOOD - Normal    Lactic Acid 1.3     TYPE AND SCREEN, ADULT    ABO/RH(D) O POS      Antibody Screen Negative      SPECIMEN EXPIRATION DATE [id number]     ABO/RH TYPE AND SCREEN       CTA Abdomen Pelvis with Contrast    (Results Pending)         Treatments and/or interventions provided:      Medications   Saline (80 mLs As instructed $Given 2/20/24 2105)   iopamidol (ISOVUE-370) solution 72 mL (72 mLs Intravenous $Given 2/20/24 2104)       Patient's response to treatments and/or interventions:    Pt resting in bed    To be done/followed up on inpatient unit:   See any in-patient orders    Does this patient have any cognitive concerns?: Disoriented to time, Disoriented to place, and Disoriented to situation    Activity level - Baseline/Home:    Unknown    Activity Level - Current:    Total Care    Patient's Preferred language:  English     Needed?: No    Isolation: None  Infection: Not Applicable  Patient tested for COVID 19 prior to admission: NO    Bariatric?: Yes    Vital Signs:   Vitals:    02/20/24 2010 02/20/24 2025 02/20/24 2040 02/20/24 2055   BP: (!) 142/83  133/78    Pulse:       Resp:       Temp:       TempSrc:       SpO2: 96% 99% 98% 98%   Weight:           Cardiac Rhythm:Cardiac Rhythm: Sinus tachycardia    Was the PSS-3 completed:   Yes  What interventions are required if any?               Family Comments: Wife at bedside  OBS brochure/video discussed/provided to patient/family: N/A              Name of person given brochure if not patient:               Relationship to patient:     For the majority of the shift this patient's behavior was Green.  Behavioral interventions performed were .    ED NURSE PHONE NUMBER: [phone]

## 2024-02-21 NOTE — PROGRESS NOTES
[facility]    Medicine Progress Note - Hospitalist Service    Date of Admission:  2/20/2024    Assessment & Plan   Jean Pierre Roblero is a markedly pleasant 89 year old gentleman with past medical history that is most notable for prior TIA and DVT on DOAC, as well as chronic dementia, diverticular disease, and recent hospitalization for back pain due to spinal stenosis; who presents with hematochezia, and is found to have acute anti-coagulation associated lower GI bleeding causing acute blood loss anemia, as well as acute L1 lumbar spine fracture.     Acute anti-coagulation associated lower GI bleeding causing acute blood loss anemia:  -CTA of the abdomen and pelvis shows active arterial extravasation identified within the distal descending colon, as well as a high attenuation lesion of the left abductor triston muscle belly, favoring an intramuscular hematoma.  -Appreciate IR review.  Status post descending angiogram 02/21 that did not show any extravasation on the selective or superselective angiography.  -Appreciate colorectal surgery review  -Monitor hemoglobin closely    -- Will obtain US of bilateral LE to determine if previous DVT have now resolved    -- MR imaging follow-up has been recommended in 3 months to document resolution of acute left abductor muscle belly hematoma, as an underlying malignancy cannot be excluded at this time.    Acute L1 lumbar spine fracture:     -- Bedrest. Fall precautions. Neurosurgery consulted. Tylenol, Oxycodone, IV Dilaudid as needed for pain. Anti-emetics as needed.     Elevated Troponin: mildly elevated Troponin T . This could be due to demand ischemia due to acute illness. Noting that recent TTE earlier this month showed preserved LVEF, ascending aorta dilation, mild mitral stenosis, and negative bubble study.    -- Continue telemetry.  Await echo    -- Outpatient follow up of 4.5 cm aorta dilation advised at discharge     CKD 2:     Hyperlipidemia: ct  home atorvastatin      BPH: ct home finasteride      GERD and presbyesophagus: Noted to be baron  regular diet as an outpatient.    -- ct PPI/famotidine when verified     Chronic dementia: Monitor for sundowning while hospitalized.    -- ct home memantine         Diet: Advance Diet as Tolerated: Clear Liquid Diet    DVT Prophylaxis: Pneumatic Compression Devices  Puckett Catheter: Not present  Lines: None     Cardiac Monitoring: ACTIVE order. Indication: IMC  Code Status: Full Code      Clinically Significant Risk Factors Present on Admission          # Hypocalcemia: Lowest Ca = 8.1 mg/dL in last 2 days, will monitor and replace as appropriate      # Drug Induced Coagulation Defect: home medication list includes an anticoagulant medication      # Dementia: noted on problem list        # Financial/Environmental Concerns:           Disposition Plan      Expected Discharge Date: 02/23/2024                Ciaran Segal MD  Hospitalist Service  [facility]  Securely message with frooly (more info)  Text page via [facility] Paging/Directory   The above note was dictated using voice recognition software. Although reviewed after completion, some word and grammatical error may remain . Please contact the author for any clarifications.  ______________________________________________________________________    Interval History   Events since admission noted.  Doing okay.  Denies any specific complaints at present  Complains of persistent back pain    Physical Exam   /78   Pulse 86   Temp 97.6  F (36.4  C) (Axillary)   Resp 28   Wt 60.3 kg (132 lb 15 oz)   SpO2 99%   BMI 20.82 kg/m    Gen- pleasant   Neck- supple  CVS- I+II+ no m/r/g  RS- CTAB  Abdo- soft, mild tenderness . No g/r/r      Medical Decision Making       51 MINUTES SPENT BY ME on the date of service doing chart review, history, exam, documentation & further activities per the note.      Data   ------------------------- PAST 24 HR DATA  REVIEWED -----------------------------------------------    I have personally reviewed the following data over the past 24 hrs:    6.2  \   8.1 (L)   / 139 (L)     137 105 22.8 /  111 (H)   4.1 25 0.95 \     ALT: 19 AST: 27 AP: 74 TBILI: 0.5   ALB: 3.6 TOT PROTEIN: 6.1 (L) LIPASE: N/A     Trop: 59 (H) BNP: N/A     Procal: N/A CRP: N/A Lactic Acid: 1.3         Imaging results reviewed over the past 24 hrs:   Recent Results (from the past 24 hour(s))   CTA Abdomen Pelvis with Contrast   Result Value    Radiologist flags Proximal left thigh MR in 3 months.    Narrative    EXAM: CTA ABDOMEN PELVIS WITH CONTRAST  LOCATION: [facility]  DATE: 2/20/2024    INDICATION: Rectal bleeding; on anticoagulation.  COMPARISON: None available.  TECHNIQUE: CT angiogram abdomen pelvis during precontrast, arterial, and portal venous phase of injection of IV contrast. 2D and 3D MIP reconstructions were performed by the CT technologist. Dose reduction techniques were used.  CONTRAST: 72 mL Isovue 370.    FINDINGS:    AORTO-ILIAC: Dilated aortic root, measuring at least 4.3 cm above the sinotubular junction, incompletely imaged and evaluated.    Moderate to severe atheromatous disease of the abdominal aorta and branch vessels. No evidence of aneurysm or dissection.    There is classic hepatic arterial anatomy.     There are single bilateral renal arteries.    LOWER CHEST: Tree-in-bud opacities, greatest within the right middle lobe and possibly chronic. Atherosclerosis of the visualized thoracic aorta and coronary arteries.    HEPATOBILIARY: Liver enhances normally and is normal in size.    Cholecystectomy.    No intrahepatic or intrahepatic biliary ductal dilatation.    PANCREAS: Enhances normally. No peripancreatic inflammatory fat stranding.    SPLEEN: Enhances normally. Normal size.    ADRENAL GLANDS: Normal.    KIDNEYS: Both kidneys enhance symmetrically, without hydronephrosis. Low-attenuation subcentimeter  renal lesion(s). These are compatible with small benign cysts and no specific imaging evaluation or follow-up is recommended.    No nephroureterolithiasis.    Urinary bladder is unremarkable.    PELVIC ORGANS: No suspicious abnormality.    BOWEL: No evidence of acute gastrointestinal inflammation or obstruction. Colonic diverticulosis, without evidence of diverticulitis.     Arterial extravasation involving the distal descending colon, series 7 image 247, which accumulates on the venous phase imaging.    No intraperitoneal free fluid or free air.    LYMPH NODES: No suspicious abdominal or pelvic lymphadenopathy.    VASCULATURE: See arterial findings, as detailed above.    MUSCULOSKELETAL: Acute, nondisplaced fracture involving the anterior one half of the L1 vertebral body. Posterior decompression changes of the lower lumbar spine. High attenuation lesion within the proximal aspect of the left adductor triston muscle   belly, measuring 3.8 x 4.1 cm in cross-section, incompletely imaged distally.    OTHER: No additionally significant abnormalities.      Impression    IMPRESSION:   1.  Active arterial extravasation identified within the distal descending colon.  2.  High attenuation lesion of the left abductor triston muscle belly, favoring an intramuscular hematoma. MR imaging follow-up recommended in 3 months to document resolution of this finding, as an underlying malignancy cannot be excluded.  3.  Acute, nondisplaced anterior column fracture of the L1 vertebral body.  4.  No evidence of acute gastrointestinal inflammation or obstruction.  5.  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 Nonemergent CT angiography of the chest is recommended, when clinically feasible.    Impression 1, 2, and 3 discussed verbally via telephone with Dr. Harden at 10:10 PM on 02/20/2024.      [Recommend Follow Up: Proximal left thigh MR in 3 months.]    This report will be copied to the  [facility] to ensure a provider acknowledges the finding.      IR Visceral Angiogram    Narrative    [facility]  LOCATION: [facility]  DATE: 2/21/2024    PROCEDURE: SELECTIVE AND SUPERSELECTIVE MESENTERIC ARTERIOGRAPHY  1.  Ultrasound-guided access of the right common femoral artery. A permanent image was stored.  2.  Digital subtraction superior mesenteric arteriography (first order).  3.  Digital subtraction celiac arteriography (first order).  4.  Digital subtraction inferior mesenteric arteriography (first order).  5.  Digital subtraction median sacral arteriography (second order).  6.  Digital subtraction distal sigmoidal branch arteriography (second order).  7.  Digital subtraction proximal sigmoidal branch arteriography (second order).  8.  Digital subtraction descending left colic arteriography (second order).  9.  Arterial closure device.  10.  Moderate sedation.    INTERVENTIONAL RADIOLOGIST: Cheng Watson MD.    INDICATION: 89-year-old male with blood loss anemia in the setting of gastrointestinal hemorrhage. CT arteriography performed earlier this evening suggests localization to the lower descending colon.    CONSENT: The risks, benefits and alternatives of the stated procedure were discussed with the patient  in detail. All questions were answered. Informed consent was given to proceed with the procedure.    MODERATE SEDATION: Versed 1 mg IV; Fentanyl 50 mcg IV.  During the timeout, immediately prior to the administration of medications, the patient was reassessed for adequacy to receive conscious sedation. Under physician supervision, Versed and fentanyl   were administered for moderate sedation. Pulse oximetry, heart rate and blood pressure were continuously monitored by an independent trained observer. The physician spent 25 minutes of face-to-face sedation time with the patient.    CONTRAST: 55 mL Isovue-300.  ANTIBIOTICS: None.  ADDITIONAL MEDICATIONS:  None.    FLUOROSCOPIC TIME: 10.0 minutes.  RADIATION DOSE: Air Kerma: 439 mGy.    COMPLICATIONS: No immediate complications.    STERILE BARRIER TECHNIQUE: Maximum sterile barrier technique was used. Cutaneous antisepsis was performed at the operative site with application of 2% chlorhexidine and large sterile drape. Prior to the procedure, the  and assistant performed   hand hygiene and wore hat, mask, sterile gown, and sterile gloves during the entire procedure.    PROCEDURE:    Using real-time ultrasound guidance, access was achieved into the right common femoral artery and conversion was made for a 5 [place] vascular sheath which was attached to a continuous heparinized saline infusion.    A Alonso 1 catheter was utilized to selectively engage the superior mesenteric artery and digital subtraction arteriography was obtained.    The Alonso 1 catheter was utilized to selectively engage the celiac artery and digital subtraction arteriography was obtained.    The Alonso 1 catheter was utilized to selectively engage the inferior mesenteric artery and digital subtraction arteriography was obtained. A Progreat microcatheter was utilized to selectively catheterize the median sacral artery, distal sigmoidal   artery, proximal sigmoidal artery and descending left colic artery with digital subtraction arteriography performed in each.    The catheters and sheath were removed with hemostasis achieved via a Perclose suture device with additional manual compression.    FINDINGS:  Ultrasound demonstrates a patent and pulsatile right common femoral artery. A permanent image was stored.    The digital subtraction superior mesenteric arteriography shows no identified site of extravasation.    The digital subtraction celiac arteriography shows no identified site of extravasation.    The digital subtraction inferior mesenteric arteriography shows no identified site of extravasation.    The digital subtraction median sacral  arteriography shows no identified site of extravasation.    The digital subtraction distal sigmoidal branch arteriography shows no identified site of extravasation.    The digital subtraction proximal sigmoidal branch arteriography shows no identified site of extravasation.    The digital subtraction descending left colic branch arteriography shows no identified site of extravasation. This vessel supplies the vascular territory suspicious for extravasation on the CT from earlier today.      Impression    IMPRESSION:    No identified extravasation on the selective or superselective mesenteric arteriography.     US Lower Extremity Venous Duplex Bilateral    Narrative    VENOUS ULTRASOUND BOTH LEGS  2/21/2024 10:13 AM     HISTORY: leg pain rule out DVT    COMPARISON: None.    FINDINGS:  Examination of the deep veins with graded compression and  color flow Doppler with spectral wave form analysis was performed.   There is no evidence for DVT in the lower extremities.      Impression    IMPRESSION: No evidence of deep venous thrombosis.    EJ MANCINI MD         SYSTEM ID:  [id number]

## 2024-02-21 NOTE — CONSULTS
Gastroenterology Consult Note    Jean Pierre Roblero MRN#  [id number]   Age:  89 year old YOB: 1934    Date of Admission:  2/20/2024     REASON FOR CONSULT   Painless hematochezia     HISTORY OF PRESENT ILLNESS   89 year old CM with history of DVT on Xarelto along with mild dementia and hyperlipidemia is seen in GI consultation today for management of hematochezia at the request of Ciaran Segal MD.    Patient seen with wife at bedside, who assisted with providing history.  Patient with an acute onset of painless hematochezia yesterday morning.  Total of 3 or 4 bowel movements with passage of large volume bright red blood.  Patient denies any associated abdominal or anorectal pain.  No preceding episodes of nausea, emesis, or other upper GI symptoms including typical heartburn, dysphagia, or odynophagia.  Patient notes history of chronic constipation with bowel movement frequency once every other day on average.  Uses MiraLAX as needed with good relief.  No fiber supplement use.  Wife notes patient may have had 1 or 2 day history of loose consistency stools prior to development of hematochezia that patient refuted.  Denies any associated systemic complaints of chest pain, dyspnea, or dizziness.    No prior episodes of overt GI bleeding.  Last colonoscopy at least 15-20 years ago with reported unremarkable findings.  On chronic anticoagulation with Xarelto following diagnosis of DVT in 05/2023.  Not taking any over-the-counter NSAIDs.    On arrival to emergency room, patient was noted to have mild tachycardia, but otherwise stable vitals.  Blood work noted hemoglobin at 9.6 g/dL that has decreased to 8.6 g/dL on repeated draws.  Blood work otherwise unremarkable.  CT angiography noted active arterial extravasation in the distal descending colon along with high attenuation lesion of the left abductor triston muscle suggestive of intramuscular hematoma.  Subsequent IR guided angiography without any  active extravasation.       PAST HISTORY      Past Medical History:   Diagnosis Date    Ascending aorta dilation (H24)     BPH (benign prostatic hyperplasia)     CKD (chronic kidney disease) stage 2, GFR 60-89 ml/min     Cognitive impairment     Diverticular disease of colon     DVT (deep venous thrombosis) (H) 05/2023    RLE    Gastroesophageal reflux disease     Hyperlipidemia     Iron deficiency     Migraine     Mitral stenosis     Osteoarthritis     Photosensitivity     Presbyesophagus     Seasonal allergies     Spinal stenosis     TIA (transient ischemic attack)       Past Surgical History:   Procedure Laterality Date    BACK SURGERY      CHOLECYSTECTOMY      ENDOSCOPIC SINUS SURGERY  8/20/2012    Procedure: ENDOSCOPIC SINUS SURGERY;  ENDOSCOPIC REMOVAL OF LEFT NASAL MASS WITH LANDMARKS ([employer] [place]), BILATERAL INFERIOR TURBINOPLASTY;  Surgeon: Dima Younger MD;  Location: [facility]    EYE SURGERY      cataracts    HERNIA REPAIR      IR VISCERAL ANGIOGRAM  2/21/2024    ORTHOPEDIC SURGERY      ingrown toe nail removal    TURBINOPLASTY  8/20/2012    Procedure: TURBINOPLASTY;;  Surgeon: Dima Younger MD;  Location: [facility]      Family History Social History   No family history on file. Social History     Socioeconomic History    Marital status:      Spouse name: Not on file    Number of children: Not on file    Years of education: Not on file    Highest education level: Not on file   Occupational History    Not on file   Tobacco Use    Smoking status: Never    Smokeless tobacco: Never   Substance and Sexual Activity    Alcohol use: No    Drug use: No    Sexual activity: Yes     Partners: Female   Other Topics Concern    Parent/sibling w/ CABG, MI or angioplasty before 65F 55M? Not Asked   Social History Narrative    Not on file     Social Determinants of Health     Financial Resource Strain: Not on file   Food Insecurity: Not on file   Transportation Needs: Not on file   Physical Activity:  Not on file   Stress: Not on file   Social Connections: Not on file   Interpersonal Safety: Not on file   Housing Stability: Not on file         MEDICATIONS & ALLERGIES   Medications Prior to Admission   Medication Sig Dispense Refill Last Dose    acetaminophen (TYLENOL) 500 MG tablet Take 1,000 mg by mouth every 8 hours as needed for pain   2/19/2024 at 0900    atorvastatin (LIPITOR) 10 MG tablet 1 tablet (10 mg) by Oral or Feeding Tube route every evening 30 tablet 0 2/19/2024 at 2000    butalbital-acetaminophen-caffeine (ESGIC) -40 MG tablet Take 1 tablet by mouth every 4 hours as needed for headaches 10 tablet 0     clotrimazole-betamethasone (LOTRISONE) 1-0.05 % external cream Apply topically 2 times daily as needed (take for 7 days at a time for facial rash flares)       famotidine (PEPCID) 40 MG tablet Take 40 mg by mouth at bedtime   2/19/2024 at hs-plan change to protonix 40mg BID 2/20    ferrous sulfate (FEROSUL) 325 (65 Fe) MG tablet Take 1 tablet (325 mg) by mouth every other day   2/20/2024 at am    finasteride (PROSCAR) 5 MG tablet Take 5 mg by mouth daily   2/20/2024 at am    Lidocaine (LIDOCARE) 4 % Patch Place 1-2 patches onto the skin every 24 hours To prevent lidocaine toxicity, patient should be patch free for 12 hrs daily. (Patient taking differently: Place 1 patch onto the skin every 24 hours To prevent lidocaine toxicity, patient should be patch free for 12 hrs daily.)   2/20/2024 at 0800 to back    loratadine (CLARITIN) 10 MG tablet Take 10 mg by mouth daily   2/20/2024 at am    memantine (NAMENDA) 10 MG tablet Take 10 mg by mouth 2 times daily   2/20/2024 at am    multivitamin  with lutein (OCUVITE WITH LTEIN) CAPS per capsule Take 1 capsule by mouth daily   2/20/2024 at am    polyethylene glycol (MIRALAX) 17 GM/Dose powder Take 17 g by mouth daily   2/18/2024 at Held 2/19 & 2/20    rivaroxaban ANTICOAGULANT (XARELTO) 20 MG TABS tablet Take 20 mg by mouth daily (with dinner)    "2/19/2024 at 1700- held 2/20    sennosides (SENOKOT) 8.6 MG tablet Take 1 tablet by mouth 2 times daily as needed for constipation       sodium chloride (OCEAN) 0.65 % nasal spray Spray 1 spray into both nostrils daily as needed for congestion (alternates use with Nasacort)       triamcinolone (NASACORT) 55 MCG/ACT nasal aerosol Spray 1 spray into both nostrils daily as needed (congestion, alternates use with Ocean spray)       vitamin C (ASCORBIC ACID) 500 MG tablet Take 500 mg by mouth daily   2/20/2024 at mid-day    vitamin D3 (CHOLECALCIFEROL) 2000 units (50 mcg) tablet Take 2,000 Units by mouth daily Noon   2/20/2024 at 1200    pantoprazole (PROTONIX) 40 MG EC tablet Take 40 mg by mouth 2 times daily   Plan to start 2/20/24 at change from famotidine      Allergies   Allergen Reactions    Chocolate Anaphylaxis     headache    Lamotrigine Unknown     Other Reaction(s): severe muscle pain, trouble walking    Propranolol Unknown     Other Reaction(s): swelling of tongue, irritation to tongue and lips, weight gain    Valproic Acid      Other Reaction(s): tongue swelling    Verapamil Other (See Comments)     Other Reaction(s): swollen tongue and swollen lips    Per patient possible reason for tongue swelling in the past    Zonisamide Unknown    Amoxicillin Unknown     He doesn't recall any allergy to this medication    Feathers     Mirabegron Unknown    Nabumetone Diarrhea    Novocaine [Procaine]      Passes out almost immediately    Oxycodone Other (See Comments)     Hallucinations    Solifenacin Unknown    Strawberry Extract GI Disturbance     Most berries    Tramadol Other (See Comments)    Amitriptyline Fatigue and Other (See Comments)     Other Reaction(s): ??back pain, memory loss    \"reduced memory\"    Topiramate Other (See Comments)     Other Reaction(s): ?? heart issues, elevated HR and elevated BP    \"reduce memory\"        REVIEW OF SYSTEMS   Comprehensive 10+ points review of systems performed with " pertinent as per HPI above.    OBJECTIVE   Vitals BP (!) 141/80   Pulse 86   Temp 97.6  F (36.4  C) (Axillary)   Resp 28   Wt 60.3 kg (132 lb 15 oz)   SpO2 99%   BMI 20.82 kg/m          General:   Well-developed elderly CM in NAD. Somnolent, but arouses easily.   HEENT:   NC/AT. MMM.   Lungs:  No respiratory distress.   Heart:  RRR. +S1, S2.    Abdomen:   Soft. NT/ND. BS active.    Ext/MS:   No cyanosis or erythema.    Neuro:   No focal deficits noted.    Skin:  No obvious rashes or lesions noted.         LABORATORY AND IMAGING    ELECTROLYTE PANEL   Recent Labs   Lab Test 02/21/24  0632 02/20/24 1947 02/06/24  0746    135 142   POTASSIUM 4.1 4.3 3.8   BUN 22.8 31.5* 13.2   CR 0.95 1.02 0.82   KENYA 8.1* 8.6* 8.5*      HEMATOLOGY PANEL   Recent Labs   Lab Test 02/21/24  1449 02/21/24  1049 02/21/24  0632 02/20/24  2230 02/20/24 1947 02/06/24  0746 02/05/24  0718 02/05/24  0020 05/27/22  0823 05/26/22  0640   WBC  --   --  6.2  --  7.1 6.2   < > 9.3   < > 7.7   HGB 8.6* 8.1* 8.1*   < > 9.6* 9.5*   < > 10.7*   < > 13.2*   MCV  --   --  104*  --  104* 101*   < > 102*   < > 99   PLT  --   --  139*  --  154 120*   < > 133*   < > 142*   INR  --   --   --   --   --   --   --  3.50*  --  1.05    < > = values in this interval not displayed.      LIVER AND PANCREAS PANEL   Recent Labs   Lab Test 02/20/24 1947 02/01/24  0631 05/05/23  0024 05/04/23  2355 01/28/23  1224 12/18/22  1052 11/06/22  1324 08/13/22  1709   ALBUMIN 3.6 4.3  --  4.1   < >  --    < >  --    BILITOTAL 0.5 0.4  --  0.3   < >  --    < >  --    ALT 19 18  --  13   < >  --    < >  --    AST 27 26  --  24   < >  --    < >  --    PROTEIN  --   --  Negative  --   --  20*  --  Negative    < > = values in this interval not displayed.      I have reviewed the current diagnostic and laboratory tests.     Assessment / Recommendations:     Assessment:  Painless hematochezia.  CT angiography on admission notable for active arterial extravasation in the  distal descending colon along with high attenuation lesion of the left abductor triston muscle suggestive of intramuscular hematoma.  Subsequent IR guided angiography without any active extravasation.  High suspicion for diverticular bleed that has since self-resolved.  Other considerations given to Dieulafoy lesion, pseudoaneurysm of mesenteric artery, or possibly neoplastic process.  Given clinical stability in otherwise frail elderly patient, favor supportive care at this time.  If overt lower GI bleeding were to reoccur, would consider colonoscopy for further evaluation.  Acute blood loss anemia.  Chronic anticoagulation with Xarelto due to DVT diagnosed in 05/2023.    Recommendations:  Monitor hemoglobin and signs of recurrent overt GI bleeding.  If painless hematochezia were to reoccur or further drop in hemoglobin, then would start patient on bowel prep with plan for colonoscopy to follow.  In the event of hemodynamic instability, would recommend repeat IR guided angiography.  Clear liquid diet today.  If no further episodes of bleeding and hemoglobin remains stable, then ok to advance diet tomorrow as tolerated.  Continue to hold Xarelto for now.    GI team to follow.       Total time spent in chart review, direct medical discussion, examination, and documentation was 40 minutes.    Gregory Faustin MD  [facility]  [phone]  I appreciate the opportunity to participate in the care of this patient.   Please feel free to call me with any questions or concerns.

## 2024-02-21 NOTE — H&P
[facility]    History and Physical  Hospitalist       Date of Admission:  2/20/2024  Date of Service (when I saw the patient): 02/20/24    ASSESSMENT  Jean Pierre Roblero is a markedly pleasant 89 year old gentleman with past medical history that is most notable for prior TIA and DVT on DOAC, as well as chronic dementia, diverticular disease, and recent hospitalization for back pain due to spinal stenosis; who presents with hematochezia, and is found to have acute anti-coagulation associated lower GI bleeding causing acute blood loss anemia, as well as acute L1 lumbar spine fracture.    PLAN     Acute anti-coagulation associated lower GI bleeding causing acute blood loss anemia: Of note, Mr. Roblero has a history of frequent falls, as well reportedly as diverticulosis. He was diagnosed with a right lower extremity DVT in 5/2023 and has been taking Xarelto since that time. He also has a history of prior TIA as well as spinal stenosis and remote history of prior lumbar spine decompression surgery. Most recently, he was hospitalized here from 2/5/24 through 2/8/24 for recurrent falls and right lower extremity weakness. He was found to have mildly increased reflexes in the upper and lower extremities and diminished sensation in the legs. Brain MRI was negative for acute intracranial process.  CTA of head and neck showed no significant stenosis, occlusion, or aneurysm.  MRI of the lumbar spine demonstrated moderate to severe canal stenosis at L3-L4, high-grade bilateral foraminal stenosis at L3-L4 and L5-S1, and additional high-grade foraminal stenosis at L2-L3 and L4-L5.  MRI thoracic spine was negative for acute findings or high-grade stenosis.  MRI cervical spine showed multilevel spondylosis, C3-C4, C4-C5, C5-C6 severe thecal sac stenosis, and equivocal patchy increased T2/STIR signal versus motion artifact in the C3-C4 spinal cord, as well as multilevel severe neuroforaminal stenosis. Neurology  and Neurosurgery were consulted. He was started on Medrol Dosepak with recommendations for outpatient ESTEBAN. Hemoglobin trended down in the hospital (as detailed below) and iron profile showed low serum iron.  He was started on an iron supplement. He was also treated with IV cefazolin for left leg cellulitis. He was discharged to [facility], where he continues to convalesce.    Now, he presents for acute lower back pain and acute hematochezia. In the ED, he is afebrile, not hypoxic. He has tachycardia. He has accelerated hypertension without hypotension. WBC and Lactate are normal. HGB drops to 8.5. it had been 13.7 on 1/22/24 and 9.5 on 2/6/24. CTA of the abdomen and pelvis shows active arterial extravasation identified within the distal descending colon, as well as a high attenuation lesion of the left abductor triston muscle belly, favoring an intramuscular hematoma. Also noted is an acute, nondisplaced anterior column fracture of the L1 vertebral body. Overall, his symptoms are consistent with acute lower GI bleeding causing acute blood loss anemia; possibly diverticular, or due to other causes such as AVM. His concomitant acute lumbar spine fracture and acute left abductor muscle hematoma suggest recent trauma could play a role. K Centra was given in the ED and he is going for IR intervention tonight.       -- IMC. NPO. GI and Colorectal Surgery consulted. He is currently undergoing urgent IR guided intervention. Informed consent obtained in case of HGB drop to 7 or acute hypotension. Q 4 HGB checks ordered. 100 ml/hour NS IVF.    -- Will obtain US of bilateral LE to determine if previous DVT have now resolved    -- MR imaging follow-up has been recommended in 3 months to document resolution of acute left abductor muscle belly hematoma, as an underlying malignancy cannot be excluded at this time.    -- Repeat CBC and BMP in AM. SW consulted for disposition planning.    Recent Labs   Lab Test 02/20/24  [id number]  02/20/24 2230 02/20/24 1947   HGB 8.7* 8.5* 9.6*     Acute L1 lumbar spine fracture:     -- Bedrest. Fall precautions. Neurosurgery consulted. Tylenol, Oxycodone, IV Dilaudid as needed for pain. Anti-emetics as needed.    Elevated Troponin: Mildly elevated Troponin T to 51. This could be due to demand ischemia due to acute illness. We will rule out ACS. Noting that recent TTE earlier this month showed preserved LVEF, ascending aorta dilation, mild mitral stenosis, and negative bubble study.    -- Telemetry, serial enzymes, repeat TTE ordered.     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 IVF ordered as above. Repeat BMP in AM.    Recent Labs   Lab Test 02/20/24 1947 02/06/24  0746 02/05/24  0020   CR 1.02 0.82 1.16     Hyperlipidemia: Resume home atorvastatin when verified    BPH: Resume home finasteride when verified.    GERD and presbyesophagus: Noted to be baron  regular diet as an outpatient.    -- resume PPI/famotidine when verified    Chronic dementia: Monitor for sundowning while hospitalized.    -- Resume home memantine when verified.    I have spent 85 minutes on the date of service doing chart review, history, examination, documentation, and further activities per the note.    Chief Complaint   Maroon stool    History is obtained from the patient and the ED physician whom I have spoken with    History of Present Illness   Jean Pierre Roblero is a markedly pleasant 89 year old gentleman who presents from [facility] with two days of ongoing, acute onset maroon colored stool noted there. He has a history of dementia, and tells me that he has noted worsening pain in his lower back and sides as well. He is unsure if he has fallen recently. He otherwise denies dizziness, light headedness, abdominal pain, dyspnea, chest pain, fever, chills, or other acute complaints at this time.    In the ED,   02/20/24 1930 (!) 148/78 99.6  F (37.6  C) Oral 105  -- 98 %     CBC and CMP were notable for HGB 9.6, BUN 31.5, Cr 1.02, Ca 8.6, TProt 6.1, Glucose 131, otherwise were within the normal reference range. WBC was 7.1. Lactate was 1.3. Troponin T was 51.    In the ED HGB trended further downward to 8.5.    The case was discussed with IR and he was given KCentra, and has now been transferred to the IR suite fir intervention.    Recent Results (from the past 24 hour(s))   CTA Abdomen Pelvis with Contrast   Result Value    Radiologist flags Proximal left thigh MR in 3 months.    Narrative    EXAM: CTA ABDOMEN PELVIS WITH CONTRAST  LOCATION: [facility]  DATE: 2/20/2024    INDICATION: Rectal bleeding; on anticoagulation.  COMPARISON: None available.  TECHNIQUE: CT angiogram abdomen pelvis during precontrast, arterial, and portal venous phase of injection of IV contrast. 2D and 3D MIP reconstructions were performed by the CT technologist. Dose reduction techniques were used.  CONTRAST: 72 mL Isovue 370.    FINDINGS:    AORTO-ILIAC: Dilated aortic root, measuring at least 4.3 cm above the sinotubular junction, incompletely imaged and evaluated.    Moderate to severe atheromatous disease of the abdominal aorta and branch vessels. No evidence of aneurysm or dissection.    There is classic hepatic arterial anatomy.     There are single bilateral renal arteries.    LOWER CHEST: Tree-in-bud opacities, greatest within the right middle lobe and possibly chronic. Atherosclerosis of the visualized thoracic aorta and coronary arteries.    HEPATOBILIARY: Liver enhances normally and is normal in size.    Cholecystectomy.    No intrahepatic or intrahepatic biliary ductal dilatation.    PANCREAS: Enhances normally. No peripancreatic inflammatory fat stranding.    SPLEEN: Enhances normally. Normal size.    ADRENAL GLANDS: Normal.    KIDNEYS: Both kidneys enhance symmetrically, without hydronephrosis. Low-attenuation subcentimeter renal lesion(s). These are  compatible with small benign cysts and no specific imaging evaluation or follow-up is recommended.    No nephroureterolithiasis.    Urinary bladder is unremarkable.    PELVIC ORGANS: No suspicious abnormality.    BOWEL: No evidence of acute gastrointestinal inflammation or obstruction. Colonic diverticulosis, without evidence of diverticulitis.     Arterial extravasation involving the distal descending colon, series 7 image 247, which accumulates on the venous phase imaging.    No intraperitoneal free fluid or free air.    LYMPH NODES: No suspicious abdominal or pelvic lymphadenopathy.    VASCULATURE: See arterial findings, as detailed above.    MUSCULOSKELETAL: Acute, nondisplaced fracture involving the anterior one half of the L1 vertebral body. Posterior decompression changes of the lower lumbar spine. High attenuation lesion within the proximal aspect of the left adductor triston muscle   belly, measuring 3.8 x 4.1 cm in cross-section, incompletely imaged distally.    OTHER: No additionally significant abnormalities.      Impression    IMPRESSION:   1.  Active arterial extravasation identified within the distal descending colon.  2.  High attenuation lesion of the left abductor triston muscle belly, favoring an intramuscular hematoma. MR imaging follow-up recommended in 3 months to document resolution of this finding, as an underlying malignancy cannot be excluded.  3.  Acute, nondisplaced anterior column fracture of the L1 vertebral body.  4.  No evidence of acute gastrointestinal inflammation or obstruction.  5.  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 Nonemergent CT angiography of the chest is recommended, when clinically feasible.    Impression 1, 2, and 3 discussed verbally via telephone with Dr. Harden at 10:10 PM on 02/20/2024.      [Recommend Follow Up: Proximal left thigh MR in 3 months.]    This report will be copied to the [facility] to  ensure a provider acknowledges the finding.          PHYSICAL EXAM  Blood pressure 136/79, pulse 100, temperature 99.6  F (37.6  C), temperature source Oral, resp. rate 15, weight 60.3 kg (132 lb 15 oz), SpO2 98%.  Constitutional: Alert and oriented to person, place; no apparent distress; appears pale  Respiratory: lungs clear to auscultation bilaterally  Cardiovascular: regular S1 S2  GI: abdomen soft non tender non distended bowel sounds positive  Musculoskeletal: no clubbing, cyanosis or edema  Neurologic: extra-ocular muscles intact; moves all four extremities     DVT Prophylaxis: Pneumatic Compression Devices  Code Status: Full Code    Disposition: Expected discharge in 2-4 days    Jean Pierre Del Castillo MD, MD    Past Medical History    I have reviewed this patient's medical history and updated it with pertinent information if needed.   Past Medical History:   Diagnosis Date    Ascending aorta dilation (H24)     BPH (benign prostatic hyperplasia)     CKD (chronic kidney disease) stage 2, GFR 60-89 ml/min     Cognitive impairment     Diverticular disease of colon     DVT (deep venous thrombosis) (H) 05/2023    RLE    Gastroesophageal reflux disease     Hyperlipidemia     Iron deficiency     Migraine     Mitral stenosis     Osteoarthritis     Photosensitivity     Presbyesophagus     Seasonal allergies     Spinal stenosis     TIA (transient ischemic attack)        Past Surgical History   I have reviewed this patient's surgical history and updated it with pertinent information if needed.  Past Surgical History:   Procedure Laterality Date    BACK SURGERY      CHOLECYSTECTOMY      ENDOSCOPIC SINUS SURGERY  8/20/2012    Procedure: ENDOSCOPIC SINUS SURGERY;  ENDOSCOPIC REMOVAL OF LEFT NASAL MASS WITH LANDMARKS (Fusion Tracking Hartman), BILATERAL INFERIOR TURBINOPLASTY;  Surgeon: Dima Younger MD;  Location: [facility]    EYE SURGERY      cataracts    HERNIA REPAIR      ORTHOPEDIC SURGERY      ingrown toe nail removal     TURBINOPLASTY  2012    Procedure: TURBINOPLASTY;;  Surgeon: Dima Younger MD;  Location: [facility]       Prior to Admission Medications   Prior to Admission Medications   Prescriptions Last Dose Informant Patient Reported? Taking?   Lidocaine (LIDOCARE) 4 % Patch 2024 at 0800 to back  No Yes   Sig: Place 1-2 patches onto the skin every 24 hours To prevent lidocaine toxicity, patient should be patch free for 12 hrs daily.   Patient taking differently: Place 1 patch onto the skin every 24 hours To prevent lidocaine toxicity, patient should be patch free for 12 hrs daily.   acetaminophen (TYLENOL) 500 MG tablet 2024 at 0900  Yes Yes   Sig: Take 1,000 mg by mouth every 8 hours as needed for pain   atorvastatin (LIPITOR) 10 MG tablet 2024 at 2000 Spouse/Significant Other No Yes   Si tablet (10 mg) by Oral or Feeding Tube route every evening   butalbital-acetaminophen-caffeine (ESGIC) -40 MG tablet   No Yes   Sig: Take 1 tablet by mouth every 4 hours as needed for headaches   clotrimazole-betamethasone (LOTRISONE) 1-0.05 % external cream  Spouse/Significant Other Yes Yes   Sig: Apply topically 2 times daily as needed (take for 7 days at a time for facial rash flares)   famotidine (PEPCID) 40 MG tablet 2024 at hs-plan change to protonix 40mg BID   Yes Yes   Sig: Take 40 mg by mouth at bedtime   ferrous sulfate (FEROSUL) 325 (65 Fe) MG tablet 2024 at am  No Yes   Sig: Take 1 tablet (325 mg) by mouth every other day   finasteride (PROSCAR) 5 MG tablet 2024 at am Spouse/Significant Other Yes Yes   Sig: Take 5 mg by mouth daily   loratadine (CLARITIN) 10 MG tablet 2024 at am Spouse/Significant Other Yes Yes   Sig: Take 10 mg by mouth daily   memantine (NAMENDA) 10 MG tablet 2024 at am Spouse/Significant Other Yes Yes   Sig: Take 10 mg by mouth 2 times daily   multivitamin  with lutein (OCUVITE WITH LTEIN) CAPS per capsule 2024 at am Spouse/Significant Other  Yes Yes   Sig: Take 1 capsule by mouth daily   pantoprazole (PROTONIX) 40 MG EC tablet Plan to start 2/20/24 at change from famotidine  Yes No   Sig: Take 40 mg by mouth 2 times daily   polyethylene glycol (MIRALAX) 17 GM/Dose powder 2/18/2024 at Held 2/19 & 2/20  No Yes   Sig: Take 17 g by mouth daily   rivaroxaban ANTICOAGULANT (XARELTO) 20 MG TABS tablet 2/19/2024 at 1700- held 2/20 Spouse/Significant Other Yes Yes   Sig: Take 20 mg by mouth daily (with dinner)   sennosides (SENOKOT) 8.6 MG tablet   No Yes   Sig: Take 1 tablet by mouth 2 times daily as needed for constipation   sodium chloride (OCEAN) 0.65 % nasal spray  Spouse/Significant Other Yes Yes   Sig: Spray 1 spray into both nostrils daily as needed for congestion (alternates use with Nasacort)   triamcinolone (NASACORT) 55 MCG/ACT nasal aerosol  Spouse/Significant Other Yes Yes   Sig: Spray 1 spray into both nostrils daily as needed (congestion, alternates use with Ocean spray)   vitamin C (ASCORBIC ACID) 500 MG tablet 2/20/2024 at mid-day Spouse/Significant Other Yes Yes   Sig: Take 500 mg by mouth daily   vitamin D3 (CHOLECALCIFEROL) 2000 units (50 mcg) tablet 2/20/2024 at 1200 Spouse/Significant Other Yes Yes   Sig: Take 2,000 Units by mouth daily Noon      Facility-Administered Medications: None     Allergies   Allergies   Allergen Reactions    Chocolate Anaphylaxis     headache    Lamotrigine Unknown     Other Reaction(s): severe muscle pain, trouble walking    Propranolol Unknown     Other Reaction(s): swelling of tongue, irritation to tongue and lips, weight gain    Valproic Acid      Other Reaction(s): tongue swelling    Verapamil Other (See Comments)     Other Reaction(s): swollen tongue and swollen lips    Per patient possible reason for tongue swelling in the past    Zonisamide Unknown    Amoxicillin Unknown     He doesn't recall any allergy to this medication    Feathers     Mirabegron Unknown    Nabumetone Diarrhea    Novocaine [Procaine]   "    Passes out almost immediately    Oxycodone Other (See Comments)     Hallucinations    Solifenacin Unknown    Strawberry Extract GI Disturbance     Most berries    Tramadol Other (See Comments)    Amitriptyline Fatigue and Other (See Comments)     Other Reaction(s): ??back pain, memory loss    \"reduced memory\"    Topiramate Other (See Comments)     Other Reaction(s): ?? heart issues, elevated HR and elevated BP    \"reduce memory\"       Social History   I have reviewed this patient's social history and updated it with pertinent information if needed. Jean Pierre Roblero  reports that he has never smoked. He has never used smokeless tobacco. He reports that he does not drink alcohol and does not use drugs.    Family History   Family history assessed and, except as above, is non-contributory.    Review of Systems   The 10 point Review of Systems is negative other than noted in the HPI or here.     Primary Care Physician   Zaki Alaniz    Data   Labs Ordered and Resulted from Time of ED Arrival to Time of ED Departure   COMPREHENSIVE METABOLIC PANEL - Abnormal       Result Value    Sodium 135      Potassium 4.3      Carbon Dioxide (CO2) 23      Anion Gap 11      Urea Nitrogen 31.5 (*)     Creatinine 1.02      GFR Estimate 70      Calcium 8.6 (*)     Chloride 101      Glucose 131 (*)     Alkaline Phosphatase 74      AST 27      ALT 19      Protein Total 6.1 (*)     Albumin 3.6      Bilirubin Total 0.5     CBC WITH PLATELETS AND DIFFERENTIAL - Abnormal    WBC Count 7.1      RBC Count 2.81 (*)     Hemoglobin 9.6 (*)     Hematocrit 29.2 (*)      (*)     MCH 34.2 (*)     MCHC 32.9      RDW 14.6      Platelet Count 154      % Neutrophils 74      % Lymphocytes 15      % Monocytes 8      % Eosinophils 2      % Basophils 0      % Immature Granulocytes 1      NRBCs per 100 WBC 0      Absolute Neutrophils 5.3      Absolute Lymphocytes 1.1      Absolute Monocytes 0.5      Absolute Eosinophils 0.1      Absolute Basophils " 0.0      Absolute Immature Granulocytes 0.1      Absolute NRBCs 0.0     HEMOGLOBIN - Abnormal    Hemoglobin 8.5 (*)    HEMOGLOBIN - Abnormal    Hemoglobin 8.7 (*)    LACTIC ACID WHOLE BLOOD - Normal    Lactic Acid 1.3     TROPONIN T, HIGH SENSITIVITY   TYPE AND SCREEN, ADULT    ABO/RH(D) O POS      Antibody Screen Negative      SPECIMEN EXPIRATION DATE [id number]     PREPARE RED BLOOD CELLS (UNIT)    Blood Component Type Red Blood Cells      Product Code L0535R45      Unit Status Ready for issue      Unit Number [id number]      CROSSMATCH Compatible      CODING SYSTEM [id number]     PREPARE RED BLOOD CELLS (UNIT)    Blood Component Type Red Blood Cells      Product Code A3670K75      Unit Status Ready for issue      Unit Number [id number]      CROSSMATCH Compatible      CODING SYSTEM [id number]     PREPARE RED BLOOD CELLS (UNIT)   ABO/RH TYPE AND SCREEN       Data reviewed today:  I personally reviewed the abdominal CT image(s) showing arterial extravasation of the colon .

## 2024-02-21 NOTE — PLAN OF CARE
Goal Outcome Evaluation:    Alert and oriented x 4. Needs reorientation at times-Dementia baseline. Reported pain x 1 to back-dilaudid given. RA, NSR VSS, External cath placed barrier cream applied, NS @100. Excoriated in jennifer folds, bruise L eye cheek and L leg, Abd firm rounded audible bowel sounds. Dark brown/ bloody bm x 2     Trend Q4 Hgb,        Problem: Adult Inpatient Plan of Care  Goal: Plan of Care Review  Description: The Plan of Care Review/Shift note should be completed every shift.  The Outcome Evaluation is a brief statement about your assessment that the patient is improving, declining, or no change.  This information will be displayed automatically on your shift  note.  Outcome: Progressing     Problem: Gastrointestinal Bleeding  Goal: Optimal Coping with Acute Illness  Outcome: Progressing  Intervention: Optimize Psychosocial Response  Recent Flowsheet Documentation  Taken 2/21/2024 0400 by Katelynn Sequeira, RN  Supportive Measures: active listening utilized  Taken 2/21/2024 0200 by Katelynn Sequeira, RN  Supportive Measures: active listening utilized  Goal: Hemostasis  Outcome: Progressing

## 2024-02-21 NOTE — TELEPHONE ENCOUNTER
A message was left for patient to call and schedule a hospital follow up visit in 3 weeks with imaging prior to the appointment. Both Lumbar and Thoracic images have been requested.    Patient advised to call clinic at [phone] to schedule.

## 2024-02-21 NOTE — CONSULTS
Care Management Initial Consult    General Information  Assessment completed with: Patient, Spouse or significant other,    Type of CM/SW Visit: Initial Assessment    Primary Care Provider verified and updated as needed: Yes   Readmission within the last 30 days:        Reason for Consult: discharge planning, facility placement  Advance Care Planning:            Communication Assessment  Patient's communication style: spoken language (English or Bilingual)    Hearing Difficulty or Deaf: yes   Wear Glasses or Blind: yes    Cognitive  Cognitive/Neuro/Behavioral: .WDL except, orientation  Level of Consciousness: intermittent confusion, alert  Arousal Level: opens eyes spontaneously  Orientation: oriented x 4  Mood/Behavior: calm, cooperative  Best Language: 0 - No aphasia  Speech: clear, spontaneous    Living Environment:   People in home: facility resident     Current living Arrangements: extended care facility  Name of Facility: [facility]   Able to return to prior arrangements: yes       Family/Social Support:  Care provided by: spouse/significant other, self  Provides care for: no one, unable/limited ability to care for self  Marital Status:   Wife, Children          Description of Support System: Involved, Supportive    Support Assessment: Adequate family and caregiver support    Current Resources:   Patient receiving home care services: No     Community Resources: Skilled Nursing Facility  Equipment currently used at home: shower chair, walker, rolling, walker, nestor  Supplies currently used at home:      Employment/Financial:  Employment Status: retired        Financial Concerns: none   Referral to Financial Worker: No       Does the patient's insurance plan have a 3 day qualifying hospital stay waiver?  No    Lifestyle & Psychosocial Needs:  Social Determinants of Health     Food Insecurity: Not on file   Depression: Not on file   Housing Stability: Not on file   Tobacco Use: Low Risk  (1/22/2024)     Patient History     Smoking Tobacco Use: Never     Smokeless Tobacco Use: Never     Passive Exposure: Not on file   Financial Resource Strain: Not on file   Alcohol Use: Not on file   Transportation Needs: Not on file   Physical Activity: Not on file   Interpersonal Safety: Not on file   Stress: Not on file   Social Connections: Not on file       Functional Status:  Prior to admission patient needed assistance:              Mental Health Status:  Mental Health Status: No Current Concerns       Chemical Dependency Status:  Chemical Dependency Status: No Current Concerns             Values/Beliefs:  Spiritual, Cultural Beliefs, [employer] Practices, Values that affect care: no               Additional Information:  SW/CM consult for discharge planning. SW reviewed chart. Pt discharged from [facility] to [facility] on 2/8/24. SW met with pt and spouse. Spouse does not believe she is holding the bed at [facility], but does want pt to return, when ready. Reviewed typically bed hold cost and and information. Spouse does not want to pay to hold the bed at [facility] due to cost. New referral sent. Reviewed Medicare SNF coverage. SW following.     SEBASTIAN Hammer, LICSW  [phone] Desk phone  [phone] Cell/text (Preferred)  [facility]

## 2024-02-22 ENCOUNTER — APPOINTMENT (OUTPATIENT)
Dept: PHYSICAL THERAPY | Facility: CLINIC | Age: 89
DRG: 813 | End: 2024-02-22
Attending: INTERNAL MEDICINE
Payer: MEDICARE

## 2024-02-22 ENCOUNTER — APPOINTMENT (OUTPATIENT)
Dept: CARDIOLOGY | Facility: CLINIC | Age: 89
DRG: 813 | End: 2024-02-22
Attending: HOSPITALIST
Payer: MEDICARE

## 2024-02-22 ENCOUNTER — APPOINTMENT (OUTPATIENT)
Dept: SPEECH THERAPY | Facility: CLINIC | Age: 89
DRG: 813 | End: 2024-02-22
Attending: INTERNAL MEDICINE
Payer: MEDICARE

## 2024-02-22 LAB
ANION GAP SERPL CALCULATED.3IONS-SCNC: 7 MMOL/L (ref 7–15)
BUN SERPL-MCNC: 11.2 MG/DL (ref 8–23)
CALCIUM SERPL-MCNC: 7.9 MG/DL (ref 8.8–10.2)
CHLORIDE SERPL-SCNC: 109 MMOL/L (ref 98–107)
CREAT SERPL-MCNC: 0.75 MG/DL (ref 0.67–1.17)
DEPRECATED HCO3 PLAS-SCNC: 23 MMOL/L (ref 22–29)
EGFRCR SERPLBLD CKD-EPI 2021: 86 ML/MIN/1.73M2
ERYTHROCYTE [DISTWIDTH] IN BLOOD BY AUTOMATED COUNT: 14.6 % (ref 10–15)
GLUCOSE SERPL-MCNC: 100 MG/DL (ref 70–99)
HCT VFR BLD AUTO: 24.2 % (ref 40–53)
HGB BLD-MCNC: 7.7 G/DL (ref 13.3–17.7)
HGB BLD-MCNC: 7.9 G/DL (ref 13.3–17.7)
LVEF ECHO: NORMAL
MCH RBC QN AUTO: 34.1 PG (ref 26.5–33)
MCHC RBC AUTO-ENTMCNC: 32.6 G/DL (ref 31.5–36.5)
MCV RBC AUTO: 104 FL (ref 78–100)
PLATELET # BLD AUTO: 127 10E3/UL (ref 150–450)
POTASSIUM SERPL-SCNC: 3.9 MMOL/L (ref 3.4–5.3)
RBC # BLD AUTO: 2.32 10E6/UL (ref 4.4–5.9)
SODIUM SERPL-SCNC: 139 MMOL/L (ref 135–145)
WBC # BLD AUTO: 5.3 10E3/UL (ref 4–11)

## 2024-02-22 PROCEDURE — 36415 COLL VENOUS BLD VENIPUNCTURE: CPT | Performed by: INTERNAL MEDICINE

## 2024-02-22 PROCEDURE — 99233 SBSQ HOSP IP/OBS HIGH 50: CPT | Performed by: INTERNAL MEDICINE

## 2024-02-22 PROCEDURE — 250N000013 HC RX MED GY IP 250 OP 250 PS 637: Performed by: PHYSICIAN ASSISTANT

## 2024-02-22 PROCEDURE — 93308 TTE F-UP OR LMTD: CPT | Mod: 26 | Performed by: INTERNAL MEDICINE

## 2024-02-22 PROCEDURE — 93321 DOPPLER ECHO F-UP/LMTD STD: CPT | Mod: 26 | Performed by: INTERNAL MEDICINE

## 2024-02-22 PROCEDURE — 120N000013 HC R&B IMCU

## 2024-02-22 PROCEDURE — 258N000003 HC RX IP 258 OP 636: Performed by: HOSPITALIST

## 2024-02-22 PROCEDURE — 93325 DOPPLER ECHO COLOR FLOW MAPG: CPT

## 2024-02-22 PROCEDURE — 92526 ORAL FUNCTION THERAPY: CPT | Mod: GN | Performed by: SPEECH-LANGUAGE PATHOLOGIST

## 2024-02-22 PROCEDURE — 250N000013 HC RX MED GY IP 250 OP 250 PS 637: Performed by: INTERNAL MEDICINE

## 2024-02-22 PROCEDURE — 92610 EVALUATE SWALLOWING FUNCTION: CPT | Mod: GN | Performed by: SPEECH-LANGUAGE PATHOLOGIST

## 2024-02-22 PROCEDURE — 250N000013 HC RX MED GY IP 250 OP 250 PS 637: Performed by: RADIOLOGY

## 2024-02-22 PROCEDURE — 80048 BASIC METABOLIC PNL TOTAL CA: CPT | Performed by: INTERNAL MEDICINE

## 2024-02-22 PROCEDURE — 97530 THERAPEUTIC ACTIVITIES: CPT | Mod: GP

## 2024-02-22 PROCEDURE — 97161 PT EVAL LOW COMPLEX 20 MIN: CPT | Mod: GP

## 2024-02-22 PROCEDURE — 250N000013 HC RX MED GY IP 250 OP 250 PS 637

## 2024-02-22 PROCEDURE — 93325 DOPPLER ECHO COLOR FLOW MAPG: CPT | Mod: 26 | Performed by: INTERNAL MEDICINE

## 2024-02-22 PROCEDURE — 85027 COMPLETE CBC AUTOMATED: CPT | Performed by: INTERNAL MEDICINE

## 2024-02-22 PROCEDURE — 85018 HEMOGLOBIN: CPT | Performed by: INTERNAL MEDICINE

## 2024-02-22 PROCEDURE — 250N000013 HC RX MED GY IP 250 OP 250 PS 637: Performed by: HOSPITALIST

## 2024-02-22 RX ORDER — POLYETHYLENE GLYCOL 3350 17 G/17G
17 POWDER, FOR SOLUTION ORAL DAILY
Status: DISCONTINUED | OUTPATIENT
Start: 2024-02-22 | End: 2024-02-26 | Stop reason: HOSPADM

## 2024-02-22 RX ADMIN — ACETAMINOPHEN 650 MG: 325 TABLET, FILM COATED ORAL at 22:01

## 2024-02-22 RX ADMIN — MEMANTINE 10 MG: 10 TABLET ORAL at 08:43

## 2024-02-22 RX ADMIN — SODIUM CHLORIDE: 9 INJECTION, SOLUTION INTRAVENOUS at 08:56

## 2024-02-22 RX ADMIN — FAMOTIDINE 20 MG: 20 TABLET ORAL at 21:57

## 2024-02-22 RX ADMIN — ACETAMINOPHEN 650 MG: 325 TABLET, FILM COATED ORAL at 01:23

## 2024-02-22 RX ADMIN — LORATADINE 10 MG: 10 TABLET ORAL at 08:43

## 2024-02-22 RX ADMIN — FINASTERIDE 5 MG: 5 TABLET, FILM COATED ORAL at 08:43

## 2024-02-22 RX ADMIN — MEMANTINE 10 MG: 10 TABLET ORAL at 21:57

## 2024-02-22 RX ADMIN — PANTOPRAZOLE SODIUM 40 MG: 40 TABLET, DELAYED RELEASE ORAL at 08:43

## 2024-02-22 RX ADMIN — ACETAMINOPHEN 650 MG: 325 TABLET, FILM COATED ORAL at 16:37

## 2024-02-22 RX ADMIN — FERROUS SULFATE TAB 325 MG (65 MG ELEMENTAL FE) 325 MG: 325 (65 FE) TAB at 08:43

## 2024-02-22 RX ADMIN — Medication 1 CAPSULE: at 08:42

## 2024-02-22 RX ADMIN — ACETAMINOPHEN 650 MG: 325 TABLET, FILM COATED ORAL at 21:58

## 2024-02-22 RX ADMIN — LIDOCAINE 1 PATCH: 4 PATCH TOPICAL at 08:42

## 2024-02-22 RX ADMIN — POLYETHYLENE GLYCOL 3350 17 G: 17 POWDER, FOR SOLUTION ORAL at 08:42

## 2024-02-22 RX ADMIN — PANTOPRAZOLE SODIUM 40 MG: 40 TABLET, DELAYED RELEASE ORAL at 21:57

## 2024-02-22 RX ADMIN — ATORVASTATIN CALCIUM 10 MG: 10 TABLET, FILM COATED ORAL at 21:57

## 2024-02-22 ASSESSMENT — ACTIVITIES OF DAILY LIVING (ADL)
ADLS_ACUITY_SCORE: 46
ADLS_ACUITY_SCORE: 43
ADLS_ACUITY_SCORE: 46
ADLS_ACUITY_SCORE: 43
ADLS_ACUITY_SCORE: 46
ADLS_ACUITY_SCORE: 43
ADLS_ACUITY_SCORE: 46
ADLS_ACUITY_SCORE: 43
ADLS_ACUITY_SCORE: 46
ADLS_ACUITY_SCORE: 43
ADLS_ACUITY_SCORE: 46
ADLS_ACUITY_SCORE: 46
ADLS_ACUITY_SCORE: 45

## 2024-02-22 NOTE — PROGRESS NOTES
COLON & RECTAL SURGERY  PROGRESS NOTE    February 22, 2024    SUBJECTIVE:  More alert this AM. Reports some lower abdominal discomfort. Denies further bleeding, and no mention of bleeding in notes. No nausea/vomiting. AVSS. Hgb 7.9 this AM from 8.1 yesterday AM.     OBJECTIVE:  Temp:  [97.6  F (36.4  C)-97.9  F (36.6  C)] 97.6  F (36.4  C)  Pulse:  [78-98] 78  Resp:  [15-28] 16  BP: (105-141)/(59-80) 105/71  SpO2:  [92 %-100 %] 98 %    Intake/Output Summary (Last 24 hours) at 2/22/2024 0737  Last data filed at 2/21/2024 1800  Gross per 24 hour   Intake 1620 ml   Output 800 ml   Net 820 ml       GENERAL:  Awake, alert, no acute distress  HEAD: Normocephalic atraumatic  SCLERA: Anicteric  EXTREMITIES: Warm and well perfused  ABDOMEN:  Soft, non-tender, mildly distended. No guarding, rigidity, or peritoneal signs.     LABS:  Lab Results   Component Value Date    WBC 5.3 02/22/2024    WBC 6.6 03/03/2020     Lab Results   Component Value Date    HGB 7.9 02/22/2024    HGB 13.2 03/03/2020     Lab Results   Component Value Date    HCT 24.2 02/22/2024    HCT 40.0 03/03/2020     Lab Results   Component Value Date     02/22/2024     03/03/2020     Last Basic Metabolic Panel:  Lab Results   Component Value Date     02/22/2024     03/03/2020      Lab Results   Component Value Date    POTASSIUM 3.9 02/22/2024    POTASSIUM 4.0 12/21/2022    POTASSIUM 4.0 12/21/2022    POTASSIUM 3.6 03/03/2020     Lab Results   Component Value Date    CHLORIDE 109 02/22/2024    CHLORIDE 106 12/21/2022    CHLORIDE 108 03/03/2020     Lab Results   Component Value Date    KENYA 7.9 02/22/2024    KENYA 8.1 03/03/2020     Lab Results   Component Value Date    CO2 23 02/22/2024    CO2 22 12/21/2022    CO2 28 03/03/2020     Lab Results   Component Value Date    BUN 11.2 02/22/2024    BUN 31 12/21/2022    BUN 17 03/03/2020     Lab Results   Component Value Date    CR 0.75 02/22/2024    CR 0.89 03/03/2020     Lab Results   Component  Value Date     02/22/2024    GLC 93 02/21/2024     12/21/2022    GLC 95 03/03/2020       ASSESSMENT/PLAN: 88 yo M with h/o dementia, on DOAC, admitted with back pain and hematochezia. Active extravasation in distal descending colon on CTA but no active bleeding identified during angio with IR. Bleeding has now resolved and Hgb overall stable in last 24h.     - Advance diet as tolerated  - Ok to restart DOAC today or tomorrow from our standpoint  - Miralax daily  - No indication for surgical intervention. If he rebleeds would try to address with IR or GI intervention. Surgery would be absolute last resort, and wife stated they may decline surgery. We will follow peripherally, call with questions/concerns.    Discussed with Dr. Mixon.    For questions/paging, please contact the CRS office at [phone].    Roshan Tian PA-C  Colorectal Physician Assistant    [facility]  [address]  [place] [address]  T: [phone]  F: [phone]      [facility]  I performed a history and physical examination of the patient and discussed their management with the physician assistant. I reviewed the physician assistants note and agree with the documented findings and plan of care.     Glenna Mixon MD, FACS, FASCRS    [facility]  [address]  [place] [address]  T: [phone]  F: [phone]

## 2024-02-22 NOTE — PROGRESS NOTES
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     Acute anti-coagulation associated lower GI bleeding causing acute blood loss anemia:  High attenuation lesion of the left abductor triston muscle, likely hematoma  -CTA of the abdomen and pelvis shows active arterial extravasation identified within the distal descending colon, as well as a high attenuation lesion of the left abductor triston muscle belly, favoring an intramuscular hematoma.  -Appreciate IR review.  Status post descending angiogram 02/21 that did not show any extravasation on the selective or superselective angiography.  -Appreciate colorectal surgery and GI.  Discussed with GI who recommended holding anticoagulation as much as possible unless absolutely necessary  --Patient has not had any further bleed.  -Hemoglobin so far has remained stable.  -Lower extremity ultrasound repeated this admission shows DVT has now resolved.   - Will discuss with family risk versus benefit of continuing anticoagulation  - MR imaging follow-up has been recommended in 3 months to document resolution of acute left abductor muscle belly hematoma, as an underlying malignancy cannot be excluded at this time.     Acute L1 lumbar spine fracture:     -- Bedrest. Fall precautions.   -Appreciate neurosurgery input , recommend brace   -Tylenol, Oxycodone, IV Dilaudid as needed for pain. Anti-emetics as needed.     Elevated Troponin likely demand ischemia :  -  mildly elevated Troponin T which has remained flat   - Continue telemetry.    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    -- ct home memantine       Diet: Clear Liquid Diet    DVT Prophylaxis: Pneumatic Compression Devices  Puckett Catheter: Not present  Lines: None     Cardiac Monitoring: ACTIVE order. Indication: C  Code Status: Full Code      Clinically Significant Risk Factors          # Hypocalcemia: Lowest Ca = 7.9 mg/dL in last 2 days, will monitor and replace as appropriate       # Thrombocytopenia: Lowest platelets = 127 in last 2 days, will monitor for bleeding       # Dementia: noted on problem list        # Financial/Environmental Concerns: none         Disposition Plan      Expected Discharge Date: 02/23/2024                    Linda Torres MD  Hospitalist Service  [facility]  Securely message with [employer] (more info)  Text page via [employer] Paging/Directory   ______________________________________________________________________    Interval History   Care assumed , chart reviewed .Patient with no new complaints , hasn't had any further bleed , hemoglobin stable . Discussed with Dr Faustin , if possibe , recommended not to continue anticoagulation . Tried calling spouse , not answered , left a voice message     Physical Exam   Vital Signs: Temp: 97.6  F (36.4  C) Temp src: Axillary BP: 105/71 Pulse: 78   Resp: 16 SpO2: 98 % O2 Device: Nasal cannula Oxygen Delivery: 1 LPM  Weight: 132 lbs 15 oz    Exam:  Constitutional: Awake, alert and no distress. Appears comfortable  Head: Normocephalic. No masses, lesions, tenderness or abnormalities  ENMT: ENT exam normal, no neck nodes or sinus  tenderness  Cardiovascular: RRR.  no murmurs, no rubs or JVD  Respiratory:normal WOB,b/l equal air entry, no wheezes or crackles   Gastrointestinal: Abdomen soft, non-tender. BS normal. No masses, organomegaly  : Deferred  Extremities :no edema , no clubbing or cyanosis         Medical Decision Making       53 MINUTES SPENT BY ME on the date of service doing chart review, history, exam, documentation & further activities per the note.      Data     I have personally reviewed the following data over the past 24 hrs:    5.3  \   7.9 (L)   / 127 (L)     139 109 (H) 11.2 /  100 (H)   3.9 23 0.75 \       Imaging results reviewed over the past 24 hrs:   Recent Results (from the past 24 hour(s))   Echo Limited   Result Value    LVEF  65%    Narrative    [id number]  [id number]  [id number]  [id number]^ANNAMARIE^MITZY^HEIDY     [facility]  Echocardiography Laboratory  [address] [place] [id number]     Name: MITZY DICKERSON  MRN: [id number]  : 1934  Study Date: 2024 09:02 AM  Age: 89 yrs  Gender: Male  Patient Location: [facility]  Reason For Study: Chest Pain  Ordering Physician: MITZY DE LUNA  Referring Physician: Zaki Alaniz  Performed By: Bernabe Guadalupe     BSA: 1.7 m2  Height: 67 in  Weight: 132 lb  HR: 101  BP: 175/93 mmHg  ______________________________________________________________________________  Procedure  Limited Portable Echo Adult.  ______________________________________________________________________________  Interpretation Summary     1. The left ventricle is normal in structure, function and size. The visual  ejection fraction is estimated at 65%. Normal left ventricular wall motion  2. The right ventricle is normal in structure, function and size.  3. No valve disease.  4. Ascending aorta dilatation is present. 4.2cm.     No changes from echo 24.  ______________________________________________________________________________  Left Ventricle  The left ventricle  is normal in structure, function and size. The visual  ejection fraction is estimated at 65%. Normal left ventricular wall motion.     Right Ventricle  The right ventricle is normal in structure, function and size.     Tricuspid Valve  There is mild (1+) tricuspid regurgitation.     Vessels  Ascending aorta dilatation is present. The inferior vena cava was normal in  size with preserved respiratory variability.     Pericardium  There is no pericardial effusion.     Rhythm  Sinus rhythm was noted.  ______________________________________________________________________________  MMode/2D Measurements & Calculations  IVSd: 0.98 cm  LVIDd: 4.1 cm  LVIDs: 1.7 cm  LVPWd: 1.0 cm  FS: 58.0 %  LV mass(C)d: 133.7 grams  LV mass(C)dI: 78.9 grams/m2  LA dimension: 2.8 cm  asc Aorta Diam: 4.2 cm  Asc Ao diam index BSA (cm/m2): 2.5  Asc Ao diam index Ht(cm/m): 2.4  RWT: 0.51     Doppler Measurements & Calculations  TR max yuri: 247.0 cm/sec  TR max P.4 mmHg     ______________________________________________________________________________  Report approved by: Jodie Galloway 2024 11:02 AM           Recent Labs   Lab 24  0526 24  2227 24  1844 24  1049 24  0632 24  0241 24  0224 24  2230 24  1947   WBC 5.3  --   --   --  6.2  --   --   --  7.1   HGB 7.9* 7.4* 8.0*   < > 8.1*   < >  --    < > 9.6*   *  --   --   --  104*  --   --   --  104*   *  --   --   --  139*  --   --   --  154     --   --   --  137  --   --   --  135   POTASSIUM 3.9  --   --   --  4.1  --   --   --  4.3   CHLORIDE 109*  --   --   --  105  --   --   --  101   CO2 23  --   --   --  25  --   --   --  23   BUN 11.2  --   --   --  22.8  --   --   --  31.5*   CR 0.75  --   --   --  0.95  --   --   --  1.02   ANIONGAP 7  --   --   --  7  --   --   --  11   KENYA 7.9*  --   --   --  8.1*  --   --   --  8.6*   *  --   --   --  111*  --  93  --  131*   ALBUMIN  --   --   --    --   --   --   --   --  3.6   PROTTOTAL  --   --   --   --   --   --   --   --  6.1*   BILITOTAL  --   --   --   --   --   --   --   --  0.5   ALKPHOS  --   --   --   --   --   --   --   --  74   ALT  --   --   --   --   --   --   --   --  19   AST  --   --   --   --   --   --   --   --  27    < > = values in this interval not displayed.

## 2024-02-22 NOTE — PLAN OF CARE
Goal Outcome Evaluation:           Overall Patient Progress: no changeOverall Patient Progress: no change         A&O to self, dementia at baseline. Intermittently tachy to low 100s, otherwise VSS on 1L NC. Tele: SR/ST. Assistx1 gb and walker. Clear liquid diet, tolerating well. Bilat PIV, RAC running NS. CO back pain. Tylenol available for pain. Incontinent of B&B, ext cath in place. No BM this shift. Continue plan of care.

## 2024-02-22 NOTE — PROVIDER NOTIFICATION
"MD Notification    Notified Person: MD    Notified Person Name: Jean Pierre Del Castillo    Notification Date/Time: 2/22/24 0010    Notification Interaction: \"Just took over for this pt, had orders for q4 hgb that dont appear to be active anymore. Last hgb 7.4 @ 2200, down from 8 at previous draw. Should q4 checks be reinstated?\"    Purpose of Notification: Lab orders    Orders Received: \"If he has had no further hematochezia and is hemodynamically stable, ok to repeat next HGB in AM \"    Comments: AM cbc scheduled    "

## 2024-02-22 NOTE — PLAN OF CARE
Goal Outcome Evaluation:  [id number]  Alert and oriented x 2-4. Needs reorientation at times-dementia baseline. Calm/cooperative, calls appropriately. Reported back pain-tylenol givenX2. RA, NSR w/PACs and PVCs. VSS, 2L NC. External cath placed, barrier cream applied, NS @100. Excoriated in jennifer folds, bruise L eye cheek and L leg, Abd firm rounded audible bowel sounds. No BMs. US neg for DVT. No surgical intervention per CRS. GI recs to monitor, keep clears for now. Trend Q4 Hgb, and stable at 8.0. tolerating clears. Good UO. Seen by Neurosurgery and xray pending.

## 2024-02-22 NOTE — PLAN OF CARE
Goal Outcome Evaluation:    Orientations: Aox2-3, baseline dementia  Vitals/Pain: VSS, RA, PRN tylenol given x1  Tele: SR  Lines/Drains: PIV SL  Skin/Wounds: Sacral wound  GI/: Incont. B&B, UOA, BMx1  Labs: Abnormal/Trends, Electrolyte Replacement- Hemoglobin recheck 1800  Ambulation/Assist: A1-2 GBW  Plan: Discharge to TCU when medically ready

## 2024-02-22 NOTE — PROGRESS NOTES
"    Gastroenterology Follow Up Note    Jean Pierre Roblero MRN#  [id number]   Age:  89 year old YOB: 1934    Date of Admission:  2/20/2024     Subjective   No acute events overnight.  One bowel movement today without any evidence of overt GI bleeding.  Hemoglobin largely stable with change from 8.1 g/dL to 7.9 g/dL this morning.  Denies any ongoing abdominal pain, nausea, or emesis.  Tolerating oral diet.  Remains afebrile and hemodynamically stable.  Seen with family at bedside.       MEDICATIONS & ALLERGIES   Current medication list reviewed.      Allergies   Allergen Reactions    Chocolate Anaphylaxis     headache    Lamotrigine Unknown     Other Reaction(s): severe muscle pain, trouble walking    Propranolol Unknown     Other Reaction(s): swelling of tongue, irritation to tongue and lips, weight gain    Valproic Acid      Other Reaction(s): tongue swelling    Verapamil Other (See Comments)     Other Reaction(s): swollen tongue and swollen lips    Per patient possible reason for tongue swelling in the past    Zonisamide Unknown    Amoxicillin Unknown     He doesn't recall any allergy to this medication    Feathers     Mirabegron Unknown    Nabumetone Diarrhea    Novocaine [Procaine]      Passes out almost immediately    Oxycodone Other (See Comments)     Hallucinations    Solifenacin Unknown    Strawberry Extract GI Disturbance     Most berries    Tramadol Other (See Comments)    Amitriptyline Fatigue and Other (See Comments)     Other Reaction(s): ??back pain, memory loss    \"reduced memory\"    Topiramate Other (See Comments)     Other Reaction(s): ?? heart issues, elevated HR and elevated BP    \"reduce memory\"          OBJECTIVE   Vitals /63 (BP Location: Right arm)   Pulse 98   Temp 97.7  F (36.5  C) (Oral)   Resp 16   Wt 60.3 kg (132 lb 15 oz)   SpO2 96%   BMI 20.82 kg/m          General:   Well-developed elderly CM in NAD.   HEENT:   NC/AT. MMM.   Lungs:  No respiratory distress. "   Heart:  Tachycardia.   Abdomen:   Soft. No distention or tenderness on palpation.   Ext/MS:   No cyanosis or erythema.    Neuro:   No focal deficits noted.    Skin:  No obvious rashes or lesions noted.         LABORATORY AND IMAGING    ELECTROLYTE PANEL   Recent Labs   Lab Test 02/22/24 0526 02/21/24 0632 02/20/24 1947    137 135   POTASSIUM 3.9 4.1 4.3   BUN 11.2 22.8 31.5*   CR 0.75 0.95 1.02   KENYA 7.9* 8.1* 8.6*      HEMATOLOGY PANEL   Recent Labs   Lab Test 02/22/24 0526 02/21/24 2227 02/21/24  1844 02/21/24  1049 02/21/24 0632 02/20/24 2230 02/20/24 1947 02/05/24  0718 02/05/24  0020 05/27/22  0823 05/26/22  0640   WBC 5.3  --   --   --  6.2  --  7.1   < > 9.3   < > 7.7   HGB 7.9* 7.4* 8.0*   < > 8.1*   < > 9.6*   < > 10.7*   < > 13.2*   *  --   --   --  104*  --  104*   < > 102*   < > 99   *  --   --   --  139*  --  154   < > 133*   < > 142*   INR  --   --   --   --   --   --   --   --  3.50*  --  1.05    < > = values in this interval not displayed.      LIVER AND PANCREAS PANEL   Recent Labs   Lab Test 02/20/24 1947 02/01/24  0631 05/05/23  0024 05/04/23  2355 01/28/23  1224 12/18/22  1052 11/06/22  1324 08/13/22  1709   ALBUMIN 3.6 4.3  --  4.1   < >  --    < >  --    BILITOTAL 0.5 0.4  --  0.3   < >  --    < >  --    ALT 19 18  --  13   < >  --    < >  --    AST 27 26  --  24   < >  --    < >  --    PROTEIN  --   --  Negative  --   --  20*  --  Negative    < > = values in this interval not displayed.      I have reviewed the current diagnostic and laboratory tests.     Assessment / Recommendations:     Assessment:  Painless hematochezia.  CT angiography on admission notable for active arterial extravasation in the distal descending colon along with high attenuation lesion of the left abductor triston muscle suggestive of intramuscular hematoma.  Subsequent IR guided angiography without any active extravasation.  High suspicion for diverticular bleed that has since  self-resolved.  Other considerations given to Dieulafoy lesion, pseudoaneurysm of mesenteric artery, or possibly neoplastic process.  Given clinical stability in otherwise frail elderly patient, favor supportive care at this time.  If overt lower GI bleeding were to reoccur, would consider colonoscopy for further evaluation.  Acute blood loss anemia.  Chronic anticoagulation with prior to arrival with Xarelto due to DVT diagnosed in 05/2023.    Recommendations:  Monitor hemoglobin and signs of recurrent overt GI bleeding.  If painless hematochezia were to reoccur or further drop in hemoglobin, then would start patient on bowel prep with plan for colonoscopy to follow.  In the event of hemodynamic instability, would recommend repeat IR guided angiography.  Advance diet as tolerated.  Would ideally like to avoid restarting Xarelto in elderly patient with high risk for re-bleeding.  However if anticoagulation is highly recommended, then would consider resuming Xarelto at a lower dose of 10 mg once daily.  GI team to sign off.  Please feel free to contact with any questions or concerns.       Total time spent in chart review, direct medical discussion, examination, and documentation was 20 minutes.    Gregory Faustin MD  [facility]  [phone]  I appreciate the opportunity to participate in the care of this patient.   Please feel free to call me with any questions or concerns.

## 2024-02-22 NOTE — PROGRESS NOTES
"   02/22/24 1016   Appointment Info   Signing Clinician's Name / Credentials (SLP) Opal Iverson MS CCC-SLP   General Information   Onset of Illness/Injury or Date of Surgery 02/20/24   Referring Physician Linda Torres MD   Patient/Family Therapy Goal Statement (SLP) Letcher Juice   Pertinent History of Current Problem \"Jean Pierre Roblero is a markedly pleasant 89 year old gentleman with past medical history that is most notable for prior TIA and DVT on DOAC, as well as chronic dementia, diverticular disease, and recent hospitalization for back pain due to spinal stenosis; who presents with hematochezia, and is found to have acute anti-coagulation associated lower GI bleeding causing acute blood loss anemia, as well as acute L1 lumbar spine fracture.\" Okay to ADAT per Surgery this morning.   General Observations Pt alert and in no reported pain this morning while sitting upright in bed   Oral Motor   Oral Musculature generally intact   Dentition (Oral Motor)   Dentition (Oral Motor) dental appliance/dentures   Vocal Quality/Secretion Management (Oral Motor)   Vocal Quality (Oral Motor) WNL   General Swallowing Observations   Past History of Dysphagia Per SLP eval 2/5/24, \"Hx includes VFSS on 12/26/16 with tight UES/slight reflux, delayed swallows, and flash penetration of thin reported; easy to chew to regular diet recommended at that time; \"Choking\" episodes reported in 2020, bedside swallow eval on 3/3/20 completed with easy to chew - regular diet recommended.  Spouse states pt coughs with po approx 1x/week at baseline.  Pneumonia reported a month ago.\" Pt discharged 2/8/24, \"Pt making progress in diet advancement, however must be supervised during meals to ensure use of safe swallow strategies. Recommend continue on IDDSI 7 (easy to chew) with thin. Strategies: sit at 90 degrees, small bites with a second swallow, slow rate of intake, and alternating food/liquid.\"   Current Diet/Method of Nutritional Intake (General " Swallowing Observations, NIS) clear liquid diet;thin liquids (level 0)   Clinical Swallow Evaluation   Clinical Swallow Evaluation Textures Trialed solid foods   Feeding Assistance set up only required   Clinical Swallow Eval: Thin Liquid Texture Trial   Mode of Presentation, Thin Liquids cup;straw   Oral Phase of Swallow WFL   Pharyngeal Phase of Swallow intact   Diagnostic Statement WFL   Clinical Swallow Evaluation: Puree Solid Texture Trial   Mode of Presentation, Puree spoon;self-fed   Oral Phase, Puree WFL   Pharyngeal Phase, Puree intact   Diagnostic Statement WFL   Clinical Swallow Evaluation: Solid Food Texture Trial   Oral Phase delayed AP movement   Pharyngeal Phase intact   Diagnostic Statement No overt s/sx of aspiration   Esophageal Phase of Swallow   Patient reports or presents with symptoms of esophageal dysphagia Yes   Swallowing Recommendations   Diet Consistency Recommendations easy to chew (level 7);thin liquids (level 0)   Supervision Level for Intake close supervision needed   Swallowing Maneuver Recommendations alternate food and liquid intake;extra swallow;effortful (hard) swallow   Monitoring/Assistance Required (Eating/Swallowing) monitor for cough or change in vocal quality with intake;check mouth frequently for oral residue/pocketing   Recommended Feeding/Eating Techniques (Swallow Eval) maintain upright posture during/after eating for 30 minutes;minimize distractions during oral intake   Medication Administration Recommendations, Swallowing (SLP) meds whole in puree   Clinical Impression   Criteria for Skilled Therapeutic Interventions Met (SLP Eval) Yes, treatment indicated   SLP Diagnosis Mild dysphagia   Risks & Benefits of therapy have been explained evaluation/treatment results reviewed;care plan/treatment goals reviewed;risks/benefits reviewed;current/potential barriers reviewed;participants voiced agreement with care plan;participants included;patient   Clinical Impression  Comments SLP: Pt appears to be near his baseline swallow function with known tight UES, reflux and presbyesophagus.. Pt self fed 8oz of thin liquids via straw, puree solids and regular solids. Pt following cues well for liquid rise. No overt s/sx of aspiration observed. From SLP perspective, okay to ADAT to Regular/Easy to chew with thin liquids. Strategies: sit at 90 degrees, small bites, slow rate of intake, and alternating food/liquid. Close supervision given pt's flucutating mental status and level of alertness.   SLP Total Evaluation Time   Eval: oral/pharyngeal swallow function, clinical swallow Minutes (76915) 15   SLP Goals   Therapy Frequency (SLP Eval) 3 times/week   SLP Predicted Duration/Target Date for Goal Attainment 03/14/24   SLP Goals Swallow   SLP: Safely tolerate diet without signs/symptoms of aspiration Regular diet;Thin liquids;With use of swallow precautions;With use of compensatory swallow strategies;Independently   Interventions   Interventions Quick Adds Swallowing Dysfunction   Swallowing Dysfunction &/or Oral Function for Feeding   Treatment of Swallowing Dysfunction &/or Oral Function for Feeding Minutes (18095) 10   Symptoms Noted During/After Treatment None   Treatment Detail/Skilled Intervention Reinforced strategies and therapeutic feedback provided. Pt followed cues well.   SLP Discharge Planning   SLP Plan Meal f/u with easy to chew/thin; trial upgrades; train/cue for swallow strategies   SLP Discharge Recommendation home with assist   SLP Rationale for DC Rec Swallow function may be close to baseline; short term SLP Tx after discharge if goal has not been met; supervision with meals   SLP Brief overview of current status  From SLP perspective, okay to ADAT to Regular/Easy to chew with thin liquids. Strategies: sit at 90 degrees, small bites, slow rate of intake, and alternating food/liquid. Close supervision given pt's flucutating mental status and level of alertness.   Total  Session Time   Total Session Time (sum of timed and untimed services) 25

## 2024-02-23 ENCOUNTER — APPOINTMENT (OUTPATIENT)
Dept: CT IMAGING | Facility: CLINIC | Age: 89
DRG: 813 | End: 2024-02-23
Attending: INTERNAL MEDICINE
Payer: MEDICARE

## 2024-02-23 ENCOUNTER — APPOINTMENT (OUTPATIENT)
Dept: GENERAL RADIOLOGY | Facility: CLINIC | Age: 89
DRG: 813 | End: 2024-02-23
Attending: NURSE PRACTITIONER
Payer: MEDICARE

## 2024-02-23 PROBLEM — R29.818 TRANSIENT NEUROLOGICAL SYMPTOMS: Status: ACTIVE | Noted: 2024-02-23

## 2024-02-23 PROBLEM — Z86.73 HISTORY OF TIA (TRANSIENT ISCHEMIC ATTACK) AND STROKE: Status: ACTIVE | Noted: 2022-08-13

## 2024-02-23 PROBLEM — F03.90 DEMENTIA (H): Status: ACTIVE | Noted: 2022-08-13

## 2024-02-23 PROBLEM — I95.1 ORTHOSTATIC HYPOTENSION: Status: ACTIVE | Noted: 2024-02-23

## 2024-02-23 LAB
BASOPHILS # BLD AUTO: 0 10E3/UL (ref 0–0.2)
BASOPHILS NFR BLD AUTO: 0 %
EOSINOPHIL # BLD AUTO: 0.2 10E3/UL (ref 0–0.7)
EOSINOPHIL NFR BLD AUTO: 3 %
ERYTHROCYTE [DISTWIDTH] IN BLOOD BY AUTOMATED COUNT: 14.4 % (ref 10–15)
GLUCOSE BLDC GLUCOMTR-MCNC: 133 MG/DL (ref 70–99)
HCT VFR BLD AUTO: 25.5 % (ref 40–53)
HGB BLD-MCNC: 8.3 G/DL (ref 13.3–17.7)
IMM GRANULOCYTES # BLD: 0.1 10E3/UL
IMM GRANULOCYTES NFR BLD: 1 %
LYMPHOCYTES # BLD AUTO: 0.9 10E3/UL (ref 0.8–5.3)
LYMPHOCYTES NFR BLD AUTO: 15 %
MCH RBC QN AUTO: 33.3 PG (ref 26.5–33)
MCHC RBC AUTO-ENTMCNC: 32.5 G/DL (ref 31.5–36.5)
MCV RBC AUTO: 102 FL (ref 78–100)
MONOCYTES # BLD AUTO: 0.4 10E3/UL (ref 0–1.3)
MONOCYTES NFR BLD AUTO: 7 %
NEUTROPHILS # BLD AUTO: 4.2 10E3/UL (ref 1.6–8.3)
NEUTROPHILS NFR BLD AUTO: 74 %
NRBC # BLD AUTO: 0 10E3/UL
NRBC BLD AUTO-RTO: 0 /100
PLATELET # BLD AUTO: 135 10E3/UL (ref 150–450)
RBC # BLD AUTO: 2.49 10E6/UL (ref 4.4–5.9)
WBC # BLD AUTO: 5.7 10E3/UL (ref 4–11)

## 2024-02-23 PROCEDURE — 36415 COLL VENOUS BLD VENIPUNCTURE: CPT | Performed by: INTERNAL MEDICINE

## 2024-02-23 PROCEDURE — 99291 CRITICAL CARE FIRST HOUR: CPT | Performed by: PHYSICIAN ASSISTANT

## 2024-02-23 PROCEDURE — 70450 CT HEAD/BRAIN W/O DYE: CPT | Mod: MG

## 2024-02-23 PROCEDURE — 72081 X-RAY EXAM ENTIRE SPI 1 VW: CPT

## 2024-02-23 PROCEDURE — 258N000003 HC RX IP 258 OP 636: Performed by: PHYSICIAN ASSISTANT

## 2024-02-23 PROCEDURE — 250N000013 HC RX MED GY IP 250 OP 250 PS 637: Performed by: HOSPITALIST

## 2024-02-23 PROCEDURE — 250N000013 HC RX MED GY IP 250 OP 250 PS 637: Performed by: PHYSICIAN ASSISTANT

## 2024-02-23 PROCEDURE — 250N000013 HC RX MED GY IP 250 OP 250 PS 637: Performed by: INTERNAL MEDICINE

## 2024-02-23 PROCEDURE — 250N000013 HC RX MED GY IP 250 OP 250 PS 637

## 2024-02-23 PROCEDURE — 70496 CT ANGIOGRAPHY HEAD: CPT | Mod: MG

## 2024-02-23 PROCEDURE — 93010 ELECTROCARDIOGRAM REPORT: CPT | Performed by: INTERNAL MEDICINE

## 2024-02-23 PROCEDURE — 85025 COMPLETE CBC W/AUTO DIFF WBC: CPT | Performed by: INTERNAL MEDICINE

## 2024-02-23 PROCEDURE — 99207 PR APP CREDIT; MD BILLING SHARED VISIT: CPT | Performed by: INTERNAL MEDICINE

## 2024-02-23 PROCEDURE — 93005 ELECTROCARDIOGRAM TRACING: CPT

## 2024-02-23 PROCEDURE — 250N000011 HC RX IP 250 OP 636: Performed by: INTERNAL MEDICINE

## 2024-02-23 PROCEDURE — 250N000009 HC RX 250: Performed by: INTERNAL MEDICINE

## 2024-02-23 PROCEDURE — 120N000013 HC R&B IMCU

## 2024-02-23 PROCEDURE — 0042T CT HEAD PERFUSION W CONTRAST: CPT

## 2024-02-23 RX ORDER — IOPAMIDOL 755 MG/ML
117 INJECTION, SOLUTION INTRAVASCULAR ONCE
Status: COMPLETED | OUTPATIENT
Start: 2024-02-23 | End: 2024-02-23

## 2024-02-23 RX ADMIN — MEMANTINE 10 MG: 10 TABLET ORAL at 11:25

## 2024-02-23 RX ADMIN — LIDOCAINE 1 PATCH: 4 PATCH TOPICAL at 11:24

## 2024-02-23 RX ADMIN — FAMOTIDINE 20 MG: 20 TABLET ORAL at 21:50

## 2024-02-23 RX ADMIN — PANTOPRAZOLE SODIUM 40 MG: 40 TABLET, DELAYED RELEASE ORAL at 11:24

## 2024-02-23 RX ADMIN — SODIUM CHLORIDE 100 ML: 9 INJECTION, SOLUTION INTRAVENOUS at 10:08

## 2024-02-23 RX ADMIN — PANTOPRAZOLE SODIUM 40 MG: 40 TABLET, DELAYED RELEASE ORAL at 20:34

## 2024-02-23 RX ADMIN — POLYETHYLENE GLYCOL 3350 17 G: 17 POWDER, FOR SOLUTION ORAL at 11:23

## 2024-02-23 RX ADMIN — ACETAMINOPHEN 650 MG: 325 TABLET, FILM COATED ORAL at 11:31

## 2024-02-23 RX ADMIN — ATORVASTATIN CALCIUM 10 MG: 10 TABLET, FILM COATED ORAL at 20:34

## 2024-02-23 RX ADMIN — MEMANTINE 10 MG: 10 TABLET ORAL at 20:34

## 2024-02-23 RX ADMIN — SODIUM CHLORIDE 250 ML: 9 INJECTION, SOLUTION INTRAVENOUS at 10:42

## 2024-02-23 RX ADMIN — FINASTERIDE 5 MG: 5 TABLET, FILM COATED ORAL at 11:24

## 2024-02-23 RX ADMIN — LORATADINE 10 MG: 10 TABLET ORAL at 11:24

## 2024-02-23 RX ADMIN — IOPAMIDOL 117 ML: 755 INJECTION, SOLUTION INTRAVENOUS at 10:09

## 2024-02-23 RX ADMIN — Medication 1 CAPSULE: at 11:24

## 2024-02-23 ASSESSMENT — ACTIVITIES OF DAILY LIVING (ADL)
ADLS_ACUITY_SCORE: 46
ADLS_ACUITY_SCORE: 45
ADLS_ACUITY_SCORE: 46
ADLS_ACUITY_SCORE: 45
ADLS_ACUITY_SCORE: 46
ADLS_ACUITY_SCORE: 45
ADLS_ACUITY_SCORE: 46
ADLS_ACUITY_SCORE: 45
ADLS_ACUITY_SCORE: 46
ADLS_ACUITY_SCORE: 45
ADLS_ACUITY_SCORE: 46
ADLS_ACUITY_SCORE: 46
ADLS_ACUITY_SCORE: 45
ADLS_ACUITY_SCORE: 46
ADLS_ACUITY_SCORE: 45

## 2024-02-23 NOTE — PLAN OF CARE
Vital signs stable on room air  except for elevated BP. Alert and oriented x 3. Lung sounds clear. Bowel sounds active, flatus present. Pain controlled with tylenol. Up assist . Tolerating easy to chew diet. CMS intact.AUO, -BM

## 2024-02-23 NOTE — PROGRESS NOTES
[facility]    Medicine Progress Note - Hospitalist Service    Date of Admission:  2/20/2024    Assessment & Plan   Jean Pierre Roblero is a markedly pleasant 89 year old gentleman with past medical history that is most notable for prior TIA and DVT on DOAC, as well as chronic dementia, diverticular disease, and recent hospitalization for back pain due to spinal stenosis; who presents with hematochezia, and is found to have acute anti-coagulation associated lower GI bleeding causing acute blood loss anemia, as well as acute L1 lumbar spine fracture.     Acute anti-coagulation associated lower GI bleeding causing acute blood loss anemia:  High attenuation lesion of the left abductor triston muscle, likely hematoma  -CTA of the abdomen and pelvis shows active arterial extravasation identified within the distal descending colon, as well as a high attenuation lesion of the left abductor triston muscle belly, favoring an intramuscular hematoma.  -Appreciate IR review.  Status post descending angiogram 02/21 that did not show any extravasation on the selective or superselective angiography.  -Appreciate colorectal surgery and GI.  Discussed with Dr Faustin who recommended holding anticoagulation unless absolutely necessary  --Patient has not had any further bleed.  -Hemoglobin so far has remained stable.  -Lower extremity ultrasound repeated this admission shows DVT has now resolved.   -Discussed with family, they are not aware of the exact reason for long-term anticoagulation.  Patient did have history of DVT in May 2023 after which patient was started on Xarelto.  Family reports that patient has had a TIA about 2 years back and was on some kind of blood thinner.  I called patient's PCPs office Dr. Zaki Gale to discuss with him the indication for longterm anticoagulation, His colleague did called back as Dr. Gale is not in office today.  After reviewing his chart she mentioned that there is no  known history of A-fib and it was just 1 time DVT event.  So likely there is no compelling indication for long-term anticoagulation.  -Will hold off on further anticoagulation unless there is confirmation of A-fib.  - MR imaging follow-up has been recommended in 3 months to document resolution of acute left abductor muscle belly hematoma, as an underlying malignancy cannot be excluded at this time.     Syncope  Confusion  -RRT called earlier this morning when he stood for standing L1 film(see below) when patient became unresponsive to verbal and was hypotensive with SBP 89 after sitting.  Subsequently became confused and apparently had right visual neglect.  Code stroke was called  -I had evaluated the patient after patient came back from head CT.  -Patient is currently back to his baseline mentation.  -Patient is currently being monitored in telemetry, per house PA, there were few reports of questionable RVR.  Will check EKG  -Patient had echocardiogram on 2/22/2024 which had shown normal ventricular wall motion, no valvular disease   -Await further recommendation from stroke neurology.  Continue to monitor in telemetry      Acute L1 lumbar spine fracture:     -- Patient was admitted with fall early February this year when patient had C-spine and lumbar spine and thoracic spine MRI done which had not shown any fracture.  [facility] and there is no history of new fall.  -CT abdomen pelvis done on admission incidentally however shows acute, nondisplaced anterior column fracture of L1 vertebral body  -Fall precautions.   -Appreciate neurosurgery input   -Upright thoracolumbar spine x-ray with no definitive fractures  -Follow-up at neurosurgery clinic in 3 weeks with x-ray prior, neurosurgery office will coordinate  -Tylenol, Oxycodone, IV Dilaudid as needed for pain. Anti-emetics as needed.     Elevated Troponin likely demand ischemia :  - mildly elevated Troponin T which has remained flat    - Continue telemetry.    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    -- ct home memantine       Diet:      DVT Prophylaxis: Pneumatic Compression Devices  Puckett Catheter: Not present  Lines: None     Cardiac Monitoring: ACTIVE order. Indication: GI Bleed  Code Status: Full Code      Clinically Significant Risk Factors                # Thrombocytopenia: Lowest platelets = 127 in last 2 days, will monitor for bleeding       # Dementia: noted on problem list          # Financial/Environmental Concerns: none         Disposition Plan      Expected Discharge Date: 02/26/2024                    Linda Torres MD  Hospitalist Service  [facility]  Securely message with [employer] (more info)  Text page via [facility] Paging/Directory   ______________________________________________________________________    Interval History   Events from earlier this morning reviewed.  Discussed with Roosevelt Robison PA-C, house provider for RRT.  Patient had an episode of worsening confusion after he became unresponsive and hypotensive.  Code stroke called.  See RRT providers note and neuro note for details.  Patient has been stable.  Did discuss with family at bedside about indication for anticoagulation.  Also did discuss with PCPs colleague, so far there is no indication for long-term anticoagulation .    Physical Exam   Vital Signs: Temp: 98.6  F (37  C) Temp src: Oral BP: (!) 131/97 Pulse: 104   Resp: 18 SpO2: 92 % O2 Device: None (Room air)    Weight: 132 lbs 15 oz    Exam:  Constitutional: Awake, alert and no distress. Appears comfortable  Head: Normocephalic. No masses, lesions, tenderness or abnormalities  ENMT: ENT exam  normal, no neck nodes or sinus tenderness  Cardiovascular: RRR.  no murmurs, no rubs or JVD  Respiratory:normal WOB,b/l equal air entry, no wheezes or crackles   Gastrointestinal: Abdomen soft, non-tender. BS normal. No masses, organomegaly  : Deferred  Extremities :no edema , no clubbing or cyanosis         Medical Decision Making       52 MINUTES SPENT BY ME on the date of service doing chart review, history, exam, documentation & further activities per the note.      Data     I have personally reviewed the following data over the past 24 hrs:    5.7  \   8.3 (L)   / 135 (L)     N/A N/A N/A /  133 (H)   N/A N/A N/A \       Imaging results reviewed over the past 24 hrs:   Recent Results (from the past 24 hour(s))   XR Thoracic Lumbar Standing 1 View    Narrative    THORACIC LUMBAR STANDING 1 VIEW  2/23/2024 9:35 AM     COMPARISON: Lumbar spine MR 2/5/2024.     HISTORY: L1 comp fracture.      Impression    IMPRESSION: Single lateral view submitted. No definite fractures  identified.     ZARINA CABEZAS MD         SYSTEM ID:  [id number]   CT Head w/o Contrast    Narrative    CT SCAN OF THE HEAD WITHOUT CONTRAST   2/23/2024 10:12 AM     HISTORY: Code Stroke, neurological symptoms.    TECHNIQUE:  Axial images of the head and coronal reformations without  IV contrast material. Radiation dose for this scan was reduced using  automated exposure control, adjustment of the mA and/or kV according  to patient size, or iterative reconstruction technique.    COMPARISON: Head MRI 2/5/2024, head CT 2/5/2024.      Impression    IMPRESSION: No evidence of hemorrhage. No convincing acute infarct.  Background of nonspecific patchy and confluent white matter  hypoattenuation presumably represents chronic small vessel ischemic  change.    Incidentally noted lobulated soft tissue lesion within the right  superior nasal cavity measuring about 2.3 cm, possibly polyp or  inflammatory thickening, similar compared to prior exams.  Direct  visualization would be typically recommended. Incidental left  maxillary sinus retention cyst.    Preliminary findings were discussed by phone between Dr. Jimenes and  the stroke neurology service at approximately 10:19 AM on 2/23/2024.      HAZEL JIMENES MD         SYSTEM ID:  [id number]   CTA Head Neck w Contrast    Narrative    CT ANGIOGRAM OF THE HEAD AND NECK WITH CONTRAST February 23, 2024  10:21 AM     HISTORY: Code Stroke, neurological deficit.    TECHNIQUE: CT angiography with an injection of 67mL Isovue-370 IV with  scans through the head and neck. Images were transferred to a separate  3-D workstation where multiplanar reformations and 3-D images were  created. Estimates of carotid stenoses are made relative to the distal  internal carotid artery diameters except as noted. Radiation dose for  this scan was reduced using automated exposure control, adjustment of  the mA and/or kV according to patient size, or iterative  reconstruction technique.    COMPARISON: CT of the head and neck 2/5/2024.     CT ANGIOGRAM HEAD FINDINGS: No evidence of large vessel occlusion,  high-grade stenosis, aneurysm, or high flow vascular malformation.  Scattered atherosclerotic plaquing with mild stenoses.     CT ANGIOGRAM NECK FINDINGS: No evidence of large vessel occlusion or  high-grade stenosis. Scattered atherosclerotic plaquing. Multiple mild  stenoses. No evidence of dissection.     INCIDENTAL FINDINGS: Cervical spine degenerative changes. Scattered  nonspecific pulmonary nodules most conspicuous within the right middle  lobe, possibly infectious/inflammatory. Follow-up chest CT could be  performed. Bilateral pleural effusions.      Impression    IMPRESSION:   CTA Head: No evidence of large vessel occlusion or high-grade  stenosis.   CTA Neck: No evidence of large vessel occlusion or high-grade  stenosis.     HAZEL JIMENES MD         SYSTEM ID:  [id number]   CT Head Perfusion w Contrast    Narrative    CT BRAIN  PERFUSION 2/23/2024 10:43 AM    HISTORY: Code Stroke    TECHNIQUE: Time sequential axial CT images of the head were acquired  during the administration of 117 mL Isovue-370 IV. Color perfusion  maps of the brain were created from this time sequential axial source  data.     Radiation dose for this scan was reduced using automated exposure  control, adjustment of the mA and/or kV according to patient size, or  iterative reconstruction technique.    COMPARISON: 2/5/2024      Impression    IMPRESSION: No convincing abnormality. Presumed incidental relative  posterior circulation transit time prolongations. MRI could be  performed if indicated.    HAZEL REYES MD         SYSTEM ID:  [id number]     Recent Labs   Lab 02/23/24  0940 02/23/24  0618 02/22/24  1855 02/22/24  0526 02/21/24  1049 02/21/24  0632 02/20/24  2230 02/20/24  1947   WBC  --  5.7  --  5.3  --  6.2  --  7.1   HGB  --  8.3* 7.7* 7.9*   < > 8.1*   < > 9.6*   MCV  --  102*  --  104*  --  104*  --  104*   PLT  --  135*  --  127*  --  139*  --  154   NA  --   --   --  139  --  137  --  135   POTASSIUM  --   --   --  3.9  --  4.1  --  4.3   CHLORIDE  --   --   --  109*  --  105  --  101   CO2  --   --   --  23  --  25  --  23   BUN  --   --   --  11.2  --  22.8  --  31.5*   CR  --   --   --  0.75  --  0.95  --  1.02   ANIONGAP  --   --   --  7  --  7  --  11   KENYA  --   --   --  7.9*  --  8.1*  --  8.6*   *  --   --  100*  --  111*   < > 131*   ALBUMIN  --   --   --   --   --   --   --  3.6   PROTTOTAL  --   --   --   --   --   --   --  6.1*   BILITOTAL  --   --   --   --   --   --   --  0.5   ALKPHOS  --   --   --   --   --   --   --  74   ALT  --   --   --   --   --   --   --  19   AST  --   --   --   --   --   --   --  27    < > = values in this interval not displayed.

## 2024-02-23 NOTE — CODE/RAPID RESPONSE
[facility]   RRT/House Officer JAYLA Progress Note  Date of Service: 2/23/2024   Time Called: 0925  RRT called for (per RN): unresponsiveness    IMPRESSION & PLAN:  Assessment & Plan     Jean Pierre Roblero is a 89 year old male w/ PMH of TIA and DVT on DOAC , dementia, diverticular disease, Afib, recent spinal stenosis admit 2/5-2/8, who was admitted on 2/20/2024 for hematochezia, w lower GI bleed w anemia      RRT called in XR when the spt stood for a standing L1 film (has a fx) , became unresponsive to verbal and hypotensive (SBP 89 after just sitting). Notes to be confused for exam, thus unreliable. BG ok at 133 and  for me on arrival. Had a profunda R vial neglect (can't focus or turn head to find me on the R, nor follow fingers). Poorly follows neuro testing, confused. Repeats last maneuver. No sx offered by pt though. D/w Ms Letha NARAYAN , Stroke Neurology, code stroke called. HCT w/o - no large bleed. Head CT w perfusion- no bleed. Ct Cerv - no LVO.  Bedside tele w occasion AF w RVR (new dx?)- few RVR runs briefly up to the 130- 140s, then self resolved. held PTA xaralto x 3d d/t GIB, is on metoprolol . Pts early confusion and inconsistent exam seemed to resolve within 1 hr, and on reeval after CT H, follows commands, oriented  no focal deficit found w exam. Overall impression is transient orthostasis, possibly secondary to discomfort or hypotension, followed by acute transient neurologic changes.All resolved  In the setting of anemia not likely TIA, nor CVA. 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 (currently held)  -Anemia, secondary to left abductor  hematoma  -Acute lower GI bleed-GI following  -Chronic dementia    Differential diagnosis considered following (not exclusive):  -Acute ischemic stroke-not  likely and imaging neg  -Orthostasis - proven, likely accounting for sx  - hypoglycemia- ruled out BG 130s  -TIA- considered as less likely per neurology  -Polypharmacy possible given his oxycodone use  --Seizure-unlikely     INTERVENTIONS:  -Immediate post incident sitting BP 89, improved 133 supine  -Orthostatic vitals trending later  -Normal saline 250 cc bolus given-CBC-hemoglobin stable 8.3.,   -Blood sugar WNL  -Discussed with stroke neurology PA, recommended code stroke  -Formal code stroke called, discussed with provider.  -CT head without contrast-no acute bleed, abnormality  -CT head perfusion-no convincing abnormality, consider MRI.  -no thrombolytics indicated (or possible GI bleed close  -CT cervical  w- No LVO-has pulmonary nodules.  -Previous MRI 2/5 reviewed-no ischemic finding  -Consider resuming DOAC when cleared by hospitalist  -Follow-up pulmonary nodules, most notable RML-discussed with hospitalist    At the end of the RRT, pt appears to be much better, BP was normalized, mentation had greatly increased during the course of the visit, with stable BP.    Disposition- remains in room with close monitoring.    Discussed with stroke neurology NP in detail, flyer RN, floor RN  Defer future cares to rounder/hospitalist attending provider.  I discussed findings with him in detail.    CT incidental findings:  Nonspecific pulmonary nodules-RML  Soft tissue lesion right superior nasal Cavity, possible polyp/thickening  -PCP/needs ENT follow-up outpatient    Code Status: Full Code    Allergies   Allergies   Allergen Reactions    Chocolate Anaphylaxis     headache    Lamotrigine Unknown     Other Reaction(s): severe muscle pain, trouble walking    Propranolol Unknown     Other Reaction(s): swelling of tongue, irritation to tongue and lips, weight gain  "   Valproic Acid      Other Reaction(s): tongue swelling    Verapamil Other (See Comments)     Other Reaction(s): swollen tongue and swollen lips    Per patient possible reason for tongue swelling in the past    Zonisamide Unknown    Amoxicillin Unknown     He doesn't recall any allergy to this medication    Feathers     Mirabegron Unknown    Nabumetone Diarrhea    Novocaine [Procaine]      Passes out almost immediately    Oxycodone Other (See Comments)     Hallucinations    Solifenacin Unknown    Strawberry Extract GI Disturbance     Most berries    Tramadol Other (See Comments)    Amitriptyline Fatigue and Other (See Comments)     Other Reaction(s): ??back pain, memory loss    \"reduced memory\"    Topiramate Other (See Comments)     Other Reaction(s): ?? heart issues, elevated HR and elevated BP    \"reduce memory\"       Last 24H PRN:     acetaminophen (TYLENOL) tablet 650 mg, 650 mg at 02/22/24 2158 **OR** acetaminophen (TYLENOL) Suppository 650 mg    acetaminophen (TYLENOL) tablet 650 mg, 650 mg at 02/22/24 2201    Subjective:  Interval History     Patient seen in x-ray.  Initially he really had great difficulty answering.  He clearly was not able to see me on the right side and did not appear to have any blinking to threat response.  When he would try to look at me he would look up into the right.  He was inconsistent in following commands for myself and for flyer.  Thus neuro exam was not very useful.  At times he would be confused and repeating the last test requested.  At 1 time stated he did not have any symptoms.    When reevaluated serially by the stroke NP as well as RNs he did improve by the end of the visit and seem to be much more alert and interactive.     Exam::  Physical Exam   Vital Signs with Ranges:  Vitals:    02/22/24 2009 02/23/24 0007 02/23/24 0326 02/23/24 0820   BP: 123/68 119/71 (!) 150/77 (!) 131/97   BP Location: Right arm Right arm Right arm Right arm   Patient Position:  Semi-Sibley's " Semi-Sibley's    Cuff Size:       Pulse: 96 90 103 104   Resp: 16 18 18 18   Temp: 98.6  F (37  C) 98.1  F (36.7  C) 98.4  F (36.9  C) 98.6  F (37  C)   TempSrc: Oral Oral Oral Oral   SpO2: 98% 92%     Weight:         Temp:  [97.7  F (36.5  C)-101.7  F (38.7  C)] 98.6  F (37  C)  Pulse:  [] 104  Resp:  [16-18] 18  BP: (112-150)/(63-97) 131/97  SpO2:  [92 %-98 %] 92 %  I/O last 3 completed shifts:  In: -   Out: 2600 [Urine:2600]    Lines, PIV ,  Constitutional: vs as above and/or per EMR  General:  adult pt lying in bed without acute distress  HEENT: NC/AT,  mouth moist oral mucosa, pupils equal round reactive,   Neck: w/o JVD, supple  CV: S1S2, w/o obvious murmur  Resp: Room air chest rise unlabored, lungs clr , w/o rhonchi, rales, whz upper and lower lobes  GI soft, nontender, nondistended  Musculoskeletal: MAEW, no bony joint deformities no lower edema or mottling  Skin: no obvious rashes on exposed skin  Lymph: defer  Neuro: non focal, no focal facial droop, faces symmetric, tongue excursion midline, speech limited, notable patient neglecting to look to the right and query visual field cut but seem to resolve.  See above.  Psych: Seems confused, slowly improved during visit.    Labs/Imaging  Data     CBC with Diff:  Recent Labs   Lab Test 02/23/24  0618 02/22/24  1855 02/22/24  0526 02/21/24  1049 02/21/24  0632 02/05/24  0718 02/05/24  0020   WBC 5.7  --  5.3  --  6.2   < > 9.3   HGB 8.3* 7.7* 7.9*   < > 8.1*   < > 10.7*   *  --  104*  --  104*   < > 102*   *  --  127*  --  139*   < > 133*   INR  --   --   --   --   --   --  3.50*    < > = values in this interval not displayed.        Comprehensive Metabolic Panel:  Recent Labs   Lab 02/22/24  0526 02/21/24  0632 02/21/24  0224 02/20/24  1947    137  --  135   POTASSIUM 3.9 4.1  --  4.3   CHLORIDE 109* 105  --  101   CO2 23 25  --  23   ANIONGAP 7 7  --  11   * 111* 93 131*   BUN 11.2 22.8  --  31.5*   CR 0.75 0.95  --  1.02    GFRESTIMATED 86 77  --  70   KENYA 7.9* 8.1*  --  8.6*   PROTTOTAL  --   --   --  6.1*   ALBUMIN  --   --   --  3.6   BILITOTAL  --   --   --  0.5   ALKPHOS  --   --   --  74   AST  --   --   --  27   ALT  --   --   --  19       IMAGING recent:   I personally reviewed the CT head (2/23) radiographic imaging, and agree with radiology interpretation below    CTA Head Neck w Contrast  Result Date: 2/23/2024  IMPRESSION: CTA Head: No evidence of large vessel occlusion or high-grade stenosis. CTA Neck: No evidence of large vessel occlusion or high-grade stenosis. HAZEL JIMENES MD   SYSTEM ID:  [id number]    CT Head w/o Contrast  Result Date: 2/23/2024  IMPRESSION: No evidence of hemorrhage. No convincing acute infarct. Background of nonspecific patchy and confluent white matter hypoattenuation presumably represents chronic small vessel ischemic change. Incidentally noted lobulated soft tissue lesion within the right superior nasal cavity measuring about 2.3 cm, possibly polyp or inflammatory thickening, similar compared to prior exams. Direct visualization would be typically recommended. Incidental left maxillary sinus retention cyst. Preliminary findings were discussed by phone between Dr. Jimenes and the stroke neurology service at approximately 10:19 AM on 2/23/2024.  HAZEL JIMENES MD   SYSTEM ID:  [id number]    CT Head Perfusion w Contrast  Result Date: 2/23/2024  CT BRAIN PERFUSION 2/23/2024 10:43 AM HISTORY: Code Stroke IMPRESSION: No convincing abnormality. Presumed incidental relative posterior circulation transit time prolongations. MRI could be performed if indicated.     XR Thoracic Lumbar Standing 1 View  Result Date: 2/23/2024  IMPRESSION: Single lateral view submitted. No definite fractures identified.     Echo Limited  Result Date: 2/22/2024  Interpretation Summary  1. The left ventricle is normal in structure, function and size. The visual ejection fraction is estimated at 65%. Normal left ventricular  wall motion 2. The right ventricle is normal in structure, function and size. 3. No valve disease. 4. Ascending aorta dilatation is present. 4.2cm.  No changes from echo 2-6-24. _____________________________    US Lower Extremity Venous Duplex Bilateral: 2/21/2024  IMPRESSION: No evidence of deep venous thrombosis.     IR Visceral Angiogram  Result Date: 2/21/2024  IMPRESSION:  No identified extravasation on the selective or superselective mesenteric arteriography.     CTA Abdomen Pelvis with Contrast: 2/20/2024  IMPRESSION: 1.  Active arterial extravasation identified within the distal descending colon. 2.  High attenuation lesion of the left abductor triston muscle belly, favoring an intramuscular hematoma. MR imaging follow-up recommended in 3 months to document resolution of this finding, as an underlying malignancy cannot be excluded. 3.  Acute, nondisplaced anterior column fracture of the L1 vertebral body. 4.  No evidence of acute gastrointestinal inflammation or obstruction. 5.  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 Nonemergent CT angiography of the chest is recommended, when clinically feasible. Impression 1, 2, and 3 discussed verbally via telephone with Dr. Harden at 10:10 PM on 02/20/2024. [Recommend Follow Up: Proximal left thigh MR in 3 months.] This report will be copied to the [facility] to ensure a provider acknowledges the finding.     Echocardiogram Limited  Result Date: 2/7/2024  Interpretation Summary  A contrast injection (Bubble Study) was performed that was negative for flow across the interatrial septum. A Valsalva maneuver was performed. The study was technically difficult.     MR Cervical Spine w/o Contrast.  Result Date: 2/6/2024 IMPRESSION: 1.  Prevertebral edema, nonspecific. 2.  No evidence for ligamentous injury. 3.  Findings described above may reflect right C3-C4 reactive inflammatory facet arthropathy versus infectious  septic facet arthropathy and osteomyelitis. Please correlate clinically. 4.  Multilevel spondylosis described above. 5.  C3-C4, C4-C5, C5-C6 severe thecal sac stenosis. 6.  C3-C4 spinal cord equivocal patchy increased T2/STIR signal versus motion artifact. Please correlate for cord edema versus myelomalacia and gliosis. 7.  Otherwise, no abnormal cord signal. 8.  Multilevel severe neural foraminal stenosis.     Echocardiogram Complete  Result Date: 2/6/2024  Interpretation Summary  Technically difficult study with some improvement after contrast use.  Normal left ventricular size and systolic function. Normal RV size and systolic function Mitral annular calcification noted with mean inflow gradient of 4 to 5 mmHg consistent with mild mitral stenosis. Aorta is dilated. Sinuses of Valsalva measures 4.6 cm. Proximal visualized ascending aorta measures 4.5 cm. There is mild aortic insufficiency. IVC is normal. No pericardial effusion. Although imaging is difficult, on subcostal views, there appears to be a shunt across the interatrial septum.     MR Thoracic Spine w/o Contrast  Result Date: 2/5/2024  IMPRESSION: 1.  No acute findings. 2.  No high-grade central or foraminal stenosis.    MR Lumbar Spine w/o Contrast  Result Date: 2/5/2024  IMPRESSION: 1.  No acute findings. 2.  At L3-L4, moderate to severe central stenosis. 3.  High-grade bilateral foraminal stenosis at L3-L4 and L5-S1. 4.  Additional high-grade left foraminal stenosis at L2-L3. 5.  Additional high-grade right foraminal stenosis at L4-L5.    US Lower Extremity Venous Duplex Bilateral  Result Date: 2/5/2024  IMPRESSION: No evidence of deep venous thrombosis.     MR Brain w/o & w Contrast  Result Date: 2/5/2024  SINUSES/MASTOIDS: No paranasal sinus mucosal disease. No middle ear or mastoid effusion.   IMPRESSION: 1.  No acute intracranial process. 2.  Generalized brain atrophy and presumed microvascular ischemic changes as detailed above.    CT Head  Perfusion w Contrast - For Tier 2 Stroke  Result Date: 2/5/2024  IMPRESSION: 1.  Area of prolonged Tmax along the right inferior temporo-occipital lobes, artifact versus ischemic change. MRI may be of value for further evaluation.    CTA Head Neck with Contrast  Result Date: 2/5/2024  IMPRESSION: HEAD CTA: 1.  No large artery stenosis or occlusion. No aneurysm. NECK CTA: 1.  No measurable carotid or vertebral artery stenosis. The results were communicated to Dr. Gavin at 12:48 AM on 02/05/2024.    CT Head w/o Contrast  Result Date: 2/5/2024  IMPRESSION: 1.  No CT evidence for acute intracranial process. 2.  Brain atrophy and presumed chronic microvascular ischemic changes as above.    CT Abdomen Pelvis w Contrast  Result Date: 1/31/2024  1. Nodular infiltrate right middle lobe; progressive when compared to May 2020. 2. No evidence of abdominal mass lesions. 3. Diverticulosis sigmoid colon without any CT evidence of diverticulitis or abscess. 4. Cause for the patient`s right lower extremity edema is not evident on the CT study. Please note that all CT scans at this facility use dose modulation, iterative reconstruction, and/or weight-based dosing when appropriate to reduce radiation dose to as low as reasonably achievable. Dictated by Maikel Bae MD @ Jan 31 2024  3:48PM (Electronically Signed)           Time Spent on this Encounter   I spent 60 minutes of critical care time on the unit/floor managing the care of this patient. Upon evaluation, this patient had a high probability of imminent or life-threatening deterioration due to acute hypotension neurologic changes, concern for acute stroke, which required my direct attention, intervention, and personal management including extensive coordination with multiple care providers along with 100% of my time was spent at the bedside counseling the patient and/or coordinating care regarding services listed in this note.    [facility]  Officer/JAYLA  Roosevelt Robison PA-C    [facility]  Securely message with the [employer] Web Console (learn more here)  Text page via [employer] Paging/Directory

## 2024-02-23 NOTE — PROGRESS NOTES
Neurosurgery progress note:     Patient with L1 compression fracture, noted on CTA abdomen pelvis, not previously noted on MRI lumbar spine on 2/5/24.     Upright xrays reviewed:   IMPRESSION: Single lateral view submitted. No definite fractures  identified.     Plan:   NSGY will sign off with recs to have patient follow up in 4 weeks with repeat xrays in clinic, we will facilitate this appointment. Patient should limit bending, lifting and twisting until follow up. No brace indicated. Pain management as needed.     Rere Pelaez PA-C  [facility] Neurosurgery  [address] [address]  [phone]

## 2024-02-23 NOTE — CONSULTS
[facility]    Stroke Consult Note    Reason for Consult: Stroke Code     Chief Complaint: Rectal Bleeding      HPI  Jean Pierre Roblero is a 89 year old male with pertinent past medical history of prior TIA, DVT on Xarelto PTA, dementia, diverticular disease, recent hospitalization for spinal stenosis.    He was admitted 2/20/24 due to hematochezia, xarelto held for GI bleed and acute blood loss anemia. Also found to have L abductor triston muscle hematoma, needing to rule out malignancy. GI/colorectal surgery recommended holding AC unless absolutely necessary. LE US was repeated during admission and showed resolution of DVT.    Today he was taken down for XR and upon standing had acute orthostatic Hypotension SBP 80s. RRT called. His BP increased to 112/59 when laid back down. He was noted to not be paying attention to anything on the R side. Paged stroke team and recommended calling Stroke code.  On this provider's arrival patient was attending to the right side and did not appear to have any new focal deficits compared to his baseline (had appreciated bilateral lower extremity weakness with more pain with lifting the right lower extremity due to his lumbar spinal fracture, has left leg hematoma causing some pain as well), both extremities are antigravity.  See exam below.      Imaging Findings  CTH No evidence of hemorrhage. No convincing acute infarct.  Background of nonspecific patchy and confluent white matter  hypoattenuation presumably represents chronic small vessel ischemic  change.     Incidentally noted lobulated soft tissue lesion within the right  superior nasal cavity measuring about 2.3 cm, possibly polyp or  inflammatory thickening, similar compared to prior exams. Direct  visualization would be typically recommended. Incidental left  maxillary sinus retention cyst.     CTA Head: No evidence of large vessel occlusion or high-grade  stenosis.   CTA Neck: No evidence of large  "vessel occlusion or high-grade  stenosis.     CTP No convincing abnormality. Presumed incidental relative  posterior circulation transit time prolongations. MRI could be  performed if indicated.    Intravenous Thrombolysis  Not given due to:   - active bleeding  - minor/isolated/quickly resolving symptoms    Endovascular Treatment  Not initiated due to absence of proximal vessel occlusion    Impression   Transient neurologic spell, suspect 2/2 hypotension, lower suspicion of TIA given rapid improvement upon raising of blood pressure    Recommendations  -If any persistent or recurrent neurologic deficits then would recommend MRI brain with and without contrast, otherwise low suspicion for stroke/TIA    Patient Follow-up     - final recommendation pending work-up    Thank you for this consult. No further stroke evaluation is recommended, so we will sign off. Please contact us with any additional questions.     Mary Monae PA-C  Vascular Neurology    To page me or covering stroke neurology team member, click here: [facility]  Choose \"On Call\" tab at top, then select \"NEUROLOGY/ALL SITES\" from middle drop-down box, press Enter, then look for \"stroke\" or \"telestroke\" for your site.  ______________________________________________________    Clinically Significant Risk Factors                # Thrombocytopenia: Lowest platelets = 127 in last 2 days, will monitor for bleeding       # Dementia: noted on problem list        # Financial/Environmental Concerns: none           Past Medical History   Past Medical History:   Diagnosis Date    Ascending aorta dilation (H24)     BPH (benign prostatic hyperplasia)     CKD (chronic kidney disease) stage 2, GFR 60-89 ml/min     Cognitive impairment     Diverticular disease of colon     DVT (deep venous thrombosis) (H) 05/2023    RLE    Gastroesophageal reflux disease     Hyperlipidemia     Iron deficiency     Migraine     Mitral stenosis     Osteoarthritis     Photosensitivity     " Presbyesophagus     Seasonal allergies     Spinal stenosis     TIA (transient ischemic attack)      Past Surgical History   Past Surgical History:   Procedure Laterality Date    BACK SURGERY      CHOLECYSTECTOMY      ENDOSCOPIC SINUS SURGERY  8/20/2012    Procedure: ENDOSCOPIC SINUS SURGERY;  ENDOSCOPIC REMOVAL OF LEFT NASAL MASS WITH LANDMARKS ([employer] [place]), BILATERAL INFERIOR TURBINOPLASTY;  Surgeon: Dima Younger MD;  Location: [facility]    EYE SURGERY      cataracts    HERNIA REPAIR      IR VISCERAL ANGIOGRAM  2/21/2024    ORTHOPEDIC SURGERY      ingrown toe nail removal    TURBINOPLASTY  8/20/2012    Procedure: TURBINOPLASTY;;  Surgeon: Dima Younger MD;  Location: [facility]     Medications   Home Meds  Prior to Admission medications    Medication Sig Start Date End Date Taking? Authorizing Provider   acetaminophen (TYLENOL) 500 MG tablet Take 1,000 mg by mouth every 8 hours as needed for pain   Yes Reported, Patient   atorvastatin (LIPITOR) 10 MG tablet 1 tablet (10 mg) by Oral or Feeding Tube route every evening 5/27/22  Yes Zeny Brown MD   butalbital-acetaminophen-caffeine (ESGIC) -40 MG tablet Take 1 tablet by mouth every 4 hours as needed for headaches 2/8/24  Yes Lilia Rivero MD   clotrimazole-betamethasone (LOTRISONE) 1-0.05 % external cream Apply topically 2 times daily as needed (take for 7 days at a time for facial rash flares)   Yes Unknown, Entered By History   famotidine (PEPCID) 40 MG tablet Take 40 mg by mouth at bedtime   Yes Unknown, Entered By History   ferrous sulfate (FEROSUL) 325 (65 Fe) MG tablet Take 1 tablet (325 mg) by mouth every other day 2/9/24  Yes Lilia Rivero MD   finasteride (PROSCAR) 5 MG tablet Take 5 mg by mouth daily   Yes Unknown, Entered By History   Lidocaine (LIDOCARE) 4 % Patch Place 1-2 patches onto the skin every 24 hours To prevent lidocaine toxicity, patient should be patch free for 12 hrs daily.  Patient taking differently:  Place 1 patch onto the skin every 24 hours To prevent lidocaine toxicity, patient should be patch free for 12 hrs daily. 2/8/24  Yes Lilia Rivero MD   loratadine (CLARITIN) 10 MG tablet Take 10 mg by mouth daily   Yes Unknown, Entered By History   memantine (NAMENDA) 10 MG tablet Take 10 mg by mouth 2 times daily   Yes Unknown, Entered By History   multivitamin  with lutein (OCUVITE WITH LTEIN) CAPS per capsule Take 1 capsule by mouth daily   Yes Unknown, Entered By History   polyethylene glycol (MIRALAX) 17 GM/Dose powder Take 17 g by mouth daily 2/8/24  Yes Lilia Rivero MD   rivaroxaban ANTICOAGULANT (XARELTO) 20 MG TABS tablet Take 20 mg by mouth daily (with dinner)   Yes Unknown, Entered By History   sennosides (SENOKOT) 8.6 MG tablet Take 1 tablet by mouth 2 times daily as needed for constipation 2/8/24  Yes Lilia Rivero MD   sodium chloride (OCEAN) 0.65 % nasal spray Spray 1 spray into both nostrils daily as needed for congestion (alternates use with Nasacort)   Yes Unknown, Entered By History   triamcinolone (NASACORT) 55 MCG/ACT nasal aerosol Spray 1 spray into both nostrils daily as needed (congestion, alternates use with Ocean spray)   Yes Unknown, Entered By History   vitamin C (ASCORBIC ACID) 500 MG tablet Take 500 mg by mouth daily   Yes Unknown, Entered By History   vitamin D3 (CHOLECALCIFEROL) 2000 units (50 mcg) tablet Take 2,000 Units by mouth daily Noon   Yes Unknown, Entered By History   pantoprazole (PROTONIX) 40 MG EC tablet Take 40 mg by mouth 2 times daily    Unknown, Entered By History   clopidogrel (PLAVIX) 75 MG tablet Take 1 tablet (75 mg) by mouth daily 5/28/22 8/19/22  Zeny Brown MD   hydrochlorothiazide (HYDRODIURIL) 12.5 MG tablet Take 1 tablet (12.5 mg) by mouth daily 5/27/22 5/27/22  Zeny Brown MD       Scheduled Meds   atorvastatin  10 mg Oral or Feeding Tube QPM    famotidine  20 mg Oral At Bedtime    ferrous sulfate  325 mg Oral Every Other Day     "finasteride  5 mg Oral Daily    lidocaine  1 patch Transdermal Q24H    lidocaine  10 mL Intradermal Once    loratadine  10 mg Oral Daily    memantine  10 mg Oral BID    multivitamin  with lutein  1 capsule Oral Daily    pantoprazole  40 mg Oral BID    polyethylene glycol  17 g Oral Daily    sodium chloride (PF)  3 mL Intracatheter Q8H       Infusion Meds   - MEDICATION INSTRUCTIONS -         PRN Meds  acetaminophen **OR** acetaminophen, acetaminophen, butalbital-acetaminophen-caffeine, calcium carbonate, - MEDICATION INSTRUCTIONS -, guaiFENesin-dextromethorphan, HOLD MEDICATION, HYDROmorphone, HYDROmorphone, HYDROmorphone, HYDROmorphone, lidocaine 4%, lidocaine (buffered or not buffered), melatonin, naloxone **OR** naloxone **OR** naloxone **OR** naloxone, ondansetron **OR** ondansetron, senna-docusate **OR** senna-docusate, sodium chloride (PF)    Allergies   Allergies   Allergen Reactions    Chocolate Anaphylaxis     headache    Lamotrigine Unknown     Other Reaction(s): severe muscle pain, trouble walking    Propranolol Unknown     Other Reaction(s): swelling of tongue, irritation to tongue and lips, weight gain    Valproic Acid      Other Reaction(s): tongue swelling    Verapamil Other (See Comments)     Other Reaction(s): swollen tongue and swollen lips    Per patient possible reason for tongue swelling in the past    Zonisamide Unknown    Amoxicillin Unknown     He doesn't recall any allergy to this medication    Feathers     Mirabegron Unknown    Nabumetone Diarrhea    Novocaine [Procaine]      Passes out almost immediately    Oxycodone Other (See Comments)     Hallucinations    Solifenacin Unknown    Strawberry Extract GI Disturbance     Most berries    Tramadol Other (See Comments)    Amitriptyline Fatigue and Other (See Comments)     Other Reaction(s): ??back pain, memory loss    \"reduced memory\"    Topiramate Other (See Comments)     Other Reaction(s): ?? heart issues, elevated HR and elevated " "BP    \"reduce memory\"     Family History   No family history on file.  Social History   Social History     Tobacco Use    Smoking status: Never    Smokeless tobacco: Never   Substance Use Topics    Alcohol use: No    Drug use: No       Review of Systems   The 10 point Review of Systems is negative other than noted in the HPI or here.        PHYSICAL EXAMINATION  Temp:  [97.7  F (36.5  C)-101.7  F (38.7  C)] 98.6  F (37  C)  Pulse:  [] 100  Resp:  [16-18] 18  BP: ()/(44-97) 104/59  SpO2:  [92 %-98 %] 95 %     General Exam  General:  patient lying in bed without any acute distress, appears frail with generalized deconditioning  HEENT:  normocephalic/atraumatic  Pulmonary:  no respiratory distress    Neuro Exam  Mental Status:  alert, oriented x 3 (is able to state the full date 2/23/2024), follows commands, speech clear and fluent, naming and repetition normal  Cranial Nerves:  visual fields intact, PERRL, EOMI with normal smooth pursuit, facial sensation intact and symmetric, facial movements symmetric, hearing not formally tested but intact to conversation, palate elevation symmetric and uvula midline, no dysarthria, tongue protrusion midline  Motor: No drift or appreciated weakness to bilateral upper extremities, able to lift bilateral lower extremities antigravity however reports significant pain with lifting right lower extremity in setting of known lumbar fracture, left lower extremity with known hematoma as well limiting strength due to pain  Reflexes:  toes down-going  Sensory:  light touch sensation intact and symmetric throughout upper and lower extremities, no extinction on double simultaneous stimulation   Coordination:  normal finger-to-nose and heel-to-shin bilaterally without dysmetria  Station/Gait:  deferred    Dysphagia Screen  Per Nursing    Stroke Scales    NIHSS  1a. Level of Consciousness 0-->Alert, keenly responsive   1b. LOC Questions 0-->Answers both questions correctly   1c. LOC " Commands 0-->Performs both tasks correctly   2.   Best Gaze 0-->Normal   3.   Visual 0-->No visual loss   4.   Facial Palsy 0-->Normal symmetrical movements   5a. Motor Arm, Left 0-->No drift, limb holds 90 (or 45) degrees for full 10 secs   5b. Motor Arm, Right 0-->No drift, limb holds 90 (or 45) degrees for full 10 secs   6a. Motor Leg, Left (S) 2-->Some effort against gravity, leg falls to bed by 5 secs, but has some effort against gravity (lumbar spine fracture with associated pain)   6b. Motor Leg, right (S) 2-->Some effort against gravity, leg falls to bed by 5 secs, but has some effort against gravity (hematoma to leg with associated pain)   7.   Limb Ataxia 0-->Absent   8.   Sensory 0-->Normal, no sensory loss   9.   Best Language 0-->No aphasia, normal   10. Dysarthria 0-->Normal   11. Extinction and Inattention  0-->No abnormality   Total 4 (02/23/24 1015)       Imaging  I personally reviewed all imaging; relevant findings per HPI.     Lab Results Data   CBC  Recent Labs   Lab 02/23/24  0618 02/22/24  1855 02/22/24  0526 02/21/24  1049 02/21/24  0632   WBC 5.7  --  5.3  --  6.2   RBC 2.49*  --  2.32*  --  2.48*   HGB 8.3* 7.7* 7.9*   < > 8.1*   HCT 25.5*  --  24.2*  --  25.8*   *  --  127*  --  139*    < > = values in this interval not displayed.     Basic Metabolic Panel    Recent Labs   Lab 02/23/24  0940 02/22/24  0526 02/21/24  0632 02/21/24  0224 02/20/24  1947   NA  --  139 137  --  135   POTASSIUM  --  3.9 4.1  --  4.3   CHLORIDE  --  109* 105  --  101   CO2  --  23 25  --  23   BUN  --  11.2 22.8  --  31.5*   CR  --  0.75 0.95  --  1.02   * 100* 111*   < > 131*   KENYA  --  7.9* 8.1*  --  8.6*    < > = values in this interval not displayed.     Liver Panel  Recent Labs   Lab 02/20/24  1947   PROTTOTAL 6.1*   ALBUMIN 3.6   BILITOTAL 0.5   ALKPHOS 74   AST 27   ALT 19     INR    Recent Labs   Lab Test 02/05/24  0020 05/26/22  0640   INR 3.50* 1.05      Lipid Profile    Recent Labs   Lab  Test 02/05/24  0718 02/05/24  0020 05/26/22  0640   CHOL 100 103 128   HDL 41 41 45   LDL 46 49 64   TRIG 66 67 96     A1C    Recent Labs   Lab Test 02/05/24  0020 05/26/22  0640   A1C 5.3 5.4     Troponin    Recent Labs   Lab 02/21/24  1049 02/21/24  0632 02/20/24  2346   CTROPT 59* 60* 51*          Stroke Code Data Data   Stroke Code Data  (for stroke code without tele)  Stroke code activated 02/23/24  (S) 0956   First stroke provider response 02/23/24  (S) 0949 (Had initially received STAT page and after hearing story, recommended code be called)   Last known normal 02/23/24  0929   Time of discovery (or onset of symptoms) 02/23/24  0930 (with sudden episode of hypotension)   Head CT read by Stroke Neuro Provider 02/23/24  1014   Was stroke code de-escalated? Yes  02/23/24  1037       I personally examined and evaluated the patient today. At the time of my evaluation and management the patient was in critical condition today due to stroke code. I personally managed review of chart, medical record, meds, imaging, history, exam, and discussion with attending regarding plan and documentation. I spent a total of 70 minutes providing critical care services, evaluating the patient, directing care and reviewing laboratory values and radiologic reports.

## 2024-02-23 NOTE — PLAN OF CARE
Care plan note:      Recent Vitals:  Temp: 98.6  F (37  C) Temp src: Oral BP: 104/59 Pulse: 100   Resp: 18 SpO2: 95 % O2 Device: None (Room air)      Orientation/Neuro: Alert and Oriented x 4, Forgetful  Pain: Pain is controlled with current analgesics.  Medication(s) being used: acetaminophen   Tele: Sinus rhythm    IV medications: None   Mobility: Assist of 2, Gait belt, and Walker   Skin: Bruises: scattered.   Resp: RA.  GI: WDL  : Using male external catheter     Diet: Tolerating diet:   Well  Orders Placed This Encounter      Easy to Chew Diet (level 7)      Safety/Concerns:  Fall Risk and Cong Risk  Aggression Color: Green    Plan: Mentation improved this afternoon after morning incidence, left thigh hematoma stable/unchanged. Appetite fair, and urine output is adequate.    Continue to monitor.      Arnie Ghosh RN

## 2024-02-24 LAB
ERYTHROCYTE [DISTWIDTH] IN BLOOD BY AUTOMATED COUNT: 14.9 % (ref 10–15)
HCT VFR BLD AUTO: 26.8 % (ref 40–53)
HGB BLD-MCNC: 8.7 G/DL (ref 13.3–17.7)
MCH RBC QN AUTO: 34.1 PG (ref 26.5–33)
MCHC RBC AUTO-ENTMCNC: 32.5 G/DL (ref 31.5–36.5)
MCV RBC AUTO: 105 FL (ref 78–100)
PLATELET # BLD AUTO: 167 10E3/UL (ref 150–450)
RBC # BLD AUTO: 2.55 10E6/UL (ref 4.4–5.9)
WBC # BLD AUTO: 5.2 10E3/UL (ref 4–11)

## 2024-02-24 PROCEDURE — 85027 COMPLETE CBC AUTOMATED: CPT | Performed by: INTERNAL MEDICINE

## 2024-02-24 PROCEDURE — 250N000013 HC RX MED GY IP 250 OP 250 PS 637: Performed by: INTERNAL MEDICINE

## 2024-02-24 PROCEDURE — 250N000013 HC RX MED GY IP 250 OP 250 PS 637: Performed by: PHYSICIAN ASSISTANT

## 2024-02-24 PROCEDURE — 99232 SBSQ HOSP IP/OBS MODERATE 35: CPT | Performed by: INTERNAL MEDICINE

## 2024-02-24 PROCEDURE — 36415 COLL VENOUS BLD VENIPUNCTURE: CPT | Performed by: INTERNAL MEDICINE

## 2024-02-24 PROCEDURE — 250N000013 HC RX MED GY IP 250 OP 250 PS 637: Performed by: HOSPITALIST

## 2024-02-24 PROCEDURE — 120N000013 HC R&B IMCU

## 2024-02-24 RX ADMIN — FERROUS SULFATE TAB 325 MG (65 MG ELEMENTAL FE) 325 MG: 325 (65 FE) TAB at 09:26

## 2024-02-24 RX ADMIN — LORATADINE 10 MG: 10 TABLET ORAL at 09:26

## 2024-02-24 RX ADMIN — PANTOPRAZOLE SODIUM 40 MG: 40 TABLET, DELAYED RELEASE ORAL at 09:26

## 2024-02-24 RX ADMIN — FINASTERIDE 5 MG: 5 TABLET, FILM COATED ORAL at 09:25

## 2024-02-24 RX ADMIN — PANTOPRAZOLE SODIUM 40 MG: 40 TABLET, DELAYED RELEASE ORAL at 20:49

## 2024-02-24 RX ADMIN — ACETAMINOPHEN 650 MG: 325 TABLET, FILM COATED ORAL at 20:48

## 2024-02-24 RX ADMIN — ATORVASTATIN CALCIUM 10 MG: 10 TABLET, FILM COATED ORAL at 20:49

## 2024-02-24 RX ADMIN — ACETAMINOPHEN 650 MG: 325 TABLET, FILM COATED ORAL at 03:38

## 2024-02-24 RX ADMIN — MEMANTINE 10 MG: 10 TABLET ORAL at 20:49

## 2024-02-24 RX ADMIN — MEMANTINE 10 MG: 10 TABLET ORAL at 09:26

## 2024-02-24 RX ADMIN — Medication 1 CAPSULE: at 09:26

## 2024-02-24 RX ADMIN — POLYETHYLENE GLYCOL 3350 17 G: 17 POWDER, FOR SOLUTION ORAL at 09:24

## 2024-02-24 RX ADMIN — FAMOTIDINE 20 MG: 20 TABLET ORAL at 22:27

## 2024-02-24 ASSESSMENT — ACTIVITIES OF DAILY LIVING (ADL)
ADLS_ACUITY_SCORE: 42

## 2024-02-24 NOTE — PLAN OF CARE
Orientations: A&O x4, forgetful, confused.   Vitals/Pain: VSS on room air. C/o of low back pain, controlled with PRN tylenol   Tele: SR/ w/ ivette PACs  Lines/Drains: 2 PIVs L/R, CDI, CL. Purewick placed  Skin/Wounds: L-thigh hematoma. Scrotum/groin redness/breakdown - powder applied.   GI/: BS active, + flatus, -BM this shift. Urine incontinent, purewick placed, AUOP.   Diet: Lvl 7 easy to chew   Ambulation/Assist: Assist x2 GB/W  Sleep Quality: good  Plan: Continue plan of care

## 2024-02-24 NOTE — PROGRESS NOTES
[facility]    Medicine Progress Note - Hospitalist Service        Date of Admission:  2/20/2024  7:26 PM    Assessment & Plan:   Jean Pierre Roblero is a markedly pleasant 89 year old gentleman with past medical history that is most notable for prior TIA and DVT on DOAC, as well as chronic dementia, diverticular disease, and recent hospitalization for back pain due to spinal stenosis; who presents with hematochezia, and is found to have acute anti-coagulation associated lower GI bleeding causing acute blood loss anemia.     Acute anti-coagulation associated lower GI bleeding causing acute blood loss anemia  High attenuation lesion of the left abductor triston muscle, likely hematoma  -CTA of the abdomen and pelvis shows active arterial extravasation identified within the distal descending colon, as well as a high attenuation lesion of the left abductor triston muscle belly, favoring an intramuscular hematoma.  -Appreciate IR review. Status post angiogram 02/21 that did not show any extravasation on the selective or superselective angiography.  -Appreciate colorectal surgery and GI.  Previous hospitalist discussed with Dr Faustin who recommended holding anticoagulation unless absolutely necessary  -Patient has not had any further bleed.  -Hemoglobin so far has remained stable.  Recheck hemoglobin today  -Lower extremity ultrasound repeated this admission shows DVT has resolved.   -Patient did have history of DVT in May 2023 after which patient was started on Xarelto. Previous hospitalist discussed with PCPs office who mentioned that there is no known history of A-fib and it was just 1 time DVT event.  So likely there is no compelling indication for long-term anticoagulation.  -Will hold off on further anticoagulation unless there is confirmation of A-fib.  -MR imaging follow-up has been recommended in 3 months to document resolution of acute left abductor muscle belly hematoma, as an underlying malignancy  cannot be excluded at this time.     Syncope  Confusion  -RRT called on the morning of 2/23/2024 when he stood up for x-ray.  Patient became unresponsive to verbal stimuli and was hypotensive with SBP 89 after sitting.  Subsequently became confused and apparently had right visual neglect.  Code stroke was called  -CT head, CT perfusion head without any acute findings.  -Patient is currently back to his baseline mentation.  -Patient had echocardiogram on 2/22/2024 which had shown normal ventricular wall motion, no valvular disease   -Patient was formally evaluated by stroke neurology and was felt that he had transient neurological spell suspected secondary to hypertension  -If persistent or neurological deficit then we will check MRI of the brain with and without contrast  -Patient had sinus tachycardia yesterday with no confirmed A-fib  -Will consider 2-week Zio patch at discharge per     ?L1 lumbar spine fracture   -Patient was admitted with fall early February this year when patient had C-spine and lumbar spine and thoracic spine MRI done which had not shown any fracture.  He was getting treated at  and there is no history of new fall.  -CT abdomen pelvis done on admission incidentally showed acute, nondisplaced anterior column fracture of L1 vertebral body  -Neurosurgery consulted, patient underwent follow-up upright x-ray which did not show definite fractures  -They recommended follow-up in 4 weeks with repeat x-rays with no additional recommendation at this time.  -Upright thoracolumbar spine x-ray with no definitive fractures  -Follow-up at neurosurgery clinic in 3 weeks with x-ray prior, neurosurgery office will coordinate  -Tylenol, Oxycodone, IV Dilaudid as needed for pain. Anti-emetics as needed.  -Continue PT, OT as able.  Therapies recommending TCU.     Elevated Troponin likely demand ischemia  -mildly elevated Troponin T which has remained flat   -Continue telemetry.    -ECHo with no wall  motion abnormality   -Outpatient follow up of 4.5 cm aorta dilation advised at discharge      Hyperlipidemia:   -Continue PTA atorvastatin      BPH  -Continue finasteride      GERD and presbyesophagus:   -Speech following   -Continue PTA PPI/famotidine     Chronic dementia  -Monitor for sundowning while hospitalized.  -Continue memantine        Diet:  Dysphagia level 7  DVT Prophylaxis: Pneumatic Compression Devices   Puckett Catheter: Not present  Code Status: Full Code     Disposition Plan      Expected Discharge Date: 02/26/2024              Entered: Derek Bone MD 02/24/2024, 11:07 AM        Clinically Significant Risk Factors                      # Dementia: noted on problem list        # Financial/Environmental Concerns: none          The patient's care was discussed with the Bedside Nurse and Patient.    Medical Decision Making       **CLEAR ALL SELECTIONS**      Labs/Imaging Reviewed:  See Information above and Data section below  Time SPENT BY ME on the date of service doing chart review, history, exam, documentation & further activities per the note:  35 MINUTES    Chart documentation was completed, in part, with [employer] voice-recognition software. Even though reviewed, some grammatical, spelling, and word errors may remain.    Derek Bone MD  Hospitalist Service  [facility]  Text Page 7AM-6PM  Securely message with the Vocera Web Console (learn more here)  Text page via [employer] Paging/Directory    ______________________________________________________________________    Interval History   No further lightheadedness or dizziness.  No new neurological concerns.  Patient back to baseline.  No further orthostatic drop in blood pressure.  Endorses back pain.  X-ray did not confirm L1 fracture.  No further interventions recommended by neurosurgery.    Data reviewed today: I reviewed all medications, new labs and imaging results over the last 24 hours. I personally reviewed the  "EKG tracing showing sinus rhythm with frequent PACs .    Physical Exam   Vital signs:  Temp: 98.1  F (36.7  C) Temp src: Oral BP: 127/76 Pulse: 88   Resp: 18 SpO2: 93 % O2 Device: None (Room air) Oxygen Delivery: 1 LPM   Weight: 60.3 kg (132 lb 15 oz)  Estimated body mass index is 20.82 kg/m  as calculated from the following:    Height as of 2/19/24: 1.702 m (5' 7\").    Weight as of this encounter: 60.3 kg (132 lb 15 oz).      Wt Readings from Last 2 Encounters:   02/20/24 60.3 kg (132 lb 15 oz)   02/19/24 60.3 kg (133 lb)       Gen: AAOX3, NAD, forgetful, hard of hearing  Resp: CTA B/L, normal WOB  CVS: RRR, no murmur  Abd/GI: Soft, non-tender. BS- normoactive.   Skin: Warm, dry no rashes  MSK:  no pedal edema  Neuro- CN- intact. No focal deficits.      Data   Recent Labs   Lab 02/23/24  0940 02/23/24  0618 02/22/24  1855 02/22/24  0526 02/21/24  1049 02/21/24  0632 02/20/24  2230 02/20/24  1947   WBC  --  5.7  --  5.3  --  6.2  --  7.1   HGB  --  8.3* 7.7* 7.9*   < > 8.1*   < > 9.6*   MCV  --  102*  --  104*  --  104*  --  104*   PLT  --  135*  --  127*  --  139*  --  154   NA  --   --   --  139  --  137  --  135   POTASSIUM  --   --   --  3.9  --  4.1  --  4.3   CHLORIDE  --   --   --  109*  --  105  --  101   CO2  --   --   --  23  --  25  --  23   BUN  --   --   --  11.2  --  22.8  --  31.5*   CR  --   --   --  0.75  --  0.95  --  1.02   ANIONGAP  --   --   --  7  --  7  --  11   KENYA  --   --   --  7.9*  --  8.1*  --  8.6*   *  --   --  100*  --  111*   < > 131*   ALBUMIN  --   --   --   --   --   --   --  3.6   PROTTOTAL  --   --   --   --   --   --   --  6.1*   BILITOTAL  --   --   --   --   --   --   --  0.5   ALKPHOS  --   --   --   --   --   --   --  74   ALT  --   --   --   --   --   --   --  19   AST  --   --   --   --   --   --   --  27    < > = values in this interval not displayed.       No results found for this or any previous visit (from the past 24 hour(s)).  Medications    - MEDICATION " INSTRUCTIONS -        atorvastatin  10 mg Oral or Feeding Tube QPM    famotidine  20 mg Oral At Bedtime    ferrous sulfate  325 mg Oral Every Other Day    finasteride  5 mg Oral Daily    lidocaine  1 patch Transdermal Q24H    lidocaine  10 mL Intradermal Once    loratadine  10 mg Oral Daily    memantine  10 mg Oral BID    multivitamin  with lutein  1 capsule Oral Daily    pantoprazole  40 mg Oral BID    polyethylene glycol  17 g Oral Daily    sodium chloride (PF)  3 mL Intracatheter Q8H

## 2024-02-24 NOTE — PLAN OF CARE
Orientation: A/Ox4 - Confused    Vitals/Tele: VSS - LSC - RA, Normotensive, Afebrile, SR/ST    IV Access/drains: 1 PIV - SL    Diet: Level 7 easy to chew - Adequate appetite     Mobility: Ax2 GB/W    GI/: External catheter in place - adequate UO, BS audible    Wound/Skin: L thigh bruise, Breakdown to scrotum    Consults:     Discharge Plan: Pending       See Flow sheets for assessment      Goal Outcome Evaluation:

## 2024-02-25 ENCOUNTER — APPOINTMENT (OUTPATIENT)
Dept: SPEECH THERAPY | Facility: CLINIC | Age: 89
DRG: 813 | End: 2024-02-25
Payer: MEDICARE

## 2024-02-25 PROCEDURE — 250N000013 HC RX MED GY IP 250 OP 250 PS 637: Performed by: PHYSICIAN ASSISTANT

## 2024-02-25 PROCEDURE — 250N000013 HC RX MED GY IP 250 OP 250 PS 637: Performed by: INTERNAL MEDICINE

## 2024-02-25 PROCEDURE — 120N000013 HC R&B IMCU

## 2024-02-25 PROCEDURE — 250N000013 HC RX MED GY IP 250 OP 250 PS 637

## 2024-02-25 PROCEDURE — 92526 ORAL FUNCTION THERAPY: CPT | Mod: GN | Performed by: REHABILITATION PRACTITIONER

## 2024-02-25 PROCEDURE — 99232 SBSQ HOSP IP/OBS MODERATE 35: CPT | Performed by: INTERNAL MEDICINE

## 2024-02-25 PROCEDURE — 250N000013 HC RX MED GY IP 250 OP 250 PS 637: Performed by: HOSPITALIST

## 2024-02-25 RX ORDER — ACETAMINOPHEN 325 MG/1
975 TABLET ORAL 3 TIMES DAILY
Status: DISCONTINUED | OUTPATIENT
Start: 2024-02-25 | End: 2024-02-26 | Stop reason: HOSPADM

## 2024-02-25 RX ADMIN — ACETAMINOPHEN 650 MG: 325 TABLET, FILM COATED ORAL at 06:24

## 2024-02-25 RX ADMIN — Medication 1 CAPSULE: at 10:35

## 2024-02-25 RX ADMIN — MEMANTINE 10 MG: 10 TABLET ORAL at 21:00

## 2024-02-25 RX ADMIN — ACETAMINOPHEN 975 MG: 325 TABLET, FILM COATED ORAL at 21:00

## 2024-02-25 RX ADMIN — FAMOTIDINE 20 MG: 20 TABLET ORAL at 21:00

## 2024-02-25 RX ADMIN — LIDOCAINE 1 PATCH: 4 PATCH TOPICAL at 10:34

## 2024-02-25 RX ADMIN — FINASTERIDE 5 MG: 5 TABLET, FILM COATED ORAL at 10:35

## 2024-02-25 RX ADMIN — POLYETHYLENE GLYCOL 3350 17 G: 17 POWDER, FOR SOLUTION ORAL at 10:33

## 2024-02-25 RX ADMIN — MEMANTINE 10 MG: 10 TABLET ORAL at 10:34

## 2024-02-25 RX ADMIN — PANTOPRAZOLE SODIUM 40 MG: 40 TABLET, DELAYED RELEASE ORAL at 20:59

## 2024-02-25 RX ADMIN — ATORVASTATIN CALCIUM 10 MG: 10 TABLET, FILM COATED ORAL at 21:00

## 2024-02-25 RX ADMIN — LORATADINE 10 MG: 10 TABLET ORAL at 10:35

## 2024-02-25 RX ADMIN — ACETAMINOPHEN 975 MG: 325 TABLET, FILM COATED ORAL at 13:53

## 2024-02-25 RX ADMIN — PANTOPRAZOLE SODIUM 40 MG: 40 TABLET, DELAYED RELEASE ORAL at 10:34

## 2024-02-25 ASSESSMENT — ACTIVITIES OF DAILY LIVING (ADL)
ADLS_ACUITY_SCORE: 41
ADLS_ACUITY_SCORE: 42
ADLS_ACUITY_SCORE: 42
ADLS_ACUITY_SCORE: 43
ADLS_ACUITY_SCORE: 42
ADLS_ACUITY_SCORE: 41
ADLS_ACUITY_SCORE: 42
ADLS_ACUITY_SCORE: 41
ADLS_ACUITY_SCORE: 42
ADLS_ACUITY_SCORE: 41
ADLS_ACUITY_SCORE: 42
ADLS_ACUITY_SCORE: 42

## 2024-02-25 NOTE — PROGRESS NOTES
Care Management Follow Up    Length of Stay (days): 5    Expected Discharge Date: 02/26/2024     Concerns to be Addressed: discharge planning     Patient plan of care discussed at interdisciplinary rounds: no    Anticipated Discharge Disposition: Transitional Care     Anticipated Discharge Services:    Anticipated Discharge DME:      Patient/family educated on Medicare website which has current facility and service quality ratings: yes  Education Provided on the Discharge Plan: Yes  Patient/Family in Agreement with the Plan: yes    Referrals Placed by CM/SW: Post Acute Facilities  Private pay costs discussed: private room/amenity fees    Additional Information:  Notified around 1500 that MD indicates patient stable for discharge.  [facility]ed Mari[facility] at [facility].  It is too late today to accept patient however [facility] can accept patient tomorrow at 1100.  Met with patient, wife and son Esau.Explained writer understanding that patient is ready for discharge and wife appeared surprised.  Reviewed [facility] acceptance tomorrow. Writer asked if they wanted a private room again and reviewed the daily cost. Wife was not aware that there is an extra fee for the private room. Both son and patient prefer the private room.  Patient will admit to the [facility] room 2122.  Son reports the last time he walked along as a hospital employee wheeled patient to the [facility] entrance and an [facility] staff member met them.   Writer explained her understanding is that the hospital policy is that staff are not to transport patients to [facility].    Son said he is able to transport patient tomorrow.  He prefers completing the transfer at 1000 if possible but can make 1100 work.   Son/wife had questions about medicare SNF days. Writer reviewed he is in the same benefit period.  It looks like he used 11 SNF days so will start on day 12. Also explained if he is in TCU on day 21 his [employer] will be billed for the daily copayment beginning on day 21.     Previous PAS remains current.   Sent Dr Dent page regarding 1100 discharge time. Updated bedside RN.  Plan:  Will ask [facility] SW on Monday to finalize the plan with Joanna and ask if son could bring patient over earlier then 1100.    MATTY Isidro

## 2024-02-25 NOTE — PLAN OF CARE
Orientations: A&O x3/4, disoriented to place at times, forgetful, intermittent confusion.   Vitals/Pain: VSS on room air. C/o low back pain, controlled with PRN tylenol   Tele: SR/ST  Lines/Drains: 1 PIVs L/R, CDI, CL.  Skin/Wounds: L-thigh hematoma. Scrotum/groin redness/breakdown - powder applied.   GI/: BS active, + flatus, -BM this shift. Urine incontinent, external male cath placed, AUOP.   Diet: Lvl 7 easy to chew   Ambulation/Assist: Assist x2 GB/W  Sleep Quality: good  Plan: Continue plan of care

## 2024-02-25 NOTE — PLAN OF CARE
Orientation: A/Ox4 - Confused at times     Vitals/Tele: VSS - LSC - RA, Normotensive, Afebrile, SR/ST     IV Access/drains: 1 PIV - SL     Diet: Regular - Adequate appetite      Mobility: Ax2 GB/W     GI/: External catheter in place - adequate UO, BS audible     Wound/Skin: L thigh bruise, Breakdown to scrotum     Consults:      Discharge Plan: TCU tomorrow          Goal Outcome Evaluation:

## 2024-02-25 NOTE — PROGRESS NOTES
[facility]    Medicine Progress Note - Hospitalist Service        Date of Admission:  2/20/2024  7:26 PM    Assessment & Plan:   Jean Pierre Roblero is a markedly pleasant 89 year old gentleman with past medical history that is most notable for prior TIA and DVT on DOAC, as well as chronic dementia, diverticular disease, and recent hospitalization for back pain due to spinal stenosis; who presents with hematochezia, and is found to have acute anti-coagulation associated lower GI bleeding causing acute blood loss anemia.     Acute anti-coagulation associated lower GI bleeding causing acute blood loss anemia  High attenuation lesion of the left abductor triston muscle, likely hematoma  -CTA of the abdomen and pelvis shows active arterial extravasation identified within the distal descending colon, as well as a high attenuation lesion of the left abductor triston muscle belly, favoring an intramuscular hematoma.  -Appreciate IR review. Status post angiogram 02/21 that did not show any extravasation on the selective or superselective angiography.  -Appreciate colorectal surgery and GI.  Previous hospitalist discussed with Dr Faustin who recommended holding anticoagulation unless absolutely necessary  -Patient has not had any further bleed.  -Hemoglobin so far has remained stable.  Recheck hemoglobin today  -Lower extremity ultrasound repeated this admission shows DVT has resolved.   -Patient did have history of DVT in May 2023 after which patient was started on Xarelto. Previous hospitalist discussed with PCPs office who mentioned that there is no known history of A-fib and it was just 1 time DVT event.  So there is no compelling indication for long-term anticoagulation.  -Will hold off on further anticoagulation unless there is confirmation of A-fib.  -Telemetry without evidence of A-fib thus far, we will plan on discharging him on 2 weeks of Zio patch.  -MR imaging follow-up has been recommended in 3 months  to document resolution of acute left abductor muscle belly hematoma, as an underlying malignancy cannot be excluded at this time.     Syncope  Confusion  -RRT called on the morning of 2/23/2024 when he stood up for x-ray.  Patient became unresponsive to verbal stimuli and was hypotensive with SBP 89 after sitting.  Subsequently became confused and apparently had right visual neglect.  Code stroke was called  -CT head, CT perfusion head without any acute findings.  -Patient is currently back to his baseline mentation.  -Patient had echocardiogram on 2/22/2024 which had shown normal ventricular wall motion, no valvular disease   -Patient was formally evaluated by stroke neurology and was felt that he had transient neurological spell suspected secondary to hypertension  -If persistent or neurological deficit then we will check MRI of the brain with and without contrast  -Neurologically stable.    ?L1 lumbar spine fracture   -Patient was admitted with fall early February this year when patient had C-spine and lumbar spine and thoracic spine MRI done which had not shown any fracture.  He was getting treated at  and there is no history of new fall.  -CT abdomen pelvis done on admission incidentally showed acute, nondisplaced anterior column fracture of L1 vertebral body  -Neurosurgery consulted, patient underwent follow-up upright x-ray which did not show definite fractures  -They recommended follow-up in 4 weeks with repeat x-rays with no additional recommendation at this time.  -Upright thoracolumbar spine x-ray with no definitive fractures  -Follow-up at neurosurgery clinic in 3 weeks with x-ray prior, neurosurgery office will coordinate  -Tylenol, Lidoderm patch, and oral Dilaudid for pain.     Elevated Troponin likely demand ischemia  -mildly elevated Troponin T which has remained flat   -Continue telemetry.    -ECHo with no wall motion abnormality   -Outpatient follow up of 4.5 cm aorta dilation advised at  discharge      Hyperlipidemia:   -Continue PTA atorvastatin      BPH  -Continue finasteride      GERD and presbyesophagus:   -Speech following   -Continue PTA PPI/famotidine     Chronic dementia  -Monitor for sundowning while hospitalized.  -Continue memantine        Diet:  Dysphagia level 7  DVT Prophylaxis: Pneumatic Compression Devices   Puckett Catheter: Not present  Code Status: Full Code     Disposition Plan medically ready for discharge.  Needs TCU.    Expected Discharge Date: 02/26/2024              Entered: Derek Bone MD 02/25/2024, 7:51 AM        Clinically Significant Risk Factors                      # Dementia: noted on problem list        # Financial/Environmental Concerns: none          The patient's care was discussed with the Bedside Nurse and Patient.    Medical Decision Making       **CLEAR ALL SELECTIONS**      Labs/Imaging Reviewed:  See Information above and Data section below  Time SPENT BY ME on the date of service doing chart review, history, exam, documentation & further activities per the note:  35 MINUTES    Chart documentation was completed, in part, with [employer] voice-recognition software. Even though reviewed, some grammatical, spelling, and word errors may remain.    Derek Bone MD  Hospitalist Service  [facility]  Text Page 7AM-6PM  Securely message with the Vocera Web Console (learn more here)  Text page via [employer] Paging/Directory    ______________________________________________________________________    Interval History   No further lightheadedness or dizziness.  Endorses low back pain which is 4-5/10 in intensity.  Hemoglobin stable.  No evidence of overt GI bleeding.    Data reviewed today: I reviewed all medications, new labs and imaging results over the last 24 hours. I personally reviewed the EKG tracing showing sinus rhythm with frequent PACs .    Physical Exam   Vital signs:  Temp: 98.3  F (36.8  C) Temp src: Oral BP: 133/84 Pulse: 95    "Resp: 16 SpO2: 96 % O2 Device: None (Room air) Oxygen Delivery: 1 LPM   Weight: 60.3 kg (132 lb 15 oz)  Estimated body mass index is 20.82 kg/m  as calculated from the following:    Height as of 2/19/24: 1.702 m (5' 7\").    Weight as of this encounter: 60.3 kg (132 lb 15 oz).      Wt Readings from Last 2 Encounters:   02/20/24 60.3 kg (132 lb 15 oz)   02/19/24 60.3 kg (133 lb)       Gen: AAOX2-3, NAD, forgetful, hard of hearing  Resp: CTA B/L, normal WOB  CVS: RRR, no murmur  Abd/GI: Soft, non-tender. BS- normoactive.   Skin: Warm, dry no rashes  MSK:  no pedal edema  Neuro- CN- intact. No focal deficits.      Data   Recent Labs   Lab 02/24/24  1454 02/23/24  0940 02/23/24  0618 02/22/24  1855 02/22/24  0526 02/21/24  1049 02/21/24  0632 02/20/24  2230 02/20/24  1947   WBC 5.2  --  5.7  --  5.3  --  6.2  --  7.1   HGB 8.7*  --  8.3* 7.7* 7.9*   < > 8.1*   < > 9.6*   *  --  102*  --  104*  --  104*  --  104*     --  135*  --  127*  --  139*  --  154   NA  --   --   --   --  139  --  137  --  135   POTASSIUM  --   --   --   --  3.9  --  4.1  --  4.3   CHLORIDE  --   --   --   --  109*  --  105  --  101   CO2  --   --   --   --  23  --  25  --  23   BUN  --   --   --   --  11.2  --  22.8  --  31.5*   CR  --   --   --   --  0.75  --  0.95  --  1.02   ANIONGAP  --   --   --   --  7  --  7  --  11   KENYA  --   --   --   --  7.9*  --  8.1*  --  8.6*   GLC  --  133*  --   --  100*  --  111*   < > 131*   ALBUMIN  --   --   --   --   --   --   --   --  3.6   PROTTOTAL  --   --   --   --   --   --   --   --  6.1*   BILITOTAL  --   --   --   --   --   --   --   --  0.5   ALKPHOS  --   --   --   --   --   --   --   --  74   ALT  --   --   --   --   --   --   --   --  19   AST  --   --   --   --   --   --   --   --  27    < > = values in this interval not displayed.       No results found for this or any previous visit (from the past 24 hour(s)).  Medications    - MEDICATION INSTRUCTIONS -        acetaminophen  975 " mg Oral TID    atorvastatin  10 mg Oral or Feeding Tube QPM    famotidine  20 mg Oral At Bedtime    ferrous sulfate  325 mg Oral Every Other Day    finasteride  5 mg Oral Daily    lidocaine  1 patch Transdermal Q24H    lidocaine  10 mL Intradermal Once    loratadine  10 mg Oral Daily    memantine  10 mg Oral BID    multivitamin  with lutein  1 capsule Oral Daily    pantoprazole  40 mg Oral BID    polyethylene glycol  17 g Oral Daily    sodium chloride (PF)  3 mL Intracatheter Q8H

## 2024-02-26 ENCOUNTER — DOCUMENTATION ONLY (OUTPATIENT)
Dept: GERIATRICS | Facility: CLINIC | Age: 89
End: 2024-02-26
Payer: MEDICARE

## 2024-02-26 ENCOUNTER — APPOINTMENT (OUTPATIENT)
Dept: CARDIOLOGY | Facility: CLINIC | Age: 89
DRG: 813 | End: 2024-02-26
Attending: INTERNAL MEDICINE
Payer: MEDICARE

## 2024-02-26 VITALS
SYSTOLIC BLOOD PRESSURE: 148 MMHG | OXYGEN SATURATION: 98 % | DIASTOLIC BLOOD PRESSURE: 77 MMHG | TEMPERATURE: 97.9 F | BODY MASS INDEX: 20.82 KG/M2 | HEART RATE: 107 BPM | WEIGHT: 132.94 LBS | RESPIRATION RATE: 16 BRPM

## 2024-02-26 DIAGNOSIS — S32.009D CLOSED FRACTURE OF LUMBAR VERTEBRA WITH ROUTINE HEALING, UNSPECIFIED FRACTURE MORPHOLOGY, UNSPECIFIED LUMBAR VERTEBRAL LEVEL, SUBSEQUENT ENCOUNTER: Primary | ICD-10-CM

## 2024-02-26 PROCEDURE — 93228 REMOTE 30 DAY ECG REV/REPORT: CPT | Performed by: INTERNAL MEDICINE

## 2024-02-26 PROCEDURE — 93270 REMOTE 30 DAY ECG REV/REPORT: CPT

## 2024-02-26 PROCEDURE — 250N000013 HC RX MED GY IP 250 OP 250 PS 637

## 2024-02-26 PROCEDURE — 250N000013 HC RX MED GY IP 250 OP 250 PS 637: Performed by: INTERNAL MEDICINE

## 2024-02-26 PROCEDURE — 99239 HOSP IP/OBS DSCHRG MGMT >30: CPT | Performed by: INTERNAL MEDICINE

## 2024-02-26 PROCEDURE — 250N000013 HC RX MED GY IP 250 OP 250 PS 637: Performed by: PHYSICIAN ASSISTANT

## 2024-02-26 RX ORDER — HYDROMORPHONE HYDROCHLORIDE 2 MG/1
1 TABLET ORAL EVERY 6 HOURS PRN
Qty: 8 TABLET | Refills: 0 | Status: SHIPPED | OUTPATIENT
Start: 2024-02-26 | End: 2024-02-26

## 2024-02-26 RX ORDER — HYDROMORPHONE HYDROCHLORIDE 1 MG/ML
1 SOLUTION ORAL EVERY 6 HOURS PRN
Qty: 16 ML | Refills: 0 | Status: SHIPPED | OUTPATIENT
Start: 2024-02-26 | End: 2024-02-27

## 2024-02-26 RX ORDER — BUTALBITAL, ACETAMINOPHEN AND CAFFEINE 50; 325; 40 MG/1; MG/1; MG/1
1 TABLET ORAL EVERY 6 HOURS PRN
Qty: 20 TABLET | Refills: 0 | Status: SHIPPED | OUTPATIENT
Start: 2024-02-26

## 2024-02-26 RX ORDER — HYDROMORPHONE HYDROCHLORIDE 1 MG/ML
1 SOLUTION ORAL EVERY 6 HOURS PRN
Qty: 30 ML | Refills: 0 | Status: SHIPPED | OUTPATIENT
Start: 2024-02-26 | End: 2024-02-27

## 2024-02-26 RX ADMIN — HYDROMORPHONE HYDROCHLORIDE 1 MG: 2 TABLET ORAL at 00:53

## 2024-02-26 RX ADMIN — FERROUS SULFATE TAB 325 MG (65 MG ELEMENTAL FE) 325 MG: 325 (65 FE) TAB at 09:57

## 2024-02-26 RX ADMIN — LIDOCAINE 1 PATCH: 4 PATCH TOPICAL at 09:51

## 2024-02-26 RX ADMIN — Medication 1 CAPSULE: at 09:55

## 2024-02-26 RX ADMIN — POLYETHYLENE GLYCOL 3350 17 G: 17 POWDER, FOR SOLUTION ORAL at 09:52

## 2024-02-26 RX ADMIN — MEMANTINE 10 MG: 10 TABLET ORAL at 09:56

## 2024-02-26 RX ADMIN — PANTOPRAZOLE SODIUM 40 MG: 40 TABLET, DELAYED RELEASE ORAL at 09:55

## 2024-02-26 RX ADMIN — FINASTERIDE 5 MG: 5 TABLET, FILM COATED ORAL at 09:55

## 2024-02-26 RX ADMIN — LORATADINE 10 MG: 10 TABLET ORAL at 09:56

## 2024-02-26 RX ADMIN — ACETAMINOPHEN 975 MG: 325 TABLET, FILM COATED ORAL at 09:55

## 2024-02-26 ASSESSMENT — ACTIVITIES OF DAILY LIVING (ADL)
ADLS_ACUITY_SCORE: 42

## 2024-02-26 NOTE — PROGRESS NOTES
Care Management Discharge Note    Discharge Date: 02/26/2024       Discharge Disposition: Transitional Care    Discharge Services:  SNF    Discharge DME:  None    Discharge Transportation:  (son Esau)    Private pay costs discussed: Not applicable    Does the patient's insurance plan have a 3 day qualifying hospital stay waiver?  No    PAS Confirmation Code:  [id number]  Patient/family educated on Medicare website which has current facility and service quality ratings: yes    Education Provided on the Discharge Plan: Yes  Persons Notified of Discharge Plans: MD, Family, Charge Nurse, Bedside Nurse, facility, [facility].   Patient/Family in Agreement with the Plan: yes    Handoff Referral Completed: No    Additional Information:  [facility] Patient will be transported by his son via wheelchair to the facility. Patient should be transported at 11:00 am to the facility as this is the earliest the facility can accept the patient. Writer confirmed with the facility they were expecting the patient today. Patient's scripts were faxed to the facility. PAS was completed. PAS was faxed and present on chart. MD has put in discharge orders. Patient will discharge to facility for TCU services today.     Buck Walsh [facility]  Care Management

## 2024-02-26 NOTE — DISCHARGE SUMMARY
[facility]    Discharge Summary  Hospitalist    Date of Admission:  2/20/2024  Date of Discharge:  2/26/2024  Discharging Provider: Derek Bone MD    Discharge Diagnoses      Acute anti-coagulation associated lower GI bleeding causing acute blood loss anemia.  High attenuation lesion of the left abductor triston muscle, likely hematoma.  Syncope  Acute encephalopathy due to syncope-resolved  Low back pain  Elevated Troponin likely demand ischemia  Hyperlipidemia:   BPH  GERD and presbyesophagus   Chronic dementia      Hospital Course:    Jean Pierre Roblero is a markedly pleasant 89 year old gentleman with past medical history that is most notable for prior TIA and DVT on DOAC, as well as chronic dementia, diverticular disease, and recent hospitalization for back pain due to spinal stenosis; who presents with hematochezia, and is found to have acute anti-coagulation associated lower GI bleeding causing acute blood loss anemia.     Acute anti-coagulation associated lower GI bleeding causing acute blood loss anemia  High attenuation lesion of the left abductor triston muscle, likely hematoma  -CTA of the abdomen and pelvis shows active arterial extravasation identified within the distal descending colon, as well as a high attenuation lesion of the left abductor triston muscle belly, favoring an intramuscular hematoma.  -Evaluated by interventional radiology, status post angiogram on 02/21 that did not show any extravasation on the selective or superselective angiography.  -Appreciate colorectal surgery and GI.  Previous hospitalist discussed with Dr Faustin who recommended holding anticoagulation unless absolutely necessary  -Patient has not had any further bleed.  -Hemoglobin so far has remained stable.    -Lower extremity ultrasound repeated this admission shows DVT has resolved.   -Patient did have history of DVT in May 2023 after which patient was started on Xarelto. Previous hospitalist  discussed with his PCPs office who mentioned that there is no known history of A-fib and it was just 1 time DVT event.  So there is no compelling indication for long-term anticoagulation.  -Therefore decision was made to hold anticoagulation for now until there is confirmation of A-fib in the future.  -Telemetry without evidence of A-fib thus far, we will plan on discharging him on 1 month event monitor  -MR imaging follow-up has been recommended in 3 months to document resolution of acute left abductor muscle belly hematoma, as an underlying malignancy cannot be excluded at this time.     Syncope  Confusion  -RRT called on the morning of 2/23/2024 when he stood up for x-ray.  Patient became unresponsive to verbal stimuli and was hypotensive with SBP 89 after sitting.  Subsequently became confused and apparently had right visual neglect.  Code stroke was called  -CT head, CT perfusion head without any acute findings.  -Patient is currently back to his baseline mentation.  -Patient had echocardiogram on 2/22/2024 which had shown normal ventricular wall motion, no valvular disease   -Patient was formally evaluated by stroke neurology and was felt that he had transient neurological spell suspected secondary to hypertension  -If persistent or neurological deficit then we will check MRI of the brain with and without contrast  -Neurologically stable.     Low back pain  -Patient was admitted with fall early February this year when patient had C-spine and lumbar spine and thoracic spine MRI done which had not shown any fracture.  He was getting treated at [facility] and there is no history of new fall.  -CT abdomen pelvis done on admission incidentally showed acute, nondisplaced anterior column fracture of L1 vertebral body  -Neurosurgery consulted, patient underwent follow-up upright x-ray which did not show definite fractures.  -They recommended follow-up in 4 weeks with repeat x-rays with no additional recommendation at  this time.  -Upright thoracolumbar spine x-ray with no definitive fractures  -Follow-up at neurosurgery clinic in 3 weeks with x-ray prior, neurosurgery office will coordinate  -Tylenol, Lidoderm patch, and oral Dilaudid for pain.     Elevated Troponin likely demand ischemia  -mildly elevated Troponin T which has remained flat   -Continue telemetry.    -ECHo with no wall motion abnormality   -Outpatient follow up of 4.5 cm aorta dilation advised at discharge      Hyperlipidemia:   -Continue PTA atorvastatin      BPH  -Continue finasteride      GERD and presbyesophagus:   -Speech following   -Continue PTA PPI/famotidine     Chronic dementia  -Monitor for sundowning   -Continue memantine           Derek Bone MD, MD    Significant Results and Procedures     Pending Results     Unresulted Labs Ordered in the Past 30 Days of this Admission       Date and Time Order Name Status Description    2/20/2024 10:25 PM Prepare red blood cells (unit) Preliminary     2/20/2024 10:25 PM Prepare red blood cells (unit) Preliminary             Code Status   Full Code       Primary Care Physician   Zaki Alaniz    Physical Exam   Temp: 97.9  F (36.6  C) Temp src: Oral BP: (!) 175/87 Pulse: 107   Resp: 16 SpO2: 98 % O2 Device: None (Room air)      Constitutional: AAOX2-33, NAD,Stebbins  Respiratory: CTA B/L, Normal WOB  Cardiovascular: RRR, No murmur  GI: Soft, Non- tender, BS- normoactive  Neuro: CN- grossly intact, Moving all 4 extremities.     Discharge Disposition   Discharged to short-term care facility  Condition at discharge: Stable    Consultations This Hospital Stay   PHARMACY IP CONSULT  CARE MANAGEMENT / SOCIAL WORK IP CONSULT  NEUROSURGERY IP CONSULT  COLORECTAL SURGERY IP CONSULT  GASTROENTEROLOGY IP CONSULT  SPEECH LANGUAGE PATH ADULT IP CONSULT  PHYSICAL THERAPY ADULT IP CONSULT  SOCIAL WORK IP CONSULT  PHYSICAL THERAPY ADULT IP CONSULT  OCCUPATIONAL THERAPY ADULT IP CONSULT    Time Spent on this Encounter   I,  Derek Bone MD, personally saw the patient today and spent greater than 30 minutes discharging this patient.    Discharge Orders      XR Lumbar Spine 2/3 Views     X-ray lumbar spine 2-3 views*     Activity    Please limit your lifting to no more that ten pounds and avoid excessive bending, twisting and turning at the lumbar spine. You should also avoid excessive jostling and jarring activities.     Ok to walk as tolerated, avoid bedrest.    No contact sports until after follow up visit.    No high impact activities such as; running/jogging, snowmobile or 4 [place] cru[place] riding or any other recreational vehicles.    Call my office at [phone] for any other questions and concerns.    Go to ER with any seizure activity, mental status change (increasing confusion), difficulty with speech or increasing or acute weakness     Follow-up and recommended labs and tests     You will be scheduled for a 3 week appointment in out clinic with XR's prior to monitor your compression fracture.     Follow-up and recommended labs and tests     Please follow up with NSGY in 4 weeks with xrays. We will call you to schedule. Our number is [phone] if questions.     Activity    Please limit your lifting to no more that ten pounds and avoid excessive bending, twisting and turning at the lumbar spine. You should also avoid excessive jostling and jarring activities.     General info for SNF    Length of Stay Estimate: Short Term Care: Estimated # of Days <30  Condition at Discharge: Improving  Level of care:skilled   Rehabilitation Potential: Excellent  Admission H&P remains valid and up-to-date: Yes  Recent Chemotherapy: N/A  Use Nursing Home Standing Orders: Yes     Mantoux instructions    Give two-step Mantoux (PPD) Per Facility Policy Yes     Follow Up and recommended labs and tests    Follow up with snf physician.  The following labs/tests are recommended: CBC in 1 week.  Neurosurgery in 2 to 3 weeks.     Reason for  your hospital stay    Anticoagulation associated lower GI bleed     Intake and output    Every shift     Daily weights    Call Provider for weight gain of more than 2 pounds per day or 5 pounds per week.     Activity - Up with nursing assistance     Physical Therapy Adult Consult    Evaluate and treat as clinically indicated.    Reason:  Deconditioning     Occupational Therapy Adult Consult    Evaluate and treat as clinically indicated.    Reason:  Deconditioning     Adult Cardiac Event Monitor     Fall precautions     Diet    Follow this diet upon discharge: Orders Placed This Encounter      Room Service      Regular Diet Adult Thin Liquids (level 0)     Discharge Medications   Current Discharge Medication List        START taking these medications    Details   HYDROmorphone (DILAUDID) 2 MG tablet Take 0.5 tablets (1 mg) by mouth every 6 hours as needed for severe pain  Qty: 8 tablet, Refills: 0    Associated Diagnoses: Acute left-sided low back pain without sciatica           CONTINUE these medications which have CHANGED    Details   butalbital-acetaminophen-caffeine (ESGIC) -40 MG tablet Take 1 tablet by mouth every 6 hours as needed for headaches  Qty: 20 tablet, Refills: 0    Associated Diagnoses: History of TIA (transient ischemic attack) and stroke           CONTINUE these medications which have NOT CHANGED    Details   acetaminophen (TYLENOL) 500 MG tablet Take 1,000 mg by mouth every 8 hours as needed for pain      atorvastatin (LIPITOR) 10 MG tablet 1 tablet (10 mg) by Oral or Feeding Tube route every evening  Qty: 30 tablet, Refills: 0    Associated Diagnoses: TIA (transient ischemic attack)      clotrimazole-betamethasone (LOTRISONE) 1-0.05 % external cream Apply topically 2 times daily as needed (take for 7 days at a time for facial rash flares)      famotidine (PEPCID) 40 MG tablet Take 40 mg by mouth at bedtime      ferrous sulfate (FEROSUL) 325 (65 Fe) MG tablet Take 1 tablet (325 mg) by mouth  every other day    Associated Diagnoses: Right leg weakness      finasteride (PROSCAR) 5 MG tablet Take 5 mg by mouth daily      Lidocaine (LIDOCARE) 4 % Patch Place 1-2 patches onto the skin every 24 hours To prevent lidocaine toxicity, patient should be patch free for 12 hrs daily.    Associated Diagnoses: Right leg weakness      loratadine (CLARITIN) 10 MG tablet Take 10 mg by mouth daily      memantine (NAMENDA) 10 MG tablet Take 10 mg by mouth 2 times daily      multivitamin  with lutein (OCUVITE WITH LTEIN) CAPS per capsule Take 1 capsule by mouth daily      polyethylene glycol (MIRALAX) 17 GM/Dose powder Take 17 g by mouth daily    Associated Diagnoses: Right leg weakness      sennosides (SENOKOT) 8.6 MG tablet Take 1 tablet by mouth 2 times daily as needed for constipation    Associated Diagnoses: Right leg weakness      sodium chloride (OCEAN) 0.65 % nasal spray Spray 1 spray into both nostrils daily as needed for congestion (alternates use with Nasacort)      triamcinolone (NASACORT) 55 MCG/ACT nasal aerosol Spray 1 spray into both nostrils daily as needed (congestion, alternates use with Ocean spray)      vitamin C (ASCORBIC ACID) 500 MG tablet Take 500 mg by mouth daily      vitamin D3 (CHOLECALCIFEROL) 2000 units (50 mcg) tablet Take 2,000 Units by mouth daily Noon      pantoprazole (PROTONIX) 40 MG EC tablet Take 40 mg by mouth 2 times daily           STOP taking these medications       rivaroxaban ANTICOAGULANT (XARELTO) 20 MG TABS tablet Comments:   Reason for Stopping:             Allergies   Allergies   Allergen Reactions    Chocolate Anaphylaxis     headache    Lamotrigine Unknown     Other Reaction(s): severe muscle pain, trouble walking    Propranolol Unknown     Other Reaction(s): swelling of tongue, irritation to tongue and lips, weight gain    Valproic Acid      Other Reaction(s): tongue swelling    Verapamil Other (See Comments)     Other Reaction(s): swollen tongue and swollen lips    Per  "patient possible reason for tongue swelling in the past    Zonisamide Unknown    Amoxicillin Unknown     He doesn't recall any allergy to this medication    Feathers     Mirabegron Unknown    Nabumetone Diarrhea    Novocaine [Procaine]      Passes out almost immediately    Oxycodone Other (See Comments)     Hallucinations    Solifenacin Unknown    Strawberry Extract GI Disturbance     Most berries    Tramadol Other (See Comments)    Amitriptyline Fatigue and Other (See Comments)     Other Reaction(s): ??back pain, memory loss    \"reduced memory\"    Topiramate Other (See Comments)     Other Reaction(s): ?? heart issues, elevated HR and elevated BP    \"reduce memory\"     Data   Most Recent 3 CBC's:  Recent Labs   Lab Test 02/24/24  1454 02/23/24  0618 02/22/24  1855 02/22/24  0526   WBC 5.2 5.7  --  5.3   HGB 8.7* 8.3* 7.7* 7.9*   * 102*  --  104*    135*  --  127*      Most Recent 3 BMP's:  Recent Labs   Lab Test 02/23/24  0940 02/22/24  0526 02/21/24  0632 02/21/24  0224 02/20/24 1947   NA  --  139 137  --  135   POTASSIUM  --  3.9 4.1  --  4.3   CHLORIDE  --  109* 105  --  101   CO2  --  23 25  --  23   BUN  --  11.2 22.8  --  31.5*   CR  --  0.75 0.95  --  1.02   ANIONGAP  --  7 7  --  11   KENYA  --  7.9* 8.1*  --  8.6*   * 100* 111*   < > 131*    < > = values in this interval not displayed.     Most Recent 2 LFT's:  Recent Labs   Lab Test 02/20/24  1947 02/01/24  0631   AST 27 26   ALT 19 18   ALKPHOS 74 81   BILITOTAL 0.5 0.4     Most Recent INR's and Anticoagulation Dosing History:  Anticoagulation Dose History          Latest Ref Rng & Units 5/26/2022 2/5/2024   Recent Dosing and Labs   INR 0.85 - 1.15 1.05  3.50      Most Recent 3 Troponin's:  Recent Labs   Lab Test 03/02/20  0850 03/02/20  0547   TROPI <0.015 <0.015     Most Recent Cholesterol Panel:  Recent Labs   Lab Test 02/05/24  0718   CHOL 100   LDL 46   HDL 41   TRIG 66     Most Recent 6 Bacteria Isolates From Any Culture (See " EPIC Reports for Culture Details):  Recent Labs   Lab Test 03/02/20  1000 03/02/20  0856 03/02/20  0850   CULT No growth No growth No growth     Most Recent TSH, T4 and A1c Labs:  Recent Labs   Lab Test 02/05/24  0020   A1C 5.3       Results for orders placed or performed during the hospital encounter of 02/20/24   CTA Abdomen Pelvis with Contrast     Value    Radiologist flags Proximal left thigh MR in 3 months.    Narrative    EXAM: CTA ABDOMEN PELVIS WITH CONTRAST  LOCATION: [facility]  DATE: 2/20/2024    INDICATION: Rectal bleeding; on anticoagulation.  COMPARISON: None available.  TECHNIQUE: CT angiogram abdomen pelvis during precontrast, arterial, and portal venous phase of injection of IV contrast. 2D and 3D MIP reconstructions were performed by the CT technologist. Dose reduction techniques were used.  CONTRAST: 72 mL Isovue 370.    FINDINGS:    AORTO-ILIAC: Dilated aortic root, measuring at least 4.3 cm above the sinotubular junction, incompletely imaged and evaluated.    Moderate to severe atheromatous disease of the abdominal aorta and branch vessels. No evidence of aneurysm or dissection.    There is classic hepatic arterial anatomy.     There are single bilateral renal arteries.    LOWER CHEST: Tree-in-bud opacities, greatest within the right middle lobe and possibly chronic. Atherosclerosis of the visualized thoracic aorta and coronary arteries.    HEPATOBILIARY: Liver enhances normally and is normal in size.    Cholecystectomy.    No intrahepatic or intrahepatic biliary ductal dilatation.    PANCREAS: Enhances normally. No peripancreatic inflammatory fat stranding.    SPLEEN: Enhances normally. Normal size.    ADRENAL GLANDS: Normal.    KIDNEYS: Both kidneys enhance symmetrically, without hydronephrosis. Low-attenuation subcentimeter renal lesion(s). These are compatible with small benign cysts and no specific imaging evaluation or follow-up is recommended.    No  nephroureterolithiasis.    Urinary bladder is unremarkable.    PELVIC ORGANS: No suspicious abnormality.    BOWEL: No evidence of acute gastrointestinal inflammation or obstruction. Colonic diverticulosis, without evidence of diverticulitis.     Arterial extravasation involving the distal descending colon, series 7 image 247, which accumulates on the venous phase imaging.    No intraperitoneal free fluid or free air.    LYMPH NODES: No suspicious abdominal or pelvic lymphadenopathy.    VASCULATURE: See arterial findings, as detailed above.    MUSCULOSKELETAL: Acute, nondisplaced fracture involving the anterior one half of the L1 vertebral body. Posterior decompression changes of the lower lumbar spine. High attenuation lesion within the proximal aspect of the left adductor triston muscle   belly, measuring 3.8 x 4.1 cm in cross-section, incompletely imaged distally.    OTHER: No additionally significant abnormalities.      Impression    IMPRESSION:   1.  Active arterial extravasation identified within the distal descending colon.  2.  High attenuation lesion of the left abductor triston muscle belly, favoring an intramuscular hematoma. MR imaging follow-up recommended in 3 months to document resolution of this finding, as an underlying malignancy cannot be excluded.  3.  Acute, nondisplaced anterior column fracture of the L1 vertebral body.  4.  No evidence of acute gastrointestinal inflammation or obstruction.  5.  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 Nonemergent CT angiography of the chest is recommended, when clinically feasible.    Impression 1, 2, and 3 discussed verbally via telephone with Dr. Harden at 10:10 PM on 02/20/2024.      [Recommend Follow Up: Proximal left thigh MR in 3 months.]    This report will be copied to the [facility] to ensure a provider acknowledges the finding.      IR Visceral Angiogram    Narrative    [facility]  LOCATION: [facility]  DATE: 2/21/2024    PROCEDURE: SELECTIVE AND SUPERSELECTIVE MESENTERIC ARTERIOGRAPHY  1.  Ultrasound-guided access of the right common femoral artery. A permanent image was stored.  2.  Digital subtraction superior mesenteric arteriography (first order).  3.  Digital subtraction celiac arteriography (first order).  4.  Digital subtraction inferior mesenteric arteriography (first order).  5.  Digital subtraction median sacral arteriography (second order).  6.  Digital subtraction distal sigmoidal branch arteriography (second order).  7.  Digital subtraction proximal sigmoidal branch arteriography (second order).  8.  Digital subtraction descending left colic arteriography (second order).  9.  Arterial closure device.  10.  Moderate sedation.    INTERVENTIONAL RADIOLOGIST: Cheng Watson MD.    INDICATION: 89-year-old male with blood loss anemia in the setting of gastrointestinal hemorrhage. CT arteriography performed earlier this evening suggests localization to the lower descending colon.    CONSENT: The risks, benefits and alternatives of the stated procedure were discussed with the patient  in detail. All questions were answered. Informed consent was given to proceed with the procedure.    MODERATE SEDATION: Versed 1 mg IV; Fentanyl 50 mcg IV.  During the timeout, immediately prior to the administration of medications, the patient was reassessed for adequacy to receive conscious sedation. Under physician supervision, Versed and fentanyl   were administered for moderate sedation. Pulse oximetry, heart rate and blood pressure were continuously monitored by an independent trained observer. The physician spent 25 minutes of face-to-face sedation time with the patient.    CONTRAST: 55 mL Isovue-300.  ANTIBIOTICS: None.  ADDITIONAL MEDICATIONS: None.    FLUOROSCOPIC TIME: 10.0 minutes.  RADIATION DOSE: Air Kerma: 439 mGy.    COMPLICATIONS: No immediate  complications.    STERILE BARRIER TECHNIQUE: Maximum sterile barrier technique was used. Cutaneous antisepsis was performed at the operative site with application of 2% chlorhexidine and large sterile drape. Prior to the procedure, the  and assistant performed   hand hygiene and wore hat, mask, sterile gown, and sterile gloves during the entire procedure.    PROCEDURE:    Using real-time ultrasound guidance, access was achieved into the right common femoral artery and conversion was made for a 5 [place] vascular sheath which was attached to a continuous heparinized saline infusion.    A Alonso 1 catheter was utilized to selectively engage the superior mesenteric artery and digital subtraction arteriography was obtained.    The Alonso 1 catheter was utilized to selectively engage the celiac artery and digital subtraction arteriography was obtained.    The Alonso 1 catheter was utilized to selectively engage the inferior mesenteric artery and digital subtraction arteriography was obtained. A Progreat microcatheter was utilized to selectively catheterize the median sacral artery, distal sigmoidal   artery, proximal sigmoidal artery and descending left colic artery with digital subtraction arteriography performed in each.    The catheters and sheath were removed with hemostasis achieved via a Perclose suture device with additional manual compression.    FINDINGS:  Ultrasound demonstrates a patent and pulsatile right common femoral artery. A permanent image was stored.    The digital subtraction superior mesenteric arteriography shows no identified site of extravasation.    The digital subtraction celiac arteriography shows no identified site of extravasation.    The digital subtraction inferior mesenteric arteriography shows no identified site of extravasation.    The digital subtraction median sacral arteriography shows no identified site of extravasation.    The digital subtraction distal sigmoidal branch  arteriography shows no identified site of extravasation.    The digital subtraction proximal sigmoidal branch arteriography shows no identified site of extravasation.    The digital subtraction descending left colic branch arteriography shows no identified site of extravasation. This vessel supplies the vascular territory suspicious for extravasation on the CT from earlier today.      Impression    IMPRESSION:    No identified extravasation on the selective or superselective mesenteric arteriography.     US Lower Extremity Venous Duplex Bilateral    Narrative    VENOUS ULTRASOUND BOTH LEGS  2/21/2024 10:13 AM     HISTORY: leg pain rule out DVT    COMPARISON: None.    FINDINGS:  Examination of the deep veins with graded compression and  color flow Doppler with spectral wave form analysis was performed.   There is no evidence for DVT in the lower extremities.      Impression    IMPRESSION: No evidence of deep venous thrombosis.    EJ MANCINI MD         SYSTEM ID:  [id number]   XR Thoracic Lumbar Standing 1 View    Narrative    THORACIC LUMBAR STANDING 1 VIEW  2/23/2024 9:35 AM     COMPARISON: Lumbar spine MR 2/5/2024.     HISTORY: L1 comp fracture.      Impression    IMPRESSION: Single lateral view submitted. No definite fractures  identified.     ZARINA CABEZAS MD         SYSTEM ID:  [id number]   CT Head Perfusion w Contrast    Narrative    CT BRAIN PERFUSION 2/23/2024 10:43 AM    HISTORY: Code Stroke    TECHNIQUE: Time sequential axial CT images of the head were acquired  during the administration of 117 mL Isovue-370 IV. Color perfusion  maps of the brain were created from this time sequential axial source  data.     Radiation dose for this scan was reduced using automated exposure  control, adjustment of the mA and/or kV according to patient size, or  iterative reconstruction technique.    COMPARISON: 2/5/2024      Impression    IMPRESSION: No convincing abnormality. Presumed incidental relative  posterior  circulation transit time prolongations. MRI could be  performed if indicated.    HAZEL JIMENES MD         SYSTEM ID:  [id number]   CT Head w/o Contrast    Narrative    CT SCAN OF THE HEAD WITHOUT CONTRAST   2/23/2024 10:12 AM     HISTORY: Code Stroke, neurological symptoms.    TECHNIQUE:  Axial images of the head and coronal reformations without  IV contrast material. Radiation dose for this scan was reduced using  automated exposure control, adjustment of the mA and/or kV according  to patient size, or iterative reconstruction technique.    COMPARISON: Head MRI 2/5/2024, head CT 2/5/2024.      Impression    IMPRESSION: No evidence of hemorrhage. No convincing acute infarct.  Background of nonspecific patchy and confluent white matter  hypoattenuation presumably represents chronic small vessel ischemic  change.    Incidentally noted lobulated soft tissue lesion within the right  superior nasal cavity measuring about 2.3 cm, possibly polyp or  inflammatory thickening, similar compared to prior exams. Direct  visualization would be typically recommended. Incidental left  maxillary sinus retention cyst.    Preliminary findings were discussed by phone between Dr. Jimenes and  the stroke neurology service at approximately 10:19 AM on 2/23/2024.      HAZEL JIMENES MD         SYSTEM ID:  [id number]   CTA Head Neck w Contrast    Narrative    CT ANGIOGRAM OF THE HEAD AND NECK WITH CONTRAST February 23, 2024  10:21 AM     HISTORY: Code Stroke, neurological deficit.    TECHNIQUE: CT angiography with an injection of 67mL [id number]-370 IV with  scans through the head and neck. Images were transferred to a separate  3-D workstation where multiplanar reformations and 3-D images were  created. Estimates of carotid stenoses are made relative to the distal  internal carotid artery diameters except as noted. Radiation dose for  this scan was reduced using automated exposure control, adjustment of  the mA and/or kV according to patient  size, or iterative  reconstruction technique.    COMPARISON: CT of the head and neck 2024.     CT ANGIOGRAM HEAD FINDINGS: No evidence of large vessel occlusion,  high-grade stenosis, aneurysm, or high flow vascular malformation.  Scattered atherosclerotic plaquing with mild stenoses.     CT ANGIOGRAM NECK FINDINGS: No evidence of large vessel occlusion or  high-grade stenosis. Scattered atherosclerotic plaquing. Multiple mild  stenoses. No evidence of dissection.     INCIDENTAL FINDINGS: Cervical spine degenerative changes. Scattered  nonspecific pulmonary nodules most conspicuous within the right middle  lobe, possibly infectious/inflammatory. Follow-up chest CT could be  performed. Bilateral pleural effusions.      Impression    IMPRESSION:   CTA Head: No evidence of large vessel occlusion or high-grade  stenosis.   CTA Neck: No evidence of large vessel occlusion or high-grade  stenosis.     HAZEL REYES MD         SYSTEM ID:  [id number]   Echo Limited     Value    LVEF  65%    Narrative    [id number]  [id number]  [id number]  [id number]^ANNAMARIE^MITZY^HEIDY     [facility]  Echocardiography Laboratory  [address] [place] [address]     Name: MITZY DICKERSON  MRN: [id number]  : 1934  Study Date: 2024 09:02 AM  Age: 89 yrs  Gender: Male  Patient [facility]: [facility]  Reason For Study: Chest Pain  Ordering Physician: MITZY DE LUNA  Referring Physician: Zaki Alaniz  Performed By: Bernabe Guadalupe     BSA: 1.7 m2  Height: 67 in  Weight: 132 lb  HR: 101  BP: 175/93 mmHg  ______________________________________________________________________________  Procedure  Limited Portable Echo Adult.  ______________________________________________________________________________  Interpretation Summary     1. The left ventricle is normal in structure, function and size. The visual  ejection fraction is estimated at 65%. Normal left ventricular wall motion  2. The right ventricle is  normal in structure, function and size.  3. No valve disease.  4. Ascending aorta dilatation is present. 4.2cm.     No changes from echo 24.  ______________________________________________________________________________  Left Ventricle  The left ventricle is normal in structure, function and size. The visual  ejection fraction is estimated at 65%. Normal left ventricular wall motion.     Right Ventricle  The right ventricle is normal in structure, function and size.     Tricuspid Valve  There is mild (1+) tricuspid regurgitation.     Vessels  Ascending aorta dilatation is present. The inferior vena cava was normal in  size with preserved respiratory variability.     Pericardium  There is no pericardial effusion.     Rhythm  Sinus rhythm was noted.  ______________________________________________________________________________  MMode/2D Measurements & Calculations  IVSd: 0.98 cm  LVIDd: 4.1 cm  LVIDs: 1.7 cm  LVPWd: 1.0 cm  FS: 58.0 %  LV mass(C)d: 133.7 grams  LV mass(C)dI: 78.9 grams/m2  LA dimension: 2.8 cm  asc Aorta Diam: 4.2 cm  Asc Ao diam index BSA (cm/m2): 2.5  Asc Ao diam index Ht(cm/m): 2.4  RWT: 0.51     Doppler Measurements & Calculations  TR max yuri: 247.0 cm/sec  TR max P.4 mmHg     ______________________________________________________________________________  Report approved by: Jodie Galloway 2024 11:02 AM

## 2024-02-26 NOTE — PLAN OF CARE
Physical Therapy Discharge Summary    Reason for therapy discharge:    Discharged to transitional care facility.    Progress towards therapy goal(s). See goals on Care Plan in Epic electronic health record for goal details.  Goals partially met.  Barriers to achieving goals:   discharge from facility.    Therapy recommendation(s):    Continued therapy is recommended.  Rationale/Recommendations:  Pt is below baseline level of function, would benefit from continued inpatient rehab to further address deficits and optimize functional recovery.  PT Brief overview of current status: Mod assist of 1 with FWW        Recommendation above provided by last treating therapist.

## 2024-02-26 NOTE — PROGRESS NOTES
[facility] GERIATRICS    PRIMARY CARE PROVIDER AND CLINIC:  Zaki Alaniz MD, [address] / [facility] [id number]  Chief Complaint   Patient presents with    Hospital F/U      [facility] Medical Record Number:  [id number]  Place of Service where encounter took place:  [facility] (TCU) [[id number]]    Jean Pierre Roblero  is a 89 year old  (6/21/1934), admitted to the above facility from  [facility]. Hospital stay 2/20/24 through 2/26/24..   HPI:    89 year old male PMH prior TIA and DVT on DOAC, chronic dementia, diverticular disease, recent hospitalization for back pain due to spinal stenosis hospitalized with acute anti-coagulation associated lower GI bleeding causing acute blood loss anemia, left abductor triston muscle hematoma. Angiogram 2/21 without extravasation. AC stopped. Had DVT 05/2023 - US showed DVT resolved. Follow-up MRI 3 months to eval for resolution of hematoma. Had episode of syncope and confusion: CT head negative, ECHO 2/22 normal. Neuro felt symptoms due to hypotension. CT abdomen/pelvis showed L1 compression fracture, pain managed with Tylenol, Lidoderm and dilaudid. Elevated troponin due to demand ischemia remained flat. HLD on statin. BPH on finasteride. GERD and presbyesophagus: on PPI and famotidine, ST. Dementia on mementine. To TCU for rehab.        Seen for initial TCU visit. Asks to be Full Code. Forgetful. Today reports pain is improved. Denies headaches, dizziness, chest pain, dyspnea, bowel or bladder issues. Son concerned that narcotics are making patient more confused. BP range 118-143/74-87 and sats 95% room air.     CODE STATUS/ADVANCE DIRECTIVES DISCUSSION:  Prior  CPR/Full code   ALLERGIES:   Allergies   Allergen Reactions    Chocolate Anaphylaxis     headache    Lamotrigine Unknown     Other Reaction(s): severe muscle pain, trouble walking    Propranolol Unknown     Other Reaction(s): swelling of tongue, irritation to tongue and lips,  "weight gain    Valproic Acid      Other Reaction(s): tongue swelling    Verapamil Other (See Comments)     Other Reaction(s): swollen tongue and swollen lips    Per patient possible reason for tongue swelling in the past    Zonisamide Unknown    Amoxicillin Unknown     He doesn't recall any allergy to this medication    Feathers     Mirabegron Unknown    Nabumetone Diarrhea    Novocaine [Procaine]      Passes out almost immediately    Oxycodone Other (See Comments)     Hallucinations    Solifenacin Unknown    Strawberry Extract GI Disturbance     Most berries    Tramadol Other (See Comments)    Amitriptyline Fatigue and Other (See Comments)     Other Reaction(s): ??back pain, memory loss    \"reduced memory\"    Topiramate Other (See Comments)     Other Reaction(s): ?? heart issues, elevated HR and elevated BP    \"reduce memory\"      PAST MEDICAL HISTORY:   Past Medical History:   Diagnosis Date    Ascending aorta dilation (H24)     BPH (benign prostatic hyperplasia)     CKD (chronic kidney disease) stage 2, GFR 60-89 ml/min     Cognitive impairment     Diverticular disease of colon     DVT (deep venous thrombosis) (H) 05/2023    RLE    Gastroesophageal reflux disease     Hyperlipidemia     Iron deficiency     Migraine     Mitral stenosis     Osteoarthritis     Photosensitivity     Presbyesophagus     Seasonal allergies     Spinal stenosis     TIA (transient ischemic attack)       PAST SURGICAL HISTORY:   has a past surgical history that includes Eye surgery; hernia repair; Cholecystectomy; orthopedic surgery; Endoscopic sinus surgery (8/20/2012); Turbinoplasty (8/20/2012); back surgery; and IR Visceral Angiogram (2/21/2024).  FAMILY HISTORY: family history is not on file.  SOCIAL HISTORY:   reports that he has never smoked. He has never used smokeless tobacco. He reports that he does not drink alcohol and does not use drugs.  Patient's living condition: lives with spouse    Post Discharge Medication Reconciliation " Status:   MED REC REQUIRED  Post Medication Reconciliation Status: discharge medications reconciled and changed, per note/orders       Current Outpatient Medications   Medication Sig    acetaminophen (TYLENOL) 500 MG tablet Take 1,000 mg by mouth every 8 hours as needed for pain    atorvastatin (LIPITOR) 10 MG tablet 1 tablet (10 mg) by Oral or Feeding Tube route every evening    butalbital-acetaminophen-caffeine (ESGIC) -40 MG tablet Take 1 tablet by mouth every 6 hours as needed for headaches    clotrimazole-betamethasone (LOTRISONE) 1-0.05 % external cream Apply topically 2 times daily as needed (take for 7 days at a time for facial rash flares)    famotidine (PEPCID) 40 MG tablet Take 40 mg by mouth at bedtime    ferrous sulfate (FEROSUL) 325 (65 Fe) MG tablet Take 1 tablet (325 mg) by mouth every other day    finasteride (PROSCAR) 5 MG tablet Take 5 mg by mouth daily    Lidocaine (LIDOCARE) 4 % Patch Place 1-2 patches onto the skin every 24 hours To prevent lidocaine toxicity, patient should be patch free for 12 hrs daily. (Patient taking differently: Place 1 patch onto the skin every 24 hours To prevent lidocaine toxicity, patient should be patch free for 12 hrs daily.)    loratadine (CLARITIN) 10 MG tablet Take 10 mg by mouth daily    memantine (NAMENDA) 10 MG tablet Take 10 mg by mouth 2 times daily    multivitamin  with lutein (OCUVITE WITH LTEIN) CAPS per capsule Take 1 capsule by mouth daily    pantoprazole (PROTONIX) 40 MG EC tablet Take 40 mg by mouth 2 times daily    polyethylene glycol (MIRALAX) 17 GM/Dose powder Take 17 g by mouth daily    sennosides (SENOKOT) 8.6 MG tablet Take 1 tablet by mouth 2 times daily as needed for constipation    sodium chloride (OCEAN) 0.65 % nasal spray Spray 1 spray into both nostrils daily as needed for congestion (alternates use with Nasacort)    triamcinolone (NASACORT) 55 MCG/ACT nasal aerosol Spray 1 spray into both nostrils daily as needed (congestion,  "alternates use with Ocean spray)    vitamin C (ASCORBIC ACID) 500 MG tablet Take 500 mg by mouth daily    vitamin D3 (CHOLECALCIFEROL) 2000 units (50 mcg) tablet Take 2,000 Units by mouth daily Noon     No current facility-administered medications for this visit.       ROS:  Limited secondary to cognitive impairment but today pt reports pain managed well, no other concerns.     Vitals:  /87   Pulse 86   Temp 98.2  F (36.8  C)   Resp 18   Ht 1.702 m (5' 7\")   Wt 68.7 kg (151 lb 6.4 oz)   SpO2 95%   BMI 23.71 kg/m    Exam:  GENERAL APPEARANCE:  Alert, in no distress, pleasant, cooperative, oriented x self and wife  EYES:  EOM, lids, pupils and irises normal, sclera clear and conjunctiva normal, no discharge or mattering on lids or lashes noted  ENT:  Mouth normal, moist mucous membranes, nose normal without drainage or crusting, external ears without lesions, hearing acuity impaired  NECK: supple, symmetrical, trachea midline  RESP:  respiratory effort normal, no chest wall tenderness, no respiratory distress, Lung sounds clear, patient is on room air  CV:  Auscultation of heart done, rate and rhythm controlled and regular, no murmur, no rub or gallop. Edema +1 left leg and trace right.   ABDOMEN:  normal bowel sounds, soft, nontender, no palpable masses.  M/S:   Gait and station unsafe without assistance, no tenderness or swelling of the joints; able to move all extremities, digits normal  NEURO: cranial nerves 2-12 grossly intact, no facial asymmetry, no speech deficits and able to follow directions, moves all extremities symmetrically  PSYCH:  insight and judgement and memory impaired, affect and mood normal     Lab/Diagnostic data:  Most Recent 3 CBC's:  Recent Labs   Lab Test 02/28/24  0611 02/24/24  1454 02/23/24  0618 02/22/24  1855 02/22/24  0526   WBC 5.2 5.2 5.7  --  5.3   HGB 8.5* 8.7* 8.3*   < > 7.9*   * 105* 102*  --  104*   PLT  --  167 135*  --  127*    < > = values in this interval " not displayed.     Most Recent 3 BMP's:  Recent Labs   Lab Test 02/23/24  0940 02/22/24  0526 02/21/24  0632 02/21/24  0224 02/20/24  1947   NA  --  139 137  --  135   POTASSIUM  --  3.9 4.1  --  4.3   CHLORIDE  --  109* 105  --  101   CO2  --  23 25  --  23   BUN  --  11.2 22.8  --  31.5*   CR  --  0.75 0.95  --  1.02   ANIONGAP  --  7 7  --  11   KENYA  --  7.9* 8.1*  --  8.6*   * 100* 111*   < > 131*    < > = values in this interval not displayed.     Most Recent Hemoglobin A1c:  Recent Labs   Lab Test 02/05/24  0020   A1C 5.3       ASSESSMENT/PLAN:  Gastrointestinal hemorrhage, unspecified gastrointestinal hemorrhage type  Gastroesophageal reflux disease, unspecified whether esophagitis present  Presbyesophagus  Acute on chronic. Bleeding has stopped. Continue famotidine 40 mg HS, Protonix 40 mg BID, monitor symptoms. CBC check on 2/28.     Hematoma of muscle  Acute anemia  Acute. Continue ferrous sulfate 325 mg QOD, MVI daily, vit D 500 mg daily, CBC check 2/28. Last Hgb 8.7 on 2/24.     History of TIA (transient ischemic attack) and stroke  Personal history of DVT (deep vein thrombosis)  By history. Now off AC and wearing heart monitor to check for a fib. Follow-up with results.     Spinal stenosis of lumbar region, unspecified whether neurogenic claudication present  Compression fracture of L1 vertebra with routine healing, subsequent encounter  Physical deconditioning  Acute on chronic, pain improved. Continue tylenol 1000 mg TID PRN, lidocaine 4% patch daily, vit D 2000 units daily. Stop dilaudid due to concerns of somnolence and confusion. Therapies as ordered and follow-up progress next visit. Follow-up neurosurgery as recommended.     Benign prostatic hyperplasia, unspecified whether lower urinary tract symptoms present  Chronic, continue Proscar 5 mg daily, urology follow-up per home routine.     Dementia without behavioral disturbance, psychotic disturbance, mood disturbance, or anxiety,  unspecified dementia severity, unspecified dementia type (H)  Chronic. Continue Namenda 10 mg BID, monitor for safety. Follow-up formal cognitive test results.     Dyslipidemia  Continue PTA Lipitor 10 mg daily.     Constipation  Continue Miralax 17 gm daily, senna 1 tab BID PRN. Monitor bowel status.     Advanced directives, counseling/discussion  Asks to be Full code    Orders:  Full code  Stop dilaudid  CBC on 2/28 diagnosis anemia    Electronically signed by:  JAY Cordero CNP

## 2024-02-26 NOTE — PLAN OF CARE
"Speech Language Therapy Discharge Summary    Reason for therapy discharge:    Discharged to transitional care facility.    Progress towards therapy goal(s). See goals on Care Plan in [facility] electronic health record for goal details.  Goals not met.  Barriers to achieving goals:   discharge from facility.    Therapy recommendation(s):    Continued therapy is recommended.  Rationale/Recommendations:  Swallow function may be close to baseline; short term SLP Tx after discharge if goal has not been met; supervision with meals.    Per last treating SLP \"Advance to Regular solids, continue thin liquids. Pt to self select softer items as needed. Strategies: sit at 90 degrees, small bites, slow rate of intake, and alternating food/liquid. Close supervision given pt's flucutating mental status and level of alertness. \"    *Pt not seen by discharging therapist on this date, note written based on previous treating therapist's notes and recommendations     "

## 2024-02-26 NOTE — PLAN OF CARE
Orientations: A&O x3/4, disoriented to place at times, forgetful, intermittent confusion.   Vitals/Pain: Hypertensive at times, otherwise VSS on room air. C/o low back pain, controlled with scheduled tylenol. Oral dilaudid x1 for 8/10 pain  Tele: SR/ST  Lines/Drains: 1 PIVs L/R, CDI, CL. Male external cath  Skin/Wounds: L-thigh hematoma, extends down to L-knee. Scrotum/groin redness/breakdown - skin barrier applied  GI/: BS active, + flatus, -BM this shift. Urine incontinent, external male cath placed, AUOP.   Diet: Regular - thin liquids  Ambulation/Assist: Assist x2 GB/W.  Sleep Quality: good  Plan: Continue plan of care. Plan is to discharge to [facility] [place] today.

## 2024-02-27 ENCOUNTER — LAB REQUISITION (OUTPATIENT)
Dept: LAB | Facility: CLINIC | Age: 89
End: 2024-02-27

## 2024-02-27 ENCOUNTER — TRANSITIONAL CARE UNIT VISIT (OUTPATIENT)
Dept: GERIATRICS | Facility: CLINIC | Age: 89
End: 2024-02-27
Payer: MEDICARE

## 2024-02-27 VITALS
TEMPERATURE: 98.2 F | RESPIRATION RATE: 18 BRPM | HEIGHT: 67 IN | OXYGEN SATURATION: 95 % | DIASTOLIC BLOOD PRESSURE: 87 MMHG | BODY MASS INDEX: 23.76 KG/M2 | WEIGHT: 151.4 LBS | SYSTOLIC BLOOD PRESSURE: 128 MMHG | HEART RATE: 86 BPM

## 2024-02-27 DIAGNOSIS — K21.9 GASTROESOPHAGEAL REFLUX DISEASE, UNSPECIFIED WHETHER ESOPHAGITIS PRESENT: ICD-10-CM

## 2024-02-27 DIAGNOSIS — K22.89 PRESBYESOPHAGUS: ICD-10-CM

## 2024-02-27 DIAGNOSIS — F03.90 DEMENTIA WITHOUT BEHAVIORAL DISTURBANCE, PSYCHOTIC DISTURBANCE, MOOD DISTURBANCE, OR ANXIETY, UNSPECIFIED DEMENTIA SEVERITY, UNSPECIFIED DEMENTIA TYPE (H): ICD-10-CM

## 2024-02-27 DIAGNOSIS — Z71.89 ADVANCED DIRECTIVES, COUNSELING/DISCUSSION: ICD-10-CM

## 2024-02-27 DIAGNOSIS — E78.5 DYSLIPIDEMIA: ICD-10-CM

## 2024-02-27 DIAGNOSIS — Z11.1 ENCOUNTER FOR SCREENING FOR RESPIRATORY TUBERCULOSIS: ICD-10-CM

## 2024-02-27 DIAGNOSIS — M48.061 SPINAL STENOSIS OF LUMBAR REGION, UNSPECIFIED WHETHER NEUROGENIC CLAUDICATION PRESENT: ICD-10-CM

## 2024-02-27 DIAGNOSIS — S32.010D COMPRESSION FRACTURE OF L1 VERTEBRA WITH ROUTINE HEALING, SUBSEQUENT ENCOUNTER: ICD-10-CM

## 2024-02-27 DIAGNOSIS — N40.0 BENIGN PROSTATIC HYPERPLASIA, UNSPECIFIED WHETHER LOWER URINARY TRACT SYMPTOMS PRESENT: ICD-10-CM

## 2024-02-27 DIAGNOSIS — D64.9 ACUTE ANEMIA: ICD-10-CM

## 2024-02-27 DIAGNOSIS — R53.81 PHYSICAL DECONDITIONING: ICD-10-CM

## 2024-02-27 DIAGNOSIS — T14.8XXA HEMATOMA OF MUSCLE: ICD-10-CM

## 2024-02-27 DIAGNOSIS — Z86.718 PERSONAL HISTORY OF DVT (DEEP VEIN THROMBOSIS): ICD-10-CM

## 2024-02-27 DIAGNOSIS — D64.9 ANEMIA, UNSPECIFIED: ICD-10-CM

## 2024-02-27 DIAGNOSIS — K92.2 GASTROINTESTINAL HEMORRHAGE, UNSPECIFIED GASTROINTESTINAL HEMORRHAGE TYPE: Primary | ICD-10-CM

## 2024-02-27 DIAGNOSIS — Z86.73 HISTORY OF TIA (TRANSIENT ISCHEMIC ATTACK) AND STROKE: ICD-10-CM

## 2024-02-27 LAB
ATRIAL RATE - MUSE: 96 BPM
DIASTOLIC BLOOD PRESSURE - MUSE: NORMAL MMHG
INTERPRETATION ECG - MUSE: NORMAL
P AXIS - MUSE: 73 DEGREES
PR INTERVAL - MUSE: 146 MS
QRS DURATION - MUSE: 76 MS
QT - MUSE: 368 MS
QTC - MUSE: 464 MS
R AXIS - MUSE: -44 DEGREES
SYSTOLIC BLOOD PRESSURE - MUSE: NORMAL MMHG
T AXIS - MUSE: 61 DEGREES
VENTRICULAR RATE- MUSE: 96 BPM

## 2024-02-27 PROCEDURE — 99309 SBSQ NF CARE MODERATE MDM 30: CPT | Performed by: NURSE PRACTITIONER

## 2024-02-28 LAB
ACANTHOCYTES BLD QL SMEAR: ABNORMAL
AUER BODIES BLD QL SMEAR: ABNORMAL
BASO STIPL BLD QL SMEAR: ABNORMAL
BITE CELLS BLD QL SMEAR: ABNORMAL
BLISTER CELLS BLD QL SMEAR: ABNORMAL
BURR CELLS BLD QL SMEAR: ABNORMAL
DACRYOCYTES BLD QL SMEAR: ABNORMAL
ELLIPTOCYTES BLD QL SMEAR: ABNORMAL
ERYTHROCYTE [DISTWIDTH] IN BLOOD BY AUTOMATED COUNT: 15 % (ref 10–15)
FRAGMENTS BLD QL SMEAR: ABNORMAL
HCT VFR BLD AUTO: 27 % (ref 40–53)
HGB BLD-MCNC: 8.5 G/DL (ref 13.3–17.7)
HGB C CRYSTALS: ABNORMAL
HOWELL-JOLLY BOD BLD QL SMEAR: ABNORMAL
MCH RBC QN AUTO: 33.5 PG (ref 26.5–33)
MCHC RBC AUTO-ENTMCNC: 31.5 G/DL (ref 31.5–36.5)
MCV RBC AUTO: 106 FL (ref 78–100)
NEUTS HYPERSEG BLD QL SMEAR: ABNORMAL
PLAT MORPH BLD: ABNORMAL
PLATELET # BLD AUTO: ABNORMAL 10*3/UL
POLYCHROMASIA BLD QL SMEAR: ABNORMAL
RBC # BLD AUTO: 2.54 10E6/UL (ref 4.4–5.9)
RBC AGGLUT BLD QL: ABNORMAL
RBC MORPH BLD: ABNORMAL
ROULEAUX BLD QL SMEAR: ABNORMAL
SICKLE CELLS BLD QL SMEAR: ABNORMAL
SMUDGE CELLS BLD QL SMEAR: ABNORMAL
SPHEROCYTES BLD QL SMEAR: ABNORMAL
STOMATOCYTES BLD QL SMEAR: ABNORMAL
TARGETS BLD QL SMEAR: ABNORMAL
TOXIC GRANULES BLD QL SMEAR: ABNORMAL
VARIANT LYMPHS BLD QL SMEAR: ABNORMAL
WBC # BLD AUTO: 5.2 10E3/UL (ref 4–11)

## 2024-02-28 PROCEDURE — 85048 AUTOMATED LEUKOCYTE COUNT: CPT | Performed by: NURSE PRACTITIONER

## 2024-02-28 PROCEDURE — 36415 COLL VENOUS BLD VENIPUNCTURE: CPT | Performed by: NURSE PRACTITIONER

## 2024-02-28 PROCEDURE — 86481 TB AG RESPONSE T-CELL SUSP: CPT | Performed by: NURSE PRACTITIONER

## 2024-02-28 PROCEDURE — P9604 ONE-WAY ALLOW PRORATED TRIP: HCPCS | Performed by: NURSE PRACTITIONER

## 2024-02-29 LAB
GAMMA INTERFERON BACKGROUND BLD IA-ACNC: 0.05 IU/ML
M TB IFN-G BLD-IMP: NEGATIVE
M TB IFN-G CD4+ BCKGRND COR BLD-ACNC: 9.95 IU/ML
MITOGEN IGNF BCKGRD COR BLD-ACNC: 0 IU/ML
MITOGEN IGNF BCKGRD COR BLD-ACNC: 0 IU/ML
QUANTIFERON MITOGEN: 10 IU/ML
QUANTIFERON NIL TUBE: 0.05 IU/ML
QUANTIFERON TB1 TUBE: 0.05 IU/ML
QUANTIFERON TB2 TUBE: 0.05

## 2024-03-04 VITALS
RESPIRATION RATE: 18 BRPM | HEIGHT: 67 IN | SYSTOLIC BLOOD PRESSURE: 153 MMHG | BODY MASS INDEX: 23.45 KG/M2 | DIASTOLIC BLOOD PRESSURE: 80 MMHG | OXYGEN SATURATION: 94 % | TEMPERATURE: 97.8 F | HEART RATE: 100 BPM | WEIGHT: 149.4 LBS

## 2024-03-05 ENCOUNTER — TRANSITIONAL CARE UNIT VISIT (OUTPATIENT)
Dept: GERIATRICS | Facility: CLINIC | Age: 89
End: 2024-03-05
Payer: MEDICARE

## 2024-03-05 DIAGNOSIS — F03.90 DEMENTIA WITHOUT BEHAVIORAL DISTURBANCE, PSYCHOTIC DISTURBANCE, MOOD DISTURBANCE, OR ANXIETY, UNSPECIFIED DEMENTIA SEVERITY, UNSPECIFIED DEMENTIA TYPE (H): ICD-10-CM

## 2024-03-05 DIAGNOSIS — N40.0 BENIGN PROSTATIC HYPERPLASIA, UNSPECIFIED WHETHER LOWER URINARY TRACT SYMPTOMS PRESENT: ICD-10-CM

## 2024-03-05 DIAGNOSIS — T14.8XXA HEMATOMA OF MUSCLE: ICD-10-CM

## 2024-03-05 DIAGNOSIS — S32.010D COMPRESSION FRACTURE OF L1 VERTEBRA WITH ROUTINE HEALING, SUBSEQUENT ENCOUNTER: ICD-10-CM

## 2024-03-05 DIAGNOSIS — K92.2 GASTROINTESTINAL HEMORRHAGE, UNSPECIFIED GASTROINTESTINAL HEMORRHAGE TYPE: Primary | ICD-10-CM

## 2024-03-05 DIAGNOSIS — K21.9 GASTROESOPHAGEAL REFLUX DISEASE, UNSPECIFIED WHETHER ESOPHAGITIS PRESENT: ICD-10-CM

## 2024-03-05 DIAGNOSIS — Z86.73 HISTORY OF TIA (TRANSIENT ISCHEMIC ATTACK) AND STROKE: ICD-10-CM

## 2024-03-05 DIAGNOSIS — R53.81 PHYSICAL DECONDITIONING: ICD-10-CM

## 2024-03-05 DIAGNOSIS — Z86.718 PERSONAL HISTORY OF DVT (DEEP VEIN THROMBOSIS): ICD-10-CM

## 2024-03-05 DIAGNOSIS — E78.5 DYSLIPIDEMIA: ICD-10-CM

## 2024-03-05 DIAGNOSIS — D64.9 ACUTE ANEMIA: ICD-10-CM

## 2024-03-05 DIAGNOSIS — K59.01 SLOW TRANSIT CONSTIPATION: ICD-10-CM

## 2024-03-05 DIAGNOSIS — M48.061 SPINAL STENOSIS OF LUMBAR REGION, UNSPECIFIED WHETHER NEUROGENIC CLAUDICATION PRESENT: ICD-10-CM

## 2024-03-05 DIAGNOSIS — K22.89 PRESBYESOPHAGUS: ICD-10-CM

## 2024-03-05 PROCEDURE — 99309 SBSQ NF CARE MODERATE MDM 30: CPT | Performed by: NURSE PRACTITIONER

## 2024-03-05 NOTE — PROGRESS NOTES
"[facility] GERIATRICS    Chief Complaint   Patient presents with    RECHECK     HPI:  Jean Pierre Roblero is a 89 year old  (6/21/1934), who is being seen today for an episodic care visit at: [facility] (TCU) [[id number]].     Per recent TCU provider progress notes:   89 year old male PMH prior TIA and DVT on DOAC, chronic dementia, diverticular disease, recent hospitalization for back pain due to spinal stenosis hospitalized with acute anti-coagulation associated lower GI bleeding causing acute blood loss anemia, left abductor triston muscle hematoma. Angiogram 2/21 without extravasation. AC stopped. Had DVT 05/2023 - US showed DVT resolved. Follow-up MRI 3 months to eval for resolution of hematoma. Had episode of syncope and confusion: CT head negative, ECHO 2/22 normal. Neuro felt symptoms due to hypotension. CT abdomen/pelvis showed L1 compression fracture, pain managed with Tylenol, Lidoderm and dilaudid. Elevated troponin due to demand ischemia remained flat. HLD on statin. BPH on finasteride. GERD and presbyesophagus: on PPI and famotidine, ST. Dementia on mementine. To TCU for rehab.     Today's concern is: seen for episodic follow-up mobility, vs, labs, pain. Reports comfortable, no pain. No headaches, dizziness, chest pain, dyspnea, bowel or bladder issues. Walks 8 ft in // bars and SLUMS 6/30. BP range 117-153/64-81 and sats 99% room air. HR 90-100s.     Allergies, and PMH/PSH reviewed in EPIC today.  REVIEW OF SYSTEMS:  4 point ROS including Respiratory, CV, GI and , other than that noted in the HPI,  is negative    Objective:   BP (!) 153/80   Pulse 100   Temp 97.8  F (36.6  C)   Resp 18   Ht 1.702 m (5' 7\")   Wt 67.8 kg (149 lb 6.4 oz)   SpO2 94%   BMI 23.40 kg/m    GENERAL APPEARANCE:  Alert, in no distress, cooperative  ENT:  Mouth normal, moist mucous membranes, [place]  EYES:  Conjunctiva and lids normal  RESP:  no respiratory distress, on room air and LSC  CV: HRR, controlled, no LE " edema  GI: positive bowel sounds, soft abdomen  NEURO:   No facial asymmetry, speech clear  PSYCH:  oriented X self and place, mood normal    Most Recent 3 CBC's:  Recent Labs   Lab Test 03/08/24  0829 02/28/24  0611 02/24/24  1454 02/23/24  0618   WBC 5.3 5.2 5.2 5.7   HGB 10.2* 8.5* 8.7* 8.3*   * 106* 105* 102*     --  167 135*     Most Recent 3 BMP's:  Recent Labs   Lab Test 02/23/24  0940 02/22/24  0526 02/21/24  0632 02/21/24  0224 02/20/24  1947   NA  --  139 137  --  135   POTASSIUM  --  3.9 4.1  --  4.3   CHLORIDE  --  109* 105  --  101   CO2  --  23 25  --  23   BUN  --  11.2 22.8  --  31.5*   CR  --  0.75 0.95  --  1.02   ANIONGAP  --  7 7  --  11   KENYA  --  7.9* 8.1*  --  8.6*   * 100* 111*   < > 131*    < > = values in this interval not displayed.       Assessment/Plan:  Gastrointestinal hemorrhage, unspecified gastrointestinal hemorrhage type  Gastroesophageal reflux disease, unspecified whether esophagitis present  Presbyesophagus  Acute on chronic. No further s/s bleeding. Continue famotidine 40 mg HS, Protonix 40 mg BID, monitor symptoms. CBC check on 2/28 with Hgb 8.5. Check CBC on 3/8    Hematoma of muscle  Acute anemia  Acute. Continue ferrous sulfate 325 mg QOD, MVI daily, vit D 500 mg daily. Last Hgb 8.5 on 2/28. Check CBC on 3/8.     History of TIA (transient ischemic attack) and stroke  Personal history of DVT (deep vein thrombosis)  By history. Now off AC and wearing heart monitor to check for a fib. Follow-up with results.     Spinal stenosis of lumbar region, unspecified whether neurogenic claudication present  Compression fracture of L1 vertebra with routine healing, subsequent encounter  Physical deconditioning  Acute on chronic, no pain. Continue tylenol 1000 mg TID PRN, lidocaine 4% patch daily, vit D 2000 units daily. Stopped dilaudid due to concerns of somnolence and confusion. Therapies as ordered and follow-up progress next visit. Follow-up neurosurgery as  recommended.     Benign prostatic hyperplasia, unspecified whether lower urinary tract symptoms present  Chronic, no symptoms. Continue Proscar 5 mg daily, urology follow-up per home routine.     Dementia without behavioral disturbance, psychotic disturbance, mood disturbance, or anxiety, unspecified dementia severity, unspecified dementia type (H)  Chronic. Continue Namenda 10 mg BID, monitor for safety. Follow-up formal cognitive test results.     Dyslipidemia  Continue PTA Lipitor 10 mg daily.     Constipation  Continue Miralax 17 gm daily, senna 1 tab BID PRN. Monitor bowel status.     MED REC REQUIRED  Post Medication Reconciliation Status:   no changes    Orders:  CBC on 3/8 diagnosis anemia    Electronically signed by: JAY Cordero CNP

## 2024-03-07 ENCOUNTER — LAB REQUISITION (OUTPATIENT)
Dept: LAB | Facility: CLINIC | Age: 89
End: 2024-03-07

## 2024-03-07 VITALS
RESPIRATION RATE: 18 BRPM | BODY MASS INDEX: 23.73 KG/M2 | OXYGEN SATURATION: 93 % | DIASTOLIC BLOOD PRESSURE: 73 MMHG | WEIGHT: 151.2 LBS | HEART RATE: 75 BPM | HEIGHT: 67 IN | TEMPERATURE: 98.3 F | SYSTOLIC BLOOD PRESSURE: 146 MMHG

## 2024-03-07 DIAGNOSIS — D64.9 ANEMIA, UNSPECIFIED: ICD-10-CM

## 2024-03-08 ENCOUNTER — TRANSITIONAL CARE UNIT VISIT (OUTPATIENT)
Dept: GERIATRICS | Facility: CLINIC | Age: 89
End: 2024-03-08
Payer: MEDICARE

## 2024-03-08 DIAGNOSIS — Z86.718 HISTORY OF DEEP VENOUS THROMBOSIS: ICD-10-CM

## 2024-03-08 DIAGNOSIS — Z86.73 HISTORY OF TIA (TRANSIENT ISCHEMIC ATTACK): ICD-10-CM

## 2024-03-08 DIAGNOSIS — K21.9 GASTROESOPHAGEAL REFLUX DISEASE, UNSPECIFIED WHETHER ESOPHAGITIS PRESENT: ICD-10-CM

## 2024-03-08 DIAGNOSIS — D62 ACUTE BLOOD LOSS ANEMIA: ICD-10-CM

## 2024-03-08 DIAGNOSIS — S32.010D COMPRESSION FRACTURE OF L1 VERTEBRA WITH ROUTINE HEALING, SUBSEQUENT ENCOUNTER: ICD-10-CM

## 2024-03-08 DIAGNOSIS — R41.89 COGNITIVE IMPAIRMENT: ICD-10-CM

## 2024-03-08 DIAGNOSIS — R55 SYNCOPE, UNSPECIFIED SYNCOPE TYPE: ICD-10-CM

## 2024-03-08 DIAGNOSIS — K92.2 LOWER GI BLEED: Primary | ICD-10-CM

## 2024-03-08 DIAGNOSIS — E78.5 DYSLIPIDEMIA: ICD-10-CM

## 2024-03-08 DIAGNOSIS — N18.2 STAGE 2 CHRONIC KIDNEY DISEASE: ICD-10-CM

## 2024-03-08 DIAGNOSIS — T14.8XXA HEMATOMA OF MUSCLE: ICD-10-CM

## 2024-03-08 DIAGNOSIS — M48.061 SPINAL STENOSIS OF LUMBAR REGION, UNSPECIFIED WHETHER NEUROGENIC CLAUDICATION PRESENT: ICD-10-CM

## 2024-03-08 DIAGNOSIS — R53.81 PHYSICAL DECONDITIONING: ICD-10-CM

## 2024-03-08 DIAGNOSIS — R91.1 PULMONARY NODULE: ICD-10-CM

## 2024-03-08 DIAGNOSIS — N40.0 BENIGN PROSTATIC HYPERPLASIA, UNSPECIFIED WHETHER LOWER URINARY TRACT SYMPTOMS PRESENT: ICD-10-CM

## 2024-03-08 LAB
ERYTHROCYTE [DISTWIDTH] IN BLOOD BY AUTOMATED COUNT: 14.5 % (ref 10–15)
HCT VFR BLD AUTO: 32.6 % (ref 40–53)
HGB BLD-MCNC: 10.2 G/DL (ref 13.3–17.7)
MCH RBC QN AUTO: 33.1 PG (ref 26.5–33)
MCHC RBC AUTO-ENTMCNC: 31.3 G/DL (ref 31.5–36.5)
MCV RBC AUTO: 106 FL (ref 78–100)
PLATELET # BLD AUTO: 297 10E3/UL (ref 150–450)
RBC # BLD AUTO: 3.08 10E6/UL (ref 4.4–5.9)
WBC # BLD AUTO: 5.3 10E3/UL (ref 4–11)

## 2024-03-08 PROCEDURE — 99309 SBSQ NF CARE MODERATE MDM 30: CPT | Performed by: INTERNAL MEDICINE

## 2024-03-08 PROCEDURE — P9604 ONE-WAY ALLOW PRORATED TRIP: HCPCS | Performed by: NURSE PRACTITIONER

## 2024-03-08 PROCEDURE — 85014 HEMATOCRIT: CPT | Performed by: NURSE PRACTITIONER

## 2024-03-12 ENCOUNTER — DISCHARGE SUMMARY NURSING HOME (OUTPATIENT)
Dept: GERIATRICS | Facility: CLINIC | Age: 89
End: 2024-03-12
Payer: MEDICARE

## 2024-03-12 VITALS
OXYGEN SATURATION: 97 % | HEART RATE: 89 BPM | BODY MASS INDEX: 23.32 KG/M2 | SYSTOLIC BLOOD PRESSURE: 112 MMHG | DIASTOLIC BLOOD PRESSURE: 70 MMHG | HEIGHT: 67 IN | WEIGHT: 148.6 LBS | RESPIRATION RATE: 18 BRPM | TEMPERATURE: 97.8 F

## 2024-03-12 DIAGNOSIS — K57.31 DIVERTICULOSIS OF LARGE INTESTINE WITH HEMORRHAGE: ICD-10-CM

## 2024-03-12 DIAGNOSIS — N40.0 BENIGN PROSTATIC HYPERPLASIA, UNSPECIFIED WHETHER LOWER URINARY TRACT SYMPTOMS PRESENT: ICD-10-CM

## 2024-03-12 DIAGNOSIS — R41.89 COGNITIVE IMPAIRMENT: ICD-10-CM

## 2024-03-12 DIAGNOSIS — Z86.73 HISTORY OF TIA (TRANSIENT ISCHEMIC ATTACK): ICD-10-CM

## 2024-03-12 DIAGNOSIS — E78.5 DYSLIPIDEMIA: ICD-10-CM

## 2024-03-12 DIAGNOSIS — Z86.718 HISTORY OF DEEP VENOUS THROMBOSIS: ICD-10-CM

## 2024-03-12 DIAGNOSIS — M48.061 SPINAL STENOSIS OF LUMBAR REGION, UNSPECIFIED WHETHER NEUROGENIC CLAUDICATION PRESENT: ICD-10-CM

## 2024-03-12 DIAGNOSIS — K59.01 SLOW TRANSIT CONSTIPATION: ICD-10-CM

## 2024-03-12 DIAGNOSIS — K92.2 LOWER GI BLEED: Primary | ICD-10-CM

## 2024-03-12 DIAGNOSIS — R91.1 PULMONARY NODULE: ICD-10-CM

## 2024-03-12 DIAGNOSIS — D62 ACUTE BLOOD LOSS ANEMIA: ICD-10-CM

## 2024-03-12 DIAGNOSIS — K22.89 PRESBYESOPHAGUS: ICD-10-CM

## 2024-03-12 DIAGNOSIS — E61.1 IRON DEFICIENCY: ICD-10-CM

## 2024-03-12 DIAGNOSIS — R29.6 FALLS FREQUENTLY: ICD-10-CM

## 2024-03-12 DIAGNOSIS — T14.8XXA HEMATOMA OF MUSCLE: ICD-10-CM

## 2024-03-12 DIAGNOSIS — R55 SYNCOPE, UNSPECIFIED SYNCOPE TYPE: ICD-10-CM

## 2024-03-12 DIAGNOSIS — R53.81 PHYSICAL DECONDITIONING: ICD-10-CM

## 2024-03-12 DIAGNOSIS — K21.9 GASTROESOPHAGEAL REFLUX DISEASE, UNSPECIFIED WHETHER ESOPHAGITIS PRESENT: ICD-10-CM

## 2024-03-12 DIAGNOSIS — N18.2 STAGE 2 CHRONIC KIDNEY DISEASE: ICD-10-CM

## 2024-03-12 DIAGNOSIS — S32.010D COMPRESSION FRACTURE OF L1 VERTEBRA WITH ROUTINE HEALING, SUBSEQUENT ENCOUNTER: ICD-10-CM

## 2024-03-12 PROCEDURE — 99316 NF DSCHRG MGMT 30 MIN+: CPT | Performed by: NURSE PRACTITIONER

## 2024-03-12 NOTE — PROGRESS NOTES
[facility] GERIATRICS DISCHARGE SUMMARY  PATIENT'S NAME: Jean Pierre Roblero  YOB: 1934  MEDICAL RECORD NUMBER:  [id number]  Place of Service where encounter took place:  [facility] ([facility]U) [[id number]]    PRIMARY CARE PROVIDER AND CLINIC RESPONSIBLE AFTER TRANSFER:   Zaki Alaniz MD, [address] [facility] [address] / [place] [address]    Non-FMG Provider     Transferring providers: JAY Cordero CNP, Dr. Nahun MD  Recent Hospitalization/ED:  [facility] Hospital stay 2/20/24 to 2/26/24.  Date of SNF Admission:  2/26/24  Date of SNF (anticipated) Discharge:  3/13/24  Discharged to: new assisted living for patient [facility] [place]  Cognitive Scores: SLUMS 6/30  Physical Function: Ambulating 75 ft with 2WW  DME: No new DME needed    CODE STATUS/ADVANCE DIRECTIVES DISCUSSION:  Prior   ALLERGIES: Chocolate, Lamotrigine, Propranolol, Valproic acid, Verapamil, Zonisamide, Amoxicillin, Feathers, Mirabegron, Nabumetone, Novocaine [procaine], Oxycodone, Solifenacin, Strawberry extract, Tramadol, Amitriptyline, and Topiramate    NURSING FACILITY COURSE   Medication Changes/Rationale:   Stopped dilaudid - no pain    Per recent TCU provider progress notes:   89 year old male PMH prior TIA and DVT on DOAC, chronic dementia, diverticular disease, recent hospitalization for back pain due to spinal stenosis hospitalized with acute anti-coagulation associated lower GI bleeding causing acute blood loss anemia, left abductor triston muscle hematoma. Angiogram 2/21 without extravasation. AC stopped. Had DVT 05/2023 - US showed DVT resolved. Follow-up MRI 3 months to eval for resolution of hematoma. Had episode of syncope and confusion: CT head negative, ECHO 2/22 normal. Neuro felt symptoms due to hypotension. CT abdomen/pelvis showed L1 compression fracture, pain managed with Tylenol, Lidoderm and dilaudid. Elevated troponin due to demand ischemia remained flat. HLD on  statin. BPH on finasteride. GERD and presbyesophagus: on PPI and famotidine, ST. Dementia on mementine. To TCU for rehab.     Seen for discharge visit. Note new blister left heel - covered with gauze, float heel to reduce pressure. No other concerns, denies pain. BP range 112-146/70-84 and sats 97% room air. To [facility] with PT and OT with provider of choice.     Summary of nursing facility stay:   Lower GI bleed  Left abductor triston muscle hematoma  Colonic diverticulosis  Acute, resolved. Off anticoagulation. Monitor for recurrence of GI bleed    ABLA  Iron deficiency  Acute on chronic. Continue ferrous sulfate 325 mg daily. Last Hgb 10.2 on 3/8.     Syncope  Noted at hospital. Monitor for recurrence    History of frequent falls  Multilevel cervical and lumbar spinal stenosis  L1 compression fracture, subsequent encounter  Physical deconditioning  Acute on chronic. No pain, up with walker To [facility] with therapies. Tylenol PRN pain.     Dyslipidemia  History of TIA  Not on antiplatelet therapy. Continue atorvastatin 10 mg daily.     History of RLE DVT  Off AC due to GI bleed. Encourage ambulation     GERD  Presbyesophagus  Chronic. Continue pantoprazole 40 mg twice daily and famotidine 40 mg at bedtime. Monitor symptoms     CKD stage II  Baseline creatinine 0.8-1. Last creatinine 0.75 on February 22. Avoid NSAIDs and nephrotoxins. Monitor renal function periodically     BPH  Continue finasteride 5 mg daily, monitor urinary status.      Slow transit constipation  Continue current bowel regimen     Cognitive impairment  Chronic. Continue memantine 10 mg twice daily. Monitor for safety.     Pulmonary nodule  Progressive RML nodular infiltrate. Follow-up as outpatient     Discharge Medications:  MED REC REQUIRED  Post Medication Reconciliation Status: discharge medications reconciled and changed, per note/orders    Current Outpatient Medications   Medication Sig Dispense Refill    acetaminophen (TYLENOL) 500 MG tablet Take  1,000 mg by mouth every 8 hours as needed for pain      atorvastatin (LIPITOR) 10 MG tablet 1 tablet (10 mg) by Oral or Feeding Tube route every evening 30 tablet 0    butalbital-acetaminophen-caffeine (ESGIC) -40 MG tablet Take 1 tablet by mouth every 6 hours as needed for headaches 20 tablet 0    clotrimazole-betamethasone (LOTRISONE) 1-0.05 % external cream Apply topically 2 times daily as needed (take for 7 days at a time for facial rash flares)      famotidine (PEPCID) 40 MG tablet Take 40 mg by mouth at bedtime      ferrous sulfate (FEROSUL) 325 (65 Fe) MG tablet Take 1 tablet (325 mg) by mouth every other day      finasteride (PROSCAR) 5 MG tablet Take 5 mg by mouth daily      Lidocaine (LIDOCARE) 4 % Patch Place 1-2 patches onto the skin every 24 hours To prevent lidocaine toxicity, patient should be patch free for 12 hrs daily. (Patient taking differently: Place 1 patch onto the skin every 24 hours To prevent lidocaine toxicity, patient should be patch free for 12 hrs daily.)      loratadine (CLARITIN) 10 MG tablet Take 10 mg by mouth daily      memantine (NAMENDA) 10 MG tablet Take 10 mg by mouth 2 times daily      multivitamin  with lutein (OCUVITE WITH LTEIN) CAPS per capsule Take 1 capsule by mouth daily      pantoprazole (PROTONIX) 40 MG EC tablet Take 40 mg by mouth 2 times daily      polyethylene glycol (MIRALAX) 17 GM/Dose powder Take 17 g by mouth daily      sennosides (SENOKOT) 8.6 MG tablet Take 1 tablet by mouth 2 times daily as needed for constipation      sodium chloride (OCEAN) 0.65 % nasal spray Spray 1 spray into both nostrils daily as needed for congestion (alternates use with Nasacort)      triamcinolone (NASACORT) 55 MCG/ACT nasal aerosol Spray 1 spray into both nostrils daily as needed (congestion, alternates use with Ocean spray)      vitamin C (ASCORBIC ACID) 500 MG tablet Take 500 mg by mouth daily      vitamin D3 (CHOLECALCIFEROL) 2000 units (50 mcg) tablet Take 2,000 Units by  "mouth daily Noon        Controlled medications:   not applicable/none     Past Medical History:   Past Medical History:   Diagnosis Date    Ascending aorta dilation (H24)     BPH (benign prostatic hyperplasia)     CKD (chronic kidney disease) stage 2, GFR 60-89 ml/min     Cognitive impairment     Diverticular disease of colon     DVT (deep venous thrombosis) (H) 05/2023    RLE    Gastroesophageal reflux disease     Hyperlipidemia     Iron deficiency     Migraine     Mitral stenosis     Osteoarthritis     Photosensitivity     Presbyesophagus     Seasonal allergies     Spinal stenosis     TIA (transient ischemic attack)      Physical Exam:   Vitals: /70   Pulse 89   Temp 97.8  F (36.6  C)   Resp 18   Ht 1.702 m (5' 7\")   Wt 67.4 kg (148 lb 9.6 oz)   SpO2 97%   BMI 23.27 kg/m    BMI: Body mass index is 23.27 kg/m .  GENERAL APPEARANCE:  Alert, in no distress, pleasant, cooperative, oriented x self, place, wife  EYES:  EOM, lids, pupils and irises normal, sclera clear and conjunctiva normal, no discharge or mattering on lids or lashes noted  ENT:  Mouth normal, moist mucous membranes, nose normal without drainage or crusting, external ears without lesions, hearing acuity [place]  NECK: supple, symmetrical, trachea midline  RESP:  respiratory effort normal, no chest wall tenderness, no respiratory distress, Lung sounds clear, patient is on RA  CV:  Auscultation of heart done, rate and rhythm controlled and regular, no murmur, no rub or gallop. Edema none bilateral lower extremities  ABDOMEN:  normal bowel sounds, soft, nontender, no palpable masses.  M/S:   Gait and station walks with assist , no tenderness or swelling of the joints; able to move all extremities, digits normal  SKIN:  Inspection and palpation of skin and subcutaneous tissue: intact blister left heel covered with gauze  NEURO: cranial nerves 2-12 grossly intact, no facial asymmetry, no speech deficits and able to follow directions, moves all " extremities symmetrically  PSYCH:  insight and judgement and memory impaired, affect and mood normal     SNF labs: Most Recent 3 CBC's:  Recent Labs   Lab Test 03/08/24  0829 02/28/24  0611 02/24/24  1454 02/23/24  0618   WBC 5.3 5.2 5.2 5.7   HGB 10.2* 8.5* 8.7* 8.3*   * 106* 105* 102*     --  167 135*     Most Recent 3 BMP's:  Recent Labs   Lab Test 02/23/24  0940 02/22/24  0526 02/21/24  0632 02/21/24  0224 02/20/24  1947   NA  --  139 137  --  135   POTASSIUM  --  3.9 4.1  --  4.3   CHLORIDE  --  109* 105  --  101   CO2  --  23 25  --  23   BUN  --  11.2 22.8  --  31.5*   CR  --  0.75 0.95  --  1.02   ANIONGAP  --  7 7  --  11   KENYA  --  7.9* 8.1*  --  8.6*   * 100* 111*   < > 131*    < > = values in this interval not displayed.       DISCHARGE PLAN:  Follow up labs: No labs orders/due  Medical Follow Up:      Follow up with primary care provider in 3-4 weeks  Follow up with specialist GI as needed   [facility] scheduled appointments:  Appointments in Next Year      Mar 26, 2024  2:00 PM  (Arrive by 1:45 PM)  XR LUMBAR SPINE 2/3 VIEWS with CSXR1  [facility] ([facility] ) [phone]     Mar 26, 2024  2:30 PM  (Arrive by 2:15 PM)  Return Adult Neurosurg with Victor Manuel Morris PA-C  [facility] ([facility] ) [phone]           Discharge Services: Home Care:  Occupational Therapy and Physical Therapy RN and HHA with [facility]  Discharge Instructions Verbalized to Patient at Discharge:   None    TOTAL DISCHARGE TIME:   Greater than 30 minutes  Electronically signed by:  JAY Cordero CNP     Documentation of Face to Face and Certification for Home Health Services    I certify that services are/were furnished while this patient was under the care of a physician and that a physician or an allowed non-physician practitioner (NPP), had a face-to-face encounter that meets the  physician face-to-face encounter requirements. The encounter was in whole, or in part, related to the primary reason for home health. The patient is confined to his/her home and needs intermittent skilled nursing, physical therapy, speech-language pathology, or the continued need for occupational therapy. A plan of care has been established by a physician and is periodically reviewed by a physician.  Date of Face-to-Face Encounter: 3/12/2024.    I certify that, based on my findings, the following services are medically necessary home health services: Nursing, Occupational Therapy, Physical Therapy, and HHA .    My clinical findings support the need for the above skilled services because: Requires assistance of another person or specialized equipment to access medical services because patient: Is prone to wander/get lost without assistance...    Patient to re-establish plan of care with their PCP within 7-10 days after leaving the facility to reestablish care.  Medicare certified PECOS provider: JAY Cordero CNP  Date: March 13, 2024

## 2024-03-13 ENCOUNTER — HOSPITAL ENCOUNTER (OUTPATIENT)
Dept: WOUND CARE | Facility: CLINIC | Age: 89
Discharge: HOME | End: 2024-03-13
Attending: PHYSICIAN ASSISTANT
Payer: MEDICARE

## 2024-03-13 DIAGNOSIS — T14.8XXA DEEP TISSUE INJURY: ICD-10-CM

## 2024-03-13 DIAGNOSIS — R21 RASH AND NONSPECIFIC SKIN ERUPTION: Primary | ICD-10-CM

## 2024-03-13 DIAGNOSIS — R60.0 PERIPHERAL EDEMA: ICD-10-CM

## 2024-03-13 PROCEDURE — 99305 1ST NF CARE MODERATE MDM 35: CPT | Performed by: PHYSICIAN ASSISTANT

## 2024-03-20 ENCOUNTER — LAB REQUISITION (OUTPATIENT)
Dept: LAB | Facility: CLINIC | Age: 89
End: 2024-03-20
Payer: MEDICARE

## 2024-03-20 DIAGNOSIS — D62 ACUTE POSTHEMORRHAGIC ANEMIA: ICD-10-CM

## 2024-03-20 NOTE — PROGRESS NOTES
WOUND VISIT AT [facility]    ASSESSMENT/PLAN:   Left heel DTI:  paint with betadine, wrap with roll gauze or Kerlix, apply compression garment, repeat daily until area dry or scabbed, then can discontinue roll gauze/Kerlix  elevate leg frequently and avoid pressure/friction in this area  Back rash:  hydrocortisone ointment twice daily until resolved  If either area worsens or is not resolved in 2 weeks follow-up with primary care.    HISTORY OF PRESENT ILLNESS:   Jean Pierre Roblero is a 89 year old male who [facility]. Was recently hospitalized at [facility] from 2/20-2/26 for hematochezia found to be due to lower GI bleed. I am seeing this patient today for a left heel DTI and a rash on his back. The staff at [place] have been applying betadine and roll gauze along with his compression stocking. The area is not open but has an intact blister today. The rash just started yesterday. No new medications or obvious reasons for this. It is itchy. Will be discharging today    Pmhx notable for dementia, TIA, DVT, chronic anticoag on DOAC and peripheral edema.     TREATMENT COURSE:  3/13/2024 : [facility] visit at [facility].     PHYSICAL EXAM:  GENERAL: Patient is alert and oriented and in no acute distress  INTEGUMENTARY: intact blister over left heel, edema noted bilaterally, maculopapular rash on central back      MDM: Moderate (new) - 35 minutes spent reviewing hospital and TCU notes, assessing patient and developing care plan.    Electronically signed by Vesna Tang PA-C on March 20, 2024

## 2024-03-28 LAB
ERYTHROCYTE [DISTWIDTH] IN BLOOD BY AUTOMATED COUNT: 14.3 % (ref 10–15)
HCT VFR BLD AUTO: 35.7 % (ref 40–53)
HGB BLD-MCNC: 11.6 G/DL (ref 13.3–17.7)
MCH RBC QN AUTO: 33.2 PG (ref 26.5–33)
MCHC RBC AUTO-ENTMCNC: 32.5 G/DL (ref 31.5–36.5)
MCV RBC AUTO: 102 FL (ref 78–100)
PLATELET # BLD AUTO: 187 10E3/UL (ref 150–450)
RBC # BLD AUTO: 3.49 10E6/UL (ref 4.4–5.9)
WBC # BLD AUTO: 5 10E3/UL (ref 4–11)

## 2024-03-28 PROCEDURE — 85027 COMPLETE CBC AUTOMATED: CPT | Mod: ORL | Performed by: FAMILY MEDICINE

## 2024-03-28 PROCEDURE — P9604 ONE-WAY ALLOW PRORATED TRIP: HCPCS | Mod: ORL | Performed by: FAMILY MEDICINE

## 2024-03-28 PROCEDURE — 36415 COLL VENOUS BLD VENIPUNCTURE: CPT | Mod: ORL | Performed by: FAMILY MEDICINE

## 2024-04-03 ENCOUNTER — LAB REQUISITION (OUTPATIENT)
Dept: LAB | Facility: CLINIC | Age: 89
End: 2024-04-03
Payer: MEDICARE

## 2024-04-03 DIAGNOSIS — R35.0 FREQUENCY OF MICTURITION: ICD-10-CM

## 2024-04-04 ENCOUNTER — LAB REQUISITION (OUTPATIENT)
Dept: LAB | Facility: CLINIC | Age: 89
End: 2024-04-04
Payer: MEDICARE

## 2024-04-04 DIAGNOSIS — R35.0 FREQUENCY OF MICTURITION: ICD-10-CM

## 2024-04-04 LAB
ALBUMIN UR-MCNC: NEGATIVE MG/DL
APPEARANCE UR: ABNORMAL
BACTERIA #/AREA URNS HPF: ABNORMAL /HPF
BILIRUB UR QL STRIP: NEGATIVE
CAOX CRY #/AREA URNS HPF: ABNORMAL /HPF
COLOR UR AUTO: YELLOW
GLUCOSE UR STRIP-MCNC: NEGATIVE MG/DL
HGB UR QL STRIP: NEGATIVE
HYALINE CASTS: 1 /LPF
KETONES UR STRIP-MCNC: NEGATIVE MG/DL
LEUKOCYTE ESTERASE UR QL STRIP: ABNORMAL
MUCOUS THREADS #/AREA URNS LPF: PRESENT /LPF
NITRATE UR QL: NEGATIVE
PH UR STRIP: 6.5 [PH] (ref 5–7)
RBC URINE: 4 /HPF
SP GR UR STRIP: 1.02 (ref 1–1.03)
SQUAMOUS EPITHELIAL: 1 /HPF
UROBILINOGEN UR STRIP-MCNC: NORMAL MG/DL
WBC URINE: 0 /HPF

## 2024-04-04 PROCEDURE — 81001 URINALYSIS AUTO W/SCOPE: CPT | Mod: ORL

## 2024-04-04 PROCEDURE — 87186 SC STD MICRODIL/AGAR DIL: CPT | Mod: ORL

## 2024-04-04 PROCEDURE — 87086 URINE CULTURE/COLONY COUNT: CPT | Mod: ORL

## 2024-04-07 LAB
BACTERIA UR CULT: ABNORMAL
BACTERIA UR CULT: ABNORMAL

## 2024-04-21 ENCOUNTER — LAB REQUISITION (OUTPATIENT)
Dept: LAB | Facility: CLINIC | Age: 89
End: 2024-04-21
Payer: MEDICARE

## 2024-04-21 DIAGNOSIS — R36.9 URETHRAL DISCHARGE, UNSPECIFIED: ICD-10-CM

## 2024-04-21 LAB
ALBUMIN UR-MCNC: NEGATIVE MG/DL
APPEARANCE UR: CLEAR
BILIRUB UR QL STRIP: NEGATIVE
COLOR UR AUTO: YELLOW
GLUCOSE UR STRIP-MCNC: NEGATIVE MG/DL
HGB UR QL STRIP: NEGATIVE
KETONES UR STRIP-MCNC: NEGATIVE MG/DL
LEUKOCYTE ESTERASE UR QL STRIP: NEGATIVE
MUCOUS THREADS #/AREA URNS LPF: PRESENT /LPF
NITRATE UR QL: NEGATIVE
PH UR STRIP: 6 [PH] (ref 5–7)
RBC URINE: 1 /HPF
SP GR UR STRIP: 1.01 (ref 1–1.03)
SQUAMOUS EPITHELIAL: 1 /HPF
UROBILINOGEN UR STRIP-MCNC: NORMAL MG/DL
WBC URINE: <1 /HPF

## 2024-04-21 PROCEDURE — 81001 URINALYSIS AUTO W/SCOPE: CPT | Mod: ORL | Performed by: FAMILY MEDICINE

## 2024-04-21 PROCEDURE — 87086 URINE CULTURE/COLONY COUNT: CPT | Mod: ORL | Performed by: FAMILY MEDICINE

## 2024-04-22 LAB — BACTERIA UR CULT: NORMAL

## 2024-05-15 ENCOUNTER — LAB REQUISITION (OUTPATIENT)
Dept: LAB | Facility: CLINIC | Age: 89
End: 2024-05-15
Payer: MEDICARE

## 2024-05-15 DIAGNOSIS — D64.9 ANEMIA, UNSPECIFIED: ICD-10-CM

## 2024-05-16 LAB — HGB BLD-MCNC: 13.4 G/DL (ref 13.3–17.7)

## 2024-05-16 PROCEDURE — P9604 ONE-WAY ALLOW PRORATED TRIP: HCPCS | Mod: ORL | Performed by: FAMILY MEDICINE

## 2024-05-16 PROCEDURE — 36415 COLL VENOUS BLD VENIPUNCTURE: CPT | Mod: ORL | Performed by: FAMILY MEDICINE

## 2024-05-16 PROCEDURE — 85018 HEMOGLOBIN: CPT | Mod: ORL | Performed by: FAMILY MEDICINE

## 2024-05-29 ENCOUNTER — LAB REQUISITION (OUTPATIENT)
Dept: LAB | Facility: CLINIC | Age: 89
End: 2024-05-29
Payer: MEDICARE

## 2024-05-29 DIAGNOSIS — D64.9 ANEMIA, UNSPECIFIED: ICD-10-CM

## 2024-06-06 LAB — HGB BLD-MCNC: 13.7 G/DL (ref 13.3–17.7)

## 2024-06-06 PROCEDURE — 36415 COLL VENOUS BLD VENIPUNCTURE: CPT | Mod: ORL | Performed by: FAMILY MEDICINE

## 2024-06-06 PROCEDURE — 85018 HEMOGLOBIN: CPT | Mod: ORL | Performed by: FAMILY MEDICINE

## 2024-06-06 PROCEDURE — P9603 ONE-WAY ALLOW PRORATED MILES: HCPCS | Mod: ORL | Performed by: FAMILY MEDICINE

## 2024-08-14 ENCOUNTER — LAB REQUISITION (OUTPATIENT)
Dept: LAB | Facility: CLINIC | Age: 89
End: 2024-08-14
Payer: MEDICARE

## 2024-08-14 DIAGNOSIS — R73.9 HYPERGLYCEMIA, UNSPECIFIED: ICD-10-CM

## 2024-08-15 LAB — HBA1C MFR BLD: 5.4 %

## 2024-08-15 PROCEDURE — 36415 COLL VENOUS BLD VENIPUNCTURE: CPT | Mod: ORL | Performed by: FAMILY MEDICINE

## 2024-08-15 PROCEDURE — 83036 HEMOGLOBIN GLYCOSYLATED A1C: CPT | Mod: ORL | Performed by: FAMILY MEDICINE

## 2024-08-15 PROCEDURE — P9604 ONE-WAY ALLOW PRORATED TRIP: HCPCS | Mod: ORL | Performed by: FAMILY MEDICINE

## 2024-08-26 ENCOUNTER — HOSPITAL ENCOUNTER (EMERGENCY)
Facility: CLINIC | Age: 89
Discharge: HOME | End: 2024-08-27
Attending: EMERGENCY MEDICINE | Admitting: EMERGENCY MEDICINE
Payer: MEDICARE

## 2024-08-26 DIAGNOSIS — R79.89 ELEVATED TROPONIN: ICD-10-CM

## 2024-08-26 DIAGNOSIS — R53.1 GENERALIZED WEAKNESS: ICD-10-CM

## 2024-08-26 DIAGNOSIS — Z86.59 HISTORY OF DEMENTIA: ICD-10-CM

## 2024-08-26 LAB
ALBUMIN SERPL BCG-MCNC: 3.5 G/DL (ref 3.5–5.2)
ALBUMIN UR-MCNC: 20 MG/DL
ALP SERPL-CCNC: 88 U/L (ref 40–150)
ALT SERPL W P-5'-P-CCNC: 9 U/L (ref 0–70)
ANION GAP SERPL CALCULATED.3IONS-SCNC: 15 MMOL/L (ref 7–15)
APPEARANCE UR: CLEAR
AST SERPL W P-5'-P-CCNC: 18 U/L (ref 0–45)
BASE EXCESS BLDV CALC-SCNC: 1 MMOL/L (ref -3–3)
BASOPHILS # BLD AUTO: 0 10E3/UL (ref 0–0.2)
BASOPHILS NFR BLD AUTO: 0 %
BILIRUB SERPL-MCNC: 0.4 MG/DL
BILIRUB UR QL STRIP: NEGATIVE
BUN SERPL-MCNC: 15.5 MG/DL (ref 8–23)
CALCIUM SERPL-MCNC: 8.8 MG/DL (ref 8.8–10.4)
CHLORIDE SERPL-SCNC: 105 MMOL/L (ref 98–107)
COLOR UR AUTO: YELLOW
CREAT SERPL-MCNC: 0.81 MG/DL (ref 0.67–1.17)
EGFRCR SERPLBLD CKD-EPI 2021: 84 ML/MIN/1.73M2
EOSINOPHIL # BLD AUTO: 0 10E3/UL (ref 0–0.7)
EOSINOPHIL NFR BLD AUTO: 0 %
ERYTHROCYTE [DISTWIDTH] IN BLOOD BY AUTOMATED COUNT: 12.4 % (ref 10–15)
GLUCOSE SERPL-MCNC: 126 MG/DL (ref 70–99)
GLUCOSE UR STRIP-MCNC: NEGATIVE MG/DL
HCO3 BLDV-SCNC: 25 MMOL/L (ref 21–28)
HCO3 SERPL-SCNC: 21 MMOL/L (ref 22–29)
HCT VFR BLD AUTO: 37.4 % (ref 40–53)
HGB BLD-MCNC: 12.8 G/DL (ref 13.3–17.7)
HGB UR QL STRIP: ABNORMAL
HOLD SPECIMEN: NORMAL
IMM GRANULOCYTES # BLD: 0.1 10E3/UL
IMM GRANULOCYTES NFR BLD: 1 %
KETONES UR STRIP-MCNC: NEGATIVE MG/DL
LACTATE BLD-SCNC: 1.1 MMOL/L
LEUKOCYTE ESTERASE UR QL STRIP: NEGATIVE
LYMPHOCYTES # BLD AUTO: 1.7 10E3/UL (ref 0.8–5.3)
LYMPHOCYTES NFR BLD AUTO: 22 %
MCH RBC QN AUTO: 33.2 PG (ref 26.5–33)
MCHC RBC AUTO-ENTMCNC: 34.2 G/DL (ref 31.5–36.5)
MCV RBC AUTO: 97 FL (ref 78–100)
MONOCYTES # BLD AUTO: 0.4 10E3/UL (ref 0–1.3)
MONOCYTES NFR BLD AUTO: 6 %
MUCOUS THREADS #/AREA URNS LPF: PRESENT /LPF
NEUTROPHILS # BLD AUTO: 5.4 10E3/UL (ref 1.6–8.3)
NEUTROPHILS NFR BLD AUTO: 71 %
NITRATE UR QL: NEGATIVE
NRBC # BLD AUTO: 0 10E3/UL
NRBC BLD AUTO-RTO: 0 /100
PCO2 BLDV: 38 MM HG (ref 40–50)
PH BLDV: 7.43 [PH] (ref 7.32–7.43)
PH UR STRIP: 5.5 [PH] (ref 5–7)
PLATELET # BLD AUTO: 167 10E3/UL (ref 150–450)
PO2 BLDV: 51 MM HG (ref 25–47)
POTASSIUM SERPL-SCNC: 3.4 MMOL/L (ref 3.4–5.3)
PROT SERPL-MCNC: 6.1 G/DL (ref 6.4–8.3)
RBC # BLD AUTO: 3.85 10E6/UL (ref 4.4–5.9)
RBC URINE: 24 /HPF
SAO2 % BLDV: 87 % (ref 70–75)
SODIUM SERPL-SCNC: 141 MMOL/L (ref 135–145)
SP GR UR STRIP: 1.03 (ref 1–1.03)
TROPONIN T SERPL HS-MCNC: 52 NG/L
TROPONIN T SERPL HS-MCNC: 54 NG/L
UROBILINOGEN UR STRIP-MCNC: NORMAL MG/DL
WBC # BLD AUTO: 7.6 10E3/UL (ref 4–11)
WBC URINE: 1 /HPF

## 2024-08-26 PROCEDURE — 80053 COMPREHEN METABOLIC PANEL: CPT | Performed by: EMERGENCY MEDICINE

## 2024-08-26 PROCEDURE — 93005 ELECTROCARDIOGRAM TRACING: CPT

## 2024-08-26 PROCEDURE — 36415 COLL VENOUS BLD VENIPUNCTURE: CPT | Performed by: EMERGENCY MEDICINE

## 2024-08-26 PROCEDURE — 82803 BLOOD GASES ANY COMBINATION: CPT

## 2024-08-26 PROCEDURE — 84484 ASSAY OF TROPONIN QUANT: CPT | Performed by: EMERGENCY MEDICINE

## 2024-08-26 PROCEDURE — 99284 EMERGENCY DEPT VISIT MOD MDM: CPT

## 2024-08-26 PROCEDURE — 81001 URINALYSIS AUTO W/SCOPE: CPT | Performed by: EMERGENCY MEDICINE

## 2024-08-26 PROCEDURE — 85025 COMPLETE CBC W/AUTO DIFF WBC: CPT | Performed by: EMERGENCY MEDICINE

## 2024-08-26 ASSESSMENT — ACTIVITIES OF DAILY LIVING (ADL)
ADLS_ACUITY_SCORE: 39

## 2024-08-26 ASSESSMENT — COLUMBIA-SUICIDE SEVERITY RATING SCALE - C-SSRS
6. HAVE YOU EVER DONE ANYTHING, STARTED TO DO ANYTHING, OR PREPARED TO DO ANYTHING TO END YOUR LIFE?: NO
2. HAVE YOU ACTUALLY HAD ANY THOUGHTS OF KILLING YOURSELF IN THE PAST MONTH?: NO
1. IN THE PAST MONTH, HAVE YOU WISHED YOU WERE DEAD OR WISHED YOU COULD GO TO SLEEP AND NOT WAKE UP?: NO

## 2024-08-26 NOTE — ED NOTES
Bed: ED23  Expected date:   Expected time:   Means of arrival:   Comments:  542  89 M from [facility]/septic/poss uti/fever  1810

## 2024-08-26 NOTE — ED PROVIDER NOTES
Emergency Department Note      History of Present Illness     Chief Complaint  Fever      HPI  Jean Pierre Roblero is a 89 year old male with advanced dementia who presents with altered mental status. Per nursing, Jean Pierre presents from a memory care facility with increased lethargy and confusion. He also reported that the temperature this morning was about 100 to 101 degrees but that was when he got up.. No abdominal pain or cough. Given tylenol prior to arrival.  Patient does not verbalize any complaints at this time.  According to his wife he has not been coughing lately.  He just wanted to sleep throughout the day today and was not as active is normal so they were concerned and sent him in.  The patient is wheelchair-bound and only is able to do some transfers with assistance.    Independent Historian  History limited due to dementia. Supplemental history provided by EMS through nursing.     Review of External Notes  I reviewed the discharge summary from the nursing home from 3/12/2024    Past Medical History   Medications:    Lipitor   Esgic   Ferosul   Proscar  Namenda   Ocuvite with ltein   Xarelto   Senokot   Nasacort      Past Medical History:    Allergic state   Fainting episodes  Gastroesophageal reflux   Headaches   Heartburn   Nocturia   Photosensitivity   Swallowing difficulty      Past Surgical History:    Back surgery   Cholecystectomy   Endoscopic sinus surgery   Cataract surgery   Ingrown toenail removal     Physical Exam   Patient Vitals for the past 24 hrs:   BP Temp Temp src Pulse Resp SpO2   08/26/24 2141 -- 97.6  F (36.4  C) Oral -- -- --   08/26/24 2100 123/63 -- -- 76 16 --   08/26/24 2000 110/78 -- -- 87 20 95 %   08/26/24 1900 124/69 -- -- 91 18 94 %   08/26/24 1830 -- 99.4  F (37.4  C) Temporal 96 19 94 %     Physical Exam    Legs: generally weak, can lift them up, but weak.   Nursing note and vitals reviewed.    Constitutional:  Appears comfortable.    HENT:                Nose normal.  No  discharge.      Oral mucosa is moist.  Eyes:    Conjunctivae are normal without injection.  Pupils are equal.  Cardiovascular:  Normal rate, regular rhythm with normal S1 and S2.      Normal heart sounds and peripheral pulses 2+ and equal.    Pulmonary:  Effort normal and breath sounds clear to auscultation bilaterally.    No respiratory distress.  No stridor.     No wheezes. No rales.     GI:    Soft. No distension and no mass. No tenderness.   Musculoskeletal:  Normal range of motion. No extremity deformity.     No edema and no tenderness.  Legs generally weak, can lift them up against gravity, but weak.   Neurological:   Alert but demented. No focal weakness or droop.  Generally weak though and has significant memory loss.  No facial droop.  Skin:    Skin is warm and dry. No rash noted.   Psychiatric:   Behavior is normal. Appropriate mood and affect.     Patient has dementia.     Diagnostics   Lab Results   Labs Ordered and Resulted from Time of ED Arrival to Time of ED Departure   COMPREHENSIVE METABOLIC PANEL - Abnormal       Result Value    Sodium 141      Potassium 3.4      Carbon Dioxide (CO2) 21 (*)     Anion Gap 15      Urea Nitrogen 15.5      Creatinine 0.81      GFR Estimate 84      Calcium 8.8      Chloride 105      Glucose 126 (*)     Alkaline Phosphatase 88      AST 18      ALT 9      Protein Total 6.1 (*)     Albumin 3.5      Bilirubin Total 0.4     TROPONIN T, HIGH SENSITIVITY - Abnormal    Troponin T, High Sensitivity 52 (*)    ROUTINE UA WITH MICROSCOPIC REFLEX TO CULTURE - Abnormal    Color Urine Yellow      Appearance Urine Clear      Glucose Urine Negative      Bilirubin Urine Negative      Ketones Urine Negative      Specific Gravity Urine 1.027      Blood Urine Small (*)     pH Urine 5.5      Protein Albumin Urine 20 (*)     Urobilinogen Urine Normal      Nitrite Urine Negative      Leukocyte Esterase Urine Negative      Mucus Urine Present (*)     RBC Urine 24 (*)     WBC Urine 1     CBC  WITH PLATELETS AND DIFFERENTIAL - Abnormal    WBC Count 7.6      RBC Count 3.85 (*)     Hemoglobin 12.8 (*)     Hematocrit 37.4 (*)     MCV 97      MCH 33.2 (*)     MCHC 34.2      RDW 12.4      Platelet Count 167      % Neutrophils 71      % Lymphocytes 22      % Monocytes 6      % Eosinophils 0      % Basophils 0      % Immature Granulocytes 1      NRBCs per 100 WBC 0      Absolute Neutrophils 5.4      Absolute Lymphocytes 1.7      Absolute Monocytes 0.4      Absolute Eosinophils 0.0      Absolute Basophils 0.0      Absolute Immature Granulocytes 0.1      Absolute NRBCs 0.0     ISTAT GASES LACTATE VENOUS POCT - Abnormal    Lactic Acid POCT 1.1      Bicarbonate Venous POCT 25      O2 Sat, Venous POCT 87 (*)     pCO2 Venous POCT 38 (*)     pH Venous POCT 7.43      pO2 Venous POCT 51 (*)     Base Excess/Deficit (+/-) POCT 1.0     TROPONIN T, HIGH SENSITIVITY - Abnormal    Troponin T, High Sensitivity 54 (*)      EKG   ECG taken at 1822, ECG read at 1827  Normal sinus rhythm  Left axis deviation  Inferior infarct, age undetermined    Rate 96 bpm. NM interval 154 ms. QRS duration 78 ms. QT/QTc 372/469 ms. P-R-T axes 65 -38 43.     Independent Interpretation  None    ED Course    Medications Administered  Medications - No data to display    Discussion of Management  None    Social Determinants of Health adding to complexity of care  Dementia    ED Course  ED Course as of 08/26/24 2203   Mon Aug 26, 2024   1824 I evaluated the patient.    2005 Rechecked, spoke with family.      Medical Decision Making / Diagnosis   CMS Diagnoses: None    MIPS  None    [facility]  Jean Pierre Roblero is a 89 year old male who was sleeping most the day and had a low-grade fever this morning but not here in the emergency room.  His white count is normal, labs did reveal an elevated troponin but delta troponin was unchanged 52-54.  Lactate is normal white count normal and urine unremarkable.  Patient seemed comfortable.  I do not find any evidence of  infection at this time but he will be observed closely over the next 24 to 48 hours for development of a fever and if he gets acutely worse with weakness increasing fevers or other symptoms, he should be reevaluated by a physician.    Continue current medications, observe for fever and increasing illness, if gets worse, contact his doctor.  Tylenol as needed.    Disposition  The patient was discharged.     ICD-10 Codes:    ICD-10-CM    1. Generalized weakness  R53.1       2. History of dementia  Z86.59       3. Elevated troponin  R79.89     No delta change           Discharge Medications  New Prescriptions    No medications on file       Scribe Disclosure:  I, DENISHA VIERA, am serving as a scribe at 6:18 PM on 8/26/2024 to document services personally performed by Gretchen Baez MD based on my observations and the provider's statements to me.     [employer]      Gretchen Baez MD  08/26/24 5157

## 2024-08-26 NOTE — ED TRIAGE NOTES
Patient coming by EMS from [facility] in [place].  Increased lethargy and temps, unable to get urine sample at facility.  Hx of UTI,  Tylenol given by facility dose unknown.  .

## 2024-08-27 VITALS
OXYGEN SATURATION: 94 % | TEMPERATURE: 97.6 F | SYSTOLIC BLOOD PRESSURE: 130 MMHG | DIASTOLIC BLOOD PRESSURE: 80 MMHG | RESPIRATION RATE: 18 BRPM | HEART RATE: 67 BPM

## 2024-08-27 LAB
ATRIAL RATE - MUSE: 96 BPM
DIASTOLIC BLOOD PRESSURE - MUSE: NORMAL MMHG
INTERPRETATION ECG - MUSE: NORMAL
P AXIS - MUSE: 65 DEGREES
PR INTERVAL - MUSE: 154 MS
QRS DURATION - MUSE: 78 MS
QT - MUSE: 372 MS
QTC - MUSE: 469 MS
R AXIS - MUSE: -38 DEGREES
SYSTOLIC BLOOD PRESSURE - MUSE: NORMAL MMHG
T AXIS - MUSE: 43 DEGREES
VENTRICULAR RATE- MUSE: 96 BPM

## 2024-08-27 ASSESSMENT — ACTIVITIES OF DAILY LIVING (ADL): ADLS_ACUITY_SCORE: 39

## 2024-08-27 NOTE — DISCHARGE INSTRUCTIONS
Continue current medications, observe for fever and increasing illness, if gets worse, contact his doctor.  Tylenol as needed.

## 2024-09-27 ENCOUNTER — APPOINTMENT (OUTPATIENT)
Dept: GENERAL RADIOLOGY | Facility: CLINIC | Age: 89
End: 2024-09-27
Attending: EMERGENCY MEDICINE
Payer: MEDICARE

## 2024-09-27 ENCOUNTER — APPOINTMENT (OUTPATIENT)
Dept: CT IMAGING | Facility: CLINIC | Age: 89
End: 2024-09-27
Attending: EMERGENCY MEDICINE
Payer: MEDICARE

## 2024-09-27 ENCOUNTER — HOSPITAL ENCOUNTER (EMERGENCY)
Facility: CLINIC | Age: 89
Discharge: HOME | End: 2024-09-27
Attending: EMERGENCY MEDICINE | Admitting: EMERGENCY MEDICINE
Payer: MEDICARE

## 2024-09-27 VITALS
BODY MASS INDEX: 23.18 KG/M2 | OXYGEN SATURATION: 97 % | TEMPERATURE: 97.7 F | DIASTOLIC BLOOD PRESSURE: 97 MMHG | SYSTOLIC BLOOD PRESSURE: 167 MMHG | RESPIRATION RATE: 20 BRPM | HEART RATE: 95 BPM | WEIGHT: 148 LBS

## 2024-09-27 DIAGNOSIS — M25.561 ACUTE PAIN OF BOTH KNEES: ICD-10-CM

## 2024-09-27 DIAGNOSIS — S01.81XA LACERATION OF FOREHEAD, INITIAL ENCOUNTER: ICD-10-CM

## 2024-09-27 DIAGNOSIS — M25.562 ACUTE PAIN OF BOTH KNEES: ICD-10-CM
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
## 2024-09-27 DIAGNOSIS — S01.81XA FACIAL LACERATION, INITIAL ENCOUNTER: ICD-10-CM

## 2024-09-27 PROCEDURE — 72170 X-RAY EXAM OF PELVIS: CPT

## 2024-09-27 PROCEDURE — 12013 RPR F/E/E/N/L/M 2.6-5.0 CM: CPT

## 2024-09-27 PROCEDURE — 99285 EMERGENCY DEPT VISIT HI MDM: CPT | Mod: 25

## 2024-09-27 PROCEDURE — G1010 CDSM STANSON: HCPCS

## 2024-09-27 PROCEDURE — 73562 X-RAY EXAM OF KNEE 3: CPT | Mod: 50

## 2024-09-27 ASSESSMENT — ACTIVITIES OF DAILY LIVING (ADL)
ADLS_ACUITY_SCORE: 39

## 2024-09-27 NOTE — ED TRIAGE NOTES
Sitting in WC and fell forward out of chair, at memory care, lac to rt forehead, no loc, c/o pain to bilat knees     Triage Assessment (Adult)       Row Name 09/27/24 0873          Triage Assessment    Airway WDL WDL        Respiratory WDL    Respiratory WDL WDL        Cardiac WDL    Cardiac WDL WDL        Cognitive/Neuro/Behavioral WDL    Cognitive/Neuro/Behavioral WDL X

## 2024-09-27 NOTE — DISCHARGE INSTRUCTIONS
Stitches need to be removed in 3-5 days.    Discharge Instructions  Head Injury    You have been seen today for a head injury. Your evaluation included a history and physical examination. You may have had a CT (CAT) scan performed, though most head injuries do not require a scan. Based on this evaluation, your provider today does not feel that your head injury is serious.    Generally, every Emergency Department visit should have a follow-up clinic visit with either a primary or a specialty clinic/provider. Please follow-up as instructed by your emergency provider today.  Return to the Emergency Department if:  You are confused or you are not acting right.  Your headache gets worse or you start to have a really bad headache even with your recommended treatment plan.  You vomit (throw up) more than once.  You have a seizure.  You have trouble walking.  You have weakness or paralysis (cannot move) in an arm or a leg.  You have blood or fluid coming from your ears or nose.  You have new symptoms or anything that worries you.    Sleeping:  It is okay for you to sleep, but someone should wake you up if instructed by your provider, and someone should check on you at your usual time to wake up.     Activity:  Do not drive for at least 24 hours.  Do not drive if you have dizzy spells or trouble concentrating, or remembering things.  Do not return to any contact sports until cleared by your regular provider.     MORE INFORMATION:    Concussion:  A concussion is a minor head injury that may cause temporary problems with the way the brain works. Although concussions are important, they are generally not an emergency or a reason that a person needs to be hospitalized. Some concussion symptoms include confusion, amnesia (forgetful), nausea (sick to your stomach) and vomiting (throwing up), dizziness, fatigue, memory or concentration problems, irritability and sleep problems. For most people, concussions are mild and temporary  but some will have more severe and persistent symptoms that require on-going care and treatment.  CT Scans: Your evaluation today may have included a CT scan (CAT scan) to look for things like bleeding or a skull fracture (broken bone).  CT scans involve radiation and too many CT scans can cause serious health problems like cancer, especially in children.  Because of this, your provider may not have ordered a CT scan today if they think you are at low risk for a serious or life threatening problem.    If you were given a prescription for medicine here today, be sure to read all of the information (including the package insert) that comes with your prescription.  This will include important information about the medicine, its side effects, and any warnings that you need to know about.  The pharmacist who fills the prescription can provide more information and answer questions you may have about the medicine.  If you have questions or concerns that the pharmacist cannot address, please call or return to the Emergency Department.     Remember that you can always come back to the Emergency Department if you are not able to see your regular provider in the amount of time listed above, if you get any new symptoms, or if there is anything that worries you.    Discharge Instructions  Extremity Injury    You were seen today for an injury to an extremity (arm, hand, leg, or foot). You may have a bruise, strain, or fracture (broken bone).    Generally, every Emergency Department visit should have a follow-up clinic visit with either a primary or a specialty clinic/provider. Please follow-up as instructed by your emergency provider today.  Return to the Emergency Department right away if:  Your pain seems to change or get worse or there is pain in a new area that wasn t evaluated today.  Your extremity becomes pale, cool, blue, or numb or tingling past the injury.  You have more drainage, redness or pain in the area of the cut  or abrasion.  You have pain that you cannot control with the medicine recommended or prescribed here, or you have pain that seems too much for your injury.  Your child (who is injured) will not stop crying or is much more fussy than normal.  You have new symptoms or anything that worries you.    What to Expect:  Your swelling and pain may be worse the day after your injury, but should not be severe and should start getting better after that. You should not have new symptoms and your pain should not get worse.  You may start to get a bruise over the injured area or below the injured area (bruising can follow gravity).  Your movement and strength should get better with time.  Some injuries may not show up until after you have left the Emergency Department so it is important to follow-up as directed.  Your injury may prevent you from working.  Follow-up with your regular provider to get a work release note.  Pain medications or your injury may make it unsafe to drive or operate machinery.    Home Care:  RICE: Rest, Ice, Compression, Elevation  Rest: Rest your injured area for at least 1-2 days. After that you may start using your extremity again as long as there is not too much pain.   Ice: Apply ice your injured area for 15 minutes at a time, at least 3 times a day. Use a cloth between the ice bag and your skin to prevent frostbite. Do not sleep with an ice pack or heating pad on, since this can cause burns or skin injury.  Compression: You may use an elastic bandage (Ace  Wrap) if it makes you more comfortable. Wrap it just tight enough to provide light compression, like a new pair of socks feels. Loosen the bandage if you have swelling past the bandage.  Elevation: Raise the injured area above the level of your heart as much as possible in the first 1-2 days.    Use Tylenol  (acetaminophen), Motrin (ibuprofen), or Advil  (ibuprofen) for your pain unless you have an allergy or are told not to use these medications by  your provider.  Take the medications as instructed on the package. Tylenol  (acetaminophen) is in many prescription medicines and non-prescription medicines--check all of your medicines to be sure you aren t taking more than 3000 mg per day.  Please follow any other instructions that were discussed with you by your provider.    Stretching/Exercises:  You may have been provided with instructions for stretching or exercises. If your injury was to your arm or shoulder and your provider put you in a sling or an immobilizer, it is important that you take off your immobilizer within 3 days and stretch/move your shoulder, unless your provider specifically tells you to not move your shoulder.  This is to prevent further injury such as a  frozen shoulder .     If you were given a prescription for medicine here today, be sure to read all of the information (including the package insert) that comes with your prescription.  This will include important information about the medicine, its side effects, and any warnings that you need to know about.  The pharmacist who fills the prescription can provide more information and answer questions you may have about the medicine.  If you have questions or concerns that the pharmacist cannot address, please call or return to the Emergency Department.     Remember that you can always come back to the Emergency Department if you are not able to see your regular provider in the amount of time listed above, if you get any new symptoms, or if there is anything that worries you.

## 2024-09-27 NOTE — ED PROVIDER NOTES
Emergency Department Note      History of Present Illness     Chief Complaint   Fall, Head Laceration, and Knee Pain    HPI   Jean Pierre Roblero is a 89 year old male with a history of iron deficiency anemia, stage 2 CKD, GERD, DVT, TIA, and GI bleed who presents via EMS for evaluation after a fall. Per EMS, the patient was at his [facility] care facility when he had a witnessed fall straight forward out of his wheelchair. He sustained a laceration to his right forehead and complains of bilateral knee pain, more on the right than the left. Denies loss of consciousness. He is not anticoagulated.     Independent Historian   EMS as detailed above.    Review of External Notes   I reviewed [facility] which shows Tdap booster was given in 2023.    Past Medical History     Medical History and Problem List   Ascending aorta dilation  BPH  Stage 2 CKD  Cognitive impairment  DVT  GERD  Hyperlipidemia  Iron deficiency anemia   Migraine  Osteoarthritis  TIA   Pneumonia  Infectious encephalopathy   Lower GI bleed    Medications   Atorvastatin  Famotidine  Finasteride  Loratadine  Memantine  Pantoprazole    Surgical History   Cholecystectomy   Cataract  Hernia repair  Visceral angiogram   Turbinoplasty     Physical Exam     Patient Vitals for the past 24 hrs:   BP Temp Temp src Pulse Resp SpO2 Weight   09/27/24 0958 (!) 167/97 -- -- 95 -- 97 % --   09/27/24 0928 (!) 157/93 -- -- 89 -- 95 % --   09/27/24 0834 (!) 180/98 -- -- 98 -- 99 % --   09/27/24 0831 (!) 190/94 97.7  F (36.5  C) Temporal 109 20 97 % 67.1 kg (148 lb)     Physical Exam  Constitutional: Well appearing.  HEENT: 2.5 cm laceration right forehead just superior to right eyebrow. Small 1 cm laceration with minimal gaping right inferior eyelid/maxilla. PERRL.  EOMI.  Moist mucous membranes.  Neck: Soft.  Supple.  No tenderness to palpation.  Cardiac: Regular rate and rhythm.  No murmur or rub.  Respiratory: Clear to auscultation bilaterally.  No respiratory distress.    Abdomen:  Soft and nontender.  No guarding.  Nondistended.  Musculoskeletal: No edema.  Normal range of motion.  Neurologic: Alert and oriented to self and wife.  Normal tone and bulk.  No facial drooping. Normal speech. 5/5 strength in bilateral upper and lower extremities.  Sensation to light touch intact throughout.   Skin: No rashes.  No edema.  Psych: Normal affect.  Normal behavior.    Diagnostics     Lab Results   Labs Ordered and Resulted from Time of ED Arrival to Time of ED Departure - No data to display    Imaging   CT Cervical Spine w/o Contrast   Final Result   IMPRESSION: No evidence of acute fracture or subluxation in the   cervical spine.      HAZEL REYES MD            SYSTEM ID:  [id number]      CT Head w/o Contrast   Final Result   IMPRESSION:       1. No acute intracranial abnormality.   2. Chronic/incidental findings as detailed.      HAZEL REYES MD            SYSTEM ID:  [id number]      XR Knee Bilateral 3 Views   Final Result   IMPRESSION: There is no evidence of acute fracture in either side.   Bones are demineralized. No evidence for joint effusion bilaterally.   No significant joint space narrowing on either side. Atherosclerotic   vascular calcifications.       RIGO BRUNNER MD            SYSTEM ID:  [id number]      XR Pelvis 1/2 Views   Final Result   IMPRESSION: Bones are demineralized. There is mild degenerative change   of both hips. No acute displaced fracture identified. Nothing to   suggest a dislocation on the single AP view. Atherosclerotic vascular   calcifications. Prior hernia mesh repair.      RIGO BRUNNER MD            SYSTEM ID:  [id number]        Independent Interpretation   None    ED Course      Medications Administered   Medications - No data to display    Procedures   Procedures     Laceration Repair      Procedure: Laceration Repair    Indication: Laceration    Consent: Verbal    Tetanus status reviewed, 2023    Location: Right Forehead    Length: 2.5 cm    Preparation:  Irrigation with Sterile Saline.    Anesthesia/Sedation: Lidocaine - 1%      Treatment/Exploration: Wound explored, no foreign bodies found     Closure: The wound was closed with one layer. Skin/superficial layer was closed with 7 x 6-0 Polypropylene (prolene)  using Interrupted sutures.     Patient Status: The patient tolerated the procedure well: Yes. There were no complications.      Laceration Repair      Procedure: Laceration Repair    Indication: Laceration    Consent: Verbal    Tetanus status reviewed, 2023    Location: Right Cheek    Length: 1.5 cm    Preparation: Irrigation with Sterile Saline.    Anesthesia/Sedation: Lidocaine - 1%      Treatment/Exploration: Wound explored, no foreign bodies found     Closure: The wound was closed with steri strips     Patient Status: The patient tolerated the procedure well: Yes. There were no complications.    Discussion of Management   None    ED Course   ED Course as of 09/27/24 1034   Fri Sep 27, 2024   0834 I obtained history and examined the patient as noted above.    0944 I rechecked and updated the patient.    1020 I performed laceration repairs as noted above.       Additional Documentation  CT head without acute intracranial hemorrhage    Medical Decision Making / Diagnosis     CMS Diagnoses: None    MIPS       None    [facility]   Jean Pierre Roblero is a 89 year old male who is afebrile and hemodynamically stable.  He has obvious facial trauma.  He reports some knee pain but has no visible abnormality or deformities.  CT scan of the head and neck without intracranial or intracervical pathology.  His lacerations were repaired as above.  His tetanus was up-to-date.  X-ray of the knee and hips demonstrate no fracture.  He has no new complaints or abnormal findings on exam.  Wife is present states that he is acting appropriately.  This appeared to be mechanical fall out of the wheelchair notification for blood work, EKG, or hospitalization at this time.  Wife is comfortable  with him going back to the facility.  He is mostly wheelchair-bound and will transferred back via EMS.  Discussed wound care precautions and need for suture removal.  Strict return precautions were given, his questions were answered, and he was in no distress at time discharge.    Disposition   The patient was discharged.     Diagnosis     ICD-10-CM    1. Laceration of forehead, initial encounter  S01.81XA       2. Facial laceration, initial encounter  S01.81XA       3. Acute pain of both knees  M25.561     M25.562       4. Fall from wheelchair, initial encounter  W05.0XXA            Discharge Medications   New Prescriptions    No medications on file     Scribe Disclosure:  IMegan, am serving as a scribe at 8:27 AM on 9/27/2024 to document services personally performed by Pierre Simpson MD based on my observations and the provider's statements to me.        Pierre Simpson MD  09/29/24 2034

## 2024-09-27 NOTE — ED NOTES
Pt wife here, called pt care facility [facility] [phone] and updated Payal NG on status and pt returning.

## 2025-04-14 ENCOUNTER — LAB REQUISITION (OUTPATIENT)
Dept: LAB | Facility: CLINIC | Age: OVER 89
End: 2025-04-14
Payer: MEDICARE

## 2025-04-14 DIAGNOSIS — E78.5 HYPERLIPIDEMIA, UNSPECIFIED: ICD-10-CM

## 2025-04-14 DIAGNOSIS — R73.9 HYPERGLYCEMIA, UNSPECIFIED: ICD-10-CM

## 2025-04-14 DIAGNOSIS — F01.B0 VASCULAR DEMENTIA, MODERATE, WITHOUT BEHAVIORAL DISTURBANCE, PSYCHOTIC DISTURBANCE, MOOD DISTURBANCE, AND ANXIETY (H): ICD-10-CM

## 2025-04-14 DIAGNOSIS — I65.23 OCCLUSION AND STENOSIS OF BILATERAL CAROTID ARTERIES: ICD-10-CM

## 2025-04-14 DIAGNOSIS — L30.9 DERMATITIS, UNSPECIFIED: ICD-10-CM

## 2025-04-17 LAB
ALBUMIN SERPL BCG-MCNC: 4.2 G/DL (ref 3.5–5.2)
ANION GAP SERPL CALCULATED.3IONS-SCNC: 11 MMOL/L (ref 7–15)
BUN SERPL-MCNC: 13.7 MG/DL (ref 8–23)
CALCIUM SERPL-MCNC: 9.4 MG/DL (ref 8.8–10.4)
CHLORIDE SERPL-SCNC: 103 MMOL/L (ref 98–107)
CREAT SERPL-MCNC: 0.87 MG/DL (ref 0.67–1.17)
EGFRCR SERPLBLD CKD-EPI 2021: 82 ML/MIN/1.73M2
ERYTHROCYTE [DISTWIDTH] IN BLOOD BY AUTOMATED COUNT: 12.7 % (ref 10–15)
EST. AVERAGE GLUCOSE BLD GHB EST-MCNC: 111 MG/DL
GLUCOSE SERPL-MCNC: 112 MG/DL (ref 70–99)
HBA1C MFR BLD: 5.5 %
HCO3 SERPL-SCNC: 25 MMOL/L (ref 22–29)
HCT VFR BLD AUTO: 46.8 % (ref 40–53)
HGB BLD-MCNC: 15.1 G/DL (ref 13.3–17.7)
MCH RBC QN AUTO: 31.9 PG (ref 26.5–33)
MCHC RBC AUTO-ENTMCNC: 32.3 G/DL (ref 31.5–36.5)
MCV RBC AUTO: 99 FL (ref 78–100)
PHOSPHATE SERPL-MCNC: 3.7 MG/DL (ref 2.5–4.5)
PLATELET # BLD AUTO: 155 10E3/UL (ref 150–450)
POTASSIUM SERPL-SCNC: 4.3 MMOL/L (ref 3.4–5.3)
RBC # BLD AUTO: 4.73 10E6/UL (ref 4.4–5.9)
SODIUM SERPL-SCNC: 139 MMOL/L (ref 135–145)
WBC # BLD AUTO: 7.3 10E3/UL (ref 4–11)

## 2025-04-17 PROCEDURE — P9604 ONE-WAY ALLOW PRORATED TRIP: HCPCS | Mod: ORL | Performed by: FAMILY MEDICINE

## 2025-04-17 PROCEDURE — 83036 HEMOGLOBIN GLYCOSYLATED A1C: CPT | Mod: ORL | Performed by: FAMILY MEDICINE

## 2025-04-17 PROCEDURE — 85027 COMPLETE CBC AUTOMATED: CPT | Mod: ORL | Performed by: FAMILY MEDICINE

## 2025-04-17 PROCEDURE — 80069 RENAL FUNCTION PANEL: CPT | Mod: ORL | Performed by: FAMILY MEDICINE

## 2025-04-17 PROCEDURE — 36415 COLL VENOUS BLD VENIPUNCTURE: CPT | Mod: ORL | Performed by: FAMILY MEDICINE

## (undated) RX ORDER — HEPARIN SODIUM 200 [USP'U]/100ML
INJECTION, SOLUTION INTRAVENOUS
Status: DISPENSED
Start: 2024-02-20

## (undated) RX ORDER — FENTANYL CITRATE 50 UG/ML
INJECTION, SOLUTION INTRAMUSCULAR; INTRAVENOUS
Status: DISPENSED
Start: 2024-02-21

## (undated) RX ORDER — BETAMETHASONE SODIUM PHOSPHATE AND BETAMETHASONE ACETATE 3; 3 MG/ML; MG/ML
INJECTION, SUSPENSION INTRA-ARTICULAR; INTRALESIONAL; INTRAMUSCULAR; SOFT TISSUE
Status: DISPENSED
Start: 2022-12-20

## (undated) RX ORDER — LIDOCAINE HYDROCHLORIDE 10 MG/ML
INJECTION, SOLUTION INFILTRATION; PERINEURAL
Status: DISPENSED
Start: 2024-02-20

## (undated) RX ORDER — DEXAMETHASONE SODIUM PHOSPHATE 10 MG/ML
INJECTION, SOLUTION INTRAMUSCULAR; INTRAVENOUS
Status: DISPENSED
Start: 2022-12-20

## (undated) RX ORDER — LIDOCAINE HYDROCHLORIDE 10 MG/ML
INJECTION, SOLUTION EPIDURAL; INFILTRATION; INTRACAUDAL; PERINEURAL
Status: DISPENSED
Start: 2022-12-20